# Patient Record
Sex: MALE | Race: WHITE | Employment: PART TIME | URBAN - METROPOLITAN AREA
[De-identification: names, ages, dates, MRNs, and addresses within clinical notes are randomized per-mention and may not be internally consistent; named-entity substitution may affect disease eponyms.]

---

## 2017-03-14 ENCOUNTER — OFFICE VISIT (OUTPATIENT)
Dept: PRIMARY CARE CLINIC | Age: 77
End: 2017-03-14

## 2017-03-14 VITALS
OXYGEN SATURATION: 95 % | RESPIRATION RATE: 14 BRPM | HEIGHT: 75 IN | DIASTOLIC BLOOD PRESSURE: 74 MMHG | WEIGHT: 315 LBS | TEMPERATURE: 97.1 F | BODY MASS INDEX: 39.17 KG/M2 | HEART RATE: 63 BPM | SYSTOLIC BLOOD PRESSURE: 128 MMHG

## 2017-03-14 DIAGNOSIS — E66.01 MORBID OBESITY WITH BMI OF 40.0-44.9, ADULT (HCC): ICD-10-CM

## 2017-03-14 DIAGNOSIS — G47.33 OSA (OBSTRUCTIVE SLEEP APNEA): ICD-10-CM

## 2017-03-14 DIAGNOSIS — I65.23 CAROTID STENOSIS, ASYMPTOMATIC, BILATERAL: ICD-10-CM

## 2017-03-14 DIAGNOSIS — E03.9 HYPOTHYROIDISM (ACQUIRED): ICD-10-CM

## 2017-03-14 DIAGNOSIS — I73.9 PERIPHERAL VASCULAR DISEASE (HCC): ICD-10-CM

## 2017-03-14 DIAGNOSIS — E66.01 MORBID OBESITY DUE TO EXCESS CALORIES (HCC): ICD-10-CM

## 2017-03-14 DIAGNOSIS — R55 NEAR SYNCOPE: Primary | ICD-10-CM

## 2017-03-14 DIAGNOSIS — I10 HTN (HYPERTENSION), BENIGN: ICD-10-CM

## 2017-03-14 DIAGNOSIS — R55 NEAR SYNCOPE: ICD-10-CM

## 2017-03-14 DIAGNOSIS — Z86.010 HISTORY OF COLONIC POLYPS: ICD-10-CM

## 2017-03-14 LAB
ALBUMIN SERPL-MCNC: 4.2 G/DL (ref 3.9–4.9)
ALP BLD-CCNC: 61 U/L (ref 35–104)
ALT SERPL-CCNC: 50 U/L (ref 0–41)
ANION GAP SERPL CALCULATED.3IONS-SCNC: 13 MEQ/L (ref 7–13)
AST SERPL-CCNC: 36 U/L (ref 0–40)
BASOPHILS ABSOLUTE: 0 K/UL (ref 0–0.2)
BASOPHILS RELATIVE PERCENT: 0.7 %
BILIRUB SERPL-MCNC: 0.5 MG/DL (ref 0–1.2)
BILIRUBIN, POC: NORMAL
BLOOD URINE, POC: NORMAL
BUN BLDV-MCNC: 23 MG/DL (ref 8–23)
CALCIUM SERPL-MCNC: 9.4 MG/DL (ref 8.6–10.2)
CHLORIDE BLD-SCNC: 103 MEQ/L (ref 98–107)
CLARITY, POC: CLEAR
CO2: 21 MEQ/L (ref 22–29)
COLOR, POC: YELLOW
CREAT SERPL-MCNC: 1.33 MG/DL (ref 0.7–1.2)
EOSINOPHILS ABSOLUTE: 0.2 K/UL (ref 0–0.7)
EOSINOPHILS RELATIVE PERCENT: 4.5 %
GFR AFRICAN AMERICAN: >60
GFR NON-AFRICAN AMERICAN: 52.2
GLOBULIN: 2.5 G/DL (ref 2.3–3.5)
GLUCOSE BLD-MCNC: 104 MG/DL (ref 74–109)
GLUCOSE URINE, POC: NORMAL
HCT VFR BLD CALC: 45.3 % (ref 42–52)
HEMOGLOBIN: 15 G/DL (ref 14–18)
KETONES, POC: NORMAL
LEUKOCYTE EST, POC: NORMAL
LYMPHOCYTES ABSOLUTE: 1.6 K/UL (ref 1–4.8)
LYMPHOCYTES RELATIVE PERCENT: 29.7 %
MCH RBC QN AUTO: 29.7 PG (ref 27–31.3)
MCHC RBC AUTO-ENTMCNC: 33 % (ref 33–37)
MCV RBC AUTO: 90.1 FL (ref 80–100)
MONOCYTES ABSOLUTE: 0.5 K/UL (ref 0.2–0.8)
MONOCYTES RELATIVE PERCENT: 8.4 %
NEUTROPHILS ABSOLUTE: 3.1 K/UL (ref 1.4–6.5)
NEUTROPHILS RELATIVE PERCENT: 56.7 %
NITRITE, POC: NORMAL
PDW BLD-RTO: 15.4 % (ref 11.5–14.5)
PH, POC: 6
PLATELET # BLD: 156 K/UL (ref 130–400)
POTASSIUM SERPL-SCNC: 4.1 MEQ/L (ref 3.5–5.1)
PROTEIN, POC: NORMAL
RBC # BLD: 5.03 M/UL (ref 4.7–6.1)
SODIUM BLD-SCNC: 137 MEQ/L (ref 132–144)
SPECIFIC GRAVITY, POC: 1.03
TOTAL PROTEIN: 6.7 G/DL (ref 6.4–8.1)
TSH SERPL DL<=0.05 MIU/L-ACNC: 1.76 UIU/ML (ref 0.27–4.2)
UROBILINOGEN, POC: NORMAL
WBC # BLD: 5.5 K/UL (ref 4.8–10.8)

## 2017-03-14 PROCEDURE — G8427 DOCREV CUR MEDS BY ELIG CLIN: HCPCS | Performed by: FAMILY MEDICINE

## 2017-03-14 PROCEDURE — G8598 ASA/ANTIPLAT THER USED: HCPCS | Performed by: FAMILY MEDICINE

## 2017-03-14 PROCEDURE — G8484 FLU IMMUNIZE NO ADMIN: HCPCS | Performed by: FAMILY MEDICINE

## 2017-03-14 PROCEDURE — 1123F ACP DISCUSS/DSCN MKR DOCD: CPT | Performed by: FAMILY MEDICINE

## 2017-03-14 PROCEDURE — G8417 CALC BMI ABV UP PARAM F/U: HCPCS | Performed by: FAMILY MEDICINE

## 2017-03-14 PROCEDURE — 93000 ELECTROCARDIOGRAM COMPLETE: CPT | Performed by: FAMILY MEDICINE

## 2017-03-14 PROCEDURE — 1036F TOBACCO NON-USER: CPT | Performed by: FAMILY MEDICINE

## 2017-03-14 PROCEDURE — 81002 URINALYSIS NONAUTO W/O SCOPE: CPT | Performed by: FAMILY MEDICINE

## 2017-03-14 PROCEDURE — 4040F PNEUMOC VAC/ADMIN/RCVD: CPT | Performed by: FAMILY MEDICINE

## 2017-03-14 PROCEDURE — 99214 OFFICE O/P EST MOD 30 MIN: CPT | Performed by: FAMILY MEDICINE

## 2017-03-14 ASSESSMENT — ENCOUNTER SYMPTOMS
CONSTIPATION: 0
ABDOMINAL PAIN: 0
WHEEZING: 0
SHORTNESS OF BREATH: 0
COUGH: 0
DIARRHEA: 0

## 2017-03-15 ENCOUNTER — TELEPHONE (OUTPATIENT)
Dept: INTERNAL MEDICINE | Age: 77
End: 2017-03-15

## 2017-03-20 ENCOUNTER — TELEPHONE (OUTPATIENT)
Dept: PRIMARY CARE CLINIC | Age: 77
End: 2017-03-20

## 2017-04-11 ENCOUNTER — OFFICE VISIT (OUTPATIENT)
Dept: PULMONOLOGY | Age: 77
End: 2017-04-11

## 2017-04-11 VITALS
DIASTOLIC BLOOD PRESSURE: 82 MMHG | TEMPERATURE: 97.6 F | SYSTOLIC BLOOD PRESSURE: 138 MMHG | WEIGHT: 315 LBS | RESPIRATION RATE: 16 BRPM | HEIGHT: 75 IN | HEART RATE: 61 BPM | BODY MASS INDEX: 39.17 KG/M2 | OXYGEN SATURATION: 98 %

## 2017-04-11 DIAGNOSIS — G47.33 SLEEP APNEA, OBSTRUCTIVE: Primary | ICD-10-CM

## 2017-04-11 DIAGNOSIS — G47.429 NARCOLEPSY DUE TO UNDERLYING CONDITION WITHOUT CATAPLEXY: ICD-10-CM

## 2017-04-11 DIAGNOSIS — G47.19 DAYTIME HYPERSOMNOLENCE: ICD-10-CM

## 2017-04-11 PROCEDURE — 1123F ACP DISCUSS/DSCN MKR DOCD: CPT | Performed by: INTERNAL MEDICINE

## 2017-04-11 PROCEDURE — 99204 OFFICE O/P NEW MOD 45 MIN: CPT | Performed by: INTERNAL MEDICINE

## 2017-04-11 PROCEDURE — G8417 CALC BMI ABV UP PARAM F/U: HCPCS | Performed by: INTERNAL MEDICINE

## 2017-04-11 PROCEDURE — G8598 ASA/ANTIPLAT THER USED: HCPCS | Performed by: INTERNAL MEDICINE

## 2017-04-11 PROCEDURE — G8427 DOCREV CUR MEDS BY ELIG CLIN: HCPCS | Performed by: INTERNAL MEDICINE

## 2017-04-11 PROCEDURE — 1036F TOBACCO NON-USER: CPT | Performed by: INTERNAL MEDICINE

## 2017-04-11 PROCEDURE — 4040F PNEUMOC VAC/ADMIN/RCVD: CPT | Performed by: INTERNAL MEDICINE

## 2017-04-11 ASSESSMENT — ENCOUNTER SYMPTOMS
VOMITING: 0
SHORTNESS OF BREATH: 0
CHEST TIGHTNESS: 0
COUGH: 0
SORE THROAT: 0
SINUS PRESSURE: 0
WHEEZING: 0
RHINORRHEA: 0
NAUSEA: 0
ABDOMINAL PAIN: 0
DIARRHEA: 0

## 2017-04-20 PROBLEM — M47.816 SPONDYLOSIS OF LUMBAR REGION WITHOUT MYELOPATHY OR RADICULOPATHY: Status: ACTIVE | Noted: 2017-04-20

## 2017-04-20 RX ORDER — FENOFIBRATE 160 MG/1
TABLET ORAL
Qty: 90 TABLET | Refills: 0 | Status: SHIPPED | OUTPATIENT
Start: 2017-04-20 | End: 2017-07-24 | Stop reason: SDUPTHER

## 2017-04-27 ENCOUNTER — HOSPITAL ENCOUNTER (OUTPATIENT)
Dept: SLEEP CENTER | Age: 77
Discharge: HOME | End: 2017-04-27
Payer: MEDICARE

## 2017-04-27 PROCEDURE — 95811 POLYSOM 6/>YRS CPAP 4/> PARM: CPT

## 2017-04-28 ENCOUNTER — HOSPITAL ENCOUNTER (OUTPATIENT)
Dept: SLEEP CENTER | Age: 77
Discharge: HOME | End: 2017-04-28
Payer: MEDICARE

## 2017-04-28 PROCEDURE — 95805 MULTIPLE SLEEP LATENCY TEST: CPT

## 2017-05-02 DIAGNOSIS — G47.429 NARCOLEPSY DUE TO UNDERLYING CONDITION WITHOUT CATAPLEXY: ICD-10-CM

## 2017-05-07 RX ORDER — AMLODIPINE BESYLATE 5 MG/1
TABLET ORAL
Qty: 90 TABLET | Refills: 1 | Status: SHIPPED | OUTPATIENT
Start: 2017-05-07 | End: 2017-11-12 | Stop reason: SDUPTHER

## 2017-05-07 RX ORDER — LISINOPRIL 30 MG/1
TABLET ORAL
Qty: 90 TABLET | Refills: 1 | Status: SHIPPED | OUTPATIENT
Start: 2017-05-07 | End: 2017-11-12 | Stop reason: SDUPTHER

## 2017-05-10 ENCOUNTER — OFFICE VISIT (OUTPATIENT)
Dept: PRIMARY CARE CLINIC | Age: 77
End: 2017-05-10

## 2017-05-10 VITALS
TEMPERATURE: 98.9 F | HEART RATE: 70 BPM | HEIGHT: 74 IN | OXYGEN SATURATION: 98 % | BODY MASS INDEX: 40.43 KG/M2 | DIASTOLIC BLOOD PRESSURE: 86 MMHG | WEIGHT: 315 LBS | SYSTOLIC BLOOD PRESSURE: 134 MMHG

## 2017-05-10 DIAGNOSIS — N39.0 URINARY TRACT INFECTION WITHOUT HEMATURIA, SITE UNSPECIFIED: Primary | ICD-10-CM

## 2017-05-10 DIAGNOSIS — I10 HTN (HYPERTENSION), BENIGN: ICD-10-CM

## 2017-05-10 DIAGNOSIS — G47.33 OSA (OBSTRUCTIVE SLEEP APNEA): ICD-10-CM

## 2017-05-10 DIAGNOSIS — E78.2 MIXED HYPERLIPIDEMIA: ICD-10-CM

## 2017-05-10 LAB
BILIRUBIN, POC: ABNORMAL
BLOOD URINE, POC: ABNORMAL
CLARITY, POC: ABNORMAL
COLOR, POC: ABNORMAL
GLUCOSE URINE, POC: ABNORMAL
KETONES, POC: ABNORMAL
LEUKOCYTE EST, POC: ABNORMAL
NITRITE, POC: ABNORMAL
PH, POC: 6
PROTEIN, POC: ABNORMAL
SPECIFIC GRAVITY, POC: 1.03
UROBILINOGEN, POC: ABNORMAL

## 2017-05-10 PROCEDURE — G8427 DOCREV CUR MEDS BY ELIG CLIN: HCPCS | Performed by: FAMILY MEDICINE

## 2017-05-10 PROCEDURE — 99213 OFFICE O/P EST LOW 20 MIN: CPT | Performed by: FAMILY MEDICINE

## 2017-05-10 PROCEDURE — 96372 THER/PROPH/DIAG INJ SC/IM: CPT | Performed by: FAMILY MEDICINE

## 2017-05-10 PROCEDURE — 1036F TOBACCO NON-USER: CPT | Performed by: FAMILY MEDICINE

## 2017-05-10 PROCEDURE — 4040F PNEUMOC VAC/ADMIN/RCVD: CPT | Performed by: FAMILY MEDICINE

## 2017-05-10 PROCEDURE — 81003 URINALYSIS AUTO W/O SCOPE: CPT | Performed by: FAMILY MEDICINE

## 2017-05-10 PROCEDURE — 1123F ACP DISCUSS/DSCN MKR DOCD: CPT | Performed by: FAMILY MEDICINE

## 2017-05-10 PROCEDURE — G8417 CALC BMI ABV UP PARAM F/U: HCPCS | Performed by: FAMILY MEDICINE

## 2017-05-10 PROCEDURE — G8598 ASA/ANTIPLAT THER USED: HCPCS | Performed by: FAMILY MEDICINE

## 2017-05-10 RX ORDER — CIPROFLOXACIN 500 MG/1
TABLET, FILM COATED ORAL
Qty: 20 TABLET | Refills: 0 | Status: SHIPPED | OUTPATIENT
Start: 2017-05-10 | End: 2017-05-20

## 2017-05-10 RX ORDER — CEFTRIAXONE 1 G/1
1 INJECTION, POWDER, FOR SOLUTION INTRAMUSCULAR; INTRAVENOUS ONCE
Status: COMPLETED | OUTPATIENT
Start: 2017-05-10 | End: 2017-05-10

## 2017-05-10 RX ADMIN — CEFTRIAXONE 1 G: 1 INJECTION, POWDER, FOR SOLUTION INTRAMUSCULAR; INTRAVENOUS at 17:40

## 2017-05-10 ASSESSMENT — ENCOUNTER SYMPTOMS
COUGH: 0
DIARRHEA: 0
NAUSEA: 0
SHORTNESS OF BREATH: 0
VOMITING: 0
CONSTIPATION: 0
ABDOMINAL PAIN: 0
BACK PAIN: 0
SORE THROAT: 0
WHEEZING: 0

## 2017-05-11 ENCOUNTER — OFFICE VISIT (OUTPATIENT)
Dept: PULMONOLOGY | Age: 77
End: 2017-05-11

## 2017-05-11 VITALS
OXYGEN SATURATION: 96 % | HEIGHT: 75 IN | SYSTOLIC BLOOD PRESSURE: 132 MMHG | RESPIRATION RATE: 18 BRPM | HEART RATE: 58 BPM | TEMPERATURE: 97.2 F | WEIGHT: 315 LBS | DIASTOLIC BLOOD PRESSURE: 76 MMHG | BODY MASS INDEX: 39.17 KG/M2

## 2017-05-11 DIAGNOSIS — G47.33 SLEEP APNEA, OBSTRUCTIVE: Primary | ICD-10-CM

## 2017-05-11 PROCEDURE — 4040F PNEUMOC VAC/ADMIN/RCVD: CPT | Performed by: INTERNAL MEDICINE

## 2017-05-11 PROCEDURE — 1123F ACP DISCUSS/DSCN MKR DOCD: CPT | Performed by: INTERNAL MEDICINE

## 2017-05-11 PROCEDURE — 99213 OFFICE O/P EST LOW 20 MIN: CPT | Performed by: INTERNAL MEDICINE

## 2017-05-11 PROCEDURE — G8417 CALC BMI ABV UP PARAM F/U: HCPCS | Performed by: INTERNAL MEDICINE

## 2017-05-11 PROCEDURE — G8598 ASA/ANTIPLAT THER USED: HCPCS | Performed by: INTERNAL MEDICINE

## 2017-05-11 PROCEDURE — 1036F TOBACCO NON-USER: CPT | Performed by: INTERNAL MEDICINE

## 2017-05-11 PROCEDURE — G8427 DOCREV CUR MEDS BY ELIG CLIN: HCPCS | Performed by: INTERNAL MEDICINE

## 2017-05-11 ASSESSMENT — ENCOUNTER SYMPTOMS
ABDOMINAL PAIN: 0
SORE THROAT: 0
WHEEZING: 0
COUGH: 0
SINUS PRESSURE: 0
RHINORRHEA: 0
NAUSEA: 0
VOMITING: 0
DIARRHEA: 0
CHEST TIGHTNESS: 0
SHORTNESS OF BREATH: 0

## 2017-05-13 LAB
ORGANISM: ABNORMAL
URINE CULTURE, ROUTINE: ABNORMAL

## 2017-05-30 ENCOUNTER — HOSPITAL ENCOUNTER (OUTPATIENT)
Dept: SLEEP CENTER | Age: 77
Discharge: HOME | End: 2017-05-30
Payer: MEDICARE

## 2017-05-30 PROCEDURE — 95810 POLYSOM 6/> YRS 4/> PARAM: CPT

## 2017-06-21 ENCOUNTER — TELEPHONE (OUTPATIENT)
Dept: PRIMARY CARE CLINIC | Age: 77
End: 2017-06-21

## 2017-06-21 ENCOUNTER — OFFICE VISIT (OUTPATIENT)
Dept: PRIMARY CARE CLINIC | Age: 77
End: 2017-06-21

## 2017-06-21 VITALS
SYSTOLIC BLOOD PRESSURE: 120 MMHG | DIASTOLIC BLOOD PRESSURE: 78 MMHG | TEMPERATURE: 97.6 F | HEART RATE: 56 BPM | RESPIRATION RATE: 18 BRPM | BODY MASS INDEX: 41.75 KG/M2 | OXYGEN SATURATION: 93 % | HEIGHT: 73 IN | WEIGHT: 315 LBS

## 2017-06-21 DIAGNOSIS — I10 HTN (HYPERTENSION), BENIGN: ICD-10-CM

## 2017-06-21 DIAGNOSIS — E78.5 DYSLIPIDEMIA: ICD-10-CM

## 2017-06-21 DIAGNOSIS — Z12.5 PROSTATE CANCER SCREENING: ICD-10-CM

## 2017-06-21 DIAGNOSIS — M47.816 SPONDYLOSIS OF LUMBAR REGION WITHOUT MYELOPATHY OR RADICULOPATHY: ICD-10-CM

## 2017-06-21 DIAGNOSIS — M17.11 PRIMARY OSTEOARTHRITIS OF RIGHT KNEE: Primary | ICD-10-CM

## 2017-06-21 LAB
ALBUMIN SERPL-MCNC: 4.1 G/DL (ref 3.9–4.9)
ALP BLD-CCNC: 60 U/L (ref 35–104)
ALT SERPL-CCNC: 41 U/L (ref 0–41)
ANION GAP SERPL CALCULATED.3IONS-SCNC: 14 MEQ/L (ref 7–13)
AST SERPL-CCNC: 28 U/L (ref 0–40)
BILIRUB SERPL-MCNC: 0.6 MG/DL (ref 0–1.2)
BUN BLDV-MCNC: 20 MG/DL (ref 8–23)
CALCIUM SERPL-MCNC: 9 MG/DL (ref 8.6–10.2)
CHLORIDE BLD-SCNC: 105 MEQ/L (ref 98–107)
CHOLESTEROL, TOTAL: 134 MG/DL (ref 0–199)
CO2: 22 MEQ/L (ref 22–29)
CREAT SERPL-MCNC: 1.04 MG/DL (ref 0.7–1.2)
GFR AFRICAN AMERICAN: >60
GFR NON-AFRICAN AMERICAN: >60
GLOBULIN: 2.4 G/DL (ref 2.3–3.5)
GLUCOSE BLD-MCNC: 117 MG/DL (ref 74–109)
HDLC SERPL-MCNC: 28 MG/DL (ref 40–59)
LDL CHOLESTEROL CALCULATED: 83 MG/DL (ref 0–129)
POTASSIUM SERPL-SCNC: 4.4 MEQ/L (ref 3.5–5.1)
PROSTATE SPECIFIC ANTIGEN: 0.01 NG/ML (ref 0–6.22)
SODIUM BLD-SCNC: 141 MEQ/L (ref 132–144)
TOTAL PROTEIN: 6.5 G/DL (ref 6.4–8.1)
TRIGL SERPL-MCNC: 117 MG/DL (ref 0–200)

## 2017-06-21 PROCEDURE — 1123F ACP DISCUSS/DSCN MKR DOCD: CPT | Performed by: FAMILY MEDICINE

## 2017-06-21 PROCEDURE — G8427 DOCREV CUR MEDS BY ELIG CLIN: HCPCS | Performed by: FAMILY MEDICINE

## 2017-06-21 PROCEDURE — G8598 ASA/ANTIPLAT THER USED: HCPCS | Performed by: FAMILY MEDICINE

## 2017-06-21 PROCEDURE — G8417 CALC BMI ABV UP PARAM F/U: HCPCS | Performed by: FAMILY MEDICINE

## 2017-06-21 PROCEDURE — 99213 OFFICE O/P EST LOW 20 MIN: CPT | Performed by: FAMILY MEDICINE

## 2017-06-21 PROCEDURE — 1036F TOBACCO NON-USER: CPT | Performed by: FAMILY MEDICINE

## 2017-06-21 PROCEDURE — 4040F PNEUMOC VAC/ADMIN/RCVD: CPT | Performed by: FAMILY MEDICINE

## 2017-06-21 ASSESSMENT — ENCOUNTER SYMPTOMS
WHEEZING: 0
BACK PAIN: 0
DIARRHEA: 0
NAUSEA: 0
SORE THROAT: 0
CONSTIPATION: 0
ABDOMINAL PAIN: 0
COUGH: 0
SHORTNESS OF BREATH: 0
VOMITING: 0

## 2017-06-23 DIAGNOSIS — M47.816 SPONDYLOSIS OF LUMBAR REGION WITHOUT MYELOPATHY OR RADICULOPATHY: ICD-10-CM

## 2017-06-23 RX ORDER — TRAMADOL HYDROCHLORIDE 50 MG/1
50 TABLET ORAL EVERY 8 HOURS PRN
Qty: 90 TABLET | Refills: 0 | Status: SHIPPED | OUTPATIENT
Start: 2017-06-23 | End: 2017-07-23

## 2017-07-07 DIAGNOSIS — L02.212 ABSCESS OF BACK: ICD-10-CM

## 2017-07-09 LAB
GRAM STAIN RESULT: ABNORMAL
WOUND/ABSCESS: ABNORMAL

## 2017-07-10 ENCOUNTER — OFFICE VISIT (OUTPATIENT)
Dept: PRIMARY CARE CLINIC | Age: 77
End: 2017-07-10

## 2017-07-10 VITALS
TEMPERATURE: 98.3 F | DIASTOLIC BLOOD PRESSURE: 66 MMHG | HEART RATE: 60 BPM | SYSTOLIC BLOOD PRESSURE: 126 MMHG | HEIGHT: 73 IN | BODY MASS INDEX: 41.75 KG/M2 | RESPIRATION RATE: 14 BRPM | OXYGEN SATURATION: 93 % | WEIGHT: 315 LBS

## 2017-07-10 DIAGNOSIS — L72.3 INFLAMED SEBACEOUS CYST: Primary | ICD-10-CM

## 2017-07-10 DIAGNOSIS — G47.33 OSA (OBSTRUCTIVE SLEEP APNEA): ICD-10-CM

## 2017-07-10 DIAGNOSIS — I10 HTN (HYPERTENSION), BENIGN: ICD-10-CM

## 2017-07-10 PROCEDURE — G8598 ASA/ANTIPLAT THER USED: HCPCS | Performed by: FAMILY MEDICINE

## 2017-07-10 PROCEDURE — 4040F PNEUMOC VAC/ADMIN/RCVD: CPT | Performed by: FAMILY MEDICINE

## 2017-07-10 PROCEDURE — G8417 CALC BMI ABV UP PARAM F/U: HCPCS | Performed by: FAMILY MEDICINE

## 2017-07-10 PROCEDURE — 99213 OFFICE O/P EST LOW 20 MIN: CPT | Performed by: FAMILY MEDICINE

## 2017-07-10 PROCEDURE — 1123F ACP DISCUSS/DSCN MKR DOCD: CPT | Performed by: FAMILY MEDICINE

## 2017-07-10 PROCEDURE — G8427 DOCREV CUR MEDS BY ELIG CLIN: HCPCS | Performed by: FAMILY MEDICINE

## 2017-07-10 PROCEDURE — 1036F TOBACCO NON-USER: CPT | Performed by: FAMILY MEDICINE

## 2017-07-10 ASSESSMENT — ENCOUNTER SYMPTOMS
COUGH: 0
CHEST TIGHTNESS: 0
GASTROINTESTINAL NEGATIVE: 1
SHORTNESS OF BREATH: 0
EYES NEGATIVE: 1
NAUSEA: 0
PHOTOPHOBIA: 0
VOMITING: 0
BACK PAIN: 0
APNEA: 0
BLOOD IN STOOL: 0
CONSTIPATION: 0
DIARRHEA: 0
RESPIRATORY NEGATIVE: 1
ABDOMINAL PAIN: 0
EYE DISCHARGE: 0

## 2017-07-14 ENCOUNTER — OFFICE VISIT (OUTPATIENT)
Dept: PRIMARY CARE CLINIC | Age: 77
End: 2017-07-14

## 2017-07-14 VITALS
DIASTOLIC BLOOD PRESSURE: 62 MMHG | RESPIRATION RATE: 16 BRPM | WEIGHT: 315 LBS | SYSTOLIC BLOOD PRESSURE: 112 MMHG | HEART RATE: 60 BPM | BODY MASS INDEX: 45.24 KG/M2 | TEMPERATURE: 97.7 F

## 2017-07-14 DIAGNOSIS — L72.3 INFECTED SEBACEOUS CYST OF SKIN: Primary | ICD-10-CM

## 2017-07-14 DIAGNOSIS — L08.9 INFECTED SEBACEOUS CYST OF SKIN: Primary | ICD-10-CM

## 2017-07-14 DIAGNOSIS — I10 HTN (HYPERTENSION), BENIGN: ICD-10-CM

## 2017-07-14 PROCEDURE — 4040F PNEUMOC VAC/ADMIN/RCVD: CPT | Performed by: FAMILY MEDICINE

## 2017-07-14 PROCEDURE — 99212 OFFICE O/P EST SF 10 MIN: CPT | Performed by: FAMILY MEDICINE

## 2017-07-14 PROCEDURE — G8598 ASA/ANTIPLAT THER USED: HCPCS | Performed by: FAMILY MEDICINE

## 2017-07-14 PROCEDURE — 1123F ACP DISCUSS/DSCN MKR DOCD: CPT | Performed by: FAMILY MEDICINE

## 2017-07-14 PROCEDURE — 1036F TOBACCO NON-USER: CPT | Performed by: FAMILY MEDICINE

## 2017-07-14 PROCEDURE — G8417 CALC BMI ABV UP PARAM F/U: HCPCS | Performed by: FAMILY MEDICINE

## 2017-07-14 PROCEDURE — G8427 DOCREV CUR MEDS BY ELIG CLIN: HCPCS | Performed by: FAMILY MEDICINE

## 2017-07-14 ASSESSMENT — ENCOUNTER SYMPTOMS
DIARRHEA: 0
RESPIRATORY NEGATIVE: 1
CHEST TIGHTNESS: 0
BACK PAIN: 0
ABDOMINAL PAIN: 0
EYE DISCHARGE: 0
VOMITING: 0
GASTROINTESTINAL NEGATIVE: 1
APNEA: 0
SHORTNESS OF BREATH: 0
PHOTOPHOBIA: 0
BLOOD IN STOOL: 0
CONSTIPATION: 0
COUGH: 0
EYES NEGATIVE: 1
NAUSEA: 0

## 2017-07-17 ENCOUNTER — OFFICE VISIT (OUTPATIENT)
Dept: PULMONOLOGY | Age: 77
End: 2017-07-17

## 2017-07-17 VITALS
TEMPERATURE: 97.6 F | HEIGHT: 75 IN | SYSTOLIC BLOOD PRESSURE: 116 MMHG | WEIGHT: 315 LBS | HEART RATE: 59 BPM | OXYGEN SATURATION: 98 % | RESPIRATION RATE: 16 BRPM | BODY MASS INDEX: 39.17 KG/M2 | DIASTOLIC BLOOD PRESSURE: 72 MMHG

## 2017-07-17 DIAGNOSIS — G47.33 SLEEP APNEA, OBSTRUCTIVE: Primary | ICD-10-CM

## 2017-07-17 PROCEDURE — G8427 DOCREV CUR MEDS BY ELIG CLIN: HCPCS | Performed by: INTERNAL MEDICINE

## 2017-07-17 PROCEDURE — G8417 CALC BMI ABV UP PARAM F/U: HCPCS | Performed by: INTERNAL MEDICINE

## 2017-07-17 PROCEDURE — 99213 OFFICE O/P EST LOW 20 MIN: CPT | Performed by: INTERNAL MEDICINE

## 2017-07-17 PROCEDURE — 4040F PNEUMOC VAC/ADMIN/RCVD: CPT | Performed by: INTERNAL MEDICINE

## 2017-07-17 PROCEDURE — 1123F ACP DISCUSS/DSCN MKR DOCD: CPT | Performed by: INTERNAL MEDICINE

## 2017-07-17 PROCEDURE — G8598 ASA/ANTIPLAT THER USED: HCPCS | Performed by: INTERNAL MEDICINE

## 2017-07-17 PROCEDURE — 1036F TOBACCO NON-USER: CPT | Performed by: INTERNAL MEDICINE

## 2017-07-17 ASSESSMENT — ENCOUNTER SYMPTOMS
COUGH: 0
SHORTNESS OF BREATH: 0
SINUS PRESSURE: 0
WHEEZING: 0
ABDOMINAL PAIN: 0
BACK PAIN: 0
DIARRHEA: 0
CHEST TIGHTNESS: 0
RHINORRHEA: 0
CONSTIPATION: 0

## 2017-07-21 ENCOUNTER — OFFICE VISIT (OUTPATIENT)
Dept: PRIMARY CARE CLINIC | Age: 77
End: 2017-07-21

## 2017-07-21 VITALS
SYSTOLIC BLOOD PRESSURE: 130 MMHG | DIASTOLIC BLOOD PRESSURE: 82 MMHG | TEMPERATURE: 97.9 F | BODY MASS INDEX: 39.17 KG/M2 | OXYGEN SATURATION: 97 % | RESPIRATION RATE: 16 BRPM | WEIGHT: 315 LBS | HEART RATE: 56 BPM | HEIGHT: 75 IN

## 2017-07-21 DIAGNOSIS — N39.0 URINARY TRACT INFECTION WITHOUT HEMATURIA, SITE UNSPECIFIED: ICD-10-CM

## 2017-07-21 DIAGNOSIS — G47.33 OSA (OBSTRUCTIVE SLEEP APNEA): ICD-10-CM

## 2017-07-21 DIAGNOSIS — I10 HTN (HYPERTENSION), BENIGN: Primary | ICD-10-CM

## 2017-07-21 LAB
BILIRUBIN, POC: NORMAL
BLOOD URINE, POC: NORMAL
CLARITY, POC: CLEAR
COLOR, POC: YELLOW
GLUCOSE URINE, POC: NORMAL
KETONES, POC: NORMAL
LEUKOCYTE EST, POC: NORMAL
NITRITE, POC: NORMAL
PH, POC: 5.5
PROTEIN, POC: NORMAL
SPECIFIC GRAVITY, POC: 1.03
UROBILINOGEN, POC: 3.5

## 2017-07-21 PROCEDURE — G8417 CALC BMI ABV UP PARAM F/U: HCPCS | Performed by: FAMILY MEDICINE

## 2017-07-21 PROCEDURE — 4040F PNEUMOC VAC/ADMIN/RCVD: CPT | Performed by: FAMILY MEDICINE

## 2017-07-21 PROCEDURE — G8598 ASA/ANTIPLAT THER USED: HCPCS | Performed by: FAMILY MEDICINE

## 2017-07-21 PROCEDURE — 81002 URINALYSIS NONAUTO W/O SCOPE: CPT | Performed by: FAMILY MEDICINE

## 2017-07-21 PROCEDURE — 99212 OFFICE O/P EST SF 10 MIN: CPT | Performed by: FAMILY MEDICINE

## 2017-07-21 PROCEDURE — 1036F TOBACCO NON-USER: CPT | Performed by: FAMILY MEDICINE

## 2017-07-21 PROCEDURE — G8427 DOCREV CUR MEDS BY ELIG CLIN: HCPCS | Performed by: FAMILY MEDICINE

## 2017-07-21 PROCEDURE — 1123F ACP DISCUSS/DSCN MKR DOCD: CPT | Performed by: FAMILY MEDICINE

## 2017-07-21 ASSESSMENT — ENCOUNTER SYMPTOMS
SORE THROAT: 0
WHEEZING: 0
NAUSEA: 0
BACK PAIN: 0
DIARRHEA: 0
ROS SKIN COMMENTS: CYST
COUGH: 0
SHORTNESS OF BREATH: 0
CONSTIPATION: 0
VOMITING: 0
ABDOMINAL PAIN: 0

## 2017-07-24 RX ORDER — FENOFIBRATE 160 MG/1
TABLET ORAL
Qty: 90 TABLET | Refills: 1 | Status: SHIPPED | OUTPATIENT
Start: 2017-07-24 | End: 2018-01-17 | Stop reason: SDUPTHER

## 2017-08-06 ASSESSMENT — ENCOUNTER SYMPTOMS: COLOR CHANGE: 1

## 2017-09-05 ENCOUNTER — OFFICE VISIT (OUTPATIENT)
Dept: PULMONOLOGY | Age: 77
End: 2017-09-05

## 2017-09-05 VITALS
DIASTOLIC BLOOD PRESSURE: 76 MMHG | WEIGHT: 315 LBS | HEART RATE: 58 BPM | OXYGEN SATURATION: 97 % | RESPIRATION RATE: 16 BRPM | BODY MASS INDEX: 39.17 KG/M2 | HEIGHT: 75 IN | TEMPERATURE: 97.5 F | SYSTOLIC BLOOD PRESSURE: 124 MMHG

## 2017-09-05 DIAGNOSIS — G47.19 DAYTIME HYPERSOMNOLENCE: ICD-10-CM

## 2017-09-05 DIAGNOSIS — G47.33 SLEEP APNEA, OBSTRUCTIVE: Primary | ICD-10-CM

## 2017-09-05 PROCEDURE — 1036F TOBACCO NON-USER: CPT | Performed by: INTERNAL MEDICINE

## 2017-09-05 PROCEDURE — G8417 CALC BMI ABV UP PARAM F/U: HCPCS | Performed by: INTERNAL MEDICINE

## 2017-09-05 PROCEDURE — 4040F PNEUMOC VAC/ADMIN/RCVD: CPT | Performed by: INTERNAL MEDICINE

## 2017-09-05 PROCEDURE — 1123F ACP DISCUSS/DSCN MKR DOCD: CPT | Performed by: INTERNAL MEDICINE

## 2017-09-05 PROCEDURE — 99213 OFFICE O/P EST LOW 20 MIN: CPT | Performed by: INTERNAL MEDICINE

## 2017-09-05 PROCEDURE — G8427 DOCREV CUR MEDS BY ELIG CLIN: HCPCS | Performed by: INTERNAL MEDICINE

## 2017-09-05 PROCEDURE — G8598 ASA/ANTIPLAT THER USED: HCPCS | Performed by: INTERNAL MEDICINE

## 2017-09-05 ASSESSMENT — ENCOUNTER SYMPTOMS
RHINORRHEA: 0
SHORTNESS OF BREATH: 0
DIARRHEA: 0
NAUSEA: 0
COUGH: 0
VOMITING: 0
WHEEZING: 0
SORE THROAT: 0
SINUS PRESSURE: 0
ABDOMINAL PAIN: 0
CHEST TIGHTNESS: 0

## 2017-09-18 ENCOUNTER — OFFICE VISIT (OUTPATIENT)
Dept: PRIMARY CARE CLINIC | Age: 77
End: 2017-09-18

## 2017-09-18 VITALS
RESPIRATION RATE: 16 BRPM | HEART RATE: 56 BPM | SYSTOLIC BLOOD PRESSURE: 134 MMHG | DIASTOLIC BLOOD PRESSURE: 70 MMHG | TEMPERATURE: 97.7 F | HEIGHT: 75 IN | BODY MASS INDEX: 39.17 KG/M2 | WEIGHT: 315 LBS

## 2017-09-18 DIAGNOSIS — M47.816 SPONDYLOSIS OF LUMBAR REGION WITHOUT MYELOPATHY OR RADICULOPATHY: ICD-10-CM

## 2017-09-18 DIAGNOSIS — I10 HTN (HYPERTENSION), BENIGN: ICD-10-CM

## 2017-09-18 DIAGNOSIS — Z01.818 PRE-OP EXAM: ICD-10-CM

## 2017-09-18 DIAGNOSIS — C61 PROSTATE CA (HCC): ICD-10-CM

## 2017-09-18 DIAGNOSIS — M17.11 PRIMARY OSTEOARTHRITIS OF RIGHT KNEE: ICD-10-CM

## 2017-09-18 DIAGNOSIS — F41.1 GAD (GENERALIZED ANXIETY DISORDER): ICD-10-CM

## 2017-09-18 DIAGNOSIS — T14.8XXA BRUISING: ICD-10-CM

## 2017-09-18 DIAGNOSIS — Z01.818 PRE-OP EXAM: Primary | ICD-10-CM

## 2017-09-18 DIAGNOSIS — G47.33 OSA (OBSTRUCTIVE SLEEP APNEA): ICD-10-CM

## 2017-09-18 LAB
ALBUMIN SERPL-MCNC: 4.3 G/DL (ref 3.9–4.9)
ALP BLD-CCNC: 54 U/L (ref 35–104)
ALT SERPL-CCNC: 56 U/L (ref 0–41)
ANION GAP SERPL CALCULATED.3IONS-SCNC: 18 MEQ/L (ref 7–13)
APTT: 29.3 SEC (ref 21.6–35.4)
AST SERPL-CCNC: 39 U/L (ref 0–40)
BILIRUB SERPL-MCNC: 0.6 MG/DL (ref 0–1.2)
BILIRUBIN, POC: NORMAL
BLOOD URINE, POC: NORMAL
BUN BLDV-MCNC: 22 MG/DL (ref 8–23)
CALCIUM SERPL-MCNC: 9.2 MG/DL (ref 8.6–10.2)
CHLORIDE BLD-SCNC: 104 MEQ/L (ref 98–107)
CLARITY, POC: CLEAR
CO2: 19 MEQ/L (ref 22–29)
COLOR, POC: YELLOW
CREAT SERPL-MCNC: 1.25 MG/DL (ref 0.7–1.2)
GFR AFRICAN AMERICAN: >60
GFR NON-AFRICAN AMERICAN: 56
GLOBULIN: 2.6 G/DL (ref 2.3–3.5)
GLUCOSE BLD-MCNC: 143 MG/DL (ref 74–109)
GLUCOSE URINE, POC: NORMAL
HCT VFR BLD CALC: 44.2 % (ref 42–52)
HEMOGLOBIN: 14.6 G/DL (ref 14–18)
INR BLD: 1.1
KETONES, POC: NORMAL
LEUKOCYTE EST, POC: NORMAL
MCH RBC QN AUTO: 29.9 PG (ref 27–31.3)
MCHC RBC AUTO-ENTMCNC: 33 % (ref 33–37)
MCV RBC AUTO: 90.7 FL (ref 80–100)
NITRITE, POC: NORMAL
PDW BLD-RTO: 15.7 % (ref 11.5–14.5)
PH, POC: 6
PLATELET # BLD: 146 K/UL (ref 130–400)
POTASSIUM SERPL-SCNC: 4.3 MEQ/L (ref 3.5–5.1)
PROTEIN, POC: NORMAL
PROTHROMBIN TIME: 11.6 SEC (ref 8.1–13.7)
RBC # BLD: 4.88 M/UL (ref 4.7–6.1)
SODIUM BLD-SCNC: 141 MEQ/L (ref 132–144)
SPECIFIC GRAVITY, POC: 1.02
TOTAL PROTEIN: 6.9 G/DL (ref 6.4–8.1)
UROBILINOGEN, POC: NORMAL
WBC # BLD: 4.4 K/UL (ref 4.8–10.8)

## 2017-09-18 PROCEDURE — G8427 DOCREV CUR MEDS BY ELIG CLIN: HCPCS | Performed by: FAMILY MEDICINE

## 2017-09-18 PROCEDURE — G8417 CALC BMI ABV UP PARAM F/U: HCPCS | Performed by: FAMILY MEDICINE

## 2017-09-18 PROCEDURE — 93000 ELECTROCARDIOGRAM COMPLETE: CPT | Performed by: FAMILY MEDICINE

## 2017-09-18 PROCEDURE — G8598 ASA/ANTIPLAT THER USED: HCPCS | Performed by: FAMILY MEDICINE

## 2017-09-18 PROCEDURE — 81003 URINALYSIS AUTO W/O SCOPE: CPT | Performed by: FAMILY MEDICINE

## 2017-09-18 PROCEDURE — 1036F TOBACCO NON-USER: CPT | Performed by: FAMILY MEDICINE

## 2017-09-18 PROCEDURE — 1123F ACP DISCUSS/DSCN MKR DOCD: CPT | Performed by: FAMILY MEDICINE

## 2017-09-18 PROCEDURE — 99214 OFFICE O/P EST MOD 30 MIN: CPT | Performed by: FAMILY MEDICINE

## 2017-09-18 PROCEDURE — 4040F PNEUMOC VAC/ADMIN/RCVD: CPT | Performed by: FAMILY MEDICINE

## 2017-09-18 ASSESSMENT — ENCOUNTER SYMPTOMS
DIARRHEA: 0
CONSTIPATION: 0
APNEA: 0
RESPIRATORY NEGATIVE: 1
EYE DISCHARGE: 0
COUGH: 0
ABDOMINAL DISTENTION: 1
PHOTOPHOBIA: 0
BACK PAIN: 0
NAUSEA: 0
VOMITING: 0
CHEST TIGHTNESS: 0
SHORTNESS OF BREATH: 0
ABDOMINAL PAIN: 0
EYES NEGATIVE: 1
BLOOD IN STOOL: 0

## 2017-09-18 ASSESSMENT — PATIENT HEALTH QUESTIONNAIRE - PHQ9
SUM OF ALL RESPONSES TO PHQ QUESTIONS 1-9: 0
1. LITTLE INTEREST OR PLEASURE IN DOING THINGS: 0
SUM OF ALL RESPONSES TO PHQ9 QUESTIONS 1 & 2: 0
2. FEELING DOWN, DEPRESSED OR HOPELESS: 0

## 2017-10-17 ENCOUNTER — TELEPHONE (OUTPATIENT)
Dept: PRIMARY CARE CLINIC | Age: 77
End: 2017-10-17

## 2017-10-17 DIAGNOSIS — G47.00 INSOMNIA, UNSPECIFIED TYPE: ICD-10-CM

## 2017-10-17 RX ORDER — ESZOPICLONE 2 MG/1
TABLET, FILM COATED ORAL
Qty: 30 TABLET | Refills: 2 | Status: ON HOLD | OUTPATIENT
Start: 2017-10-17 | End: 2018-04-05 | Stop reason: HOSPADM

## 2017-11-12 RX ORDER — AMLODIPINE BESYLATE 5 MG/1
TABLET ORAL
Qty: 90 TABLET | Refills: 1 | Status: ON HOLD | OUTPATIENT
Start: 2017-11-12 | End: 2018-04-02

## 2017-11-12 RX ORDER — LISINOPRIL 30 MG/1
TABLET ORAL
Qty: 90 TABLET | Refills: 1 | Status: SHIPPED | OUTPATIENT
Start: 2017-11-12 | End: 2018-05-05 | Stop reason: SDUPTHER

## 2018-01-19 RX ORDER — FENOFIBRATE 160 MG/1
TABLET ORAL
Qty: 90 TABLET | Refills: 1 | Status: SHIPPED | OUTPATIENT
Start: 2018-01-19 | End: 2018-08-08 | Stop reason: SDUPTHER

## 2018-04-02 ENCOUNTER — HOSPITAL ENCOUNTER (INPATIENT)
Age: 78
LOS: 3 days | Discharge: HOME HEALTH | DRG: 271 | End: 2018-04-05
Attending: EMERGENCY MEDICINE | Admitting: INTERNAL MEDICINE
Payer: MEDICARE

## 2018-04-02 ENCOUNTER — ANESTHESIA EVENT (OUTPATIENT)
Dept: OPERATING ROOM | Age: 78
DRG: 271 | End: 2018-04-02
Payer: MEDICARE

## 2018-04-02 DIAGNOSIS — I72.9 PSEUDOANEURYSM (HCC): Primary | ICD-10-CM

## 2018-04-02 DIAGNOSIS — D50.8 OTHER IRON DEFICIENCY ANEMIA: ICD-10-CM

## 2018-04-02 LAB
ABO/RH: NORMAL
ALBUMIN SERPL-MCNC: 3.7 G/DL (ref 3.9–4.9)
ALP BLD-CCNC: 50 U/L (ref 35–104)
ALT SERPL-CCNC: 16 U/L (ref 0–41)
ANION GAP SERPL CALCULATED.3IONS-SCNC: 13 MEQ/L (ref 7–13)
ANTIBODY SCREEN: NORMAL
APTT: 29.8 SEC (ref 21.6–35.4)
AST SERPL-CCNC: 18 U/L (ref 0–40)
BASOPHILS ABSOLUTE: 0 K/UL (ref 0–0.2)
BASOPHILS RELATIVE PERCENT: 0.3 %
BILIRUB SERPL-MCNC: 1.7 MG/DL (ref 0–1.2)
BUN BLDV-MCNC: 32 MG/DL (ref 8–23)
CALCIUM SERPL-MCNC: 8.7 MG/DL (ref 8.6–10.2)
CHLORIDE BLD-SCNC: 102 MEQ/L (ref 98–107)
CO2: 23 MEQ/L (ref 22–29)
CREAT SERPL-MCNC: 1.31 MG/DL (ref 0.7–1.2)
EKG ATRIAL RATE: 64 BPM
EKG P AXIS: 95 DEGREES
EKG P-R INTERVAL: 218 MS
EKG Q-T INTERVAL: 432 MS
EKG QRS DURATION: 112 MS
EKG QTC CALCULATION (BAZETT): 445 MS
EKG R AXIS: -13 DEGREES
EKG T AXIS: 59 DEGREES
EKG VENTRICULAR RATE: 64 BPM
EOSINOPHILS ABSOLUTE: 0.1 K/UL (ref 0–0.7)
EOSINOPHILS RELATIVE PERCENT: 1.3 %
GFR AFRICAN AMERICAN: >60
GFR NON-AFRICAN AMERICAN: 53
GLOBULIN: 2.4 G/DL (ref 2.3–3.5)
GLUCOSE BLD-MCNC: 103 MG/DL (ref 74–109)
HCT VFR BLD CALC: 22.3 % (ref 42–52)
HEMOGLOBIN: 7.6 G/DL (ref 14–18)
INR BLD: 1.1
LYMPHOCYTES ABSOLUTE: 1.3 K/UL (ref 1–4.8)
LYMPHOCYTES RELATIVE PERCENT: 18.3 %
MCH RBC QN AUTO: 29.8 PG (ref 27–31.3)
MCHC RBC AUTO-ENTMCNC: 33.9 % (ref 33–37)
MCV RBC AUTO: 87.9 FL (ref 80–100)
MONOCYTES ABSOLUTE: 0.7 K/UL (ref 0.2–0.8)
MONOCYTES RELATIVE PERCENT: 10.3 %
NEUTROPHILS ABSOLUTE: 4.9 K/UL (ref 1.4–6.5)
NEUTROPHILS RELATIVE PERCENT: 69.8 %
PDW BLD-RTO: 16.7 % (ref 11.5–14.5)
PLATELET # BLD: 225 K/UL (ref 130–400)
POTASSIUM SERPL-SCNC: 4.5 MEQ/L (ref 3.5–5.1)
PROTHROMBIN TIME: 11.9 SEC (ref 8.1–13.7)
RBC # BLD: 2.54 M/UL (ref 4.7–6.1)
SODIUM BLD-SCNC: 138 MEQ/L (ref 132–144)
TOTAL PROTEIN: 6.1 G/DL (ref 6.4–8.1)
WBC # BLD: 7 K/UL (ref 4.8–10.8)

## 2018-04-02 PROCEDURE — 86920 COMPATIBILITY TEST SPIN: CPT

## 2018-04-02 PROCEDURE — 6360000002 HC RX W HCPCS: Performed by: EMERGENCY MEDICINE

## 2018-04-02 PROCEDURE — 93005 ELECTROCARDIOGRAM TRACING: CPT

## 2018-04-02 PROCEDURE — 96374 THER/PROPH/DIAG INJ IV PUSH: CPT

## 2018-04-02 PROCEDURE — 36430 TRANSFUSION BLD/BLD COMPNT: CPT

## 2018-04-02 PROCEDURE — 85610 PROTHROMBIN TIME: CPT

## 2018-04-02 PROCEDURE — 36415 COLL VENOUS BLD VENIPUNCTURE: CPT

## 2018-04-02 PROCEDURE — 85025 COMPLETE CBC W/AUTO DIFF WBC: CPT

## 2018-04-02 PROCEDURE — 80053 COMPREHEN METABOLIC PANEL: CPT

## 2018-04-02 PROCEDURE — 2580000003 HC RX 258: Performed by: INTERNAL MEDICINE

## 2018-04-02 PROCEDURE — 6370000000 HC RX 637 (ALT 250 FOR IP): Performed by: INTERNAL MEDICINE

## 2018-04-02 PROCEDURE — 86901 BLOOD TYPING SEROLOGIC RH(D): CPT

## 2018-04-02 PROCEDURE — P9016 RBC LEUKOCYTES REDUCED: HCPCS

## 2018-04-02 PROCEDURE — 2580000003 HC RX 258: Performed by: EMERGENCY MEDICINE

## 2018-04-02 PROCEDURE — 86850 RBC ANTIBODY SCREEN: CPT

## 2018-04-02 PROCEDURE — 1210000000 HC MED SURG R&B

## 2018-04-02 PROCEDURE — 85730 THROMBOPLASTIN TIME PARTIAL: CPT

## 2018-04-02 PROCEDURE — 99285 EMERGENCY DEPT VISIT HI MDM: CPT

## 2018-04-02 PROCEDURE — 86900 BLOOD TYPING SEROLOGIC ABO: CPT

## 2018-04-02 RX ORDER — ZOLPIDEM TARTRATE 5 MG/1
5 TABLET ORAL NIGHTLY PRN
Status: DISCONTINUED | OUTPATIENT
Start: 2018-04-02 | End: 2018-04-03

## 2018-04-02 RX ORDER — 0.9 % SODIUM CHLORIDE 0.9 %
250 INTRAVENOUS SOLUTION INTRAVENOUS ONCE
Status: COMPLETED | OUTPATIENT
Start: 2018-04-02 | End: 2018-04-03

## 2018-04-02 RX ORDER — NIACIN 500 MG/1
500 TABLET, EXTENDED RELEASE ORAL NIGHTLY
Status: DISCONTINUED | OUTPATIENT
Start: 2018-04-02 | End: 2018-04-03

## 2018-04-02 RX ORDER — FERROUS SULFATE 325(65) MG
325 TABLET ORAL 2 TIMES DAILY
Status: DISCONTINUED | OUTPATIENT
Start: 2018-04-02 | End: 2018-04-03

## 2018-04-02 RX ORDER — ACETAMINOPHEN 325 MG/1
650 TABLET ORAL EVERY 4 HOURS PRN
Status: DISCONTINUED | OUTPATIENT
Start: 2018-04-02 | End: 2018-04-03

## 2018-04-02 RX ORDER — GABAPENTIN 300 MG/1
300 CAPSULE ORAL DAILY
COMMUNITY
End: 2018-10-03 | Stop reason: SDUPTHER

## 2018-04-02 RX ORDER — FENOFIBRATE 160 MG/1
160 TABLET ORAL DAILY
Status: DISCONTINUED | OUTPATIENT
Start: 2018-04-02 | End: 2018-04-03

## 2018-04-02 RX ORDER — ONDANSETRON 2 MG/ML
4 INJECTION INTRAMUSCULAR; INTRAVENOUS EVERY 6 HOURS PRN
Status: DISCONTINUED | OUTPATIENT
Start: 2018-04-02 | End: 2018-04-03

## 2018-04-02 RX ORDER — FERROUS SULFATE 325(65) MG
325 TABLET ORAL 2 TIMES DAILY
COMMUNITY

## 2018-04-02 RX ORDER — LISINOPRIL 30 MG/1
1 TABLET ORAL DAILY
Status: CANCELLED | OUTPATIENT
Start: 2018-04-02

## 2018-04-02 RX ORDER — GABAPENTIN 300 MG/1
300 CAPSULE ORAL DAILY
Status: DISCONTINUED | OUTPATIENT
Start: 2018-04-03 | End: 2018-04-03

## 2018-04-02 RX ORDER — FAMOTIDINE 20 MG/1
20 TABLET, FILM COATED ORAL 2 TIMES DAILY
Status: DISCONTINUED | OUTPATIENT
Start: 2018-04-02 | End: 2018-04-03

## 2018-04-02 RX ORDER — FUROSEMIDE 20 MG/1
20 TABLET ORAL DAILY
COMMUNITY

## 2018-04-02 RX ORDER — LANOLIN ALCOHOL/MO/W.PET/CERES
3 CREAM (GRAM) TOPICAL DAILY
Status: DISCONTINUED | OUTPATIENT
Start: 2018-04-02 | End: 2018-04-03

## 2018-04-02 RX ORDER — 0.9 % SODIUM CHLORIDE 0.9 %
1000 INTRAVENOUS SOLUTION INTRAVENOUS ONCE
Status: COMPLETED | OUTPATIENT
Start: 2018-04-02 | End: 2018-04-02

## 2018-04-02 RX ORDER — ATORVASTATIN CALCIUM 10 MG/1
10 TABLET, FILM COATED ORAL DAILY
Status: DISCONTINUED | OUTPATIENT
Start: 2018-04-02 | End: 2018-04-03

## 2018-04-02 RX ORDER — METOPROLOL SUCCINATE 25 MG/1
25 TABLET, EXTENDED RELEASE ORAL DAILY
Status: DISCONTINUED | OUTPATIENT
Start: 2018-04-02 | End: 2018-04-03

## 2018-04-02 RX ORDER — MAGNESIUM GLUCONATE 30 MG(550)
50 TABLET ORAL
COMMUNITY

## 2018-04-02 RX ORDER — ONDANSETRON 2 MG/ML
4 INJECTION INTRAMUSCULAR; INTRAVENOUS ONCE
Status: COMPLETED | OUTPATIENT
Start: 2018-04-02 | End: 2018-04-02

## 2018-04-02 RX ADMIN — ATORVASTATIN CALCIUM 10 MG: 10 TABLET, FILM COATED ORAL at 22:05

## 2018-04-02 RX ADMIN — SODIUM CHLORIDE 1000 ML: 9 INJECTION, SOLUTION INTRAVENOUS at 13:26

## 2018-04-02 RX ADMIN — ONDANSETRON 4 MG: 2 INJECTION INTRAMUSCULAR; INTRAVENOUS at 13:26

## 2018-04-02 RX ADMIN — FERROUS SULFATE TAB 325 MG (65 MG ELEMENTAL FE) 325 MG: 325 (65 FE) TAB at 22:06

## 2018-04-02 RX ADMIN — SODIUM CHLORIDE 250 ML: 9 INJECTION, SOLUTION INTRAVENOUS at 22:04

## 2018-04-02 RX ADMIN — Medication 1 MG: at 13:26

## 2018-04-02 RX ADMIN — FENOFIBRATE 160 MG: 160 TABLET ORAL at 22:04

## 2018-04-02 RX ADMIN — METOPROLOL SUCCINATE 25 MG: 25 TABLET, EXTENDED RELEASE ORAL at 22:05

## 2018-04-02 RX ADMIN — FAMOTIDINE 20 MG: 20 TABLET, FILM COATED ORAL at 22:05

## 2018-04-02 RX ADMIN — MELATONIN TAB 3 MG 3 MG: 3 TAB at 22:04

## 2018-04-02 ASSESSMENT — ENCOUNTER SYMPTOMS
DIARRHEA: 0
COUGH: 0
ABDOMINAL PAIN: 0
NAUSEA: 0
SORE THROAT: 0
SHORTNESS OF BREATH: 0
BACK PAIN: 0
VOMITING: 0

## 2018-04-02 ASSESSMENT — PAIN DESCRIPTION - LOCATION
LOCATION: GROIN
LOCATION: GROIN

## 2018-04-02 ASSESSMENT — PAIN DESCRIPTION - ORIENTATION
ORIENTATION: RIGHT
ORIENTATION: RIGHT

## 2018-04-02 ASSESSMENT — PAIN SCALES - GENERAL
PAINLEVEL_OUTOF10: 8
PAINLEVEL_OUTOF10: 4
PAINLEVEL_OUTOF10: 6

## 2018-04-02 ASSESSMENT — PAIN DESCRIPTION - PAIN TYPE
TYPE: ACUTE PAIN
TYPE: ACUTE PAIN

## 2018-04-03 ENCOUNTER — APPOINTMENT (OUTPATIENT)
Dept: GENERAL RADIOLOGY | Age: 78
DRG: 271 | End: 2018-04-03
Payer: MEDICARE

## 2018-04-03 ENCOUNTER — ANESTHESIA (OUTPATIENT)
Dept: OPERATING ROOM | Age: 78
DRG: 271 | End: 2018-04-03
Payer: MEDICARE

## 2018-04-03 VITALS — TEMPERATURE: 98.4 F | RESPIRATION RATE: 19 BRPM | OXYGEN SATURATION: 100 %

## 2018-04-03 LAB
ANION GAP SERPL CALCULATED.3IONS-SCNC: 15 MEQ/L (ref 7–13)
BASOPHILS ABSOLUTE: 0 K/UL (ref 0–0.2)
BASOPHILS RELATIVE PERCENT: 0.3 %
BUN BLDV-MCNC: 30 MG/DL (ref 8–23)
CALCIUM SERPL-MCNC: 8.8 MG/DL (ref 8.6–10.2)
CHLORIDE BLD-SCNC: 102 MEQ/L (ref 98–107)
CO2: 21 MEQ/L (ref 22–29)
CREAT SERPL-MCNC: 1.4 MG/DL (ref 0.7–1.2)
EOSINOPHILS ABSOLUTE: 0.1 K/UL (ref 0–0.7)
EOSINOPHILS RELATIVE PERCENT: 1.8 %
GFR AFRICAN AMERICAN: 59.4
GFR NON-AFRICAN AMERICAN: 49.1
GLUCOSE BLD-MCNC: 117 MG/DL (ref 74–109)
HCT VFR BLD CALC: 23 % (ref 42–52)
HCT VFR BLD CALC: 24 % (ref 42–52)
HEMOGLOBIN: 7.8 G/DL (ref 14–18)
HEMOGLOBIN: 8.2 G/DL (ref 14–18)
LACTIC ACID: 1.3 MMOL/L (ref 0.5–2.2)
LYMPHOCYTES ABSOLUTE: 1.5 K/UL (ref 1–4.8)
LYMPHOCYTES RELATIVE PERCENT: 21.5 %
MAGNESIUM: 1.9 MG/DL (ref 1.7–2.3)
MCH RBC QN AUTO: 29.9 PG (ref 27–31.3)
MCHC RBC AUTO-ENTMCNC: 33.9 % (ref 33–37)
MCV RBC AUTO: 88 FL (ref 80–100)
MONOCYTES ABSOLUTE: 0.7 K/UL (ref 0.2–0.8)
MONOCYTES RELATIVE PERCENT: 10.6 %
NEUTROPHILS ABSOLUTE: 4.6 K/UL (ref 1.4–6.5)
NEUTROPHILS RELATIVE PERCENT: 65.8 %
PDW BLD-RTO: 16.9 % (ref 11.5–14.5)
PLATELET # BLD: 215 K/UL (ref 130–400)
POTASSIUM REFLEX MAGNESIUM: 5.2 MEQ/L (ref 3.5–5.1)
RBC # BLD: 2.73 M/UL (ref 4.7–6.1)
SODIUM BLD-SCNC: 138 MEQ/L (ref 132–144)
WBC # BLD: 7.1 K/UL (ref 4.8–10.8)

## 2018-04-03 PROCEDURE — C1725 CATH, TRANSLUMIN NON-LASER: HCPCS | Performed by: THORACIC SURGERY (CARDIOTHORACIC VASCULAR SURGERY)

## 2018-04-03 PROCEDURE — 80048 BASIC METABOLIC PNL TOTAL CA: CPT

## 2018-04-03 PROCEDURE — 0JCL0ZZ EXTIRPATION OF MATTER FROM RIGHT UPPER LEG SUBCUTANEOUS TISSUE AND FASCIA, OPEN APPROACH: ICD-10-PCS | Performed by: THORACIC SURGERY (CARDIOTHORACIC VASCULAR SURGERY)

## 2018-04-03 PROCEDURE — 3600000002 HC SURGERY LEVEL 2 BASE: Performed by: THORACIC SURGERY (CARDIOTHORACIC VASCULAR SURGERY)

## 2018-04-03 PROCEDURE — 85025 COMPLETE CBC W/AUTO DIFF WBC: CPT

## 2018-04-03 PROCEDURE — 04QK0ZZ REPAIR RIGHT FEMORAL ARTERY, OPEN APPROACH: ICD-10-PCS | Performed by: THORACIC SURGERY (CARDIOTHORACIC VASCULAR SURGERY)

## 2018-04-03 PROCEDURE — 2580000003 HC RX 258: Performed by: INTERNAL MEDICINE

## 2018-04-03 PROCEDURE — 6370000000 HC RX 637 (ALT 250 FOR IP): Performed by: THORACIC SURGERY (CARDIOTHORACIC VASCULAR SURGERY)

## 2018-04-03 PROCEDURE — 2500000003 HC RX 250 WO HCPCS: Performed by: NURSE ANESTHETIST, CERTIFIED REGISTERED

## 2018-04-03 PROCEDURE — P9016 RBC LEUKOCYTES REDUCED: HCPCS

## 2018-04-03 PROCEDURE — 2580000003 HC RX 258: Performed by: THORACIC SURGERY (CARDIOTHORACIC VASCULAR SURGERY)

## 2018-04-03 PROCEDURE — 76000 FLUOROSCOPY <1 HR PHYS/QHP: CPT

## 2018-04-03 PROCEDURE — 6370000000 HC RX 637 (ALT 250 FOR IP): Performed by: INTERNAL MEDICINE

## 2018-04-03 PROCEDURE — 2000000000 HC ICU R&B

## 2018-04-03 PROCEDURE — 6360000002 HC RX W HCPCS: Performed by: INTERNAL MEDICINE

## 2018-04-03 PROCEDURE — 6360000002 HC RX W HCPCS: Performed by: NURSE ANESTHETIST, CERTIFIED REGISTERED

## 2018-04-03 PROCEDURE — 83735 ASSAY OF MAGNESIUM: CPT

## 2018-04-03 PROCEDURE — 3700000001 HC ADD 15 MINUTES (ANESTHESIA): Performed by: THORACIC SURGERY (CARDIOTHORACIC VASCULAR SURGERY)

## 2018-04-03 PROCEDURE — 04VK3DZ RESTRICTION OF RIGHT FEMORAL ARTERY WITH INTRALUMINAL DEVICE, PERCUTANEOUS APPROACH: ICD-10-PCS | Performed by: THORACIC SURGERY (CARDIOTHORACIC VASCULAR SURGERY)

## 2018-04-03 PROCEDURE — C1874 STENT, COATED/COV W/DEL SYS: HCPCS | Performed by: THORACIC SURGERY (CARDIOTHORACIC VASCULAR SURGERY)

## 2018-04-03 PROCEDURE — 2720000010 HC SURG SUPPLY STERILE: Performed by: THORACIC SURGERY (CARDIOTHORACIC VASCULAR SURGERY)

## 2018-04-03 PROCEDURE — 6360000002 HC RX W HCPCS: Performed by: THORACIC SURGERY (CARDIOTHORACIC VASCULAR SURGERY)

## 2018-04-03 PROCEDURE — 85014 HEMATOCRIT: CPT

## 2018-04-03 PROCEDURE — 85018 HEMOGLOBIN: CPT

## 2018-04-03 PROCEDURE — 6360000004 HC RX CONTRAST MEDICATION: Performed by: THORACIC SURGERY (CARDIOTHORACIC VASCULAR SURGERY)

## 2018-04-03 PROCEDURE — 6360000002 HC RX W HCPCS: Performed by: STUDENT IN AN ORGANIZED HEALTH CARE EDUCATION/TRAINING PROGRAM

## 2018-04-03 PROCEDURE — 2580000003 HC RX 258: Performed by: STUDENT IN AN ORGANIZED HEALTH CARE EDUCATION/TRAINING PROGRAM

## 2018-04-03 PROCEDURE — 2580000003 HC RX 258: Performed by: NURSE ANESTHETIST, CERTIFIED REGISTERED

## 2018-04-03 PROCEDURE — 36430 TRANSFUSION BLD/BLD COMPNT: CPT

## 2018-04-03 PROCEDURE — 2500000003 HC RX 250 WO HCPCS: Performed by: STUDENT IN AN ORGANIZED HEALTH CARE EDUCATION/TRAINING PROGRAM

## 2018-04-03 PROCEDURE — 36415 COLL VENOUS BLD VENIPUNCTURE: CPT

## 2018-04-03 PROCEDURE — 2580000003 HC RX 258

## 2018-04-03 PROCEDURE — 93010 ELECTROCARDIOGRAM REPORT: CPT | Performed by: INTERNAL MEDICINE

## 2018-04-03 PROCEDURE — 36592 COLLECT BLOOD FROM PICC: CPT

## 2018-04-03 PROCEDURE — 2700000000 HC OXYGEN THERAPY PER DAY

## 2018-04-03 PROCEDURE — 83605 ASSAY OF LACTIC ACID: CPT

## 2018-04-03 PROCEDURE — 3700000000 HC ANESTHESIA ATTENDED CARE: Performed by: THORACIC SURGERY (CARDIOTHORACIC VASCULAR SURGERY)

## 2018-04-03 PROCEDURE — 3600000012 HC SURGERY LEVEL 2 ADDTL 15MIN: Performed by: THORACIC SURGERY (CARDIOTHORACIC VASCULAR SURGERY)

## 2018-04-03 PROCEDURE — C1894 INTRO/SHEATH, NON-LASER: HCPCS | Performed by: THORACIC SURGERY (CARDIOTHORACIC VASCULAR SURGERY)

## 2018-04-03 PROCEDURE — C1769 GUIDE WIRE: HCPCS | Performed by: THORACIC SURGERY (CARDIOTHORACIC VASCULAR SURGERY)

## 2018-04-03 DEVICE — IMPLANTABLE DEVICE: Type: IMPLANTABLE DEVICE | Status: FUNCTIONAL

## 2018-04-03 RX ORDER — DIPHENHYDRAMINE HYDROCHLORIDE 50 MG/ML
12.5 INJECTION INTRAMUSCULAR; INTRAVENOUS
Status: DISCONTINUED | OUTPATIENT
Start: 2018-04-03 | End: 2018-04-03 | Stop reason: HOSPADM

## 2018-04-03 RX ORDER — MORPHINE SULFATE 2 MG/ML
1 INJECTION, SOLUTION INTRAMUSCULAR; INTRAVENOUS ONCE
Status: COMPLETED | OUTPATIENT
Start: 2018-04-03 | End: 2018-04-03

## 2018-04-03 RX ORDER — SODIUM CHLORIDE, SODIUM LACTATE, POTASSIUM CHLORIDE, CALCIUM CHLORIDE 600; 310; 30; 20 MG/100ML; MG/100ML; MG/100ML; MG/100ML
INJECTION, SOLUTION INTRAVENOUS CONTINUOUS
Status: DISCONTINUED | OUTPATIENT
Start: 2018-04-03 | End: 2018-04-03

## 2018-04-03 RX ORDER — TRAMADOL HYDROCHLORIDE 50 MG/1
50 TABLET ORAL EVERY 6 HOURS PRN
Status: DISCONTINUED | OUTPATIENT
Start: 2018-04-03 | End: 2018-04-05 | Stop reason: HOSPADM

## 2018-04-03 RX ORDER — METOCLOPRAMIDE HYDROCHLORIDE 5 MG/ML
10 INJECTION INTRAMUSCULAR; INTRAVENOUS
Status: DISCONTINUED | OUTPATIENT
Start: 2018-04-03 | End: 2018-04-03 | Stop reason: HOSPADM

## 2018-04-03 RX ORDER — PROPOFOL 10 MG/ML
INJECTION, EMULSION INTRAVENOUS PRN
Status: DISCONTINUED | OUTPATIENT
Start: 2018-04-03 | End: 2018-04-03 | Stop reason: SDUPTHER

## 2018-04-03 RX ORDER — ONDANSETRON 2 MG/ML
4 INJECTION INTRAMUSCULAR; INTRAVENOUS EVERY 6 HOURS PRN
Status: DISCONTINUED | OUTPATIENT
Start: 2018-04-03 | End: 2018-04-05 | Stop reason: HOSPADM

## 2018-04-03 RX ORDER — HYDROCODONE BITARTRATE AND ACETAMINOPHEN 5; 325 MG/1; MG/1
1 TABLET ORAL PRN
Status: DISCONTINUED | OUTPATIENT
Start: 2018-04-03 | End: 2018-04-03 | Stop reason: HOSPADM

## 2018-04-03 RX ORDER — MEPERIDINE HYDROCHLORIDE 25 MG/ML
12.5 INJECTION INTRAMUSCULAR; INTRAVENOUS; SUBCUTANEOUS EVERY 5 MIN PRN
Status: DISCONTINUED | OUTPATIENT
Start: 2018-04-03 | End: 2018-04-03 | Stop reason: HOSPADM

## 2018-04-03 RX ORDER — MAGNESIUM HYDROXIDE 1200 MG/15ML
LIQUID ORAL CONTINUOUS PRN
Status: DISCONTINUED | OUTPATIENT
Start: 2018-04-03 | End: 2018-04-03 | Stop reason: HOSPADM

## 2018-04-03 RX ORDER — FENTANYL CITRATE 50 UG/ML
50 INJECTION, SOLUTION INTRAMUSCULAR; INTRAVENOUS EVERY 10 MIN PRN
Status: DISCONTINUED | OUTPATIENT
Start: 2018-04-03 | End: 2018-04-03 | Stop reason: HOSPADM

## 2018-04-03 RX ORDER — ONDANSETRON 2 MG/ML
4 INJECTION INTRAMUSCULAR; INTRAVENOUS
Status: DISCONTINUED | OUTPATIENT
Start: 2018-04-03 | End: 2018-04-03 | Stop reason: HOSPADM

## 2018-04-03 RX ORDER — ONDANSETRON 2 MG/ML
INJECTION INTRAMUSCULAR; INTRAVENOUS PRN
Status: DISCONTINUED | OUTPATIENT
Start: 2018-04-03 | End: 2018-04-03 | Stop reason: SDUPTHER

## 2018-04-03 RX ORDER — SODIUM CHLORIDE 9 MG/ML
INJECTION, SOLUTION INTRAVENOUS CONTINUOUS PRN
Status: DISCONTINUED | OUTPATIENT
Start: 2018-04-03 | End: 2018-04-03 | Stop reason: SDUPTHER

## 2018-04-03 RX ORDER — LIDOCAINE HYDROCHLORIDE 10 MG/ML
INJECTION, SOLUTION EPIDURAL; INFILTRATION; INTRACAUDAL; PERINEURAL PRN
Status: DISCONTINUED | OUTPATIENT
Start: 2018-04-03 | End: 2018-04-03 | Stop reason: SDUPTHER

## 2018-04-03 RX ORDER — SODIUM CHLORIDE 9 MG/ML
INJECTION, SOLUTION INTRAVENOUS CONTINUOUS
Status: DISPENSED | OUTPATIENT
Start: 2018-04-03 | End: 2018-04-04

## 2018-04-03 RX ORDER — 0.9 % SODIUM CHLORIDE 0.9 %
250 INTRAVENOUS SOLUTION INTRAVENOUS ONCE
Status: DISCONTINUED | OUTPATIENT
Start: 2018-04-03 | End: 2018-04-03

## 2018-04-03 RX ORDER — ACETAMINOPHEN 325 MG/1
650 TABLET ORAL EVERY 4 HOURS PRN
Status: DISCONTINUED | OUTPATIENT
Start: 2018-04-03 | End: 2018-04-05 | Stop reason: HOSPADM

## 2018-04-03 RX ORDER — SODIUM CHLORIDE 9 MG/ML
INJECTION, SOLUTION INTRAVENOUS
Status: COMPLETED
Start: 2018-04-03 | End: 2018-04-03

## 2018-04-03 RX ORDER — ROCURONIUM BROMIDE 10 MG/ML
INJECTION, SOLUTION INTRAVENOUS PRN
Status: DISCONTINUED | OUTPATIENT
Start: 2018-04-03 | End: 2018-04-03 | Stop reason: SDUPTHER

## 2018-04-03 RX ORDER — HYDROCODONE BITARTRATE AND ACETAMINOPHEN 5; 325 MG/1; MG/1
2 TABLET ORAL PRN
Status: DISCONTINUED | OUTPATIENT
Start: 2018-04-03 | End: 2018-04-03 | Stop reason: HOSPADM

## 2018-04-03 RX ORDER — PANTOPRAZOLE SODIUM 40 MG/1
40 TABLET, DELAYED RELEASE ORAL ONCE
Status: COMPLETED | OUTPATIENT
Start: 2018-04-03 | End: 2018-04-03

## 2018-04-03 RX ORDER — DEXTROSE MONOHYDRATE 25 G/50ML
25 INJECTION, SOLUTION INTRAVENOUS PRN
Status: DISCONTINUED | OUTPATIENT
Start: 2018-04-03 | End: 2018-04-03

## 2018-04-03 RX ORDER — SODIUM CHLORIDE 9 MG/ML
INJECTION, SOLUTION INTRAVENOUS CONTINUOUS
Status: DISCONTINUED | OUTPATIENT
Start: 2018-04-03 | End: 2018-04-03

## 2018-04-03 RX ORDER — FENTANYL CITRATE 50 UG/ML
INJECTION, SOLUTION INTRAMUSCULAR; INTRAVENOUS PRN
Status: DISCONTINUED | OUTPATIENT
Start: 2018-04-03 | End: 2018-04-03 | Stop reason: SDUPTHER

## 2018-04-03 RX ORDER — CEFAZOLIN SODIUM 1 G/3ML
INJECTION, POWDER, FOR SOLUTION INTRAMUSCULAR; INTRAVENOUS PRN
Status: DISCONTINUED | OUTPATIENT
Start: 2018-04-03 | End: 2018-04-03 | Stop reason: SDUPTHER

## 2018-04-03 RX ADMIN — SODIUM CHLORIDE 250 ML: 9 INJECTION, SOLUTION INTRAVENOUS at 01:23

## 2018-04-03 RX ADMIN — PANTOPRAZOLE SODIUM 40 MG: 40 TABLET, DELAYED RELEASE ORAL at 23:24

## 2018-04-03 RX ADMIN — FENTANYL CITRATE 50 MCG: 50 INJECTION, SOLUTION INTRAMUSCULAR; INTRAVENOUS at 18:15

## 2018-04-03 RX ADMIN — ROCURONIUM BROMIDE 50 MG: 10 INJECTION INTRAVENOUS at 15:41

## 2018-04-03 RX ADMIN — SODIUM CHLORIDE: 9 INJECTION, SOLUTION INTRAVENOUS at 14:13

## 2018-04-03 RX ADMIN — SODIUM CHLORIDE, POTASSIUM CHLORIDE, SODIUM LACTATE AND CALCIUM CHLORIDE: 600; 310; 30; 20 INJECTION, SOLUTION INTRAVENOUS at 15:04

## 2018-04-03 RX ADMIN — ROCURONIUM BROMIDE 10 MG: 10 INJECTION INTRAVENOUS at 17:01

## 2018-04-03 RX ADMIN — TRAMADOL HYDROCHLORIDE 50 MG: 50 TABLET, FILM COATED ORAL at 20:41

## 2018-04-03 RX ADMIN — MORPHINE SULFATE 1 MG: 2 INJECTION, SOLUTION INTRAMUSCULAR; INTRAVENOUS at 20:58

## 2018-04-03 RX ADMIN — INSULIN HUMAN 10 UNITS: 100 INJECTION, SOLUTION PARENTERAL at 12:26

## 2018-04-03 RX ADMIN — FENTANYL CITRATE 50 MCG: 50 INJECTION, SOLUTION INTRAMUSCULAR; INTRAVENOUS at 17:20

## 2018-04-03 RX ADMIN — SODIUM CHLORIDE: 9 INJECTION, SOLUTION INTRAVENOUS at 11:45

## 2018-04-03 RX ADMIN — ROCURONIUM BROMIDE 20 MG: 10 INJECTION INTRAVENOUS at 16:12

## 2018-04-03 RX ADMIN — CALCIUM GLUCONATE 1 G: 94 INJECTION, SOLUTION INTRAVENOUS at 12:26

## 2018-04-03 RX ADMIN — CEFAZOLIN SODIUM 3000 MG: 1 INJECTION, POWDER, FOR SOLUTION INTRAMUSCULAR; INTRAVENOUS at 15:55

## 2018-04-03 RX ADMIN — ONDANSETRON 4 MG: 2 INJECTION INTRAMUSCULAR; INTRAVENOUS at 17:45

## 2018-04-03 RX ADMIN — PROPOFOL 150 MG: 10 INJECTION, EMULSION INTRAVENOUS at 15:41

## 2018-04-03 RX ADMIN — FENTANYL CITRATE 100 MCG: 50 INJECTION, SOLUTION INTRAMUSCULAR; INTRAVENOUS at 15:41

## 2018-04-03 RX ADMIN — FENTANYL CITRATE 50 MCG: 50 INJECTION, SOLUTION INTRAMUSCULAR; INTRAVENOUS at 17:45

## 2018-04-03 RX ADMIN — SUGAMMADEX 200 MG: 100 INJECTION, SOLUTION INTRAVENOUS at 18:00

## 2018-04-03 RX ADMIN — SODIUM CHLORIDE: 9 INJECTION, SOLUTION INTRAVENOUS at 20:53

## 2018-04-03 RX ADMIN — LIDOCAINE HYDROCHLORIDE 50 MG: 10 INJECTION, SOLUTION EPIDURAL; INFILTRATION; INTRACAUDAL; PERINEURAL at 15:41

## 2018-04-03 RX ADMIN — FENTANYL CITRATE 50 MCG: 50 INJECTION, SOLUTION INTRAMUSCULAR; INTRAVENOUS at 18:30

## 2018-04-03 RX ADMIN — ACETAMINOPHEN 650 MG: 325 TABLET ORAL at 20:42

## 2018-04-03 ASSESSMENT — PULMONARY FUNCTION TESTS
PIF_VALUE: 9
PIF_VALUE: 3
PIF_VALUE: 20
PIF_VALUE: 21
PIF_VALUE: 21
PIF_VALUE: 20
PIF_VALUE: 3
PIF_VALUE: 20
PIF_VALUE: 21
PIF_VALUE: 22
PIF_VALUE: 20
PIF_VALUE: 21
PIF_VALUE: 21
PIF_VALUE: 2
PIF_VALUE: 15
PIF_VALUE: 19
PIF_VALUE: 20
PIF_VALUE: 21
PIF_VALUE: 21
PIF_VALUE: 20
PIF_VALUE: 19
PIF_VALUE: 3
PIF_VALUE: 21
PIF_VALUE: 15
PIF_VALUE: 21
PIF_VALUE: 0
PIF_VALUE: 20
PIF_VALUE: 15
PIF_VALUE: 20
PIF_VALUE: 21
PIF_VALUE: 21
PIF_VALUE: 20
PIF_VALUE: 15
PIF_VALUE: 15
PIF_VALUE: 21
PIF_VALUE: 21
PIF_VALUE: 20
PIF_VALUE: 21
PIF_VALUE: 20
PIF_VALUE: 20
PIF_VALUE: 21
PIF_VALUE: 3
PIF_VALUE: 9
PIF_VALUE: 21
PIF_VALUE: 21
PIF_VALUE: 20
PIF_VALUE: 21
PIF_VALUE: 20
PIF_VALUE: 17
PIF_VALUE: 20
PIF_VALUE: 21
PIF_VALUE: 21
PIF_VALUE: 3
PIF_VALUE: 20
PIF_VALUE: 17
PIF_VALUE: 20
PIF_VALUE: 21
PIF_VALUE: 21
PIF_VALUE: 20
PIF_VALUE: 20
PIF_VALUE: 21
PIF_VALUE: 14
PIF_VALUE: 20
PIF_VALUE: 12
PIF_VALUE: 5
PIF_VALUE: 20
PIF_VALUE: 22
PIF_VALUE: 20
PIF_VALUE: 19
PIF_VALUE: 21
PIF_VALUE: 22
PIF_VALUE: 21
PIF_VALUE: 21
PIF_VALUE: 5
PIF_VALUE: 21
PIF_VALUE: 20
PIF_VALUE: 20
PIF_VALUE: 21
PIF_VALUE: 20
PIF_VALUE: 21
PIF_VALUE: 20
PIF_VALUE: 21
PIF_VALUE: 3
PIF_VALUE: 22
PIF_VALUE: 21
PIF_VALUE: 3
PIF_VALUE: 20
PIF_VALUE: 3
PIF_VALUE: 19
PIF_VALUE: 21
PIF_VALUE: 19
PIF_VALUE: 20
PIF_VALUE: 3
PIF_VALUE: 21
PIF_VALUE: 20
PIF_VALUE: 22
PIF_VALUE: 20
PIF_VALUE: 20
PIF_VALUE: 0
PIF_VALUE: 21
PIF_VALUE: 20
PIF_VALUE: 2
PIF_VALUE: 15
PIF_VALUE: 3
PIF_VALUE: 21
PIF_VALUE: 20
PIF_VALUE: 20
PIF_VALUE: 21
PIF_VALUE: 20
PIF_VALUE: 20
PIF_VALUE: 21
PIF_VALUE: 20
PIF_VALUE: 20
PIF_VALUE: 21
PIF_VALUE: 17
PIF_VALUE: 15
PIF_VALUE: 20
PIF_VALUE: 21
PIF_VALUE: 21
PIF_VALUE: 20
PIF_VALUE: 21
PIF_VALUE: 20
PIF_VALUE: 14
PIF_VALUE: 19
PIF_VALUE: 21
PIF_VALUE: 21
PIF_VALUE: 20
PIF_VALUE: 21
PIF_VALUE: 21
PIF_VALUE: 20
PIF_VALUE: 4
PIF_VALUE: 22
PIF_VALUE: 20
PIF_VALUE: 21
PIF_VALUE: 20
PIF_VALUE: 21
PIF_VALUE: 16
PIF_VALUE: 3
PIF_VALUE: 21
PIF_VALUE: 2
PIF_VALUE: 20
PIF_VALUE: 21
PIF_VALUE: 21
PIF_VALUE: 19
PIF_VALUE: 20
PIF_VALUE: 16
PIF_VALUE: 21
PIF_VALUE: 21
PIF_VALUE: 20
PIF_VALUE: 3
PIF_VALUE: 21
PIF_VALUE: 16
PIF_VALUE: 21
PIF_VALUE: 20
PIF_VALUE: 21
PIF_VALUE: 21
PIF_VALUE: 3
PIF_VALUE: 3

## 2018-04-03 ASSESSMENT — PAIN SCALES - GENERAL
PAINLEVEL_OUTOF10: 9
PAINLEVEL_OUTOF10: 8
PAINLEVEL_OUTOF10: 0

## 2018-04-03 ASSESSMENT — ENCOUNTER SYMPTOMS
CONSTIPATION: 0
DIARRHEA: 0
VOMITING: 0
NAUSEA: 0
SHORTNESS OF BREATH: 0
WHEEZING: 0

## 2018-04-04 LAB
ANION GAP SERPL CALCULATED.3IONS-SCNC: 14 MEQ/L (ref 7–13)
BLOOD BANK DISPENSE STATUS: NORMAL
BLOOD BANK PRODUCT CODE: NORMAL
BPU ID: NORMAL
BUN BLDV-MCNC: 26 MG/DL (ref 8–23)
CALCIUM SERPL-MCNC: 8.3 MG/DL (ref 8.6–10.2)
CHLORIDE BLD-SCNC: 104 MEQ/L (ref 98–107)
CO2: 20 MEQ/L (ref 22–29)
CREAT SERPL-MCNC: 1.29 MG/DL (ref 0.7–1.2)
DESCRIPTION BLOOD BANK: NORMAL
GFR AFRICAN AMERICAN: >60
GFR NON-AFRICAN AMERICAN: 54
GLUCOSE BLD-MCNC: 103 MG/DL (ref 74–109)
HCT VFR BLD CALC: 22.7 % (ref 42–52)
HEMOGLOBIN: 7.7 G/DL (ref 14–18)
MCH RBC QN AUTO: 30.4 PG (ref 27–31.3)
MCHC RBC AUTO-ENTMCNC: 34.1 % (ref 33–37)
MCV RBC AUTO: 89.2 FL (ref 80–100)
PDW BLD-RTO: 16.7 % (ref 11.5–14.5)
PLATELET # BLD: 208 K/UL (ref 130–400)
POTASSIUM REFLEX MAGNESIUM: 4.4 MEQ/L (ref 3.5–5.1)
RBC # BLD: 2.54 M/UL (ref 4.7–6.1)
SODIUM BLD-SCNC: 138 MEQ/L (ref 132–144)
WBC # BLD: 6.1 K/UL (ref 4.8–10.8)

## 2018-04-04 PROCEDURE — 2000000000 HC ICU R&B

## 2018-04-04 PROCEDURE — 80048 BASIC METABOLIC PNL TOTAL CA: CPT

## 2018-04-04 PROCEDURE — 6370000000 HC RX 637 (ALT 250 FOR IP): Performed by: INTERNAL MEDICINE

## 2018-04-04 PROCEDURE — 7100000000 HC PACU RECOVERY - FIRST 15 MIN

## 2018-04-04 PROCEDURE — 7100000001 HC PACU RECOVERY - ADDTL 15 MIN

## 2018-04-04 PROCEDURE — 6370000000 HC RX 637 (ALT 250 FOR IP): Performed by: THORACIC SURGERY (CARDIOTHORACIC VASCULAR SURGERY)

## 2018-04-04 PROCEDURE — 2580000003 HC RX 258: Performed by: THORACIC SURGERY (CARDIOTHORACIC VASCULAR SURGERY)

## 2018-04-04 PROCEDURE — 36430 TRANSFUSION BLD/BLD COMPNT: CPT

## 2018-04-04 PROCEDURE — P9016 RBC LEUKOCYTES REDUCED: HCPCS

## 2018-04-04 PROCEDURE — 85027 COMPLETE CBC AUTOMATED: CPT

## 2018-04-04 PROCEDURE — 2700000000 HC OXYGEN THERAPY PER DAY

## 2018-04-04 RX ORDER — FUROSEMIDE 10 MG/ML
20 INJECTION INTRAMUSCULAR; INTRAVENOUS 2 TIMES DAILY
Status: DISCONTINUED | OUTPATIENT
Start: 2018-04-04 | End: 2018-04-05 | Stop reason: HOSPADM

## 2018-04-04 RX ORDER — CARVEDILOL 12.5 MG/1
12.5 TABLET ORAL 2 TIMES DAILY WITH MEALS
Status: DISCONTINUED | OUTPATIENT
Start: 2018-04-05 | End: 2018-04-04

## 2018-04-04 RX ORDER — FERROUS SULFATE 325(65) MG
325 TABLET ORAL 2 TIMES DAILY
Status: DISCONTINUED | OUTPATIENT
Start: 2018-04-04 | End: 2018-04-05 | Stop reason: HOSPADM

## 2018-04-04 RX ORDER — ATORVASTATIN CALCIUM 10 MG/1
10 TABLET, FILM COATED ORAL DAILY
Status: DISCONTINUED | OUTPATIENT
Start: 2018-04-04 | End: 2018-04-05 | Stop reason: HOSPADM

## 2018-04-04 RX ORDER — NIACIN 500 MG/1
500 TABLET, EXTENDED RELEASE ORAL NIGHTLY
Status: DISCONTINUED | OUTPATIENT
Start: 2018-04-04 | End: 2018-04-05 | Stop reason: HOSPADM

## 2018-04-04 RX ORDER — GABAPENTIN 300 MG/1
300 CAPSULE ORAL DAILY
Status: DISCONTINUED | OUTPATIENT
Start: 2018-04-04 | End: 2018-04-05 | Stop reason: HOSPADM

## 2018-04-04 RX ORDER — OMEPRAZOLE 20 MG/1
20 TABLET, DELAYED RELEASE ORAL NIGHTLY
Status: DISCONTINUED | OUTPATIENT
Start: 2018-04-04 | End: 2018-04-04

## 2018-04-04 RX ORDER — PANTOPRAZOLE SODIUM 40 MG/1
40 TABLET, DELAYED RELEASE ORAL NIGHTLY
Status: DISCONTINUED | OUTPATIENT
Start: 2018-04-04 | End: 2018-04-05 | Stop reason: HOSPADM

## 2018-04-04 RX ORDER — FENOFIBRATE 160 MG/1
160 TABLET ORAL DAILY
Status: DISCONTINUED | OUTPATIENT
Start: 2018-04-04 | End: 2018-04-05 | Stop reason: HOSPADM

## 2018-04-04 RX ORDER — CARVEDILOL 6.25 MG/1
6.25 TABLET ORAL 2 TIMES DAILY WITH MEALS
Status: DISCONTINUED | OUTPATIENT
Start: 2018-04-05 | End: 2018-04-05 | Stop reason: HOSPADM

## 2018-04-04 RX ORDER — 0.9 % SODIUM CHLORIDE 0.9 %
250 INTRAVENOUS SOLUTION INTRAVENOUS ONCE
Status: COMPLETED | OUTPATIENT
Start: 2018-04-04 | End: 2018-04-05

## 2018-04-04 RX ORDER — CARVEDILOL 3.12 MG/1
3.12 TABLET ORAL 2 TIMES DAILY WITH MEALS
Status: DISCONTINUED | OUTPATIENT
Start: 2018-04-04 | End: 2018-04-04

## 2018-04-04 RX ORDER — LANOLIN ALCOHOL/MO/W.PET/CERES
3 CREAM (GRAM) TOPICAL DAILY
Status: DISCONTINUED | OUTPATIENT
Start: 2018-04-04 | End: 2018-04-05 | Stop reason: HOSPADM

## 2018-04-04 RX ADMIN — SODIUM CHLORIDE 250 ML: 9 INJECTION, SOLUTION INTRAVENOUS at 11:40

## 2018-04-04 RX ADMIN — GABAPENTIN 300 MG: 300 CAPSULE ORAL at 11:37

## 2018-04-04 RX ADMIN — FENOFIBRATE 160 MG: 160 TABLET ORAL at 11:37

## 2018-04-04 RX ADMIN — CARVEDILOL 3.12 MG: 3.12 TABLET, FILM COATED ORAL at 18:21

## 2018-04-04 RX ADMIN — TRAMADOL HYDROCHLORIDE 50 MG: 50 TABLET, FILM COATED ORAL at 20:15

## 2018-04-04 RX ADMIN — MELATONIN TAB 3 MG 3 MG: 3 TAB at 20:16

## 2018-04-04 RX ADMIN — NIACIN 500 MG: 500 TABLET, EXTENDED RELEASE ORAL at 20:16

## 2018-04-04 RX ADMIN — FERROUS SULFATE TAB 325 MG (65 MG ELEMENTAL FE) 325 MG: 325 (65 FE) TAB at 11:37

## 2018-04-04 RX ADMIN — ATORVASTATIN CALCIUM 10 MG: 10 TABLET, FILM COATED ORAL at 11:37

## 2018-04-04 RX ADMIN — PANTOPRAZOLE SODIUM 40 MG: 40 TABLET, DELAYED RELEASE ORAL at 20:59

## 2018-04-04 RX ADMIN — ACETAMINOPHEN 650 MG: 325 TABLET ORAL at 20:16

## 2018-04-04 RX ADMIN — FERROUS SULFATE TAB 325 MG (65 MG ELEMENTAL FE) 325 MG: 325 (65 FE) TAB at 18:21

## 2018-04-04 ASSESSMENT — PAIN DESCRIPTION - PAIN TYPE
TYPE: ACUTE PAIN

## 2018-04-04 ASSESSMENT — PAIN DESCRIPTION - ORIENTATION
ORIENTATION: RIGHT

## 2018-04-04 ASSESSMENT — PAIN SCALES - GENERAL
PAINLEVEL_OUTOF10: 3
PAINLEVEL_OUTOF10: 0
PAINLEVEL_OUTOF10: 5
PAINLEVEL_OUTOF10: 3
PAINLEVEL_OUTOF10: 5
PAINLEVEL_OUTOF10: 0
PAINLEVEL_OUTOF10: 3
PAINLEVEL_OUTOF10: 3
PAINLEVEL_OUTOF10: 4
PAINLEVEL_OUTOF10: 4
PAINLEVEL_OUTOF10: 0
PAINLEVEL_OUTOF10: 3
PAINLEVEL_OUTOF10: 5

## 2018-04-04 ASSESSMENT — ENCOUNTER SYMPTOMS
VOMITING: 0
DIARRHEA: 0
CONSTIPATION: 0
NAUSEA: 0
WHEEZING: 0
SHORTNESS OF BREATH: 0

## 2018-04-04 ASSESSMENT — PAIN DESCRIPTION - LOCATION
LOCATION: GROIN

## 2018-04-04 ASSESSMENT — PAIN DESCRIPTION - DESCRIPTORS
DESCRIPTORS: BURNING

## 2018-04-05 LAB
ANION GAP SERPL CALCULATED.3IONS-SCNC: 13 MEQ/L (ref 7–13)
BUN BLDV-MCNC: 24 MG/DL (ref 8–23)
CALCIUM SERPL-MCNC: 8.5 MG/DL (ref 8.6–10.2)
CHLORIDE BLD-SCNC: 104 MEQ/L (ref 98–107)
CO2: 20 MEQ/L (ref 22–29)
CREAT SERPL-MCNC: 1.13 MG/DL (ref 0.7–1.2)
GFR AFRICAN AMERICAN: >60
GFR NON-AFRICAN AMERICAN: >60
GLUCOSE BLD-MCNC: 110 MG/DL (ref 74–109)
HCT VFR BLD CALC: 29.7 % (ref 42–52)
HEMOGLOBIN: 10.1 G/DL (ref 14–18)
MCH RBC QN AUTO: 30.3 PG (ref 27–31.3)
MCHC RBC AUTO-ENTMCNC: 34.1 % (ref 33–37)
MCV RBC AUTO: 88.9 FL (ref 80–100)
PDW BLD-RTO: 16.7 % (ref 11.5–14.5)
PLATELET # BLD: 200 K/UL (ref 130–400)
POTASSIUM SERPL-SCNC: 4.4 MEQ/L (ref 3.5–5.1)
RBC # BLD: 3.35 M/UL (ref 4.7–6.1)
SODIUM BLD-SCNC: 137 MEQ/L (ref 132–144)
WBC # BLD: 6.9 K/UL (ref 4.8–10.8)

## 2018-04-05 PROCEDURE — 80048 BASIC METABOLIC PNL TOTAL CA: CPT

## 2018-04-05 PROCEDURE — 6360000002 HC RX W HCPCS: Performed by: INTERNAL MEDICINE

## 2018-04-05 PROCEDURE — 85027 COMPLETE CBC AUTOMATED: CPT

## 2018-04-05 PROCEDURE — 2700000000 HC OXYGEN THERAPY PER DAY

## 2018-04-05 PROCEDURE — 6370000000 HC RX 637 (ALT 250 FOR IP): Performed by: INTERNAL MEDICINE

## 2018-04-05 PROCEDURE — 36415 COLL VENOUS BLD VENIPUNCTURE: CPT

## 2018-04-05 RX ORDER — CARVEDILOL 6.25 MG/1
3.12 TABLET ORAL 2 TIMES DAILY WITH MEALS
Qty: 60 TABLET | Refills: 3 | Status: SHIPPED | OUTPATIENT
Start: 2018-04-05

## 2018-04-05 RX ADMIN — GABAPENTIN 300 MG: 300 CAPSULE ORAL at 09:42

## 2018-04-05 RX ADMIN — FERROUS SULFATE TAB 325 MG (65 MG ELEMENTAL FE) 325 MG: 325 (65 FE) TAB at 16:51

## 2018-04-05 RX ADMIN — FERROUS SULFATE TAB 325 MG (65 MG ELEMENTAL FE) 325 MG: 325 (65 FE) TAB at 09:41

## 2018-04-05 RX ADMIN — CARVEDILOL 6.25 MG: 6.25 TABLET, FILM COATED ORAL at 09:41

## 2018-04-05 RX ADMIN — CARVEDILOL 6.25 MG: 6.25 TABLET, FILM COATED ORAL at 16:51

## 2018-04-05 RX ADMIN — FUROSEMIDE 20 MG: 10 INJECTION, SOLUTION INTRAVENOUS at 09:41

## 2018-04-05 ASSESSMENT — ENCOUNTER SYMPTOMS
NAUSEA: 0
DIARRHEA: 0
CONSTIPATION: 0
SHORTNESS OF BREATH: 0
VOMITING: 0
WHEEZING: 0

## 2018-04-05 ASSESSMENT — PAIN SCALES - GENERAL
PAINLEVEL_OUTOF10: 0

## 2018-04-06 VITALS
OXYGEN SATURATION: 99 % | DIASTOLIC BLOOD PRESSURE: 63 MMHG | RESPIRATION RATE: 20 BRPM | WEIGHT: 315 LBS | BODY MASS INDEX: 39.17 KG/M2 | HEIGHT: 75 IN | HEART RATE: 68 BPM | SYSTOLIC BLOOD PRESSURE: 145 MMHG | TEMPERATURE: 98.2 F

## 2018-04-07 ENCOUNTER — CARE COORDINATION (OUTPATIENT)
Dept: CASE MANAGEMENT | Age: 78
End: 2018-04-07

## 2018-04-07 DIAGNOSIS — I72.9 PSEUDOANEURYSM (HCC): Primary | ICD-10-CM

## 2018-04-07 PROCEDURE — 1111F DSCHRG MED/CURRENT MED MERGE: CPT | Performed by: FAMILY MEDICINE

## 2018-04-11 ENCOUNTER — OFFICE VISIT (OUTPATIENT)
Dept: PRIMARY CARE CLINIC | Age: 78
End: 2018-04-11
Payer: MEDICARE

## 2018-04-11 VITALS
HEART RATE: 52 BPM | OXYGEN SATURATION: 96 % | SYSTOLIC BLOOD PRESSURE: 138 MMHG | HEIGHT: 75 IN | WEIGHT: 315 LBS | RESPIRATION RATE: 15 BRPM | BODY MASS INDEX: 39.17 KG/M2 | DIASTOLIC BLOOD PRESSURE: 72 MMHG | TEMPERATURE: 98.5 F

## 2018-04-11 DIAGNOSIS — I72.9 PSEUDOANEURYSM (HCC): ICD-10-CM

## 2018-04-11 DIAGNOSIS — I73.9 PERIPHERAL VASCULAR DISEASE (HCC): ICD-10-CM

## 2018-04-11 DIAGNOSIS — G47.33 OSA (OBSTRUCTIVE SLEEP APNEA): ICD-10-CM

## 2018-04-11 DIAGNOSIS — E66.01 MORBID OBESITY WITH BMI OF 40.0-44.9, ADULT (HCC): ICD-10-CM

## 2018-04-11 DIAGNOSIS — I10 HTN (HYPERTENSION), BENIGN: Primary | ICD-10-CM

## 2018-04-11 PROCEDURE — 99496 TRANSJ CARE MGMT HIGH F2F 7D: CPT | Performed by: FAMILY MEDICINE

## 2018-04-11 ASSESSMENT — ENCOUNTER SYMPTOMS
EYE DISCHARGE: 0
COUGH: 0
CONSTIPATION: 0
ABDOMINAL PAIN: 0
SHORTNESS OF BREATH: 0
GASTROINTESTINAL NEGATIVE: 1
NAUSEA: 0
RESPIRATORY NEGATIVE: 1
PHOTOPHOBIA: 0
APNEA: 0
VOMITING: 0
BLOOD IN STOOL: 0
DIARRHEA: 0
EYES NEGATIVE: 1
CHEST TIGHTNESS: 0
BACK PAIN: 0

## 2018-05-30 ENCOUNTER — OFFICE VISIT (OUTPATIENT)
Dept: PRIMARY CARE CLINIC | Age: 78
End: 2018-05-30
Payer: MEDICARE

## 2018-05-30 VITALS
OXYGEN SATURATION: 96 % | DIASTOLIC BLOOD PRESSURE: 68 MMHG | TEMPERATURE: 98.1 F | WEIGHT: 315 LBS | SYSTOLIC BLOOD PRESSURE: 138 MMHG | BODY MASS INDEX: 42.07 KG/M2 | HEART RATE: 50 BPM

## 2018-05-30 DIAGNOSIS — E55.9 VITAMIN D DEFICIENCY: ICD-10-CM

## 2018-05-30 DIAGNOSIS — I10 HTN (HYPERTENSION), BENIGN: Primary | ICD-10-CM

## 2018-05-30 DIAGNOSIS — F51.04 PSYCHOPHYSIOLOGICAL INSOMNIA: ICD-10-CM

## 2018-05-30 DIAGNOSIS — I10 HTN (HYPERTENSION), BENIGN: ICD-10-CM

## 2018-05-30 DIAGNOSIS — D51.9 ANEMIA DUE TO VITAMIN B12 DEFICIENCY, UNSPECIFIED B12 DEFICIENCY TYPE: ICD-10-CM

## 2018-05-30 DIAGNOSIS — G47.33 OSA (OBSTRUCTIVE SLEEP APNEA): ICD-10-CM

## 2018-05-30 DIAGNOSIS — M47.816 SPONDYLOSIS OF LUMBAR REGION WITHOUT MYELOPATHY OR RADICULOPATHY: ICD-10-CM

## 2018-05-30 DIAGNOSIS — I72.9 PSEUDOANEURYSM (HCC): ICD-10-CM

## 2018-05-30 LAB
ALBUMIN SERPL-MCNC: 4.5 G/DL (ref 3.9–4.9)
ALP BLD-CCNC: 71 U/L (ref 35–104)
ALT SERPL-CCNC: 26 U/L (ref 0–41)
ANION GAP SERPL CALCULATED.3IONS-SCNC: 15 MEQ/L (ref 7–13)
AST SERPL-CCNC: 23 U/L (ref 0–40)
BASOPHILS ABSOLUTE: 0 K/UL (ref 0–0.2)
BASOPHILS RELATIVE PERCENT: 0.9 %
BILIRUB SERPL-MCNC: 0.6 MG/DL (ref 0–1.2)
BUN BLDV-MCNC: 26 MG/DL (ref 8–23)
CALCIUM SERPL-MCNC: 9 MG/DL (ref 8.6–10.2)
CHLORIDE BLD-SCNC: 103 MEQ/L (ref 98–107)
CO2: 23 MEQ/L (ref 22–29)
CREAT SERPL-MCNC: 1.12 MG/DL (ref 0.7–1.2)
EOSINOPHILS ABSOLUTE: 0.2 K/UL (ref 0–0.7)
EOSINOPHILS RELATIVE PERCENT: 6.1 %
FOLATE: 3.8 NG/ML (ref 7.3–26.1)
GFR AFRICAN AMERICAN: >60
GFR NON-AFRICAN AMERICAN: >60
GLOBULIN: 2.1 G/DL (ref 2.3–3.5)
GLUCOSE BLD-MCNC: 88 MG/DL (ref 74–109)
HCT VFR BLD CALC: 44.9 % (ref 42–52)
HEMOGLOBIN: 14.7 G/DL (ref 14–18)
LYMPHOCYTES ABSOLUTE: 1.1 K/UL (ref 1–4.8)
LYMPHOCYTES RELATIVE PERCENT: 32.9 %
MCH RBC QN AUTO: 30.1 PG (ref 27–31.3)
MCHC RBC AUTO-ENTMCNC: 32.8 % (ref 33–37)
MCV RBC AUTO: 91.7 FL (ref 80–100)
MONOCYTES ABSOLUTE: 0.3 K/UL (ref 0.2–0.8)
MONOCYTES RELATIVE PERCENT: 10.1 %
NEUTROPHILS ABSOLUTE: 1.7 K/UL (ref 1.4–6.5)
NEUTROPHILS RELATIVE PERCENT: 50 %
PDW BLD-RTO: 17.6 % (ref 11.5–14.5)
PLATELET # BLD: 140 K/UL (ref 130–400)
POTASSIUM SERPL-SCNC: 4 MEQ/L (ref 3.5–5.1)
RBC # BLD: 4.9 M/UL (ref 4.7–6.1)
SLIDE REVIEW: ABNORMAL
SODIUM BLD-SCNC: 141 MEQ/L (ref 132–144)
TOTAL PROTEIN: 6.6 G/DL (ref 6.4–8.1)
VITAMIN B-12: 256 PG/ML (ref 232–1245)
VITAMIN D 25-HYDROXY: 44.2 NG/ML (ref 30–100)
WBC # BLD: 3.3 K/UL (ref 4.8–10.8)

## 2018-05-30 PROCEDURE — G8417 CALC BMI ABV UP PARAM F/U: HCPCS | Performed by: FAMILY MEDICINE

## 2018-05-30 PROCEDURE — 1123F ACP DISCUSS/DSCN MKR DOCD: CPT | Performed by: FAMILY MEDICINE

## 2018-05-30 PROCEDURE — 4040F PNEUMOC VAC/ADMIN/RCVD: CPT | Performed by: FAMILY MEDICINE

## 2018-05-30 PROCEDURE — 1036F TOBACCO NON-USER: CPT | Performed by: FAMILY MEDICINE

## 2018-05-30 PROCEDURE — G8427 DOCREV CUR MEDS BY ELIG CLIN: HCPCS | Performed by: FAMILY MEDICINE

## 2018-05-30 PROCEDURE — 99213 OFFICE O/P EST LOW 20 MIN: CPT | Performed by: FAMILY MEDICINE

## 2018-05-30 RX ORDER — FOLIC ACID 1 MG/1
1 TABLET ORAL DAILY
Qty: 30 TABLET | Refills: 3 | Status: SHIPPED | OUTPATIENT
Start: 2018-05-30 | End: 2018-07-30 | Stop reason: SDUPTHER

## 2018-05-30 RX ORDER — TEMAZEPAM 15 MG/1
15 CAPSULE ORAL NIGHTLY PRN
Qty: 30 CAPSULE | Refills: 0 | Status: SHIPPED | OUTPATIENT
Start: 2018-05-30 | End: 2018-07-03 | Stop reason: SDUPTHER

## 2018-05-30 ASSESSMENT — ENCOUNTER SYMPTOMS
NAUSEA: 0
WHEEZING: 0
CHEST TIGHTNESS: 0
CONSTIPATION: 0
SHORTNESS OF BREATH: 0
PHOTOPHOBIA: 0
STRIDOR: 0
CHOKING: 0
DIARRHEA: 0
EYE DISCHARGE: 0
ABDOMINAL PAIN: 0
EYE PAIN: 0
FACIAL SWELLING: 0
EYE REDNESS: 0
COLOR CHANGE: 0
APNEA: 0

## 2018-07-03 ENCOUNTER — OFFICE VISIT (OUTPATIENT)
Dept: PRIMARY CARE CLINIC | Age: 78
End: 2018-07-03
Payer: MEDICARE

## 2018-07-03 VITALS
SYSTOLIC BLOOD PRESSURE: 130 MMHG | BODY MASS INDEX: 39.17 KG/M2 | DIASTOLIC BLOOD PRESSURE: 86 MMHG | RESPIRATION RATE: 16 BRPM | HEART RATE: 48 BPM | OXYGEN SATURATION: 98 % | HEIGHT: 75 IN | TEMPERATURE: 97.6 F | WEIGHT: 315 LBS

## 2018-07-03 DIAGNOSIS — Z12.5 PROSTATE CANCER SCREENING: ICD-10-CM

## 2018-07-03 DIAGNOSIS — E78.5 DYSLIPIDEMIA: ICD-10-CM

## 2018-07-03 DIAGNOSIS — I10 HTN (HYPERTENSION), BENIGN: ICD-10-CM

## 2018-07-03 DIAGNOSIS — F51.04 PSYCHOPHYSIOLOGICAL INSOMNIA: Primary | ICD-10-CM

## 2018-07-03 DIAGNOSIS — G47.33 OSA (OBSTRUCTIVE SLEEP APNEA): ICD-10-CM

## 2018-07-03 DIAGNOSIS — I73.9 PERIPHERAL VASCULAR DISEASE (HCC): ICD-10-CM

## 2018-07-03 PROCEDURE — 99213 OFFICE O/P EST LOW 20 MIN: CPT | Performed by: FAMILY MEDICINE

## 2018-07-03 PROCEDURE — 1123F ACP DISCUSS/DSCN MKR DOCD: CPT | Performed by: FAMILY MEDICINE

## 2018-07-03 PROCEDURE — 4040F PNEUMOC VAC/ADMIN/RCVD: CPT | Performed by: FAMILY MEDICINE

## 2018-07-03 PROCEDURE — 1036F TOBACCO NON-USER: CPT | Performed by: FAMILY MEDICINE

## 2018-07-03 PROCEDURE — G8427 DOCREV CUR MEDS BY ELIG CLIN: HCPCS | Performed by: FAMILY MEDICINE

## 2018-07-03 PROCEDURE — G8417 CALC BMI ABV UP PARAM F/U: HCPCS | Performed by: FAMILY MEDICINE

## 2018-07-03 RX ORDER — TEMAZEPAM 15 MG/1
15 CAPSULE ORAL NIGHTLY PRN
Qty: 30 CAPSULE | Refills: 2 | Status: SHIPPED | OUTPATIENT
Start: 2018-07-03 | End: 2018-08-02

## 2018-07-03 ASSESSMENT — ENCOUNTER SYMPTOMS
RESPIRATORY NEGATIVE: 1
BLOOD IN STOOL: 0
SHORTNESS OF BREATH: 0
PHOTOPHOBIA: 0
VOMITING: 0
CONSTIPATION: 0
COUGH: 0
DIARRHEA: 0
EYES NEGATIVE: 1
CHEST TIGHTNESS: 0
BACK PAIN: 0
ABDOMINAL PAIN: 0
APNEA: 0
GASTROINTESTINAL NEGATIVE: 1
EYE DISCHARGE: 0
NAUSEA: 0

## 2018-07-03 NOTE — PROGRESS NOTES
Subjective:      Patient ID: Jl Stephenson is a 68 y.o. male who presents today for:  Chief Complaint   Patient presents with    Insomnia     pt is here today to follow up on insomnia. pt is currently taking temazepam 15 mg nightly that has helped a lot. pt will need a refill for this. HPI     Insomnia  Patient is here for a follow up and medication refill on Temazepam 15 MG nightly. Patient states this medication works well for him and has no concerns at this time. Past Medical History:   Diagnosis Date    Anxiety     Arthritis     Chronic back pain     GERD (gastroesophageal reflux disease)     Glaucoma     Hyperlipidemia     Hypertension     Prostate CA ([address] 2001    Skin cancer 2011     Past Surgical History:   Procedure Laterality Date    BACK SURGERY      CARDIAC SURGERY      CATARACT REMOVAL WITH IMPLANT  01/23/2018    DR Summer Gonsales,   RT    CATARACT REMOVAL WITH IMPLANT  01/30/2018    DR Summer Gonsales,  LT    COLONOSCOPY  05/26/2017    Taz Corbin MD    ENDOSCOPY, COLON, DIAGNOSTIC      EYE SURGERY      JOINT REPLACEMENT      [place] SKIN TISSUE PROCEDURE UNLISTED Right 4/3/2018    REPAIR OF RIGHT FEMORAL PSEUDOANEURYSM ROOM: 482 performed by Claudine Veronica MD at [address] [place] [address]       History reviewed. No pertinent family history. Social History     Social History    Marital status:      Spouse name: N/A    Number of children: N/A    Years of education: N/A     Occupational History    Not on file. Social History Main Topics    Smoking status: Former Smoker     Types: Cigarettes     Quit date: 1/1/1968    Smokeless tobacco: Former User    Alcohol use No    Drug use: No    Sexual activity: Not on file     Other Topics Concern    Not on file     Social History Narrative    No narrative on file     Allergies:  Patient has no known allergies. Review of Systems   Constitutional: Positive for fatigue. Negative for activity change, appetite change and fever.    HENT:

## 2018-07-27 ENCOUNTER — OFFICE VISIT (OUTPATIENT)
Dept: PRIMARY CARE CLINIC | Age: 78
End: 2018-07-27
Payer: MEDICARE

## 2018-07-27 VITALS
HEIGHT: 75 IN | RESPIRATION RATE: 16 BRPM | HEART RATE: 45 BPM | OXYGEN SATURATION: 93 % | WEIGHT: 315 LBS | TEMPERATURE: 97.4 F | DIASTOLIC BLOOD PRESSURE: 80 MMHG | BODY MASS INDEX: 39.17 KG/M2 | SYSTOLIC BLOOD PRESSURE: 130 MMHG

## 2018-07-27 DIAGNOSIS — R22.31 LOCALIZED SWELLING ON RIGHT HAND: ICD-10-CM

## 2018-07-27 DIAGNOSIS — I10 HTN (HYPERTENSION), BENIGN: ICD-10-CM

## 2018-07-27 DIAGNOSIS — M79.641 RIGHT HAND PAIN: ICD-10-CM

## 2018-07-27 DIAGNOSIS — M79.641 RIGHT HAND PAIN: Primary | ICD-10-CM

## 2018-07-27 DIAGNOSIS — Z12.5 PROSTATE CANCER SCREENING: ICD-10-CM

## 2018-07-27 DIAGNOSIS — E78.5 DYSLIPIDEMIA: ICD-10-CM

## 2018-07-27 DIAGNOSIS — G47.33 OSA (OBSTRUCTIVE SLEEP APNEA): ICD-10-CM

## 2018-07-27 LAB
ALBUMIN SERPL-MCNC: 4.3 G/DL (ref 3.9–4.9)
ALP BLD-CCNC: 55 U/L (ref 35–104)
ALT SERPL-CCNC: 38 U/L (ref 0–41)
ANION GAP SERPL CALCULATED.3IONS-SCNC: 18 MEQ/L (ref 7–13)
AST SERPL-CCNC: 32 U/L (ref 0–40)
BILIRUB SERPL-MCNC: 0.6 MG/DL (ref 0–1.2)
BUN BLDV-MCNC: 26 MG/DL (ref 8–23)
CALCIUM SERPL-MCNC: 9.5 MG/DL (ref 8.6–10.2)
CHLORIDE BLD-SCNC: 102 MEQ/L (ref 98–107)
CHOLESTEROL, TOTAL: 127 MG/DL (ref 0–199)
CO2: 20 MEQ/L (ref 22–29)
CREAT SERPL-MCNC: 1.34 MG/DL (ref 0.7–1.2)
GFR AFRICAN AMERICAN: >60
GFR NON-AFRICAN AMERICAN: 51.6
GLOBULIN: 2.6 G/DL (ref 2.3–3.5)
GLUCOSE BLD-MCNC: 102 MG/DL (ref 74–109)
HDLC SERPL-MCNC: 26 MG/DL (ref 40–59)
LDL CHOLESTEROL CALCULATED: 69 MG/DL (ref 0–129)
POTASSIUM SERPL-SCNC: 4.2 MEQ/L (ref 3.5–5.1)
PROSTATE SPECIFIC ANTIGEN: 0.01 NG/ML (ref 0–6.22)
RHEUMATOID FACTOR: <10 IU/ML (ref 0–14)
SEDIMENTATION RATE, ERYTHROCYTE: 2 MM (ref 0–20)
SODIUM BLD-SCNC: 140 MEQ/L (ref 132–144)
TOTAL PROTEIN: 6.9 G/DL (ref 6.4–8.1)
TRIGL SERPL-MCNC: 158 MG/DL (ref 0–200)
URIC ACID, SERUM: 7.5 MG/DL (ref 3.4–7)

## 2018-07-27 PROCEDURE — 99213 OFFICE O/P EST LOW 20 MIN: CPT | Performed by: FAMILY MEDICINE

## 2018-07-27 PROCEDURE — G8417 CALC BMI ABV UP PARAM F/U: HCPCS | Performed by: FAMILY MEDICINE

## 2018-07-27 PROCEDURE — 4040F PNEUMOC VAC/ADMIN/RCVD: CPT | Performed by: FAMILY MEDICINE

## 2018-07-27 PROCEDURE — G8427 DOCREV CUR MEDS BY ELIG CLIN: HCPCS | Performed by: FAMILY MEDICINE

## 2018-07-27 PROCEDURE — 1036F TOBACCO NON-USER: CPT | Performed by: FAMILY MEDICINE

## 2018-07-27 PROCEDURE — 1101F PT FALLS ASSESS-DOCD LE1/YR: CPT | Performed by: FAMILY MEDICINE

## 2018-07-27 PROCEDURE — 1123F ACP DISCUSS/DSCN MKR DOCD: CPT | Performed by: FAMILY MEDICINE

## 2018-07-27 ASSESSMENT — ENCOUNTER SYMPTOMS
ABDOMINAL PAIN: 0
VOMITING: 0
PHOTOPHOBIA: 0
CONSTIPATION: 0
BLOOD IN STOOL: 0
BACK PAIN: 0
NAUSEA: 0
DIARRHEA: 0
RESPIRATORY NEGATIVE: 1
COUGH: 0
GASTROINTESTINAL NEGATIVE: 1
EYE DISCHARGE: 0
APNEA: 0
SHORTNESS OF BREATH: 0
CHEST TIGHTNESS: 0
EYES NEGATIVE: 1

## 2018-07-27 NOTE — PROGRESS NOTES
tobacco: Former User    Alcohol use No    Drug use: No    Sexual activity: Not on file     Other Topics Concern    Not on file     Social History Narrative    No narrative on file     Allergies:  Patient has no known allergies. Review of Systems   Constitutional: Negative. Negative for activity change, appetite change, fatigue and fever. HENT: Negative. Negative for congestion, nosebleeds and tinnitus. Eyes: Negative. Negative for photophobia, discharge and visual disturbance. Respiratory: Negative. Negative for apnea, cough, chest tightness and shortness of breath. Cardiovascular: Negative. Negative for chest pain and palpitations. Gastrointestinal: Negative. Negative for abdominal pain, blood in stool, constipation, diarrhea, nausea and vomiting. Genitourinary: Negative. Negative for dysuria, frequency, hematuria and urgency. Musculoskeletal: Positive for arthralgias. Negative for back pain and neck pain. Skin: Negative. Negative for pallor. Neurological: Negative. Negative for dizziness, tingling, syncope, speech difficulty, weakness, light-headedness, numbness and headaches. Psychiatric/Behavioral: Negative. Objective:   /80 (Site: Right Arm, Position: Sitting, Cuff Size: Medium Adult)   Pulse (!) 45   Temp 97.4 °F (36.3 °C) (Oral)   Resp 16   Ht 6' 3\" (1.905 m)   Wt (!) 335 lb (152 kg)   SpO2 93%   BMI 41.87 kg/m²     Physical Exam   Constitutional: He is oriented to person, place, and time. He appears well-developed and well-nourished. HENT:   Head: Normocephalic and atraumatic. Eyes: Conjunctivae and EOM are normal. Pupils are equal, round, and reactive to light. Neck: Normal range of motion. Neck supple. No JVD present. No thyromegaly present. Cardiovascular: Normal rate, regular rhythm, normal heart sounds and intact distal pulses. Exam reveals no gallop. No murmur heard.   Pulmonary/Chest: Effort normal and breath sounds normal. No Electronically signed by :  Miguelina Menon DO, personally performed the services described in this documentation, as scribed by Carly Murrell in my presence, and it is both accurate and complete.  Electronically signed by: Danii Gaviria DO    8/2/18 11:19 PM    Danii Gaviria DO

## 2018-07-30 DIAGNOSIS — D51.9 ANEMIA DUE TO VITAMIN B12 DEFICIENCY, UNSPECIFIED B12 DEFICIENCY TYPE: ICD-10-CM

## 2018-07-30 RX ORDER — FOLIC ACID 1 MG/1
1 TABLET ORAL DAILY
Qty: 90 TABLET | Refills: 3 | Status: SHIPPED | OUTPATIENT
Start: 2018-07-30

## 2018-07-30 NOTE — TELEPHONE ENCOUNTER
Patient was last seen on 7-27-18  Last Prescribed 5-30-18    Medication is pending  Please approve or deny this request

## 2018-08-01 ENCOUNTER — TELEPHONE (OUTPATIENT)
Dept: PRIMARY CARE CLINIC | Age: 78
End: 2018-08-01

## 2018-08-01 NOTE — TELEPHONE ENCOUNTER
Prior Authorization: Approved      Prior authorization approved Case ID: Danelle Roca      Payer: Naomi Macdonald [phone]     [phone]      Approval Details     Authorized from July 27, 2018 to December 30, 2018      Electronic appeal:  Not supported   View History   diclofenac sodium (VOLTAREN) 1 % GEL  Apply 4 g topically 4 times daily  Dispense:  5 Tube   Refills:  0  Start:  7/27/2018   End:  8/26/2018  Class:  Normal  Diagnoses:  Right hand pain  This order has been released to its destination.

## 2018-08-08 RX ORDER — FENOFIBRATE 160 MG/1
TABLET ORAL
Qty: 90 TABLET | Refills: 0 | Status: SHIPPED | OUTPATIENT
Start: 2018-08-08 | End: 2018-11-02 | Stop reason: SDUPTHER

## 2018-09-28 DIAGNOSIS — F51.02 ADJUSTMENT INSOMNIA: Primary | ICD-10-CM

## 2018-09-30 RX ORDER — TEMAZEPAM 15 MG/1
CAPSULE ORAL
Qty: 30 CAPSULE | Refills: 2 | Status: SHIPPED | OUTPATIENT
Start: 2018-09-30 | End: 2018-10-01 | Stop reason: SDUPTHER

## 2018-10-01 DIAGNOSIS — F51.02 ADJUSTMENT INSOMNIA: ICD-10-CM

## 2018-10-02 RX ORDER — TEMAZEPAM 15 MG/1
CAPSULE ORAL
Qty: 90 CAPSULE | Refills: 0 | Status: SHIPPED | OUTPATIENT
Start: 2018-10-02 | End: 2018-11-02

## 2018-10-03 ENCOUNTER — OFFICE VISIT (OUTPATIENT)
Dept: PRIMARY CARE CLINIC | Age: 78
End: 2018-10-03
Payer: MEDICARE

## 2018-10-03 VITALS
HEIGHT: 75 IN | WEIGHT: 315 LBS | HEART RATE: 58 BPM | TEMPERATURE: 97.5 F | SYSTOLIC BLOOD PRESSURE: 120 MMHG | RESPIRATION RATE: 16 BRPM | DIASTOLIC BLOOD PRESSURE: 78 MMHG | OXYGEN SATURATION: 98 % | BODY MASS INDEX: 39.17 KG/M2

## 2018-10-03 DIAGNOSIS — E78.5 DYSLIPIDEMIA: ICD-10-CM

## 2018-10-03 DIAGNOSIS — C61 PROSTATE CA (HCC): ICD-10-CM

## 2018-10-03 DIAGNOSIS — M17.11 PRIMARY OSTEOARTHRITIS OF RIGHT KNEE: ICD-10-CM

## 2018-10-03 DIAGNOSIS — R35.0 FREQUENCY OF URINATION: Primary | ICD-10-CM

## 2018-10-03 DIAGNOSIS — I10 HTN (HYPERTENSION), BENIGN: ICD-10-CM

## 2018-10-03 LAB
BILIRUBIN, POC: NORMAL
BLOOD URINE, POC: NORMAL
CLARITY, POC: CLEAR
COLOR, POC: YELLOW
GLUCOSE URINE, POC: NORMAL
KETONES, POC: NORMAL
LEUKOCYTE EST, POC: NORMAL
NITRITE, POC: NORMAL
PH, POC: 6
PROTEIN, POC: NORMAL
SPECIFIC GRAVITY, POC: 1.02
UROBILINOGEN, POC: NORMAL

## 2018-10-03 PROCEDURE — 81003 URINALYSIS AUTO W/O SCOPE: CPT | Performed by: FAMILY MEDICINE

## 2018-10-03 PROCEDURE — 1101F PT FALLS ASSESS-DOCD LE1/YR: CPT | Performed by: FAMILY MEDICINE

## 2018-10-03 PROCEDURE — 1036F TOBACCO NON-USER: CPT | Performed by: FAMILY MEDICINE

## 2018-10-03 PROCEDURE — G8417 CALC BMI ABV UP PARAM F/U: HCPCS | Performed by: FAMILY MEDICINE

## 2018-10-03 PROCEDURE — 99214 OFFICE O/P EST MOD 30 MIN: CPT | Performed by: FAMILY MEDICINE

## 2018-10-03 PROCEDURE — G8484 FLU IMMUNIZE NO ADMIN: HCPCS | Performed by: FAMILY MEDICINE

## 2018-10-03 PROCEDURE — G8427 DOCREV CUR MEDS BY ELIG CLIN: HCPCS | Performed by: FAMILY MEDICINE

## 2018-10-03 PROCEDURE — 4040F PNEUMOC VAC/ADMIN/RCVD: CPT | Performed by: FAMILY MEDICINE

## 2018-10-03 PROCEDURE — 1123F ACP DISCUSS/DSCN MKR DOCD: CPT | Performed by: FAMILY MEDICINE

## 2018-10-03 RX ORDER — GABAPENTIN 300 MG/1
300 CAPSULE ORAL NIGHTLY
Qty: 90 CAPSULE | Refills: 1 | Status: SHIPPED | OUTPATIENT
Start: 2018-10-03 | End: 2019-01-03

## 2018-10-03 RX ORDER — TAMSULOSIN HYDROCHLORIDE 0.4 MG/1
0.4 CAPSULE ORAL DAILY
Qty: 30 CAPSULE | Refills: 5 | Status: SHIPPED | OUTPATIENT
Start: 2018-10-03 | End: 2018-11-02

## 2018-10-03 ASSESSMENT — ENCOUNTER SYMPTOMS
COUGH: 0
VOMITING: 0
BACK PAIN: 0
CHEST TIGHTNESS: 0
PHOTOPHOBIA: 0
BLURRED VISION: 0
APNEA: 0
DIARRHEA: 0
EYE DISCHARGE: 0
GASTROINTESTINAL NEGATIVE: 1
NAUSEA: 0
SHORTNESS OF BREATH: 0
ABDOMINAL PAIN: 0
BLOOD IN STOOL: 0
ORTHOPNEA: 0
RESPIRATORY NEGATIVE: 1
CONSTIPATION: 0
EYES NEGATIVE: 1

## 2018-10-03 ASSESSMENT — PATIENT HEALTH QUESTIONNAIRE - PHQ9
1. LITTLE INTEREST OR PLEASURE IN DOING THINGS: 0
2. FEELING DOWN, DEPRESSED OR HOPELESS: 0
SUM OF ALL RESPONSES TO PHQ QUESTIONS 1-9: 0
SUM OF ALL RESPONSES TO PHQ QUESTIONS 1-9: 0
SUM OF ALL RESPONSES TO PHQ9 QUESTIONS 1 & 2: 0

## 2018-10-25 ENCOUNTER — TELEPHONE (OUTPATIENT)
Dept: PRIMARY CARE CLINIC | Age: 78
End: 2018-10-25

## 2018-11-02 RX ORDER — LISINOPRIL 30 MG/1
TABLET ORAL
Qty: 90 TABLET | Refills: 1 | Status: SHIPPED | OUTPATIENT
Start: 2018-11-02

## 2018-11-02 RX ORDER — FENOFIBRATE 160 MG/1
TABLET ORAL
Qty: 90 TABLET | Refills: 1 | Status: SHIPPED | OUTPATIENT
Start: 2018-11-02

## 2019-01-18 ENCOUNTER — NURSE ONLY (OUTPATIENT)
Dept: PRIMARY CARE CLINIC | Age: 79
End: 2019-01-18
Payer: MEDICARE

## 2019-01-18 DIAGNOSIS — Z23 NEED FOR INFLUENZA VACCINATION: Primary | ICD-10-CM

## 2019-01-18 PROCEDURE — 90662 IIV NO PRSV INCREASED AG IM: CPT | Performed by: FAMILY MEDICINE

## 2019-01-18 PROCEDURE — G0008 ADMIN INFLUENZA VIRUS VAC: HCPCS | Performed by: FAMILY MEDICINE

## 2019-03-08 ENCOUNTER — OFFICE VISIT (OUTPATIENT)
Dept: PRIMARY CARE CLINIC | Age: 79
End: 2019-03-08
Payer: MEDICARE

## 2019-03-08 DIAGNOSIS — R31.9 URINARY TRACT INFECTION WITH HEMATURIA, SITE UNSPECIFIED: Primary | ICD-10-CM

## 2019-03-08 DIAGNOSIS — R31.9 URINARY TRACT INFECTION WITH HEMATURIA, SITE UNSPECIFIED: ICD-10-CM

## 2019-03-08 DIAGNOSIS — N39.0 URINARY TRACT INFECTION WITH HEMATURIA, SITE UNSPECIFIED: ICD-10-CM

## 2019-03-08 DIAGNOSIS — N39.0 URINARY TRACT INFECTION WITH HEMATURIA, SITE UNSPECIFIED: Primary | ICD-10-CM

## 2019-03-08 LAB
BILIRUBIN, POC: ABNORMAL
BLOOD URINE, POC: ABNORMAL
CLARITY, POC: ABNORMAL
COLOR, POC: YELLOW
GLUCOSE URINE, POC: ABNORMAL
KETONES, POC: ABNORMAL
LEUKOCYTE EST, POC: ABNORMAL
NITRITE, POC: ABNORMAL
PH, POC: 6
PROTEIN, POC: ABNORMAL
SPECIFIC GRAVITY, POC: 1.02
UROBILINOGEN, POC: ABNORMAL

## 2019-03-08 PROCEDURE — 81003 URINALYSIS AUTO W/O SCOPE: CPT | Performed by: FAMILY MEDICINE

## 2019-03-08 PROCEDURE — 99999 PR OFFICE/OUTPT VISIT,PROCEDURE ONLY: CPT | Performed by: FAMILY MEDICINE

## 2019-03-08 RX ORDER — CIPROFLOXACIN 500 MG/1
500 TABLET, FILM COATED ORAL 2 TIMES DAILY
Qty: 20 TABLET | Refills: 0 | Status: SHIPPED | OUTPATIENT
Start: 2019-03-08 | End: 2019-03-18

## 2019-03-08 RX ORDER — PHENAZOPYRIDINE HYDROCHLORIDE 200 MG/1
200 TABLET, FILM COATED ORAL 3 TIMES DAILY PRN
Qty: 10 TABLET | Refills: 0 | Status: SHIPPED | OUTPATIENT
Start: 2019-03-08 | End: 2019-03-11

## 2019-03-11 LAB
ORGANISM: ABNORMAL
URINE CULTURE, ROUTINE: ABNORMAL
URINE CULTURE, ROUTINE: ABNORMAL

## 2019-05-07 ENCOUNTER — TELEPHONE (OUTPATIENT)
Dept: ADMINISTRATIVE | Age: 79
End: 2019-05-07

## 2019-05-07 ENCOUNTER — TELEPHONE (OUTPATIENT)
Dept: PRIMARY CARE CLINIC | Age: 79
End: 2019-05-07

## 2019-05-07 NOTE — TELEPHONE ENCOUNTER
Spoke with patient. Will follow  Wade Mejia DO.to [facility]  Patient eligible for AWV. Left message to call back.

## 2023-02-19 PROBLEM — I25.10 CAD S/P PERCUTANEOUS CORONARY ANGIOPLASTY: Status: ACTIVE | Noted: 2023-02-19

## 2023-02-19 PROBLEM — I48.92 ATRIAL FLUTTER (MULTI): Status: ACTIVE | Noted: 2023-02-19

## 2023-02-19 PROBLEM — F33.9 DEPRESSION, RECURRENT (CMS-HCC): Status: ACTIVE | Noted: 2023-02-19

## 2023-02-19 PROBLEM — M25.551 HIP PAIN, RIGHT: Status: ACTIVE | Noted: 2023-02-19

## 2023-02-19 PROBLEM — E78.2 HYPERLIPEMIA, MIXED: Status: ACTIVE | Noted: 2023-02-19

## 2023-02-19 PROBLEM — N28.9 RENAL INSUFFICIENCY: Status: ACTIVE | Noted: 2023-02-19

## 2023-02-19 PROBLEM — M47.12 CERVICAL SPONDYLOSIS WITH MYELOPATHY: Status: ACTIVE | Noted: 2023-02-19

## 2023-02-19 PROBLEM — M47.816 SPONDYLOSIS OF LUMBAR SPINE: Status: ACTIVE | Noted: 2023-02-19

## 2023-02-19 PROBLEM — D49.6 BRAIN TUMOR (MULTI): Status: ACTIVE | Noted: 2023-02-19

## 2023-02-19 PROBLEM — H90.3 BILATERAL SENSORINEURAL HEARING LOSS: Status: ACTIVE | Noted: 2023-02-19

## 2023-02-19 PROBLEM — K13.21 LEUKOPLAKIA OF TONGUE: Status: ACTIVE | Noted: 2023-02-19

## 2023-02-19 PROBLEM — R53.83 FATIGUE: Status: ACTIVE | Noted: 2023-02-19

## 2023-02-19 PROBLEM — R00.1 BRADYCARDIA: Status: ACTIVE | Noted: 2023-02-19

## 2023-02-19 PROBLEM — I99.8 ISCHEMIC FOOT PAIN AT REST: Status: ACTIVE | Noted: 2023-02-19

## 2023-02-19 PROBLEM — R25.2 MUSCLE CRAMPS: Status: ACTIVE | Noted: 2023-02-19

## 2023-02-19 PROBLEM — M25.611 STIFFNESS OF RIGHT SHOULDER JOINT: Status: ACTIVE | Noted: 2023-02-19

## 2023-02-19 PROBLEM — I10 HYPERTENSION: Status: ACTIVE | Noted: 2023-02-19

## 2023-02-19 PROBLEM — I71.21 THORACIC ASCENDING AORTIC ANEURYSM (CMS-HCC): Status: ACTIVE | Noted: 2023-02-19

## 2023-02-19 PROBLEM — N18.30 CHRONIC KIDNEY DISEASE (CKD), STAGE III (MODERATE) (MULTI): Status: ACTIVE | Noted: 2023-02-19

## 2023-02-19 PROBLEM — R32 INCONTINENCE: Status: ACTIVE | Noted: 2023-02-19

## 2023-02-19 PROBLEM — E79.0 HYPERURICEMIA: Status: ACTIVE | Noted: 2023-02-19

## 2023-02-19 PROBLEM — G47.30 SLEEP APNEA: Status: ACTIVE | Noted: 2023-02-19

## 2023-02-19 PROBLEM — M79.604 PAIN IN BOTH LOWER EXTREMITIES: Status: ACTIVE | Noted: 2023-02-19

## 2023-02-19 PROBLEM — E53.8 FOLIC ACID DEFICIENCY: Status: ACTIVE | Noted: 2023-02-19

## 2023-02-19 PROBLEM — I73.9 PVD (PERIPHERAL VASCULAR DISEASE) (CMS-HCC): Status: ACTIVE | Noted: 2023-02-19

## 2023-02-19 PROBLEM — R19.7 DIARRHEA: Status: ACTIVE | Noted: 2023-02-19

## 2023-02-19 PROBLEM — E53.8 VITAMIN B12 DEFICIENCY: Status: ACTIVE | Noted: 2023-02-19

## 2023-02-19 PROBLEM — G25.81 RESTLESS LEG SYNDROME: Status: ACTIVE | Noted: 2023-02-19

## 2023-02-19 PROBLEM — D64.9 ANEMIA: Status: ACTIVE | Noted: 2023-02-19

## 2023-02-19 PROBLEM — C61 MALIGNANT NEOPLASM OF PROSTATE (MULTI): Status: ACTIVE | Noted: 2023-02-19

## 2023-02-19 PROBLEM — Z98.61 CAD S/P PERCUTANEOUS CORONARY ANGIOPLASTY: Status: ACTIVE | Noted: 2023-02-19

## 2023-02-19 PROBLEM — G47.00 INSOMNIA: Status: ACTIVE | Noted: 2023-02-19

## 2023-02-19 PROBLEM — R26.9 GAIT ABNORMALITY: Status: ACTIVE | Noted: 2023-02-19

## 2023-02-19 PROBLEM — R35.0 INCREASED URINARY FREQUENCY: Status: ACTIVE | Noted: 2023-02-19

## 2023-02-19 PROBLEM — M25.511 ACUTE PAIN OF RIGHT SHOULDER: Status: ACTIVE | Noted: 2023-02-19

## 2023-02-19 PROBLEM — M54.16 LUMBAR RADICULOPATHY: Status: ACTIVE | Noted: 2023-02-19

## 2023-02-19 PROBLEM — M79.605 PAIN IN BOTH LOWER EXTREMITIES: Status: ACTIVE | Noted: 2023-02-19

## 2023-02-19 PROBLEM — I49.5 SINUS NODE DYSFUNCTION (MULTI): Status: ACTIVE | Noted: 2023-02-19

## 2023-02-19 PROBLEM — I72.9 ANEURYSM (CMS-HCC): Status: ACTIVE | Noted: 2023-02-19

## 2023-02-19 PROBLEM — M79.673 ISCHEMIC FOOT PAIN AT REST: Status: ACTIVE | Noted: 2023-02-19

## 2023-02-19 PROBLEM — R07.81 RIB PAIN ON RIGHT SIDE: Status: ACTIVE | Noted: 2023-02-19

## 2023-02-19 PROBLEM — F32.A DEPRESSION: Status: ACTIVE | Noted: 2023-02-19

## 2023-02-19 RX ORDER — ROPINIROLE 0.25 MG/1
1 TABLET, FILM COATED ORAL NIGHTLY
COMMUNITY
Start: 2022-05-14 | End: 2023-05-30 | Stop reason: SDUPTHER

## 2023-02-19 RX ORDER — MIRABEGRON 50 MG/1
1 TABLET, EXTENDED RELEASE ORAL DAILY
COMMUNITY
End: 2023-07-27 | Stop reason: ALTCHOICE

## 2023-02-19 RX ORDER — LATANOPROST 50 UG/ML
1 SOLUTION/ DROPS OPHTHALMIC NIGHTLY
COMMUNITY
Start: 2022-05-05

## 2023-02-19 RX ORDER — LANOLIN ALCOHOL/MO/W.PET/CERES
1 CREAM (GRAM) TOPICAL DAILY
COMMUNITY
Start: 2019-04-19

## 2023-02-19 RX ORDER — TRAMADOL HYDROCHLORIDE 50 MG/1
1 TABLET ORAL EVERY 6 HOURS
COMMUNITY
Start: 2016-03-01 | End: 2023-05-15 | Stop reason: SDUPTHER

## 2023-02-19 RX ORDER — NIACIN 500 MG/1
1 TABLET, EXTENDED RELEASE ORAL NIGHTLY
COMMUNITY

## 2023-02-19 RX ORDER — ASPIRIN 81 MG/1
1 TABLET ORAL DAILY
COMMUNITY
End: 2024-04-04 | Stop reason: WASHOUT

## 2023-02-19 RX ORDER — ALLOPURINOL 100 MG/1
1 TABLET ORAL 2 TIMES DAILY
COMMUNITY
Start: 2021-04-14 | End: 2023-05-15 | Stop reason: SDUPTHER

## 2023-02-19 RX ORDER — IBUPROFEN 100 MG/5ML
1 SUSPENSION, ORAL (FINAL DOSE FORM) ORAL DAILY
COMMUNITY

## 2023-02-19 RX ORDER — CALCITRIOL 0.25 UG/1
1 CAPSULE ORAL DAILY
COMMUNITY
Start: 2022-10-10 | End: 2023-09-21 | Stop reason: SDUPTHER

## 2023-02-19 RX ORDER — FERROUS SULFATE 325(65) MG
1 TABLET ORAL DAILY
COMMUNITY

## 2023-02-19 RX ORDER — DULOXETIN HYDROCHLORIDE 30 MG/1
1 CAPSULE, DELAYED RELEASE ORAL DAILY
COMMUNITY
Start: 2020-09-25 | End: 2023-04-19 | Stop reason: ALTCHOICE

## 2023-02-19 RX ORDER — FOLIC ACID 1 MG/1
1 TABLET ORAL DAILY
COMMUNITY
Start: 2019-09-16 | End: 2023-03-09 | Stop reason: SDUPTHER

## 2023-02-19 RX ORDER — CHLORTHALIDONE 25 MG/1
1 TABLET ORAL DAILY
COMMUNITY
End: 2023-10-25 | Stop reason: SDUPTHER

## 2023-02-19 RX ORDER — ATORVASTATIN CALCIUM 10 MG/1
1 TABLET, FILM COATED ORAL NIGHTLY
COMMUNITY
Start: 2021-12-27

## 2023-02-19 RX ORDER — TEMAZEPAM 15 MG/1
1 CAPSULE ORAL NIGHTLY PRN
COMMUNITY
Start: 2019-04-19 | End: 2023-05-05 | Stop reason: SDUPTHER

## 2023-02-19 RX ORDER — CHOLECALCIFEROL (VITAMIN D3) 125 MCG
1 CAPSULE ORAL DAILY
COMMUNITY

## 2023-02-19 RX ORDER — FUROSEMIDE 20 MG/1
1 TABLET ORAL
COMMUNITY
Start: 2020-07-17 | End: 2023-07-27 | Stop reason: ALTCHOICE

## 2023-02-19 RX ORDER — LOSARTAN POTASSIUM 50 MG/1
50 TABLET ORAL DAILY
COMMUNITY
End: 2023-09-21 | Stop reason: ENTERED-IN-ERROR

## 2023-02-19 RX ORDER — EZETIMIBE 10 MG/1
1 TABLET ORAL DAILY
COMMUNITY
Start: 2021-12-06 | End: 2023-06-09 | Stop reason: SDUPTHER

## 2023-03-09 DIAGNOSIS — E53.8 FOLIC ACID DEFICIENCY: Primary | ICD-10-CM

## 2023-03-09 RX ORDER — FOLIC ACID 1 MG/1
1 TABLET ORAL DAILY
Qty: 90 TABLET | Refills: 0 | Status: SHIPPED | OUTPATIENT
Start: 2023-03-09 | End: 2023-06-09 | Stop reason: SDUPTHER

## 2023-04-05 ENCOUNTER — OFFICE VISIT (OUTPATIENT)
Dept: PAIN MANAGEMENT | Age: 83
End: 2023-04-05
Payer: MEDICARE

## 2023-04-05 VITALS
DIASTOLIC BLOOD PRESSURE: 60 MMHG | HEIGHT: 74 IN | SYSTOLIC BLOOD PRESSURE: 110 MMHG | BODY MASS INDEX: 37.22 KG/M2 | TEMPERATURE: 97 F | WEIGHT: 290 LBS

## 2023-04-05 DIAGNOSIS — M46.1 SACROILIITIS (HCC): Primary | ICD-10-CM

## 2023-04-05 DIAGNOSIS — M47.817 LUMBOSACRAL SPONDYLOSIS WITHOUT MYELOPATHY: ICD-10-CM

## 2023-04-05 PROBLEM — F33.9 DEPRESSION, RECURRENT (HCC): Status: ACTIVE | Noted: 2023-02-19

## 2023-04-05 PROBLEM — G25.81 RESTLESS LEG SYNDROME: Status: ACTIVE | Noted: 2023-02-19

## 2023-04-05 PROBLEM — K21.9 GASTROESOPHAGEAL REFLUX DISEASE WITHOUT ESOPHAGITIS: Status: ACTIVE | Noted: 2017-06-29

## 2023-04-05 PROBLEM — E66.9 OBESITY: Status: ACTIVE | Noted: 2022-04-20

## 2023-04-05 PROBLEM — N18.30 STAGE 3 CHRONIC KIDNEY DISEASE (HCC): Status: ACTIVE | Noted: 2022-04-20

## 2023-04-05 PROBLEM — M17.11 OSTEOARTHRITIS OF RIGHT KNEE: Status: ACTIVE | Noted: 2017-10-03

## 2023-04-05 PROBLEM — I48.92 ATRIAL FLUTTER (HCC): Status: ACTIVE | Noted: 2018-03-22

## 2023-04-05 PROBLEM — D49.6 BRAIN TUMOR (HCC): Status: ACTIVE | Noted: 2023-02-19

## 2023-04-05 PROBLEM — I71.21 ASCENDING AORTIC ANEURYSM (HCC): Status: ACTIVE | Noted: 2022-04-20

## 2023-04-05 PROBLEM — D64.9 ANEMIA: Status: ACTIVE | Noted: 2022-04-20

## 2023-04-05 PROCEDURE — 99204 OFFICE O/P NEW MOD 45 MIN: CPT | Performed by: NURSE PRACTITIONER

## 2023-04-05 PROCEDURE — G8427 DOCREV CUR MEDS BY ELIG CLIN: HCPCS | Performed by: NURSE PRACTITIONER

## 2023-04-05 PROCEDURE — G8417 CALC BMI ABV UP PARAM F/U: HCPCS | Performed by: NURSE PRACTITIONER

## 2023-04-05 PROCEDURE — 1036F TOBACCO NON-USER: CPT | Performed by: NURSE PRACTITIONER

## 2023-04-05 PROCEDURE — 3078F DIAST BP <80 MM HG: CPT | Performed by: NURSE PRACTITIONER

## 2023-04-05 PROCEDURE — 1123F ACP DISCUSS/DSCN MKR DOCD: CPT | Performed by: NURSE PRACTITIONER

## 2023-04-05 PROCEDURE — 3074F SYST BP LT 130 MM HG: CPT | Performed by: NURSE PRACTITIONER

## 2023-04-05 RX ORDER — TEMAZEPAM 15 MG/1
CAPSULE ORAL
COMMUNITY
Start: 2019-04-19

## 2023-04-05 RX ORDER — CALCITRIOL 0.25 UG/1
CAPSULE, LIQUID FILLED ORAL
COMMUNITY
Start: 2023-01-08

## 2023-04-05 RX ORDER — CHLORTHALIDONE 25 MG/1
25 TABLET ORAL DAILY
COMMUNITY
Start: 2020-12-31

## 2023-04-05 RX ORDER — ROPINIROLE 0.5 MG/1
0.5 TABLET, FILM COATED ORAL NIGHTLY
COMMUNITY
Start: 2023-01-05

## 2023-04-05 RX ORDER — ALLOPURINOL 100 MG/1
100 TABLET ORAL 2 TIMES DAILY
COMMUNITY
Start: 2023-02-13

## 2023-04-05 RX ORDER — VIT C/B6/B5/MAGNESIUM/HERB 173 50-5-6-5MG
1500 CAPSULE ORAL DAILY
COMMUNITY

## 2023-04-05 RX ORDER — TRAMADOL HYDROCHLORIDE 50 MG/1
50 TABLET ORAL EVERY 6 HOURS PRN
COMMUNITY
Start: 2023-01-10

## 2023-04-05 RX ORDER — FLUOROURACIL 50 MG/G
CREAM TOPICAL
COMMUNITY
Start: 2019-04-30

## 2023-04-05 RX ORDER — FLUOXETINE 20 MG/1
20 TABLET, FILM COATED ORAL DAILY
COMMUNITY
Start: 2023-02-27

## 2023-04-05 RX ORDER — LOSARTAN POTASSIUM 50 MG/1
50 TABLET ORAL DAILY
COMMUNITY
Start: 2023-02-07

## 2023-04-05 RX ORDER — ZINC GLUCONATE 50 MG
TABLET ORAL
COMMUNITY

## 2023-04-05 RX ORDER — EZETIMIBE 10 MG/1
1 TABLET ORAL DAILY
COMMUNITY
Start: 2021-12-06

## 2023-04-05 ASSESSMENT — ENCOUNTER SYMPTOMS
BACK PAIN: 1
NAUSEA: 0
EYES NEGATIVE: 1
COUGH: 0
CONSTIPATION: 0
DIARRHEA: 0
GASTROINTESTINAL NEGATIVE: 1
SHORTNESS OF BREATH: 0

## 2023-04-05 NOTE — PROGRESS NOTES
Isabelle Carter  ()    2023    Subjective:     Isabelle Carter is 80 y.o. male who complains today of:    Chief Complaint   Patient presents with    Hip Pain         Allergies:  Patient has no known allergies. Past Medical History:   Diagnosis Date    Anxiety     Arthritis     Chronic back pain     Depression, recurrent ([facility] 2023    GERD (gastroesophageal reflux disease)     Glaucoma     Hyperlipidemia     Hypertension     [facility]     Skin cancer     Stage 3 chronic kidney disease ([facility] 2022     Past Surgical History:   Procedure Laterality Date    BACK SURGERY      CARDIAC SURGERY      CATARACT REMOVAL WITH IMPLANT  2018    DR Marilyn Mckay,   RT    CATARACT REMOVAL WITH IMPLANT  2018    DR Marilyn Mckay,  LT    COLONOSCOPY  2017    Fide Mathis MD    ENDOSCOPY, COLON, DIAGNOSTIC      EYE SURGERY      JOINT REPLACEMENT      RI UNLISTED PX SKIN MUC MEMBRANE & SUBQ TISSUE Right 4/3/2018    REPAIR OF RIGHT FEMORAL PSEUDOANEURYSM ROOM: 482 performed by Nikolay Woodward MD at [address] [place] [address]       History reviewed. No pertinent family history.   Social History     Socioeconomic History    Marital status:      Spouse name: Not on file    Number of children: Not on file    Years of education: Not on file    Highest education level: Not on file   Occupational History    Not on file   Tobacco Use    Smoking status: Former     Packs/day: 4.00     Years: 10.00     Pack years: 40.00     Types: Cigarettes     Quit date: 1968     Years since quittin.2    Smokeless tobacco: Former   Vaping Use    Vaping Use: Never used   Substance and Sexual Activity    Alcohol use: No    Drug use: No    Sexual activity: Not on file   Other Topics Concern    Not on file   Social History Narrative    Not on file     Social Determinants of Health     Financial Resource Strain: Not on file   Food Insecurity: Not on file   Transportation Needs: Not on file   Physical

## 2023-04-08 PROBLEM — K80.20 CALCULUS OF GALLBLADDER WITHOUT CHOLECYSTITIS WITHOUT OBSTRUCTION: Status: ACTIVE | Noted: 2023-04-08

## 2023-04-08 PROBLEM — E66.9 CLASS 2 OBESITY WITH BODY MASS INDEX (BMI) OF 38.0 TO 38.9 IN ADULT: Status: ACTIVE | Noted: 2023-04-08

## 2023-04-08 PROBLEM — R32 URINARY INCONTINENCE: Status: ACTIVE | Noted: 2023-04-08

## 2023-04-08 PROBLEM — R10.11 ABDOMINAL PAIN, CHRONIC, RIGHT UPPER QUADRANT: Status: ACTIVE | Noted: 2023-04-08

## 2023-04-08 PROBLEM — G89.29 ABDOMINAL PAIN, CHRONIC, RIGHT UPPER QUADRANT: Status: ACTIVE | Noted: 2023-04-08

## 2023-04-08 PROBLEM — E66.811 CLASS 1 OBESITY WITH BODY MASS INDEX (BMI) OF 31.0 TO 31.9 IN ADULT: Status: ACTIVE | Noted: 2023-04-08

## 2023-04-08 PROBLEM — E66.9 CLASS 1 OBESITY WITH BODY MASS INDEX (BMI) OF 31.0 TO 31.9 IN ADULT: Status: ACTIVE | Noted: 2023-04-08

## 2023-04-08 PROBLEM — E66.812 CLASS 2 OBESITY WITH BODY MASS INDEX (BMI) OF 38.0 TO 38.9 IN ADULT: Status: ACTIVE | Noted: 2023-04-08

## 2023-04-08 PROBLEM — K81.1 CHRONIC CHOLECYSTITIS: Status: ACTIVE | Noted: 2023-04-08

## 2023-04-08 PROBLEM — N39.41 URGE INCONTINENCE OF URINE: Status: ACTIVE | Noted: 2023-04-08

## 2023-04-08 PROBLEM — R39.12 WEAK URINARY STREAM: Status: ACTIVE | Noted: 2023-04-08

## 2023-04-10 ENCOUNTER — APPOINTMENT (OUTPATIENT)
Dept: PRIMARY CARE | Facility: CLINIC | Age: 83
End: 2023-04-10
Payer: MEDICARE

## 2023-04-10 DIAGNOSIS — J06.9 UPPER RESPIRATORY TRACT INFECTION, UNSPECIFIED TYPE: Primary | ICD-10-CM

## 2023-04-10 RX ORDER — BENZONATATE 100 MG/1
100 CAPSULE ORAL 3 TIMES DAILY PRN
Qty: 42 CAPSULE | Refills: 0 | Status: SHIPPED | OUTPATIENT
Start: 2023-04-10 | End: 2023-04-19 | Stop reason: ALTCHOICE

## 2023-04-10 RX ORDER — AZITHROMYCIN 250 MG/1
TABLET, FILM COATED ORAL
Qty: 6 TABLET | Refills: 0 | Status: SHIPPED | OUTPATIENT
Start: 2023-04-10 | End: 2023-04-19 | Stop reason: ALTCHOICE

## 2023-04-11 DIAGNOSIS — M46.1 SACROILIITIS (HCC): ICD-10-CM

## 2023-04-19 ENCOUNTER — OFFICE VISIT (OUTPATIENT)
Dept: PRIMARY CARE | Facility: CLINIC | Age: 83
End: 2023-04-19
Payer: MEDICARE

## 2023-04-19 VITALS
BODY MASS INDEX: 37.35 KG/M2 | HEIGHT: 74 IN | WEIGHT: 291 LBS | OXYGEN SATURATION: 99 % | HEART RATE: 45 BPM | DIASTOLIC BLOOD PRESSURE: 64 MMHG | SYSTOLIC BLOOD PRESSURE: 120 MMHG

## 2023-04-19 DIAGNOSIS — R10.9 ABDOMINAL PAIN, UNSPECIFIED ABDOMINAL LOCATION: Primary | ICD-10-CM

## 2023-04-19 DIAGNOSIS — E66.01 MORBID OBESITY (MULTI): ICD-10-CM

## 2023-04-19 DIAGNOSIS — I73.9 PVD (PERIPHERAL VASCULAR DISEASE) (CMS-HCC): ICD-10-CM

## 2023-04-19 DIAGNOSIS — I49.5 SINUS NODE DYSFUNCTION (MULTI): ICD-10-CM

## 2023-04-19 DIAGNOSIS — I10 PRIMARY HYPERTENSION: ICD-10-CM

## 2023-04-19 DIAGNOSIS — I71.21 ANEURYSM OF ASCENDING AORTA WITHOUT RUPTURE (CMS-HCC): ICD-10-CM

## 2023-04-19 DIAGNOSIS — F33.9 DEPRESSION, RECURRENT (CMS-HCC): ICD-10-CM

## 2023-04-19 PROBLEM — C61 MALIGNANT NEOPLASM OF PROSTATE (MULTI): Status: RESOLVED | Noted: 2023-02-19 | Resolved: 2023-04-19

## 2023-04-19 PROBLEM — R60.9 EDEMA: Status: ACTIVE | Noted: 2023-04-19

## 2023-04-19 PROBLEM — D49.6 BRAIN TUMOR (MULTI): Status: RESOLVED | Noted: 2023-02-19 | Resolved: 2023-04-19

## 2023-04-19 PROCEDURE — 1036F TOBACCO NON-USER: CPT | Performed by: FAMILY MEDICINE

## 2023-04-19 PROCEDURE — 99214 OFFICE O/P EST MOD 30 MIN: CPT | Performed by: FAMILY MEDICINE

## 2023-04-19 PROCEDURE — 3078F DIAST BP <80 MM HG: CPT | Performed by: FAMILY MEDICINE

## 2023-04-19 PROCEDURE — 3074F SYST BP LT 130 MM HG: CPT | Performed by: FAMILY MEDICINE

## 2023-04-19 PROCEDURE — 1159F MED LIST DOCD IN RCRD: CPT | Performed by: FAMILY MEDICINE

## 2023-04-19 PROCEDURE — 1160F RVW MEDS BY RX/DR IN RCRD: CPT | Performed by: FAMILY MEDICINE

## 2023-04-19 RX ORDER — TORSEMIDE 20 MG/1
1 TABLET ORAL DAILY
COMMUNITY
Start: 2023-04-14 | End: 2023-11-01 | Stop reason: ALTCHOICE

## 2023-04-19 ASSESSMENT — ENCOUNTER SYMPTOMS
POLYPHAGIA: 0
ABDOMINAL DISTENTION: 0
PALPITATIONS: 0
SEIZURES: 0
CONFUSION: 0
APPETITE CHANGE: 0
FATIGUE: 0
COUGH: 0
SINUS PAIN: 0
FLANK PAIN: 0
LOSS OF SENSATION IN FEET: 0
DIZZINESS: 0
ADENOPATHY: 0
CONSTITUTIONAL NEGATIVE: 1
PHOTOPHOBIA: 0
RHINORRHEA: 0
SINUS PRESSURE: 0
DECREASED CONCENTRATION: 0
ACTIVITY CHANGE: 0
NECK STIFFNESS: 0
CONSTIPATION: 0
OCCASIONAL FEELINGS OF UNSTEADINESS: 0
BLOOD IN STOOL: 0
ARTHRALGIAS: 0
DYSPHORIC MOOD: 0
HEMATURIA: 0
RECTAL PAIN: 0
TROUBLE SWALLOWING: 0
DYSURIA: 0
EYE PAIN: 0
MYALGIAS: 0
DIARRHEA: 0
STRIDOR: 0
FEVER: 0
COLOR CHANGE: 0
ABDOMINAL PAIN: 1
POLYDIPSIA: 0
SHORTNESS OF BREATH: 0
SORE THROAT: 0
HEADACHES: 0
SPEECH DIFFICULTY: 0
CHEST TIGHTNESS: 0
SLEEP DISTURBANCE: 0
DEPRESSION: 0
NERVOUS/ANXIOUS: 0
AGITATION: 0

## 2023-04-19 ASSESSMENT — LIFESTYLE VARIABLES
AUDIT-C TOTAL SCORE: 0
SKIP TO QUESTIONS 9-10: 1
HOW OFTEN DO YOU HAVE A DRINK CONTAINING ALCOHOL: NEVER
HOW MANY STANDARD DRINKS CONTAINING ALCOHOL DO YOU HAVE ON A TYPICAL DAY: PATIENT DOES NOT DRINK
HOW OFTEN DO YOU HAVE SIX OR MORE DRINKS ON ONE OCCASION: NEVER

## 2023-04-19 NOTE — LETTER
May 4, 2023     Calos Bonilla  [address] [place] [address]      Dear Mr. Bonilla:    Below are the results from your recent visit:  TEST RESULTS WITHIN NORMAL LIMITS     Resulted Orders   CT abdomen pelvis w IV contrast    Narrative    Interpreted By:  NIXON BILL MD  MRN: [id number]  Patient Name: CALOS BONILLA     STUDY:  CT ABDOMEN AND PELVIS W IV CONTRAST;  5/3/2023 10:08 am     INDICATION:  abdominal pain.     COMPARISON:  Right upper quadrant ultrasound 8 March 2023; CT abdomen and pelvis  without contrast 25 February 2020, 28 March 2018 and 17 November 2009     ACCESSION NUMBER(S):  [id number]     ORDERING CLINICIAN:  LEIGHANN SORTO     TECHNIQUE:  CT of the abdomen and pelvis from the lung bases through the  symphysis pubis after the uneventful administration of intravenous  contrast (75 mL Omnipaque 350). No oral contrast.     FINDINGS:  LOWER CHEST: No acute airspace disease.     BONES: No acute findings     LIVER: Normal. No enlargement or evidence of cirrhosis or fatty  change. No mass or other suspect lesion.     SPLEEN: Normal. No enlargement, mass or evidence of splenic vein  thrombosis.     PANCREAS: Normal. No CT evidence of acute or chronic pancreatitis. No  duct dilation. No mass.     GALLBLADDER: Distended up to the upper limits of normal, known sludge  and stones from ultrasound as recently as 8 March 2023. CT appearance  unchanged back through 28 March 2018     BILE DUCTS: Normal. No biliary duct dilation.     ADRENAL GLANDS: Normal. No nodule or mass.     KIDNEYS AND URETERS: Normal except for the 4.5 cm simple right renal  cyst. No hydronephrosis on either side.  No mass.  Symmetric  enhancement.  No infarct or CT evidence of acute pyelonephritis.  No  substantial radiodense stone.  Tiny stones and radiolucent stones  could be occult on CT.     LYMPH NODES: No adenopathy, intraperitoneal, retroperitoneal, pelvic  or otherwise     APPENDIX: Normal.  Not dilated, thick walled or in any  other way  inflamed in appearance.  No inflammatory change about the appendix.     COLON: Normal. No sign of acute diverticulitis or other colitis. No  annular constricting mass.     SMALL BOWEL: Normal. No small bowel dilation or any other sign of  small bowel obstruction. No sign of active inflammatory bowel disease.     STOMACH / DUODENUM: Grossly normal by CT which has limited  sensitivity and specificity for the stomach and duodenum.     RETROPERITONEUM: Normal.  No acute hemorrhage or inflammatory change.  Lymph nodes in a separate dedicated section.     OMENTUM, MESENTERY AND PERITONEAL SPACES:  Free intraperitoneal air: Negative  Free intraperitoneal fluid: Negative  Abscess: Negative  Other: n/a     URINARY BLADDER: Normal. No wall thickening, large diverticula,  radiodense stone or surrounding inflammatory change.     PELVIS: Brachytherapy seeds are in the prostate     VASCULATURE: Aortic and iliac atherosclerotic calcifications without  aneurysm or other acute finding. No high grade stenosis of the major  abdominal aortic branch vessels. Portal venous system patent.     ABDOMINAL WALL:  Hernia: Unchanged small fat containing left inguinal  Other: No acute or contributory abnormality.       Impression    NO ACUTE FINDINGS IN THE ABDOMEN OR PELVIS        The test results show that your current treatment is working. Please continue your current medication and plan.   If you have any questions or concerns, please don't hesitate to call.         Sincerely,        Giacomo Joseph, DO

## 2023-04-19 NOTE — PATIENT INSTRUCTIONS
Follow up in 1 week after CT virtually     Continue current medications and therapy for chronic medical conditions.    Patient was advised importance of proper diet/nutrition in addition adequate hydration. Patient was encouraged moderate exercise program to include 30 minutes daily for 5 days of the week or 150 minutes weekly. Patient will follow-up with us as scheduled.    I personally reviewed the OARRS report for this patient. I have considered the risks of abuse, dependence, addiction, and diversion.    UDS 2/1/2023    CT A&P ordered     CMP ordered

## 2023-04-19 NOTE — PROGRESS NOTES
Subjective   Patient ID: Holden Burgess is a 82 y.o. male who presents for Abdominal Pain, Depression, and Hypertension.    Patient presents today to follow up on abdominal pain, HTN and depression. Patient states he is still experiencing abdominal pain. He is seeing a specialist.     Patient was started on Fluoxetine for depression but had to stop it due to excessive sleepiness. States he was sleeping all day for 3 days. States overall he is doing well without the medication     At last visit, patient's Losartan was increased to 100mg. (/68)          Review of Systems   Constitutional: Negative.  Negative for activity change, appetite change, fatigue and fever.   HENT:  Negative for congestion, dental problem, ear discharge, ear pain, mouth sores, rhinorrhea, sinus pressure, sinus pain, sore throat, tinnitus and trouble swallowing.    Eyes:  Negative for photophobia, pain and visual disturbance.   Respiratory:  Negative for cough, chest tightness, shortness of breath and stridor.    Cardiovascular:  Negative for chest pain and palpitations.   Gastrointestinal:  Positive for abdominal pain. Negative for abdominal distention, blood in stool, constipation, diarrhea and rectal pain.   Endocrine: Negative for cold intolerance, heat intolerance, polydipsia, polyphagia and polyuria.   Genitourinary:  Negative for dysuria, flank pain, hematuria and urgency.   Musculoskeletal:  Negative for arthralgias, gait problem, myalgias and neck stiffness.   Skin:  Negative for color change and rash.   Allergic/Immunologic: Negative for environmental allergies and food allergies.   Neurological:  Negative for dizziness, seizures, syncope, speech difficulty and headaches.   Hematological:  Negative for adenopathy.   Psychiatric/Behavioral:  Negative for agitation, confusion, decreased concentration, dysphoric mood and sleep disturbance. The patient is not nervous/anxious.        Objective   /64 (BP Location: Right arm,  "Patient Position: Sitting, BP Cuff Size: Adult)   Pulse (!) 45   Ht 1.88 m (6' 2\")   Wt 132 kg (291 lb)   SpO2 99%   BMI 37.36 kg/m²     Physical Exam  Vitals reviewed.   Constitutional:       General: He is not in acute distress.     Appearance: Normal appearance. He is normal weight. He is not ill-appearing or diaphoretic.   HENT:      Head: Normocephalic.      Right Ear: Tympanic membrane and external ear normal.      Left Ear: Tympanic membrane and external ear normal.      Nose: Nose normal. No congestion.      Mouth/Throat:      Mouth: Mucous membranes are dry.      Pharynx: No posterior oropharyngeal erythema.   Eyes:      General:         Right eye: No discharge.         Left eye: No discharge.      Extraocular Movements: Extraocular movements intact.      Conjunctiva/sclera: Conjunctivae normal.      Pupils: Pupils are equal, round, and reactive to light.   Cardiovascular:      Rate and Rhythm: Normal rate and regular rhythm.      Pulses: Normal pulses.      Heart sounds: Normal heart sounds. No murmur heard.  Pulmonary:      Effort: Pulmonary effort is normal. No respiratory distress.      Breath sounds: Normal breath sounds. No wheezing or rales.   Chest:      Chest wall: No tenderness.   Abdominal:      General: Abdomen is flat. Bowel sounds are normal. There is no distension.      Palpations: There is no mass.      Tenderness: There is no abdominal tenderness. There is no guarding.   Genitourinary:     Rectum: Normal.   Musculoskeletal:         General: No tenderness. Normal range of motion.      Cervical back: Normal range of motion and neck supple. No tenderness.      Right lower leg: No edema.      Left lower leg: No edema.   Skin:     General: Skin is warm and dry.      Coloration: Skin is not jaundiced.      Findings: No bruising or erythema.   Neurological:      General: No focal deficit present.      Mental Status: He is alert and oriented to person, place, and time. Mental status is at " baseline.      Cranial Nerves: No cranial nerve deficit.      Sensory: No sensory deficit.      Coordination: Coordination normal.      Gait: Gait normal.   Psychiatric:         Mood and Affect: Mood normal.         Thought Content: Thought content normal.         Judgment: Judgment normal.         Assessment/Plan   Problem List Items Addressed This Visit          Circulatory    Hypertension    Relevant Orders    Comprehensive metabolic panel    PVD (peripheral vascular disease) (CMS/HCC)    Thoracic ascending aortic aneurysm (CMS/HCC)    Sinus node dysfunction (CMS/HCC)       Other    Depression, recurrent (CMS/HCC)     Other Visit Diagnoses       Abdominal pain, unspecified abdominal location    -  Primary    Relevant Orders    CT abdomen pelvis wo IV contrast    Morbid obesity (CMS/HCC)             Scribe Attestation  By signing my name below, I, Carissa ZAVALA , Scribe   attest that this documentation has been prepared under the direction and in the presence of Giacomo Joseph DO.  Provider Attestation - Scribe documentation  All medical record entries made by the Scribe were at my direction and personally dictated by me. I have reviewed the chart and agree that the record accurately reflects my personal performance of the history, physical exam, discussion and plan.

## 2023-04-26 LAB
ANION GAP IN SER/PLAS: 13 MMOL/L (ref 10–20)
CALCIDIOL (25 OH VITAMIN D3) (NG/ML) IN SER/PLAS: 36 NG/ML
CALCIUM (MG/DL) IN SER/PLAS: 9.5 MG/DL (ref 8.6–10.3)
CARBON DIOXIDE, TOTAL (MMOL/L) IN SER/PLAS: 31 MMOL/L (ref 21–32)
CHLORIDE (MMOL/L) IN SER/PLAS: 100 MMOL/L (ref 98–107)
CREATININE (MG/DL) IN SER/PLAS: 1.48 MG/DL (ref 0.5–1.3)
CREATININE (MG/DL) IN URINE: NORMAL
GFR MALE: 47 ML/MIN/1.73M2
GLUCOSE (MG/DL) IN SER/PLAS: 102 MG/DL (ref 74–99)
PHOSPHATE (MG/DL) IN SER/PLAS: 3.4 MG/DL (ref 2.5–4.9)
POTASSIUM (MMOL/L) IN SER/PLAS: 3.8 MMOL/L (ref 3.5–5.3)
PROTEIN (MG/DL) IN URINE: NORMAL
PROTEIN/CREATININE (MG/MG) IN URINE: NORMAL
SODIUM (MMOL/L) IN SER/PLAS: 140 MMOL/L (ref 136–145)
UREA NITROGEN (MG/DL) IN SER/PLAS: 44 MG/DL (ref 6–23)

## 2023-04-27 LAB
CREATININE (MG/DL) IN URINE: 64.4 MG/DL (ref 20–370)
PARATHYRIN INTACT (PG/ML) IN SER/PLAS: 55.2 PG/ML (ref 18.5–88)
PROTEIN (MG/DL) IN URINE: <4 MG/DL (ref 5–25)
PROTEIN/CREATININE (MG/MG) IN URINE: ABNORMAL MG/MG CREAT (ref 0–0.17)

## 2023-05-05 DIAGNOSIS — F51.01 PRIMARY INSOMNIA: ICD-10-CM

## 2023-05-05 NOTE — TELEPHONE ENCOUNTER
Pt came into the office + said that he is having trouble sleeping and would like to be put back on temazepam (Restoril) 15 mg capsule    ..he thought he could go without it but can't.    Richie on [facility]

## 2023-05-08 RX ORDER — TEMAZEPAM 15 MG/1
15 CAPSULE ORAL NIGHTLY PRN
Qty: 30 CAPSULE | Refills: 1 | Status: SHIPPED | OUTPATIENT
Start: 2023-05-08 | End: 2023-07-18 | Stop reason: SDUPTHER

## 2023-05-15 ENCOUNTER — TELEMEDICINE (OUTPATIENT)
Dept: PRIMARY CARE | Facility: CLINIC | Age: 83
End: 2023-05-15
Payer: MEDICARE

## 2023-05-15 DIAGNOSIS — I10 PRIMARY HYPERTENSION: Primary | ICD-10-CM

## 2023-05-15 DIAGNOSIS — N18.31 STAGE 3A CHRONIC KIDNEY DISEASE (MULTI): ICD-10-CM

## 2023-05-15 DIAGNOSIS — R10.11 ABDOMINAL PAIN, CHRONIC, RIGHT UPPER QUADRANT: ICD-10-CM

## 2023-05-15 DIAGNOSIS — K81.1 CHRONIC CHOLECYSTITIS: ICD-10-CM

## 2023-05-15 DIAGNOSIS — G89.29 ABDOMINAL PAIN, CHRONIC, RIGHT UPPER QUADRANT: ICD-10-CM

## 2023-05-15 DIAGNOSIS — M10.9 GOUT, UNSPECIFIED CAUSE, UNSPECIFIED CHRONICITY, UNSPECIFIED SITE: ICD-10-CM

## 2023-05-15 PROCEDURE — 99442 PR PHYS/QHP TELEPHONE EVALUATION 11-20 MIN: CPT | Performed by: FAMILY MEDICINE

## 2023-05-15 RX ORDER — TRAMADOL HYDROCHLORIDE 50 MG/1
50 TABLET ORAL EVERY 6 HOURS
Qty: 30 TABLET | Refills: 1 | Status: SHIPPED | OUTPATIENT
Start: 2023-05-15 | End: 2023-07-27 | Stop reason: SDUPTHER

## 2023-05-15 RX ORDER — ALLOPURINOL 100 MG/1
100 TABLET ORAL 2 TIMES DAILY
Qty: 180 TABLET | Refills: 1 | Status: SHIPPED | OUTPATIENT
Start: 2023-05-15

## 2023-05-15 RX ORDER — UREA 10 %
LOTION (ML) TOPICAL
COMMUNITY

## 2023-05-15 ASSESSMENT — ENCOUNTER SYMPTOMS
SLEEP DISTURBANCE: 0
DYSURIA: 0
SPEECH DIFFICULTY: 0
CONSTIPATION: 0
ADENOPATHY: 0
RECTAL PAIN: 0
HEADACHES: 0
BLOOD IN STOOL: 0
POLYDIPSIA: 0
SHORTNESS OF BREATH: 0
DIZZINESS: 0
COUGH: 0
SINUS PAIN: 0
APPETITE CHANGE: 0
DECREASED CONCENTRATION: 0
SINUS PRESSURE: 0
HEMATURIA: 0
COLOR CHANGE: 0
CONSTITUTIONAL NEGATIVE: 1
ARTHRALGIAS: 0
ABDOMINAL PAIN: 1
SORE THROAT: 0
MYALGIAS: 0
PHOTOPHOBIA: 0
FEVER: 0
CHEST TIGHTNESS: 0
STRIDOR: 0
NECK STIFFNESS: 0
ACTIVITY CHANGE: 0
DIARRHEA: 0
PAIN: 1
NERVOUS/ANXIOUS: 0
SEIZURES: 0
CONFUSION: 0
RHINORRHEA: 0
TROUBLE SWALLOWING: 0
PALPITATIONS: 0
AGITATION: 0
EYE PAIN: 0
POLYPHAGIA: 0
FLANK PAIN: 0
FATIGUE: 0
ABDOMINAL DISTENTION: 0
DYSPHORIC MOOD: 0

## 2023-05-15 NOTE — PATIENT INSTRUCTIONS
Follow up in 6 weeks     Continue current medications and therapy for chronic medical conditions.    Patient was advised importance of proper diet/nutrition in addition adequate hydration. Patient was encouraged moderate exercise program to include 30 minutes daily for 5 days of the week or 150 minutes weekly. Patient will follow-up with us as scheduled.    Review OARRS (data from independent source)    Review UDS    CT and A&P reviewed with patient today

## 2023-05-15 NOTE — PROGRESS NOTES
Subjective   Patient ID: Holden Burgess is a 82 y.o. male who presents for Abdominal Pain, Pain, and Gout.      Patient is following up on abdominal pain that has resolved.  He would like to review results of blood work done 4/26/23 and CT abdomen pelvis done on 55/3/23  He will need refill of allopurinol and tramadol.     Abdominal Pain  Pertinent negatives include no arthralgias, constipation, diarrhea, dysuria, fever, headaches, hematuria or myalgias.   Pain  Associated symptoms include abdominal pain. Pertinent negatives include no chest pain, constipation, diarrhea, dysuria, eye pain, fatigue, fever, headaches, rash or shortness of breath.        Review of Systems   Constitutional: Negative.  Negative for activity change, appetite change, fatigue and fever.   HENT:  Negative for congestion, dental problem, ear discharge, ear pain, mouth sores, rhinorrhea, sinus pressure, sinus pain, sore throat, tinnitus and trouble swallowing.    Eyes:  Negative for photophobia, pain and visual disturbance.   Respiratory:  Negative for cough, chest tightness, shortness of breath and stridor.    Cardiovascular:  Negative for chest pain and palpitations.   Gastrointestinal:  Positive for abdominal pain. Negative for abdominal distention, blood in stool, constipation, diarrhea and rectal pain.   Endocrine: Negative for cold intolerance, heat intolerance, polydipsia, polyphagia and polyuria.   Genitourinary:  Negative for dysuria, flank pain, hematuria and urgency.   Musculoskeletal:  Negative for arthralgias, gait problem, myalgias and neck stiffness.   Skin:  Negative for color change and rash.   Allergic/Immunologic: Negative for environmental allergies and food allergies.   Neurological:  Negative for dizziness, seizures, syncope, speech difficulty and headaches.   Hematological:  Negative for adenopathy.   Psychiatric/Behavioral:  Negative for agitation, confusion, decreased concentration, dysphoric mood and sleep  disturbance. The patient is not nervous/anxious.        Objective   There were no vitals taken for this visit.    Physical Exam  Constitutional: Well developed, well nourished, alert and in no acute distress   Psychiatric: Mood calm and affect normal        Assessment/Plan   Problem List Items Addressed This Visit          Nervous    Abdominal pain, chronic, right upper quadrant    Relevant Medications    traMADol (Ultram) 50 mg tablet    Other Relevant Orders    Follow Up In [facility] - PCP       Circulatory    Hypertension - Primary       Digestive    Chronic cholecystitis       Genitourinary    Chronic kidney disease (CKD), stage III (moderate) (CMS/[facility])     Other Visit Diagnoses       Gout, unspecified cause, unspecified chronicity, unspecified site        Relevant Medications    allopurinol (Zyloprim) 100 mg tablet    Other Relevant Orders    Follow Up In [facility] - PCP           Scribe Attestation  By signing my name below, I, Gloria Kovacs   attest that this documentation has been prepared under the direction and in the presence of Giacomo Joseph DO.  Provider Attestation - Scribe documentation  All medical record entries made by the Scribe were at my direction and personally dictated by me. I have reviewed the chart and agree that the record accurately reflects my personal performance of the history, physical exam, discussion and plan.

## 2023-05-30 DIAGNOSIS — G25.81 RESTLESS LEG SYNDROME: ICD-10-CM

## 2023-05-30 RX ORDER — ROPINIROLE 0.25 MG/1
0.25 TABLET, FILM COATED ORAL NIGHTLY
Qty: 90 TABLET | Refills: 1 | Status: SHIPPED | OUTPATIENT
Start: 2023-05-30

## 2023-05-30 NOTE — TELEPHONE ENCOUNTER
Dr. Joseph patient  Refill for Ropinirole   [id number] - [place], [place] - [address] [place] [address]

## 2023-06-09 DIAGNOSIS — E53.8 FOLIC ACID DEFICIENCY: ICD-10-CM

## 2023-06-09 DIAGNOSIS — E78.00 HIGH CHOLESTEROL: Primary | ICD-10-CM

## 2023-06-09 RX ORDER — FOLIC ACID 1 MG/1
1 TABLET ORAL DAILY
Qty: 90 TABLET | Refills: 0 | Status: SHIPPED | OUTPATIENT
Start: 2023-06-09 | End: 2023-06-09 | Stop reason: SDUPTHER

## 2023-06-09 RX ORDER — FOLIC ACID 1 MG/1
1 TABLET ORAL DAILY
Qty: 90 TABLET | Refills: 0 | Status: SHIPPED | OUTPATIENT
Start: 2023-06-09 | End: 2023-09-21 | Stop reason: SDUPTHER

## 2023-06-10 RX ORDER — EZETIMIBE 10 MG/1
10 TABLET ORAL DAILY
Qty: 90 TABLET | Refills: 0 | Status: SHIPPED | OUTPATIENT
Start: 2023-06-10 | End: 2023-09-21 | Stop reason: SDUPTHER

## 2023-06-19 LAB
ALANINE AMINOTRANSFERASE (SGPT) (U/L) IN SER/PLAS: 48 U/L (ref 10–52)
ALBUMIN (G/DL) IN SER/PLAS: 4 G/DL (ref 3.4–5)
ALKALINE PHOSPHATASE (U/L) IN SER/PLAS: 77 U/L (ref 33–136)
ANION GAP IN SER/PLAS: 13 MMOL/L (ref 10–20)
ASPARTATE AMINOTRANSFERASE (SGOT) (U/L) IN SER/PLAS: 31 U/L (ref 9–39)
BILIRUBIN TOTAL (MG/DL) IN SER/PLAS: 0.8 MG/DL (ref 0–1.2)
CALCIUM (MG/DL) IN SER/PLAS: 9.3 MG/DL (ref 8.6–10.3)
CARBON DIOXIDE, TOTAL (MMOL/L) IN SER/PLAS: 25 MMOL/L (ref 21–32)
CHLORIDE (MMOL/L) IN SER/PLAS: 106 MMOL/L (ref 98–107)
CREATININE (MG/DL) IN SER/PLAS: 1.27 MG/DL (ref 0.5–1.3)
ERYTHROCYTE DISTRIBUTION WIDTH (RATIO) BY AUTOMATED COUNT: 18.4 % (ref 11.5–14.5)
ERYTHROCYTE MEAN CORPUSCULAR HEMOGLOBIN CONCENTRATION (G/DL) BY AUTOMATED: 30.8 G/DL (ref 32–36)
ERYTHROCYTE MEAN CORPUSCULAR VOLUME (FL) BY AUTOMATED COUNT: 103 FL (ref 80–100)
ERYTHROCYTES (10*6/UL) IN BLOOD BY AUTOMATED COUNT: 3.99 X10E12/L (ref 4.5–5.9)
GFR MALE: 56 ML/MIN/1.73M2
GLUCOSE (MG/DL) IN SER/PLAS: 121 MG/DL (ref 74–99)
HEMATOCRIT (%) IN BLOOD BY AUTOMATED COUNT: 41.2 % (ref 41–52)
HEMOGLOBIN (G/DL) IN BLOOD: 12.7 G/DL (ref 13.5–17.5)
LEUKOCYTES (10*3/UL) IN BLOOD BY AUTOMATED COUNT: 4.4 X10E9/L (ref 4.4–11.3)
PLATELETS (10*3/UL) IN BLOOD AUTOMATED COUNT: 63 X10E9/L (ref 150–450)
POTASSIUM (MMOL/L) IN SER/PLAS: 4.3 MMOL/L (ref 3.5–5.3)
PROTEIN TOTAL: 6.4 G/DL (ref 6.4–8.2)
SODIUM (MMOL/L) IN SER/PLAS: 140 MMOL/L (ref 136–145)
UREA NITROGEN (MG/DL) IN SER/PLAS: 31 MG/DL (ref 6–23)

## 2023-06-22 LAB — URINE CULTURE: ABNORMAL

## 2023-06-27 ENCOUNTER — HOSPITAL ENCOUNTER (OUTPATIENT)
Dept: DATA CONVERSION | Facility: HOSPITAL | Age: 83
End: 2023-06-27
Attending: UROLOGY | Admitting: UROLOGY
Payer: MEDICARE

## 2023-06-27 DIAGNOSIS — F32.A DEPRESSION, UNSPECIFIED: ICD-10-CM

## 2023-06-27 DIAGNOSIS — I25.10 ATHEROSCLEROTIC HEART DISEASE OF NATIVE CORONARY ARTERY WITHOUT ANGINA PECTORIS: ICD-10-CM

## 2023-06-27 DIAGNOSIS — G47.33 OBSTRUCTIVE SLEEP APNEA (ADULT) (PEDIATRIC): ICD-10-CM

## 2023-06-27 DIAGNOSIS — Z79.82 LONG TERM (CURRENT) USE OF ASPIRIN: ICD-10-CM

## 2023-06-27 DIAGNOSIS — I12.9 HYPERTENSIVE CHRONIC KIDNEY DISEASE WITH STAGE 1 THROUGH STAGE 4 CHRONIC KIDNEY DISEASE, OR UNSPECIFIED CHRONIC KIDNEY DISEASE: ICD-10-CM

## 2023-06-27 DIAGNOSIS — N18.9 CHRONIC KIDNEY DISEASE, UNSPECIFIED: ICD-10-CM

## 2023-06-27 DIAGNOSIS — Z85.46 PERSONAL HISTORY OF MALIGNANT NEOPLASM OF PROSTATE: ICD-10-CM

## 2023-06-27 DIAGNOSIS — Z87.891 PERSONAL HISTORY OF NICOTINE DEPENDENCE: ICD-10-CM

## 2023-06-27 DIAGNOSIS — E78.5 HYPERLIPIDEMIA, UNSPECIFIED: ICD-10-CM

## 2023-06-27 DIAGNOSIS — N40.0 BENIGN PROSTATIC HYPERPLASIA WITHOUT LOWER URINARY TRACT SYMPTOMS: ICD-10-CM

## 2023-06-27 DIAGNOSIS — K21.9 GASTRO-ESOPHAGEAL REFLUX DISEASE WITHOUT ESOPHAGITIS: ICD-10-CM

## 2023-06-27 DIAGNOSIS — N40.1 BENIGN PROSTATIC HYPERPLASIA WITH LOWER URINARY TRACT SYMPTOMS: ICD-10-CM

## 2023-06-27 LAB
ABO GROUP (TYPE) IN BLOOD: NORMAL
RH FACTOR: NORMAL

## 2023-06-29 ENCOUNTER — APPOINTMENT (OUTPATIENT)
Dept: PRIMARY CARE | Facility: CLINIC | Age: 83
End: 2023-06-29
Payer: MEDICARE

## 2023-06-30 ENCOUNTER — TELEPHONE (OUTPATIENT)
Dept: PRIMARY CARE | Facility: CLINIC | Age: 83
End: 2023-06-30
Payer: MEDICARE

## 2023-07-18 DIAGNOSIS — F51.01 PRIMARY INSOMNIA: ICD-10-CM

## 2023-07-18 RX ORDER — TEMAZEPAM 15 MG/1
15 CAPSULE ORAL NIGHTLY PRN
Qty: 30 CAPSULE | Refills: 1 | Status: SHIPPED | OUTPATIENT
Start: 2023-07-18 | End: 2023-09-21 | Stop reason: SDUPTHER

## 2023-07-18 NOTE — TELEPHONE ENCOUNTER
PT requests refill of:   TEMAZEPAM 15 MG CAP    1 CAP AT BEDTIME    Send to:   [facility]    Please advise pt @ [phone]

## 2023-07-27 ENCOUNTER — OFFICE VISIT (OUTPATIENT)
Dept: PRIMARY CARE | Facility: CLINIC | Age: 83
End: 2023-07-27
Payer: MEDICARE

## 2023-07-27 VITALS
DIASTOLIC BLOOD PRESSURE: 58 MMHG | HEART RATE: 45 BPM | SYSTOLIC BLOOD PRESSURE: 110 MMHG | BODY MASS INDEX: 38.89 KG/M2 | OXYGEN SATURATION: 97 % | HEIGHT: 74 IN | WEIGHT: 303 LBS | RESPIRATION RATE: 16 BRPM

## 2023-07-27 DIAGNOSIS — R10.11 ABDOMINAL PAIN, CHRONIC, RIGHT UPPER QUADRANT: ICD-10-CM

## 2023-07-27 DIAGNOSIS — E66.01 CLASS 2 SEVERE OBESITY DUE TO EXCESS CALORIES WITH SERIOUS COMORBIDITY AND BODY MASS INDEX (BMI) OF 38.0 TO 38.9 IN ADULT (MULTI): ICD-10-CM

## 2023-07-27 DIAGNOSIS — E78.2 HYPERLIPEMIA, MIXED: ICD-10-CM

## 2023-07-27 DIAGNOSIS — R26.9 GAIT ABNORMALITY: ICD-10-CM

## 2023-07-27 DIAGNOSIS — I71.21 ANEURYSM OF ASCENDING AORTA WITHOUT RUPTURE (CMS-HCC): ICD-10-CM

## 2023-07-27 DIAGNOSIS — I10 PRIMARY HYPERTENSION: ICD-10-CM

## 2023-07-27 DIAGNOSIS — G89.29 ABDOMINAL PAIN, CHRONIC, RIGHT UPPER QUADRANT: ICD-10-CM

## 2023-07-27 DIAGNOSIS — M54.16 LUMBAR RADICULOPATHY: ICD-10-CM

## 2023-07-27 DIAGNOSIS — M25.611 STIFFNESS OF RIGHT SHOULDER JOINT: Primary | ICD-10-CM

## 2023-07-27 PROBLEM — E66.9 CLASS 1 OBESITY WITH BODY MASS INDEX (BMI) OF 31.0 TO 31.9 IN ADULT: Status: RESOLVED | Noted: 2023-04-08 | Resolved: 2023-07-27

## 2023-07-27 PROBLEM — E66.811 CLASS 1 OBESITY WITH BODY MASS INDEX (BMI) OF 31.0 TO 31.9 IN ADULT: Status: RESOLVED | Noted: 2023-04-08 | Resolved: 2023-07-27

## 2023-07-27 PROBLEM — E66.9 CLASS 2 OBESITY WITH BODY MASS INDEX (BMI) OF 38.0 TO 38.9 IN ADULT: Status: RESOLVED | Noted: 2023-04-08 | Resolved: 2023-07-27

## 2023-07-27 PROBLEM — E66.812 CLASS 2 OBESITY WITH BODY MASS INDEX (BMI) OF 38.0 TO 38.9 IN ADULT: Status: RESOLVED | Noted: 2023-04-08 | Resolved: 2023-07-27

## 2023-07-27 PROCEDURE — 99213 OFFICE O/P EST LOW 20 MIN: CPT | Performed by: FAMILY MEDICINE

## 2023-07-27 RX ORDER — ACETAMINOPHEN 500 MG
TABLET ORAL
COMMUNITY
End: 2023-09-21 | Stop reason: SDUPTHER

## 2023-07-27 RX ORDER — TRAMADOL HYDROCHLORIDE 50 MG/1
50 TABLET ORAL EVERY 6 HOURS
Qty: 30 TABLET | Refills: 1 | Status: SHIPPED | OUTPATIENT
Start: 2023-07-27 | End: 2024-03-01 | Stop reason: SDUPTHER

## 2023-07-27 ASSESSMENT — ENCOUNTER SYMPTOMS
FLANK PAIN: 0
SINUS PAIN: 0
DYSPHORIC MOOD: 0
RHINORRHEA: 0
CHEST TIGHTNESS: 0
SLEEP DISTURBANCE: 0
POLYPHAGIA: 0
EYE PAIN: 0
HEMATURIA: 0
PAIN: 1
COUGH: 0
DIARRHEA: 0
SINUS PRESSURE: 0
SPEECH DIFFICULTY: 0
DIZZINESS: 0
HEADACHES: 0
ARTHRALGIAS: 0
POLYDIPSIA: 0
NECK STIFFNESS: 0
CONSTITUTIONAL NEGATIVE: 1
FEVER: 0
ADENOPATHY: 0
FATIGUE: 0
DECREASED CONCENTRATION: 0
MYALGIAS: 0
CONFUSION: 0
PALPITATIONS: 0
SEIZURES: 0
STRIDOR: 0
RECTAL PAIN: 0
SHORTNESS OF BREATH: 0
BLOOD IN STOOL: 0
APPETITE CHANGE: 0
PHOTOPHOBIA: 0
AGITATION: 0
ABDOMINAL DISTENTION: 0
COLOR CHANGE: 0
CONSTIPATION: 0
ABDOMINAL PAIN: 0
TROUBLE SWALLOWING: 0
ACTIVITY CHANGE: 0
SORE THROAT: 0
NERVOUS/ANXIOUS: 0
DYSURIA: 0

## 2023-07-27 NOTE — PATIENT INSTRUCTIONS
Follow up in 3 months    Continue current medications and therapy for chronic medical conditions.    Patient was advised importance of proper diet/nutrition in addition adequate hydration. Patient was encouraged moderate exercise program to include 30 minutes daily for 5 days of the week or 150 minutes weekly. Patient will follow-up with us as scheduled.    I personally reviewed the OARRS report for this patient. I have considered the risks of abuse, dependence, addiction, and diversion.    UDS: 02/01/2023    CSA: 01/26/2023    Obtain CBC and CMP.

## 2023-07-27 NOTE — PROGRESS NOTES
Subjective   Patient ID: Holden Burgess is a 82 y.o. male who presents for Pain.    Pain  Pertinent negatives include no abdominal pain, chest pain, constipation, diarrhea, dysuria, eye pain, fatigue, fever, headaches, rash or shortness of breath.    Follow up of chronic pain and used tramadol.     He is also has right arm and shoulder pain x 2 weeks. He was cranking up an umbrella and went to [facility] 7/10/23 for xray of arm and found pulled tendon . He went to Dr. Valladares and will need a shoulder replacement. Pain has reduced and was placed on a sling. He does not use the sling since it bothers the neck .     Review of Systems   Constitutional: Negative.  Negative for activity change, appetite change, fatigue and fever.   HENT:  Negative for congestion, dental problem, ear discharge, ear pain, mouth sores, rhinorrhea, sinus pressure, sinus pain, sore throat, tinnitus and trouble swallowing.    Eyes:  Negative for photophobia, pain and visual disturbance.   Respiratory:  Negative for cough, chest tightness, shortness of breath and stridor.    Cardiovascular:  Negative for chest pain and palpitations.   Gastrointestinal:  Negative for abdominal distention, abdominal pain, blood in stool, constipation, diarrhea and rectal pain.   Endocrine: Negative for cold intolerance, heat intolerance, polydipsia, polyphagia and polyuria.   Genitourinary:  Negative for dysuria, flank pain, hematuria and urgency.   Musculoskeletal:  Negative for arthralgias, gait problem, myalgias and neck stiffness.   Skin:  Negative for color change and rash.   Allergic/Immunologic: Negative for environmental allergies and food allergies.   Neurological:  Negative for dizziness, seizures, syncope, speech difficulty and headaches.   Hematological:  Negative for adenopathy.   Psychiatric/Behavioral:  Negative for agitation, confusion, decreased concentration, dysphoric mood and sleep disturbance. The patient is not nervous/anxious.      Objective  "  /58 (BP Location: Right arm, Patient Position: Sitting, BP Cuff Size: Large adult)   Pulse (!) 45 Comment: routinely low  Resp 16   Ht 1.88 m (6' 2\")   Wt 137 kg (303 lb)   SpO2 97%   BMI 38.90 kg/m²     Physical Exam  Vitals reviewed.   Constitutional:       General: He is not in acute distress.     Appearance: He is obese. He is not ill-appearing or diaphoretic.   HENT:      Head: Normocephalic.      Right Ear: Tympanic membrane and external ear normal.      Left Ear: Tympanic membrane and external ear normal.      Nose: Nose normal. No congestion.      Mouth/Throat:      Pharynx: No posterior oropharyngeal erythema.   Eyes:      General:         Right eye: No discharge.         Left eye: No discharge.      Extraocular Movements: Extraocular movements intact.      Conjunctiva/sclera: Conjunctivae normal.      Pupils: Pupils are equal, round, and reactive to light.   Cardiovascular:      Rate and Rhythm: Normal rate and regular rhythm.      Pulses: Normal pulses.      Heart sounds: Normal heart sounds. No murmur heard.  Pulmonary:      Effort: Pulmonary effort is normal. No respiratory distress.      Breath sounds: Normal breath sounds. No wheezing or rales.   Chest:      Chest wall: No tenderness.   Abdominal:      General: Abdomen is flat. Bowel sounds are normal. There is distension.      Palpations: There is no mass.      Tenderness: There is no abdominal tenderness. There is no guarding.   Musculoskeletal:         General: No tenderness. Normal range of motion.      Cervical back: Normal range of motion and neck supple. No tenderness.      Right lower leg: No edema.      Left lower leg: No edema.   Skin:     General: Skin is warm and dry.      Coloration: Skin is not jaundiced.      Findings: No bruising or erythema.   Neurological:      General: No focal deficit present.      Mental Status: He is alert and oriented to person, place, and time. Mental status is at baseline.      Cranial Nerves: No " cranial nerve deficit.      Sensory: No sensory deficit.      Coordination: Coordination normal.      Gait: Gait normal.   Psychiatric:         Mood and Affect: Mood normal.         Judgment: Judgment normal.       Assessment/Plan   Problem List Items Addressed This Visit       Gait abnormality    Hyperlipemia, mixed    Relevant Orders    CBC and Auto Differential    Comprehensive Metabolic Panel    Hypertension    Lumbar radiculopathy    Stiffness of right shoulder joint - Primary    Thoracic ascending aortic aneurysm (CMS/HCC)    Abdominal pain, chronic, right upper quadrant    Relevant Medications    traMADol (Ultram) 50 mg tablet    RESOLVED: Class 2 obesity with body mass index (BMI) of 38.0 to 38.9 in adult         Scribe Attestation  By signing my name below, IZarina RMA , Gloria   attest that this documentation has been prepared under the direction and in the presence of Giacomo Joseph DO.   Provider Attestation - Scribe documentation    All medical record entries made by the Scribe were at my direction and personally dictated by me. I have reviewed the chart and agree that the record accurately reflects my personal performance of the history, physical exam, discussion and plan.

## 2023-09-20 PROBLEM — Z87.891 FORMER SMOKER: Status: ACTIVE | Noted: 2023-09-20

## 2023-09-20 PROBLEM — M17.11 OSTEOARTHRITIS OF RIGHT KNEE: Status: ACTIVE | Noted: 2017-10-03

## 2023-09-20 PROBLEM — I25.10 CORONARY ATHEROSCLEROSIS OF NATIVE CORONARY ARTERY: Status: ACTIVE | Noted: 2022-04-20

## 2023-09-20 PROBLEM — N39.3 SUI (STRESS URINARY INCONTINENCE), MALE: Status: ACTIVE | Noted: 2023-09-20

## 2023-09-20 PROBLEM — H40.1131 PRIMARY OPEN-ANGLE GLAUCOMA, BILATERAL, MILD STAGE: Status: ACTIVE | Noted: 2018-01-03

## 2023-09-20 PROBLEM — R33.9 INCOMPLETE EMPTYING OF BLADDER: Status: ACTIVE | Noted: 2023-09-20

## 2023-09-20 PROBLEM — T83.9XXA FOLEY CATHETER PROBLEM (CMS-HCC): Status: ACTIVE | Noted: 2023-09-20

## 2023-09-20 PROBLEM — K21.9 GASTROESOPHAGEAL REFLUX DISEASE WITHOUT ESOPHAGITIS: Status: ACTIVE | Noted: 2017-06-29

## 2023-09-20 PROBLEM — I72.9 PSEUDOANEURYSM (CMS-HCC): Status: ACTIVE | Noted: 2018-04-02

## 2023-09-20 PROBLEM — M25.571 PAIN IN RIGHT ANKLE AND JOINTS OF RIGHT FOOT: Status: ACTIVE | Noted: 2018-12-06

## 2023-09-20 RX ORDER — FLUOXETINE 20 MG/1
1 TABLET ORAL DAILY
COMMUNITY
Start: 2023-02-27 | End: 2023-11-01 | Stop reason: ALTCHOICE

## 2023-09-20 RX ORDER — FUROSEMIDE 20 MG/1
1 TABLET ORAL AS NEEDED
COMMUNITY
End: 2024-02-15 | Stop reason: SDUPTHER

## 2023-09-20 RX ORDER — LOSARTAN POTASSIUM 100 MG/1
50 TABLET ORAL DAILY
COMMUNITY
Start: 2023-02-27 | End: 2024-01-03 | Stop reason: ALTCHOICE

## 2023-09-21 DIAGNOSIS — E78.00 HIGH CHOLESTEROL: ICD-10-CM

## 2023-09-21 DIAGNOSIS — F51.01 PRIMARY INSOMNIA: ICD-10-CM

## 2023-09-21 DIAGNOSIS — E53.8 FOLIC ACID DEFICIENCY: ICD-10-CM

## 2023-09-21 RX ORDER — CALCITRIOL 0.25 UG/1
0.25 CAPSULE ORAL EVERY OTHER DAY
COMMUNITY

## 2023-09-21 NOTE — TELEPHONE ENCOUNTER
Dr. Joseph Pt    Refill for  ezetimibe (Zetia) 10 mg tablet  temazepam (Restoril) 15 mg capsule   folic acid (Folvite) 1 mg tablet     Pharmacy-- [id number] [address] & [facility]

## 2023-09-23 RX ORDER — FOLIC ACID 1 MG/1
1 TABLET ORAL DAILY
Qty: 90 TABLET | Refills: 0 | Status: SHIPPED | OUTPATIENT
Start: 2023-09-23

## 2023-09-23 RX ORDER — EZETIMIBE 10 MG/1
10 TABLET ORAL DAILY
Qty: 90 TABLET | Refills: 0 | Status: SHIPPED | OUTPATIENT
Start: 2023-09-23

## 2023-09-23 RX ORDER — TEMAZEPAM 15 MG/1
15 CAPSULE ORAL NIGHTLY PRN
Qty: 30 CAPSULE | Refills: 1 | Status: SHIPPED | OUTPATIENT
Start: 2023-09-23 | End: 2023-11-22 | Stop reason: SDUPTHER

## 2023-09-26 ENCOUNTER — OFFICE VISIT (OUTPATIENT)
Dept: PRIMARY CARE | Facility: CLINIC | Age: 83
End: 2023-09-26
Payer: MEDICARE

## 2023-09-26 VITALS
HEART RATE: 50 BPM | OXYGEN SATURATION: 98 % | DIASTOLIC BLOOD PRESSURE: 58 MMHG | WEIGHT: 296.4 LBS | BODY MASS INDEX: 38.04 KG/M2 | RESPIRATION RATE: 16 BRPM | SYSTOLIC BLOOD PRESSURE: 122 MMHG | HEIGHT: 74 IN

## 2023-09-26 DIAGNOSIS — E78.2 HYPERLIPEMIA, MIXED: ICD-10-CM

## 2023-09-26 DIAGNOSIS — I10 PRIMARY HYPERTENSION: ICD-10-CM

## 2023-09-26 DIAGNOSIS — M25.511 CHRONIC RIGHT SHOULDER PAIN: Primary | ICD-10-CM

## 2023-09-26 DIAGNOSIS — G89.29 CHRONIC RIGHT SHOULDER PAIN: Primary | ICD-10-CM

## 2023-09-26 DIAGNOSIS — D50.9 IRON DEFICIENCY ANEMIA, UNSPECIFIED IRON DEFICIENCY ANEMIA TYPE: ICD-10-CM

## 2023-09-26 DIAGNOSIS — R42 LIGHTHEADEDNESS: ICD-10-CM

## 2023-09-26 LAB — POC HEMOGLOBIN: 11.8 G/DL (ref 13.5–17.5)

## 2023-09-26 PROCEDURE — 85018 HEMOGLOBIN: CPT | Performed by: FAMILY MEDICINE

## 2023-09-26 PROCEDURE — 99213 OFFICE O/P EST LOW 20 MIN: CPT | Performed by: FAMILY MEDICINE

## 2023-09-26 ASSESSMENT — ENCOUNTER SYMPTOMS
BLOOD IN STOOL: 0
DYSURIA: 0
AGITATION: 0
PALPITATIONS: 0
NERVOUS/ANXIOUS: 0
DECREASED CONCENTRATION: 0
SINUS PRESSURE: 0
SHORTNESS OF BREATH: 0
APPETITE CHANGE: 0
CONSTITUTIONAL NEGATIVE: 1
PHOTOPHOBIA: 0
SPEECH DIFFICULTY: 0
SEIZURES: 0
RHINORRHEA: 0
STRIDOR: 0
SORE THROAT: 0
EYE PAIN: 0
DYSPHORIC MOOD: 0
COLOR CHANGE: 0
RECTAL PAIN: 0
ACTIVITY CHANGE: 0
SLEEP DISTURBANCE: 0
FLANK PAIN: 0
CONSTIPATION: 0
TROUBLE SWALLOWING: 0
HEADACHES: 0
FEVER: 0
HEMATURIA: 0
POLYDIPSIA: 0
DIZZINESS: 0
ARTHRALGIAS: 0
DIARRHEA: 0
CONFUSION: 0
ABDOMINAL PAIN: 0
FATIGUE: 0
POLYPHAGIA: 0
ADENOPATHY: 0
MYALGIAS: 0
CHEST TIGHTNESS: 0
SINUS PAIN: 0
NECK STIFFNESS: 0
ABDOMINAL DISTENTION: 0
LIGHT-HEADEDNESS: 1
COUGH: 0

## 2023-09-26 NOTE — PATIENT INSTRUCTIONS
Follow up in 3 months    Continue current medications and therapy for chronic medical conditions.    Patient was advised importance of proper diet/nutrition in addition adequate hydration. Patient was encouraged moderate exercise program to include 30 minutes daily for 5 days of the week or 150 minutes weekly. Patient will follow-up with us as scheduled.    I personally reviewed the OARRS report for this patient. I have considered the risks of abuse, dependence, addiction, and diversion.    UDS: 02/01/2023    IO HEMOGLOBIN TODAY     OBTAIN CMP AND CBC

## 2023-09-26 NOTE — PROGRESS NOTES
"Subjective   Patient ID: GENARO Burgess is a 82 y.o. male who presents for Shoulder Pain.    HPI   Patient is following up on right shoulder pain. He is still in pain and uses tramadol.  He florinda abdominal bleeding and will not get shoulder surgery until bleeding is handled. He is going to the hyperbaric chamber routinely.   He would like to wait on the flu shot until after the shoulder surgery.     Review of Systems   Constitutional: Negative.  Negative for activity change, appetite change, fatigue and fever.   HENT:  Negative for congestion, dental problem, ear discharge, ear pain, mouth sores, rhinorrhea, sinus pressure, sinus pain, sore throat, tinnitus and trouble swallowing.    Eyes:  Negative for photophobia, pain and visual disturbance.   Respiratory:  Negative for cough, chest tightness, shortness of breath and stridor.    Cardiovascular:  Negative for chest pain and palpitations.   Gastrointestinal:  Negative for abdominal distention, abdominal pain, blood in stool, constipation, diarrhea and rectal pain.   Endocrine: Negative for cold intolerance, heat intolerance, polydipsia, polyphagia and polyuria.   Genitourinary:  Negative for dysuria, flank pain, hematuria and urgency.   Musculoskeletal:  Negative for arthralgias, gait problem, myalgias and neck stiffness.   Skin:  Negative for color change and rash.   Allergic/Immunologic: Negative for environmental allergies and food allergies.   Neurological:  Positive for light-headedness. Negative for dizziness, seizures, syncope, speech difficulty and headaches.   Hematological:  Negative for adenopathy.   Psychiatric/Behavioral:  Negative for agitation, confusion, decreased concentration, dysphoric mood and sleep disturbance. The patient is not nervous/anxious.        Objective   /58 (BP Location: Left arm, Patient Position: Sitting, BP Cuff Size: Large adult)   Pulse 50   Resp 16   Ht 1.88 m (6' 2\")   Wt 134 kg (296 lb 6.4 oz)   SpO2 98%   BMI 38.06 " kg/m²     Physical Exam  Vitals reviewed.   Constitutional:       General: He is not in acute distress.     Appearance: Normal appearance. He is normal weight. He is not ill-appearing or diaphoretic.   HENT:      Head: Normocephalic.      Right Ear: Tympanic membrane and external ear normal.      Left Ear: Tympanic membrane and external ear normal.      Nose: Nose normal. No congestion.      Mouth/Throat:      Pharynx: No posterior oropharyngeal erythema.   Eyes:      General:         Right eye: No discharge.         Left eye: No discharge.      Extraocular Movements: Extraocular movements intact.      Conjunctiva/sclera: Conjunctivae normal.      Pupils: Pupils are equal, round, and reactive to light.   Cardiovascular:      Rate and Rhythm: Normal rate and regular rhythm.      Pulses: Normal pulses.      Heart sounds: Normal heart sounds. No murmur heard.  Pulmonary:      Effort: Pulmonary effort is normal. No respiratory distress.      Breath sounds: Normal breath sounds. No wheezing or rales.   Chest:      Chest wall: No tenderness.   Abdominal:      General: Abdomen is flat. Bowel sounds are normal. There is no distension.      Palpations: There is no mass.      Tenderness: There is no abdominal tenderness. There is no guarding.   Musculoskeletal:         General: No tenderness. Normal range of motion.      Cervical back: Normal range of motion and neck supple. No tenderness.      Right lower leg: No edema.      Left lower leg: No edema.   Skin:     General: Skin is dry.      Coloration: Skin is not jaundiced.      Findings: No bruising, erythema or rash.   Neurological:      General: No focal deficit present.      Mental Status: He is alert and oriented to person, place, and time. Mental status is at baseline.      Cranial Nerves: No cranial nerve deficit.      Sensory: No sensory deficit.      Coordination: Coordination normal.      Gait: Gait normal.   Psychiatric:         Mood and Affect: Mood normal.          Thought Content: Thought content normal.         Judgment: Judgment normal.         Assessment/Plan   Problem List Items Addressed This Visit             ICD-10-CM    Acute pain of right shoulder - Primary M25.511    Anemia D64.9    Relevant Orders    POCT hemoglobin manually resulted (Completed)    Hyperlipemia, mixed E78.2    Hypertension I10     Other Visit Diagnoses         Codes    Lightheadedness     R42    Relevant Orders    Comprehensive Metabolic Panel    CBC and Auto Differential              Scribe Attestation  By signing my name below, IBarby RMA , Scribe   attest that this documentation has been prepared under the direction and in the presence of Giacomo Joseph DO.   Provider Attestation - Scribe documentation    All medical record entries made by the Scribe were at my direction and personally dictated by me. I have reviewed the chart and agree that the record accurately reflects my personal performance of the history, physical exam, discussion and plan.

## 2023-09-29 VITALS — BODY MASS INDEX: 38.96 KG/M2 | WEIGHT: 303.57 LBS | HEIGHT: 74 IN

## 2023-09-30 NOTE — H&P
History & Physical Reviewed:   I have reviewed the History and Physical dated:  26-Jun-2023   History and Physical reviewed and relevant findings noted. Patient examined to review pertinent physical  findings.: No significant changes   Home Medications Reviewed: no changes noted   Allergies Reviewed: no changes noted       ERAS (Enhanced Recovery After Surgery):  ·  ERAS Patient: no     Consent:   COVID-19 Consent:  ·  COVID-19 Risk Consent Surgeon has reviewed key risks related to the risk of hanane COVID-19 and if they contract COVID-19 what the risks are.     Attestation:   Note Completion:  I am a:  Resident/Fellow   Attending Attestation I saw and evaluated the patient.  I personally obtained the key and critical portions of the history and physical exam or was physically present for key and  critical portions performed by the resident/fellow. I reviewed the resident/fellow?s documentation and discussed the patient with the resident/fellow.  I agree with the resident/fellow?s medical decision making as documented in the note.     I personally evaluated the patient on 27-Jun-2023         Electronic Signatures:  Isa López (Resident))  (Signed 27-Jun-2023 11:55)   Authored: History & Physical Reviewed, ERAS, Consent,  Note Completion  Waylon Flores)  (Signed 27-Jun-2023 15:41)   Authored: Note Completion   Co-Signer: History & Physical Reviewed, ERAS, Consent, Note Completion      Last Updated: 27-Jun-2023 15:41 by Waylon lFores)

## 2023-10-02 ENCOUNTER — OFFICE VISIT (OUTPATIENT)
Dept: WOUND CARE | Facility: CLINIC | Age: 83
End: 2023-10-02
Payer: MEDICARE

## 2023-10-02 PROCEDURE — G0277 HBOT, FULL BODY CHAMBER, 30M: HCPCS

## 2023-10-02 NOTE — OP NOTE
PROCEDURE DETAILS    Preoperative Diagnosis:  Benign prostatic hyperplasia with lower urinary tract symptoms, N40.1    Postoperative Diagnosis:  Benign prostatic hyperplasia with lower urinary tract symptoms, N40.1    Surgeon: Waylon Flores  Resident/Fellow/Other Assistant: Raj Waldrop    Procedure:  1. TRANSURETHERAL INCISION OF THE PROSTATE    Anesthesia: No anesthesiologist associated with this case  Estimated Blood Loss: nil  Findings: exquisitely high and tight bladder neck. 5 and 7 oclock incisions made down to fat with excellent opening of the bladder neck  Specimens(s) Collected: no,     Drains and/or Catheters: 22fr 3 way, 30cc in balloon        Operative Report:   82 year old male, history of prostate ca s.p brachytherapy, with severe obstructive LUTS. Workup demonstrated a high and tight prostate. He was counseled on  his options and elected for transurethral incision of the prostate.  All risks, benefits, and alternatives were discussed with the patient and he elected to proceed.    After informed consent was obtained, the patient was brought back to the operating room and placed under general anesthesia.  He was positioned in the dorsal lithotomy with all pressure points padded per protocol.  He was prepped and draped in normal  sterile fashion.  A preoperative pause was performed and preoperative antibiotics were confirmed to be given.  We began by entering the bladder with a 22 Greek Storz cystourethroscope.  The anterior urethra was unremarkable.  The posterior urethra showed  a high and tight prostate, significant elevated off the floor of the trigone.  The bladder was free from masses, stones, lesions.  Ureteral orifices were well away from the base of the prostate bilaterally.    We removed the scope and dilated the anterior urethra to 28 Greek using [place] sounds.  We then inserted a 26 Greek resectoscope and using  a vapor electrode proceeded to make 5 and 7 incisions from the  bladder neck to the proximal to the verumontanum down to fat.  We stayed well away from the veru to minimize risk of post op incontinence in light of his radiation history. Several brachy  seeds were encountered and removed. We obtained meticulous hemostasis.  At the conclusion of the case, the was widely patent and level with the trigone, bladder was free from prostate chips, and ureteral orifices were preserved.  The scope was removed  and a 22 Lithuanian three-way Estevez catheter was inserted into the bladder with 30 cc of sterile water in the balloon.  The bladder irrigated clear.  The patient was awoken from anesthesia and transferred to recovery in stable condition                          Electronic Signatures:  Waylon Flores)  (Signed 27-Jun-2023 13:07)   Authored: Post-Operative Note, Chart Review, Note Completion      Last Updated: 27-Jun-2023 13:07 by Waylon Flores)

## 2023-10-03 ENCOUNTER — OFFICE VISIT (OUTPATIENT)
Dept: WOUND CARE | Facility: CLINIC | Age: 83
End: 2023-10-03
Payer: MEDICARE

## 2023-10-03 PROCEDURE — G0277 HBOT, FULL BODY CHAMBER, 30M: HCPCS

## 2023-10-03 PROCEDURE — 1159F MED LIST DOCD IN RCRD: CPT | Performed by: SURGERY

## 2023-10-03 PROCEDURE — 1125F AMNT PAIN NOTED PAIN PRSNT: CPT | Performed by: SURGERY

## 2023-10-03 PROCEDURE — 1036F TOBACCO NON-USER: CPT | Performed by: SURGERY

## 2023-10-03 PROCEDURE — 99183 HYPERBARIC OXYGEN THERAPY: CPT | Performed by: SURGERY

## 2023-10-03 PROCEDURE — 1160F RVW MEDS BY RX/DR IN RCRD: CPT | Performed by: SURGERY

## 2023-10-04 ENCOUNTER — OFFICE VISIT (OUTPATIENT)
Dept: WOUND CARE | Facility: CLINIC | Age: 83
End: 2023-10-04
Payer: MEDICARE

## 2023-10-04 PROCEDURE — G0277 HBOT, FULL BODY CHAMBER, 30M: HCPCS

## 2023-10-05 ENCOUNTER — OFFICE VISIT (OUTPATIENT)
Dept: WOUND CARE | Facility: CLINIC | Age: 83
End: 2023-10-05
Payer: MEDICARE

## 2023-10-05 PROCEDURE — G0277 HBOT, FULL BODY CHAMBER, 30M: HCPCS

## 2023-10-06 ENCOUNTER — OFFICE VISIT (OUTPATIENT)
Dept: WOUND CARE | Facility: CLINIC | Age: 83
End: 2023-10-06
Payer: MEDICARE

## 2023-10-06 ENCOUNTER — APPOINTMENT (OUTPATIENT)
Dept: CARDIOLOGY | Facility: CLINIC | Age: 83
End: 2023-10-06
Payer: MEDICARE

## 2023-10-06 PROCEDURE — G0277 HBOT, FULL BODY CHAMBER, 30M: HCPCS

## 2023-10-06 PROCEDURE — 99183 HYPERBARIC OXYGEN THERAPY: CPT | Performed by: NURSE PRACTITIONER

## 2023-10-09 ENCOUNTER — OFFICE VISIT (OUTPATIENT)
Dept: WOUND CARE | Facility: CLINIC | Age: 83
End: 2023-10-09
Payer: MEDICARE

## 2023-10-09 PROCEDURE — 99183 HYPERBARIC OXYGEN THERAPY: CPT | Performed by: NURSE PRACTITIONER

## 2023-10-09 PROCEDURE — G0277 HBOT, FULL BODY CHAMBER, 30M: HCPCS

## 2023-10-10 ENCOUNTER — OFFICE VISIT (OUTPATIENT)
Dept: WOUND CARE | Facility: CLINIC | Age: 83
End: 2023-10-10
Payer: MEDICARE

## 2023-10-10 PROCEDURE — 99183 HYPERBARIC OXYGEN THERAPY: CPT | Performed by: SURGERY

## 2023-10-12 ENCOUNTER — CLINICAL SUPPORT (OUTPATIENT)
Dept: WOUND CARE | Facility: CLINIC | Age: 83
End: 2023-10-12
Payer: MEDICARE

## 2023-10-13 ENCOUNTER — CLINICAL SUPPORT (OUTPATIENT)
Dept: WOUND CARE | Facility: CLINIC | Age: 83
End: 2023-10-13
Payer: MEDICARE

## 2023-10-13 PROCEDURE — 99183 HYPERBARIC OXYGEN THERAPY: CPT | Performed by: NURSE PRACTITIONER

## 2023-10-16 ENCOUNTER — OFFICE VISIT (OUTPATIENT)
Dept: WOUND CARE | Facility: CLINIC | Age: 83
End: 2023-10-16
Payer: MEDICARE

## 2023-10-16 PROCEDURE — 99183 HYPERBARIC OXYGEN THERAPY: CPT | Performed by: NURSE PRACTITIONER

## 2023-10-16 PROCEDURE — G0277 HBOT, FULL BODY CHAMBER, 30M: HCPCS | Performed by: NURSE PRACTITIONER

## 2023-10-17 ENCOUNTER — OFFICE VISIT (OUTPATIENT)
Dept: WOUND CARE | Facility: CLINIC | Age: 83
End: 2023-10-17
Payer: MEDICARE

## 2023-10-17 PROCEDURE — G0277 HBOT, FULL BODY CHAMBER, 30M: HCPCS

## 2023-10-17 PROCEDURE — 99183 HYPERBARIC OXYGEN THERAPY: CPT | Performed by: SURGERY

## 2023-10-18 ENCOUNTER — OFFICE VISIT (OUTPATIENT)
Dept: WOUND CARE | Facility: CLINIC | Age: 83
End: 2023-10-18
Payer: MEDICARE

## 2023-10-18 PROCEDURE — G0277 HBOT, FULL BODY CHAMBER, 30M: HCPCS

## 2023-10-18 PROCEDURE — 99183 HYPERBARIC OXYGEN THERAPY: CPT | Performed by: SURGERY

## 2023-10-19 ENCOUNTER — APPOINTMENT (OUTPATIENT)
Dept: UROLOGY | Facility: HOSPITAL | Age: 83
End: 2023-10-19
Payer: MEDICARE

## 2023-10-19 ENCOUNTER — OFFICE VISIT (OUTPATIENT)
Dept: WOUND CARE | Facility: CLINIC | Age: 83
End: 2023-10-19
Payer: MEDICARE

## 2023-10-19 PROCEDURE — G0277 HBOT, FULL BODY CHAMBER, 30M: HCPCS

## 2023-10-23 ENCOUNTER — APPOINTMENT (OUTPATIENT)
Dept: WOUND CARE | Facility: CLINIC | Age: 83
End: 2023-10-23
Payer: MEDICARE

## 2023-10-24 ENCOUNTER — OFFICE VISIT (OUTPATIENT)
Dept: WOUND CARE | Facility: CLINIC | Age: 83
End: 2023-10-24
Payer: MEDICARE

## 2023-10-24 PROCEDURE — G0277 HBOT, FULL BODY CHAMBER, 30M: HCPCS

## 2023-10-24 PROCEDURE — 99183 HYPERBARIC OXYGEN THERAPY: CPT | Performed by: SURGERY

## 2023-10-25 ENCOUNTER — TELEPHONE (OUTPATIENT)
Dept: UROLOGY | Facility: CLINIC | Age: 83
End: 2023-10-25

## 2023-10-25 ENCOUNTER — OFFICE VISIT (OUTPATIENT)
Dept: WOUND CARE | Facility: CLINIC | Age: 83
End: 2023-10-25
Payer: MEDICARE

## 2023-10-25 ENCOUNTER — TELEMEDICINE (OUTPATIENT)
Dept: UROLOGY | Facility: HOSPITAL | Age: 83
End: 2023-10-25
Payer: MEDICARE

## 2023-10-25 ENCOUNTER — TELEPHONE (OUTPATIENT)
Dept: PRIMARY CARE | Facility: CLINIC | Age: 83
End: 2023-10-25

## 2023-10-25 DIAGNOSIS — I10 ESSENTIAL HYPERTENSION: Primary | ICD-10-CM

## 2023-10-25 DIAGNOSIS — N39.3 SUI (STRESS URINARY INCONTINENCE), MALE: Primary | ICD-10-CM

## 2023-10-25 PROCEDURE — 99213 OFFICE O/P EST LOW 20 MIN: CPT | Mod: 95 | Performed by: UROLOGY

## 2023-10-25 PROCEDURE — 99213 OFFICE O/P EST LOW 20 MIN: CPT | Performed by: UROLOGY

## 2023-10-25 PROCEDURE — G0277 HBOT, FULL BODY CHAMBER, 30M: HCPCS

## 2023-10-25 RX ORDER — CHLORTHALIDONE 25 MG/1
25 TABLET ORAL DAILY
Qty: 90 TABLET | Refills: 3 | Status: SHIPPED | OUTPATIENT
Start: 2023-10-25 | End: 2024-05-08 | Stop reason: WASHOUT

## 2023-10-25 NOTE — TELEPHONE ENCOUNTER
"Called and left a message for the patient - waiting for a call back to schedule with Dr Cardoso    Referral from Dr Flores  \"82 year old male, history of prostate radiation, severe obstructive symptoms, urge incontinence, s/p TUIP 6/27/23.  Now unfortunately with profound stress incontinence\"    "

## 2023-10-25 NOTE — TELEPHONE ENCOUNTER
Rx Refill Request Telephone Encounter    Name:  Holden IRWIN Aditya  :  [id number]  Medication Name:  chlorthalidone  25mg  By mouth  daily  90    Specific Pharmacy location:  [facility]  Date of last appointment:    Date of next appointment:  23  Best number to reach patient:  [phone]

## 2023-10-25 NOTE — PROGRESS NOTES
Virtual or Telephone Consent    A telephone visit (audio only) between the patient (at the originating site) and the provider (at the distant site) was utilized to provide this telehealth service.   Verbal consent was requested and obtained from Hloden Burgess on this date, 10/25/23 for a telehealth visit.     HPI  Patient is an 82 year old male, history of prostate radiation, severe obstructive symptoms, urge incontinence, s/p TUIP 6/27/23.      6/29/23- stream is strong. successful TOV     7/20/23- Patient reported that he change pads every 2 hours. He is emptying bladder well. PVR 0ml. +ELIZABETH, significant, barely able to urinate on his own         10/25/2023- here today for symptom check/ fuv. No blood in urine. Significant leakage. Doing kegel exercises. Changing depends q2h. Wants to do something to get this fixed. Unable to use a cunningham clamp due to body habitus    Lab Results   Component Value Date    PSA <0.10 04/14/2021    PSA <0.10 10/31/2019       Current Medications:  Current Outpatient Medications   Medication Sig Dispense Refill    allopurinol (Zyloprim) 100 mg tablet Take 1 tablet (100 mg) by mouth 2 times a day. 180 tablet 1    ascorbic acid (Vitamin C) 1,000 mg tablet Take 1 tablet (1,000 mg) by mouth once daily.      aspirin 81 mg EC tablet Take 1 tablet (81 mg) by mouth once daily.      atorvastatin (Lipitor) 10 mg tablet Take 1 tablet (10 mg) by mouth once daily at bedtime.      calcitriol (Rocaltrol) 0.25 mcg capsule Take 1 capsule (0.25 mcg) by mouth once daily.      chlorthalidone (Hygroton) 25 mg tablet Take 1 tablet (25 mg) by mouth once daily.      cholecalciferol (Vitamin D-3) 125 MCG (5000 UT) capsule Take 1 capsule (125 mcg) by mouth once daily.      cyanocobalamin (Vitamin B-12) 1,000 mcg tablet Take 1 tablet (1,000 mcg) by mouth once daily.      diclofenac sodium 1 % kit Apply 4 g topically.      ezetimibe (Zetia) 10 mg tablet Take 1 tablet (10 mg) by mouth once daily. 90 tablet 0     famotidine (PEPCID AC ORAL) Take by mouth.      ferrous sulfate 325 (65 Fe) MG tablet Take 1 tablet (325 mg) by mouth once daily.      FLUoxetine (PROzac) 20 mg tablet Take 1 tablet (20 mg) by mouth once daily.      folic acid (Folvite) 1 mg tablet Take 1 tablet (1 mg) by mouth once daily. 90 tablet 0    furosemide (Lasix) 20 mg tablet Take 1 tablet (20 mg) by mouth once daily.      latanoprost (Xalatan) 0.005 % ophthalmic solution Administer 1 drop into affected eye(s) once daily at bedtime.      losartan (Cozaar) 100 mg tablet Take 1 tablet (100 mg) by mouth once daily.      magnesium 30 mg tablet Take 550 mg by mouth.      niacin (Niaspan) 500 mg ER tablet Take 1 tablet (500 mg) by mouth.      rOPINIRole (Requip) 0.25 mg tablet Take 1 tablet (0.25 mg) by mouth once daily at bedtime. 90 tablet 1    temazepam (Restoril) 15 mg capsule Take 1 capsule (15 mg) by mouth as needed at bedtime for sleep. 30 capsule 1    torsemide (Demadex) 20 mg tablet Take 1 tablet (20 mg) by mouth once daily.      traMADol (Ultram) 50 mg tablet Take 1 tablet (50 mg) by mouth every 6 hours. 30 tablet 1    TURMERIC ORAL Take 3 capsules by mouth once daily.      zinc sulfate 50 mg zinc (220 mg) tablet Take by mouth.       No current facility-administered medications for this visit.        Active Problems:  GENARO Burgess is a 82 y.o. male with the following Problems and Medications.  Patient Active Problem List   Diagnosis    Acute pain of right shoulder    Insomnia    Anemia    Atrial flutter (CMS/HCC)    Bilateral sensorineural hearing loss    Bradycardia    Cervical spondylosis with myelopathy    CAD S/P percutaneous coronary angioplasty    Chronic kidney disease (CKD), stage III (moderate) (CMS/HCC)    Depression, recurrent (CMS/HCC)    Depression    Diarrhea    Fatigue    Folic acid deficiency    Gait abnormality    Hip pain, right    Pain in both lower extremities    Hyperlipemia, mixed    Hyperuricemia    Ischemic foot pain at rest     Leukoplakia of tongue    Muscle cramps    Aneurysm (CMS/HCC)    Hypertension    PVD (peripheral vascular disease) (CMS/HCC)    Renal insufficiency    Restless leg syndrome    Rib pain on right side    Sleep apnea    Lumbar radiculopathy    Spondylosis of lumbar spine    Stiffness of right shoulder joint    Vitamin B12 deficiency    Thoracic ascending aortic aneurysm (CMS/HCC)    Increased urinary frequency    Incontinence    Sinus node dysfunction (CMS/HCC)    Abdominal pain, chronic, right upper quadrant    Calculus of gallbladder without cholecystitis without obstruction    Chronic cholecystitis    Urge incontinence of urine    Weak urinary stream    Urinary incontinence    Edema    Anxiety    Benign neoplasm    Chronic back pain    Coronary atherosclerosis of native coronary artery    Dermatochalasis of both eyelids    Dextroscoliosis    Estevez catheter problem (CMS/HCC)    Gastroesophageal reflux disease without esophagitis    Glaucoma    Incomplete emptying of bladder    Localized osteoarthrosis    Lumbosacral spondylosis without myelopathy    MGD (meibomian gland dysfunction)    Osteoarthritis of right knee    Osteoarthritis of spine with radiculopathy, lumbar region    Pain in right ankle and joints of right foot    Pain in joint involving pelvic region and thigh    Primary open-angle glaucoma, bilateral, mild stage    RPE mottling of macula    ELIZABETH (stress urinary incontinence), male    Lumbar stenosis    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 180 tablet 1    ascorbic acid (Vitamin C) 1,000 mg tablet Take 1 tablet (1,000 mg) by mouth once daily.      aspirin 81 mg EC tablet Take 1 tablet (81 mg) by mouth once daily.      atorvastatin (Lipitor) 10 mg tablet Take 1 tablet (10 mg) by mouth once daily at bedtime.      calcitriol (Rocaltrol) 0.25 mcg  capsule Take 1 capsule (0.25 mcg) by mouth once daily.      chlorthalidone (Hygroton) 25 mg tablet Take 1 tablet (25 mg) by mouth once daily.      cholecalciferol (Vitamin D-3) 125 MCG (5000 UT) capsule Take 1 capsule (125 mcg) by mouth once daily.      cyanocobalamin (Vitamin B-12) 1,000 mcg tablet Take 1 tablet (1,000 mcg) by mouth once daily.      diclofenac sodium 1 % kit Apply 4 g topically.      ezetimibe (Zetia) 10 mg tablet Take 1 tablet (10 mg) by mouth once daily. 90 tablet 0    famotidine (PEPCID AC ORAL) Take by mouth.      ferrous sulfate 325 (65 Fe) MG tablet Take 1 tablet (325 mg) by mouth once daily.      FLUoxetine (PROzac) 20 mg tablet Take 1 tablet (20 mg) by mouth once daily.      folic acid (Folvite) 1 mg tablet Take 1 tablet (1 mg) by mouth once daily. 90 tablet 0    furosemide (Lasix) 20 mg tablet Take 1 tablet (20 mg) by mouth once daily.      latanoprost (Xalatan) 0.005 % ophthalmic solution Administer 1 drop into affected eye(s) once daily at bedtime.      losartan (Cozaar) 100 mg tablet Take 1 tablet (100 mg) by mouth once daily.      magnesium 30 mg tablet Take 550 mg by mouth.      niacin (Niaspan) 500 mg ER tablet Take 1 tablet (500 mg) by mouth.      rOPINIRole (Requip) 0.25 mg tablet Take 1 tablet (0.25 mg) by mouth once daily at bedtime. 90 tablet 1    temazepam (Restoril) 15 mg capsule Take 1 capsule (15 mg) by mouth as needed at bedtime for sleep. 30 capsule 1    torsemide (Demadex) 20 mg tablet Take 1 tablet (20 mg) by mouth once daily.      traMADol (Ultram) 50 mg tablet Take 1 tablet (50 mg) by mouth every 6 hours. 30 tablet 1    TURMERIC ORAL Take 3 capsules by mouth once daily.      zinc sulfate 50 mg zinc (220 mg) tablet Take by mouth.       No current facility-administered medications for this visit.       PMH:  Past Medical History:   Diagnosis Date    Abdominal pain, chronic, right upper quadrant     ACP (advance care planning)     Anemia     Aneurysm (CMS/HCC)     Body  mass index (BMI) 39.0-39.9, adult 12/06/2021    BMI 39.0-39.9,adult    Body mass index (BMI) 39.0-39.9, adult 07/11/2022    BMI 39.0-39.9,adult    Body mass index (BMI) 39.0-39.9, adult 04/24/2022    Body mass index (BMI) of 39.0 to 39.9 in adult    Body mass index (BMI) 39.0-39.9, adult 04/24/2022    Body mass index (BMI) of 39.0 to 39.9 in adult    Body mass index (BMI) 39.0-39.9, adult 04/24/2022    Body mass index (BMI) of 39.0 to 39.9 in adult    Cramp and spasm 07/19/2022    Leg cramps    Difficulty breathing     Encounter for general adult medical examination without abnormal findings 09/25/2020    Encounter for Medicare annual wellness exam    Encounter for screening for malignant neoplasm of prostate 04/14/2021    Encounter for prostate cancer screening    Encounter for screening for malignant neoplasm of prostate 09/25/2020    Encounter for prostate cancer screening    Forearm injury     Hx of atrial flutter     Hyperuricemia     Incontinence     Obesity, unspecified 04/26/2022    Class 2 obesity with body mass index (BMI) of 39.0 to 39.9 in adult    Other symptoms and signs involving the musculoskeletal system 10/12/2022    Shoulder weakness    Personal history of other diseases of the circulatory system 09/29/2021    History of hypotension    Personal history of other diseases of the circulatory system 10/26/2021    History of hypertension    Personal history of other endocrine, nutritional and metabolic disease 12/06/2021    History of morbid obesity    Personal history of other specified conditions 09/29/2021    History of dizziness    Rotator cuff injury        PSH:  Past Surgical History:   Procedure Laterality Date    MR CHEST ANGIO W AND WO IV CONTRAST  9/18/2019    MR CHEST ANGIO W AND WO IV CONTRAST 9/18/2019 [facility]    MR CHEST ANGIO W AND WO IV CONTRAST  1/25/2023    MR CHEST ANGIO W AND WO IV CONTRAST 1/25/2023 [facility]    MR CHEST ANGIO W AND WO IV CONTRAST  1/25/2023     MR CHEST ANGIO W AND WO IV CONTRAST    OTHER SURGICAL HISTORY  2020    Knee replacement    OTHER SURGICAL HISTORY  2020    Catheter ablation    OTHER SURGICAL HISTORY  2020    Prostate surgery    OTHER SURGICAL HISTORY  2020    Lower back surgery    OTHER SURGICAL HISTORY  2020    Hand surgery    OTHER SURGICAL HISTORY  2021    Surgery    OTHER SURGICAL HISTORY  2021    Cataract surgery    OTHER SURGICAL HISTORY  2021    Colonoscopy    OTHER SURGICAL HISTORY  2021    Vertebral fusion       FMH:  Family History   Problem Relation Name Age of Onset    No Known Problems Mother      Heart disease Father      Heart disease Brother      Polymyalgia rheumatica Brother      Other (mitral valve disorder) Brother      Other (Coronary artery bypass graft) Brother      Other (Arterisclerotic cardiovascular disease) Brother         SHx:  Social History     Tobacco Use    Smoking status: Former     Types: Cigarettes     Quit date: 1978     Years since quittin.1     Passive exposure: Never    Smokeless tobacco: Never   Substance Use Topics    Alcohol use: Never    Drug use: Never       Allergies:  Allergies   Allergen Reactions    Crestor [Rosuvastatin] Unknown    Ezetimibe Unknown    Statins-Hmg-Coa Reductase Inhibitors Unknown     leg cramping         Assesment/Plan    Unfortunately persistent ELIZABETH, now 4 mo s/p TUIP. Discussed this is a risk of any outlet procedure in a post-radiation patient, but certainly is not something he would like to live with. Will send to Dr. Cardoso for considerAtion of sling vs AUS.    Scribe Attestation  By signing my name below, I, Gloria Bowers   attest that this documentation has been prepared under the direction and in the presence of Waylon Flores MD.

## 2023-10-26 ENCOUNTER — CLINICAL SUPPORT (OUTPATIENT)
Dept: WOUND CARE | Facility: CLINIC | Age: 83
End: 2023-10-26
Payer: MEDICARE

## 2023-10-26 PROCEDURE — G0277 HBOT, FULL BODY CHAMBER, 30M: HCPCS

## 2023-10-27 ENCOUNTER — APPOINTMENT (OUTPATIENT)
Dept: PRIMARY CARE | Facility: CLINIC | Age: 83
End: 2023-10-27
Payer: MEDICARE

## 2023-10-27 ENCOUNTER — CLINICAL SUPPORT (OUTPATIENT)
Dept: WOUND CARE | Facility: CLINIC | Age: 83
End: 2023-10-27
Payer: MEDICARE

## 2023-10-27 PROCEDURE — G0277 HBOT, FULL BODY CHAMBER, 30M: HCPCS

## 2023-10-30 ENCOUNTER — CLINICAL SUPPORT (OUTPATIENT)
Dept: WOUND CARE | Facility: CLINIC | Age: 83
End: 2023-10-30
Payer: MEDICARE

## 2023-10-30 PROCEDURE — G0277 HBOT, FULL BODY CHAMBER, 30M: HCPCS

## 2023-10-30 PROCEDURE — 99183 HYPERBARIC OXYGEN THERAPY: CPT | Performed by: NURSE PRACTITIONER

## 2023-10-31 ENCOUNTER — OFFICE VISIT (OUTPATIENT)
Dept: PRIMARY CARE | Facility: CLINIC | Age: 83
End: 2023-10-31
Payer: MEDICARE

## 2023-10-31 ENCOUNTER — OFFICE VISIT (OUTPATIENT)
Dept: UROLOGY | Facility: CLINIC | Age: 83
End: 2023-10-31
Payer: MEDICARE

## 2023-10-31 VITALS
RESPIRATION RATE: 15 BRPM | WEIGHT: 300.6 LBS | HEIGHT: 74 IN | HEART RATE: 44 BPM | OXYGEN SATURATION: 97 % | BODY MASS INDEX: 38.58 KG/M2 | SYSTOLIC BLOOD PRESSURE: 118 MMHG | DIASTOLIC BLOOD PRESSURE: 60 MMHG

## 2023-10-31 DIAGNOSIS — F51.02 ADJUSTMENT INSOMNIA: ICD-10-CM

## 2023-10-31 DIAGNOSIS — M47.817 LUMBOSACRAL SPONDYLOSIS WITHOUT MYELOPATHY: ICD-10-CM

## 2023-10-31 DIAGNOSIS — N39.41 URGE INCONTINENCE OF URINE: Primary | ICD-10-CM

## 2023-10-31 DIAGNOSIS — N39.0 URINARY TRACT INFECTION WITH HEMATURIA, SITE UNSPECIFIED: ICD-10-CM

## 2023-10-31 DIAGNOSIS — D50.9 IRON DEFICIENCY ANEMIA, UNSPECIFIED IRON DEFICIENCY ANEMIA TYPE: ICD-10-CM

## 2023-10-31 DIAGNOSIS — I10 PRIMARY HYPERTENSION: Primary | ICD-10-CM

## 2023-10-31 DIAGNOSIS — R31.9 URINARY TRACT INFECTION WITH HEMATURIA, SITE UNSPECIFIED: ICD-10-CM

## 2023-10-31 DIAGNOSIS — F33.9 DEPRESSION, RECURRENT (CMS-HCC): ICD-10-CM

## 2023-10-31 DIAGNOSIS — Z23 NEED FOR INFLUENZA VACCINATION: ICD-10-CM

## 2023-10-31 LAB
POC APPEARANCE, URINE: ABNORMAL
POC BILIRUBIN, URINE: ABNORMAL
POC BLOOD, URINE: ABNORMAL
POC COLOR, URINE: ABNORMAL
POC GLUCOSE, URINE: NEGATIVE MG/DL
POC HEMOGLOBIN: 11.1 G/DL (ref 13.5–17.5)
POC KETONES, URINE: NEGATIVE MG/DL
POC LEUKOCYTES, URINE: ABNORMAL
POC NITRITE,URINE: POSITIVE
POC PH, URINE: 6 PH
POC PROTEIN, URINE: ABNORMAL MG/DL
POC SPECIFIC GRAVITY, URINE: 1.02
POC UROBILINOGEN, URINE: 2 EU/DL

## 2023-10-31 PROCEDURE — 99205 OFFICE O/P NEW HI 60 MIN: CPT | Performed by: STUDENT IN AN ORGANIZED HEALTH CARE EDUCATION/TRAINING PROGRAM

## 2023-10-31 PROCEDURE — 99213 OFFICE O/P EST LOW 20 MIN: CPT | Performed by: FAMILY MEDICINE

## 2023-10-31 PROCEDURE — G0008 ADMIN INFLUENZA VIRUS VAC: HCPCS | Performed by: FAMILY MEDICINE

## 2023-10-31 PROCEDURE — 90662 IIV NO PRSV INCREASED AG IM: CPT | Performed by: FAMILY MEDICINE

## 2023-10-31 PROCEDURE — 87186 SC STD MICRODIL/AGAR DIL: CPT

## 2023-10-31 PROCEDURE — 1160F RVW MEDS BY RX/DR IN RCRD: CPT | Performed by: STUDENT IN AN ORGANIZED HEALTH CARE EDUCATION/TRAINING PROGRAM

## 2023-10-31 PROCEDURE — 81003 URINALYSIS AUTO W/O SCOPE: CPT | Performed by: STUDENT IN AN ORGANIZED HEALTH CARE EDUCATION/TRAINING PROGRAM

## 2023-10-31 PROCEDURE — 3078F DIAST BP <80 MM HG: CPT | Performed by: STUDENT IN AN ORGANIZED HEALTH CARE EDUCATION/TRAINING PROGRAM

## 2023-10-31 PROCEDURE — 1125F AMNT PAIN NOTED PAIN PRSNT: CPT | Performed by: STUDENT IN AN ORGANIZED HEALTH CARE EDUCATION/TRAINING PROGRAM

## 2023-10-31 PROCEDURE — 3074F SYST BP LT 130 MM HG: CPT | Performed by: STUDENT IN AN ORGANIZED HEALTH CARE EDUCATION/TRAINING PROGRAM

## 2023-10-31 PROCEDURE — 1159F MED LIST DOCD IN RCRD: CPT | Performed by: STUDENT IN AN ORGANIZED HEALTH CARE EDUCATION/TRAINING PROGRAM

## 2023-10-31 PROCEDURE — 1036F TOBACCO NON-USER: CPT | Performed by: STUDENT IN AN ORGANIZED HEALTH CARE EDUCATION/TRAINING PROGRAM

## 2023-10-31 PROCEDURE — 87086 URINE CULTURE/COLONY COUNT: CPT

## 2023-10-31 PROCEDURE — 85018 HEMOGLOBIN: CPT | Performed by: FAMILY MEDICINE

## 2023-10-31 RX ORDER — SULFAMETHOXAZOLE AND TRIMETHOPRIM 800; 160 MG/1; MG/1
1 TABLET ORAL 2 TIMES DAILY
Qty: 20 TABLET | Refills: 0 | Status: SHIPPED | OUTPATIENT
Start: 2023-10-31 | End: 2023-11-01 | Stop reason: SDUPTHER

## 2023-10-31 ASSESSMENT — ENCOUNTER SYMPTOMS
PALPITATIONS: 0
APPETITE CHANGE: 0
BLOOD IN STOOL: 0
NERVOUS/ANXIOUS: 0
ARTHRALGIAS: 0
SLEEP DISTURBANCE: 0
ADENOPATHY: 0
TROUBLE SWALLOWING: 0
COUGH: 0
CONSTITUTIONAL NEGATIVE: 1
DECREASED CONCENTRATION: 0
ABDOMINAL PAIN: 0
CONFUSION: 0
DIZZINESS: 0
ABDOMINAL DISTENTION: 0
RECTAL PAIN: 0
FEVER: 0
DYSPHORIC MOOD: 0
HEADACHES: 0
EYE PAIN: 0
COLOR CHANGE: 0
RHINORRHEA: 0
DYSURIA: 0
SHORTNESS OF BREATH: 0
NECK STIFFNESS: 0
HEMATURIA: 0
FATIGUE: 0
SORE THROAT: 0
POLYPHAGIA: 0
CHEST TIGHTNESS: 0
AGITATION: 0
FLANK PAIN: 0
SINUS PAIN: 0
SEIZURES: 0
POLYDIPSIA: 0
MYALGIAS: 0
SPEECH DIFFICULTY: 0
STRIDOR: 0
SINUS PRESSURE: 0
ACTIVITY CHANGE: 0
CONSTIPATION: 0
DIARRHEA: 0
PHOTOPHOBIA: 0

## 2023-10-31 NOTE — PROGRESS NOTES
Subjective   Patient ID: GENARO Burgess is a 82 y.o. male who presents for Shoulder Pain.    Patient is here for follow up on shoulder pain.    Patient states feeling better today. Patient states still have pain.    Patient denies have any other symptoms or concerns today.    Shoulder Pain   This is a recurrent problem. The current episode started more than 1 month ago. The problem occurs constantly. The problem has been gradually improving. Pertinent negatives include no fever.        Review of Systems   Constitutional: Negative.  Negative for activity change, appetite change, fatigue and fever.   HENT:  Negative for congestion, dental problem, ear discharge, ear pain, mouth sores, rhinorrhea, sinus pressure, sinus pain, sore throat, tinnitus and trouble swallowing.    Eyes:  Negative for photophobia, pain and visual disturbance.   Respiratory:  Negative for cough, chest tightness, shortness of breath and stridor.    Cardiovascular:  Negative for chest pain and palpitations.   Gastrointestinal:  Negative for abdominal distention, abdominal pain, blood in stool, constipation, diarrhea and rectal pain.   Endocrine: Negative for cold intolerance, heat intolerance, polydipsia, polyphagia and polyuria.   Genitourinary:  Negative for dysuria, flank pain, hematuria and urgency.   Musculoskeletal:  Negative for arthralgias, gait problem, myalgias and neck stiffness.   Skin:  Negative for color change and rash.   Allergic/Immunologic: Negative for environmental allergies and food allergies.   Neurological:  Negative for dizziness, seizures, syncope, speech difficulty and headaches.   Hematological:  Negative for adenopathy.   Psychiatric/Behavioral:  Negative for agitation, confusion, decreased concentration, dysphoric mood and sleep disturbance. The patient is not nervous/anxious.        Objective   /60 (BP Location: Right arm, Patient Position: Sitting, BP Cuff Size: Large adult)   Pulse (!) 44   Resp 15   Ht 1.88  "m (6' 2\")   Wt 136 kg (300 lb 9.6 oz)   SpO2 97%   BMI 38.59 kg/m²     Physical Exam  Vitals reviewed.   Constitutional:       General: He is not in acute distress.     Appearance: Normal appearance. He is normal weight. He is not ill-appearing or diaphoretic.   HENT:      Head: Normocephalic.      Right Ear: Tympanic membrane and external ear normal.      Left Ear: Tympanic membrane and external ear normal.      Nose: Nose normal. No congestion.      Mouth/Throat:      Pharynx: No posterior oropharyngeal erythema.   Eyes:      General:         Right eye: No discharge.         Left eye: No discharge.      Extraocular Movements: Extraocular movements intact.      Conjunctiva/sclera: Conjunctivae normal.      Pupils: Pupils are equal, round, and reactive to light.   Cardiovascular:      Rate and Rhythm: Normal rate and regular rhythm.      Pulses: Normal pulses.      Heart sounds: Normal heart sounds. No murmur heard.  Pulmonary:      Effort: Pulmonary effort is normal. No respiratory distress.      Breath sounds: Normal breath sounds. No wheezing or rales.   Chest:      Chest wall: No tenderness.   Abdominal:      General: Abdomen is flat. Bowel sounds are normal. There is no distension.      Palpations: There is no mass.      Tenderness: There is no abdominal tenderness. There is no guarding.   Musculoskeletal:         General: No tenderness. Normal range of motion.      Cervical back: Normal range of motion and neck supple. No tenderness.      Right lower leg: No edema.      Left lower leg: No edema.   Skin:     General: Skin is dry.      Coloration: Skin is not jaundiced.      Findings: No bruising, erythema or rash.   Neurological:      General: No focal deficit present.      Mental Status: He is alert and oriented to person, place, and time. Mental status is at baseline.      Cranial Nerves: No cranial nerve deficit.      Sensory: No sensory deficit.      Coordination: Coordination normal.      Gait: Gait " normal.   Psychiatric:         Mood and Affect: Mood normal.         Thought Content: Thought content normal.         Judgment: Judgment normal.         Assessment/Plan         Scribe Attestation  By signing my name below, I, LUCAS Mitchell , Wendieibmurray   attest that this documentation has been prepared under the direction and in the presence of Giacomo Joseph DO.   Provider Attestation - Scribe documentation    All medical record entries made by the Scribe were at my direction and personally dictated by me. I have reviewed the chart and agree that the record accurately reflects my personal performance of the history, physical exam, discussion and plan.

## 2023-10-31 NOTE — PROGRESS NOTES
Holden Burgess 1940 is a AGE@ male seen today for follow-up.    Referred by: Waylon Flores MD    CC: Urinary incontinence     10/31/2023:  Patient is a 82-year-old male with a past medical history significant for coronary artery disease, hyperlipidemia, hypertension, peripheral vascular disease, GERD, chronic kidney disease stage III, depression, anxiety, prostate cancer status postradiation who presents for the evaluation of urinary incontinence  Patient underwent procedure for BPH in June 2023  Subsequently developed stress incontinence which appears to be severe (more than 7 pads)  Patient has not tried any interventions at this point including physical therapy  He is currently receiving hyperbaric oxygen therapy for an isolated episode of gross hematuria which led to urinary retention      Extracted from chart review:  83 yo with BPH s/p TUIP with Dr. Flores 6/27/23  Subsequent stress incontinence  Hx of prostate cancer s/p RT  Catheter placed in ED for retention 8/5/23  Returns today for FUV    Pat is seen accompanied by spouse, Luanne  They report gross hematuria since last visit  Flushing periodically with good drainage  No dizziness, palpitations, nausea  No fever, chills, flank pain    10/31/2023:  HPI:          PSA   Date Value Ref Range Status   04/14/2021 <0.10 0.00 - 4.00 ng/mL Final     Comment:     The FDA requires that the method used for PSA assay be   reported to the physician. Values obtained with different   assay methods must not be used interchangeably. This test  was performed at [facility] using the Access   Hybritech PSA assay is a two-site immunoenzymatic sandwich   assay. The assay is approved for measurement of   prostate-specific antigen (PSA)in serum and may be used   in conjunction with a digital rectal examination in men   50 years and older as an aid in detection of prostate   cancer.  5-Alpha-reductase inhibitors (e.g. Proscar, Finasteride,   Avodart, Dutasteride  and Michelle) for the treatment of BPH   have been shown to lower PSA levels by an average of 50%   after 6 months of treatment.     10/31/2019 <0.10 0.00 - 4.00 ng/mL Final     Comment:     The FDA requires that the method used for PSA assay be   reported to the physician. Values obtained with different   assay methods must not be used interchangeably. This test   was performed at [facility] using the ADVIA  Centaur PSA method, which is a sandwich immunoassay using   chemiluminescence for quantitation. The assay is approved  for measurement of prostate-specific antigen (PSA) in   serum and may be used in conjunction with a digital rectal  examination in men 50 years and older as an aid in   detection of prostate cancer.   5-Alpha-reductase inhibitors (e.g. Proscar, Finasteride,   Avodart, Dutasteride and Michelle) for the treatment of BPH   have been shown to lower PSA levels by an average of 50%   after 6 months of treatment.          reports that he quit smoking about 45 years ago. His smoking use included cigarettes. He has never been exposed to tobacco smoke. He has never used smokeless tobacco. He reports that he does not drink alcohol and does not use drugs.    Current Outpatient Medications   Medication Sig Dispense Refill    allopurinol (Zyloprim) 100 mg tablet Take 1 tablet (100 mg) by mouth 2 times a day. 180 tablet 1    ascorbic acid (Vitamin C) 1,000 mg tablet Take 1 tablet (1,000 mg) by mouth once daily.      aspirin 81 mg EC tablet Take 1 tablet (81 mg) by mouth once daily.      atorvastatin (Lipitor) 10 mg tablet Take 1 tablet (10 mg) by mouth once daily at bedtime.      calcitriol (Rocaltrol) 0.25 mcg capsule Take 1 capsule (0.25 mcg) by mouth once daily.      chlorthalidone (Hygroton) 25 mg tablet Take 1 tablet (25 mg) by mouth once daily. 90 tablet 3    cholecalciferol (Vitamin D-3) 125 MCG (5000 UT) capsule Take 1 capsule (125 mcg) by mouth once daily.      cyanocobalamin (Vitamin  B-12) 1,000 mcg tablet Take 1 tablet (1,000 mcg) by mouth once daily.      diclofenac sodium 1 % kit Apply 4 g topically.      ezetimibe (Zetia) 10 mg tablet Take 1 tablet (10 mg) by mouth once daily. 90 tablet 0    famotidine (PEPCID AC ORAL) Take by mouth.      ferrous sulfate 325 (65 Fe) MG tablet Take 1 tablet (325 mg) by mouth once daily.      FLUoxetine (PROzac) 20 mg tablet Take 1 tablet (20 mg) by mouth once daily.      folic acid (Folvite) 1 mg tablet Take 1 tablet (1 mg) by mouth once daily. 90 tablet 0    furosemide (Lasix) 20 mg tablet Take 1 tablet (20 mg) by mouth once daily.      latanoprost (Xalatan) 0.005 % ophthalmic solution Administer 1 drop into affected eye(s) once daily at bedtime.      losartan (Cozaar) 100 mg tablet Take 1 tablet (100 mg) by mouth once daily.      magnesium 30 mg tablet Take 550 mg by mouth.      niacin (Niaspan) 500 mg ER tablet Take 1 tablet (500 mg) by mouth.      rOPINIRole (Requip) 0.25 mg tablet Take 1 tablet (0.25 mg) by mouth once daily at bedtime. 90 tablet 1    temazepam (Restoril) 15 mg capsule Take 1 capsule (15 mg) by mouth as needed at bedtime for sleep. 30 capsule 1    torsemide (Demadex) 20 mg tablet Take 1 tablet (20 mg) by mouth once daily.      traMADol (Ultram) 50 mg tablet Take 1 tablet (50 mg) by mouth every 6 hours. 30 tablet 1    TURMERIC ORAL Take 3 capsules by mouth once daily.      zinc sulfate 50 mg zinc (220 mg) tablet Take by mouth.       No current facility-administered medications for this visit.       Past Surgical History:   Procedure Laterality Date    MR CHEST ANGIO W AND WO IV CONTRAST  9/18/2019    MR CHEST ANGIO W AND WO IV CONTRAST 9/18/2019 [facility]    MR CHEST ANGIO W AND WO IV CONTRAST  1/25/2023    MR CHEST ANGIO W AND WO IV CONTRAST 1/25/2023 [facility]    MR CHEST ANGIO W AND WO IV CONTRAST  1/25/2023    MR CHEST ANGIO W AND WO IV CONTRAST    OTHER SURGICAL HISTORY  05/14/2020    Knee replacement    OTHER  SURGICAL HISTORY  05/14/2020    Catheter ablation    OTHER SURGICAL HISTORY  05/14/2020    Prostate surgery    OTHER SURGICAL HISTORY  05/14/2020    Lower back surgery    OTHER SURGICAL HISTORY  05/14/2020    Hand surgery    OTHER SURGICAL HISTORY  09/29/2021    Surgery    OTHER SURGICAL HISTORY  09/29/2021    Cataract surgery    OTHER SURGICAL HISTORY  09/29/2021    Colonoscopy    OTHER SURGICAL HISTORY  09/29/2021    Vertebral fusion       Family History   Problem Relation Name Age of Onset    No Known Problems Mother      Heart disease Father      Heart disease Brother      Polymyalgia rheumatica Brother      Other (mitral valve disorder) Brother      Other (Coronary artery bypass graft) Brother      Other (Arterisclerotic cardiovascular disease) Brother          Allergies   Allergen Reactions    Crestor [Rosuvastatin] Unknown    Ezetimibe Unknown    Statins-Hmg-Coa Reductase Inhibitors Unknown     leg cramping        Past Medical History:   Diagnosis Date    Abdominal pain, chronic, right upper quadrant     ACP (advance care planning)     Anemia     Aneurysm (CMS/HCC)     Body mass index (BMI) 39.0-39.9, adult 12/06/2021    BMI 39.0-39.9,adult    Body mass index (BMI) 39.0-39.9, adult 07/11/2022    BMI 39.0-39.9,adult    Body mass index (BMI) 39.0-39.9, adult 04/24/2022    Body mass index (BMI) of 39.0 to 39.9 in adult    Body mass index (BMI) 39.0-39.9, adult 04/24/2022    Body mass index (BMI) of 39.0 to 39.9 in adult    Body mass index (BMI) 39.0-39.9, adult 04/24/2022    Body mass index (BMI) of 39.0 to 39.9 in adult    Cramp and spasm 07/19/2022    Leg cramps    Difficulty breathing     Encounter for general adult medical examination without abnormal findings 09/25/2020    Encounter for Medicare annual wellness exam    Encounter for screening for malignant neoplasm of prostate 04/14/2021    Encounter for prostate cancer screening    Encounter for screening for malignant neoplasm of prostate 09/25/2020     "Encounter for prostate cancer screening    Forearm injury     Hx of atrial flutter     Hyperuricemia     Incontinence     Obesity, unspecified 04/26/2022    Class 2 obesity with body mass index (BMI) of 39.0 to 39.9 in adult    Other symptoms and signs involving the musculoskeletal system 10/12/2022    Shoulder weakness    Personal history of other diseases of the circulatory system 09/29/2021    History of hypotension    Personal history of other diseases of the circulatory system 10/26/2021    History of hypertension    Personal history of other endocrine, nutritional and metabolic disease 12/06/2021    History of morbid obesity    Personal history of other specified conditions 09/29/2021    History of dizziness    Rotator cuff injury         Review of Systems:   No fevers, chills, chest pain, or shortness of breath.     Physical Exam:  General: no Acute distress, appears well   Psych: Alert and oriented X 3  : circumcised phallus with patent meatus    Lab Results   Component Value Date    WBC 4.5 08/05/2023    HGB 11.8 (A) 09/26/2023    HCT 34.0 (L) 08/05/2023    MCV 99 08/05/2023    PLT 47 (L) 08/05/2023     Lab Results   Component Value Date    GLUCOSE 107 (H) 08/05/2023    CALCIUM 9.3 08/05/2023     08/05/2023    K 4.2 08/05/2023    CO2 25 08/05/2023     08/05/2023    BUN 32 (H) 08/05/2023    CREATININE 1.43 (H) 08/05/2023     Lab Results   Component Value Date    PSA <0.10 04/14/2021    PSA <0.10 10/31/2019     No results found for: \"HGBA1C\"  No results found.                               Diagnoses and all orders for this visit:  Urge incontinence of urine    Patient is an 82-year-old male with a complex medical history significant for coronary artery disease, chronic kidney disease, hypertension, hyperlipidemia, prostate cancer status postradiation, TUIP in June 2023 with subsequent urinary incontinence which appears to be severe. PVR 8cc.    1.  Urinary incontinence  I had an extensive " discussion with the patient today in regards to potential intervention options.  Patient is only 4 months out from the prostatic procedure and I would like to wait at least 6 months prior to any interventions.  In the interim we will try the patient on pelvic floor physical therapy.  He is in agreement with this.  In addition patient has urinary tract infection on encounter today and will be prescribed antibiotics.  Return to clinic in 3 months.      The dictation was performed using Dragon software. Please excuse any errors. Please contact our office with any inquiries or clarifications.    [unfilled]    Robert Cardoso MD

## 2023-10-31 NOTE — PATIENT INSTRUCTIONS
Follow up in 6 WEEKS     Continue current medications and therapy for chronic medical conditions.    Patient was advised importance of proper diet/nutrition in addition adequate hydration. Patient was encouraged moderate exercise program to include 30 minutes daily for 5 days of the week or 150 minutes weekly. Patient will follow-up with us as scheduled.    I personally reviewed the OARRS report for this patient. I have considered the risks of abuse, dependence, addiction, and diversion.    UDS: 02/01/2023     Obtain hemoglobin fingerstick    Administer influenza vaccine

## 2023-11-01 ENCOUNTER — TELEPHONE (OUTPATIENT)
Dept: UROLOGY | Facility: CLINIC | Age: 83
End: 2023-11-01

## 2023-11-01 ENCOUNTER — OFFICE VISIT (OUTPATIENT)
Dept: CARDIOLOGY | Facility: CLINIC | Age: 83
End: 2023-11-01
Payer: MEDICARE

## 2023-11-01 VITALS
WEIGHT: 300 LBS | DIASTOLIC BLOOD PRESSURE: 58 MMHG | SYSTOLIC BLOOD PRESSURE: 114 MMHG | HEART RATE: 56 BPM | HEIGHT: 74 IN | BODY MASS INDEX: 38.5 KG/M2

## 2023-11-01 DIAGNOSIS — I49.5 SINUS NODE DYSFUNCTION (MULTI): ICD-10-CM

## 2023-11-01 DIAGNOSIS — I25.10 ATHEROSCLEROSIS OF NATIVE CORONARY ARTERY OF NATIVE HEART, UNSPECIFIED WHETHER ANGINA PRESENT: ICD-10-CM

## 2023-11-01 DIAGNOSIS — Z87.891 FORMER SMOKER: ICD-10-CM

## 2023-11-01 DIAGNOSIS — I48.92 ATRIAL FLUTTER, UNSPECIFIED TYPE (MULTI): Primary | ICD-10-CM

## 2023-11-01 DIAGNOSIS — R00.1 BRADYCARDIA: ICD-10-CM

## 2023-11-01 DIAGNOSIS — I10 PRIMARY HYPERTENSION: ICD-10-CM

## 2023-11-01 DIAGNOSIS — R94.5 ABNORMAL RESULTS OF LIVER FUNCTION STUDIES: ICD-10-CM

## 2023-11-01 DIAGNOSIS — Z01.812 PRE-PROCEDURAL LABORATORY EXAMINATIONS: ICD-10-CM

## 2023-11-01 PROCEDURE — 1160F RVW MEDS BY RX/DR IN RCRD: CPT | Performed by: INTERNAL MEDICINE

## 2023-11-01 PROCEDURE — 3078F DIAST BP <80 MM HG: CPT | Performed by: INTERNAL MEDICINE

## 2023-11-01 PROCEDURE — 99215 OFFICE O/P EST HI 40 MIN: CPT | Performed by: INTERNAL MEDICINE

## 2023-11-01 PROCEDURE — 1125F AMNT PAIN NOTED PAIN PRSNT: CPT | Performed by: INTERNAL MEDICINE

## 2023-11-01 PROCEDURE — 93000 ELECTROCARDIOGRAM COMPLETE: CPT | Performed by: INTERNAL MEDICINE

## 2023-11-01 PROCEDURE — 1159F MED LIST DOCD IN RCRD: CPT | Performed by: INTERNAL MEDICINE

## 2023-11-01 PROCEDURE — 3074F SYST BP LT 130 MM HG: CPT | Performed by: INTERNAL MEDICINE

## 2023-11-01 PROCEDURE — 1036F TOBACCO NON-USER: CPT | Performed by: INTERNAL MEDICINE

## 2023-11-01 RX ORDER — MUPIROCIN 20 MG/G
1 OINTMENT TOPICAL ONCE
Status: CANCELLED | OUTPATIENT
Start: 2023-11-01 | End: 2023-11-01

## 2023-11-01 RX ORDER — SULFAMETHOXAZOLE AND TRIMETHOPRIM 800; 160 MG/1; MG/1
1 TABLET ORAL 2 TIMES DAILY
Qty: 20 TABLET | Refills: 0 | Status: SHIPPED | OUTPATIENT
Start: 2023-11-01 | End: 2023-11-01 | Stop reason: SDUPTHER

## 2023-11-01 RX ORDER — SULFAMETHOXAZOLE AND TRIMETHOPRIM 800; 160 MG/1; MG/1
1 TABLET ORAL 2 TIMES DAILY
Qty: 20 TABLET | Refills: 0 | OUTPATIENT
Start: 2023-11-01 | End: 2023-11-11

## 2023-11-01 ASSESSMENT — ENCOUNTER SYMPTOMS
CONSTITUTIONAL NEGATIVE: 1
CARDIOVASCULAR NEGATIVE: 1
RESPIRATORY NEGATIVE: 1
NEUROLOGICAL NEGATIVE: 1

## 2023-11-01 NOTE — PROGRESS NOTES
CARDIOLOGY OFFICE VISIT      CHIEF COMPLAINT  Chief Complaint   Patient presents with    Follow-up     Holter results       HISTORY OF PRESENT ILLNESS  HPI    82-year-old  male who is followed for paroxysmal atrial flutter status post radiofrequency catheter ablation on March 22, 2018 with no clinical recurrence. He developed a right femoral pseudoaneurysm and underwent repair with endovascular stent graft and evacuation of a right thigh hematoma on April 8, 2018. He has underlying sinus node dysfunction, minimal coronary disease per left heart catheterization, and normal left ventricular function. He presents to the office today for follow-up evaluation of sinus node dysfunction.    Patient had an echocardiogram in October 2022 that shows left ventricular contraction 60% with mild to moderate mitral regurgitation moderate tricuspid regurgitation and moderate dilatation of the aortic root and ascending aorta. Patient underwent a chest MRI that shows aortic root 44 mm, ascending aortic area 40 mm unchanged from prior MRI.    Patient states that he is under treatment for bladder issues with hyperbaric chamber.  One of the days that he was on his way for his treatment, he had an episode of dizziness and lightheadedness.  He was found to have a heart rate in the 40s.  Procedure was canceled.    Since then he has been noticing that most of the times his heart rate is bouncing between 40 to 50 bpm.        Past Medical History  Past Medical History:   Diagnosis Date    Abdominal pain, chronic, right upper quadrant     ACP (advance care planning)     Anemia     Aneurysm (CMS/[facility])     Body mass index (BMI) 39.0-39.9, adult 12/06/2021    BMI 39.0-39.9,adult    Body mass index (BMI) 39.0-39.9, adult 07/11/2022    BMI 39.0-39.9,adult    Body mass index (BMI) 39.0-39.9, adult 04/24/2022    Body mass index (BMI) of 39.0 to 39.9 in adult    Body mass index (BMI) 39.0-39.9, adult 04/24/2022    Body mass index (BMI) of 39.0  to 39.9 in adult    Body mass index (BMI) 39.0-39.9, adult 2022    Body mass index (BMI) of 39.0 to 39.9 in adult    Cramp and spasm 2022    Leg cramps    Difficulty breathing     Encounter for general adult medical examination without abnormal findings 2020    Encounter for Medicare annual wellness exam    Encounter for screening for malignant neoplasm of prostate 2021    Encounter for prostate cancer screening    Encounter for screening for malignant neoplasm of prostate 2020    Encounter for prostate cancer screening    Forearm injury     Hx of atrial flutter     Hyperuricemia     Incontinence     Obesity, unspecified 2022    Class 2 obesity with body mass index (BMI) of 39.0 to 39.9 in adult    Other symptoms and signs involving the musculoskeletal system 10/12/2022    Shoulder weakness    Personal history of other diseases of the circulatory system 2021    History of hypotension    Personal history of other diseases of the circulatory system 10/26/2021    History of hypertension    Personal history of other endocrine, nutritional and metabolic disease 2021    History of morbid obesity    Personal history of other specified conditions 2021    History of dizziness    Rotator cuff injury        Social History  Social History     Tobacco Use    Smoking status: Former     Types: Cigarettes     Quit date: 1978     Years since quittin.2     Passive exposure: Never    Smokeless tobacco: Never   Substance Use Topics    Alcohol use: Never    Drug use: Never       Family History     Family History   Problem Relation Name Age of Onset    No Known Problems Mother      Heart disease Father      Heart disease Brother      Polymyalgia rheumatica Brother      Other (mitral valve disorder) Brother      Other (Coronary artery bypass graft) Brother      Other (Arterisclerotic cardiovascular disease) Brother          Allergies:  Allergies   Allergen Reactions     Crestor [Rosuvastatin] Unknown    Ezetimibe Unknown    Statins-Hmg-Coa Reductase Inhibitors Unknown     leg cramping        Outpatient Medications:  Current Outpatient Medications   Medication Instructions    allopurinol (ZYLOPRIM) 100 mg, oral, 2 times daily    ascorbic acid (Vitamin C) 1,000 mg tablet 1 tablet, oral, Daily    aspirin 81 mg EC tablet 1 tablet, oral, Daily    atorvastatin (Lipitor) 10 mg tablet 1 tablet, oral, Nightly    calcitriol (ROCALTROL) 0.25 mcg, oral, Daily    chlorthalidone (HYGROTON) 25 mg, oral, Daily    cholecalciferol (Vitamin D-3) 125 MCG (5000 UT) capsule 1 capsule, oral, Daily    cyanocobalamin (Vitamin B-12) 1,000 mcg tablet 1 tablet, oral, Daily    ezetimibe (ZETIA) 10 mg, oral, Daily    famotidine (PEPCID AC ORAL) oral    ferrous sulfate 325 (65 Fe) MG tablet 1 tablet, oral, Daily    folic acid (FOLVITE) 1 mg, oral, Daily    furosemide (Lasix) 20 mg tablet 1 tablet, oral, As needed    latanoprost (Xalatan) 0.005 % ophthalmic solution 1 drop, ophthalmic (eye), Nightly    losartan (COZAAR) 50 mg, oral, Daily    magnesium 550 mg, oral    niacin (Niaspan) 500 mg ER tablet 1 tablet, oral    NON FORMULARY 1 each, oral, Daily, Prevagen    rOPINIRole (REQUIP) 0.25 mg, oral, Nightly    sulfamethoxazole-trimethoprim (Bactrim DS) 800-160 mg tablet 1 tablet, oral, 2 times daily    temazepam (RESTORIL) 15 mg, oral, Nightly PRN    traMADol (ULTRAM) 50 mg, oral, Every 6 hours    TURMERIC ORAL 1 capsule, oral, Daily    zinc sulfate 50 mg zinc (220 mg) tablet oral          REVIEW OF SYSTEMS  Review of Systems   Constitutional: Negative.   Cardiovascular: Negative.    Respiratory: Negative.     Neurological: Negative.    All other systems reviewed and are negative.        VITALS  Vitals:    11/01/23 1100   BP: 114/58   Pulse: 56       PHYSICAL EXAM  Constitutional:       Appearance: Healthy appearance.   Eyes:      Pupils: Pupils are equal, round, and reactive to light.   Pulmonary:      Effort:  Pulmonary effort is normal.      Breath sounds: Normal breath sounds.   Cardiovascular:      PMI at left midclavicular line. Normal rate. Regular rhythm.      Murmurs: There is no murmur.      No gallop.  No click. No rub.   Pulses:     Intact distal pulses.   Musculoskeletal: Normal range of motion.      Cervical back: Normal range of motion. Skin:     General: Skin is warm and dry.   Neurological:      General: No focal deficit present.      Mental Status: Alert and oriented to person, place and time.           ASSESSMENT AND PLAN    Clinical impressions:  1. Atrial flutter status post radiofrequency catheter ablation on March 22, 2018 with no clinical recurrence.  2. Repair of a leaking right femoral pseudoaneurysm with endovascular stent graft and evacuation of a right thigh hematoma on April 3, 2018.  3. Normal left ventricular function per 2-D echo dated August 28, 2019.  Echocardiogram in 2022 shows left ventricular atrial fraction of 60%  4. Underlying sinus node dysfunction with EP study dated March 22, 2018 revealing mild sinus node dysfunction.  5. Minimal coronary disease per remote left heart catheterization.  6. Hypertension, controlled 7. Dyslipidemia on statin.  8. Obstructive sleep apnea on CPAP.  9. Morbid obesity with a BMI of 41.91.  10. 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      Plan-recommendations.    I have personally reviewed the Holter monitor with patient that happening at the beginning of 2023.  Initial rhythm was sinus rhythm-sinus bradycardia with a minimum heart rate of 34 bpm maximal heart rate of 112 bpm average heart rate of 65 bpm.  Patient is lately having episodes of dizziness that may be related with symptomatic bradycardia.  Patient will be recommended to undergo dual-chamber pacemaker implantation.  Procedure, risk, benefits and possible complications were explained to patient.   All questions were answered.  Patient agrees with plan.  Informed consent was signed.    Patient will be followed my office 7 days for procedure for wound assessment.    Risk factor modification and lifestyle modification discussed with patient. Diet , exercise and hydration discussed with patient.    I have personally review with patient during this office visit, laboratory data, echocardiogram results, stress test results, Holter-event monitor results prior and after the last electrophysiology visit. All questions has been answered.    Please excuse any errors in grammar or translation related to this dictation.  Voice recognition software was utilized to prepare this document.

## 2023-11-01 NOTE — H&P (VIEW-ONLY)
CARDIOLOGY OFFICE VISIT      CHIEF COMPLAINT  Chief Complaint   Patient presents with    Follow-up     Holter results       HISTORY OF PRESENT ILLNESS  HPI    82-year-old  male who is followed for paroxysmal atrial flutter status post radiofrequency catheter ablation on March 22, 2018 with no clinical recurrence. He developed a right femoral pseudoaneurysm and underwent repair with endovascular stent graft and evacuation of a right thigh hematoma on April 8, 2018. He has underlying sinus node dysfunction, minimal coronary disease per left heart catheterization, and normal left ventricular function. He presents to the office today for follow-up evaluation of sinus node dysfunction.    Patient had an echocardiogram in October 2022 that shows left ventricular contraction 60% with mild to moderate mitral regurgitation moderate tricuspid regurgitation and moderate dilatation of the aortic root and ascending aorta. Patient underwent a chest MRI that shows aortic root 44 mm, ascending aortic area 40 mm unchanged from prior MRI.    Patient states that he is under treatment for bladder issues with hyperbaric chamber.  One of the days that he was on his way for his treatment, he had an episode of dizziness and lightheadedness.  He was found to have a heart rate in the 40s.  Procedure was canceled.    Since then he has been noticing that most of the times his heart rate is bouncing between 40 to 50 bpm.        Past Medical History  Past Medical History:   Diagnosis Date    Abdominal pain, chronic, right upper quadrant     ACP (advance care planning)     Anemia     Aneurysm (CMS/[facility])     Body mass index (BMI) 39.0-39.9, adult 12/06/2021    BMI 39.0-39.9,adult    Body mass index (BMI) 39.0-39.9, adult 07/11/2022    BMI 39.0-39.9,adult    Body mass index (BMI) 39.0-39.9, adult 04/24/2022    Body mass index (BMI) of 39.0 to 39.9 in adult    Body mass index (BMI) 39.0-39.9, adult 04/24/2022    Body mass index (BMI) of 39.0  to 39.9 in adult    Body mass index (BMI) 39.0-39.9, adult 2022    Body mass index (BMI) of 39.0 to 39.9 in adult    Cramp and spasm 2022    Leg cramps    Difficulty breathing     Encounter for general adult medical examination without abnormal findings 2020    Encounter for Medicare annual wellness exam    Encounter for screening for malignant neoplasm of prostate 2021    Encounter for prostate cancer screening    Encounter for screening for malignant neoplasm of prostate 2020    Encounter for prostate cancer screening    Forearm injury     Hx of atrial flutter     Hyperuricemia     Incontinence     Obesity, unspecified 2022    Class 2 obesity with body mass index (BMI) of 39.0 to 39.9 in adult    Other symptoms and signs involving the musculoskeletal system 10/12/2022    Shoulder weakness    Personal history of other diseases of the circulatory system 2021    History of hypotension    Personal history of other diseases of the circulatory system 10/26/2021    History of hypertension    Personal history of other endocrine, nutritional and metabolic disease 2021    History of morbid obesity    Personal history of other specified conditions 2021    History of dizziness    Rotator cuff injury        Social History  Social History     Tobacco Use    Smoking status: Former     Types: Cigarettes     Quit date: 1978     Years since quittin.2     Passive exposure: Never    Smokeless tobacco: Never   Substance Use Topics    Alcohol use: Never    Drug use: Never       Family History     Family History   Problem Relation Name Age of Onset    No Known Problems Mother      Heart disease Father      Heart disease Brother      Polymyalgia rheumatica Brother      Other (mitral valve disorder) Brother      Other (Coronary artery bypass graft) Brother      Other (Arterisclerotic cardiovascular disease) Brother          Allergies:  Allergies   Allergen Reactions     Crestor [Rosuvastatin] Unknown    Ezetimibe Unknown    Statins-Hmg-Coa Reductase Inhibitors Unknown     leg cramping        Outpatient Medications:  Current Outpatient Medications   Medication Instructions    allopurinol (ZYLOPRIM) 100 mg, oral, 2 times daily    ascorbic acid (Vitamin C) 1,000 mg tablet 1 tablet, oral, Daily    aspirin 81 mg EC tablet 1 tablet, oral, Daily    atorvastatin (Lipitor) 10 mg tablet 1 tablet, oral, Nightly    calcitriol (ROCALTROL) 0.25 mcg, oral, Daily    chlorthalidone (HYGROTON) 25 mg, oral, Daily    cholecalciferol (Vitamin D-3) 125 MCG (5000 UT) capsule 1 capsule, oral, Daily    cyanocobalamin (Vitamin B-12) 1,000 mcg tablet 1 tablet, oral, Daily    ezetimibe (ZETIA) 10 mg, oral, Daily    famotidine (PEPCID AC ORAL) oral    ferrous sulfate 325 (65 Fe) MG tablet 1 tablet, oral, Daily    folic acid (FOLVITE) 1 mg, oral, Daily    furosemide (Lasix) 20 mg tablet 1 tablet, oral, As needed    latanoprost (Xalatan) 0.005 % ophthalmic solution 1 drop, ophthalmic (eye), Nightly    losartan (COZAAR) 50 mg, oral, Daily    magnesium 550 mg, oral    niacin (Niaspan) 500 mg ER tablet 1 tablet, oral    NON FORMULARY 1 each, oral, Daily, Prevagen    rOPINIRole (REQUIP) 0.25 mg, oral, Nightly    sulfamethoxazole-trimethoprim (Bactrim DS) 800-160 mg tablet 1 tablet, oral, 2 times daily    temazepam (RESTORIL) 15 mg, oral, Nightly PRN    traMADol (ULTRAM) 50 mg, oral, Every 6 hours    TURMERIC ORAL 1 capsule, oral, Daily    zinc sulfate 50 mg zinc (220 mg) tablet oral          REVIEW OF SYSTEMS  Review of Systems   Constitutional: Negative.   Cardiovascular: Negative.    Respiratory: Negative.     Neurological: Negative.    All other systems reviewed and are negative.        VITALS  Vitals:    11/01/23 1100   BP: 114/58   Pulse: 56       PHYSICAL EXAM  Constitutional:       Appearance: Healthy appearance.   Eyes:      Pupils: Pupils are equal, round, and reactive to light.   Pulmonary:      Effort:  Pulmonary effort is normal.      Breath sounds: Normal breath sounds.   Cardiovascular:      PMI at left midclavicular line. Normal rate. Regular rhythm.      Murmurs: There is no murmur.      No gallop.  No click. No rub.   Pulses:     Intact distal pulses.   Musculoskeletal: Normal range of motion.      Cervical back: Normal range of motion. Skin:     General: Skin is warm and dry.   Neurological:      General: No focal deficit present.      Mental Status: Alert and oriented to person, place and time.           ASSESSMENT AND PLAN    Clinical impressions:  1. Atrial flutter status post radiofrequency catheter ablation on March 22, 2018 with no clinical recurrence.  2. Repair of a leaking right femoral pseudoaneurysm with endovascular stent graft and evacuation of a right thigh hematoma on April 3, 2018.  3. Normal left ventricular function per 2-D echo dated August 28, 2019.  Echocardiogram in 2022 shows left ventricular atrial fraction of 60%  4. Underlying sinus node dysfunction with EP study dated March 22, 2018 revealing mild sinus node dysfunction.  5. Minimal coronary disease per remote left heart catheterization.  6. Hypertension, controlled 7. Dyslipidemia on statin.  8. Obstructive sleep apnea on CPAP.  9. Morbid obesity with a BMI of 41.91.  10. 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      Plan-recommendations.    I have personally reviewed the Holter monitor with patient that happening at the beginning of 2023.  Initial rhythm was sinus rhythm-sinus bradycardia with a minimum heart rate of 34 bpm maximal heart rate of 112 bpm average heart rate of 65 bpm.  Patient is lately having episodes of dizziness that may be related with symptomatic bradycardia.  Patient will be recommended to undergo dual-chamber pacemaker implantation.  Procedure, risk, benefits and possible complications were explained to patient.   All questions were answered.  Patient agrees with plan.  Informed consent was signed.    Patient will be followed my office 7 days for procedure for wound assessment.    Risk factor modification and lifestyle modification discussed with patient. Diet , exercise and hydration discussed with patient.    I have personally review with patient during this office visit, laboratory data, echocardiogram results, stress test results, Holter-event monitor results prior and after the last electrophysiology visit. All questions has been answered.    Please excuse any errors in grammar or translation related to this dictation.  Voice recognition software was utilized to prepare this document.

## 2023-11-02 ENCOUNTER — CLINICAL SUPPORT (OUTPATIENT)
Dept: WOUND CARE | Facility: CLINIC | Age: 83
End: 2023-11-02
Payer: MEDICARE

## 2023-11-02 PROCEDURE — G0277 HBOT, FULL BODY CHAMBER, 30M: HCPCS

## 2023-11-03 ENCOUNTER — OFFICE VISIT (OUTPATIENT)
Dept: WOUND CARE | Facility: CLINIC | Age: 83
End: 2023-11-03
Payer: MEDICARE

## 2023-11-03 PROCEDURE — G0277 HBOT, FULL BODY CHAMBER, 30M: HCPCS

## 2023-11-04 LAB
BACTERIA UR CULT: ABNORMAL
BACTERIA UR CULT: ABNORMAL

## 2023-11-06 ENCOUNTER — CLINICAL SUPPORT (OUTPATIENT)
Dept: WOUND CARE | Facility: CLINIC | Age: 83
End: 2023-11-06
Payer: MEDICARE

## 2023-11-06 PROCEDURE — 99183 HYPERBARIC OXYGEN THERAPY: CPT | Performed by: NURSE PRACTITIONER

## 2023-11-06 PROCEDURE — G0277 HBOT, FULL BODY CHAMBER, 30M: HCPCS

## 2023-11-07 ENCOUNTER — OFFICE VISIT (OUTPATIENT)
Dept: WOUND CARE | Facility: CLINIC | Age: 83
End: 2023-11-07
Payer: MEDICARE

## 2023-11-07 PROCEDURE — G0277 HBOT, FULL BODY CHAMBER, 30M: HCPCS

## 2023-11-07 PROCEDURE — 99183 HYPERBARIC OXYGEN THERAPY: CPT | Performed by: SURGERY

## 2023-11-08 ENCOUNTER — APPOINTMENT (OUTPATIENT)
Dept: WOUND CARE | Facility: CLINIC | Age: 83
End: 2023-11-08
Payer: MEDICARE

## 2023-11-09 ENCOUNTER — CLINICAL SUPPORT (OUTPATIENT)
Dept: WOUND CARE | Facility: CLINIC | Age: 83
End: 2023-11-09
Payer: MEDICARE

## 2023-11-09 PROCEDURE — G0277 HBOT, FULL BODY CHAMBER, 30M: HCPCS

## 2023-11-12 PROBLEM — F32.A DEPRESSION: Status: RESOLVED | Noted: 2023-02-19 | Resolved: 2023-11-12

## 2023-11-13 ENCOUNTER — CLINICAL SUPPORT (OUTPATIENT)
Dept: WOUND CARE | Facility: CLINIC | Age: 83
End: 2023-11-13
Payer: MEDICARE

## 2023-11-13 ENCOUNTER — LAB (OUTPATIENT)
Dept: LAB | Facility: LAB | Age: 83
End: 2023-11-13
Payer: MEDICARE

## 2023-11-13 DIAGNOSIS — R94.5 ABNORMAL RESULTS OF LIVER FUNCTION STUDIES: ICD-10-CM

## 2023-11-13 DIAGNOSIS — Z01.812 PRE-PROCEDURAL LABORATORY EXAMINATIONS: ICD-10-CM

## 2023-11-13 DIAGNOSIS — E78.2 HYPERLIPEMIA, MIXED: ICD-10-CM

## 2023-11-13 DIAGNOSIS — I10 PRIMARY HYPERTENSION: ICD-10-CM

## 2023-11-13 DIAGNOSIS — R42 LIGHTHEADEDNESS: ICD-10-CM

## 2023-11-13 LAB
ALBUMIN SERPL BCP-MCNC: 4.1 G/DL (ref 3.4–5)
ALP SERPL-CCNC: 83 U/L (ref 33–136)
ALT SERPL W P-5'-P-CCNC: 47 U/L (ref 10–52)
ANION GAP SERPL CALC-SCNC: 11 MMOL/L (ref 10–20)
AST SERPL W P-5'-P-CCNC: 32 U/L (ref 9–39)
BASOPHILS # BLD AUTO: 0.05 X10*3/UL (ref 0–0.1)
BASOPHILS NFR BLD AUTO: 1.2 %
BILIRUB SERPL-MCNC: 0.9 MG/DL (ref 0–1.2)
BUN SERPL-MCNC: 27 MG/DL (ref 6–23)
CALCIUM SERPL-MCNC: 9.2 MG/DL (ref 8.6–10.3)
CHLORIDE SERPL-SCNC: 106 MMOL/L (ref 98–107)
CO2 SERPL-SCNC: 25 MMOL/L (ref 21–32)
CREAT SERPL-MCNC: 1.48 MG/DL (ref 0.5–1.3)
EOSINOPHIL # BLD AUTO: 0.1 X10*3/UL (ref 0–0.4)
EOSINOPHIL NFR BLD AUTO: 2.4 %
ERYTHROCYTE [DISTWIDTH] IN BLOOD BY AUTOMATED COUNT: 19.2 % (ref 11.5–14.5)
GFR SERPL CREATININE-BSD FRML MDRD: 47 ML/MIN/1.73M*2
GLUCOSE SERPL-MCNC: 95 MG/DL (ref 74–99)
HCT VFR BLD AUTO: 39.8 % (ref 41–52)
HGB BLD-MCNC: 12.1 G/DL (ref 13.5–17.5)
IMM GRANULOCYTES # BLD AUTO: 0.03 X10*3/UL (ref 0–0.5)
IMM GRANULOCYTES NFR BLD AUTO: 0.7 % (ref 0–0.9)
INR PPP: 1.2 (ref 0.9–1.1)
LYMPHOCYTES # BLD AUTO: 1.21 X10*3/UL (ref 0.8–3)
LYMPHOCYTES NFR BLD AUTO: 29.2 %
MCH RBC QN AUTO: 30.3 PG (ref 26–34)
MCHC RBC AUTO-ENTMCNC: 30.4 G/DL (ref 32–36)
MCV RBC AUTO: 100 FL (ref 80–100)
MONOCYTES # BLD AUTO: 0.24 X10*3/UL (ref 0.05–0.8)
MONOCYTES NFR BLD AUTO: 5.8 %
NEUTROPHILS # BLD AUTO: 2.52 X10*3/UL (ref 1.6–5.5)
NEUTROPHILS NFR BLD AUTO: 60.7 %
NRBC BLD-RTO: 0 /100 WBCS (ref 0–0)
PLATELET # BLD AUTO: 55 X10*3/UL (ref 150–450)
POTASSIUM SERPL-SCNC: 3.8 MMOL/L (ref 3.5–5.3)
PROT SERPL-MCNC: 6.1 G/DL (ref 6.4–8.2)
PROTHROMBIN TIME: 13.6 SECONDS (ref 9.8–12.8)
RBC # BLD AUTO: 3.99 X10*6/UL (ref 4.5–5.9)
SODIUM SERPL-SCNC: 138 MMOL/L (ref 136–145)
WBC # BLD AUTO: 4.2 X10*3/UL (ref 4.4–11.3)

## 2023-11-13 PROCEDURE — 85610 PROTHROMBIN TIME: CPT

## 2023-11-13 PROCEDURE — 99183 HYPERBARIC OXYGEN THERAPY: CPT | Performed by: NURSE PRACTITIONER

## 2023-11-13 PROCEDURE — 36415 COLL VENOUS BLD VENIPUNCTURE: CPT

## 2023-11-13 PROCEDURE — G0277 HBOT, FULL BODY CHAMBER, 30M: HCPCS

## 2023-11-13 PROCEDURE — 80053 COMPREHEN METABOLIC PANEL: CPT

## 2023-11-13 PROCEDURE — 85025 COMPLETE CBC W/AUTO DIFF WBC: CPT

## 2023-11-14 ENCOUNTER — CLINICAL SUPPORT (OUTPATIENT)
Dept: WOUND CARE | Facility: CLINIC | Age: 83
End: 2023-11-14
Payer: MEDICARE

## 2023-11-14 DIAGNOSIS — N39.3 SUI (STRESS URINARY INCONTINENCE), MALE: ICD-10-CM

## 2023-11-14 PROCEDURE — G0277 HBOT, FULL BODY CHAMBER, 30M: HCPCS

## 2023-11-14 PROCEDURE — 99183 HYPERBARIC OXYGEN THERAPY: CPT | Performed by: SURGERY

## 2023-11-15 ENCOUNTER — OFFICE VISIT (OUTPATIENT)
Dept: WOUND CARE | Facility: CLINIC | Age: 83
End: 2023-11-15
Payer: MEDICARE

## 2023-11-15 LAB — GLUCOSE BLD MANUAL STRIP-MCNC: 101 MG/DL (ref 74–99)

## 2023-11-15 PROCEDURE — 82947 ASSAY GLUCOSE BLOOD QUANT: CPT

## 2023-11-15 PROCEDURE — G0277 HBOT, FULL BODY CHAMBER, 30M: HCPCS

## 2023-11-15 PROCEDURE — 99183 HYPERBARIC OXYGEN THERAPY: CPT | Performed by: NURSE PRACTITIONER

## 2023-11-16 ENCOUNTER — CLINICAL SUPPORT (OUTPATIENT)
Dept: WOUND CARE | Facility: CLINIC | Age: 83
End: 2023-11-16
Payer: MEDICARE

## 2023-11-16 PROCEDURE — G0277 HBOT, FULL BODY CHAMBER, 30M: HCPCS

## 2023-11-20 ENCOUNTER — OFFICE VISIT (OUTPATIENT)
Dept: WOUND CARE | Facility: CLINIC | Age: 83
End: 2023-11-20
Payer: MEDICARE

## 2023-11-20 PROCEDURE — 99183 HYPERBARIC OXYGEN THERAPY: CPT | Performed by: NURSE PRACTITIONER

## 2023-11-20 PROCEDURE — G0277 HBOT, FULL BODY CHAMBER, 30M: HCPCS

## 2023-11-21 ENCOUNTER — APPOINTMENT (OUTPATIENT)
Dept: RADIOLOGY | Facility: HOSPITAL | Age: 83
End: 2023-11-21
Payer: MEDICARE

## 2023-11-21 ENCOUNTER — APPOINTMENT (OUTPATIENT)
Dept: CARDIOLOGY | Facility: HOSPITAL | Age: 83
End: 2023-11-21
Payer: MEDICARE

## 2023-11-21 ENCOUNTER — HOSPITAL ENCOUNTER (OUTPATIENT)
Facility: HOSPITAL | Age: 83
Setting detail: OUTPATIENT SURGERY
Discharge: HOME | End: 2023-11-21
Attending: INTERNAL MEDICINE | Admitting: INTERNAL MEDICINE
Payer: MEDICARE

## 2023-11-21 VITALS
HEIGHT: 74 IN | DIASTOLIC BLOOD PRESSURE: 76 MMHG | BODY MASS INDEX: 38.62 KG/M2 | RESPIRATION RATE: 18 BRPM | SYSTOLIC BLOOD PRESSURE: 142 MMHG | HEART RATE: 75 BPM | TEMPERATURE: 96.4 F | WEIGHT: 300.93 LBS | OXYGEN SATURATION: 98 %

## 2023-11-21 DIAGNOSIS — R00.1 BRADYCARDIA: Primary | ICD-10-CM

## 2023-11-21 DIAGNOSIS — Z95.0 PACEMAKER: ICD-10-CM

## 2023-11-21 DIAGNOSIS — I49.5 SINUS NODE DYSFUNCTION (MULTI): ICD-10-CM

## 2023-11-21 DIAGNOSIS — Z95.0 PACEMAKER: Primary | ICD-10-CM

## 2023-11-21 DIAGNOSIS — Z01.812 PRE-PROCEDURAL LABORATORY EXAMINATIONS: ICD-10-CM

## 2023-11-21 LAB
ABO GROUP (TYPE) IN BLOOD: NORMAL
ANION GAP SERPL CALC-SCNC: 13 MMOL/L (ref 10–20)
ANTIBODY SCREEN: NORMAL
BUN SERPL-MCNC: 31 MG/DL (ref 6–23)
CALCIUM SERPL-MCNC: 9 MG/DL (ref 8.6–10.3)
CHLORIDE SERPL-SCNC: 107 MMOL/L (ref 98–107)
CO2 SERPL-SCNC: 23 MMOL/L (ref 21–32)
CREAT SERPL-MCNC: 1.24 MG/DL (ref 0.5–1.3)
ERYTHROCYTE [DISTWIDTH] IN BLOOD BY AUTOMATED COUNT: 18.6 % (ref 11.5–14.5)
GFR SERPL CREATININE-BSD FRML MDRD: 58 ML/MIN/1.73M*2
GLUCOSE SERPL-MCNC: 119 MG/DL (ref 74–99)
HCT VFR BLD AUTO: 36.2 % (ref 41–52)
HGB BLD-MCNC: 11.5 G/DL (ref 13.5–17.5)
MCH RBC QN AUTO: 30.3 PG (ref 26–34)
MCHC RBC AUTO-ENTMCNC: 31.8 G/DL (ref 32–36)
MCV RBC AUTO: 95 FL (ref 80–100)
NRBC BLD-RTO: 0 /100 WBCS (ref 0–0)
PLATELET # BLD AUTO: 50 X10*3/UL (ref 150–450)
POTASSIUM SERPL-SCNC: 3.7 MMOL/L (ref 3.5–5.3)
RBC # BLD AUTO: 3.8 X10*6/UL (ref 4.5–5.9)
RH FACTOR (ANTIGEN D): NORMAL
SODIUM SERPL-SCNC: 139 MMOL/L (ref 136–145)
WBC # BLD AUTO: 2.9 X10*3/UL (ref 4.4–11.3)

## 2023-11-21 PROCEDURE — 71045 X-RAY EXAM CHEST 1 VIEW: CPT | Mod: FOREIGN READ | Performed by: RADIOLOGY

## 2023-11-21 PROCEDURE — 85027 COMPLETE CBC AUTOMATED: CPT | Performed by: NURSE PRACTITIONER

## 2023-11-21 PROCEDURE — 99152 MOD SED SAME PHYS/QHP 5/>YRS: CPT | Performed by: INTERNAL MEDICINE

## 2023-11-21 PROCEDURE — 33208 INSRT HEART PM ATRIAL & VENT: CPT | Mod: KX | Performed by: INTERNAL MEDICINE

## 2023-11-21 PROCEDURE — 2500000004 HC RX 250 GENERAL PHARMACY W/ HCPCS (ALT 636 FOR OP/ED): Performed by: INTERNAL MEDICINE

## 2023-11-21 PROCEDURE — 33208 INSRT HEART PM ATRIAL & VENT: CPT | Performed by: INTERNAL MEDICINE

## 2023-11-21 PROCEDURE — 36430 TRANSFUSION BLD/BLD COMPNT: CPT | Mod: 59

## 2023-11-21 PROCEDURE — 93005 ELECTROCARDIOGRAM TRACING: CPT

## 2023-11-21 PROCEDURE — 2780000003 HC OR 278 NO HCPCS: Performed by: INTERNAL MEDICINE

## 2023-11-21 PROCEDURE — 99153 MOD SED SAME PHYS/QHP EA: CPT | Performed by: INTERNAL MEDICINE

## 2023-11-21 PROCEDURE — P9035 PLATELET PHERES LEUKOREDUCED: HCPCS

## 2023-11-21 PROCEDURE — 71046 X-RAY EXAM CHEST 2 VIEWS: CPT | Performed by: RADIOLOGY

## 2023-11-21 PROCEDURE — 7100000010 HC PHASE TWO TIME - EACH INCREMENTAL 1 MINUTE: Performed by: INTERNAL MEDICINE

## 2023-11-21 PROCEDURE — 2500000004 HC RX 250 GENERAL PHARMACY W/ HCPCS (ALT 636 FOR OP/ED): Performed by: NURSE PRACTITIONER

## 2023-11-21 PROCEDURE — 2500000001 HC RX 250 WO HCPCS SELF ADMINISTERED DRUGS (ALT 637 FOR MEDICARE OP): Performed by: NURSE PRACTITIONER

## 2023-11-21 PROCEDURE — C1785 PMKR, DUAL, RATE-RESP: HCPCS | Performed by: INTERNAL MEDICINE

## 2023-11-21 PROCEDURE — C1892 INTRO/SHEATH,FIXED,PEEL-AWAY: HCPCS | Performed by: INTERNAL MEDICINE

## 2023-11-21 PROCEDURE — 80048 BASIC METABOLIC PNL TOTAL CA: CPT | Performed by: NURSE PRACTITIONER

## 2023-11-21 PROCEDURE — C1898 LEAD, PMKR, OTHER THAN TRANS: HCPCS | Performed by: INTERNAL MEDICINE

## 2023-11-21 PROCEDURE — 36415 COLL VENOUS BLD VENIPUNCTURE: CPT | Performed by: NURSE PRACTITIONER

## 2023-11-21 PROCEDURE — 2500000005 HC RX 250 GENERAL PHARMACY W/O HCPCS: Performed by: INTERNAL MEDICINE

## 2023-11-21 PROCEDURE — 2500000001 HC RX 250 WO HCPCS SELF ADMINISTERED DRUGS (ALT 637 FOR MEDICARE OP): Performed by: INTERNAL MEDICINE

## 2023-11-21 PROCEDURE — 93010 ELECTROCARDIOGRAM REPORT: CPT | Performed by: INTERNAL MEDICINE

## 2023-11-21 PROCEDURE — 86900 BLOOD TYPING SEROLOGIC ABO: CPT | Performed by: NURSE PRACTITIONER

## 2023-11-21 PROCEDURE — 7100000009 HC PHASE TWO TIME - INITIAL BASE CHARGE: Performed by: INTERNAL MEDICINE

## 2023-11-21 PROCEDURE — 2720000007 HC OR 272 NO HCPCS: Performed by: INTERNAL MEDICINE

## 2023-11-21 PROCEDURE — 2750000001 HC OR 275 NO HCPCS: Performed by: INTERNAL MEDICINE

## 2023-11-21 PROCEDURE — 71046 X-RAY EXAM CHEST 2 VIEWS: CPT | Mod: FY

## 2023-11-21 PROCEDURE — 71045 X-RAY EXAM CHEST 1 VIEW: CPT | Mod: FY,FR

## 2023-11-21 DEVICE — LEAD 5076-58 CAPSUREFIX NOVUS US EN
Type: IMPLANTABLE DEVICE | Status: FUNCTIONAL
Brand: CAPSUREFIX® NOVUS

## 2023-11-21 DEVICE — LEAD 5076-52 CAPSUREFIX NOVUS US EN
Type: IMPLANTABLE DEVICE | Status: FUNCTIONAL
Brand: CAPSUREFIX® NOVUS

## 2023-11-21 DEVICE — IPG W1DR01 AZURE XT DR MRI WL USA BCP
Type: IMPLANTABLE DEVICE | Status: FUNCTIONAL
Brand: AZURE™ XT DR MRI SURESCAN™

## 2023-11-21 RX ORDER — CHLORHEXIDINE GLUCONATE 40 MG/ML
SOLUTION TOPICAL ONCE
Status: COMPLETED | OUTPATIENT
Start: 2023-11-21 | End: 2023-11-21

## 2023-11-21 RX ORDER — ACETAMINOPHEN 325 MG/1
650 TABLET ORAL EVERY 4 HOURS PRN
Qty: 30 TABLET | Refills: 0
Start: 2023-11-21 | End: 2024-04-04 | Stop reason: WASHOUT

## 2023-11-21 RX ORDER — TRAMADOL HYDROCHLORIDE 50 MG/1
50 TABLET ORAL EVERY 6 HOURS PRN
Status: DISCONTINUED | OUTPATIENT
Start: 2023-11-21 | End: 2023-11-21 | Stop reason: HOSPADM

## 2023-11-21 RX ORDER — MIDAZOLAM HYDROCHLORIDE 1 MG/ML
INJECTION INTRAMUSCULAR; INTRAVENOUS AS NEEDED
Status: DISCONTINUED | OUTPATIENT
Start: 2023-11-21 | End: 2023-11-21 | Stop reason: HOSPADM

## 2023-11-21 RX ORDER — POTASSIUM CHLORIDE 20 MEQ/1
40 TABLET, EXTENDED RELEASE ORAL ONCE
Status: COMPLETED | OUTPATIENT
Start: 2023-11-21 | End: 2023-11-21

## 2023-11-21 RX ORDER — CEFAZOLIN SODIUM IN 0.9 % NACL 3 G/100 ML
3 INTRAVENOUS SOLUTION, PIGGYBACK (ML) INTRAVENOUS ONCE
Status: COMPLETED | OUTPATIENT
Start: 2023-11-21 | End: 2023-11-21

## 2023-11-21 RX ORDER — SODIUM CHLORIDE 9 MG/ML
20 INJECTION, SOLUTION INTRAVENOUS CONTINUOUS
Status: DISCONTINUED | OUTPATIENT
Start: 2023-11-21 | End: 2023-11-21

## 2023-11-21 RX ORDER — FENTANYL CITRATE 50 UG/ML
INJECTION, SOLUTION INTRAMUSCULAR; INTRAVENOUS AS NEEDED
Status: DISCONTINUED | OUTPATIENT
Start: 2023-11-21 | End: 2023-11-21 | Stop reason: HOSPADM

## 2023-11-21 RX ORDER — MUPIROCIN 20 MG/G
1 OINTMENT TOPICAL ONCE
Status: COMPLETED | OUTPATIENT
Start: 2023-11-21 | End: 2023-11-21

## 2023-11-21 RX ORDER — ACETAMINOPHEN 325 MG/1
650 TABLET ORAL EVERY 4 HOURS PRN
Status: DISCONTINUED | OUTPATIENT
Start: 2023-11-21 | End: 2023-11-21 | Stop reason: HOSPADM

## 2023-11-21 RX ORDER — ONDANSETRON HYDROCHLORIDE 2 MG/ML
4 INJECTION, SOLUTION INTRAVENOUS EVERY 6 HOURS PRN
Status: DISCONTINUED | OUTPATIENT
Start: 2023-11-21 | End: 2023-11-21 | Stop reason: HOSPADM

## 2023-11-21 RX ORDER — LIDOCAINE HYDROCHLORIDE 10 MG/ML
INJECTION, SOLUTION EPIDURAL; INFILTRATION; INTRACAUDAL; PERINEURAL AS NEEDED
Status: DISCONTINUED | OUTPATIENT
Start: 2023-11-21 | End: 2023-11-21 | Stop reason: HOSPADM

## 2023-11-21 RX ADMIN — POTASSIUM CHLORIDE 40 MEQ: 1500 TABLET, EXTENDED RELEASE ORAL at 16:11

## 2023-11-21 RX ADMIN — Medication: at 07:39

## 2023-11-21 RX ADMIN — ACETAMINOPHEN 650 MG: 325 TABLET ORAL at 17:43

## 2023-11-21 RX ADMIN — SODIUM CHLORIDE 20 ML/HR: 9 INJECTION, SOLUTION INTRAVENOUS at 07:38

## 2023-11-21 RX ADMIN — SODIUM CHLORIDE 20 ML/HR: 9 INJECTION, SOLUTION INTRAVENOUS at 07:39

## 2023-11-21 RX ADMIN — MUPIROCIN 1 APPLICATION: 20 OINTMENT TOPICAL at 07:38

## 2023-11-21 RX ADMIN — Medication 3 G: at 07:00

## 2023-11-21 ASSESSMENT — PAIN SCALES - GENERAL: PAINLEVEL_OUTOF10: 4

## 2023-11-21 ASSESSMENT — COLUMBIA-SUICIDE SEVERITY RATING SCALE - C-SSRS
6. HAVE YOU EVER DONE ANYTHING, STARTED TO DO ANYTHING, OR PREPARED TO DO ANYTHING TO END YOUR LIFE?: NO
2. HAVE YOU ACTUALLY HAD ANY THOUGHTS OF KILLING YOURSELF?: NO
1. IN THE PAST MONTH, HAVE YOU WISHED YOU WERE DEAD OR WISHED YOU COULD GO TO SLEEP AND NOT WAKE UP?: NO

## 2023-11-21 ASSESSMENT — PAIN - FUNCTIONAL ASSESSMENT
PAIN_FUNCTIONAL_ASSESSMENT: 0-10
PAIN_FUNCTIONAL_ASSESSMENT: 0-10

## 2023-11-21 NOTE — NURSING NOTE
Patient is S/P PPM insertion via lt chestwall, Aquacell dressing remains dry/intact. Ice pack and lt arm immobilizer in place. Patient resting comfortably, denies any complaints. Post-procedure EKG and Chest Xray completed. Will provided box lunch.

## 2023-11-21 NOTE — DISCHARGE INSTRUCTIONS
**Please follow-up with your primary care physician in regards to your chronically low platelets.**

## 2023-11-21 NOTE — Clinical Note
The PACEMAKER, DUAL CHAMBER, HORACIO MRI XT DR - [id number] device was inserted. The leads were placed into the connector and visually verified to be in correct position. Injury current obtained.

## 2023-11-21 NOTE — NURSING NOTE
270 ml platelets transfused without  allergic reaction. Patient tolerated well. Vital signs remain stable.

## 2023-11-21 NOTE — NURSING NOTE
Patient device check completed at 1700, all is good. Patient ambulated with standby assist to BR at 1715, gait slow, steady. Discharge instructions reviewed with patient and wife, verbalized understanding. Final Lt chest dressing check remains stable. IV x 2 removed. Patient discharged to home via w/c.

## 2023-11-22 DIAGNOSIS — F51.01 PRIMARY INSOMNIA: ICD-10-CM

## 2023-11-22 LAB
BLOOD EXPIRATION DATE: NORMAL
DISPENSE STATUS: NORMAL
PRODUCT BLOOD TYPE: 6200
PRODUCT CODE: NORMAL
UNIT ABO: NORMAL
UNIT NUMBER: NORMAL
UNIT RH: NORMAL
UNIT VOLUME: 270

## 2023-11-24 RX ORDER — TEMAZEPAM 15 MG/1
15 CAPSULE ORAL NIGHTLY PRN
Qty: 30 CAPSULE | Refills: 2 | Status: SHIPPED | OUTPATIENT
Start: 2023-11-24 | End: 2024-01-25

## 2023-11-27 ENCOUNTER — APPOINTMENT (OUTPATIENT)
Dept: WOUND CARE | Facility: CLINIC | Age: 83
End: 2023-11-27
Payer: MEDICARE

## 2023-11-28 ENCOUNTER — TELEPHONE (OUTPATIENT)
Dept: CARDIOLOGY | Facility: CLINIC | Age: 83
End: 2023-11-28

## 2023-11-28 ENCOUNTER — CLINICAL SUPPORT (OUTPATIENT)
Dept: CARDIOLOGY | Facility: CLINIC | Age: 83
End: 2023-11-28
Payer: MEDICARE

## 2023-11-28 DIAGNOSIS — R00.1 BRADYCARDIA: ICD-10-CM

## 2023-11-28 DIAGNOSIS — I49.5 SINUS NODE DYSFUNCTION (MULTI): ICD-10-CM

## 2023-11-28 DIAGNOSIS — Z95.0 PACEMAKER: ICD-10-CM

## 2023-11-28 NOTE — PROGRESS NOTES
Pt. here for wound check of pacemaker implant by Dr. Solis on 11/21/2023 at [facility]. L clavicular area is well approximated with no erythema or drainage. Pt. denies any fever or chills. Temp 98.4

## 2023-11-28 NOTE — TELEPHONE ENCOUNTER
Pt was in for wound check from pacemaker implant. Pt asking when he would be able to have right shoulder replacement and he is also due to have artificial sphincter surgery as well.

## 2023-12-01 LAB
ATRIAL RATE: 53 BPM
ATRIAL RATE: 60 BPM
P AXIS: 91 DEGREES
P OFFSET: 142 MS
P ONSET: 104 MS
PR INTERVAL: 242 MS
PR INTERVAL: 298 MS
Q ONSET: 222 MS
Q ONSET: 225 MS
QRS COUNT: 10 BEATS
QRS COUNT: 9 BEATS
QRS DURATION: 164 MS
QRS DURATION: 166 MS
QT INTERVAL: 474 MS
QT INTERVAL: 486 MS
QTC CALCULATION(BAZETT): 456 MS
QTC CALCULATION(BAZETT): 474 MS
QTC FREDERICIA: 466 MS
QTC FREDERICIA: 474 MS
R AXIS: -35 DEGREES
R AXIS: -47 DEGREES
T AXIS: -6 DEGREES
T AXIS: 10 DEGREES
T OFFSET: 459 MS
T OFFSET: 468 MS
VENTRICULAR RATE: 53 BPM
VENTRICULAR RATE: 60 BPM

## 2023-12-04 ENCOUNTER — OFFICE VISIT (OUTPATIENT)
Dept: PRIMARY CARE | Facility: CLINIC | Age: 83
End: 2023-12-04
Payer: MEDICARE

## 2023-12-04 VITALS
TEMPERATURE: 97.8 F | WEIGHT: 299 LBS | BODY MASS INDEX: 38.37 KG/M2 | HEART RATE: 64 BPM | OXYGEN SATURATION: 95 % | HEIGHT: 74 IN | DIASTOLIC BLOOD PRESSURE: 58 MMHG | SYSTOLIC BLOOD PRESSURE: 116 MMHG

## 2023-12-04 DIAGNOSIS — R31.9 HEMATURIA, UNSPECIFIED TYPE: ICD-10-CM

## 2023-12-04 DIAGNOSIS — Z00.00 ENCOUNTER FOR MEDICARE ANNUAL WELLNESS EXAM: Primary | ICD-10-CM

## 2023-12-04 DIAGNOSIS — D69.6 THROMBOCYTOPENIA (CMS-HCC): ICD-10-CM

## 2023-12-04 DIAGNOSIS — Z71.89 ACP (ADVANCE CARE PLANNING): ICD-10-CM

## 2023-12-04 DIAGNOSIS — H61.23 BILATERAL IMPACTED CERUMEN: ICD-10-CM

## 2023-12-04 DIAGNOSIS — Z79.899 MEDICATION MANAGEMENT: ICD-10-CM

## 2023-12-04 LAB
POC APPEARANCE, URINE: CLEAR
POC BILIRUBIN, URINE: ABNORMAL
POC BLOOD, URINE: ABNORMAL
POC COLOR, URINE: ABNORMAL
POC GLUCOSE, URINE: NEGATIVE MG/DL
POC KETONES, URINE: NEGATIVE MG/DL
POC LEUKOCYTES, URINE: ABNORMAL
POC NITRITE,URINE: POSITIVE
POC PH, URINE: 5.5 PH
POC PROTEIN, URINE: ABNORMAL MG/DL
POC SPECIFIC GRAVITY, URINE: 1.02
POC UROBILINOGEN, URINE: 1 EU/DL

## 2023-12-04 PROCEDURE — 1159F MED LIST DOCD IN RCRD: CPT | Performed by: FAMILY MEDICINE

## 2023-12-04 PROCEDURE — 1036F TOBACCO NON-USER: CPT | Performed by: FAMILY MEDICINE

## 2023-12-04 PROCEDURE — 1125F AMNT PAIN NOTED PAIN PRSNT: CPT | Performed by: FAMILY MEDICINE

## 2023-12-04 PROCEDURE — 3074F SYST BP LT 130 MM HG: CPT | Performed by: FAMILY MEDICINE

## 2023-12-04 PROCEDURE — 69209 REMOVE IMPACTED EAR WAX UNI: CPT | Performed by: FAMILY MEDICINE

## 2023-12-04 PROCEDURE — G0439 PPPS, SUBSEQ VISIT: HCPCS | Performed by: FAMILY MEDICINE

## 2023-12-04 PROCEDURE — 3078F DIAST BP <80 MM HG: CPT | Performed by: FAMILY MEDICINE

## 2023-12-04 PROCEDURE — 1160F RVW MEDS BY RX/DR IN RCRD: CPT | Performed by: FAMILY MEDICINE

## 2023-12-04 PROCEDURE — 81002 URINALYSIS NONAUTO W/O SCOPE: CPT | Performed by: FAMILY MEDICINE

## 2023-12-04 PROCEDURE — 1170F FXNL STATUS ASSESSED: CPT | Performed by: FAMILY MEDICINE

## 2023-12-04 RX ORDER — AMOXICILLIN AND CLAVULANATE POTASSIUM 875; 125 MG/1; MG/1
875 TABLET, FILM COATED ORAL 2 TIMES DAILY
Qty: 20 TABLET | Refills: 0 | Status: SHIPPED | OUTPATIENT
Start: 2023-12-04 | End: 2023-12-11 | Stop reason: SDUPTHER

## 2023-12-04 ASSESSMENT — ENCOUNTER SYMPTOMS
DYSPHORIC MOOD: 0
RECTAL PAIN: 0
EYE PAIN: 0
HEMATURIA: 0
ABDOMINAL DISTENTION: 0
CONSTIPATION: 0
SHORTNESS OF BREATH: 0
SEIZURES: 0
PALPITATIONS: 0
ADENOPATHY: 0
DEPRESSION: 0
CHEST TIGHTNESS: 0
ACTIVITY CHANGE: 0
FATIGUE: 0
MYALGIAS: 0
POLYPHAGIA: 0
PHOTOPHOBIA: 0
SINUS PAIN: 0
CONSTITUTIONAL NEGATIVE: 1
ARTHRALGIAS: 0
RHINORRHEA: 0
SORE THROAT: 0
DYSURIA: 0
SLEEP DISTURBANCE: 0
COLOR CHANGE: 0
OCCASIONAL FEELINGS OF UNSTEADINESS: 1
FEVER: 0
NERVOUS/ANXIOUS: 0
STRIDOR: 0
POLYDIPSIA: 0
LOSS OF SENSATION IN FEET: 0
TROUBLE SWALLOWING: 0
ABDOMINAL PAIN: 0
CONFUSION: 0
SPEECH DIFFICULTY: 0
NECK STIFFNESS: 0
DIARRHEA: 0
SINUS PRESSURE: 0
DIZZINESS: 0
AGITATION: 0
COUGH: 0
BLOOD IN STOOL: 0
FLANK PAIN: 0
DECREASED CONCENTRATION: 0
APPETITE CHANGE: 0
HEADACHES: 0

## 2023-12-04 ASSESSMENT — ACTIVITIES OF DAILY LIVING (ADL)
TAKING_MEDICATION: INDEPENDENT
BATHING: INDEPENDENT
MANAGING_FINANCES: INDEPENDENT
DOING_HOUSEWORK: INDEPENDENT
DRESSING: INDEPENDENT
GROCERY_SHOPPING: INDEPENDENT

## 2023-12-04 ASSESSMENT — PATIENT HEALTH QUESTIONNAIRE - PHQ9
1. LITTLE INTEREST OR PLEASURE IN DOING THINGS: NOT AT ALL
2. FEELING DOWN, DEPRESSED OR HOPELESS: NOT AT ALL
SUM OF ALL RESPONSES TO PHQ9 QUESTIONS 1 AND 2: 0

## 2023-12-04 NOTE — PROGRESS NOTES
Subjective   Reason for Visit: Holden Burgess is an 83 y.o. male here for a Medicare Wellness visit.     Past Medical, Surgical, and Family History reviewed and updated in chart.    Reviewed all medications by prescribing practitioner or clinical pharmacist (such as prescriptions, OTCs, herbal therapies and supplements) and documented in the medical record.    HPI    Patient Care Team:  Giacomo Joseph DO as PCP - General  Giacomo Joseph DO as PCP - [facility] ACO Attributed Provider     Review of Systems   Constitutional: Negative.  Negative for activity change, appetite change, fatigue and fever.   HENT:  Negative for congestion, dental problem, ear discharge, ear pain, mouth sores, rhinorrhea, sinus pressure, sinus pain, sore throat, tinnitus and trouble swallowing.    Eyes:  Negative for photophobia, pain and visual disturbance.   Respiratory:  Negative for cough, chest tightness, shortness of breath and stridor.    Cardiovascular:  Negative for chest pain and palpitations.   Gastrointestinal:  Negative for abdominal distention, abdominal pain, blood in stool, constipation, diarrhea and rectal pain.   Endocrine: Negative for cold intolerance, heat intolerance, polydipsia, polyphagia and polyuria.   Genitourinary:  Negative for dysuria, flank pain, hematuria and urgency.   Musculoskeletal:  Negative for arthralgias, gait problem, myalgias and neck stiffness.   Skin:  Negative for color change and rash.   Allergic/Immunologic: Negative for environmental allergies and food allergies.   Neurological:  Negative for dizziness, seizures, syncope, speech difficulty and headaches.   Hematological:  Negative for adenopathy.   Psychiatric/Behavioral:  Negative for agitation, confusion, decreased concentration, dysphoric mood and sleep disturbance. The patient is not nervous/anxious.        Objective   Vitals:  /58 (BP Location: Right arm, Patient Position: Sitting, BP Cuff Size: Adult)   Pulse 64   Temp 36.6 °C  "(97.8 °F)   Ht 1.88 m (6' 2\")   Wt 136 kg (299 lb)   SpO2 95%   BMI 38.39 kg/m²       Physical Exam  Vitals reviewed.   Constitutional:       General: He is not in acute distress.     Appearance: He is obese. He is not ill-appearing or diaphoretic.   HENT:      Head: Normocephalic.      Right Ear: Tympanic membrane and external ear normal.      Left Ear: Tympanic membrane and external ear normal.      Nose: Nose normal. No congestion.      Mouth/Throat:      Pharynx: No posterior oropharyngeal erythema.   Eyes:      General:         Right eye: No discharge.         Left eye: No discharge.      Extraocular Movements: Extraocular movements intact.      Conjunctiva/sclera: Conjunctivae normal.      Pupils: Pupils are equal, round, and reactive to light.   Cardiovascular:      Rate and Rhythm: Normal rate and regular rhythm.      Pulses: Normal pulses.      Heart sounds: Normal heart sounds. No murmur heard.  Pulmonary:      Effort: Pulmonary effort is normal. No respiratory distress.      Breath sounds: Normal breath sounds. No wheezing or rales.   Chest:      Chest wall: No tenderness.   Abdominal:      General: Bowel sounds are normal. There is distension.      Palpations: There is no mass.      Tenderness: There is no abdominal tenderness. There is no guarding.   Musculoskeletal:         General: No tenderness. Normal range of motion.      Cervical back: Normal range of motion and neck supple. No tenderness.      Right lower leg: No edema.      Left lower leg: No edema.   Skin:     General: Skin is dry.      Coloration: Skin is not jaundiced.      Findings: No bruising, erythema or rash.   Neurological:      Mental Status: He is alert and oriented to person, place, and time. Mental status is at baseline.      Cranial Nerves: No cranial nerve deficit.      Sensory: No sensory deficit.      Coordination: Coordination normal.      Gait: Gait normal.      Comments: Mild recent and remote memory changes "   Psychiatric:         Mood and Affect: Mood normal.         Thought Content: Thought content normal.         Judgment: Judgment normal.         Assessment/Plan   Problem List Items Addressed This Visit    None  Visit Diagnoses       Encounter for Medicare annual wellness exam    -  Primary    Medication management        Relevant Orders    OOB Internal Tracking    Thrombocytopenia (CMS/HCC)        Relevant Medications    amoxicillin-pot clavulanate (Augmentin) 875-125 mg tablet    Other Relevant Orders    Referral to Hematology and Oncology    CBC and Auto Differential    Comprehensive Metabolic Panel    POCT UA (nonautomated) manually resulted (Completed)    ACP (advance care planning)        Bilateral impacted cerumen        Relevant Orders    Ear cerumen removal           Scribe Attestation  By signing my name below, IBarby RMA , Gloria   attest that this documentation has been prepared under the direction and in the presence of Giacomo Joseph DO.   Provider Attestation - Scribe documentation    All medical record entries made by the Scribe were at my direction and personally dictated by me. I have reviewed the chart and agree that the record accurately reflects my personal performance of the history, physical exam, discussion and plan.

## 2023-12-05 PROCEDURE — 87086 URINE CULTURE/COLONY COUNT: CPT

## 2023-12-05 PROCEDURE — 87186 SC STD MICRODIL/AGAR DIL: CPT

## 2023-12-07 DIAGNOSIS — D69.6 THROMBOCYTOPENIA (CMS-HCC): ICD-10-CM

## 2023-12-07 NOTE — TELEPHONE ENCOUNTER
DR SORTO PT    PT PHONED OFFICE AND STATED THAT HE DROPPED OFF URINE ON TUES AND HAS NOT HEARD ANYTHING BACK. LET HIM KNOW IT IS STILL IN PROCESS.

## 2023-12-09 LAB — BACTERIA UR CULT: ABNORMAL

## 2023-12-11 RX ORDER — AMOXICILLIN AND CLAVULANATE POTASSIUM 875; 125 MG/1; MG/1
875 TABLET, FILM COATED ORAL 2 TIMES DAILY
Qty: 20 TABLET | Refills: 0 | Status: SHIPPED | OUTPATIENT
Start: 2023-12-11 | End: 2023-12-13 | Stop reason: SDUPTHER

## 2023-12-11 NOTE — TELEPHONE ENCOUNTER
Dr. Joseph pt  Please fax amoxicillin-pot clavulanate ([facility]in) 875-[facility]   [phone]

## 2023-12-12 DIAGNOSIS — D69.6 THROMBOCYTOPENIA (CMS-HCC): ICD-10-CM

## 2023-12-12 DIAGNOSIS — N30.00 ACUTE CYSTITIS WITHOUT HEMATURIA: ICD-10-CM

## 2023-12-12 RX ORDER — SULFAMETHOXAZOLE AND TRIMETHOPRIM 800; 160 MG/1; MG/1
1 TABLET ORAL 2 TIMES DAILY
Qty: 20 TABLET | Refills: 0 | Status: CANCELLED | OUTPATIENT
Start: 2023-12-12

## 2023-12-12 RX ORDER — CIPROFLOXACIN 500 MG/1
500 TABLET ORAL 2 TIMES DAILY
Qty: 20 TABLET | Refills: 0 | Status: SHIPPED | OUTPATIENT
Start: 2023-12-12 | End: 2023-12-22

## 2023-12-12 NOTE — TELEPHONE ENCOUNTER
Dr. Joseph Pt    Needs Refill for  amoxicillin-pot clavulanate (Augmentin) 875-125 mg tablet       Send back to   [id number] - [place], [place] - [address] AT [address]         *[facility]

## 2023-12-13 RX ORDER — AMOXICILLIN AND CLAVULANATE POTASSIUM 875; 125 MG/1; MG/1
875 TABLET, FILM COATED ORAL 2 TIMES DAILY
Qty: 20 TABLET | Refills: 0 | Status: SHIPPED | OUTPATIENT
Start: 2023-12-13 | End: 2023-12-23

## 2023-12-14 ENCOUNTER — APPOINTMENT (OUTPATIENT)
Dept: NEPHROLOGY | Facility: CLINIC | Age: 83
End: 2023-12-14
Payer: MEDICARE

## 2023-12-19 ENCOUNTER — TELEPHONE (OUTPATIENT)
Dept: CARDIOLOGY | Facility: CLINIC | Age: 83
End: 2023-12-19

## 2023-12-19 ENCOUNTER — APPOINTMENT (OUTPATIENT)
Dept: UROLOGY | Facility: CLINIC | Age: 83
End: 2023-12-19
Payer: MEDICARE

## 2023-12-19 NOTE — TELEPHONE ENCOUNTER
12/18  Patient called office and left voice mail message stating he was returning a call. He can be reached at [phone]

## 2024-01-03 ENCOUNTER — OFFICE VISIT (OUTPATIENT)
Dept: NEPHROLOGY | Facility: CLINIC | Age: 84
End: 2024-01-03
Payer: MEDICARE

## 2024-01-03 VITALS
WEIGHT: 306 LBS | HEIGHT: 74 IN | DIASTOLIC BLOOD PRESSURE: 82 MMHG | BODY MASS INDEX: 39.27 KG/M2 | HEART RATE: 84 BPM | SYSTOLIC BLOOD PRESSURE: 157 MMHG

## 2024-01-03 DIAGNOSIS — R80.8 OTHER PROTEINURIA: ICD-10-CM

## 2024-01-03 DIAGNOSIS — I10 ESSENTIAL HYPERTENSION: ICD-10-CM

## 2024-01-03 DIAGNOSIS — N18.32 STAGE 3B CHRONIC KIDNEY DISEASE (MULTI): Primary | ICD-10-CM

## 2024-01-03 PROCEDURE — 1036F TOBACCO NON-USER: CPT | Performed by: INTERNAL MEDICINE

## 2024-01-03 PROCEDURE — 1159F MED LIST DOCD IN RCRD: CPT | Performed by: INTERNAL MEDICINE

## 2024-01-03 PROCEDURE — 3077F SYST BP >= 140 MM HG: CPT | Performed by: INTERNAL MEDICINE

## 2024-01-03 PROCEDURE — 3079F DIAST BP 80-89 MM HG: CPT | Performed by: INTERNAL MEDICINE

## 2024-01-03 PROCEDURE — 99214 OFFICE O/P EST MOD 30 MIN: CPT | Performed by: INTERNAL MEDICINE

## 2024-01-03 PROCEDURE — 1125F AMNT PAIN NOTED PAIN PRSNT: CPT | Performed by: INTERNAL MEDICINE

## 2024-01-03 RX ORDER — VALSARTAN 320 MG/1
320 TABLET ORAL DAILY
Qty: 90 TABLET | Refills: 3 | Status: SHIPPED | OUTPATIENT
Start: 2024-01-03 | End: 2025-01-02

## 2024-01-03 NOTE — PROGRESS NOTES
Holden Burgess   83 y.o.    @@  [facility]/Room: [id number]/Room/bed info not found    Subjective:   The patient is being seen for a routine clinic follow-up of chronic kidney disease. Recently, the disease has been stable. Disease complications:  No hyperkalemia, no hypocalcemia, no hyperphosphatemia, no metabolic acidosis, no coagulopathy, no uremic encephalopathy, no neuropathy and no renal osteodystrophy. The patient is currently asymptomatic. No associated symptoms are reported.       Meds:   Current Outpatient Medications   Medication Sig Dispense Refill    acetaminophen (Tylenol) 325 mg tablet Take 2 tablets (650 mg) by mouth every 4 hours if needed for mild pain (1 - 3). 30 tablet 0    allopurinol (Zyloprim) 100 mg tablet Take 1 tablet (100 mg) by mouth 2 times a day. (Patient taking differently: Take 1 tablet (100 mg) by mouth once daily.) 180 tablet 1    ascorbic acid (Vitamin C) 1,000 mg tablet Take 1 tablet (1,000 mg) by mouth once daily.      aspirin 81 mg EC tablet Take 1 tablet (81 mg) by mouth once daily.      atorvastatin (Lipitor) 10 mg tablet Take 1 tablet (10 mg) by mouth once daily at bedtime.      calcitriol (Rocaltrol) 0.25 mcg capsule Take 1 capsule (0.25 mcg) by mouth once daily.      chlorthalidone (Hygroton) 25 mg tablet Take 1 tablet (25 mg) by mouth once daily. 90 tablet 3    cholecalciferol (Vitamin D-3) 125 MCG (5000 UT) capsule Take 1 capsule (125 mcg) by mouth once daily.      cyanocobalamin (Vitamin B-12) 1,000 mcg tablet Take 1 tablet (1,000 mcg) by mouth once daily.      ezetimibe (Zetia) 10 mg tablet Take 1 tablet (10 mg) by mouth once daily. 90 tablet 0    famotidine (PEPCID AC ORAL) Take by mouth.      ferrous sulfate 325 (65 Fe) MG tablet Take 1 tablet by mouth once daily.      folic acid (Folvite) 1 mg tablet Take 1 tablet (1 mg) by mouth once daily. 90 tablet 0    furosemide (Lasix) 20 mg tablet Take 1 tablet (20 mg) by mouth if needed.      latanoprost (Xalatan) 0.005 %  ophthalmic solution Administer 1 drop into affected eye(s) once daily at bedtime.      losartan (Cozaar) 100 mg tablet Take 0.5 tablets (50 mg) by mouth once daily.      magnesium 30 mg tablet Take 550 mg by mouth.      niacin (Niaspan) 500 mg ER tablet Take 1 tablet (500 mg) by mouth.      NON FORMULARY Take 1 each by mouth once daily. Prevagen      rOPINIRole (Requip) 0.25 mg tablet Take 1 tablet (0.25 mg) by mouth once daily at bedtime. 90 tablet 1    temazepam (Restoril) 15 mg capsule Take 1 capsule (15 mg) by mouth as needed at bedtime for sleep. 30 capsule 2    traMADol (Ultram) 50 mg tablet Take 1 tablet (50 mg) by mouth every 6 hours. 30 tablet 1    TURMERIC ORAL Take 1 capsule by mouth once daily.      zinc sulfate 50 mg zinc (220 mg) tablet Take by mouth.       No current facility-administered medications for this visit.          ROS:  The patient is awake and oriented. No dizziness or lightheadedness. No chills and no fever. No headaches. No nausea and no vomiting. No shortness of breath. No cough. No sputum. No chest pain. No chest tightness. No abdominal pain. No diarrhea and no constipation. No hematemesis or hemoptysis. No hematuria. No rectal bleeding. No melena. No epistaxis. No urinary symptoms. No flank pain. No leg edema. No leg pain. No weakness. No itching. Overall, the rest of the review of systems is also negative.  12 point review of systems otherwise negative as stated in HPI.        Physical Examination:        Vitals:    01/03/24 1312   BP: 157/82   Pulse: 84     General: The patient is awake, oriented, and is not in any distress.  Head and Neck: Normocephalic. No periorbital edema.  Eyes: non-icteric  Respiratory: Symmetric air entry. Symmetric chest expansion.No respiratory distress.  Cardiovascular: Symmetric peripheral pulses.  Skin: No maculopapular rash.  Abdomen: soft, nt/nd  Musculoskeletal: No peripheral edema in both left and right upper extremities.  No edema in either left or  right lower extremities.  Neuro Exam: Speech is fluent. Moves extremities.    Imaging:  === 03/08/23 ===    US RIGHT UPPER QUADRANT    - Impression -  Cholelithiasis, however no specific findings to suggest acute  cholecystitis.    Overall, no acute right upper quadrant pathology is demonstrated.    Hepatomegaly and steatosis are suspected.       Blood Labs:  No results found for this or any previous visit (from the past 24 hour(s)).   Lab Results   Component Value Date    PTH 55.2 04/26/2023    PROTUR >=300 (3+) (A) 12/04/2023    PHOS 3.4 04/26/2023      Lab Results   Component Value Date    GLUCOSE 119 (H) 11/21/2023    CALCIUM 9.0 11/21/2023     11/21/2023    K 3.7 11/21/2023    CO2 23 11/21/2023     11/21/2023    BUN 31 (H) 11/21/2023    CREATININE 1.24 11/21/2023         Assessment and Plan:  #1 chronic kidney disease stage III. Creatinine level is 1.24.  Stable kidney function.  Normal potassium and bicarb level.  I asked for PTH, phosphorus, and 25-hydroxy vitamin D level.    #2 hypertension. Blood pressure is high side.  I replaced his losartan with valsartan.    #3 dyslipidemia. He is on a statin.     #4 hyperuricemia. Uric acid level is high. I increased his allopurinol dose.    5.  Proteinuria.  His recent urine test shows proteinuria.  I asked for a spot urine protein to creatinine ratio.  I also asked for urine and serum protein electrophoresis and immunofixation and free light chain assay.     I will see him in about 4 months for follow-up.          Nicola Brooks MD

## 2024-01-04 ENCOUNTER — LAB (OUTPATIENT)
Dept: LAB | Facility: LAB | Age: 84
End: 2024-01-04
Payer: MEDICARE

## 2024-01-04 DIAGNOSIS — N18.32 STAGE 3B CHRONIC KIDNEY DISEASE (MULTI): ICD-10-CM

## 2024-01-04 DIAGNOSIS — I10 ESSENTIAL HYPERTENSION: ICD-10-CM

## 2024-01-04 DIAGNOSIS — R80.8 OTHER PROTEINURIA: ICD-10-CM

## 2024-01-04 DIAGNOSIS — D69.6 THROMBOCYTOPENIA (CMS-HCC): ICD-10-CM

## 2024-01-04 LAB
25(OH)D3 SERPL-MCNC: 45 NG/ML (ref 30–100)
ALBUMIN SERPL BCP-MCNC: 4.2 G/DL (ref 3.4–5)
ALP SERPL-CCNC: 77 U/L (ref 33–136)
ALT SERPL W P-5'-P-CCNC: 30 U/L (ref 10–52)
ANION GAP SERPL CALC-SCNC: 11 MMOL/L (ref 10–20)
AST SERPL W P-5'-P-CCNC: 27 U/L (ref 9–39)
BASOPHILS # BLD AUTO: 0.03 X10*3/UL (ref 0–0.1)
BASOPHILS NFR BLD AUTO: 0.8 %
BILIRUB SERPL-MCNC: 1.1 MG/DL (ref 0–1.2)
BUN SERPL-MCNC: 29 MG/DL (ref 6–23)
CALCIUM SERPL-MCNC: 9.2 MG/DL (ref 8.6–10.3)
CHLORIDE SERPL-SCNC: 105 MMOL/L (ref 98–107)
CO2 SERPL-SCNC: 27 MMOL/L (ref 21–32)
CREAT SERPL-MCNC: 1.44 MG/DL (ref 0.5–1.3)
EOSINOPHIL # BLD AUTO: 0.1 X10*3/UL (ref 0–0.4)
EOSINOPHIL NFR BLD AUTO: 2.7 %
ERYTHROCYTE [DISTWIDTH] IN BLOOD BY AUTOMATED COUNT: 18.1 % (ref 11.5–14.5)
GFR SERPL CREATININE-BSD FRML MDRD: 48 ML/MIN/1.73M*2
GLUCOSE SERPL-MCNC: 116 MG/DL (ref 74–99)
HCT VFR BLD AUTO: 40.5 % (ref 41–52)
HGB BLD-MCNC: 13.1 G/DL (ref 13.5–17.5)
IMM GRANULOCYTES # BLD AUTO: 0.04 X10*3/UL (ref 0–0.5)
IMM GRANULOCYTES NFR BLD AUTO: 1.1 % (ref 0–0.9)
LYMPHOCYTES # BLD AUTO: 1.27 X10*3/UL (ref 0.8–3)
LYMPHOCYTES NFR BLD AUTO: 33.9 %
MCH RBC QN AUTO: 30.5 PG (ref 26–34)
MCHC RBC AUTO-ENTMCNC: 32.3 G/DL (ref 32–36)
MCV RBC AUTO: 94 FL (ref 80–100)
MONOCYTES # BLD AUTO: 0.25 X10*3/UL (ref 0.05–0.8)
MONOCYTES NFR BLD AUTO: 6.7 %
NEUTROPHILS # BLD AUTO: 2.06 X10*3/UL (ref 1.6–5.5)
NEUTROPHILS NFR BLD AUTO: 54.8 %
NRBC BLD-RTO: 0 /100 WBCS (ref 0–0)
PLATELET # BLD AUTO: 53 X10*3/UL (ref 150–450)
POTASSIUM SERPL-SCNC: 4.1 MMOL/L (ref 3.5–5.3)
PROT SERPL-MCNC: 6.2 G/DL (ref 6.4–8.2)
PROT SERPL-MCNC: 6.3 G/DL (ref 6.4–8.2)
PTH-INTACT SERPL-MCNC: 75.1 PG/ML (ref 18.5–88)
RBC # BLD AUTO: 4.29 X10*6/UL (ref 4.5–5.9)
SODIUM SERPL-SCNC: 139 MMOL/L (ref 136–145)
WBC # BLD AUTO: 3.8 X10*3/UL (ref 4.4–11.3)

## 2024-01-04 PROCEDURE — 85025 COMPLETE CBC W/AUTO DIFF WBC: CPT

## 2024-01-04 PROCEDURE — 82306 VITAMIN D 25 HYDROXY: CPT

## 2024-01-04 PROCEDURE — 84165 PROTEIN E-PHORESIS SERUM: CPT | Performed by: INTERNAL MEDICINE

## 2024-01-04 PROCEDURE — 80053 COMPREHEN METABOLIC PANEL: CPT

## 2024-01-04 PROCEDURE — 83970 ASSAY OF PARATHORMONE: CPT

## 2024-01-04 PROCEDURE — 84155 ASSAY OF PROTEIN SERUM: CPT

## 2024-01-04 PROCEDURE — 36415 COLL VENOUS BLD VENIPUNCTURE: CPT

## 2024-01-04 PROCEDURE — 83521 IG LIGHT CHAINS FREE EACH: CPT

## 2024-01-04 PROCEDURE — 86334 IMMUNOFIX E-PHORESIS SERUM: CPT

## 2024-01-04 PROCEDURE — 86320 SERUM IMMUNOELECTROPHORESIS: CPT | Performed by: INTERNAL MEDICINE

## 2024-01-04 PROCEDURE — 84165 PROTEIN E-PHORESIS SERUM: CPT

## 2024-01-05 ENCOUNTER — LAB (OUTPATIENT)
Dept: LAB | Facility: LAB | Age: 84
End: 2024-01-05
Payer: MEDICARE

## 2024-01-05 DIAGNOSIS — I10 ESSENTIAL HYPERTENSION: ICD-10-CM

## 2024-01-05 DIAGNOSIS — N18.32 STAGE 3B CHRONIC KIDNEY DISEASE (MULTI): ICD-10-CM

## 2024-01-05 DIAGNOSIS — R80.8 OTHER PROTEINURIA: ICD-10-CM

## 2024-01-05 LAB
CREAT UR-MCNC: 88 MG/DL (ref 20–370)
KAPPA LC SERPL-MCNC: 2.81 MG/DL (ref 0.33–1.94)
KAPPA LC/LAMBDA SER: 1.48 {RATIO} (ref 0.26–1.65)
LAMBDA LC SERPL-MCNC: 1.9 MG/DL (ref 0.57–2.63)
PROT UR-ACNC: 8 MG/DL (ref 5–25)
PROT UR-ACNC: 8 MG/DL (ref 5–25)
PROT/CREAT UR: 0.09 MG/MG CREAT (ref 0–0.17)

## 2024-01-05 PROCEDURE — 84156 ASSAY OF PROTEIN URINE: CPT

## 2024-01-05 PROCEDURE — 84166 PROTEIN E-PHORESIS/URINE/CSF: CPT

## 2024-01-05 PROCEDURE — 86335 IMMUNFIX E-PHORSIS/URINE/CSF: CPT

## 2024-01-05 PROCEDURE — 82570 ASSAY OF URINE CREATININE: CPT

## 2024-01-05 PROCEDURE — 86325 OTHER IMMUNOELECTROPHORESIS: CPT | Performed by: INTERNAL MEDICINE

## 2024-01-05 PROCEDURE — 84166 PROTEIN E-PHORESIS/URINE/CSF: CPT | Performed by: INTERNAL MEDICINE

## 2024-01-09 LAB
ALBUMIN: 3.9 G/DL (ref 3.4–5)
ALPHA 1 GLOBULIN: 0.3 G/DL (ref 0.2–0.6)
ALPHA 2 GLOBULIN: 0.6 G/DL (ref 0.4–1.1)
BETA GLOBULIN: 0.7 G/DL (ref 0.5–1.2)
GAMMA GLOBULIN: 0.7 G/DL (ref 0.5–1.4)
IMMUNOFIXATION COMMENT: NORMAL
PATH REVIEW - SERUM IMMUNOFIXATION: NORMAL
PATH REVIEW-SERUM PROTEIN ELECTROPHORESIS: NORMAL
PROTEIN ELECTROPHORESIS COMMENT: NORMAL

## 2024-01-10 ENCOUNTER — HOSPITAL ENCOUNTER (OUTPATIENT)
Dept: CARDIOLOGY | Facility: HOSPITAL | Age: 84
Discharge: HOME | End: 2024-01-10
Payer: MEDICARE

## 2024-01-10 DIAGNOSIS — I49.5 SINUS NODE DYSFUNCTION (MULTI): ICD-10-CM

## 2024-01-10 DIAGNOSIS — Z95.0 PACEMAKER: ICD-10-CM

## 2024-01-10 LAB
ALBUMIN MFR UR ELPH: 54 %
ALPHA1 GLOB MFR UR ELPH: 7.9 %
ALPHA2 GLOB MFR UR ELPH: 11.3 %
B-GLOBULIN MFR UR ELPH: 15.7 %
GAMMA GLOB MFR UR ELPH: 11.1 %
IMMUNOFIXATION COMMENT: NORMAL
PATH REVIEW - URINE IMMUNOFIXATION: NORMAL
PATH REVIEW-URINE PROTEIN ELECTROPHORESIS: NORMAL
URINE ELECTROPHORESIS COMMENT: NORMAL

## 2024-01-15 ENCOUNTER — TREATMENT (OUTPATIENT)
Dept: PHYSICAL THERAPY | Facility: CLINIC | Age: 84
End: 2024-01-15
Payer: MEDICARE

## 2024-01-15 DIAGNOSIS — N39.3 SUI (STRESS URINARY INCONTINENCE), MALE: ICD-10-CM

## 2024-01-15 DIAGNOSIS — M62.89 PFD (PELVIC FLOOR DYSFUNCTION): Primary | ICD-10-CM

## 2024-01-15 PROCEDURE — 97110 THERAPEUTIC EXERCISES: CPT | Mod: GP | Performed by: PHYSICAL THERAPIST

## 2024-01-15 PROCEDURE — 97162 PT EVAL MOD COMPLEX 30 MIN: CPT | Mod: GP | Performed by: PHYSICAL THERAPIST

## 2024-01-15 ASSESSMENT — PAIN - FUNCTIONAL ASSESSMENT: PAIN_FUNCTIONAL_ASSESSMENT: 0-10

## 2024-01-15 ASSESSMENT — PAIN SCALES - GENERAL: PAINLEVEL_OUTOF10: 5 - MODERATE PAIN

## 2024-01-15 NOTE — LETTER
January 18, 2024    Sally Duarte PT  [address] [place] [address]   Rehab Services, [facility] [address]  [facility] [address]    Patient: GENARO Burgess   YOB: 1940   Date of Visit: 1/15/2024       Dear Robert Cardoso Md  Office Address Unavailable  Office Address Unavailable  As Of 7/1/2021    The attached plan of care is being sent to you because your patient’s medical reimbursement requires that you certify the plan of care. Your signature is required to allow uninterrupted insurance coverage.      You may indicate your approval by signing below and faxing this form back to us at Dept Fax: [phone].    Please call Dept: [phone] with any questions or concerns.    Thank you for this referral,        Sally Duarte PT  [address]  [address] [facility] [address] [place] [address]    Payer: Payor: MEDICARE / Plan: MEDICARE PART A AND B / Product Type: *No Product type* /                                                                         Date:     Dear Sally Duarte PT,     Re: Mr. Holden Burgess, MRN:[id number]    I certify that I have reviewed the attached plan of care and it is medically necessary for Mr. Holden Burgess (1940) who is under my care.          ______________________________________                    _________________  Provider name and credentials                                           Date and time                                                                                           Plan of Care 1/15/24   Effective from: 1/15/2024  Effective to: 4/8/2024    Plan ID: [id number]            Participants as of Finalize on 1/18/2024    Name Type Comments Contact Info    Robert Cardoso MD Referring Provider      Sally Duarte PT Physical Therapist  [phone]       Last Plan Note     Author: Sally Duarte PT Status: Sign when Signing Visit Last edited: 1/15/2024  4:00 PM       Physical Therapy    Physical Therapy Evaluation and Treatment     "  Patient Name: Holden Burgess \"GENARO\"  MRN: [id number]  Today's Date: 1/15/2024  Time Calculation  Start Time: 0415  Stop Time: 0505  Time Calculation (min): 50 min  Visit 1/10 per poc.    90 Days= 4-8-24    Assessment:  Patient presents with  chronic PFD s/p radiation seeds for prostate cancer.    Problems include-  Poor posture, Poor flexibility, weakness, decrease knowledge of condition, decrease knowledge of HEP.    Response to treatment-  Improved knowledge and understanding.        Goals:  1.Patient will begin to feel the need to void 50%-75% of the time.    2.Patient will report decreased leaking noted by  1-2 pads/day, and 50% less saturated at night.      3.Patient will evacuate bowels daily.    4.Patient will demonstrate good postural awareness and alignment for toileting and strengthening.  5.Increase LE hamstring/adductor/hip flexor flexibility to moderately impraired.    6.Patient will demonstrate good isolated PF/TA contractions.  7.Patient will be knowledgeable of healthy bowel/bladder habits.    8.NIH-CPSI =  8 or less   .  9.Patient will be independent with a home exercise program.     Plan:  OP PT Plan  Treatment/Interventions: Biofeedback, Education/ Instruction, Manual therapy, Neuromuscular re-education, Therapeutic exercises  PT Plan: Skilled PT  PT Frequency: 1 time per week  Duration: 10 weeks, 10 visits  Certification Period Start Date: 01/15/24  Certification Period End Date: 04/08/24  Rehab Potential: Good  Plan of Care Agreement: Patient    Current Problem:   1. PFD (pelvic floor dysfunction)  Follow Up In Physical Therapy      2. ELIZABETH (stress urinary incontinence), male  Referral to Physical Therapy    Follow Up In Physical Therapy          Subjective    COMPLAINT/REPORT:  Patient reports loss of urine continence after seeds implanted.  He is here to prepare for upcoming surgery for continence.                            Bowel-  Evacuates daily to every other day.  Occasionally strains.  " Fluids- 1 cup of coffee.  1 1/2 to 4 glasses of water.      Bladder-  Does not feel the need to void.  Goes q 2 hours.  4 Pads/2 Depends during the day.  1 Depend at night.  Wakes with it full.      [place]-  n/a.                          Precautions/PMH:  Prostate Cancer (Radiation Seeds).  HTN.  Pacemaker.  Lumbar Fusion.  Stage 3 Kidney Disease.  Bilateral TKR.  Right shoulder surgery pending.  Sleep Apnea (C-Pap).    Precautions  Precautions Comment: No recent falls.  Uses cane prn.     Pain:  Pain Assessment  Pain Assessment: 0-10  Pain Score: 5 - Moderate pain  Pain Location: Back      Outcome Measures:  Other Measures  Other Outcome Measures: NIH-CPSI = 17.     Objective   INSPECTION:  Posture- Trunk flexed/SB-Rot Right.  Flat lumbar spine.  Right hip low.                         Gait/Stairs-  Short steps.                            ROM/FLEXIBILITY:  Hip Flexor contractures  noted by inability to get flat in supine.  LLE-  Poor hip rotation/hamstrings/adductors.  RLE-  Poor hamstrings/adductors.      STRENGTH:  MMT-  seated LE's 4-5/5.                         Isolated Contractions-  Trace.                       Resting Tone-  n/a.      PALPATION:  n/a.    OTHER/SPECIAL:  Q#- n/a.     Treatments:  TherX-  Education initiated regarding healthy bowel and bladder habits.  Including frequency, water intake and toileting posture.  Patient to continue with timed voids 1x q 2 hours.  Practiced upright sitting posture.      Education initiated on the core/PF function.  Global vs. Local muscles.  Fast vs. Slow contractions.  Instructed in and performed isolated PF and TA contractions.  Patient to perform 5 fast kegels, 10 slow kegels with TA, 5 fast kegels 5x/day.                                             Current Participants as of 1/18/2024    Name Type Comments Contact Info    Robert Cardoso MD Referring Provider      Signature pending    Sally Duarte PT Physical Therapist  [phone]    Signature  pending

## 2024-01-16 ENCOUNTER — LAB (OUTPATIENT)
Dept: LAB | Facility: CLINIC | Age: 84
End: 2024-01-16
Payer: MEDICARE

## 2024-01-16 ENCOUNTER — OFFICE VISIT (OUTPATIENT)
Dept: HEMATOLOGY/ONCOLOGY | Facility: CLINIC | Age: 84
End: 2024-01-16
Payer: MEDICARE

## 2024-01-16 VITALS
TEMPERATURE: 97.7 F | WEIGHT: 304.24 LBS | DIASTOLIC BLOOD PRESSURE: 77 MMHG | BODY MASS INDEX: 41.21 KG/M2 | RESPIRATION RATE: 16 BRPM | HEIGHT: 72 IN | HEART RATE: 87 BPM | OXYGEN SATURATION: 96 % | SYSTOLIC BLOOD PRESSURE: 139 MMHG

## 2024-01-16 DIAGNOSIS — D69.6 THROMBOCYTOPENIA (CMS-HCC): ICD-10-CM

## 2024-01-16 LAB
BASOPHILS # BLD AUTO: 0.02 X10*3/UL (ref 0–0.1)
BASOPHILS NFR BLD AUTO: 0.5 %
EOSINOPHIL # BLD AUTO: 0.12 X10*3/UL (ref 0–0.4)
EOSINOPHIL NFR BLD AUTO: 3 %
ERYTHROCYTE [DISTWIDTH] IN BLOOD BY AUTOMATED COUNT: 18 % (ref 11.5–14.5)
FERRITIN SERPL-MCNC: 355 NG/ML (ref 20–300)
FOLATE SERPL-MCNC: >24 NG/ML
HCT VFR BLD AUTO: 40.9 % (ref 41–52)
HGB BLD-MCNC: 13.3 G/DL (ref 13.5–17.5)
IMM GRANULOCYTES # BLD AUTO: 0.04 X10*3/UL (ref 0–0.5)
IMM GRANULOCYTES NFR BLD AUTO: 1 % (ref 0–0.9)
IRON SATN MFR SERPL: 26 % (ref 25–45)
IRON SERPL-MCNC: 75 UG/DL (ref 35–150)
LYMPHOCYTES # BLD AUTO: 1.13 X10*3/UL (ref 0.8–3)
LYMPHOCYTES NFR BLD AUTO: 28.3 %
MCH RBC QN AUTO: 31.1 PG (ref 26–34)
MCHC RBC AUTO-ENTMCNC: 32.5 G/DL (ref 32–36)
MCV RBC AUTO: 96 FL (ref 80–100)
MONOCYTES # BLD AUTO: 0.3 X10*3/UL (ref 0.05–0.8)
MONOCYTES NFR BLD AUTO: 7.5 %
NEUTROPHILS # BLD AUTO: 2.38 X10*3/UL (ref 1.6–5.5)
NEUTROPHILS NFR BLD AUTO: 59.7 %
NRBC BLD-RTO: ABNORMAL /100{WBCS}
OVALOCYTES BLD QL SMEAR: NORMAL
PLATELET # BLD AUTO: 60 X10*3/UL (ref 150–450)
POLYCHROMASIA BLD QL SMEAR: NORMAL
RBC # BLD AUTO: 4.27 X10*6/UL (ref 4.5–5.9)
RBC MORPH BLD: NORMAL
RHEUMATOID FACT SER NEPH-ACNC: <10 IU/ML (ref 0–15)
TIBC SERPL-MCNC: 284 UG/DL (ref 240–445)
UIBC SERPL-MCNC: 209 UG/DL (ref 110–370)
VIT B12 SERPL-MCNC: 547 PG/ML (ref 211–911)
WBC # BLD AUTO: 4 X10*3/UL (ref 4.4–11.3)

## 2024-01-16 PROCEDURE — 82746 ASSAY OF FOLIC ACID SERUM: CPT | Performed by: PHYSICIAN ASSISTANT

## 2024-01-16 PROCEDURE — 99215 OFFICE O/P EST HI 40 MIN: CPT | Performed by: PHYSICIAN ASSISTANT

## 2024-01-16 PROCEDURE — 86038 ANTINUCLEAR ANTIBODIES: CPT | Performed by: PHYSICIAN ASSISTANT

## 2024-01-16 PROCEDURE — 1159F MED LIST DOCD IN RCRD: CPT | Performed by: PHYSICIAN ASSISTANT

## 2024-01-16 PROCEDURE — 1036F TOBACCO NON-USER: CPT | Performed by: PHYSICIAN ASSISTANT

## 2024-01-16 PROCEDURE — 86431 RHEUMATOID FACTOR QUANT: CPT | Performed by: PHYSICIAN ASSISTANT

## 2024-01-16 PROCEDURE — 83540 ASSAY OF IRON: CPT | Performed by: PHYSICIAN ASSISTANT

## 2024-01-16 PROCEDURE — 3078F DIAST BP <80 MM HG: CPT | Performed by: PHYSICIAN ASSISTANT

## 2024-01-16 PROCEDURE — 82728 ASSAY OF FERRITIN: CPT | Performed by: PHYSICIAN ASSISTANT

## 2024-01-16 PROCEDURE — 1160F RVW MEDS BY RX/DR IN RCRD: CPT | Performed by: PHYSICIAN ASSISTANT

## 2024-01-16 PROCEDURE — 36415 COLL VENOUS BLD VENIPUNCTURE: CPT

## 2024-01-16 PROCEDURE — 3075F SYST BP GE 130 - 139MM HG: CPT | Performed by: PHYSICIAN ASSISTANT

## 2024-01-16 PROCEDURE — 99205 OFFICE O/P NEW HI 60 MIN: CPT | Performed by: PHYSICIAN ASSISTANT

## 2024-01-16 PROCEDURE — 85025 COMPLETE CBC W/AUTO DIFF WBC: CPT

## 2024-01-16 PROCEDURE — 1126F AMNT PAIN NOTED NONE PRSNT: CPT | Performed by: PHYSICIAN ASSISTANT

## 2024-01-16 PROCEDURE — 82607 VITAMIN B-12: CPT | Performed by: PHYSICIAN ASSISTANT

## 2024-01-16 ASSESSMENT — ENCOUNTER SYMPTOMS
ABDOMINAL DISTENTION: 0
EYE PROBLEMS: 0
HEADACHES: 0
CHILLS: 0
NECK PAIN: 0
NAUSEA: 0
WOUND: 0
FREQUENCY: 0
DIZZINESS: 0
VOMITING: 0
CONFUSION: 0
CARDIOVASCULAR NEGATIVE: 1
DIFFICULTY URINATING: 0
NUMBNESS: 0
SPEECH DIFFICULTY: 0
DECREASED CONCENTRATION: 0
CONSTIPATION: 0
HEMATURIA: 0
DIAPHORESIS: 0
APPETITE CHANGE: 0
NECK STIFFNESS: 0
ABDOMINAL PAIN: 0
FATIGUE: 1
SCLERAL ICTERUS: 0
DIARRHEA: 0
RESPIRATORY NEGATIVE: 1
HEMATOLOGIC/LYMPHATIC NEGATIVE: 1

## 2024-01-16 ASSESSMENT — COLUMBIA-SUICIDE SEVERITY RATING SCALE - C-SSRS
2. HAVE YOU ACTUALLY HAD ANY THOUGHTS OF KILLING YOURSELF?: NO
1. IN THE PAST MONTH, HAVE YOU WISHED YOU WERE DEAD OR WISHED YOU COULD GO TO SLEEP AND NOT WAKE UP?: NO
6. HAVE YOU EVER DONE ANYTHING, STARTED TO DO ANYTHING, OR PREPARED TO DO ANYTHING TO END YOUR LIFE?: NO

## 2024-01-16 ASSESSMENT — PAIN SCALES - GENERAL: PAINLEVEL: 0-NO PAIN

## 2024-01-16 NOTE — PROGRESS NOTES
"Patient ID: Holden Burgess \"PAT\" is a 83 y.o. male.  Primary care physician:     Interval History:   Mr. Burgess is a 82 y/o male with PMH of sleep apnea, paroxysmal A-fib s/p radiofrequency ablation (3/22/18), CKD, CAD s/p percutaneous coronary angioplasty, hypertension,  depression/anxiety, hyperlipidemia, osteoarthritis, prostate cancer s/p radiation(2001), urinary incontinence, anemia. He is referred to [facility] today for the evaluation of thrombocytopenia. Of note, patient has low platelets counts for several years. However, he states that he was not aware of his low counts until he went to have pace marker placed in Nov, 2023. Patient states that he received one unit platelets transfusion prior to the procedure. Patient states that he noticed blood in urine the first few weeks of receiving hyperbaric treatment for his bladder issue, which has resolved at this time. States that he has been on oral iron tablets since he had ablation done. Admits history of alcohol drinking. Stopped on 12/31/2000. Admits history of smoking, 3-4 pack per day, but stopped in June 1968. Patient states that his brother in [place] was diagnosed with MDS 2 years ago    Selected labs prior to the initial consult:   7/5/19: platelets 103,000  7/2/20: platelets 68,000  9/28/20: Hgb 14.5, MCV 95, platelets 97,000  7/14/21: WBC 3.5, ANC 1.83, Hgb 13.6, , platelets 71,000  10/1/21: WBC 3.6, ANC 1.76, Hgb 14.0, MCV 97, platelets 58,000  2/7/22: ESR 9  7/27/22: WBC 4.1, Hgb 12.9, MCV 99, platelets 66,000, creatinine 1.35  6/19/23: Hgb 12.7, , platelets 63,000  8/5/23: Hgb 10.7, MCV 99, platelets 47,000  11/13/23: WBC 4.2, Hgb 12.1, , platelets 55,000, creatinine 1.48  11/21/23: WBC 2.9, Hgb 11.5, MCV 95, platelets 50,000  1/4/24: WBC 3.8, ANC 2.06, Hgb 13.1, MCV 94, platelets 53,000, creatinine 1.44  1/4/24: SPEP with IF showed no monoclonal proteins detected, FLC ratio 1.48    4/29/23: EGD and colonoscopy with biopsy  A. " Stomach, antrum, biopsy:  - Gastric antral mucosa with no diagnostic abnormalities.  - No evidence of H. pylori microorganisms on H&E sections.     B. Colon, transverse, polyp, biopsy:  - Tubular adenoma.     C. Colon, descending, polyp, biopsy:  - Tubular adenoma.    5/3/23: CT abdomen/pelvis with IV contrast  LIVER: Normal. No enlargement or evidence of cirrhosis or fatty change. No mass or other suspect lesion.     SPLEEN: Normal. No enlargement, mass or evidence of splenic vein thrombosis.      Subjective      HPI  -Complains of fatigue.   -Denies any fever, drenching night sweats or unintentional weight loss.   -Denies feeling any lumps or bumps.   -Denies any chest pain, SOB, abdominal pain.   -Denies any hematochezia. Complains of dark stools.   -Currently on oral iron tablets.   -Denies any nose or gum bleeding.   -Complains of blood in urine the first few weeks of receiving hyperbaric treatment, which has resolved.       Social History     Tobacco Use    Smoking status: Former     Types: Cigarettes     Quit date: 1978     Years since quittin.4     Passive exposure: Never    Smokeless tobacco: Never   Substance Use Topics    Alcohol use: Never    Drug use: Never        Objective    BMI:   Body mass index is 41.57 kg/m².     Vitals:   Visit Vitals  Smoking Status Former     Vitals:    24 1009   BP: 139/77   Pulse: 87   Resp: 16   Temp: 36.5 °C (97.7 °F)   SpO2: 96%        Review of Systems   Constitutional:  Positive for fatigue. Negative for appetite change, chills and diaphoresis.   HENT:   Negative for lump/mass, mouth sores and nosebleeds.    Eyes:  Negative for eye problems and icterus.   Respiratory: Negative.     Cardiovascular: Negative.    Gastrointestinal:  Negative for abdominal distention, abdominal pain, constipation, diarrhea, nausea and vomiting.   Genitourinary:  Negative for bladder incontinence, difficulty urinating, frequency and hematuria.    Musculoskeletal:  Negative for  "gait problem, neck pain and neck stiffness.   Skin:  Negative for itching, rash and wound.   Neurological:  Negative for dizziness, gait problem, headaches, numbness and speech difficulty.   Hematological: Negative.    Psychiatric/Behavioral:  Negative for confusion and decreased concentration.         Physical Exam  HENT:      Head: Normocephalic.      Nose: Nose normal.      Mouth/Throat:      Mouth: Mucous membranes are moist.   Eyes:      Pupils: Pupils are equal, round, and reactive to light.   Cardiovascular:      Rate and Rhythm: Normal rate and regular rhythm.      Pulses: Normal pulses.      Heart sounds: Normal heart sounds.   Pulmonary:      Effort: Pulmonary effort is normal.      Breath sounds: Normal breath sounds.   Abdominal:      General: Bowel sounds are normal.      Palpations: Abdomen is soft.   Musculoskeletal:         General: Normal range of motion.   Skin:     General: Skin is warm and dry.   Neurological:      General: No focal deficit present.      Mental Status: He is alert and oriented to person, place, and time.   Psychiatric:         Mood and Affect: Mood normal.         Behavior: Behavior normal.       Labs:  Lab Results   Component Value Date    WBC 3.8 (L) 01/04/2024    NEUTROABS 2.06 01/04/2024    IGABSOL 0.04 01/04/2024    LYMPHSABS 1.27 01/04/2024    MONOSABS 0.25 01/04/2024    EOSABS 0.10 01/04/2024    BASOSABS 0.03 01/04/2024    RBC 4.29 (L) 01/04/2024    MCV 94 01/04/2024    MCHC 32.3 01/04/2024    HGB 13.1 (L) 01/04/2024    HCT 40.5 (L) 01/04/2024    PLT 53 (L) 01/04/2024     No results found for: \"RETICCTPCT\"   Lab Results   Component Value Date    CREATININE 1.44 (H) 01/04/2024    BUN 29 (H) 01/04/2024    EGFR 48 (L) 01/04/2024     01/04/2024    K 4.1 01/04/2024     01/04/2024    CO2 27 01/04/2024      Lab Results   Component Value Date    ALT 30 01/04/2024    AST 27 01/04/2024    ALKPHOS 77 01/04/2024    BILITOT 1.1 01/04/2024      Lab Results   Component Value " Date    TSH 2.65 07/27/2022     Lab Results   Component Value Date    TSH 2.65 07/27/2022    R0BWGQB 7.4 12/17/2020       Lab Results   Component Value Date    SEDRATE 9 02/07/2022        Lab Results   Component Value Date    SPEP Normal. 01/04/2024          Performance Status:  Symptomatic; fully ambulatory    Assessment/Plan      Thrombocytopenia  -thrombocytopenia for several years  -The most recent labs on 1/4/24: Hgb 13.1, MCV 94, platelets 53,000  -Patient states that he received one unit platelets transfusion on 11/21/23 prior to the placement of his pacemaker.   -Discussed causes of thrombocytopenia with patient: pseudothrombocytopenia, ITP, drug induced, infection (e.g. HIV, hepatitis C, mono), hypersplenism due to chronic liver disease, alcohol, nutrient deficiencies (e.g. vitamin B12, folate), autoimmune disorder (SLE, RA), MDS, cancer with bone marrow infiltration, hereditary    -Admits history of alcohol drinking. Stopped on 12/31/2000.   -5/3/23: CT abdomen/pelvis with IV contrast did not show hepatosplenomegaly   -Today, we will do CBC, vitamin B12, folate, RF and ALFREDO  -Considering his age, history of anemia and intermittent leukopenia (though differentials were normal), we will also schedule patient for bone marrow biopsy.   -I will see him back in 2 weeks after the bone marrow biopsy to discuss results and further plan.     2. Anemia  -history of anemia  -Currently, he is on oral iron tablets once per day.   -Complains of dark stool.  -EGD and colonoscopy done on 4/29/23 were unremarkable.   -patient has CKD, which can also contribute to anemia  -Besides CBC, vitamin B12, folate, we will also check iron panel and ferritin today.     3. Intermittent leukopenia with normal differentials    4. History of prostate cancer (2000)    5. sleep apnea    6. paroxysmal A-fib s/p radiofrequency ablation (3/22/18)    7. CKD  -currently on calcitriol    8. CAD s/p percutaneous coronary angioplasty  -currently on  atorvastatin, aspirin     9. Hypertension  -currently on chlorthalidone. Furosemide, valsartan     10. depression/anxiety    11. Hyperlipidemia  -currently on ezetimibe, niacin     12. urinary incontinence    Problem List Items Addressed This Visit    None  Visit Diagnoses         Codes    Thrombocytopenia (CMS/HCC)     D69.6    Relevant Orders    CBC and Auto Differential (Completed)    Iron and TIBC    Ferritin    Vitamin B12    Folate    ALFREDO with Reflex to DALE    Rheumatoid Factor    Bone Marrow Evaluation    Biopsy bone marrow    Clinic Appointment Request Follow Up; FLACO HERNANDEZ; [facility]                 Flaco Hernandez PA-C

## 2024-01-16 NOTE — PROGRESS NOTES
"Physical Therapy    Physical Therapy Evaluation and Treatment      Patient Name: Holden Burgess \"PAT\"  MRN: [id number]  Today's Date: 1/15/2024  Time Calculation  Start Time: 0415  Stop Time: 0505  Time Calculation (min): 50 min  Visit 1/10 per poc.    90 Days= 4-8-24    Assessment:  Patient presents with  chronic PFD s/p radiation seeds for prostate cancer.    Problems include-  Poor posture, Poor flexibility, weakness, decrease knowledge of condition, decrease knowledge of HEP.    Response to treatment-  Improved knowledge and understanding.        Goals:  1.Patient will begin to feel the need to void 50%-75% of the time.    2.Patient will report decreased leaking noted by  1-2 pads/day, and 50% less saturated at night.      3.Patient will evacuate bowels daily.    4.Patient will demonstrate good postural awareness and alignment for toileting and strengthening.  5.Increase LE hamstring/adductor/hip flexor flexibility to moderately impraired.    6.Patient will demonstrate good isolated PF/TA contractions.  7.Patient will be knowledgeable of healthy bowel/bladder habits.    8.NIH-CPSI =  8 or less   .  9.Patient will be independent with a home exercise program.     Plan:  OP PT Plan  Treatment/Interventions: Biofeedback, Education/ Instruction, Manual therapy, Neuromuscular re-education, Therapeutic exercises  PT Plan: Skilled PT  PT Frequency: 1 time per week  Duration: 10 weeks, 10 visits  Certification Period Start Date: 01/15/24  Certification Period End Date: 04/08/24  Rehab Potential: Good  Plan of Care Agreement: Patient    Current Problem:   1. PFD (pelvic floor dysfunction)  Follow Up In Physical Therapy      2. ELIZABETH (stress urinary incontinence), male  Referral to Physical Therapy    Follow Up In Physical Therapy          Subjective    COMPLAINT/REPORT:  Patient reports loss of urine continence after seeds implanted.  He is here to prepare for upcoming surgery for continence.                          "   Bowel-  Evacuates daily to every other day.  Occasionally strains.  Fluids- 1 cup of coffee.  1 1/2 to 4 glasses of water.      Bladder-  Does not feel the need to void.  Goes q 2 hours.  4 Pads/2 Depends during the day.  1 Depend at night.  Wakes with it full.      [place]-  n/a.                          Precautions/PMH:  Prostate Cancer (Radiation Seeds).  HTN.  Pacemaker.  Lumbar Fusion.  Stage 3 Kidney Disease.  Bilateral TKR.  Right shoulder surgery pending.  Sleep Apnea (C-Pap).    Precautions  Precautions Comment: No recent falls.  Uses cane prn.     Pain:  Pain Assessment  Pain Assessment: 0-10  Pain Score: 5 - Moderate pain  Pain Location: Back      Outcome Measures:  Other Measures  Other Outcome Measures: NIH-CPSI = 17.     Objective   INSPECTION:  Posture- Trunk flexed/SB-Rot Right.  Flat lumbar spine.  Right hip low.                         Gait/Stairs-  Short steps.                            ROM/FLEXIBILITY:  Hip Flexor contractures  noted by inability to get flat in supine.  LLE-  Poor hip rotation/hamstrings/adductors.  RLE-  Poor hamstrings/adductors.      STRENGTH:  MMT-  seated LE's 4-5/5.                         Isolated Contractions-  Trace.                       Resting Tone-  n/a.      PALPATION:  n/a.    OTHER/SPECIAL:  Q#- n/a.     Treatments:  TherX-  Education initiated regarding healthy bowel and bladder habits.  Including frequency, water intake and toileting posture.  Patient to continue with timed voids 1x q 2 hours.  Practiced upright sitting posture.      Education initiated on the core/PF function.  Global vs. Local muscles.  Fast vs. Slow contractions.  Instructed in and performed isolated PF and TA contractions.  Patient to perform 5 fast kegels, 10 slow kegels with TA, 5 fast kegels 5x/day.

## 2024-01-16 NOTE — PATIENT INSTRUCTIONS
You had some blood drawn today.     We will schedule you for bone marrow biopsy.     We will see you back in 2 weeks after you have bone marrow biopsy done

## 2024-01-17 LAB — ANA SER QL HEP2 SUBST: NEGATIVE

## 2024-01-18 ENCOUNTER — TELEPHONE (OUTPATIENT)
Dept: HEMATOLOGY/ONCOLOGY | Facility: CLINIC | Age: 84
End: 2024-01-18
Payer: MEDICARE

## 2024-01-18 NOTE — TELEPHONE ENCOUNTER
Spoke with the patient's wife and provided the update from Caren eubanks. She will update patient and had no further questions or concerns at this time.

## 2024-01-22 DIAGNOSIS — F51.01 PRIMARY INSOMNIA: ICD-10-CM

## 2024-01-23 ENCOUNTER — APPOINTMENT (OUTPATIENT)
Dept: PHYSICAL THERAPY | Facility: CLINIC | Age: 84
End: 2024-01-23
Payer: MEDICARE

## 2024-01-23 ENCOUNTER — PROCEDURE VISIT (OUTPATIENT)
Dept: HEMATOLOGY/ONCOLOGY | Facility: CLINIC | Age: 84
End: 2024-01-23
Payer: MEDICARE

## 2024-01-23 ENCOUNTER — LAB (OUTPATIENT)
Dept: LAB | Facility: CLINIC | Age: 84
End: 2024-01-23
Payer: MEDICARE

## 2024-01-23 VITALS
RESPIRATION RATE: 16 BRPM | OXYGEN SATURATION: 100 % | SYSTOLIC BLOOD PRESSURE: 153 MMHG | BODY MASS INDEX: 41.15 KG/M2 | WEIGHT: 301.15 LBS | DIASTOLIC BLOOD PRESSURE: 80 MMHG | HEART RATE: 83 BPM | TEMPERATURE: 97.3 F

## 2024-01-23 DIAGNOSIS — D69.6 THROMBOCYTOPENIA (CMS-HCC): ICD-10-CM

## 2024-01-23 DIAGNOSIS — D64.9 ANEMIA: Primary | ICD-10-CM

## 2024-01-23 LAB
BASOPHILS # BLD AUTO: 0.01 X10*3/UL (ref 0–0.1)
BASOPHILS NFR BLD AUTO: 0.3 %
EOSINOPHIL # BLD AUTO: 0.1 X10*3/UL (ref 0–0.4)
EOSINOPHIL NFR BLD AUTO: 3.1 %
ERYTHROCYTE [DISTWIDTH] IN BLOOD BY AUTOMATED COUNT: 17.5 % (ref 11.5–14.5)
HCT VFR BLD AUTO: 40 % (ref 41–52)
HGB BLD-MCNC: 13.1 G/DL (ref 13.5–17.5)
HOLD SPECIMEN: NORMAL
IMM GRANULOCYTES # BLD AUTO: 0.02 X10*3/UL (ref 0–0.5)
IMM GRANULOCYTES NFR BLD AUTO: 0.6 % (ref 0–0.9)
LYMPHOCYTES # BLD AUTO: 1.19 X10*3/UL (ref 0.8–3)
LYMPHOCYTES NFR BLD AUTO: 36.8 %
MCH RBC QN AUTO: 31.4 PG (ref 26–34)
MCHC RBC AUTO-ENTMCNC: 32.8 G/DL (ref 32–36)
MCV RBC AUTO: 96 FL (ref 80–100)
MONOCYTES # BLD AUTO: 0.26 X10*3/UL (ref 0.05–0.8)
MONOCYTES NFR BLD AUTO: 8 %
NEUTROPHILS # BLD AUTO: 1.65 X10*3/UL (ref 1.6–5.5)
NEUTROPHILS NFR BLD AUTO: 51.2 %
PLATELET # BLD AUTO: 57 X10*3/UL (ref 150–450)
RBC # BLD AUTO: 4.17 X10*6/UL (ref 4.5–5.9)
WBC # BLD AUTO: 3.2 X10*3/UL (ref 4.4–11.3)

## 2024-01-23 PROCEDURE — 38222 DX BONE MARROW BX & ASPIR: CPT | Performed by: PHYSICIAN ASSISTANT

## 2024-01-23 PROCEDURE — 88271 CYTOGENETICS DNA PROBE: CPT | Mod: 59 | Performed by: PHYSICIAN ASSISTANT

## 2024-01-23 PROCEDURE — 88185 FLOWCYTOMETRY/TC ADD-ON: CPT | Mod: TC | Performed by: PHYSICIAN ASSISTANT

## 2024-01-23 PROCEDURE — 88313 SPECIAL STAINS GROUP 2: CPT | Performed by: PATHOLOGY

## 2024-01-23 PROCEDURE — 81450 HL NEO GSAP 5-50DNA/DNA&RNA: CPT | Performed by: PHYSICIAN ASSISTANT

## 2024-01-23 PROCEDURE — 85097 BONE MARROW INTERPRETATION: CPT | Mod: TC,59,SUR | Performed by: PHYSICIAN ASSISTANT

## 2024-01-23 PROCEDURE — 88342 IMHCHEM/IMCYTCHM 1ST ANTB: CPT | Performed by: PATHOLOGY

## 2024-01-23 PROCEDURE — 85025 COMPLETE CBC W/AUTO DIFF WBC: CPT | Performed by: PHYSICIAN ASSISTANT

## 2024-01-23 PROCEDURE — 36415 COLL VENOUS BLD VENIPUNCTURE: CPT

## 2024-01-23 PROCEDURE — 88311 DECALCIFY TISSUE: CPT | Performed by: PATHOLOGY

## 2024-01-23 PROCEDURE — 85097 BONE MARROW INTERPRETATION: CPT | Mod: TC | Performed by: PHYSICIAN ASSISTANT

## 2024-01-23 PROCEDURE — 88305 TISSUE EXAM BY PATHOLOGIST: CPT | Performed by: PATHOLOGY

## 2024-01-23 PROCEDURE — 88280 CHROMOSOME KARYOTYPE STUDY: CPT | Performed by: PHYSICIAN ASSISTANT

## 2024-01-23 PROCEDURE — G0452 MOLECULAR PATHOLOGY INTERPR: HCPCS | Performed by: PHYSICIAN ASSISTANT

## 2024-01-23 PROCEDURE — 88291 CYTO/MOLECULAR REPORT: CPT | Performed by: PHYSICIAN ASSISTANT

## 2024-01-23 PROCEDURE — 88341 IMHCHEM/IMCYTCHM EA ADD ANTB: CPT | Performed by: PATHOLOGY

## 2024-01-23 PROCEDURE — 88189 FLOWCYTOMETRY/READ 16 & >: CPT | Performed by: PHYSICIAN ASSISTANT

## 2024-01-23 ASSESSMENT — PAIN SCALES - GENERAL: PAINLEVEL: 0-NO PAIN

## 2024-01-23 NOTE — PROGRESS NOTES
"Patient ID: Holden Burgess \"GENARO\" is a 83 y.o. male.    Biopsy bone marrow    Date/Time: 1/23/2024 3:54 PM    Performed by: Waylon Gonzalez PA-C  Authorized by: Waylon Gonzalez PA-C    Consent:     Consent obtained:  Written    Consent given by:  Patient    Risks, benefits, and alternatives were discussed: yes      Risks discussed:  Bleeding, infection and pain  Universal protocol:     Procedure explained and questions answered to patient or proxy's satisfaction: yes      Relevant documents present and verified: yes      Test results available: yes      Imaging studies available: yes      Required blood products, implants, devices, and special equipment available: yes      Site/side marked: yes      Immediately prior to procedure, a time out was called: yes      Patient identity confirmed:  Verbally with patient  Indications:     Indications:  Disease assessment  Pre-procedure details:     Skin preparation:  Chlorhexidine    Preparation: Patient was prepped and draped in the usual sterile fashion    Sedation:     Sedation type:  None  Anesthesia:     Anesthesia method:  Local infiltration    Local anesthetic:  Lidocaine 1% w/o epi  Procedure specific details:      The procedure was explained & potential complications reviewed with the patient including the risks for bleeding, infection, & discomfort at the bone marrow biopsy site. The patient was given time for questions regarding the procedure. The patient agreed to proceed & electronic signed consent was obtained.  The patient's most recent history & physical exam were reviewed & relevant findings were noted.  The patient's allergy history was reviewed.  Next, a pre-procedure time out was performed to properly identify the patient & the procedure to be performed.  The patient was placed in the prone position & the left posterior iliac crest bone marrow biopsy site was identified & marked.  Next, the skin at the marked bone marrow biopsy site was cleansed " Please call patient and schedule patient for a telehealth appointment with me, sometime soon. Thank you.    Please see orders for home health and home-based medical care.  Thank you   with Chlorhexidine solution & sterile drapes were placed.  The skin, subcutaneous tissue, & periosteum below the marked bone marrow biopsy site were anesthetized using 10 ml of 1% Lidocaine solution.  Next, a Jamshidi needle was inserted at the marked biopsy site & slowly advanced into the posterior iliac crest.  Marrow aspirate & core biopsy were obtained & sent to Pathology for review & testing.  The needle was removed & sterile dressing was applied to the biopsy site.  The patient tolerated the procedure well without incident.  Prior to discharge, the biopsy site was inspected & the patient was given written post procedure care instructions.   Post-procedure details:     Procedure completion:  Tolerated well, no immediate complications

## 2024-01-25 RX ORDER — TEMAZEPAM 15 MG/1
15 CAPSULE ORAL NIGHTLY PRN
Qty: 90 CAPSULE | Refills: 0 | Status: SHIPPED | OUTPATIENT
Start: 2024-01-25 | End: 2024-02-21 | Stop reason: SDUPTHER

## 2024-01-29 ENCOUNTER — APPOINTMENT (OUTPATIENT)
Dept: PHYSICAL THERAPY | Facility: CLINIC | Age: 84
End: 2024-01-29
Payer: MEDICARE

## 2024-01-29 LAB
CELL COUNT (BLOOD): 3.55 X10*3/UL
CELL POPULATIONS: NORMAL
DIAGNOSIS: NORMAL
FLOW DIFFERENTIAL: NORMAL
FLOW TEST ORDERED: NORMAL
LAB TEST METHOD: NORMAL
NUMBER OF CELLS COLLECTED: NORMAL
PATH REPORT.TOTAL CANCER: NORMAL
SIGNATURE COMMENT: NORMAL
SPECIMEN VIABILITY: NORMAL

## 2024-01-31 LAB
ELECTRONICALLY SIGNED BY: NORMAL
MYELOID NGS RESULTS: NORMAL

## 2024-02-05 ENCOUNTER — TREATMENT (OUTPATIENT)
Dept: PHYSICAL THERAPY | Facility: CLINIC | Age: 84
End: 2024-02-05
Payer: MEDICARE

## 2024-02-05 DIAGNOSIS — N39.3 SUI (STRESS URINARY INCONTINENCE), MALE: ICD-10-CM

## 2024-02-05 DIAGNOSIS — M62.89 PFD (PELVIC FLOOR DYSFUNCTION): ICD-10-CM

## 2024-02-05 PROCEDURE — 97110 THERAPEUTIC EXERCISES: CPT | Mod: GP | Performed by: PHYSICAL THERAPIST

## 2024-02-06 ENCOUNTER — APPOINTMENT (OUTPATIENT)
Dept: HEMATOLOGY/ONCOLOGY | Facility: CLINIC | Age: 84
End: 2024-02-06
Payer: MEDICARE

## 2024-02-06 ASSESSMENT — PAIN SCALES - GENERAL: PAINLEVEL_OUTOF10: 5 - MODERATE PAIN

## 2024-02-06 ASSESSMENT — PAIN - FUNCTIONAL ASSESSMENT: PAIN_FUNCTIONAL_ASSESSMENT: 0-10

## 2024-02-06 NOTE — PROGRESS NOTES
Physical Therapy    Physical Therapy Treatment    Patient Name: Holden Burgess  MRN: [id number]  Today's Date: 2-5-24   Start Time:  3:10  Stop Time:  4:00  Total:  50 minutes  Visit 2/10 per poc  90 Days = 4-8-24    Assessment:  Sensation improving.  Postural awareness improving.       Plan:  Continue to progress as tolerated.       Current Problem  1. ELIZABETH (stress urinary incontinence), male  Follow Up In Physical Therapy      2. PFD (pelvic floor dysfunction)  Follow Up In Physical Therapy              Subjective    Patient reports that his biopsy went well.  He has to go back for blood work.    Compliant with home instructions.     Bladder-  Beginning to experience the need to go 25% of the time, but then nothing comes out.      Precautions  Precautions  Precautions Comment: No recent falls.     Pain  Pain Assessment  Pain Assessment: 0-10  Pain Score: 5 - Moderate pain  Pain Location: Back    Objective   Updated handouts issued.      Treatments:  TherX-  Reviewed bladder habits including frequency and the use of the Urge Protocol.  Patient to continue Timed voids, and use good toileting posture.  Use diaphragmatic breathing to down train pelvic floor and improve voiding. Bladder Log issued.      HEP Review-  5 fast kegels, 10 slow kegels with TA contraction, 5 fast kegel at least 5x/day.  Upright Posture.      Instructed in and performed in upright posture diaphragmatic breathing, isolated PF/TA contractions, Roll-In with ball/Roll-Out with red band.  Handouts issued.    Instructed in and performed seated hamstring stretch and standing hip flexor stretch.

## 2024-02-07 LAB
CHROM ANALY OVERALL INTERP-IMP: NORMAL
ELECTRONICALLY COSIGNED BY CYTOGENETICS: NORMAL
ELECTRONICALLY SIGNED BY CYTOGENETICS: NORMAL
STRUCT VAR ISCN NAME: NORMAL

## 2024-02-12 ENCOUNTER — TREATMENT (OUTPATIENT)
Dept: PHYSICAL THERAPY | Facility: CLINIC | Age: 84
End: 2024-02-12
Payer: MEDICARE

## 2024-02-12 DIAGNOSIS — M62.89 PFD (PELVIC FLOOR DYSFUNCTION): ICD-10-CM

## 2024-02-12 DIAGNOSIS — N39.3 SUI (STRESS URINARY INCONTINENCE), MALE: ICD-10-CM

## 2024-02-12 PROCEDURE — 97110 THERAPEUTIC EXERCISES: CPT | Mod: GP | Performed by: PHYSICAL THERAPIST

## 2024-02-13 ENCOUNTER — APPOINTMENT (OUTPATIENT)
Dept: UROLOGY | Facility: CLINIC | Age: 84
End: 2024-02-13
Payer: MEDICARE

## 2024-02-13 ENCOUNTER — OFFICE VISIT (OUTPATIENT)
Dept: HEMATOLOGY/ONCOLOGY | Facility: CLINIC | Age: 84
End: 2024-02-13
Payer: MEDICARE

## 2024-02-13 VITALS
HEART RATE: 85 BPM | BODY MASS INDEX: 41.81 KG/M2 | WEIGHT: 306 LBS | TEMPERATURE: 97.5 F | DIASTOLIC BLOOD PRESSURE: 87 MMHG | OXYGEN SATURATION: 97 % | SYSTOLIC BLOOD PRESSURE: 146 MMHG | RESPIRATION RATE: 18 BRPM

## 2024-02-13 DIAGNOSIS — D69.3 IDIOPATHIC THROMBOCYTOPENIC PURPURA (MULTI): ICD-10-CM

## 2024-02-13 DIAGNOSIS — N39.3 SUI (STRESS URINARY INCONTINENCE), MALE: ICD-10-CM

## 2024-02-13 DIAGNOSIS — D69.6 THROMBOCYTOPENIA (CMS-HCC): ICD-10-CM

## 2024-02-13 DIAGNOSIS — Z98.61 CAD S/P PERCUTANEOUS CORONARY ANGIOPLASTY: ICD-10-CM

## 2024-02-13 DIAGNOSIS — I10 PRIMARY HYPERTENSION: ICD-10-CM

## 2024-02-13 DIAGNOSIS — E78.2 HYPERLIPEMIA, MIXED: Primary | ICD-10-CM

## 2024-02-13 DIAGNOSIS — I25.10 CAD S/P PERCUTANEOUS CORONARY ANGIOPLASTY: ICD-10-CM

## 2024-02-13 DIAGNOSIS — M10.9 GOUT, UNSPECIFIED CAUSE, UNSPECIFIED CHRONICITY, UNSPECIFIED SITE: ICD-10-CM

## 2024-02-13 PROCEDURE — 1159F MED LIST DOCD IN RCRD: CPT | Performed by: INTERNAL MEDICINE

## 2024-02-13 PROCEDURE — 3079F DIAST BP 80-89 MM HG: CPT | Performed by: INTERNAL MEDICINE

## 2024-02-13 PROCEDURE — 1126F AMNT PAIN NOTED NONE PRSNT: CPT | Performed by: INTERNAL MEDICINE

## 2024-02-13 PROCEDURE — 99214 OFFICE O/P EST MOD 30 MIN: CPT | Performed by: INTERNAL MEDICINE

## 2024-02-13 PROCEDURE — 1036F TOBACCO NON-USER: CPT | Performed by: INTERNAL MEDICINE

## 2024-02-13 PROCEDURE — 3077F SYST BP >= 140 MM HG: CPT | Performed by: INTERNAL MEDICINE

## 2024-02-13 RX ORDER — PREDNISONE 50 MG/1
50 TABLET ORAL DAILY
Qty: 3 TABLET | Refills: 0 | Status: SHIPPED | OUTPATIENT
Start: 2024-02-13 | End: 2024-02-16

## 2024-02-13 ASSESSMENT — PAIN SCALES - GENERAL
PAINLEVEL: 0-NO PAIN
PAINLEVEL_OUTOF10: 5 - MODERATE PAIN

## 2024-02-13 ASSESSMENT — PAIN - FUNCTIONAL ASSESSMENT: PAIN_FUNCTIONAL_ASSESSMENT: 0-10

## 2024-02-13 NOTE — PATIENT INSTRUCTIONS
Your bone marrow did not show any evidence of MDS    There is a possibility you may have ITP--we will empirically treat with prednisone and see what happens    You need to take prednisone 50 mg daily for 3 days, then we will recheck your blood counts on Friday

## 2024-02-13 NOTE — PROGRESS NOTES
Patient ID: GENARO Burgess is a 83 y.o. male.  Referring Physician: No referring provider defined for this encounter.  Primary Care Provider: Giacomo Joseph,   Visit Type: Follow Up      Subjective    HPI What did my bone marrow show?    Review of Systems   Constitutional: Negative.    HENT:  Negative.     Eyes: Negative.    Respiratory: Negative.     Cardiovascular: Negative.    Gastrointestinal: Negative.    Endocrine: Negative.    Genitourinary: Negative.     Musculoskeletal: Negative.    Skin: Negative.    Neurological: Negative.    Hematological: Negative.    Psychiatric/Behavioral: Negative.          Objective   BSA: 2.65 meters squared  /87 (BP Location: Left arm)   Pulse 85   Temp 36.4 °C (97.5 °F) (Temporal)   Resp 18   Wt 139 kg (306 lb)   SpO2 97%   BMI 41.81 kg/m²      has a past medical history of Abdominal pain, chronic, right upper quadrant, ACP (advance care planning), Anemia, Aneurysm (CMS/[facility]), Body mass index (BMI) 39.0-39.9, adult (12/06/2021), Body mass index (BMI) 39.0-39.9, adult (07/11/2022), Body mass index (BMI) 39.0-39.9, adult (04/24/2022), Body mass index (BMI) 39.0-39.9, adult (04/24/2022), Body mass index (BMI) 39.0-39.9, adult (04/24/2022), Cramp and spasm (07/19/2022), Difficulty breathing, Encounter for general adult medical examination without abnormal findings (09/25/2020), Encounter for screening for malignant neoplasm of prostate (04/14/2021), Encounter for screening for malignant neoplasm of prostate (09/25/2020), Forearm injury, atrial flutter, Hyperlipidemia, Hypertension, Hyperuricemia, Incontinence, Obesity, unspecified (04/26/2022), Other symptoms and signs involving the musculoskeletal system (10/12/2022), Personal history of other diseases of the circulatory system (09/29/2021), Personal history of other diseases of the circulatory system (10/26/2021), Personal history of other endocrine, nutritional and metabolic disease (12/06/2021), Personal history of  "other specified conditions (09/29/2021), Rotator cuff injury, Sinus node dysfunction (CMS/HCC), and Valvular heart disease.   has a past surgical history that includes Other surgical history (05/14/2020); Other surgical history (05/14/2020); Other surgical history (05/14/2020); Other surgical history (05/14/2020); Other surgical history (05/14/2020); Other surgical history (09/29/2021); Other surgical history (09/29/2021); Other surgical history (09/29/2021); Other surgical history (09/29/2021); MR angio chest w and wo IV contrast (9/18/2019); MR angio chest w and wo IV contrast (1/25/2023); MR angio chest w and wo IV contrast (1/25/2023); and Cardiac electrophysiology procedure (Left, 11/21/2023).  Family History   Problem Relation Name Age of Onset    No Known Problems Mother      Heart disease Father      Heart disease Brother      Polymyalgia rheumatica Brother      Other (mitral valve disorder) Brother      Other (Coronary artery bypass graft) Brother      Other (Arterisclerotic cardiovascular disease) Brother       Oncology History    No history exists.       Holden Burgess \"PAT\"  reports that he quit smoking about 45 years ago. His smoking use included cigarettes. He has never been exposed to tobacco smoke. He has never used smokeless tobacco.  He  reports no history of alcohol use.  He  reports no history of drug use.    Physical Exam  Vitals reviewed.   Constitutional:       Appearance: Normal appearance.   HENT:      Head: Normocephalic.      Mouth/Throat:      Mouth: Mucous membranes are moist.   Eyes:      Extraocular Movements: Extraocular movements intact.      Pupils: Pupils are equal, round, and reactive to light.   Cardiovascular:      Rate and Rhythm: Normal rate and regular rhythm.      Heart sounds: Normal heart sounds.   Pulmonary:      Breath sounds: Normal breath sounds.   Abdominal:      General: Bowel sounds are normal.      Palpations: Abdomen is soft.   Musculoskeletal:         General: " Normal range of motion.      Cervical back: Normal range of motion and neck supple.   Skin:     General: Skin is warm.   Neurological:      General: No focal deficit present.      Mental Status: He is alert and oriented to person, place, and time.   Psychiatric:         Mood and Affect: Mood normal.         Behavior: Behavior normal.         WBC   Date/Time Value Ref Range Status   01/23/2024 02:52 PM 3.2 (L) 4.4 - 11.3 x10*3/uL Final   01/16/2024 11:07 AM 4.0 (L) 4.4 - 11.3 x10*3/uL Final   01/04/2024 01:53 PM 3.8 (L) 4.4 - 11.3 x10*3/uL Final     nRBC   Date Value Ref Range Status   01/16/2024   Final     Comment:     Not Measured   01/04/2024 0.0 0.0 - 0.0 /100 WBCs Final   11/21/2023 0.0 0.0 - 0.0 /100 WBCs Final     RBC   Date Value Ref Range Status   01/23/2024 4.17 (L) 4.50 - 5.90 x10*6/uL Final   01/16/2024 4.27 (L) 4.50 - 5.90 x10*6/uL Final   01/04/2024 4.29 (L) 4.50 - 5.90 x10*6/uL Final     POC Hemoglobin   Date Value Ref Range Status   10/31/2023 11.1 (A) 13.5 - 17.5 g/dL Final   09/26/2023 11.8 (A) 13.5 - 17.5 g/dL Final     Hemoglobin   Date Value Ref Range Status   01/23/2024 13.1 (L) 13.5 - 17.5 g/dL Final   01/16/2024 13.3 (L) 13.5 - 17.5 g/dL Final   01/04/2024 13.1 (L) 13.5 - 17.5 g/dL Final     Hematocrit   Date Value Ref Range Status   01/23/2024 40.0 (L) 41.0 - 52.0 % Final   01/16/2024 40.9 (L) 41.0 - 52.0 % Final   01/04/2024 40.5 (L) 41.0 - 52.0 % Final     MCV   Date/Time Value Ref Range Status   01/23/2024 02:52 PM 96 80 - 100 fL Final   01/16/2024 11:07 AM 96 80 - 100 fL Final   01/04/2024 01:53 PM 94 80 - 100 fL Final     MCH   Date/Time Value Ref Range Status   01/23/2024 02:52 PM 31.4 26.0 - 34.0 pg Final   01/16/2024 11:07 AM 31.1 26.0 - 34.0 pg Final   01/04/2024 01:53 PM 30.5 26.0 - 34.0 pg Final     MCHC   Date/Time Value Ref Range Status   01/23/2024 02:52 PM 32.8 32.0 - 36.0 g/dL Final   01/16/2024 11:07 AM 32.5 32.0 - 36.0 g/dL Final   01/04/2024 01:53 PM 32.3 32.0 - 36.0 g/dL  "Final     RDW   Date/Time Value Ref Range Status   01/23/2024 02:52 PM 17.5 (H) 11.5 - 14.5 % Final   01/16/2024 11:07 AM 18.0 (H) 11.5 - 14.5 % Final   01/04/2024 01:53 PM 18.1 (H) 11.5 - 14.5 % Final     Platelets   Date/Time Value Ref Range Status   01/23/2024 02:52 PM 57 (L) 150 - 450 x10*3/uL Final   01/16/2024 11:07 AM 60 (L) 150 - 450 x10*3/uL Final   01/04/2024 01:53 PM 53 (L) 150 - 450 x10*3/uL Final     No results found for: \"MPV\"  Neutrophils %   Date/Time Value Ref Range Status   01/23/2024 02:52 PM 51.2 40.0 - 80.0 % Final   01/16/2024 11:07 AM 59.7 40.0 - 80.0 % Final   01/04/2024 01:53 PM 54.8 40.0 - 80.0 % Final     Immature Granulocytes %, Automated   Date/Time Value Ref Range Status   01/23/2024 02:52 PM 0.6 0.0 - 0.9 % Final     Comment:     Immature Granulocyte Count (IG) includes promyelocytes, myelocytes and metamyelocytes but does not include bands. Percent differential counts (%) should be interpreted in the context of the absolute cell counts (cells/UL).   01/16/2024 11:07 AM 1.0 (H) 0.0 - 0.9 % Final     Comment:     Immature Granulocyte Count (IG) includes promyelocytes, myelocytes and metamyelocytes but does not include bands. Percent differential counts (%) should be interpreted in the context of the absolute cell counts (cells/UL).   01/04/2024 01:53 PM 1.1 (H) 0.0 - 0.9 % Final     Comment:     Immature Granulocyte Count (IG) includes promyelocytes, myelocytes and metamyelocytes but does not include bands. Percent differential counts (%) should be interpreted in the context of the absolute cell counts (cells/UL).     Lymphocytes %   Date/Time Value Ref Range Status   01/23/2024 02:52 PM 36.8 13.0 - 44.0 % Final   01/16/2024 11:07 AM 28.3 13.0 - 44.0 % Final   01/04/2024 01:53 PM 33.9 13.0 - 44.0 % Final     Monocytes %   Date/Time Value Ref Range Status   01/23/2024 02:52 PM 8.0 2.0 - 10.0 % Final   01/16/2024 11:07 AM 7.5 2.0 - 10.0 % Final   01/04/2024 01:53 PM 6.7 2.0 - 10.0 % Final "     Eosinophils %   Date/Time Value Ref Range Status   01/23/2024 02:52 PM 3.1 0.0 - 6.0 % Final   01/16/2024 11:07 AM 3.0 0.0 - 6.0 % Final   01/04/2024 01:53 PM 2.7 0.0 - 6.0 % Final     Basophils %   Date/Time Value Ref Range Status   01/23/2024 02:52 PM 0.3 0.0 - 2.0 % Final   01/16/2024 11:07 AM 0.5 0.0 - 2.0 % Final   01/04/2024 01:53 PM 0.8 0.0 - 2.0 % Final     Neutrophils Absolute   Date/Time Value Ref Range Status   01/23/2024 02:52 PM 1.65 1.60 - 5.50 x10*3/uL Final     Comment:     Percent differential counts (%) should be interpreted in the context of the absolute cell counts (cells/uL).   01/16/2024 11:07 AM 2.38 1.60 - 5.50 x10*3/uL Final     Comment:     Percent differential counts (%) should be interpreted in the context of the absolute cell counts (cells/uL).   01/04/2024 01:53 PM 2.06 1.60 - 5.50 x10*3/uL Final     Comment:     Percent differential counts (%) should be interpreted in the context of the absolute cell counts (cells/uL).     Immature Granulocytes Absolute, Automated   Date/Time Value Ref Range Status   01/23/2024 02:52 PM 0.02 0.00 - 0.50 x10*3/uL Final   01/16/2024 11:07 AM 0.04 0.00 - 0.50 x10*3/uL Final   01/04/2024 01:53 PM 0.04 0.00 - 0.50 x10*3/uL Final     Lymphocytes Absolute   Date/Time Value Ref Range Status   01/23/2024 02:52 PM 1.19 0.80 - 3.00 x10*3/uL Final   01/16/2024 11:07 AM 1.13 0.80 - 3.00 x10*3/uL Final   01/04/2024 01:53 PM 1.27 0.80 - 3.00 x10*3/uL Final     Monocytes Absolute   Date/Time Value Ref Range Status   01/23/2024 02:52 PM 0.26 0.05 - 0.80 x10*3/uL Final   01/16/2024 11:07 AM 0.30 0.05 - 0.80 x10*3/uL Final   01/04/2024 01:53 PM 0.25 0.05 - 0.80 x10*3/uL Final     Eosinophils Absolute   Date/Time Value Ref Range Status   01/23/2024 02:52 PM 0.10 0.00 - 0.40 x10*3/uL Final   01/16/2024 11:07 AM 0.12 0.00 - 0.40 x10*3/uL Final   01/04/2024 01:53 PM 0.10 0.00 - 0.40 x10*3/uL Final     Basophils Absolute   Date/Time Value Ref Range Status   01/23/2024  "02:52 PM 0.01 0.00 - 0.10 x10*3/uL Final   01/16/2024 11:07 AM 0.02 0.00 - 0.10 x10*3/uL Final     Comment:     Automated WBC differential has been confirmed by manual smear.   01/04/2024 01:53 PM 0.03 0.00 - 0.10 x10*3/uL Final       No components found for: \"PT\"  aPTT   Date/Time Value Ref Range Status   06/23/2020 02:38 PM 34 25 - 35 sec Final     Comment:      Note new reference range as of 05/26/2020.    THE APTT IS NO LONGER USED FOR MONITORING     UNFRACTIONATED HEPARIN THERAPY.    FOR MONITORING HEPARIN THERAPY,     USE THE HEPARIN ASSAY.       Medication Documentation Review Audit       Reviewed by Danielle Tarango MA (Medical Assistant) on 02/20/24 at 1048      Medication Order Taking? Sig Documenting Provider Last Dose Status   acetaminophen (Tylenol) 325 mg tablet [id number] Yes Take 2 tablets (650 mg) by mouth every 4 hours if needed for mild pain (1 - 3). Zenia Blanco, APRN-CNP Taking Active   allopurinol (Zyloprim) 100 mg tablet [id number] Yes Take 1 tablet (100 mg) by mouth 2 times a day. Giacomo Joseph,  Taking Active   ascorbic acid (Vitamin C) 1,000 mg tablet [id number] Yes Take 1 tablet (1,000 mg) by mouth once daily. Historical Provider, MD Taking Active   aspirin 81 mg EC tablet [id number] Yes Take 1 tablet (81 mg) by mouth once daily. Historical Provider, MD Taking Active   atorvastatin (Lipitor) 10 mg tablet [id number] Yes Take 1 tablet (10 mg) by mouth once daily at bedtime. Historical Provider, MD Taking Active   calcitriol (Rocaltrol) 0.25 mcg capsule [id number] Yes Take 1 capsule (0.25 mcg) by mouth every other day. Historical Provider, MD Taking Active   chlorthalidone (Hygroton) 25 mg tablet [id number] Yes Take 1 tablet (25 mg) by mouth once daily. Nicola Brooks MD Taking Active   cholecalciferol (Vitamin D-3) 125 MCG (5000 UT) capsule [id number] Yes Take 1 capsule (125 mcg) by mouth once daily. Historical Provider, MD Taking Active   cyanocobalamin (Vitamin B-12) 1,000 mcg tablet [id number] Yes " Take 1 tablet (1,000 mcg) by mouth once daily. Historical Provider, MD Taking Active   ezetimibe (Zetia) 10 mg tablet [id number] Yes Take 1 tablet (10 mg) by mouth once daily. Giacomo Joseph DO Taking Active     Discontinued 02/15/24 1252   ferrous sulfate 325 (65 Fe) MG tablet [id number] Yes Take 1 tablet by mouth once daily. Historical Provider, MD Taking Active   folic acid (Folvite) 1 mg tablet [id number] Yes Take 1 tablet (1 mg) by mouth once daily. Giacomo Joseph DO Taking Active     Discontinued 02/15/24 1322   furosemide (Lasix) 20 mg tablet [id number] Yes Take 1 tablet (20 mg) by mouth once daily. Waylon Benjamin,  Taking Active   latanoprost (Xalatan) 0.005 % ophthalmic solution [id number] Yes Administer 1 drop into affected eye(s) once daily at bedtime. Historical Provider, MD Taking Active   magnesium 30 mg tablet [id number] Yes Take 550 mg by mouth. Historical Provider, MD Taking Active   niacin (Niaspan) 500 mg ER tablet [id number] Yes Take 1 tablet (500 mg) by mouth. Historical Provider, MD Taking Active   NON FORMULARY [id number] Yes Take 1 each by mouth once daily. Prevagen Historical Provider, MD Taking Active   predniSONE (Deltasone) 50 mg tablet [id number]  Take 1 tablet (50 mg) by mouth once daily for 3 days. Kevin Murphy MD   24 2359   rOPINIRole (Requip) 0.25 mg tablet [id number] Yes Take 1 tablet (0.25 mg) by mouth once daily at bedtime. Giacomo Joseph DO Taking Active   temazepam (Restoril) 15 mg capsule [id number] Yes TAKE 1 CAPSULE BY MOUTH AT BEDTIME AS NEEDED FOR SLEEP Giacomo Joseph DO Taking Active   traMADol (Ultram) 50 mg tablet [id number] Yes Take 1 tablet (50 mg) by mouth every 6 hours. Giacomo Joseph DO Taking Active   TURMERIC ORAL [id number] Yes Take 1 capsule by mouth once daily. Historical Provider, MD Taking Active   valsartan (Diovan) 320 mg tablet [id number] Yes Take 1 tablet (320 mg) by mouth once daily. Nicola Brooks MD Taking Active   zinc sulfate 50 mg  zinc (220 mg) tablet [id number] Yes Take by mouth. 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; he completed XRT over 20 yrs ago  -here to review  bmbx done on 1/23/2024 -showed mildly hypercellular bone marrow (80%) with maturing trilineage hematopoiesis and moderate increase in megakaryocytes; small lymphoid aggregates favor benign; marrow shows mild reticulin fibrosis and increased number of megakaryocytes; flow cytometry showed no immunophenotypic evidence of lymphoproliferative disorder and no increased and/or abnormal blast population  -finding of SRSF2 mutation; 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  -CBC done on day of marrow showed wbc 4.0, hgb 13.3, plt 60,000, ANC 2380  -behavior of cytopenias and marrow findings suggest MDS, but specimen itself could not corroborate this, probably secondary to sampling variation  -there is possibility this could be ITP--will give him prednisone trial--will take prednisone 50 mg PO daily for next 3 days--if he has ITP--there should be dramatic rise in platelet count on Friday--if no response, then he does not have ITP, and so depending on nature of surgical procedure--he should be transfused a unit of platelets if appropriate  -will follow him Q6 months    2) gout  -on allopurinol    3) hyperlipidemia  -on atorvastatin  -on ezetimibe  -on lasix    4) hypertension  -on chlorthalidone  -on valsartan    5) CAD  -s/p PCI  -has pacemaker in place  -on ASA    6) stress incontinence  -has history of prostate cancer, s/p XRT >20 years ago  -s/p TURP in 6/2023  -has upcoming cystoscopy     Problem List Items Addressed This Visit    None  Visit Diagnoses         Codes    Thrombocytopenia (CMS/HCC)     D69.6    Relevant Orders    CBC and Auto Differential (Completed)    Clinic Appointment Request Follow  Up; KEVIN MURPHY; [facility]                 Kevin Murphy MD

## 2024-02-13 NOTE — PROGRESS NOTES
Physical Therapy    Physical Therapy Treatment    Patient Name: Holden Burgess  MRN: [id number]  Today's Date: 2-12-24   Start Time:  3:10  Stop Time:  3:55  Total:  45 minutes  Visit 3/10 per poc  90 Days = 4-8-24    Assessment:  Sensation improving.  Postural awareness improving.  Clarifications/additions made to HEP.     Plan:  Continue to progress as tolerated.  Inquire if use of the Knack decreases leaking with bed transitions.       Current Problem  1. ELIZABETH (stress urinary incontinence), male  Follow Up In Physical Therapy      2. PFD (pelvic floor dysfunction)  Follow Up In Physical Therapy              Subjective    Patient reports that his biopsy went well.  He has to go back for blood work.    Compliant with home instructions.     Bladder-  Beginning to experience the need to go 25% of the time, but then nothing comes out.  Patient reports leakage with bed transitions.  Patient has been performing his exercises on the toilet once he attempts to void.      Precautions  Precautions  Precautions Comment: No recent falls.     Pain  Pain Assessment  Pain Assessment: 0-10  Pain Score: 5 - Moderate pain  Pain Location: Back    Objective   Updated handouts issued.      Treatments:  TherX-  Reviewed bladder habits including frequency and the use of the Urge Protocol.  Patient to continue Timed voids, and use good toileting posture.  Use diaphragmatic breathing to down train pelvic floor and improve voiding. Patient to continue Bladder Log.  Instructed in the use of The Knack for bed transitions.      HEP Review-  5 fast kegels, 10 slow kegels with TA contraction, 5 fast kegel at least 5x/day.  *PATIENT TO NOT PERFORM EXERCISES ON THE TOILET.  HE IS TO FOCUS ON DOWN TRAINING TO VOID.*  Once he has change his garment and is finished, he can perform his exercises.  Upright Posture.  Diaphragmatic breathing.  Roll-in/Roll-out.  Seated hamstring stretch.  Standing hip flexor stretch.

## 2024-02-15 ENCOUNTER — OFFICE VISIT (OUTPATIENT)
Dept: CARDIOLOGY | Facility: CLINIC | Age: 84
End: 2024-02-15
Payer: MEDICARE

## 2024-02-15 VITALS
BODY MASS INDEX: 42.85 KG/M2 | WEIGHT: 306.1 LBS | HEART RATE: 80 BPM | DIASTOLIC BLOOD PRESSURE: 90 MMHG | HEIGHT: 71 IN | SYSTOLIC BLOOD PRESSURE: 144 MMHG

## 2024-02-15 DIAGNOSIS — I48.92 ATRIAL FLUTTER, UNSPECIFIED TYPE (MULTI): ICD-10-CM

## 2024-02-15 DIAGNOSIS — I73.9 PVD (PERIPHERAL VASCULAR DISEASE) (CMS-HCC): ICD-10-CM

## 2024-02-15 DIAGNOSIS — Z98.61 CAD S/P PERCUTANEOUS CORONARY ANGIOPLASTY: ICD-10-CM

## 2024-02-15 DIAGNOSIS — E66.01 MORBID OBESITY WITH BMI OF 40.0-44.9, ADULT (MULTI): ICD-10-CM

## 2024-02-15 DIAGNOSIS — N18.31 STAGE 3A CHRONIC KIDNEY DISEASE (MULTI): ICD-10-CM

## 2024-02-15 DIAGNOSIS — I25.10 CAD S/P PERCUTANEOUS CORONARY ANGIOPLASTY: ICD-10-CM

## 2024-02-15 DIAGNOSIS — I49.5 SINUS NODE DYSFUNCTION (MULTI): ICD-10-CM

## 2024-02-15 DIAGNOSIS — G47.30 SLEEP APNEA, UNSPECIFIED TYPE: ICD-10-CM

## 2024-02-15 DIAGNOSIS — E78.2 HYPERLIPEMIA, MIXED: ICD-10-CM

## 2024-02-15 DIAGNOSIS — Z87.891 FORMER SMOKER: ICD-10-CM

## 2024-02-15 DIAGNOSIS — I71.21 ANEURYSM OF ASCENDING AORTA WITHOUT RUPTURE (CMS-HCC): ICD-10-CM

## 2024-02-15 DIAGNOSIS — I10 PRIMARY HYPERTENSION: ICD-10-CM

## 2024-02-15 PROCEDURE — 1126F AMNT PAIN NOTED NONE PRSNT: CPT | Performed by: INTERNAL MEDICINE

## 2024-02-15 PROCEDURE — 3080F DIAST BP >= 90 MM HG: CPT | Performed by: INTERNAL MEDICINE

## 2024-02-15 PROCEDURE — 1036F TOBACCO NON-USER: CPT | Performed by: INTERNAL MEDICINE

## 2024-02-15 PROCEDURE — 3077F SYST BP >= 140 MM HG: CPT | Performed by: INTERNAL MEDICINE

## 2024-02-15 PROCEDURE — 99214 OFFICE O/P EST MOD 30 MIN: CPT | Performed by: INTERNAL MEDICINE

## 2024-02-15 PROCEDURE — 1159F MED LIST DOCD IN RCRD: CPT | Performed by: INTERNAL MEDICINE

## 2024-02-15 RX ORDER — FUROSEMIDE 20 MG/1
20 TABLET ORAL DAILY
Qty: 90 TABLET | Refills: 3 | Status: SHIPPED | OUTPATIENT
Start: 2024-02-15 | End: 2024-03-18 | Stop reason: SDUPTHER

## 2024-02-15 NOTE — PATIENT INSTRUCTIONS
Patient to follow up in 1 month with Dr. Sourav MD     Please INCREASE Furosemide (Lasix) to 20mg once daily instead of as needed.     No other changes today.   Continue same medications and treatments.   Patient educated on proper medication use.   Patient educated on risk factor modification.   Please bring any lab results from other providers / physicians to your next appointment.     Please bring all medicines, vitamins, and herbal supplements with you when you come to the office.     Prescriptions will not be filled unless you are compliant with your follow up appointments or have a follow up appointment scheduled as per instruction of your physician. Refills should be requested at the time of your visit.    ITroy RN am scribing for and in the presence of Dr. Waylon Benjamin MD

## 2024-02-15 NOTE — PROGRESS NOTES
Patient:  Holden Burgess  YOB: 1940  MRN: [id number]       Chief Complaint/Active Symptoms:     6 month follow up  Holden Burgess is a 83 y.o. male who returns today for cardiac follow-up.  Only complaint is a little bit of puffiness around his ankles.  He has been taking intermittent Lasix on occasion but this point we can go to daily at 20 mg.  He has no chest pain or shortness of breath had a recent bone marrow biopsy and is being treated for chronic hematological issue but this is going to be done conservatively.  He is also planning to get shoulder surgery and a urological surgery in the future.  Did ultimately have a pacemaker for sick sinus syndrome done in the fall of last year by Dr. Solis.      Objective:     Vitals:    02/15/24 1254   BP: 144/90   Pulse: 80       Vitals:    02/15/24 1254   Weight: 139 kg (306 lb 1.6 oz)       Allergies:     Allergies   Allergen Reactions    Crestor [Rosuvastatin] Unknown    Ezetimibe Unknown    Statins-Hmg-Coa Reductase Inhibitors Unknown     leg cramping          Medications:     Current Outpatient Medications   Medication Instructions    acetaminophen (TYLENOL) 650 mg, oral, Every 4 hours PRN    allopurinol (ZYLOPRIM) 100 mg, oral, 2 times daily    ascorbic acid (Vitamin C) 1,000 mg tablet 1 tablet, oral, Daily    aspirin 81 mg EC tablet 1 tablet, oral, Daily    atorvastatin (Lipitor) 10 mg tablet 1 tablet, oral, Nightly    calcitriol (ROCALTROL) 0.25 mcg, oral, Every other day    chlorthalidone (HYGROTON) 25 mg, oral, Daily    cholecalciferol (Vitamin D-3) 125 MCG (5000 UT) capsule 1 capsule, oral, Daily    cyanocobalamin (Vitamin B-12) 1,000 mcg tablet 1 tablet, oral, Daily    ezetimibe (ZETIA) 10 mg, oral, Daily    ferrous sulfate 325 (65 Fe) MG tablet 1 tablet, oral, Daily    folic acid (FOLVITE) 1 mg, oral, Daily    furosemide (Lasix) 20 mg tablet 1 tablet, oral, As needed    latanoprost (Xalatan) 0.005 % ophthalmic solution 1 drop, ophthalmic  (eye), Nightly    magnesium 550 mg, oral    niacin (Niaspan) 500 mg ER tablet 1 tablet, oral    NON FORMULARY 1 each, oral, Daily, Prevagen    predniSONE (DELTASONE) 50 mg, oral, Daily    rOPINIRole (REQUIP) 0.25 mg, oral, Nightly    temazepam (RESTORIL) 15 mg, oral, Nightly PRN    traMADol (ULTRAM) 50 mg, oral, Every 6 hours    TURMERIC ORAL 1 capsule, oral, Daily    valsartan (DIOVAN) 320 mg, oral, Daily    zinc sulfate 50 mg zinc (220 mg) tablet oral       Physical Examination:   Vitals reviewed.   Constitutional:       Appearance: Normal and healthy appearance. Well-developed and not in distress.      Comments: Walking cane for assistance    Eyes:      Conjunctiva/sclera: Conjunctivae normal.      Pupils: Pupils are equal, round, and reactive to light.   Neck:      Vascular: No JVR. JVD normal.   Pulmonary:      Effort: Pulmonary effort is normal.      Breath sounds: Normal breath sounds. No wheezing. No rhonchi. No rales.   Chest:      Chest wall: Not tender to palpatation.   Cardiovascular:      PMI at left midclavicular line. Normal rate. Regular rhythm. Normal S1. Normal S2.       Murmurs: There is no murmur.      No gallop.  No click. No rub.   Pulses:     Intact distal pulses.   Edema:     Peripheral edema absent.   Abdominal:      Tenderness: There is no abdominal tenderness.   Musculoskeletal: Normal range of motion.         General: No tenderness.      Cervical back: Normal range of motion. Skin:     General: Skin is warm and dry.   Neurological:      General: No focal deficit present.      Mental Status: Alert and oriented to person, place and time.   Psychiatric:         Behavior: Behavior is cooperative.            Lab:     CBC:   Lab Results   Component Value Date    WBC 3.2 (L) 01/23/2024    RBC 4.17 (L) 01/23/2024    HGB 13.1 (L) 01/23/2024    HGB 11.1 (A) 10/31/2023    HCT 40.0 (L) 01/23/2024    PLT 57 (L) 01/23/2024        CMP:    Lab Results   Component Value Date     01/04/2024    K 4.1  "01/04/2024     01/04/2024    CO2 27 01/04/2024    BUN 29 (H) 01/04/2024    CREATININE 1.44 (H) 01/04/2024    GLUCOSE 116 (H) 01/04/2024    CALCIUM 9.2 01/04/2024       Magnesium:    Lab Results   Component Value Date    MG 2.00 08/16/2022       Lipid Profile:    Lab Results   Component Value Date    TRIG 131 02/01/2023    HDL 28.8 (A) 02/01/2023       TSH:    Lab Results   Component Value Date    TSH 2.65 07/27/2022       BNP:   Lab Results   Component Value Date    BNP 23 07/05/2019        PT/INR:    Lab Results   Component Value Date    PROTIME 13.6 (H) 11/13/2023    INR 1.2 (H) 11/13/2023       HgBA1c:    No results found for: \"HGBA1C\"    BMP:  Lab Results   Component Value Date     01/04/2024     11/21/2023     11/13/2023    K 4.1 01/04/2024    K 3.7 11/21/2023    K 3.8 11/13/2023     01/04/2024     11/21/2023     11/13/2023    CO2 27 01/04/2024    CO2 23 11/21/2023    CO2 25 11/13/2023    BUN 29 (H) 01/04/2024    BUN 31 (H) 11/21/2023    BUN 27 (H) 11/13/2023    CREATININE 1.44 (H) 01/04/2024    CREATININE 1.24 11/21/2023    CREATININE 1.48 (H) 11/13/2023       CBC:  Lab Results   Component Value Date    WBC 3.2 (L) 01/23/2024    WBC 4.0 (L) 01/16/2024    WBC 3.8 (L) 01/04/2024    RBC 4.17 (L) 01/23/2024    RBC 4.27 (L) 01/16/2024    RBC 4.29 (L) 01/04/2024    HGB 13.1 (L) 01/23/2024    HGB 13.3 (L) 01/16/2024    HGB 13.1 (L) 01/04/2024    HGB 11.1 (A) 10/31/2023    HGB 11.8 (A) 09/26/2023    HCT 40.0 (L) 01/23/2024    HCT 40.9 (L) 01/16/2024    HCT 40.5 (L) 01/04/2024    MCV 96 01/23/2024    MCV 96 01/16/2024    MCV 94 01/04/2024    MCH 31.4 01/23/2024    MCH 31.1 01/16/2024    MCH 30.5 01/04/2024    MCHC 32.8 01/23/2024    MCHC 32.5 01/16/2024    MCHC 32.3 01/04/2024    RDW 17.5 (H) 01/23/2024    RDW 18.0 (H) 01/16/2024    RDW 18.1 (H) 01/04/2024    PLT 57 (L) 01/23/2024    PLT 60 (L) 01/16/2024    PLT 53 (L) 01/04/2024       Cardiac Enzymes:    No results found for: " "\"TROPHS\"    Hepatic Function Panel:    Lab Results   Component Value Date    ALKPHOS 77 01/04/2024    ALT 30 01/04/2024    AST 27 01/04/2024    PROT 6.3 (L) 01/04/2024    PROT 6.2 (L) 01/04/2024    BILITOT 1.1 01/04/2024         Diagnostic Studies:   MRN: [id number]  Patient Name: CALOS BONILLA     STUDY:  MYOCARDIAL PERFUSION STRESS TEST WITH LEXISCAN     Performing facility:  [facility],  [address] [place] [address] [address] [id number]  [facility] Provider:  Waylon Benjamin DO, [facility]  PCP:  Dr. DORON SORTO  Supervising provider:  Osvaldo Burks MD, [facility]     INDICATION:  CAD; S/P PTCA     HISTORY:  Gender:  M; Age:  82 y/o ; Height:  187.9 cm; Weight:  138.884911 kg.     High Cholesterol;  CAD;  Arrhythmias;  Family HX CAD;  THORACIC AAA  CKD  H/O Ablation     Quit smoking 55 years ago.     PTCA.     COMPARISON:  Previous nuclear testing completed [id number] at [facility].        ACCESSION NUMBER(S):  [id number]; [id number]; [id number]     ORDERING CLINICIAN:  WAYLON BENJAMIN     TECHNIQUE:  TWO DAY protocol.  Stress injection: Date:11/08/22, 34.6 mCi of Myoview IV 20 seconds  after rapid injection of Lexiscan.  Rest injection: Date: 11/14/22, 26.2 mCi of Myoview IV at rest.  The patient had a rapid injection of  0.4 mg of Lexiscan IV over 10  seconds.  Imaging was performed by  gated tomographic technique.  Reason for Lexiscan:  CANE     STRESS TEST DATA:  Resting heart rate was 52 BPM.  Resting blood pressure was 160/72 mmHg.  Peak blood pressure was 146/64 mmHg.  Peak heart rate was 72 BPM.     TEST TERMINATED DUE TO:  Protocol completed     FINDINGS:  STRESS TEST RESULTS:     Resting electrocardiogram revealed sinus bradycardia with  first-degree AV block and right bundle branch block.  There were no significant ischemic ECG changes or dysrhythmias.  The patient did not have chest pains/symptoms during procedure.  There was a normal recovery phase.     IMAGING RESULTS:     Image quality was good.  Rest " "and stress tomographic images were reviewed and revealed normal  perfusion without evidence of ischemia, myocardial infarction, or  left ventricular dilatation with stress.  Overall left ventricular systolic function appeared to be normal  without regional wall motion abnormalities.  Ejection fraction was 57%.  TID is 1.01 and is normal.  There was evidence of diaphragmatic attenuation artifact.     IMPRESSION:  Normal Lexiscan Myoview cardiac perfusion stress test.  No evidence of ischemia or myocardial infarction by perfusion imaging.  Normal left ventricular systolic function, ejection fraction 57%.  When compared to prior study from August 2019, there has been no  significant changes.  None invasive risk stratification is low risk.  No results found.    EKG:   No results found for: \"EKG\"     [address], [address], [place] [address]           Tel [phone] Fax [phone]     TRANSTHORACIC ECHOCARDIOGRAM REPORT        Patient Name:     CALOS ALLISON     Flip Physician:   [id number] Waylon BONILLA DO  Study Date:       9/30/2022           Referring Physician: WAYLON SALCIDO  MRN/PID:          [id number]            PCP:                 [id number] Joseph DO  Accession/Order#: [id number]        Department Location: [facility]  YOB: 1940          Fellow:  Gender:           M                   Nurse:  Admit Date:       9/30/2022           Sonographer:         Waylon Enriquez  Admission Status: Outpatient          Additional Staff:  Height:           187.96 cm           CC Report to:  Weight:           134.72 kg           Study Type:          Echocardiogram  BSA:              2.57 m2  Blood Pressure: 135 /65 mmHg     Diagnosis/ICD: " I25.10-Atherosclerotic heart disease of native coronary artery                 without angina pectoris; Z98.61-Coronary angioplasty status                 (PTCA); G47.30-Sleep apnea, unspecified; I71.2-Thoracic aortic                 aneurysm, without rupture  Indication:    CAD w/ PTCA, Sleep Apnea, Thoracic AO Aneurysm  Procedure/CPT: Echo Complete w Full [id number]     Patient History:  Pertinent History: CAD and PTCA, Sleep Apnea, Tho. AO Aneurysm, Atrial Flutter.     Study Detail: The following Echo studies were performed: 2D, M-Mode, Doppler and                color flow. Technically challenging study due to body habitus and                Unable to control breathing. The patient was awake.        PHYSICIAN INTERPRETATION:  Left Ventricle: Left ventricular systolic function is normal, with an estimated ejection fraction of 60%. There are no regional wall motion abnormalities. The left ventricular cavity size is normal. There is mild concentric left ventricular hypertrophy. Spectral Doppler shows an impaired relaxation pattern of left ventricular diastolic filling.  LV Wall Scoring:  All segments are normal.     Left Atrium: The left atrium is normal in size.  Right Ventricle: The right ventricle is moderately enlarged. There is normal right ventricular global systolic function.  Right Atrium: The right atrium is mild to moderately dilated.  Aortic Valve: The aortic valve appears structurally normal. The aortic valve appears tricuspid. There is no evidence of aortic valve stenosis.  There is mild aortic valve regurgitation. The peak instantaneous gradient of the aortic valve is 8.8 mmHg. The mean gradient of the aortic valve is 5.0 mmHg.  Mitral Valve: The mitral valve is normal in structure. There is no evidence of mitral valve stenosis. The doppler estimated mean and peak diastolic pressure gradients are 1.0 mmHg and 4.3 mmHg respectively. There is normal mitral valve leaflet mobility. There is mild mitral  valve regurgitation.  Tricuspid Valve: The tricuspid valve is structurally normal. There is normal tricuspid valve leaflet mobility. There is mild tricuspid regurgitation.  Pulmonic Valve: The pulmonic valve is not well visualized. There is no indication of pulmonic valve regurgitation.  Pericardium: There is no pericardial effusion noted.  Aorta: The aortic root is abnormal. There is moderate dilatation of the ascending aorta. There is moderate dilatation the aortic root.  Pulmonary Artery: The main pulmonary artery is normal in size, and position, with normal bifurcation into the left and right pulmonary arteries.  Systemic Veins: The inferior vena cava appears to be of normal size.  In comparison to the previous echocardiogram(s): Prior examinations are available and were reviewed for comparison purposes. Somewhat similar to 2019 study.        CONCLUSIONS:   1. Left ventricular systolic function is normal with a 60% estimated ejection fraction.   2. Spectral Doppler shows an impaired relaxation pattern of left ventricular diastolic filling.   3. Moderately enlarged right ventricle.   4. The right atrium is mild to moderately dilated.   5. There is no evidence of mitral valve stenosis.   6. Mild mitral valve regurgitation.   7. Mild tricuspid regurgitation is visualized.   8. Aortic valve stenosis is not present.   9. Mild aortic valve regurgitation.  10. The main pulmonary artery is normal in size, and position, with normal bifurcation into the left and right pulmonary arteries.  11. There is moderate dilatation of the aortic root.  12. There is moderate dilatation of the ascending aorta.  Radiology:     No orders to display       Assessment/Plan:     Diagnoses and all orders for this visit:  CAD S/P percutaneous coronary angioplasty  Hyperlipemia, mixed  Primary hypertension  Aneurysm of ascending aorta without rupture (CMS/HCC)  Sinus node dysfunction (CMS/HCC)  PVD (peripheral vascular disease)  (CMS/HCC)  Atrial flutter, unspecified type (CMS/HCC)  Morbid obesity with BMI of 40.0-44.9, adult (CMS/HCC)  Stage 3a chronic kidney disease (CMS/HCC)  Sleep apnea, unspecified type  Former smoker      Patient Active Problem List   Diagnosis    Acute pain of right shoulder    Insomnia    Anemia    Atrial flutter (CMS/HCC)    Bilateral sensorineural hearing loss    Bradycardia    Cervical spondylosis with myelopathy    CAD S/P percutaneous coronary angioplasty    Chronic kidney disease (CKD), stage III (moderate) (CMS/HCC)    Depression, recurrent (CMS/HCC)    Diarrhea    Fatigue    Folic acid deficiency    Gait abnormality    Hip pain, right    Pain in both lower extremities    Hyperlipemia, mixed    Hyperuricemia    Ischemic foot pain at rest    Leukoplakia of tongue    Muscle cramps    Aneurysm (CMS/HCC)    Hypertension    PVD (peripheral vascular disease) (CMS/[facility])    Renal insufficiency    Restless leg syndrome    Rib pain on right side    Sleep apnea    Lumbar radiculopathy    Spondylosis of lumbar spine    Stiffness of right shoulder joint    Vitamin B12 deficiency    Thoracic ascending aortic aneurysm (CMS/HCC)    Increased urinary frequency    Incontinence    Sinus node dysfunction (CMS/HCC)    Abdominal pain, chronic, right upper quadrant    Calculus of gallbladder without cholecystitis without obstruction    Chronic cholecystitis    Urge incontinence of urine    Weak urinary stream    Urinary incontinence    Edema    Anxiety    Benign neoplasm    Chronic back pain    Dermatochalasis of both eyelids    Dextroscoliosis    Estevez catheter problem (CMS/HCC)    Gastroesophageal reflux disease without esophagitis    Glaucoma    Incomplete emptying of bladder    Localized osteoarthrosis    Lumbosacral spondylosis without myelopathy    MGD (meibomian gland dysfunction)    Osteoarthritis of right knee    Osteoarthritis of spine with radiculopathy, lumbar region    Pain in right ankle and joints of right foot     Pain in joint involving pelvic region and thigh    Primary open-angle glaucoma, bilateral, mild stage    RPE mottling of macula    ELIZABETH (stress urinary incontinence), male    Lumbar stenosis    Pseudoaneurysm (CMS/HCC)    Former smoker    BMI 38.0-38.9,adult    PFD (pelvic floor dysfunction)    Morbid obesity with BMI of 40.0-44.9, adult (CMS/[facility])         ASSESSMENT   83-year-old gentleman here for routine cardiovascular follow-up.    Meds, vitals, examination are as noted.    Was reviewed in detail discussed the patient at length.    Impression:    Diagnoses and all orders for this visit:  CAD S/P percutaneous coronary angioplasty  Hyperlipemia, mixed  Primary hypertension  Aneurysm of ascending aorta without rupture (CMS/HCC)  Sinus node dysfunction (CMS/HCC)  PVD (peripheral vascular disease) (CMS/[facility])  Atrial flutter, unspecified type (CMS/HCC)  Morbid obesity with BMI of 40.0-44.9, adult (CMS/[facility])  Stage 3a chronic kidney disease (CMS/[facility])  Sleep apnea, unspecified type  Former smoker  Status postDual-chamber permanent pacer  Urinary incontinence  PLAN   Commendation:  Will attempt to used 20 of Lasix daily  Patient will follow up with me in 4 to 6 weeks  This should help control his minor hypertension as well  Will be prepared to come in for preoperative assessment when this arises for urological and orthopedic matters  Follow-up with Dr. Solis for his pacemaker  Call if any other problems or issues otherwise arise.

## 2024-02-16 ENCOUNTER — TELEPHONE (OUTPATIENT)
Dept: HEMATOLOGY/ONCOLOGY | Facility: CLINIC | Age: 84
End: 2024-02-16
Payer: MEDICARE

## 2024-02-16 ENCOUNTER — LAB (OUTPATIENT)
Dept: LAB | Facility: CLINIC | Age: 84
End: 2024-02-16
Payer: MEDICARE

## 2024-02-16 DIAGNOSIS — D69.6 THROMBOCYTOPENIA (CMS-HCC): ICD-10-CM

## 2024-02-16 LAB
BASOPHILS # BLD AUTO: 0 X10*3/UL (ref 0–0.1)
BASOPHILS NFR BLD AUTO: 0 %
EOSINOPHIL # BLD AUTO: 0.01 X10*3/UL (ref 0–0.4)
EOSINOPHIL NFR BLD AUTO: 0.2 %
ERYTHROCYTE [DISTWIDTH] IN BLOOD BY AUTOMATED COUNT: 17.7 % (ref 11.5–14.5)
HCT VFR BLD AUTO: 37.8 % (ref 41–52)
HGB BLD-MCNC: 12.3 G/DL (ref 13.5–17.5)
HOLD SPECIMEN: NORMAL
HOLD SPECIMEN: NORMAL
IMM GRANULOCYTES # BLD AUTO: 0.05 X10*3/UL (ref 0–0.5)
IMM GRANULOCYTES NFR BLD AUTO: 0.9 % (ref 0–0.9)
LYMPHOCYTES # BLD AUTO: 1.45 X10*3/UL (ref 0.8–3)
LYMPHOCYTES NFR BLD AUTO: 24.7 %
MCH RBC QN AUTO: 31 PG (ref 26–34)
MCHC RBC AUTO-ENTMCNC: 32.5 G/DL (ref 32–36)
MCV RBC AUTO: 95 FL (ref 80–100)
MONOCYTES # BLD AUTO: 0.39 X10*3/UL (ref 0.05–0.8)
MONOCYTES NFR BLD AUTO: 6.7 %
NEUTROPHILS # BLD AUTO: 3.96 X10*3/UL (ref 1.6–5.5)
NEUTROPHILS NFR BLD AUTO: 67.5 %
NRBC BLD-RTO: ABNORMAL /100{WBCS}
OVALOCYTES BLD QL SMEAR: NORMAL
PLATELET # BLD AUTO: 62 X10*3/UL (ref 150–450)
POLYCHROMASIA BLD QL SMEAR: NORMAL
RBC # BLD AUTO: 3.97 X10*6/UL (ref 4.5–5.9)
RBC MORPH BLD: NORMAL
WBC # BLD AUTO: 5.9 X10*3/UL (ref 4.4–11.3)

## 2024-02-16 PROCEDURE — 36415 COLL VENOUS BLD VENIPUNCTURE: CPT

## 2024-02-16 PROCEDURE — 85025 COMPLETE CBC W/AUTO DIFF WBC: CPT

## 2024-02-16 NOTE — TELEPHONE ENCOUNTER
Pt in for lab check today.  He completed 3 days of prednisone and returned today for recheck of plt.  Dr. Murphy reviewed labs, plt count without any change despite plt.  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  Dr. Murphy spoke with pt. Briefly.  He was provided a copy of the lab results.  He is aware with platelets in the 60-70k range he may need platelet transfusion prior to any surgical procedures.  He and spouse voiced understanding.  He was given a copy of the LLS booklet on MDS.  He has a follow up scheduled for AUG.  He and spouse had no other questions.

## 2024-02-20 ENCOUNTER — APPOINTMENT (OUTPATIENT)
Dept: UROLOGY | Facility: CLINIC | Age: 84
End: 2024-02-20
Payer: MEDICARE

## 2024-02-20 ENCOUNTER — PROCEDURE VISIT (OUTPATIENT)
Dept: UROLOGY | Facility: CLINIC | Age: 84
End: 2024-02-20
Payer: MEDICARE

## 2024-02-20 VITALS
HEIGHT: 71 IN | SYSTOLIC BLOOD PRESSURE: 131 MMHG | DIASTOLIC BLOOD PRESSURE: 80 MMHG | BODY MASS INDEX: 42.14 KG/M2 | HEART RATE: 80 BPM | WEIGHT: 301 LBS

## 2024-02-20 DIAGNOSIS — N39.41 URGE INCONTINENCE OF URINE: ICD-10-CM

## 2024-02-20 DIAGNOSIS — N39.3 SUI (STRESS URINARY INCONTINENCE), MALE: Primary | ICD-10-CM

## 2024-02-20 PROCEDURE — 99214 OFFICE O/P EST MOD 30 MIN: CPT | Performed by: UROLOGY

## 2024-02-20 PROCEDURE — 52000 CYSTOURETHROSCOPY: CPT | Performed by: UROLOGY

## 2024-02-20 NOTE — PROGRESS NOTES
"Subjective   Patient ID: GENARO Burgess is a 83 y.o. male who presents for Cystoscopy.  2/2/2024: He has been having urine leakage for several years. He has not taken any medication for this. He lives with his wife. He is able to climb stairs. He does not use the restroom to urinate  normally. The leakage is what bothers him most. He has had a scope done in the past. He has started wearing pads. He is currently doing pelvic floor therapy and his next appointment is in March.   Interval history:      Extracted from Walker's note:  Patient is a 82-year-old male with a past medical history significant for coronary artery disease, hyperlipidemia, hypertension, peripheral vascular disease, GERD, chronic kidney disease stage III, depression, anxiety, prostate cancer status postradiation who presents for the evaluation of urinary incontinence  Patient underwent procedure for TUIP in June 2023  He is a prostates cancer survivor and had radiations more than 20 years ago.   Subsequently developed stress incontinence which appears to be severe (more than 7 pads)  Patient has not tried any interventions at this point including physical therapy  He is currently receiving hyperbaric oxygen therapy for an isolated episode of gross hematuria which led to urinary retention         Informed consent was obtained.  He was prepped and draped in a standard sterile fashion.  Flexible cystoscope was advanced per urethra.  Pendulous urethra was normal.  Bulbar urethra was normal.  External sphincter had poor strength 2/4.  Prostate was small and signs of radiation.  There was minor radionecrosis and dystrophic calcification. Bladder was entered and distended.  There were no intravesical lesions, foreign bodies, stones, or tumors.  The scope was removed.  Patient tolerated the procedure well. After that we had him stand up and he had large volume incontinence .     Review of Systems    Objective   /80   Pulse 80   Ht 1.803 m (5' 11\")  "  Wt 137 kg (301 lb)   BMI 41.98 kg/m²     Physical Exam  Constitutional: Patient appears well-developed and well-nourished. No acute distress.     Pulmonary/Chest: Effort normal. No respiratory distress.   Abdominal: obese.  : gravitational incontinence  Integumentary: No rash or lesions.  Psychiatric: Normal mood and affect. Behavior is normal. Thought content normal.     Assessment/Plan   Problem List Items Addressed This Visit    Discussed artifical sphincter surgery in depth.  Provided handout so he can research the surgery at home. Schedule the surgery at your convenience.     He has gravitational incontinence. He has ELIZABETH + UUI. Offered AUS. Will subsequently require bladder relaxation meds as well     Scribe Attestation  By signing my name below, IRoberta , Scribe   attest that this documentation has been prepared under the direction and in the presence of Jamarcus Phan MD.

## 2024-02-21 DIAGNOSIS — R32 URINARY INCONTINENCE, UNSPECIFIED TYPE: ICD-10-CM

## 2024-02-21 DIAGNOSIS — F51.01 PRIMARY INSOMNIA: ICD-10-CM

## 2024-02-21 RX ORDER — TEMAZEPAM 15 MG/1
15 CAPSULE ORAL NIGHTLY PRN
Qty: 90 CAPSULE | Refills: 0 | Status: SHIPPED | OUTPATIENT
Start: 2024-02-21

## 2024-02-25 PROBLEM — D69.6 THROMBOCYTOPENIA (CMS-HCC): Status: ACTIVE | Noted: 2024-02-25

## 2024-02-25 PROBLEM — M10.9 GOUT: Status: ACTIVE | Noted: 2024-02-25

## 2024-02-25 ASSESSMENT — ENCOUNTER SYMPTOMS
EYES NEGATIVE: 1
ENDOCRINE NEGATIVE: 1
HEMATOLOGIC/LYMPHATIC NEGATIVE: 1
PSYCHIATRIC NEGATIVE: 1
RESPIRATORY NEGATIVE: 1
MUSCULOSKELETAL NEGATIVE: 1
CARDIOVASCULAR NEGATIVE: 1
NEUROLOGICAL NEGATIVE: 1
GASTROINTESTINAL NEGATIVE: 1
CONSTITUTIONAL NEGATIVE: 1

## 2024-02-28 ENCOUNTER — OFFICE VISIT (OUTPATIENT)
Dept: CARDIOLOGY | Facility: CLINIC | Age: 84
End: 2024-02-28
Payer: MEDICARE

## 2024-02-28 ENCOUNTER — HOSPITAL ENCOUNTER (OUTPATIENT)
Dept: RADIOLOGY | Facility: HOSPITAL | Age: 84
Discharge: HOME | End: 2024-02-28
Payer: MEDICARE

## 2024-02-28 ENCOUNTER — HOSPITAL ENCOUNTER (OUTPATIENT)
Dept: CARDIOLOGY | Facility: HOSPITAL | Age: 84
Discharge: HOME | End: 2024-02-28
Payer: MEDICARE

## 2024-02-28 VITALS
HEART RATE: 69 BPM | BODY MASS INDEX: 39.01 KG/M2 | WEIGHT: 304 LBS | HEIGHT: 74 IN | DIASTOLIC BLOOD PRESSURE: 62 MMHG | SYSTOLIC BLOOD PRESSURE: 112 MMHG

## 2024-02-28 DIAGNOSIS — I49.5 SINUS NODE DYSFUNCTION (MULTI): ICD-10-CM

## 2024-02-28 DIAGNOSIS — I10 PRIMARY HYPERTENSION: ICD-10-CM

## 2024-02-28 DIAGNOSIS — I48.3 TYPICAL ATRIAL FLUTTER (MULTI): ICD-10-CM

## 2024-02-28 DIAGNOSIS — Z95.0 PACEMAKER: Primary | ICD-10-CM

## 2024-02-28 DIAGNOSIS — R00.1 BRADYCARDIA: ICD-10-CM

## 2024-02-28 DIAGNOSIS — Z95.0 PACEMAKER: ICD-10-CM

## 2024-02-28 DIAGNOSIS — Z87.891 FORMER SMOKER: ICD-10-CM

## 2024-02-28 PROBLEM — Z98.890 HISTORY OF RADIOFREQUENCY ABLATION (RFA) PROCEDURE FOR CARDIAC ARRHYTHMIA: Status: ACTIVE | Noted: 2024-02-28

## 2024-02-28 PROBLEM — E66.01 MORBID OBESITY WITH BMI OF 40.0-44.9, ADULT (MULTI): Status: RESOLVED | Noted: 2024-02-15 | Resolved: 2024-02-28

## 2024-02-28 PROBLEM — Z85.46 PERSONAL HISTORY OF MALIGNANT NEOPLASM OF PROSTATE: Status: ACTIVE | Noted: 2018-03-28

## 2024-02-28 PROBLEM — R42 DIZZINESS AND GIDDINESS: Status: ACTIVE | Noted: 2018-03-28

## 2024-02-28 LAB
CELLS ANALYZED: 20 CELLS
CHROM ANALY OVERALL INTERP-IMP: NORMAL
CHROMOS CYTO BASIC ASSOC OBS PNL BLD/T: 5 CELLS
CHROMOSOME ANALYSIS MASTER PANEL: 0 CELLS
CHROMOSOME ANALYSIS MASTER PANEL: 2 CELLS
CHROMOSOME ANALYSIS MASTER PANEL: 47 CHROMOSOMES
CHROMOSOME ANALYSIS MASTER PANEL: NORMAL
ELECTRONICALLY SIGNED BY CYTOGENETICS: NORMAL
ISCN BAND LEVEL QL: 400 BANDS
KARYOTYP MAR: 3 CELLS
TOTAL CELLS COUNTED MAR: 20 CELLS

## 2024-02-28 PROCEDURE — 71046 X-RAY EXAM CHEST 2 VIEWS: CPT

## 2024-02-28 PROCEDURE — 93000 ELECTROCARDIOGRAM COMPLETE: CPT | Mod: DISTINCT PROCEDURAL SERVICE | Performed by: INTERNAL MEDICINE

## 2024-02-28 PROCEDURE — 93280 PM DEVICE PROGR EVAL DUAL: CPT

## 2024-02-28 PROCEDURE — 99215 OFFICE O/P EST HI 40 MIN: CPT | Performed by: INTERNAL MEDICINE

## 2024-02-28 PROCEDURE — 71046 X-RAY EXAM CHEST 2 VIEWS: CPT | Performed by: RADIOLOGY

## 2024-02-28 PROCEDURE — 3074F SYST BP LT 130 MM HG: CPT | Performed by: INTERNAL MEDICINE

## 2024-02-28 PROCEDURE — 93280 PM DEVICE PROGR EVAL DUAL: CPT | Performed by: INTERNAL MEDICINE

## 2024-02-28 PROCEDURE — 93290 INTERROG DEV EVAL ICPMS IP: CPT | Performed by: INTERNAL MEDICINE

## 2024-02-28 PROCEDURE — 1126F AMNT PAIN NOTED NONE PRSNT: CPT | Performed by: INTERNAL MEDICINE

## 2024-02-28 PROCEDURE — 1036F TOBACCO NON-USER: CPT | Performed by: INTERNAL MEDICINE

## 2024-02-28 PROCEDURE — 3078F DIAST BP <80 MM HG: CPT | Performed by: INTERNAL MEDICINE

## 2024-02-28 PROCEDURE — 1159F MED LIST DOCD IN RCRD: CPT | Performed by: INTERNAL MEDICINE

## 2024-02-28 ASSESSMENT — ENCOUNTER SYMPTOMS
PALPITATIONS: 0
DYSPNEA ON EXERTION: 0

## 2024-02-28 NOTE — PATIENT INSTRUCTIONS
Continue same medications/treatment.  Patient educated on proper medication use.  Patient educated on risk factor modification.  Please bring any lab results from other providers/physicians to your next appointment.    Please bring all medicines, vitamins, and herbal supplements with you when you come to the office.    Prescriptions will not be filled unless you are compliant with your follow up appointments or have a follow up appointment scheduled as per instruction of your physician. Refills should be requested at the time of your visit.    Follow up with Dr. Dent in 6 months with device check  Continue remote checks at 3 and 9 months    RANGEL DANIELLE RN, AM SCRIBING FOR, AND IN THE PRESENCE OF DR. ANA PAULA DENT MD

## 2024-02-28 NOTE — PROGRESS NOTES
CARDIOLOGY OFFICE VISIT      CHIEF COMPLAINT  Chief Complaint   Patient presents with    Device Check       HISTORY OF PRESENT ILLNESS  HPI    83-year-old  male who is followed for paroxysmal atrial flutter status post radiofrequency catheter ablation on March 22, 2018 with no clinical recurrence. He developed a right femoral pseudoaneurysm and underwent repair with endovascular stent graft and evacuation of a right thigh hematoma on April 8, 2018. He has underlying sinus node dysfunction, minimal coronary disease per left heart catheterization, and normal left ventricular function. He presents to the office today for follow-up evaluation of sinus node dysfunction.     Patient had an echocardiogram in October 2022 that shows left ventricular contraction 60% with mild to moderate mitral regurgitation moderate tricuspid regurgitation and moderate dilatation of the aortic root and ascending aorta. Patient underwent a chest MRI that shows aortic root 44 mm, ascending aortic area 40 mm unchanged from prior MRI.     Patient states that he is under treatment for bladder issues with hyperbaric chamber.  One of the days that he was on his way for his treatment, he had an episode of dizziness and lightheadedness.  He was found to have a heart rate in the 40s.  Procedure was canceled.     Since then he has been noticing that most of the times his heart rate is bouncing between 40 to 50 bpm.  He underwent a dual-chamber pacemaker in November 2023 with no complications.    During the pacemaker evaluation, patient had evidence of thrombocytopenia.  He was referred to hematology service.  Apparently they are discussing the option of myelodysplastic syndrome.  Bone marrow biopsy was performed recently.  Pending results.    Overall patient is doing much better.  He states that after pacemaker was implanted he has more energy to do things.    EKG performed today shows atrial paced rhythm left axis deviation right bundle  branch block at a rate of 69 bpm QRS duration 146 ms QT corrected 450 ms.  Rhythm strip shows the same pattern.    Patient had a device interrogation performed today at the device clinic and it shows adequate sensing, capture and impedances of all leads.  Battery longevity 12 years 9 months.  [place] of atrial fibrillation 0.4% of the time with the longest duration 10 hours.  No ventricular arrhythmias.        Past Medical History  Past Medical History:   Diagnosis Date    Abdominal pain, chronic, right upper quadrant     ACP (advance care planning)     Anemia     Aneurysm (CMS/[facility])     Body mass index (BMI) 39.0-39.9, adult 12/06/2021    BMI 39.0-39.9,adult    Body mass index (BMI) 39.0-39.9, adult 07/11/2022    BMI 39.0-39.9,adult    Body mass index (BMI) 39.0-39.9, adult 04/24/2022    Body mass index (BMI) of 39.0 to 39.9 in adult    Body mass index (BMI) 39.0-39.9, adult 04/24/2022    Body mass index (BMI) of 39.0 to 39.9 in adult    Body mass index (BMI) 39.0-39.9, adult 04/24/2022    Body mass index (BMI) of 39.0 to 39.9 in adult    Cramp and spasm 07/19/2022    Leg cramps    Difficulty breathing     Encounter for general adult medical examination without abnormal findings 09/25/2020    Encounter for Medicare annual wellness exam    Encounter for screening for malignant neoplasm of prostate 04/14/2021    Encounter for prostate cancer screening    Encounter for screening for malignant neoplasm of prostate 09/25/2020    Encounter for prostate cancer screening    Forearm injury     Hx of atrial flutter     Hyperlipidemia     Hypertension     Hyperuricemia     Incontinence     Obesity, unspecified 04/26/2022    Class 2 obesity with body mass index (BMI) of 39.0 to 39.9 in adult    Other symptoms and signs involving the musculoskeletal system 10/12/2022    Shoulder weakness    Personal history of other diseases of the circulatory system 09/29/2021    History of hypotension    Personal history of other diseases of  the circulatory system 10/26/2021    History of hypertension    Personal history of other endocrine, nutritional and metabolic disease 2021    History of morbid obesity    Personal history of other specified conditions 2021    History of dizziness    Rotator cuff injury     Sinus node dysfunction (CMS/HCC)     Valvular heart disease        Social History  Social History     Tobacco Use    Smoking status: Former     Packs/day: 3.00     Years: 10.00     Additional pack years: 0.00     Total pack years: 30.00     Types: Cigarettes     Quit date: 1968     Years since quittin.5     Passive exposure: Never    Smokeless tobacco: Never   Substance Use Topics    Alcohol use: Never    Drug use: Never       Family History     Family History   Problem Relation Name Age of Onset    Other (polio) Mother      Heart disease Father      Heart disease Brother      Polymyalgia rheumatica Brother      Other (mitral valve disorder) Brother      Other (Coronary artery bypass graft) Brother      Other (Arterisclerotic cardiovascular disease) Brother          Allergies:  Allergies   Allergen Reactions    Crestor [Rosuvastatin] Unknown    Ezetimibe Unknown    Statins-Hmg-Coa Reductase Inhibitors Unknown     leg cramping        Outpatient Medications:  Current Outpatient Medications   Medication Instructions    acetaminophen (TYLENOL) 650 mg, oral, Every 4 hours PRN    allopurinol (ZYLOPRIM) 100 mg, oral, 2 times daily    ascorbic acid (Vitamin C) 1,000 mg tablet 1 tablet, oral, Daily    aspirin 81 mg EC tablet 1 tablet, oral, Daily    atorvastatin (Lipitor) 10 mg tablet 1 tablet, oral, Nightly    calcitriol (ROCALTROL) 0.25 mcg, oral, Every other day    chlorthalidone (HYGROTON) 25 mg, oral, Daily    cholecalciferol (Vitamin D-3) 125 MCG (5000 UT) capsule 1 capsule, oral, Daily    cyanocobalamin (Vitamin B-12) 1,000 mcg tablet 1 tablet, oral, Daily    ezetimibe (ZETIA) 10 mg, oral, Daily    ferrous sulfate 325 (65 Fe)  MG tablet 1 tablet, oral, Daily    folic acid (FOLVITE) 1 mg, oral, Daily    furosemide (LASIX) 20 mg, oral, Daily    latanoprost (Xalatan) 0.005 % ophthalmic solution 1 drop, ophthalmic (eye), Nightly    MAGNESIUM ORAL 550 mg, oral, Nightly, Take 1 tablet 550 mg at night.    niacin (Niaspan) 500 mg ER tablet 1 tablet, oral, Nightly    NON FORMULARY 1 each, oral, Daily, Prevagen    rOPINIRole (REQUIP) 0.25 mg, oral, Nightly    temazepam (RESTORIL) 15 mg, oral, Nightly PRN    traMADol (ULTRAM) 50 mg, oral, Every 6 hours    TURMERIC ORAL 1 capsule, oral, Daily    valsartan (DIOVAN) 320 mg, oral, Daily    zinc sulfate 50 mg zinc (220 mg) tablet oral          REVIEW OF SYSTEMS  Review of Systems   Cardiovascular:  Negative for chest pain, dyspnea on exertion and palpitations.   All other systems reviewed and are negative.        VITALS  Vitals:    02/28/24 1029   BP: 112/62   Pulse: 69       PHYSICAL EXAM  Constitutional:       General: Awake.      Appearance: Normal and healthy appearance. Well-developed and not in distress.   Neck:      Vascular: No JVR. JVD normal.   Pulmonary:      Effort: Pulmonary effort is normal.      Breath sounds: Normal breath sounds. No wheezing. No rhonchi. No rales.   Chest:      Chest wall: Not tender to palpatation.      Comments: Left sided device pocket- healed and well approximated. No swelling or hematoma      Cardiovascular:      PMI at left midclavicular line. Normal rate. Regular rhythm. Normal S1. Normal S2.       Murmurs: There is no murmur.      No gallop.  No click. No rub.   Pulses:     Intact distal pulses.   Edema:     Peripheral edema absent.   Abdominal:      Tenderness: There is no abdominal tenderness.   Musculoskeletal: Normal range of motion.         General: No tenderness. Skin:     General: Skin is warm and dry.   Neurological:      General: No focal deficit present.      Mental Status: Alert and oriented to person, place and time.           ASSESSMENT AND  PLAN    Clinical impressions:  1. Atrial flutter status post radiofrequency catheter ablation on March 22, 2018 with no clinical recurrence.  2. Repair of a leaking right femoral pseudoaneurysm with endovascular stent graft and evacuation of a right thigh hematoma on April 3, 2018.  3. Normal left ventricular function per 2-D echo dated August 28, 2019.  Echocardiogram in 2022 shows left ventricular atrial fraction of 60%  4. Underlying sinus node dysfunction with EP study dated March 22, 2018 revealing mild sinus node dysfunction.  5. Minimal coronary disease per remote left heart catheterization.  6. Hypertension, controlled   7. Dyslipidemia on statin.  8. Obstructive sleep apnea on CPAP.  9. Morbid obesity with a BMI of 41.91.  10. 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  11.  Status post dual-chamber pacemaker implanted in 2023 no complications.  [employer] dual-chamber pacemaker [place] XT DR MRI  12.  Evidence of atrial fibrillation by device interrogation.      Plan recommendations    Patient is overall doing well from the electrophysiology standpoint.  I have personally discussed results of the device interrogation.  [place] of atrial fraction is still minimal but he may require anticoagulation therapy if burden increases.  If this is the case, we may have to talk to hematology service about clearance for use anticoagulation therapy.    Follow device clinic as scheduled.    No markers every 6 months or sooner if needed.    Risk factor modification and lifestyle modification discussed with patient. Diet , exercise and hydration discussed with patient.    I have personally review with patient during this office visit, laboratory data, echocardiogram results, stress test results, Holter-event monitor results prior and after the last electrophysiology visit. All questions has been answered.    Please excuse any errors  in grammar or translation related to this dictation.  Voice recognition software was utilized to prepare this document.

## 2024-03-01 DIAGNOSIS — R10.11 ABDOMINAL PAIN, CHRONIC, RIGHT UPPER QUADRANT: ICD-10-CM

## 2024-03-01 DIAGNOSIS — G89.29 ABDOMINAL PAIN, CHRONIC, RIGHT UPPER QUADRANT: ICD-10-CM

## 2024-03-01 LAB
LAB AP CONSOLIDATED THERANOSTIC REPORT: NORMAL
PATH REPORT.COMMENTS IMP SPEC: NORMAL
PATH REPORT.FINAL DX SPEC: NORMAL
PATH REPORT.GROSS SPEC: NORMAL
PATH REPORT.MICROSCOPIC SPEC OTHER STN: NORMAL
PATH REPORT.RELEVANT HX SPEC: NORMAL
PATH REPORT.TOTAL CANCER: NORMAL

## 2024-03-03 RX ORDER — TRAMADOL HYDROCHLORIDE 50 MG/1
50 TABLET ORAL EVERY 6 HOURS PRN
Qty: 30 TABLET | Refills: 0 | Status: SHIPPED | OUTPATIENT
Start: 2024-03-03 | End: 2024-04-18 | Stop reason: SDUPTHER

## 2024-03-05 RX ORDER — CELECOXIB 400 MG/1
400 CAPSULE ORAL ONCE
Status: CANCELLED | OUTPATIENT
Start: 2024-03-05 | End: 2024-03-05

## 2024-03-05 RX ORDER — SODIUM CHLORIDE 9 MG/ML
100 INJECTION, SOLUTION INTRAVENOUS CONTINUOUS
Status: CANCELLED | OUTPATIENT
Start: 2024-03-05

## 2024-03-05 RX ORDER — ACETAMINOPHEN 325 MG/1
975 TABLET ORAL ONCE
Status: CANCELLED | OUTPATIENT
Start: 2024-03-05 | End: 2024-03-05

## 2024-03-11 ENCOUNTER — APPOINTMENT (OUTPATIENT)
Dept: OTOLARYNGOLOGY | Facility: CLINIC | Age: 84
End: 2024-03-11
Payer: MEDICARE

## 2024-03-11 ENCOUNTER — TELEPHONE (OUTPATIENT)
Dept: HEMATOLOGY/ONCOLOGY | Facility: CLINIC | Age: 84
End: 2024-03-11

## 2024-03-11 DIAGNOSIS — D69.6 THROMBOCYTOPENIA (CMS-HCC): Primary | ICD-10-CM

## 2024-03-11 DIAGNOSIS — D46.9 MDS (MYELODYSPLASTIC SYNDROME) (MULTI): Primary | ICD-10-CM

## 2024-03-11 NOTE — TELEPHONE ENCOUNTER
I have him scheduled for 4/3 at 2pm.  He is aware there were updates to his bone marrow report and that Dr. Murphy wants to review these updates with him in person.  PT was amenable.  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  I need to present him in heme tumor board this Thursday.  He originally wanted to come back in 6 months.  Can he be brought back any time after this Thursday, so I can review treatment options for him.  He does have an upcoming urology procedure--I already told urologist that he may have to give him a one time platelet transfusion just before the procedure.    -per DR. Murphy

## 2024-03-14 ENCOUNTER — APPOINTMENT (OUTPATIENT)
Dept: HEMATOLOGY/ONCOLOGY | Facility: HOSPITAL | Age: 84
End: 2024-03-14
Payer: MEDICARE

## 2024-03-14 ENCOUNTER — APPOINTMENT (OUTPATIENT)
Dept: CARDIOLOGY | Facility: CLINIC | Age: 84
End: 2024-03-14
Payer: MEDICARE

## 2024-03-18 ENCOUNTER — OFFICE VISIT (OUTPATIENT)
Dept: CARDIOLOGY | Facility: CLINIC | Age: 84
End: 2024-03-18
Payer: MEDICARE

## 2024-03-18 VITALS
HEIGHT: 72 IN | BODY MASS INDEX: 41.68 KG/M2 | WEIGHT: 307.7 LBS | DIASTOLIC BLOOD PRESSURE: 62 MMHG | HEART RATE: 68 BPM | SYSTOLIC BLOOD PRESSURE: 118 MMHG

## 2024-03-18 DIAGNOSIS — I25.10 CAD S/P PERCUTANEOUS CORONARY ANGIOPLASTY: ICD-10-CM

## 2024-03-18 DIAGNOSIS — I73.9 PVD (PERIPHERAL VASCULAR DISEASE) (CMS-HCC): ICD-10-CM

## 2024-03-18 DIAGNOSIS — I48.92 ATRIAL FLUTTER, UNSPECIFIED TYPE (MULTI): ICD-10-CM

## 2024-03-18 DIAGNOSIS — I72.9 ANEURYSM (CMS-HCC): ICD-10-CM

## 2024-03-18 DIAGNOSIS — E78.2 HYPERLIPEMIA, MIXED: ICD-10-CM

## 2024-03-18 DIAGNOSIS — Z98.61 CAD S/P PERCUTANEOUS CORONARY ANGIOPLASTY: ICD-10-CM

## 2024-03-18 DIAGNOSIS — I10 PRIMARY HYPERTENSION: ICD-10-CM

## 2024-03-18 DIAGNOSIS — Z87.891 FORMER SMOKER: ICD-10-CM

## 2024-03-18 PROCEDURE — 1036F TOBACCO NON-USER: CPT | Performed by: INTERNAL MEDICINE

## 2024-03-18 PROCEDURE — 3074F SYST BP LT 130 MM HG: CPT | Performed by: INTERNAL MEDICINE

## 2024-03-18 PROCEDURE — 1159F MED LIST DOCD IN RCRD: CPT | Performed by: INTERNAL MEDICINE

## 2024-03-18 PROCEDURE — 99214 OFFICE O/P EST MOD 30 MIN: CPT | Performed by: INTERNAL MEDICINE

## 2024-03-18 PROCEDURE — 3078F DIAST BP <80 MM HG: CPT | Performed by: INTERNAL MEDICINE

## 2024-03-18 RX ORDER — FUROSEMIDE 20 MG/1
20 TABLET ORAL DAILY
Qty: 90 TABLET | Refills: 3 | Status: SHIPPED | OUTPATIENT
Start: 2024-03-18 | End: 2024-04-07 | Stop reason: HOSPADM

## 2024-03-18 NOTE — PROGRESS NOTES
Patient:  Holden Burgess  YOB: 1940  MRN: [id number]       Chief Complaint/Active Symptoms:     1 month follow up  Holden Burgess is a 83 y.o. male who returns today for cardiac follow-up.  Patient is feeling reasonably well.  He still walking with a cane.  He is planning various procedures including urological and orthopedic in the near future.  He has had no change cardiac wise.  His blood pressure was perfectly fine today.  He did not necessarily adjust his Lasix as we discussed before.  He is going to discuss further with urology the use of Lasix after his procedure.  Based on current status is cardiac clearance and is acceptable for the proposed procedures.      Objective:     Vitals:    03/18/24 1407   BP: 118/62   Pulse: 68       Vitals:    03/18/24 1407   Weight: 140 kg (307 lb 11.2 oz)       Allergies:     Allergies   Allergen Reactions    Crestor [Rosuvastatin] Unknown    Ezetimibe Unknown    Statins-Hmg-Coa Reductase Inhibitors Unknown     leg cramping          Medications:     Current Outpatient Medications   Medication Instructions    acetaminophen (TYLENOL) 650 mg, oral, Every 4 hours PRN    allopurinol (ZYLOPRIM) 100 mg, oral, 2 times daily    ascorbic acid (Vitamin C) 1,000 mg tablet 1 tablet, oral, Daily    aspirin 81 mg EC tablet 1 tablet, oral, Daily    atorvastatin (Lipitor) 10 mg tablet 1 tablet, oral, Nightly    calcitriol (ROCALTROL) 0.25 mcg, oral, Every other day    chlorthalidone (HYGROTON) 25 mg, oral, Daily    cholecalciferol (Vitamin D-3) 125 MCG (5000 UT) capsule 1 capsule, oral, Daily    cyanocobalamin (Vitamin B-12) 1,000 mcg tablet 1 tablet, oral, Daily    ezetimibe (ZETIA) 10 mg, oral, Daily    ferrous sulfate 325 (65 Fe) MG tablet 1 tablet, oral, Daily    folic acid (FOLVITE) 1 mg, oral, Daily    furosemide (LASIX) 20 mg, oral, Daily    latanoprost (Xalatan) 0.005 % ophthalmic solution 1 drop, ophthalmic (eye), Nightly    MAGNESIUM ORAL 550 mg, oral, Nightly,  Take 1 tablet 550 mg at night.    niacin (Niaspan) 500 mg ER tablet 1 tablet, oral, Nightly    NON FORMULARY 1 each, oral, Daily, Prevagen    rOPINIRole (REQUIP) 0.25 mg, oral, Nightly    temazepam (RESTORIL) 15 mg, oral, Nightly PRN    traMADol (ULTRAM) 50 mg, oral, Every 6 hours PRN    TURMERIC ORAL 1 capsule, oral, Daily    valsartan (DIOVAN) 320 mg, oral, Daily    zinc sulfate 50 mg zinc (220 mg) tablet oral       Physical Examination:   Constitutional:       Appearance: Healthy appearance. Not in distress.   Neck:      Vascular: No JVR. JVD normal.   Pulmonary:      Effort: Pulmonary effort is normal.      Breath sounds: Normal breath sounds. No wheezing. No rhonchi. No rales.   Chest:      Chest wall: Not tender to palpatation.   Cardiovascular:      PMI at left midclavicular line. Normal rate. Regular rhythm. Normal S1. Normal S2.       Murmurs: There is no murmur.      No gallop.  No click. No rub.      Comments: Pacemaker in upper left chest.  Pulses:     Intact distal pulses.   Edema:     Peripheral edema absent.   Abdominal:      General: Bowel sounds are normal.      Palpations: Abdomen is soft.      Tenderness: There is no abdominal tenderness.   Musculoskeletal: Normal range of motion.         General: No tenderness. Skin:     General: Skin is warm and dry.   Neurological:      General: No focal deficit present.      Mental Status: Alert and oriented to person, place and time.      Comments: Altered gait due to orthopedic issues            Lab:     CBC:   Lab Results   Component Value Date    WBC 5.9 02/16/2024    RBC 3.97 (L) 02/16/2024    HGB 12.3 (L) 02/16/2024    HGB 11.1 (A) 10/31/2023    HCT 37.8 (L) 02/16/2024    PLT 62 (L) 02/16/2024        CMP:    Lab Results   Component Value Date     01/04/2024    K 4.1 01/04/2024     01/04/2024    CO2 27 01/04/2024    BUN 29 (H) 01/04/2024    CREATININE 1.44 (H) 01/04/2024    GLUCOSE 116 (H) 01/04/2024    CALCIUM 9.2 01/04/2024       Magnesium:   "  Lab Results   Component Value Date    MG 2.00 08/16/2022       Lipid Profile:    Lab Results   Component Value Date    TRIG 131 02/01/2023    HDL 28.8 (A) 02/01/2023       TSH:    Lab Results   Component Value Date    TSH 2.65 07/27/2022       BNP:   Lab Results   Component Value Date    BNP 23 07/05/2019        PT/INR:    Lab Results   Component Value Date    PROTIME 13.6 (H) 11/13/2023    INR 1.2 (H) 11/13/2023       HgBA1c:    No results found for: \"HGBA1C\"    BMP:  Lab Results   Component Value Date     01/04/2024     11/21/2023     11/13/2023    K 4.1 01/04/2024    K 3.7 11/21/2023    K 3.8 11/13/2023     01/04/2024     11/21/2023     11/13/2023    CO2 27 01/04/2024    CO2 23 11/21/2023    CO2 25 11/13/2023    BUN 29 (H) 01/04/2024    BUN 31 (H) 11/21/2023    BUN 27 (H) 11/13/2023    CREATININE 1.44 (H) 01/04/2024    CREATININE 1.24 11/21/2023    CREATININE 1.48 (H) 11/13/2023       CBC:  Lab Results   Component Value Date    WBC 5.9 02/16/2024    WBC 3.2 (L) 01/23/2024    WBC 4.0 (L) 01/16/2024    RBC 3.97 (L) 02/16/2024    RBC 4.17 (L) 01/23/2024    RBC 4.27 (L) 01/16/2024    HGB 12.3 (L) 02/16/2024    HGB 13.1 (L) 01/23/2024    HGB 13.3 (L) 01/16/2024    HGB 11.1 (A) 10/31/2023    HGB 11.8 (A) 09/26/2023    HCT 37.8 (L) 02/16/2024    HCT 40.0 (L) 01/23/2024    HCT 40.9 (L) 01/16/2024    MCV 95 02/16/2024    MCV 96 01/23/2024    MCV 96 01/16/2024    MCH 31.0 02/16/2024    MCH 31.4 01/23/2024    MCH 31.1 01/16/2024    MCHC 32.5 02/16/2024    MCHC 32.8 01/23/2024    MCHC 32.5 01/16/2024    RDW 17.7 (H) 02/16/2024    RDW 17.5 (H) 01/23/2024    RDW 18.0 (H) 01/16/2024    PLT 62 (L) 02/16/2024    PLT 57 (L) 01/23/2024    PLT 60 (L) 01/16/2024       Cardiac Enzymes:    No results found for: \"TROPHS\"    Hepatic Function Panel:    Lab Results   Component Value Date    ALKPHOS 77 01/04/2024    ALT 30 01/04/2024    AST 27 01/04/2024    PROT 6.3 (L) 01/04/2024    PROT 6.2 (L) " "01/04/2024    BILITOT 1.1 01/04/2024         Diagnostic Studies:     ECG 12 Lead    Result Date: 2/28/2024  EKG performed today shows atrial paced rhythm left axis deviation right bundle branch block at a rate of 69 bpm QRS duration 146 ms QT corrected 450 ms.  Rhythm strip shows the same pattern.    XR chest 2 views    Result Date: 2/28/2024  Interpreted By:  Thanh Louis, STUDY: XR CHEST 2 VIEWS;  2/28/2024 9:16 am   INDICATION: Signs/Symptoms:Post PPM.   COMPARISON: 11/21/2023   ACCESSION NUMBER(S): [id number]   ORDERING CLINICIAN: ANA PAULA DENT   FINDINGS: CHEST/LUNGS: The cardiac and mediastinal silhouettes are unchanged in size and configuration. Dual lead cardiac device overlies the left hemithorax and is unchanged in position. No focal areas of consolidation are noted. No effusion or pneumothorax is seen.   Minimal areas of atelectasis/scarring the left base.   UPPER ABDOMEN: No remarkable upper abdominal findings.   OSSEOUS STRUCTURES: No acute changes.       No radiographic evidence of an acute cardiopulmonary process.   MACRO: None.   Signed by: Thanh Louis 2/28/2024 9:26 AM Dictation workstation:   [id number]      EKG:   No results found for: \"EKG\"    MRN: [id number]  Patient Name: CALOS BONILLA     STUDY:  MYOCARDIAL PERFUSION STRESS TEST WITH LEXISCAN     Performing facility:  [facility], [facility],  [address] [place] [address] [address] [place] [address] Provider:  Waylon Benjamin DO, [facility]  PCP:  Dr. DORON SORTO  Supervising provider:  Osvaldo Burks MD, [facility]     INDICATION:  CAD; S/P PTCA     HISTORY:  Gender:  M; Age:  82 y/o ; Height:  187.9 cm; Weight:  138.492635 kg.     High Cholesterol;  CAD;  Arrhythmias;  Family HX CAD;  THORACIC AAA  CKD  H/O Ablation     Quit smoking 55 years ago.     PTCA.     COMPARISON:  Previous nuclear testing completed [id number] at [facility].        ACCESSION NUMBER(S):  [id number]; [id number]; [id number]     ORDERING CLINICIAN:  WAYLON BENJAMIN   "   TECHNIQUE:  TWO DAY protocol.  Stress injection: Date:11/08/22, 34.6 mCi of Myoview IV 20 seconds  after rapid injection of Lexiscan.  Rest injection: Date: 11/14/22, 26.2 mCi of Myoview IV at rest.  The patient had a rapid injection of  0.4 mg of Lexiscan IV over 10  seconds.  Imaging was performed by  gated tomographic technique.  Reason for Lexiscan:  CANE     STRESS TEST DATA:  Resting heart rate was 52 BPM.  Resting blood pressure was 160/72 mmHg.  Peak blood pressure was 146/64 mmHg.  Peak heart rate was 72 BPM.     TEST TERMINATED DUE TO:  Protocol completed     FINDINGS:  STRESS TEST RESULTS:     Resting electrocardiogram revealed sinus bradycardia with  first-degree AV block and right bundle branch block.  There were no significant ischemic ECG changes or dysrhythmias.  The patient did not have chest pains/symptoms during procedure.  There was a normal recovery phase.     IMAGING RESULTS:     Image quality was good.  Rest and stress tomographic images were reviewed and revealed normal  perfusion without evidence of ischemia, myocardial infarction, or  left ventricular dilatation with stress.  Overall left ventricular systolic function appeared to be normal  without regional wall motion abnormalities.  Ejection fraction was 57%.  TID is 1.01 and is normal.  There was evidence of diaphragmatic attenuation artifact.     IMPRESSION:  Normal Lexiscan Myoview cardiac perfusion stress test.  No evidence of ischemia or myocardial infarction by perfusion imaging.  Normal left ventricular systolic function, ejection fraction 57%.  When compared to prior study from August 2019, there has been no  significant changes.  None invasive risk stratification is low risk.                    [address], [address], [place] [address]           Tel [phone] Fax [phone]     TRANSTHORACIC ECHOCARDIOGRAM REPORT        Patient Name:     CALOS Castelan Physician:    [id number] Robert BONILLA DO  Study Date:       9/30/2022           Referring Physician: ROBERT SALCIDO  MRN/PID:          [id number]            PCP:                 [id number] Joseph DO  Accession/Order#: [id number]        Department Location: [facility]                                                             Anne Zarate  YOB: 1940          Fellow:  Gender:           M                   Nurse:  Admit Date:       9/30/2022           Sonographer:         Robert Enriquez  Admission Status: Outpatient          Additional Staff:  Height:           187.96 cm           CC Report to:  Weight:           134.72 kg           Study Type:          Echocardiogram  BSA:              2.57 m2  Blood Pressure: 135 /65 mmHg     Diagnosis/ICD: I25.10-Atherosclerotic heart disease of native coronary artery                 without angina pectoris; Z98.61-Coronary angioplasty status                 (PTCA); G47.30-Sleep apnea, unspecified; I71.2-Thoracic aortic                 aneurysm, without rupture  Indication:    CAD w/ PTCA, Sleep Apnea, Thoracic AO Aneurysm  Procedure/CPT: Echo Complete w Full [id number]     Patient History:  Pertinent History: CAD and PTCA, Sleep Apnea, Tho. AO Aneurysm, Atrial Flutter.     Study Detail: The following Echo studies were performed: 2D, M-Mode, Doppler and                color flow. Technically challenging study due to body habitus and                Unable to control breathing. The patient was awake.        PHYSICIAN INTERPRETATION:  Left Ventricle: Left ventricular systolic function is normal, with an estimated ejection fraction of 60%. There are no regional wall motion abnormalities. The left ventricular cavity size is normal. There is mild concentric left ventricular hypertrophy. Spectral Doppler shows an impaired relaxation pattern of left  ventricular diastolic filling.  LV Wall Scoring:  All segments are normal.     Left Atrium: The left atrium is normal in size.  Right Ventricle: The right ventricle is moderately enlarged. There is normal right ventricular global systolic function.  Right Atrium: The right atrium is mild to moderately dilated.  Aortic Valve: The aortic valve appears structurally normal. The aortic valve appears tricuspid. There is no evidence of aortic valve stenosis.  There is mild aortic valve regurgitation. The peak instantaneous gradient of the aortic valve is 8.8 mmHg. The mean gradient of the aortic valve is 5.0 mmHg.  Mitral Valve: The mitral valve is normal in structure. There is no evidence of mitral valve stenosis. The doppler estimated mean and peak diastolic pressure gradients are 1.0 mmHg and 4.3 mmHg respectively. There is normal mitral valve leaflet mobility. There is mild mitral valve regurgitation.  Tricuspid Valve: The tricuspid valve is structurally normal. There is normal tricuspid valve leaflet mobility. There is mild tricuspid regurgitation.  Pulmonic Valve: The pulmonic valve is not well visualized. There is no indication of pulmonic valve regurgitation.  Pericardium: There is no pericardial effusion noted.  Aorta: The aortic root is abnormal. There is moderate dilatation of the ascending aorta. There is moderate dilatation the aortic root.  Pulmonary Artery: The main pulmonary artery is normal in size, and position, with normal bifurcation into the left and right pulmonary arteries.  Systemic Veins: The inferior vena cava appears to be of normal size.  In comparison to the previous echocardiogram(s): Prior examinations are available and were reviewed for comparison purposes. Somewhat similar to 2019 study.        CONCLUSIONS:   1. Left ventricular systolic function is normal with a 60% estimated ejection fraction.   2. Spectral Doppler shows an impaired relaxation pattern of left ventricular diastolic  filling.   3. Moderately enlarged right ventricle.   4. The right atrium is mild to moderately dilated.   5. There is no evidence of mitral valve stenosis.   6. Mild mitral valve regurgitation.   7. Mild tricuspid regurgitation is visualized.   8. Aortic valve stenosis is not present.   9. Mild aortic valve regurgitation.  10. The main pulmonary artery is normal in size, and position, with normal bifurcation into the left and right pulmonary arteries.  11. There is moderate dilatation of the aortic root.  12. There is moderate dilatation of the ascending aorta.  Radiology:     No orders to display       Assessment/Plan:         Patient Active Problem List   Diagnosis    Acute pain of right shoulder    Insomnia    Anemia    Atrial flutter (CMS/HCC)    Bilateral sensorineural hearing loss    Bradycardia    Cervical spondylosis with myelopathy    CAD S/P percutaneous coronary angioplasty    Chronic kidney disease (CKD), stage III (moderate) (CMS/HCC)    Depression, recurrent (CMS/HCC)    Diarrhea    Fatigue    Folic acid deficiency    Gait abnormality    Hip pain, right    Pain in both lower extremities    Hyperlipemia, mixed    Hyperuricemia    Ischemic foot pain at rest    Leukoplakia of tongue    Muscle cramps    Aneurysm (CMS/HCC)    Hypertension    PVD (peripheral vascular disease) (CMS/HCC)    Renal insufficiency    Restless leg syndrome    Rib pain on right side    Sleep apnea    Lumbar radiculopathy    Spondylosis of lumbar spine    Stiffness of right shoulder joint    Vitamin B12 deficiency    Thoracic ascending aortic aneurysm (CMS/HCC)    Increased urinary frequency    Incontinence    Sinus node dysfunction (CMS/HCC)    Abdominal pain, chronic, right upper quadrant    Calculus of gallbladder without cholecystitis without obstruction    Chronic cholecystitis    Urge incontinence of urine    Weak urinary stream    Urinary incontinence    Edema    Anxiety    Benign neoplasm    Chronic back pain     Dermatochalasis of both eyelids    Dextroscoliosis    Estevez catheter problem (CMS/HCC)    Gastroesophageal reflux disease without esophagitis    Glaucoma    Incomplete emptying of bladder    Localized osteoarthrosis    Lumbosacral spondylosis without myelopathy    MGD (meibomian gland dysfunction)    Osteoarthritis of right knee    Osteoarthritis of spine with radiculopathy, lumbar region    Pain in right ankle and joints of right foot    Pain in joint involving pelvic region and thigh    Primary open-angle glaucoma, bilateral, mild stage    RPE mottling of macula    ELIZABETH (stress urinary incontinence), male    Lumbar stenosis    Pseudoaneurysm (CMS/HCC)    Former smoker    PFD (pelvic floor dysfunction)    BMI 40.0-44.9, adult (CMS/HCC)    Thrombocytopenia (CMS/HCC)    Gout    BMI 39.0-39.9,adult    Dizziness and giddiness    History of radiofrequency ablation (RFA) procedure for cardiac arrhythmia    Personal history of malignant neoplasm of prostate    Presence of cardiac pacemaker         ASSESSMENT       83-year-old gentleman here for routine cardiovascular follow-up.     Meds, vitals, examination are as noted.     Was reviewed in detail discussed the patient at length.     Impression:     Diagnoses and all orders for this visit:  CAD S/P percutaneous coronary angioplasty  Hyperlipemia, mixed  Primary hypertension improved at this time  Aneurysm of ascending aorta without rupture (CMS/HCC)  Sinus node dysfunction (CMS/HCC)  PVD (peripheral vascular disease) (CMS/HCC)  Atrial flutter, unspecified type (CMS/HCC)  Morbid obesity with BMI of 40.0-44.9, adult (CMS/HCC)  Stage 3a chronic kidney disease (CMS/HCC)  Sleep apnea, unspecified type  Former smoker  Status postDual-chamber permanent pacer  Urinary incontinence  PLAN     Recommendation:  Proceed with any planned surgery is at acceptable relatively low cardiovascular risk  See me back afterwards in the summer  Call if any problems or issues arise  May use Lasix  as needed for the time being or every other day  Usual safety precautions from walking with a cane discussed with the patient  Usual EP follow-up with Dr. Solis

## 2024-03-18 NOTE — PATIENT INSTRUCTIONS
Continue same medications/treatment.  Patient educated on proper medication use.  Patient educated on risk factor modification.  Please bring any lab results from other providers/physicians to your next appointment.    Please bring all medicines, vitamins, and herbal supplements with you when you come to the office.    Prescriptions will not be filled unless you are compliant with your follow up appointments or have a follow up appointment scheduled as per instruction of your physician. Refills should be requested at the time of your visit.    Follow up in August I, LISANDRO MCKEON RN, AM SCRIBING FOR AND IN THE PRESENCE OF DR. ROBERT SALCIDO, DO, FACC

## 2024-03-20 DIAGNOSIS — R35.0 URINARY FREQUENCY: ICD-10-CM

## 2024-03-21 ENCOUNTER — TUMOR BOARD CONFERENCE (OUTPATIENT)
Dept: HEMATOLOGY/ONCOLOGY | Facility: HOSPITAL | Age: 84
End: 2024-03-21
Payer: MEDICARE

## 2024-03-21 NOTE — TUMOR BOARD NOTE
TUMOR BOARD DISCUSSION SUMMARY    PRESENTER: Kevin Murphy    DIAGNOSIS: MDS with low platelet count    SUMMARY/PRESENTATION: Holden Burgess is a 83 y.o. male patient who was presented by Dr. 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 The treating [facility] physician is not always present, and many of the physicians formulating the recommendation have not personally seen or examined the patient under discussion. It is understood that the treating [facility] physician considers the expertise of the Tumor Board Recommendation in formulating his/her plan for the patient. However, in many situations, based on individualized patient considerations, a different plan is determined by the treating physician to be the optimal medical management.     Scribe Attestation  By signing my name below, Radha DANIELLE Scribe   attest that this documentation has been prepared under the direction and in the presence of [facility]IGNA[facility] TUMOR BOARD.

## 2024-03-22 ENCOUNTER — APPOINTMENT (OUTPATIENT)
Dept: PHYSICAL THERAPY | Facility: CLINIC | Age: 84
End: 2024-03-22
Payer: MEDICARE

## 2024-03-23 ENCOUNTER — LAB (OUTPATIENT)
Dept: LAB | Facility: LAB | Age: 84
End: 2024-03-23
Payer: MEDICARE

## 2024-03-23 DIAGNOSIS — R35.0 URINARY FREQUENCY: ICD-10-CM

## 2024-03-23 PROCEDURE — 87086 URINE CULTURE/COLONY COUNT: CPT

## 2024-03-24 LAB — BACTERIA UR CULT: ABNORMAL

## 2024-03-25 ENCOUNTER — ANESTHESIA EVENT (OUTPATIENT)
Dept: OPERATING ROOM | Facility: HOSPITAL | Age: 84
End: 2024-03-25
Payer: MEDICARE

## 2024-03-25 DIAGNOSIS — Z79.2 PROPHYLACTIC ANTIBIOTIC: ICD-10-CM

## 2024-03-25 RX ORDER — SULFAMETHOXAZOLE AND TRIMETHOPRIM 800; 160 MG/1; MG/1
1 TABLET ORAL 2 TIMES DAILY
Qty: 6 TABLET | Refills: 0 | Status: SHIPPED | OUTPATIENT
Start: 2024-03-25 | End: 2024-04-04 | Stop reason: WASHOUT

## 2024-03-28 ENCOUNTER — ANESTHESIA (OUTPATIENT)
Dept: OPERATING ROOM | Facility: HOSPITAL | Age: 84
End: 2024-03-28
Payer: MEDICARE

## 2024-03-28 ENCOUNTER — HOSPITAL ENCOUNTER (OUTPATIENT)
Facility: HOSPITAL | Age: 84
Setting detail: OUTPATIENT SURGERY
Discharge: HOME | End: 2024-03-28
Attending: UROLOGY | Admitting: UROLOGY
Payer: MEDICARE

## 2024-03-28 VITALS
RESPIRATION RATE: 20 BRPM | HEART RATE: 62 BPM | TEMPERATURE: 97.3 F | WEIGHT: 307 LBS | SYSTOLIC BLOOD PRESSURE: 165 MMHG | BODY MASS INDEX: 41.58 KG/M2 | OXYGEN SATURATION: 98 % | HEIGHT: 72 IN | DIASTOLIC BLOOD PRESSURE: 78 MMHG

## 2024-03-28 DIAGNOSIS — G89.18 ACUTE POSTOPERATIVE PAIN: Primary | ICD-10-CM

## 2024-03-28 DIAGNOSIS — R32 URINARY INCONTINENCE, UNSPECIFIED TYPE: ICD-10-CM

## 2024-03-28 LAB
ABO GROUP (TYPE) IN BLOOD: NORMAL
ANION GAP SERPL CALC-SCNC: 13 MMOL/L (ref 10–20)
ANTIBODY SCREEN: NORMAL
BUN SERPL-MCNC: 34 MG/DL (ref 6–23)
CALCIUM SERPL-MCNC: 9.3 MG/DL (ref 8.6–10.3)
CHLORIDE SERPL-SCNC: 106 MMOL/L (ref 98–107)
CO2 SERPL-SCNC: 23 MMOL/L (ref 21–32)
CREAT SERPL-MCNC: 1.75 MG/DL (ref 0.5–1.3)
EGFRCR SERPLBLD CKD-EPI 2021: 38 ML/MIN/1.73M*2
ERYTHROCYTE [DISTWIDTH] IN BLOOD BY AUTOMATED COUNT: 18.6 % (ref 11.5–14.5)
GLUCOSE SERPL-MCNC: 91 MG/DL (ref 74–99)
HCT VFR BLD AUTO: 38.8 % (ref 41–52)
HGB BLD-MCNC: 12.5 G/DL (ref 13.5–17.5)
MCH RBC QN AUTO: 31.1 PG (ref 26–34)
MCHC RBC AUTO-ENTMCNC: 32.2 G/DL (ref 32–36)
MCV RBC AUTO: 97 FL (ref 80–100)
NRBC BLD-RTO: 0 /100 WBCS (ref 0–0)
PATH REVIEW-CBC DIFFERENTIAL: NORMAL
PLATELET # BLD AUTO: 41 X10*3/UL (ref 150–450)
POTASSIUM SERPL-SCNC: 4.2 MMOL/L (ref 3.5–5.3)
RBC # BLD AUTO: 4.02 X10*6/UL (ref 4.5–5.9)
RH FACTOR (ANTIGEN D): NORMAL
SODIUM SERPL-SCNC: 138 MMOL/L (ref 136–145)
WBC # BLD AUTO: 3.7 X10*3/UL (ref 4.4–11.3)

## 2024-03-28 PROCEDURE — A53445 PR INSERT,INFLATABLE SPHINCTER: Performed by: ANESTHESIOLOGY

## 2024-03-28 PROCEDURE — 2500000004 HC RX 250 GENERAL PHARMACY W/ HCPCS (ALT 636 FOR OP/ED): Performed by: UROLOGY

## 2024-03-28 PROCEDURE — 2500000001 HC RX 250 WO HCPCS SELF ADMINISTERED DRUGS (ALT 637 FOR MEDICARE OP): Performed by: UROLOGY

## 2024-03-28 PROCEDURE — 36415 COLL VENOUS BLD VENIPUNCTURE: CPT | Performed by: ANESTHESIOLOGY

## 2024-03-28 PROCEDURE — C1815 PROS, URINARY SPH, IMP: HCPCS | Performed by: UROLOGY

## 2024-03-28 PROCEDURE — P9073 PLATELETS PHERESIS PATH REDU: HCPCS

## 2024-03-28 PROCEDURE — 86900 BLOOD TYPING SEROLOGIC ABO: CPT | Mod: 91 | Performed by: ANESTHESIOLOGY

## 2024-03-28 PROCEDURE — 85027 COMPLETE CBC AUTOMATED: CPT | Performed by: ANESTHESIOLOGY

## 2024-03-28 PROCEDURE — C1725 CATH, TRANSLUMIN NON-LASER: HCPCS | Performed by: UROLOGY

## 2024-03-28 PROCEDURE — 7100000010 HC PHASE TWO TIME - EACH INCREMENTAL 1 MINUTE: Performed by: UROLOGY

## 2024-03-28 PROCEDURE — 2720000007 HC OR 272 NO HCPCS: Performed by: UROLOGY

## 2024-03-28 PROCEDURE — 2500000004 HC RX 250 GENERAL PHARMACY W/ HCPCS (ALT 636 FOR OP/ED): Mod: JZ | Performed by: NURSE PRACTITIONER

## 2024-03-28 PROCEDURE — 36415 COLL VENOUS BLD VENIPUNCTURE: CPT | Performed by: UROLOGY

## 2024-03-28 PROCEDURE — 3600000008 HC OR TIME - EACH INCREMENTAL 1 MINUTE - PROCEDURE LEVEL THREE: Performed by: UROLOGY

## 2024-03-28 PROCEDURE — C1760 CLOSURE DEV, VASC: HCPCS | Performed by: UROLOGY

## 2024-03-28 PROCEDURE — 53445 INSERT URO/VES NCK SPHINCTER: CPT | Performed by: UROLOGY

## 2024-03-28 PROCEDURE — 3600000003 HC OR TIME - INITIAL BASE CHARGE - PROCEDURE LEVEL THREE: Performed by: UROLOGY

## 2024-03-28 PROCEDURE — 2500000005 HC RX 250 GENERAL PHARMACY W/O HCPCS: Performed by: NURSE ANESTHETIST, CERTIFIED REGISTERED

## 2024-03-28 PROCEDURE — 2780000003 HC OR 278 NO HCPCS: Performed by: UROLOGY

## 2024-03-28 PROCEDURE — 82374 ASSAY BLOOD CARBON DIOXIDE: CPT | Performed by: UROLOGY

## 2024-03-28 PROCEDURE — 3700000001 HC GENERAL ANESTHESIA TIME - INITIAL BASE CHARGE: Performed by: UROLOGY

## 2024-03-28 PROCEDURE — 3700000002 HC GENERAL ANESTHESIA TIME - EACH INCREMENTAL 1 MINUTE: Performed by: UROLOGY

## 2024-03-28 PROCEDURE — 2500000004 HC RX 250 GENERAL PHARMACY W/ HCPCS (ALT 636 FOR OP/ED): Performed by: NURSE ANESTHETIST, CERTIFIED REGISTERED

## 2024-03-28 PROCEDURE — 7100000009 HC PHASE TWO TIME - INITIAL BASE CHARGE: Performed by: UROLOGY

## 2024-03-28 PROCEDURE — 85060 BLOOD SMEAR INTERPRETATION: CPT | Performed by: PATHOLOGY

## 2024-03-28 PROCEDURE — 7100000001 HC RECOVERY ROOM TIME - INITIAL BASE CHARGE: Performed by: UROLOGY

## 2024-03-28 PROCEDURE — 99100 ANES PT EXTEME AGE<1 YR&>70: CPT | Performed by: ANESTHESIOLOGY

## 2024-03-28 PROCEDURE — 7100000002 HC RECOVERY ROOM TIME - EACH INCREMENTAL 1 MINUTE: Performed by: UROLOGY

## 2024-03-28 PROCEDURE — A53445 PR INSERT,INFLATABLE SPHINCTER: Performed by: NURSE ANESTHETIST, CERTIFIED REGISTERED

## 2024-03-28 PROCEDURE — 36430 TRANSFUSION BLD/BLD COMPNT: CPT

## 2024-03-28 DEVICE — ACCESSORY KIT, AMS  800: Type: IMPLANTABLE DEVICE | Site: ABDOMEN | Status: FUNCTIONAL

## 2024-03-28 DEVICE — OCCLUSIVE CUFF WITH INHIBIZONE
Type: IMPLANTABLE DEVICE | Site: GROIN | Status: FUNCTIONAL
Brand: AMS 800 ARTIFICIAL URINARY SPHINCTER

## 2024-03-28 RX ORDER — MEPERIDINE HYDROCHLORIDE 25 MG/ML
12.5 INJECTION INTRAMUSCULAR; INTRAVENOUS; SUBCUTANEOUS EVERY 10 MIN PRN
Status: DISCONTINUED | OUTPATIENT
Start: 2024-03-28 | End: 2024-03-28 | Stop reason: HOSPADM

## 2024-03-28 RX ORDER — LIDOCAINE HYDROCHLORIDE 10 MG/ML
INJECTION INFILTRATION; PERINEURAL AS NEEDED
Status: DISCONTINUED | OUTPATIENT
Start: 2024-03-28 | End: 2024-03-28

## 2024-03-28 RX ORDER — PROPOFOL 10 MG/ML
INJECTION, EMULSION INTRAVENOUS AS NEEDED
Status: DISCONTINUED | OUTPATIENT
Start: 2024-03-28 | End: 2024-03-28

## 2024-03-28 RX ORDER — ACETAMINOPHEN 325 MG/1
975 TABLET ORAL ONCE
Status: COMPLETED | OUTPATIENT
Start: 2024-03-28 | End: 2024-03-28

## 2024-03-28 RX ORDER — ACETAMINOPHEN 325 MG/1
650 TABLET ORAL EVERY 4 HOURS PRN
Status: DISCONTINUED | OUTPATIENT
Start: 2024-03-28 | End: 2024-03-28 | Stop reason: HOSPADM

## 2024-03-28 RX ORDER — OXYCODONE HYDROCHLORIDE 5 MG/1
5 TABLET ORAL EVERY 6 HOURS PRN
Qty: 12 TABLET | Refills: 0 | Status: SHIPPED | OUTPATIENT
Start: 2024-03-28 | End: 2024-04-04 | Stop reason: WASHOUT

## 2024-03-28 RX ORDER — ALBUTEROL SULFATE 0.83 MG/ML
2.5 SOLUTION RESPIRATORY (INHALATION) ONCE AS NEEDED
Status: DISCONTINUED | OUTPATIENT
Start: 2024-03-28 | End: 2024-03-28 | Stop reason: HOSPADM

## 2024-03-28 RX ORDER — SODIUM CHLORIDE 9 MG/ML
100 INJECTION, SOLUTION INTRAVENOUS CONTINUOUS
Status: DISCONTINUED | OUTPATIENT
Start: 2024-03-28 | End: 2024-03-28 | Stop reason: HOSPADM

## 2024-03-28 RX ORDER — PHENYLEPHRINE HYDROCHLORIDE 10 MG/ML
INJECTION INTRAVENOUS AS NEEDED
Status: DISCONTINUED | OUTPATIENT
Start: 2024-03-28 | End: 2024-03-28

## 2024-03-28 RX ORDER — CEFAZOLIN SODIUM 2 G/100ML
INJECTION, SOLUTION INTRAVENOUS AS NEEDED
Status: DISCONTINUED | OUTPATIENT
Start: 2024-03-28 | End: 2024-03-28

## 2024-03-28 RX ORDER — ONDANSETRON HYDROCHLORIDE 2 MG/ML
4 INJECTION, SOLUTION INTRAVENOUS ONCE AS NEEDED
Status: DISCONTINUED | OUTPATIENT
Start: 2024-03-28 | End: 2024-03-28 | Stop reason: HOSPADM

## 2024-03-28 RX ORDER — ONDANSETRON HYDROCHLORIDE 2 MG/ML
INJECTION, SOLUTION INTRAVENOUS AS NEEDED
Status: DISCONTINUED | OUTPATIENT
Start: 2024-03-28 | End: 2024-03-28

## 2024-03-28 RX ORDER — ACETAMINOPHEN 500 MG
1000 TABLET ORAL EVERY 6 HOURS PRN
Qty: 30 TABLET | Refills: 0 | Status: SHIPPED | OUTPATIENT
Start: 2024-03-28

## 2024-03-28 RX ORDER — SULFAMETHOXAZOLE AND TRIMETHOPRIM 800; 160 MG/1; MG/1
1 TABLET ORAL 2 TIMES DAILY
Qty: 14 TABLET | Refills: 0 | Status: SHIPPED | OUTPATIENT
Start: 2024-03-28 | End: 2024-04-07 | Stop reason: HOSPADM

## 2024-03-28 RX ORDER — METRONIDAZOLE 500 MG/100ML
500 INJECTION, SOLUTION INTRAVENOUS ONCE
Status: COMPLETED | OUTPATIENT
Start: 2024-03-28 | End: 2024-03-28

## 2024-03-28 RX ORDER — MELOXICAM 15 MG/1
15 TABLET ORAL DAILY
Qty: 7 TABLET | Refills: 0 | Status: SHIPPED | OUTPATIENT
Start: 2024-03-28 | End: 2024-04-04 | Stop reason: WASHOUT

## 2024-03-28 RX ORDER — SODIUM CHLORIDE, SODIUM LACTATE, POTASSIUM CHLORIDE, CALCIUM CHLORIDE 600; 310; 30; 20 MG/100ML; MG/100ML; MG/100ML; MG/100ML
100 INJECTION, SOLUTION INTRAVENOUS CONTINUOUS
Status: DISCONTINUED | OUTPATIENT
Start: 2024-03-28 | End: 2024-03-28 | Stop reason: HOSPADM

## 2024-03-28 RX ORDER — CELECOXIB 100 MG/1
400 CAPSULE ORAL ONCE
Status: COMPLETED | OUTPATIENT
Start: 2024-03-28 | End: 2024-03-28

## 2024-03-28 RX ORDER — PHENYLEPHRINE HCL IN 0.9% NACL 1 MG/10 ML
SYRINGE (ML) INTRAVENOUS AS NEEDED
Status: DISCONTINUED | OUTPATIENT
Start: 2024-03-28 | End: 2024-03-28

## 2024-03-28 RX ORDER — FENTANYL CITRATE 50 UG/ML
INJECTION, SOLUTION INTRAMUSCULAR; INTRAVENOUS AS NEEDED
Status: DISCONTINUED | OUTPATIENT
Start: 2024-03-28 | End: 2024-03-28

## 2024-03-28 RX ORDER — ROCURONIUM BROMIDE 10 MG/ML
INJECTION, SOLUTION INTRAVENOUS AS NEEDED
Status: DISCONTINUED | OUTPATIENT
Start: 2024-03-28 | End: 2024-03-28

## 2024-03-28 RX ORDER — POLYETHYLENE GLYCOL 3350 17 G/17G
17 POWDER, FOR SOLUTION ORAL DAILY
Qty: 30 PACKET | Refills: 0 | Status: SHIPPED | OUTPATIENT
Start: 2024-03-28 | End: 2024-04-27

## 2024-03-28 RX ADMIN — ROCURONIUM BROMIDE 50 MG: 10 INJECTION, SOLUTION INTRAVENOUS at 15:29

## 2024-03-28 RX ADMIN — ACETAMINOPHEN 975 MG: 325 TABLET ORAL at 14:34

## 2024-03-28 RX ADMIN — FENTANYL CITRATE 50 MCG: 50 INJECTION, SOLUTION INTRAMUSCULAR; INTRAVENOUS at 15:29

## 2024-03-28 RX ADMIN — FENTANYL CITRATE 50 MCG: 50 INJECTION, SOLUTION INTRAMUSCULAR; INTRAVENOUS at 14:59

## 2024-03-28 RX ADMIN — Medication 200 MCG: at 15:56

## 2024-03-28 RX ADMIN — Medication 200 MCG: at 15:50

## 2024-03-28 RX ADMIN — CELECOXIB 400 MG: 100 CAPSULE ORAL at 14:34

## 2024-03-28 RX ADMIN — SODIUM CHLORIDE, SODIUM LACTATE, POTASSIUM CHLORIDE, AND CALCIUM CHLORIDE: .6; .31; .03; .02 INJECTION, SOLUTION INTRAVENOUS at 15:20

## 2024-03-28 RX ADMIN — METRONIDAZOLE 500 MG: 500 INJECTION, SOLUTION INTRAVENOUS at 15:20

## 2024-03-28 RX ADMIN — DEXAMETHASONE SODIUM PHOSPHATE 4 MG: 4 INJECTION, SOLUTION INTRAMUSCULAR; INTRAVENOUS at 15:05

## 2024-03-28 RX ADMIN — SODIUM CHLORIDE 100 ML/HR: 9 INJECTION, SOLUTION INTRAVENOUS at 14:34

## 2024-03-28 RX ADMIN — Medication 3 G: at 15:05

## 2024-03-28 RX ADMIN — Medication 200 MCG: at 16:05

## 2024-03-28 RX ADMIN — GENTAMICIN SULFATE 700 MG: 40 INJECTION, SOLUTION INTRAMUSCULAR; INTRAVENOUS at 14:45

## 2024-03-28 RX ADMIN — Medication 100 MCG: at 15:23

## 2024-03-28 RX ADMIN — ONDANSETRON 4 MG: 2 INJECTION, SOLUTION INTRAMUSCULAR; INTRAVENOUS at 15:05

## 2024-03-28 RX ADMIN — LIDOCAINE HYDROCHLORIDE 3 ML: 10 INJECTION, SOLUTION INFILTRATION; PERINEURAL at 14:59

## 2024-03-28 RX ADMIN — PROPOFOL 200 MG: 10 INJECTION, EMULSION INTRAVENOUS at 14:59

## 2024-03-28 RX ADMIN — FENTANYL CITRATE 50 MCG: 50 INJECTION, SOLUTION INTRAMUSCULAR; INTRAVENOUS at 17:02

## 2024-03-28 RX ADMIN — FENTANYL CITRATE 50 MCG: 50 INJECTION, SOLUTION INTRAMUSCULAR; INTRAVENOUS at 16:48

## 2024-03-28 RX ADMIN — SUGAMMADEX 200 MG: 100 INJECTION, SOLUTION INTRAVENOUS at 17:02

## 2024-03-28 SDOH — HEALTH STABILITY: MENTAL HEALTH: CURRENT SMOKER: 0

## 2024-03-28 ASSESSMENT — COLUMBIA-SUICIDE SEVERITY RATING SCALE - C-SSRS
1. IN THE PAST MONTH, HAVE YOU WISHED YOU WERE DEAD OR WISHED YOU COULD GO TO SLEEP AND NOT WAKE UP?: NO
6. HAVE YOU EVER DONE ANYTHING, STARTED TO DO ANYTHING, OR PREPARED TO DO ANYTHING TO END YOUR LIFE?: NO
1. IN THE PAST MONTH, HAVE YOU WISHED YOU WERE DEAD OR WISHED YOU COULD GO TO SLEEP AND NOT WAKE UP?: NO
2. HAVE YOU ACTUALLY HAD ANY THOUGHTS OF KILLING YOURSELF?: NO
2. HAVE YOU ACTUALLY HAD ANY THOUGHTS OF KILLING YOURSELF?: NO
6. HAVE YOU EVER DONE ANYTHING, STARTED TO DO ANYTHING, OR PREPARED TO DO ANYTHING TO END YOUR LIFE?: NO

## 2024-03-28 ASSESSMENT — PAIN SCALES - GENERAL
PAINLEVEL_OUTOF10: 0 - NO PAIN
PAIN_LEVEL: 0

## 2024-03-28 ASSESSMENT — PAIN - FUNCTIONAL ASSESSMENT
PAIN_FUNCTIONAL_ASSESSMENT: 0-10
PAIN_FUNCTIONAL_ASSESSMENT: 0-10

## 2024-03-28 NOTE — DISCHARGE INSTRUCTIONS
Urology [place]    DISCHARGE INSTRUCTIONS ANNETTE Burgess    Call [phone] during regular daytime business hours (8:00 am - 5:00 pm) and after 5:00 pm ask for the Urology resident with any questions or concerns.    If it is a life-threatening situation, proceed to the nearest emergency department.        Follow-up appointment:  5-13-24      Thank you for the opportunity to care for you today.  Your health and healing are very important to us.  We hope we made you feel as comfortable as possible and are committed to your recovery and continued well-being.      The following is a brief overview of your AUS (artificial urinary sphincter) procedure. Some of the information contained on this summary may be confidential.  This information should be kept in your records and should be shared with your regular doctor.    Physicians:   Dr. Phan    Procedure performed: insertion of artificial urinary sphincter      What to Expect During your Recovery and Home Care  Anesthesia Side Effects   You may feel sleepy, tired, or have a sore throat.   You may also feel drowsiness, dizziness, or inability to think clearly.  For your safety, do not drive, drink alcoholic beverages, take any unprescribed medication or make any important decisions for 24 hours after anesthesia.  A responsible adult should be with you for 24 hours.        Activity and Recovery    No heavy lifting greater than 10 pounds for the next 6 weeks. No driving until mobility has returned to normal. Do not drive or operate heavy machinery while taking narcotic pain medications as these medications can alter perception, impair judgement, and slow reaction times.    Pain Control  Unfortunately, you may experience pain after your procedure. Frequency and urgency to urinate and mild discomfort are expected. Adequate pain management can include alternative measures to help ease your pain and that can include over the counter Tylenol/ibuprofen or  oxycodone which can be taken as prescribed as needed for breakthrough pain. Do not take more than 4,000mg of Tylenol in a 24-hour period.      Nausea/Vomiting   Clear liquids are best tolerated at first. Start slow, advance your diet as tolerated to normal foods. Avoid spicy, greasy, heavy foods at first. Also, you may feel nauseous or like you need to vomit if you take any type of medication on an empty stomach.  Call your physician if you are unable to eat or drink, are not passing gas and have persistent vomiting.    Signs of Bleeding   You could have some blood in your urine off and on over the next few weeks. Your urine will be light pink to yellow. Minor bleeding or drainage may occur from the surgical sites; however, excessive or consistent bleeding should be reported to your surgeon. Excessive bleeding is defined as blood that is dripping from wound, soaking you bandages, and is ketchup colored, thick with possible blood clots.  Consistent is defined as bleeding that does not stop.      Treatment/wound care:   Keep area(s) clean and dry.   It is okay to shower 24 hours after time of surgery.    Do not scrub wound(s), pat dry.    Do not submerge wound(s) in standing water until seen for follow up appointment (no tub bathing, swimming, or hot tubs).    Clean with mild soap, gentle washing, pat dry, cover with bandage as needed.    Avoid waterproof bandages.  No oils or lotions on incisions.    Please visually inspect your wound(s) at least once daily.  If the wound(s) are in a difficult to see location, please use a mirror or have someone else assist with visual inspection.    Signs of Infection  Signs of infection can include fever, chills, redness around surgical incisions, green/yellow drainage from incisions, burning sensation with urination or severe abdominal pain.  If you see any of these occur, please contact your doctor's office at [phone]. Any fever higher than 100.4, especially if associated  with an ill feeling, abdominal pain, chills, or nausea should be reported to your surgeon.      Assist in bowel movements/urination  Take daily stool softener you were prescribed  Increase fiber in diet  Increase water (6 to 8 glasses)  Increase walking   Can try adding over the counter MiraLAX or in extreme cases milk of magnesia.  If you have tried these methods and you are not passing gas, your bladder still feels full and you cannot have a bowel movement, please go to your nearest Emergency room/contact your physician.    Additional Instructions:   No sex until discussed further at your follow up appointment. Your device is inactive, and it will be activated at your 6 weeks post operative visit and taught how to use it then. So you will continue to have leakage until that appointment. You should wear tight fitting underwear for the next 6 weeks. You should pull down on the pump inside your scrotum toward the ground while showering and while urinating    CATHETER CARE  Always keep the catheters tubing and drainage bag below the level of your bladder.  Avoid loops and kinks in the catheter tubing.  NOTIFY your physician if catheter falls out or catheter seems clogged and urine is not draining.   Do not wear the small leg bag to bed you should be provided with a larger overnight bag that you should wear to bed and can hang over the side of the bed.  We recommend wearing the large bag in the shower as well as this is easy to dry, and you do not get your leg straps wet from your leg bag.   Your catheter should be secured to your upper thigh, do not allow it to hang or dangle.

## 2024-03-28 NOTE — ANESTHESIA PREPROCEDURE EVALUATION
"Patient: Holden Burgess \"PAT\"    Procedure Information       Date/Time: 03/28/24 2134    Procedure: URINARY SPHINCTER DEVICE INSERTION (Scrotum)    Location: [facility] / [facility]    Surgeons: Jamarcus Phan MD            Relevant Problems   Anesthesia (within normal limits)      Cardiac   (+) Atrial flutter (CMS/HCC)   (+) CAD S/P percutaneous coronary angioplasty   (+) Hyperlipemia, mixed   (+) Hypertension   (+) Ischemic foot pain at rest   (+) PVD (peripheral vascular disease) (CMS/HCC)   (+) Presence of cardiac pacemaker   (+) Rib pain on right side   (+) Sinus node dysfunction (CMS/HCC)   (+) Thoracic ascending aortic aneurysm (CMS/HCC)      Neuro   (+) Anxiety   (+) Depression, recurrent (CMS/HCC)   (+) Lumbar radiculopathy      GI   (+) Gastroesophageal reflux disease without esophagitis      Liver   (+) Calculus of gallbladder without cholecystitis without obstruction   (+) Chronic cholecystitis      Hematology   (+) Anemia   (+) Thrombocytopenia (CMS/HCC)      Musculoskeletal   (+) Cervical spondylosis with myelopathy   (+) Dextroscoliosis   (+) Localized osteoarthrosis   (+) Lumbar stenosis   (+) Lumbosacral spondylosis without myelopathy   (+) Osteoarthritis of right knee   (+) Osteoarthritis of spine with radiculopathy, lumbar region   (+) Spondylosis of lumbar spine      HEENT   (+) Bilateral sensorineural hearing loss   (+) Glaucoma   (+) Primary open-angle glaucoma, bilateral, mild stage       Clinical information reviewed:   Tobacco  Allergies  Meds  Problems  Med Hx  Surg Hx   Fam Hx  Soc   Hx        NPO Detail:  NPO/Void Status  Date of Last Liquid: 03/28/24  Time of Last Liquid: 1100  Date of Last Solid: 03/28/24  Time of Last Solid: 0000         Physical Exam    Airway  Mallampati: III  TM distance: >3 FB  Neck ROM: full     Cardiovascular - normal exam     Dental - normal exam     Pulmonary - normal exam     Abdominal - normal exam             Anesthesia Plan    History of " general anesthesia?: yes  History of complications of general anesthesia?: no    ASA 3     general   (Denies GERD, Platelets available for transfusion  LMA 4)  The patient is not a current smoker.    intravenous induction   Postoperative administration of opioids is intended.  Anesthetic plan and risks discussed with patient.  Use of blood products discussed with patient who consented to blood products.    Plan discussed with CRNA.

## 2024-03-28 NOTE — H&P
History Of Present Illness  GENARO Burgess is a 83 y.o. male with hx of PCa and radiation with stress urinary incontinence who presents for an artifical urinary sphincter. No updates in pmh/psh/meds/all.     Past Medical History  Past Medical History:   Diagnosis Date    Abdominal pain, chronic, right upper quadrant     ACP (advance care planning)     Anemia     Aneurysm (CMS/HCC)     Body mass index (BMI) 39.0-39.9, adult 12/06/2021    BMI 39.0-39.9,adult    Body mass index (BMI) 39.0-39.9, adult 07/11/2022    BMI 39.0-39.9,adult    Body mass index (BMI) 39.0-39.9, adult 04/24/2022    Body mass index (BMI) of 39.0 to 39.9 in adult    Body mass index (BMI) 39.0-39.9, adult 04/24/2022    Body mass index (BMI) of 39.0 to 39.9 in adult    Body mass index (BMI) 39.0-39.9, adult 04/24/2022    Body mass index (BMI) of 39.0 to 39.9 in adult    Cramp and spasm 07/19/2022    Leg cramps    Difficulty breathing     Encounter for general adult medical examination without abnormal findings 09/25/2020    Encounter for Medicare annual wellness exam    Encounter for screening for malignant neoplasm of prostate 04/14/2021    Encounter for prostate cancer screening    Encounter for screening for malignant neoplasm of prostate 09/25/2020    Encounter for prostate cancer screening    Forearm injury     Hx of atrial flutter     Hyperlipidemia     Hypertension     Hyperuricemia     Incontinence     Obesity, unspecified 04/26/2022    Class 2 obesity with body mass index (BMI) of 39.0 to 39.9 in adult    Other symptoms and signs involving the musculoskeletal system 10/12/2022    Shoulder weakness    Personal history of other diseases of the circulatory system 09/29/2021    History of hypotension    Personal history of other diseases of the circulatory system 10/26/2021    History of hypertension    Personal history of other endocrine, nutritional and metabolic disease 12/06/2021    History of morbid obesity    Personal history of other  specified conditions 09/29/2021    History of dizziness    Rotator cuff injury     Sinus node dysfunction (CMS/HCC)     Valvular heart disease        Surgical History  Past Surgical History:   Procedure Laterality Date    BONE MARROW BIOPSY      BONE MARROW BIOPSY W/ ASPIRATION  01/23/2024    CARDIAC ELECTROPHYSIOLOGY PROCEDURE Left 11/21/2023    Procedure: PPM IMPLANT DC;  Surgeon: Jun Solis MD;  Location: [facility];  Service: Electrophysiology;  Laterality: Left;    CYSTOSCOPY  02/20/2024    MR CHEST ANGIO W AND WO IV CONTRAST  09/18/2019    MR CHEST ANGIO W AND WO IV CONTRAST 9/18/2019 [facility]    MR CHEST ANGIO W AND WO IV CONTRAST  01/25/2023    MR CHEST ANGIO W AND WO IV CONTRAST 1/25/2023 [facility]    MR CHEST ANGIO W AND WO IV CONTRAST  01/25/2023    MR CHEST ANGIO W AND WO IV CONTRAST    OTHER SURGICAL HISTORY  05/14/2020    Knee replacement    OTHER SURGICAL HISTORY  05/14/2020    Catheter ablation    OTHER SURGICAL HISTORY  05/14/2020    Prostate surgery    OTHER SURGICAL HISTORY  05/14/2020    Lower back surgery    OTHER SURGICAL HISTORY  05/14/2020    Hand surgery    OTHER SURGICAL HISTORY  09/29/2021    Surgery    OTHER SURGICAL HISTORY  09/29/2021    Cataract surgery    OTHER SURGICAL HISTORY  09/29/2021    Colonoscopy    OTHER SURGICAL HISTORY  09/29/2021    Vertebral fusion        Social History  He reports that he quit smoking about 55 years ago. His smoking use included cigarettes. He has a 30.00 pack-year smoking history. He has never been exposed to tobacco smoke. He has never used smokeless tobacco. He reports that he does not drink alcohol and does not use drugs.    Family History  Family History   Problem Relation Name Age of Onset    Other (polio) Mother      Heart disease Father      Heart disease Brother      Polymyalgia rheumatica Brother      Other (mitral valve disorder) Brother      Other (Coronary artery bypass graft) Brother      Other  (Arterisclerotic cardiovascular disease) Brother          Allergies  Crestor [rosuvastatin], Ezetimibe, and Statins-hmg-coa reductase inhibitors    Review of Systems  12 pt ROS reviewed and negative    Physical Exam  NAD  RRR  Non labored on RA  Soft NT     Last Recorded Vitals  There were no vitals taken for this visit.    Relevant Results  Cr 1.44  HH 12.3/37.8  Plt 62, will receive transufsion     Assessment/Plan   Principal Problem:    Urinary incontinence  GENARO Burgess is a 83 y.o. male with hx of PCa and radiation with stress urinary incontinence who presents for an artifical urinary sphincter. Proceed with surgery, Plt transfusion.           Isac Mariano MD

## 2024-03-28 NOTE — ANESTHESIA PROCEDURE NOTES
Airway  Date/Time: 3/28/2024 2:59 PM  Urgency: elective    Airway not difficult    Staffing  Performed: CRNA   Authorized by: Chris Thomas MD    Performed by: MICHELE Petersen-GAEL  Patient location during procedure: OR    Indications and Patient Condition  Indications for airway management: anesthesia  Spontaneous ventilation: present  Sedation level: deep  Preoxygenated: yes  Patient position: sniffing  Mask difficulty assessment: 1 - vent by mask  Planned trial extubation    Final Airway Details  Final airway type: endotracheal airway      Successful airway: ETT  Cuffed: yes   Successful intubation technique: direct laryngoscopy  Blade: Ketty  Blade size: #4  ETT size (mm): 8.0  Cormack-Lehane Classification: grade I - full view of glottis  Placement verified by: chest auscultation   Measured from: lips  ETT to lips (cm): 23  Number of attempts at approach: 1    Additional Comments  Patient moving-intubated

## 2024-03-28 NOTE — ANESTHESIA POSTPROCEDURE EVALUATION
"Patient: Holden Burgess \"PAT\"    Procedure Summary       Date: 03/28/24 Room / Location: [facility] / [facility]    Anesthesia Start: 1432 Anesthesia Stop: 1711    Procedure: URINARY SPHINCTER DEVICE INSERTION (Scrotum) Diagnosis:       Urinary incontinence, unspecified type      (Urinary incontinence, unspecified type [R32])    Surgeons: Jamarcus Phan MD Responsible Provider: Chris Thomas MD    Anesthesia Type: general ASA Status: 3            Anesthesia Type: general    Vitals Value Taken Time   /74 03/28/24 1711   Temp 36 03/28/24 1711   Pulse 62 03/28/24 1711   Resp 14 03/28/24 1711   SpO2 98 03/28/24 1711       Anesthesia Post Evaluation    Patient location during evaluation: PACU  Patient participation: complete - patient participated  Level of consciousness: awake and alert  Pain score: 0  Pain management: adequate  Multimodal analgesia pain management approach  Airway patency: patent  Cardiovascular status: acceptable  Respiratory status: acceptable  Hydration status: acceptable  Postoperative Nausea and Vomiting: none        No notable events documented.    "

## 2024-03-28 NOTE — ANESTHESIA PROCEDURE NOTES
Airway  Date/Time: 3/28/2024 3:29 PM  Urgency: elective    Airway not difficult    Staffing  Performed: CRNA   Authorized by: Chris Thomas MD    Performed by: MICHELE Petersen-GAEL  Patient location during procedure: OR    Indications and Patient Condition  Indications for airway management: anesthesia  Spontaneous Ventilation: absent  Sedation level: deep  Preoxygenated: yes  Patient position: sniffing  MILS maintained throughout  Mask difficulty assessment: 0 - not attempted  Planned trial extubation    Final Airway Details  Final airway type: supraglottic airway      Successful airway: Size 4     Number of attempts at approach: 1  Ventilation between attempts: none  Number of other approaches attempted: 0    Additional Comments  I-Gel #4 LMA

## 2024-03-28 NOTE — OP NOTE
"URINARY SPHINCTER DEVICE INSERTION Operative Note     Date: 3/28/2024  OR Location: [facility]    Name: Holden Burgess \"GENARO\", : 1940, Age: 83 y.o., MRN: [id number], Sex: male    Diagnosis  Pre-op Diagnosis     * Urinary incontinence, unspecified type [R32] Post-op Diagnosis     * Urinary incontinence, unspecified type [R32]     Procedures  URINARY SPHINCTER DEVICE INSERTION  48184 - AK INSJ INFLATABLE URETHRAL/BLADDER NECK SPHINCTER    Surgeons      * Jamarcus Phan - Primary    Resident/Fellow/Other Assistant:  Surgeon(s) and Role:     * Isac Mukherjee MD - Resident - Assisting    Procedure Summary  Anesthesia: General  ASA: III  Anesthesia Staff: Anesthesiologist: Chris Thomas MD  CRNA: MICHELE Petersen-CRNA  Estimated Blood Loss: 50mL  Intra-op Medications:   Administrations occurring from 1515 to 1745 on 24:   Medication Name Total Dose   sodium chloride 0.9% infusion Cannot be calculated   metroNIDAZOLE (Flagyl) 500 mg in NaCl (iso-os) 100 mL 500 mg              Anesthesia Record               Intraprocedure I/O Totals          Intake    sodium chloride 0.9% infusion 1000.00 mL    Total Intake 1000 mL          Specimen: No specimens collected     Staff:   Circulator: Betina White RN  Relief Circulator: Lizy Zendejas RN; Shae Eddy RN  Scrub Person: Tameka Parks         Drains and/or Catheters:   Urethral Catheter 16 Fr. (Active)       Tourniquet Times: none        Implants:  Implants       Type Name Action Serial No.      General Urology ACCESSORY KIT, AMS  800 - SN/A - [id number] Implanted N/A     Implant CUFF, CONTINENCE, URINARY ABI603, 4.5CM, W/INHIBIZONE - [id number] Implanted             Findings:  artificial urinary sphincter, 4.5 cm cuff, 24mL PRB right submuscular    Indications: GENARO Burgess is an 83 y.o. male who is having surgery for Urinary incontinence, unspecified type [R32].     The patient was seen in the preoperative area. The risks, benefits, " complications, treatment options, non-operative alternatives, expected recovery and outcomes were discussed with the patient. The possibilities of reaction to medication, pulmonary aspiration, injury to surrounding structures, bleeding, recurrent infection, the need for additional procedures, failure to diagnose a condition, and creating a complication requiring transfusion or operation were discussed with the patient. The patient concurred with the proposed plan, giving informed consent.  The site of surgery was properly noted/marked if necessary per policy. The patient has been actively warmed in preoperative area. Preoperative antibiotics have been ordered and given within 2 hours of incision. Venous thrombosis prophylaxis have been ordered including bilateral sequential compression devices    Procedure Details:   Preoperative diagnosis: male stress urinary Incontinence  Postoperative diagnosis: Male stress urinary Incontinence    Procedures performed:  Insertion of 3-piece artificial urinary sphincter    Anesthesia: general  IV Fluids: see anesthesia report  Estimated Blood Loss (mL):  50 mL  Blood Replacement: No   Specimen: None  Complications: No   Drains and/or Catheters: None  Findings:  artificial urinary sphincter, 4.5 cm cuff, 24mL PRB right submuscular    The indications, alternatives, benefits, and risks were discussed with the patient and informed consent was obtained. The patient was brought onto the operating room table, positioned supine, and secured with a safety strap. Pneumatic compression devices were placed on the lower extremities.    After the administration of intravenous antibiotics and general anesthesia, the patient was repositioned in dorsal lithotomy using well-padded universal stirrups. A gel bolster was placed under the buttocks to support and rotate the pelvis. The genitalia, perineum, and lower abdomen were prepped and draped in the standard sterile manner. A time-out was  completed, verifying the correct patient, surgical procedure, and positioning, prior to beginning the procedure.      The three components of the artificial urinary sphincter were appropriately prepared according to the ’s specifications and soaked in sterile antibiotic solution. Rubber-shod hemostats were used on the tubing to avoid inadvertent injury to the device.     A 16Fr urethral catheter was inserted to drain the bladder. A vertical midline perineal skin incision was made, extending from the base of the scrotum to 3 cm above the anus. Colles’ fascia and the subcutaneous tissues were incised with electrocautery, exposing the minimal bulbospongiosus muscle. A [place] appropriately positioned to optimize exposure.     The proximal bulbar urethra was carefully dissected circumferentially, freeing it dorsally from the corporal bodies. A loop was placed through this tunnel to facilitate the urethra’s mobility. The cuff-sizer was positioned snugly around the urethra at this level and measured at 5 cm. An  AUS with 4.5 cm urethral cuff was chosen and passed behind the urethra with the mesh surface on the outside. Using the tab, the urethral cuff was secured ventrally, confirming a  snug fit. Hemostasis was achieved with judicious electrocautery as well as Tisseal.    A 3cm transverse right inguinal incision was made following Trina’s lines and carried down through to the external oblique aponeurosis. This was incised sharply and the underlying muscles were gently , entering a space where a small pocket was created. The  61-70cm H20 pressure-regulating balloon was positioned in this space and filled with 24ml of sterile normal saline. The tubing was occluded with a rubber-shod hemostat and the overlying abdominal wall fascia was closed around the exiting tubing to avoid herniation, using interrupted 2-0 Vicryl suture.    A hemostat clamp was placed through the inguinal incision and  guided inferiorly to the right  hemiscrotum. A dartos pouch was created and the scrotal pump placed within this, away from the testis, with its  activation button facing the skin. The urethral catheter was removed. Using the trocar, the urethral cuff tubing was passed from the perineal to the inguinal incision. The excess tubing was trimmed and the ends flushed with sterile normal saline. The urethral cuff and pressure regulating balloon tubing were connected to the scrotal pump tubing using the quick-connectors, and the rubber-shod hemostats were removed.  The device was cycled through the activation and deactivation phases, ensuring a secure an airtight connection and proper function.     Having completed the artificial urinary sphincter placement, hemostasis was obtained and the self-retaining retractor was removed. The perineal wound was irrigated with warm sterile normal saline and closed using a running 2-0 Vicryl suture to approximate the bulbospongiosus muscle and a 2-0 Vicryl suture for Colles’ fascia. Running 4-0 vicryl sutures were used on the skin. The inguinal incision was closed with 2-0 Vicryl for the joselin's layer and by using running two layers of 2-0 vicryl for subcutaneous layers followed by a 4-0 Monocryl for skin. The perineal incision was covered with a dermabond and gauze fluffs, and an athletic supporter applied to help minimize swelling. Dermabond was used to secure the inguinal incision. At the end of the procedure, all counts were correct.    The patient tolerated the procedure well and was taken to the recovery room in satisfactory condition.     Complications:  None; patient tolerated the procedure well.    Disposition: PACU - hemodynamically stable.  Condition: stable           Attending Attestation:   Isac Mariano MD for   Jamarcus Phan  Phone Number: [phone]

## 2024-03-29 LAB
BLOOD EXPIRATION DATE: NORMAL
DISPENSE STATUS: NORMAL
PRODUCT BLOOD TYPE: 6200
PRODUCT CODE: NORMAL
UNIT ABO: NORMAL
UNIT NUMBER: NORMAL
UNIT RH: NORMAL
UNIT VOLUME: 323

## 2024-03-30 LAB
BLOOD EXPIRATION DATE: NORMAL
DISPENSE STATUS: NORMAL
PRODUCT BLOOD TYPE: 5100
PRODUCT CODE: NORMAL
UNIT ABO: NORMAL
UNIT NUMBER: NORMAL
UNIT RH: NORMAL
UNIT VOLUME: 268

## 2024-03-31 ENCOUNTER — HOSPITAL ENCOUNTER (EMERGENCY)
Facility: HOSPITAL | Age: 84
Discharge: HOME | End: 2024-03-31
Attending: STUDENT IN AN ORGANIZED HEALTH CARE EDUCATION/TRAINING PROGRAM
Payer: MEDICARE

## 2024-03-31 ENCOUNTER — TELEPHONE (OUTPATIENT)
Dept: UROLOGY | Facility: HOSPITAL | Age: 84
End: 2024-03-31
Payer: MEDICARE

## 2024-03-31 ENCOUNTER — APPOINTMENT (OUTPATIENT)
Dept: RADIOLOGY | Facility: HOSPITAL | Age: 84
End: 2024-03-31
Payer: MEDICARE

## 2024-03-31 ENCOUNTER — HOSPITAL ENCOUNTER (EMERGENCY)
Facility: HOSPITAL | Age: 84
Discharge: STILL ADMITTED OR ADMINISTRATIVE | End: 2024-03-31
Payer: MEDICARE

## 2024-03-31 VITALS
SYSTOLIC BLOOD PRESSURE: 136 MMHG | HEIGHT: 74 IN | BODY MASS INDEX: 38.5 KG/M2 | HEART RATE: 63 BPM | RESPIRATION RATE: 18 BRPM | TEMPERATURE: 97.2 F | OXYGEN SATURATION: 98 % | WEIGHT: 300 LBS | DIASTOLIC BLOOD PRESSURE: 73 MMHG

## 2024-03-31 DIAGNOSIS — R33.8 ACUTE URINARY RETENTION: Primary | ICD-10-CM

## 2024-03-31 LAB
ALBUMIN SERPL BCP-MCNC: 3.8 G/DL (ref 3.4–5)
ALP SERPL-CCNC: 82 U/L (ref 33–136)
ALT SERPL W P-5'-P-CCNC: 22 U/L (ref 10–52)
ANION GAP SERPL CALC-SCNC: 11 MMOL/L (ref 10–20)
APPEARANCE UR: ABNORMAL
AST SERPL W P-5'-P-CCNC: 23 U/L (ref 9–39)
BACTERIA #/AREA URNS AUTO: ABNORMAL /HPF
BASOPHILS # BLD AUTO: 0.03 X10*3/UL (ref 0–0.1)
BASOPHILS NFR BLD AUTO: 0.6 %
BILIRUB SERPL-MCNC: 0.9 MG/DL (ref 0–1.2)
BILIRUB UR STRIP.AUTO-MCNC: NEGATIVE MG/DL
BUN SERPL-MCNC: 38 MG/DL (ref 6–23)
CALCIUM SERPL-MCNC: 8.9 MG/DL (ref 8.6–10.3)
CHLORIDE SERPL-SCNC: 106 MMOL/L (ref 98–107)
CO2 SERPL-SCNC: 24 MMOL/L (ref 21–32)
COLOR UR: YELLOW
CREAT SERPL-MCNC: 1.81 MG/DL (ref 0.5–1.3)
EGFRCR SERPLBLD CKD-EPI 2021: 37 ML/MIN/1.73M*2
EOSINOPHIL # BLD AUTO: 0.17 X10*3/UL (ref 0–0.4)
EOSINOPHIL NFR BLD AUTO: 3.3 %
ERYTHROCYTE [DISTWIDTH] IN BLOOD BY AUTOMATED COUNT: 18.5 % (ref 11.5–14.5)
GLUCOSE SERPL-MCNC: 116 MG/DL (ref 74–99)
GLUCOSE UR STRIP.AUTO-MCNC: NEGATIVE MG/DL
HCT VFR BLD AUTO: 32.4 % (ref 41–52)
HGB BLD-MCNC: 10.3 G/DL (ref 13.5–17.5)
HOLD SPECIMEN: NORMAL
IMM GRANULOCYTES # BLD AUTO: 0.03 X10*3/UL (ref 0–0.5)
IMM GRANULOCYTES NFR BLD AUTO: 0.6 % (ref 0–0.9)
KETONES UR STRIP.AUTO-MCNC: NEGATIVE MG/DL
LEUKOCYTE ESTERASE UR QL STRIP.AUTO: ABNORMAL
LYMPHOCYTES # BLD AUTO: 0.93 X10*3/UL (ref 0.8–3)
LYMPHOCYTES NFR BLD AUTO: 17.8 %
MCH RBC QN AUTO: 30.9 PG (ref 26–34)
MCHC RBC AUTO-ENTMCNC: 31.8 G/DL (ref 32–36)
MCV RBC AUTO: 97 FL (ref 80–100)
MONOCYTES # BLD AUTO: 0.41 X10*3/UL (ref 0.05–0.8)
MONOCYTES NFR BLD AUTO: 7.8 %
NEUTROPHILS # BLD AUTO: 3.66 X10*3/UL (ref 1.6–5.5)
NEUTROPHILS NFR BLD AUTO: 69.9 %
NITRITE UR QL STRIP.AUTO: NEGATIVE
NRBC BLD-RTO: 0 /100 WBCS (ref 0–0)
PH UR STRIP.AUTO: 5 [PH]
PLATELET # BLD AUTO: 47 X10*3/UL (ref 150–450)
POTASSIUM SERPL-SCNC: 4.5 MMOL/L (ref 3.5–5.3)
PROT SERPL-MCNC: 5.7 G/DL (ref 6.4–8.2)
PROT UR STRIP.AUTO-MCNC: ABNORMAL MG/DL
RBC # BLD AUTO: 3.33 X10*6/UL (ref 4.5–5.9)
RBC # UR STRIP.AUTO: ABNORMAL /UL
RBC #/AREA URNS AUTO: >20 /HPF
SODIUM SERPL-SCNC: 136 MMOL/L (ref 136–145)
SP GR UR STRIP.AUTO: 1.02
UROBILINOGEN UR STRIP.AUTO-MCNC: <2 MG/DL
WBC # BLD AUTO: 5.2 X10*3/UL (ref 4.4–11.3)
WBC #/AREA URNS AUTO: ABNORMAL /HPF

## 2024-03-31 PROCEDURE — 2500000004 HC RX 250 GENERAL PHARMACY W/ HCPCS (ALT 636 FOR OP/ED)

## 2024-03-31 PROCEDURE — 99285 EMERGENCY DEPT VISIT HI MDM: CPT | Performed by: STUDENT IN AN ORGANIZED HEALTH CARE EDUCATION/TRAINING PROGRAM

## 2024-03-31 PROCEDURE — 87086 URINE CULTURE/COLONY COUNT: CPT | Mod: STJLAB | Performed by: STUDENT IN AN ORGANIZED HEALTH CARE EDUCATION/TRAINING PROGRAM

## 2024-03-31 PROCEDURE — 80053 COMPREHEN METABOLIC PANEL: CPT

## 2024-03-31 PROCEDURE — 74176 CT ABD & PELVIS W/O CONTRAST: CPT | Mod: FOREIGN READ | Performed by: RADIOLOGY

## 2024-03-31 PROCEDURE — 74176 CT ABD & PELVIS W/O CONTRAST: CPT

## 2024-03-31 PROCEDURE — 85025 COMPLETE CBC W/AUTO DIFF WBC: CPT

## 2024-03-31 PROCEDURE — 4500999001 HC ED NO CHARGE

## 2024-03-31 PROCEDURE — 81001 URINALYSIS AUTO W/SCOPE: CPT | Performed by: STUDENT IN AN ORGANIZED HEALTH CARE EDUCATION/TRAINING PROGRAM

## 2024-03-31 PROCEDURE — 2500000001 HC RX 250 WO HCPCS SELF ADMINISTERED DRUGS (ALT 637 FOR MEDICARE OP)

## 2024-03-31 PROCEDURE — 36415 COLL VENOUS BLD VENIPUNCTURE: CPT

## 2024-03-31 PROCEDURE — 99284 EMERGENCY DEPT VISIT MOD MDM: CPT | Mod: 25

## 2024-03-31 RX ORDER — CIPROFLOXACIN 500 MG/1
250 TABLET ORAL ONCE
Status: COMPLETED | OUTPATIENT
Start: 2024-03-31 | End: 2024-03-31

## 2024-03-31 RX ADMIN — CIPROFLOXACIN HYDROCHLORIDE 250 MG: 500 TABLET, FILM COATED ORAL at 10:57

## 2024-03-31 RX ADMIN — SODIUM CHLORIDE 1000 ML: 9 INJECTION, SOLUTION INTRAVENOUS at 09:32

## 2024-03-31 ASSESSMENT — PAIN DESCRIPTION - FREQUENCY: FREQUENCY: CONSTANT/CONTINUOUS

## 2024-03-31 ASSESSMENT — PAIN DESCRIPTION - PAIN TYPE: TYPE: ACUTE PAIN

## 2024-03-31 ASSESSMENT — PAIN SCALES - GENERAL
PAINLEVEL_OUTOF10: 4
PAINLEVEL_OUTOF10: 0 - NO PAIN

## 2024-03-31 ASSESSMENT — LIFESTYLE VARIABLES
EVER HAD A DRINK FIRST THING IN THE MORNING TO STEADY YOUR NERVES TO GET RID OF A HANGOVER: NO
TOTAL SCORE: 0
HAVE YOU EVER FELT YOU SHOULD CUT DOWN ON YOUR DRINKING: NO
EVER FELT BAD OR GUILTY ABOUT YOUR DRINKING: NO
HAVE PEOPLE ANNOYED YOU BY CRITICIZING YOUR DRINKING: NO

## 2024-03-31 ASSESSMENT — PAIN - FUNCTIONAL ASSESSMENT: PAIN_FUNCTIONAL_ASSESSMENT: 0-10

## 2024-03-31 ASSESSMENT — PAIN DESCRIPTION - LOCATION: LOCATION: SCROTUM

## 2024-03-31 ASSESSMENT — PAIN DESCRIPTION - DESCRIPTORS: DESCRIPTORS: DISCOMFORT

## 2024-03-31 NOTE — TELEPHONE ENCOUNTER
"   UROLOGY ON-CALL RESIDENT TELEPHONE NOTE    Holden Burgess  [id number]  1940 (83 y.o.)    Page received from  to call patient.  Page read \"urinary retention/swelling at incision\"    HPI: Briefly, this is a 83 y.o. male w/PMHx most notable for prostate cancer for which he received radiation and subsequently developed stress urinary incontinence now postop from artificial urethral sphincter placement on 3/28/2024 with Dr. Phan.  He discharged without a catheter.  Overnight he has developed worsening suprapubic fullness and urge but inability to urinate.  They call asking for advice.    Allergies   Allergen Reactions    Crestor [Rosuvastatin] Unknown    Ezetimibe Unknown    Statins-Hmg-Coa Reductase Inhibitors Unknown     leg cramping       Past Medical History:   Diagnosis Date    Abdominal pain, chronic, right upper quadrant     ACP (advance care planning)     Anemia     Aneurysm (CMS/HCC)     Body mass index (BMI) 39.0-39.9, adult 12/06/2021    BMI 39.0-39.9,adult    Body mass index (BMI) 39.0-39.9, adult 07/11/2022    BMI 39.0-39.9,adult    Body mass index (BMI) 39.0-39.9, adult 04/24/2022    Body mass index (BMI) of 39.0 to 39.9 in adult    Body mass index (BMI) 39.0-39.9, adult 04/24/2022    Body mass index (BMI) of 39.0 to 39.9 in adult    Body mass index (BMI) 39.0-39.9, adult 04/24/2022    Body mass index (BMI) of 39.0 to 39.9 in adult    Cramp and spasm 07/19/2022    Leg cramps    Difficulty breathing     Encounter for general adult medical examination without abnormal findings 09/25/2020    Encounter for Medicare annual wellness exam    Encounter for screening for malignant neoplasm of prostate 04/14/2021    Encounter for prostate cancer screening    Encounter for screening for malignant neoplasm of prostate 09/25/2020    Encounter for prostate cancer screening    Forearm injury     Hx of atrial flutter     Hyperlipidemia     Hypertension     Hyperuricemia     Incontinence     Obesity, " unspecified 04/26/2022    Class 2 obesity with body mass index (BMI) of 39.0 to 39.9 in adult    Other symptoms and signs involving the musculoskeletal system 10/12/2022    Shoulder weakness    Personal history of other diseases of the circulatory system 09/29/2021    History of hypotension    Personal history of other diseases of the circulatory system 10/26/2021    History of hypertension    Personal history of other endocrine, nutritional and metabolic disease 12/06/2021    History of morbid obesity    Personal history of other specified conditions 09/29/2021    History of dizziness    Rotator cuff injury     Sinus node dysfunction (CMS/HCC)     Valvular heart disease      Past Surgical History:   Procedure Laterality Date    BONE MARROW BIOPSY      BONE MARROW BIOPSY W/ ASPIRATION  01/23/2024    CARDIAC ELECTROPHYSIOLOGY PROCEDURE Left 11/21/2023    Procedure: PPM IMPLANT DC;  Surgeon: Jun Solis MD;  Location: [facility];  Service: Electrophysiology;  Laterality: Left;    CYSTOSCOPY  02/20/2024    MR CHEST ANGIO W AND WO IV CONTRAST  09/18/2019    MR CHEST ANGIO W AND WO IV CONTRAST 9/18/2019 [facility]    MR CHEST ANGIO W AND WO IV CONTRAST  01/25/2023    MR CHEST ANGIO W AND WO IV CONTRAST 1/25/2023 [facility]    MR CHEST ANGIO W AND WO IV CONTRAST  01/25/2023    MR CHEST ANGIO W AND WO IV CONTRAST    OTHER SURGICAL HISTORY  05/14/2020    Knee replacement    OTHER SURGICAL HISTORY  05/14/2020    Catheter ablation    OTHER SURGICAL HISTORY  05/14/2020    Prostate surgery    OTHER SURGICAL HISTORY  05/14/2020    Lower back surgery    OTHER SURGICAL HISTORY  05/14/2020    Hand surgery    OTHER SURGICAL HISTORY  09/29/2021    Surgery    OTHER SURGICAL HISTORY  09/29/2021    Cataract surgery    OTHER SURGICAL HISTORY  09/29/2021    Colonoscopy    OTHER SURGICAL HISTORY  09/29/2021    Vertebral fusion     Social History     Socioeconomic History    Marital status:       Spouse name: Not on file    Number of children: Not on file    Years of education: Not on file    Highest education level: Not on file   Occupational History    Not on file   Tobacco Use    Smoking status: Former     Packs/day: 3.00     Years: 10.00     Additional pack years: 0.00     Total pack years: 30.00     Types: Cigarettes     Quit date: 1968     Years since quittin.6     Passive exposure: Never    Smokeless tobacco: Never   Substance and Sexual Activity    Alcohol use: Never    Drug use: Never    Sexual activity: Defer   Other Topics Concern    Not on file   Social History Narrative    Not on file     Social Determinants of Health     Financial Resource Strain: Not on file   Food Insecurity: Not on file   Transportation Needs: Not on file   Physical Activity: Not on file   Stress: Not on file   Social Connections: Not on file   Intimate Partner Violence: Not on file   Housing Stability: Not on file     Family History   Problem Relation Name Age of Onset    Other (polio) Mother      Heart disease Father      Heart disease Brother      Polymyalgia rheumatica Brother      Other (mitral valve disorder) Brother      Other (Coronary artery bypass graft) Brother      Other (Arterisclerotic cardiovascular disease) Brother       Current Outpatient Medications   Medication Sig Dispense Refill    acetaminophen (Tylenol) 325 mg tablet Take 2 tablets (650 mg) by mouth every 4 hours if needed for mild pain (1 - 3). 30 tablet 0    acetaminophen (Tylenol) 500 mg tablet Take 2 tablets (1,000 mg) by mouth every 6 hours if needed for mild pain (1 - 3). 30 tablet 0    allopurinol (Zyloprim) 100 mg tablet Take 1 tablet (100 mg) by mouth 2 times a day. 180 tablet 1    ascorbic acid (Vitamin C) 1,000 mg tablet Take 1 tablet (1,000 mg) by mouth once daily.      aspirin 81 mg EC tablet Take 1 tablet (81 mg) by mouth once daily.      atorvastatin (Lipitor) 10 mg tablet Take 1 tablet (10 mg) by mouth once daily at bedtime.       calcitriol (Rocaltrol) 0.25 mcg capsule Take 1 capsule (0.25 mcg) by mouth every other day.      chlorthalidone (Hygroton) 25 mg tablet Take 1 tablet (25 mg) by mouth once daily. 90 tablet 3    cholecalciferol (Vitamin D-3) 125 MCG (5000 UT) capsule Take 1 capsule (125 mcg) by mouth once daily.      cyanocobalamin (Vitamin B-12) 1,000 mcg tablet Take 1 tablet (1,000 mcg) by mouth once daily.      ezetimibe (Zetia) 10 mg tablet Take 1 tablet (10 mg) by mouth once daily. 90 tablet 0    ferrous sulfate 325 (65 Fe) MG tablet Take 1 tablet by mouth once daily.      folic acid (Folvite) 1 mg tablet Take 1 tablet (1 mg) by mouth once daily. 90 tablet 0    furosemide (Lasix) 20 mg tablet Take 1 tablet (20 mg) by mouth once daily. 90 tablet 3    latanoprost (Xalatan) 0.005 % ophthalmic solution Administer 1 drop into affected eye(s) once daily at bedtime.      MAGNESIUM ORAL Take 550 mg by mouth once daily at bedtime. Take 1 tablet 550 mg at night.      meloxicam (Mobic) 15 mg tablet Take 1 tablet (15 mg) by mouth once daily for 7 days. 7 tablet 0    niacin (Niaspan) 500 mg ER tablet Take 1 tablet (500 mg) by mouth once daily at bedtime.      NON FORMULARY Take 1 each by mouth once daily. Prevagen      oxyCODONE (Roxicodone) 5 mg immediate release tablet Take 1 tablet (5 mg) by mouth every 6 hours if needed for severe pain (7 - 10) for up to 3 days. 12 tablet 0    polyethylene glycol (Glycolax, Miralax) 17 gram packet Take 17 g by mouth once daily. 30 packet 0    rOPINIRole (Requip) 0.25 mg tablet Take 1 tablet (0.25 mg) by mouth once daily at bedtime. 90 tablet 1    sulfamethoxazole-trimethoprim (Bactrim DS) 800-160 mg tablet Take 1 tablet by mouth 2 times a day for 7 days. 14 tablet 0    temazepam (Restoril) 15 mg capsule Take 1 capsule (15 mg) by mouth as needed at bedtime for sleep. 90 capsule 0    traMADol (Ultram) 50 mg tablet Take 1 tablet (50 mg) by mouth every 6 hours if needed for severe pain (7 - 10). 30 tablet  0    TURMERIC ORAL Take 1 capsule by mouth once daily.      valsartan (Diovan) 320 mg tablet Take 1 tablet (320 mg) by mouth once daily. 90 tablet 3    zinc sulfate 50 mg zinc (220 mg) tablet Take by mouth.       No current facility-administered medications for this visit.       Disposition: Referred patient to ED.  Advised them to speak with urology before placing Estevez catheter as the smallest reasonable catheter size is needed to prevent erosion of urethral sphincter.  Patient agreed with plan.    Roman Perera MD  Urologic Surgery PGY-2  Covering Main Stella Only  Adult Urology: [id number]    Pediatric Urology: [id number]

## 2024-03-31 NOTE — DISCHARGE INSTRUCTIONS
Follow-up with Dr. Phan at your regularly scheduled appointment.  Please follow-up with his instructions.

## 2024-03-31 NOTE — ED PROVIDER NOTES
EMERGENCY DEPARTMENT ENCOUNTER      Pt Name: Holden Burgess  MRN: [id number]  Birthdate 1940  Date of evaluation: 3/31/2024    HISTORY OF PRESENT ILLNESS    Holden Burgess is an 83 y.o. male with history including CAD, a flutter, CKD stage III, depression, hypertension, PVD, prostate cancer, urinary incontinence presenting to the emergency department for urinary retention and feeling of a full urinary bladder.  Patient had a procedure done with Dr. Phan on 3/27.  They placed a urinary sphincter device.  Patient has had gravity incontinence and dribbles at baseline.  Patient has been having continued dribbling since his procedure.  He states that he feels like he is not able to completely empty his bladder.  He has a lot of pressure in the suprapubic region.  He is also noted a lot of swelling and discoloration in the genitals.  Patient does have a history of low platelet counts causing bleeding.  Patient denies any fevers or chills.    PAST MEDICAL HISTORY     Past Medical History:   Diagnosis Date    Abdominal pain, chronic, right upper quadrant     ACP (advance care planning)     Anemia     Aneurysm (CMS/[facility])     Body mass index (BMI) 39.0-39.9, adult 12/06/2021    BMI 39.0-39.9,adult    Body mass index (BMI) 39.0-39.9, adult 07/11/2022    BMI 39.0-39.9,adult    Body mass index (BMI) 39.0-39.9, adult 04/24/2022    Body mass index (BMI) of 39.0 to 39.9 in adult    Body mass index (BMI) 39.0-39.9, adult 04/24/2022    Body mass index (BMI) of 39.0 to 39.9 in adult    Body mass index (BMI) 39.0-39.9, adult 04/24/2022    Body mass index (BMI) of 39.0 to 39.9 in adult    Cramp and spasm 07/19/2022    Leg cramps    Difficulty breathing     Encounter for general adult medical examination without abnormal findings 09/25/2020    Encounter for Medicare annual wellness exam    Encounter for screening for malignant neoplasm of prostate 04/14/2021    Encounter for prostate cancer screening    Encounter for screening  for malignant neoplasm of prostate 09/25/2020    Encounter for prostate cancer screening    Forearm injury     Hx of atrial flutter     Hyperlipidemia     Hypertension     Hyperuricemia     Incontinence     Obesity, unspecified 04/26/2022    Class 2 obesity with body mass index (BMI) of 39.0 to 39.9 in adult    Other symptoms and signs involving the musculoskeletal system 10/12/2022    Shoulder weakness    Personal history of other diseases of the circulatory system 09/29/2021    History of hypotension    Personal history of other diseases of the circulatory system 10/26/2021    History of hypertension    Personal history of other endocrine, nutritional and metabolic disease 12/06/2021    History of morbid obesity    Personal history of other specified conditions 09/29/2021    History of dizziness    Rotator cuff injury     Sinus node dysfunction (CMS/HCC)     Valvular heart disease        SURGICAL HISTORY       Past Surgical History:   Procedure Laterality Date    BONE MARROW BIOPSY      BONE MARROW BIOPSY W/ ASPIRATION  01/23/2024    CARDIAC ELECTROPHYSIOLOGY PROCEDURE Left 11/21/2023    Procedure: PPM IMPLANT DC;  Surgeon: Jun Solis MD;  Location: [facility];  Service: Electrophysiology;  Laterality: Left;    CYSTOSCOPY  02/20/2024    MR CHEST ANGIO W AND WO IV CONTRAST  09/18/2019    MR CHEST ANGIO W AND WO IV CONTRAST 9/18/2019 [facility]    MR CHEST ANGIO W AND WO IV CONTRAST  01/25/2023    MR CHEST ANGIO W AND WO IV CONTRAST 1/25/2023 [facility]    MR CHEST ANGIO W AND WO IV CONTRAST  01/25/2023    MR CHEST ANGIO W AND WO IV CONTRAST    OTHER SURGICAL HISTORY  05/14/2020    Knee replacement    OTHER SURGICAL HISTORY  05/14/2020    Catheter ablation    OTHER SURGICAL HISTORY  05/14/2020    Prostate surgery    OTHER SURGICAL HISTORY  05/14/2020    Lower back surgery    OTHER SURGICAL HISTORY  05/14/2020    Hand surgery    OTHER SURGICAL HISTORY  09/29/2021    Surgery    OTHER  SURGICAL HISTORY  09/29/2021    Cataract surgery    OTHER SURGICAL HISTORY  09/29/2021    Colonoscopy    OTHER SURGICAL HISTORY  09/29/2021    Vertebral fusion       CURRENT MEDICATIONS       Discharge Medication List as of 3/31/2024 12:35 PM        CONTINUE these medications which have NOT CHANGED    Details   !! acetaminophen (Tylenol) 325 mg tablet Take 2 tablets (650 mg) by mouth every 4 hours if needed for mild pain (1 - 3)., Starting Tue 11/21/2023, No Print      !! acetaminophen (Tylenol) 500 mg tablet Take 2 tablets (1,000 mg) by mouth every 6 hours if needed for mild pain (1 - 3)., Starting Thu 3/28/2024, Normal      allopurinol (Zyloprim) 100 mg tablet Take 1 tablet (100 mg) by mouth 2 times a day., Starting Mon 5/15/2023, Normal      ascorbic acid (Vitamin C) 1,000 mg tablet Take 1 tablet (1,000 mg) by mouth once daily., Historical Med      aspirin 81 mg EC tablet Take 1 tablet (81 mg) by mouth once daily., Historical Med      atorvastatin (Lipitor) 10 mg tablet Take 1 tablet (10 mg) by mouth once daily at bedtime., Starting Mon 12/27/2021, Historical Med      calcitriol (Rocaltrol) 0.25 mcg capsule Take 1 capsule (0.25 mcg) by mouth every other day., Historical Med      chlorthalidone (Hygroton) 25 mg tablet Take 1 tablet (25 mg) by mouth once daily., Starting Wed 10/25/2023, Until Thu 10/24/2024, Normal      cholecalciferol (Vitamin D-3) 125 MCG (5000 UT) capsule Take 1 capsule (125 mcg) by mouth once daily., Historical Med      cyanocobalamin (Vitamin B-12) 1,000 mcg tablet Take 1 tablet (1,000 mcg) by mouth once daily., Starting Fri 4/19/2019, Historical Med      ezetimibe (Zetia) 10 mg tablet Take 1 tablet (10 mg) by mouth once daily., Starting Sat 9/23/2023, Normal      ferrous sulfate 325 (65 Fe) MG tablet Take 1 tablet by mouth once daily., Historical Med      folic acid (Folvite) 1 mg tablet Take 1 tablet (1 mg) by mouth once daily., Starting Sat 9/23/2023, Normal      furosemide (Lasix) 20 mg  tablet Take 1 tablet (20 mg) by mouth once daily., Starting Mon 3/18/2024, Until Tue 3/18/2025, Print      latanoprost (Xalatan) 0.005 % ophthalmic solution Administer 1 drop into affected eye(s) once daily at bedtime., Starting Thu 5/5/2022, Historical Med      MAGNESIUM ORAL Take 550 mg by mouth once daily at bedtime. Take 1 tablet 550 mg at night., Historical Med      meloxicam (Mobic) 15 mg tablet Take 1 tablet (15 mg) by mouth once daily for 7 days., Starting Thu 3/28/2024, Until Thu 4/4/2024, Normal      niacin (Niaspan) 500 mg ER tablet Take 1 tablet (500 mg) by mouth once daily at bedtime., Historical Med      NON FORMULARY Take 1 each by mouth once daily. Prevagen, Historical Med      oxyCODONE (Roxicodone) 5 mg immediate release tablet Take 1 tablet (5 mg) by mouth every 6 hours if needed for severe pain (7 - 10) for up to 3 days., Starting Thu 3/28/2024, Until Sun 3/31/2024 at 2359, Normal      polyethylene glycol (Glycolax, Miralax) 17 gram packet Take 17 g by mouth once daily., Starting Thu 3/28/2024, Until Sat 4/27/2024, Normal      rOPINIRole (Requip) 0.25 mg tablet Take 1 tablet (0.25 mg) by mouth once daily at bedtime., Starting Tue 5/30/2023, Normal      sulfamethoxazole-trimethoprim (Bactrim DS) 800-160 mg tablet Take 1 tablet by mouth 2 times a day for 7 days., Starting Thu 3/28/2024, Until Thu 4/4/2024, Normal      temazepam (Restoril) 15 mg capsule Take 1 capsule (15 mg) by mouth as needed at bedtime for sleep., Starting Wed 2/21/2024, Normal      traMADol (Ultram) 50 mg tablet Take 1 tablet (50 mg) by mouth every 6 hours if needed for severe pain (7 - 10)., Starting Sun 3/3/2024, Normal      TURMERIC ORAL Take 1 capsule by mouth once daily., Historical Med      valsartan (Diovan) 320 mg tablet Take 1 tablet (320 mg) by mouth once daily., Starting Wed 1/3/2024, Until Thu 1/2/2025, Normal      zinc sulfate 50 mg zinc (220 mg) tablet Take by mouth., Historical Med       !! - Potential duplicate  medications found. Please discuss with provider.          ALLERGIES     Crestor [rosuvastatin], Ezetimibe, and Statins-hmg-coa reductase inhibitors    FAMILY HISTORY       Family History   Problem Relation Name Age of Onset    Other (polio) Mother      Heart disease Father      Heart disease Brother      Polymyalgia rheumatica Brother      Other (mitral valve disorder) Brother      Other (Coronary artery bypass graft) Brother      Other (Arterisclerotic cardiovascular disease) Brother          SOCIAL HISTORY       Social History     Socioeconomic History    Marital status:      Spouse name: None    Number of children: None    Years of education: None    Highest education level: None   Occupational History    None   Tobacco Use    Smoking status: Former     Packs/day: 3.00     Years: 10.00     Additional pack years: 0.00     Total pack years: 30.00     Types: Cigarettes     Quit date: 1968     Years since quittin.6     Passive exposure: Never    Smokeless tobacco: Never   Substance and Sexual Activity    Alcohol use: Never    Drug use: Never    Sexual activity: Defer   Other Topics Concern    None   Social History Narrative    None     Social Determinants of Health     Financial Resource Strain: Not on file   Food Insecurity: Not on file   Transportation Needs: Not on file   Physical Activity: Not on file   Stress: Not on file   Social Connections: Not on file   Intimate Partner Violence: Not on file   Housing Stability: Not on file       PHYSICAL EXAM       ED Triage Vitals [24 0735]   Temperature Heart Rate Respirations BP   36.2 °C (97.2 °F) 69 18 (!) 152/118      Pulse Ox Temp Source Heart Rate Source Patient Position   96 % Tympanic Monitor Sitting      BP Location FiO2 (%)     Right arm --       Physical Exam  Vitals and nursing note reviewed. Exam conducted with a chaperone present.   Constitutional:       General: He is not in acute distress.     Appearance: He is well-developed.    HENT:      Head: Normocephalic and atraumatic.      Right Ear: External ear normal.      Left Ear: External ear normal.      Nose: Nose normal.      Mouth/Throat:      Mouth: Mucous membranes are moist.   Eyes:      Conjunctiva/sclera: Conjunctivae normal.   Cardiovascular:      Rate and Rhythm: Normal rate and regular rhythm.      Pulses: Normal pulses.      Heart sounds: Normal heart sounds. No murmur heard.  Pulmonary:      Effort: Pulmonary effort is normal. No respiratory distress.      Breath sounds: Normal breath sounds.   Abdominal:      General: Bowel sounds are normal.      Palpations: Abdomen is soft.      Tenderness: There is abdominal tenderness (Suprapubic).   Genitourinary:     Comments: Swelling to the scrotum with bruising throughout the mons and scrotal region  Musculoskeletal:         General: No swelling. Normal range of motion.      Cervical back: Neck supple.   Skin:     General: Skin is warm and dry.      Capillary Refill: Capillary refill takes less than 2 seconds.   Neurological:      General: No focal deficit present.      Mental Status: He is alert. Mental status is at baseline.   Psychiatric:         Mood and Affect: Mood normal.          DIAGNOSTIC RESULTS     LABS:  Labs Reviewed   URINALYSIS WITH REFLEX CULTURE AND MICROSCOPIC - Abnormal       Result Value    Color, Urine Yellow      Appearance, Urine Hazy (*)     Specific Gravity, Urine 1.017      pH, Urine 5.0      Protein, Urine 30 (1+) (*)     Glucose, Urine NEGATIVE      Blood, Urine LARGE (3+) (*)     Ketones, Urine NEGATIVE      Bilirubin, Urine NEGATIVE      Urobilinogen, Urine <2.0      Nitrite, Urine NEGATIVE      Leukocyte Esterase, Urine TRACE (*)    CBC WITH AUTO DIFFERENTIAL - Abnormal    WBC 5.2      nRBC 0.0      RBC 3.33 (*)     Hemoglobin 10.3 (*)     Hematocrit 32.4 (*)     MCV 97      MCH 30.9      MCHC 31.8 (*)     RDW 18.5 (*)     Platelets 47 (*)     Neutrophils % 69.9      Immature Granulocytes %, Automated  0.6      Lymphocytes % 17.8      Monocytes % 7.8      Eosinophils % 3.3      Basophils % 0.6      Neutrophils Absolute 3.66      Immature Granulocytes Absolute, Automated 0.03      Lymphocytes Absolute 0.93      Monocytes Absolute 0.41      Eosinophils Absolute 0.17      Basophils Absolute 0.03     COMPREHENSIVE METABOLIC PANEL - Abnormal    Glucose 116 (*)     Sodium 136      Potassium 4.5      Chloride 106      Bicarbonate 24      Anion Gap 11      Urea Nitrogen 38 (*)     Creatinine 1.81 (*)     eGFR 37 (*)     Calcium 8.9      Albumin 3.8      Alkaline Phosphatase 82      Total Protein 5.7 (*)     AST 23      Bilirubin, Total 0.9      ALT 22     MICROSCOPIC ONLY, URINE - Abnormal    WBC, Urine 21-50 (*)     RBC, Urine >20 (*)     Bacteria, Urine 1+ (*)    URINE CULTURE   URINALYSIS WITH REFLEX CULTURE AND MICROSCOPIC    Narrative:     The following orders were created for panel order Urinalysis with Reflex Culture and Microscopic.  Procedure                               Abnormality         Status                     ---------                               -----------         ------                     Urinalysis with Reflex C...[[id number]]  Abnormal            Final result               Extra Urine Gray Tube[[id number]]                            In process                   Please view results for these tests on the individual orders.   EXTRA URINE GRAY TUBE       All other labs were within normal range or not returned as of this dictation.    Imaging  CT abdomen pelvis wo IV contrast   Final Result   1. Slight asymmetrical enlargement of the right rectus muscle   measuring up to 2.3 cm in thickness while the left measures 1.5 cm.   Findings may be secondary to a small intramuscular hematoma.    Postsurgical changes along the anterior pelvic wall. No fluid   collection or abscess.   2. Prostate gland is mildly enlarged measuring 6 cm with radiation   seeds noted in the prostate.   3. Tiny hiatal hernia.   4.  Moderate to severe bilateral renal cortical atrophy.   5. Colonic diverticulosis without findings of diverticulitis.   6. Moderate coronary artery calcifications.   Signed by Lee Holley MD           Procedures  Procedures     EMERGENCY DEPARTMENT COURSE/MDM:   Medical Decision Making  Holden Burgess is an 83 y.o. male with history including CAD, a flutter, CKD stage III, depression, hypertension, PVD, prostate cancer, urinary incontinence presenting to the emergency department for urinary retention and feeling of a full urinary bladder.  Initial bedside ultrasound showed a volume of 22 cc.  CT imaging ordered to rule out other potential causes of his symptoms.  Lab workup also ordered.    Patient's labs show an elevated creatinine of 1.81 up from 3 days ago at 1.75.  Patient's platelet count is 47 consistent with his baseline.  On examination patient has a lot of bruising and swelling to the genital region.  Most likely this is secondary to his low platelet count and easy bruising.  Patient was given fluids after CT imaging.  Patient CT shows evidence of a distended bladder.  Repeat bladder scan showed about 375 at bedside.  Initially, case was discussed with Dr. Phan and decision was made to transfer the patient for patient to be seen by urology downtown.    Because the patient's device placement, cannot place a Estevez here and we were able to contact Dr. Phan, his urologist who was able to come in and evaluated the patient at bedside in our ED and patient did not require transfer downtown..  He was able to confirm that the device was in the correct positioning and placed in the open position.  He does not believe that the patient needs antibiotics.  Care instructions were given to patient at bedside and they feel comfortable with discharge home and outpatient follow-up.        ED Course as of 03/31/24 8097   Sun Mar 31, 2024   1050 Discussed this case with Dr. Leonard urology who is a partner of   Emilie.  He recommends speaking to Dr. Phan in regards to this patient because of how close he was to the operation. [SK]   1127 Spoke with Dr. Phan in regards to this patient.  He recommends patient be sent downtown to see if his acute urinary retention is secondary to a device failure before having a suprapubic catheter placed.  Patient was accepted ED to ED transfer and will go by private vehicle. [SK]   1129 Patient's labs are concerning for possible infection with leukocyte Estrace and bacteria.  He was given a dose of ciprofloxacin sent here.  Patient also had new JORDY was given a liter of fluids. [SK]      ED Course User Index  [SK] Flower Clarke DO         Diagnoses as of 03/31/24 1857   Acute urinary retention        External records reviewed: recent inpatient, clinic, and prior ED notes  Labs and Diagnostic imaging independently reviewed/interpreted by me.    Patient plan, care, lab results and imaging were all discussed with attending.    ED Medications administered this visit:    Medications   sodium chloride 0.9 % bolus 1,000 mL (0 mL intravenous Stopped 3/31/24 1002)   ciprofloxacin (Cipro) tablet 250 mg (250 mg oral Given 3/31/24 1057)     New Prescriptions from this visit:    Discharge Medication List as of 3/31/2024 12:35 PM          (Please note that portions of this note were completed with a voice recognition program.  Efforts were made to edit the dictations but occasionally words are mis-transcribed.)     Flower Clarke DO  Resident  03/31/24 1858    The patient was seen by the resident/fellow.  I have personally performed a substantive portion of the encounter.  I have seen and examined the patient; agree with the workup, evaluation, MDM, management and diagnosis.  The care plan has been discussed with the resident; I have reviewed the resident’s note and agree with the documented findings.           Iqra Nance DO  04/01/24 0904

## 2024-04-01 ENCOUNTER — DOCUMENTATION (OUTPATIENT)
Dept: PHYSICAL THERAPY | Facility: CLINIC | Age: 84
End: 2024-04-01
Payer: MEDICARE

## 2024-04-01 ENCOUNTER — APPOINTMENT (OUTPATIENT)
Dept: PHYSICAL THERAPY | Facility: CLINIC | Age: 84
End: 2024-04-01
Payer: MEDICARE

## 2024-04-01 DIAGNOSIS — D69.6 THROMBOCYTOPENIA (CMS-HCC): ICD-10-CM

## 2024-04-01 NOTE — PROGRESS NOTES
"Physical Therapy    Discharge Summary    Name: Holden Burgess \"GENARO\"  MRN: [id number]  : 1940  Date: 24    Discharge Summary: PT    Discharge Information: Date of discharge 24, Date of last visit 24, Date of evaluation 1-15-24, Number of attended visits 3, Referred by Dr. Cardoso, and Referred for Stress Urinary Incontinence.     Rehab Discharge Reason: Patient requested due to having surgery.    "

## 2024-04-02 LAB — BACTERIA UR CULT: NO GROWTH

## 2024-04-03 ENCOUNTER — LAB (OUTPATIENT)
Dept: LAB | Facility: CLINIC | Age: 84
End: 2024-04-03
Payer: MEDICARE

## 2024-04-03 ENCOUNTER — TELEPHONE (OUTPATIENT)
Dept: CARDIOLOGY | Facility: CLINIC | Age: 84
End: 2024-04-03
Payer: MEDICARE

## 2024-04-03 ENCOUNTER — OFFICE VISIT (OUTPATIENT)
Dept: HEMATOLOGY/ONCOLOGY | Facility: CLINIC | Age: 84
End: 2024-04-03
Payer: MEDICARE

## 2024-04-03 VITALS
TEMPERATURE: 97.2 F | BODY MASS INDEX: 40.56 KG/M2 | HEART RATE: 79 BPM | OXYGEN SATURATION: 99 % | WEIGHT: 315 LBS | RESPIRATION RATE: 18 BRPM | SYSTOLIC BLOOD PRESSURE: 172 MMHG | DIASTOLIC BLOOD PRESSURE: 77 MMHG

## 2024-04-03 DIAGNOSIS — Z98.61 CAD S/P PERCUTANEOUS CORONARY ANGIOPLASTY: ICD-10-CM

## 2024-04-03 DIAGNOSIS — I25.10 CAD S/P PERCUTANEOUS CORONARY ANGIOPLASTY: ICD-10-CM

## 2024-04-03 DIAGNOSIS — N39.3 SUI (STRESS URINARY INCONTINENCE), MALE: ICD-10-CM

## 2024-04-03 DIAGNOSIS — E78.2 HYPERLIPEMIA, MIXED: ICD-10-CM

## 2024-04-03 DIAGNOSIS — D69.6 THROMBOCYTOPENIA (CMS-HCC): ICD-10-CM

## 2024-04-03 DIAGNOSIS — I10 PRIMARY HYPERTENSION: ICD-10-CM

## 2024-04-03 DIAGNOSIS — D46.9 MDS (MYELODYSPLASTIC SYNDROME) (MULTI): Primary | ICD-10-CM

## 2024-04-03 DIAGNOSIS — M1A.9XX0 CHRONIC GOUT WITHOUT TOPHUS, UNSPECIFIED CAUSE, UNSPECIFIED SITE: ICD-10-CM

## 2024-04-03 LAB
ALBUMIN SERPL BCP-MCNC: 4 G/DL (ref 3.4–5)
ALP SERPL-CCNC: 91 U/L (ref 33–136)
ALT SERPL W P-5'-P-CCNC: 22 U/L (ref 10–52)
ANION GAP SERPL CALC-SCNC: 14 MMOL/L (ref 10–20)
AST SERPL W P-5'-P-CCNC: 22 U/L (ref 9–39)
BASOPHILS # BLD AUTO: 0.01 X10*3/UL (ref 0–0.1)
BASOPHILS NFR BLD AUTO: 0.2 %
BILIRUB SERPL-MCNC: 1.2 MG/DL (ref 0–1.2)
BUN SERPL-MCNC: 43 MG/DL (ref 6–23)
CALCIUM SERPL-MCNC: 9.1 MG/DL (ref 8.6–10.3)
CHLORIDE SERPL-SCNC: 105 MMOL/L (ref 98–107)
CO2 SERPL-SCNC: 21 MMOL/L (ref 21–32)
CREAT SERPL-MCNC: 2.33 MG/DL (ref 0.5–1.3)
EGFRCR SERPLBLD CKD-EPI 2021: 27 ML/MIN/1.73M*2
EOSINOPHIL # BLD AUTO: 0.17 X10*3/UL (ref 0–0.4)
EOSINOPHIL NFR BLD AUTO: 3.3 %
ERYTHROCYTE [DISTWIDTH] IN BLOOD BY AUTOMATED COUNT: 19 % (ref 11.5–14.5)
GLUCOSE SERPL-MCNC: 107 MG/DL (ref 74–99)
HCT VFR BLD AUTO: 32.7 % (ref 41–52)
HGB BLD-MCNC: 10.3 G/DL (ref 13.5–17.5)
IMM GRANULOCYTES # BLD AUTO: 0.04 X10*3/UL (ref 0–0.5)
IMM GRANULOCYTES NFR BLD AUTO: 0.8 % (ref 0–0.9)
LYMPHOCYTES # BLD AUTO: 0.78 X10*3/UL (ref 0.8–3)
LYMPHOCYTES NFR BLD AUTO: 15 %
MCH RBC QN AUTO: 31.1 PG (ref 26–34)
MCHC RBC AUTO-ENTMCNC: 31.5 G/DL (ref 32–36)
MCV RBC AUTO: 99 FL (ref 80–100)
MONOCYTES # BLD AUTO: 0.29 X10*3/UL (ref 0.05–0.8)
MONOCYTES NFR BLD AUTO: 5.6 %
NEUTROPHILS # BLD AUTO: 3.9 X10*3/UL (ref 1.6–5.5)
NEUTROPHILS NFR BLD AUTO: 75.1 %
NRBC BLD-RTO: ABNORMAL /100{WBCS}
PLATELET # BLD AUTO: 58 X10*3/UL (ref 150–450)
PMV BLD AUTO: 11.3 FL (ref 7.5–11.5)
POTASSIUM SERPL-SCNC: 5.1 MMOL/L (ref 3.5–5.3)
PROT SERPL-MCNC: 6.1 G/DL (ref 6.4–8.2)
RBC # BLD AUTO: 3.31 X10*6/UL (ref 4.5–5.9)
SODIUM SERPL-SCNC: 135 MMOL/L (ref 136–145)
WBC # BLD AUTO: 5.2 X10*3/UL (ref 4.4–11.3)

## 2024-04-03 PROCEDURE — 3078F DIAST BP <80 MM HG: CPT | Performed by: INTERNAL MEDICINE

## 2024-04-03 PROCEDURE — 3077F SYST BP >= 140 MM HG: CPT | Performed by: INTERNAL MEDICINE

## 2024-04-03 PROCEDURE — 1159F MED LIST DOCD IN RCRD: CPT | Performed by: INTERNAL MEDICINE

## 2024-04-03 PROCEDURE — 85025 COMPLETE CBC W/AUTO DIFF WBC: CPT

## 2024-04-03 PROCEDURE — 36415 COLL VENOUS BLD VENIPUNCTURE: CPT

## 2024-04-03 PROCEDURE — 99214 OFFICE O/P EST MOD 30 MIN: CPT | Performed by: INTERNAL MEDICINE

## 2024-04-03 PROCEDURE — 80053 COMPREHEN METABOLIC PANEL: CPT

## 2024-04-03 PROCEDURE — 1126F AMNT PAIN NOTED NONE PRSNT: CPT | Performed by: INTERNAL MEDICINE

## 2024-04-03 ASSESSMENT — PAIN SCALES - GENERAL: PAINLEVEL: 0-NO PAIN

## 2024-04-03 NOTE — PATIENT INSTRUCTIONS
Let's try promacta    Hopefully you can get med in hand soon, and if that is the case, you will start it and then 2 weeks later return for next followup and blood count recheck

## 2024-04-03 NOTE — TELEPHONE ENCOUNTER
407 pm Wife left voicemail stating that patient is SOB on exertion and swelling in his extremities.  On Thurs he had a urinary sphincter device inserted.  He saw Hematology earlier today and his blood pressure was 177/72, HR 79, O2 sat 99.  She states that he is tired and doesn't feel well.  Earlier today she gave him a Lasix.  She states that if she does not hear from us, she will probably take him to the ER.    Spoke to wife and she states that on Sun at ER his weight was 300 and today at the hematologist his weight was 315.  I advised he that Dr. Benjamin was not in the office today and the other providers were gone for the day.  Advised her that if patient got worse to take him to ER.  Wife verbalized understanding.    Routed to Marcela NEWBERRY

## 2024-04-04 ENCOUNTER — HOSPITAL ENCOUNTER (OUTPATIENT)
Dept: RADIOLOGY | Facility: HOSPITAL | Age: 84
Discharge: HOME | DRG: 291 | End: 2024-04-04
Payer: MEDICARE

## 2024-04-04 ENCOUNTER — SPECIALTY PHARMACY (OUTPATIENT)
Dept: PHARMACY | Facility: CLINIC | Age: 84
End: 2024-04-04

## 2024-04-04 ENCOUNTER — HOSPITAL ENCOUNTER (INPATIENT)
Facility: HOSPITAL | Age: 84
LOS: 3 days | Discharge: HOME | DRG: 291 | End: 2024-04-07
Attending: STUDENT IN AN ORGANIZED HEALTH CARE EDUCATION/TRAINING PROGRAM | Admitting: INTERNAL MEDICINE
Payer: MEDICARE

## 2024-04-04 ENCOUNTER — APPOINTMENT (OUTPATIENT)
Dept: CARDIOLOGY | Facility: HOSPITAL | Age: 84
DRG: 291 | End: 2024-04-04
Payer: MEDICARE

## 2024-04-04 ENCOUNTER — TELEPHONE (OUTPATIENT)
Dept: CARDIOLOGY | Facility: HOSPITAL | Age: 84
End: 2024-04-04

## 2024-04-04 ENCOUNTER — OFFICE VISIT (OUTPATIENT)
Dept: CARDIOLOGY | Facility: CLINIC | Age: 84
End: 2024-04-04
Payer: MEDICARE

## 2024-04-04 VITALS
HEIGHT: 73 IN | HEART RATE: 60 BPM | SYSTOLIC BLOOD PRESSURE: 120 MMHG | BODY MASS INDEX: 41.08 KG/M2 | WEIGHT: 310 LBS | DIASTOLIC BLOOD PRESSURE: 60 MMHG

## 2024-04-04 DIAGNOSIS — I71.20 THORACIC AORTIC ANEURYSM, WITHOUT RUPTURE, UNSPECIFIED (CMS-HCC): ICD-10-CM

## 2024-04-04 DIAGNOSIS — R06.09 DYSPNEA ON EXERTION: ICD-10-CM

## 2024-04-04 DIAGNOSIS — I50.41 ACUTE COMBINED SYSTOLIC AND DIASTOLIC CONGESTIVE HEART FAILURE (MULTI): ICD-10-CM

## 2024-04-04 DIAGNOSIS — E78.2 HYPERLIPEMIA, MIXED: ICD-10-CM

## 2024-04-04 DIAGNOSIS — I10 PRIMARY HYPERTENSION: ICD-10-CM

## 2024-04-04 DIAGNOSIS — N18.9 ACUTE RENAL FAILURE SUPERIMPOSED ON CHRONIC KIDNEY DISEASE, UNSPECIFIED ACUTE RENAL FAILURE TYPE, UNSPECIFIED CKD STAGE (CMS-HCC): ICD-10-CM

## 2024-04-04 DIAGNOSIS — I50.9 ACUTE CONGESTIVE HEART FAILURE, UNSPECIFIED HEART FAILURE TYPE (MULTI): ICD-10-CM

## 2024-04-04 DIAGNOSIS — I48.92 ATRIAL FLUTTER, UNSPECIFIED TYPE (MULTI): ICD-10-CM

## 2024-04-04 DIAGNOSIS — I25.10 CAD S/P PERCUTANEOUS CORONARY ANGIOPLASTY: ICD-10-CM

## 2024-04-04 DIAGNOSIS — R06.02 SHORTNESS OF BREATH: ICD-10-CM

## 2024-04-04 DIAGNOSIS — Z98.61 CAD S/P PERCUTANEOUS CORONARY ANGIOPLASTY: ICD-10-CM

## 2024-04-04 DIAGNOSIS — R06.02 SHORTNESS OF BREATH: Primary | ICD-10-CM

## 2024-04-04 DIAGNOSIS — N17.9 ACUTE RENAL FAILURE SUPERIMPOSED ON CHRONIC KIDNEY DISEASE, UNSPECIFIED ACUTE RENAL FAILURE TYPE, UNSPECIFIED CKD STAGE (CMS-HCC): ICD-10-CM

## 2024-04-04 DIAGNOSIS — Z87.891 FORMER SMOKER: ICD-10-CM

## 2024-04-04 DIAGNOSIS — Z86.711 PERSONAL HISTORY OF PULMONARY EMBOLISM: ICD-10-CM

## 2024-04-04 PROBLEM — D46.9 MDS (MYELODYSPLASTIC SYNDROME) (MULTI): Status: ACTIVE | Noted: 2024-04-04

## 2024-04-04 LAB
ALBUMIN SERPL BCP-MCNC: 4.1 G/DL (ref 3.4–5)
ALP SERPL-CCNC: 98 U/L (ref 33–136)
ALT SERPL W P-5'-P-CCNC: 22 U/L (ref 10–52)
ANION GAP SERPL CALC-SCNC: 15 MMOL/L (ref 10–20)
AST SERPL W P-5'-P-CCNC: 25 U/L (ref 9–39)
BASOPHILS # BLD AUTO: 0.02 X10*3/UL (ref 0–0.1)
BASOPHILS NFR BLD AUTO: 0.3 %
BILIRUB DIRECT SERPL-MCNC: 0.2 MG/DL (ref 0–0.3)
BILIRUB SERPL-MCNC: 1.6 MG/DL (ref 0–1.2)
BNP SERPL-MCNC: 15 PG/ML (ref 0–99)
BUN SERPL-MCNC: 45 MG/DL (ref 6–23)
CALCIUM SERPL-MCNC: 9 MG/DL (ref 8.6–10.3)
CARDIAC TROPONIN I PNL SERPL HS: 5 NG/L (ref 0–20)
CARDIAC TROPONIN I PNL SERPL HS: 5 NG/L (ref 0–20)
CHLORIDE SERPL-SCNC: 106 MMOL/L (ref 98–107)
CO2 SERPL-SCNC: 18 MMOL/L (ref 21–32)
CREAT SERPL-MCNC: 2.6 MG/DL (ref 0.5–1.3)
D DIMER PPP FEU-MCNC: 1746 NG/ML FEU
EGFRCR SERPLBLD CKD-EPI 2021: 24 ML/MIN/1.73M*2
EOSINOPHIL # BLD AUTO: 0.14 X10*3/UL (ref 0–0.4)
EOSINOPHIL NFR BLD AUTO: 2.2 %
ERYTHROCYTE [DISTWIDTH] IN BLOOD BY AUTOMATED COUNT: 18.9 % (ref 11.5–14.5)
GLUCOSE SERPL-MCNC: 82 MG/DL (ref 74–99)
HCT VFR BLD AUTO: 31.2 % (ref 41–52)
HGB BLD-MCNC: 10 G/DL (ref 13.5–17.5)
HOLD SPECIMEN: NORMAL
IMM GRANULOCYTES # BLD AUTO: 0.05 X10*3/UL (ref 0–0.5)
IMM GRANULOCYTES NFR BLD AUTO: 0.8 % (ref 0–0.9)
INR PPP: 1.3 (ref 0.9–1.1)
LYMPHOCYTES # BLD AUTO: 1.25 X10*3/UL (ref 0.8–3)
LYMPHOCYTES NFR BLD AUTO: 20 %
MCH RBC QN AUTO: 31.3 PG (ref 26–34)
MCHC RBC AUTO-ENTMCNC: 32.1 G/DL (ref 32–36)
MCV RBC AUTO: 98 FL (ref 80–100)
MONOCYTES # BLD AUTO: 0.41 X10*3/UL (ref 0.05–0.8)
MONOCYTES NFR BLD AUTO: 6.6 %
NEUTROPHILS # BLD AUTO: 4.38 X10*3/UL (ref 1.6–5.5)
NEUTROPHILS NFR BLD AUTO: 70.1 %
NRBC BLD-RTO: 0 /100 WBCS (ref 0–0)
PLATELET # BLD AUTO: 64 X10*3/UL (ref 150–450)
POTASSIUM SERPL-SCNC: 4.9 MMOL/L (ref 3.5–5.3)
PROT SERPL-MCNC: 6.4 G/DL (ref 6.4–8.2)
PROTHROMBIN TIME: 14.3 SECONDS (ref 9.8–12.8)
RBC # BLD AUTO: 3.19 X10*6/UL (ref 4.5–5.9)
SODIUM SERPL-SCNC: 134 MMOL/L (ref 136–145)
WBC # BLD AUTO: 6.3 X10*3/UL (ref 4.4–11.3)

## 2024-04-04 PROCEDURE — 71250 CT THORAX DX C-: CPT

## 2024-04-04 PROCEDURE — 1200000002 HC GENERAL ROOM WITH TELEMETRY DAILY

## 2024-04-04 PROCEDURE — 36415 COLL VENOUS BLD VENIPUNCTURE: CPT | Performed by: PHYSICIAN ASSISTANT

## 2024-04-04 PROCEDURE — 80053 COMPREHEN METABOLIC PANEL: CPT | Performed by: PHYSICIAN ASSISTANT

## 2024-04-04 PROCEDURE — 99285 EMERGENCY DEPT VISIT HI MDM: CPT | Mod: 25

## 2024-04-04 PROCEDURE — 85379 FIBRIN DEGRADATION QUANT: CPT | Performed by: PHYSICIAN ASSISTANT

## 2024-04-04 PROCEDURE — 85025 COMPLETE CBC W/AUTO DIFF WBC: CPT | Performed by: PHYSICIAN ASSISTANT

## 2024-04-04 PROCEDURE — 99214 OFFICE O/P EST MOD 30 MIN: CPT | Performed by: INTERNAL MEDICINE

## 2024-04-04 PROCEDURE — 99223 1ST HOSP IP/OBS HIGH 75: CPT | Performed by: INTERNAL MEDICINE

## 2024-04-04 PROCEDURE — 82248 BILIRUBIN DIRECT: CPT | Performed by: PHYSICIAN ASSISTANT

## 2024-04-04 PROCEDURE — 93005 ELECTROCARDIOGRAM TRACING: CPT

## 2024-04-04 PROCEDURE — 85610 PROTHROMBIN TIME: CPT | Performed by: PHYSICIAN ASSISTANT

## 2024-04-04 PROCEDURE — 3078F DIAST BP <80 MM HG: CPT | Performed by: INTERNAL MEDICINE

## 2024-04-04 PROCEDURE — 3074F SYST BP LT 130 MM HG: CPT | Performed by: INTERNAL MEDICINE

## 2024-04-04 PROCEDURE — 84484 ASSAY OF TROPONIN QUANT: CPT | Performed by: PHYSICIAN ASSISTANT

## 2024-04-04 PROCEDURE — 83880 ASSAY OF NATRIURETIC PEPTIDE: CPT | Performed by: PHYSICIAN ASSISTANT

## 2024-04-04 PROCEDURE — 93970 EXTREMITY STUDY: CPT | Performed by: STUDENT IN AN ORGANIZED HEALTH CARE EDUCATION/TRAINING PROGRAM

## 2024-04-04 PROCEDURE — 71250 CT THORAX DX C-: CPT | Performed by: STUDENT IN AN ORGANIZED HEALTH CARE EDUCATION/TRAINING PROGRAM

## 2024-04-04 PROCEDURE — 93970 EXTREMITY STUDY: CPT

## 2024-04-04 PROCEDURE — 1036F TOBACCO NON-USER: CPT | Performed by: INTERNAL MEDICINE

## 2024-04-04 PROCEDURE — 1159F MED LIST DOCD IN RCRD: CPT | Performed by: INTERNAL MEDICINE

## 2024-04-04 RX ORDER — PANTOPRAZOLE SODIUM 40 MG/1
40 TABLET, DELAYED RELEASE ORAL
Status: DISCONTINUED | OUTPATIENT
Start: 2024-04-05 | End: 2024-04-07 | Stop reason: HOSPADM

## 2024-04-04 RX ORDER — HEPARIN SODIUM 5000 [USP'U]/ML
7500 INJECTION, SOLUTION INTRAVENOUS; SUBCUTANEOUS EVERY 8 HOURS SCHEDULED
Status: DISCONTINUED | OUTPATIENT
Start: 2024-04-04 | End: 2024-04-05

## 2024-04-04 RX ORDER — ACETAMINOPHEN 325 MG/1
650 TABLET ORAL EVERY 4 HOURS PRN
Status: DISCONTINUED | OUTPATIENT
Start: 2024-04-04 | End: 2024-04-07 | Stop reason: HOSPADM

## 2024-04-04 RX ORDER — PANTOPRAZOLE SODIUM 40 MG/10ML
40 INJECTION, POWDER, LYOPHILIZED, FOR SOLUTION INTRAVENOUS
Status: DISCONTINUED | OUTPATIENT
Start: 2024-04-05 | End: 2024-04-07 | Stop reason: HOSPADM

## 2024-04-04 RX ORDER — ACETAMINOPHEN 650 MG/1
650 SUPPOSITORY RECTAL EVERY 4 HOURS PRN
Status: DISCONTINUED | OUTPATIENT
Start: 2024-04-04 | End: 2024-04-07 | Stop reason: HOSPADM

## 2024-04-04 RX ORDER — ACETAMINOPHEN 160 MG/5ML
650 SOLUTION ORAL EVERY 4 HOURS PRN
Status: DISCONTINUED | OUTPATIENT
Start: 2024-04-04 | End: 2024-04-07 | Stop reason: HOSPADM

## 2024-04-04 RX ORDER — ONDANSETRON 4 MG/1
4 TABLET, FILM COATED ORAL EVERY 8 HOURS PRN
Status: DISCONTINUED | OUTPATIENT
Start: 2024-04-04 | End: 2024-04-07 | Stop reason: HOSPADM

## 2024-04-04 RX ORDER — POLYETHYLENE GLYCOL 3350 17 G/17G
17 POWDER, FOR SOLUTION ORAL DAILY PRN
Status: DISCONTINUED | OUTPATIENT
Start: 2024-04-04 | End: 2024-04-06

## 2024-04-04 RX ORDER — ONDANSETRON HYDROCHLORIDE 2 MG/ML
4 INJECTION, SOLUTION INTRAVENOUS EVERY 8 HOURS PRN
Status: DISCONTINUED | OUTPATIENT
Start: 2024-04-04 | End: 2024-04-07 | Stop reason: HOSPADM

## 2024-04-04 RX ORDER — TALC
3 POWDER (GRAM) TOPICAL NIGHTLY PRN
Status: DISCONTINUED | OUTPATIENT
Start: 2024-04-04 | End: 2024-04-07 | Stop reason: HOSPADM

## 2024-04-04 ASSESSMENT — ENCOUNTER SYMPTOMS
MUSCULOSKELETAL NEGATIVE: 1
RESPIRATORY NEGATIVE: 1
HEMATOLOGIC/LYMPHATIC NEGATIVE: 1
ENDOCRINE NEGATIVE: 1
CONSTITUTIONAL NEGATIVE: 1
CARDIOVASCULAR NEGATIVE: 1
PSYCHIATRIC NEGATIVE: 1
EYES NEGATIVE: 1
NEUROLOGICAL NEGATIVE: 1
GASTROINTESTINAL NEGATIVE: 1

## 2024-04-04 ASSESSMENT — PAIN SCALES - GENERAL: PAINLEVEL_OUTOF10: 0 - NO PAIN

## 2024-04-04 ASSESSMENT — LIFESTYLE VARIABLES
TOTAL SCORE: 0
HAVE PEOPLE ANNOYED YOU BY CRITICIZING YOUR DRINKING: NO
EVER HAD A DRINK FIRST THING IN THE MORNING TO STEADY YOUR NERVES TO GET RID OF A HANGOVER: NO
EVER FELT BAD OR GUILTY ABOUT YOUR DRINKING: NO
HAVE YOU EVER FELT YOU SHOULD CUT DOWN ON YOUR DRINKING: NO

## 2024-04-04 ASSESSMENT — COLUMBIA-SUICIDE SEVERITY RATING SCALE - C-SSRS
2. HAVE YOU ACTUALLY HAD ANY THOUGHTS OF KILLING YOURSELF?: NO
6. HAVE YOU EVER DONE ANYTHING, STARTED TO DO ANYTHING, OR PREPARED TO DO ANYTHING TO END YOUR LIFE?: NO
1. IN THE PAST MONTH, HAVE YOU WISHED YOU WERE DEAD OR WISHED YOU COULD GO TO SLEEP AND NOT WAKE UP?: NO

## 2024-04-04 ASSESSMENT — PAIN - FUNCTIONAL ASSESSMENT
PAIN_FUNCTIONAL_ASSESSMENT: 0-10
PAIN_FUNCTIONAL_ASSESSMENT: 0-10

## 2024-04-04 NOTE — ED PROVIDER NOTES
"HPI   Chief Complaint   Patient presents with   • Shortness of Breath     \"Was sent over here from CT for an irregular finding in his chest CT.\"   • Post-op Problem       Patient is an 83-year-old gentleman who was sent in by his cardiologist after having an abnormal CT scan done earlier this afternoon.  The patient recently underwent urethral surgery a week ago at [facility] and yesterday started experiencing some shortness of breath with exertion as well is with rest.  The wife states that she noted some swelling in his legs and gave him some Lasix.  She called the cardiologist this afternoon who saw him in the office and ordered a noncontrast CT scan of the chest as well as duplex ultrasounds of the lower extremities.  The wife states that they received a phone call today that there was an abnormal finding on the CAT scan of the chest and they were advised with emergency room.  I did review the patient's ultrasound which shows no evidence of DVT in the bilateral lower extremities.  There is not noted a 3.5 x 2 cm anechoic cystic structure in the left groin anterior to the common femoral artery/vein corresponding to a postprocedural seroma/chronic hematoma also noted on CT scan done on March 31, 2024.  CT scan of the chest noncontrast shows no acute pulmonary process mild subsegmental atelectasis in the lower lobes right greater than left, aneurysmal ascending aorta measuring 4.9 x 4.8 cm no evidence of acute intramural hematoma.  When compared to previous imaging this is unchanged.  Dilated main pulmonary artery measuring up to 3.8 cm suggestive of pulm hypertension.  IV contrast with not performed due to patient's low GFR.  The patient's wife notes that he does seem more short of breath with minimal exertion.  Patient denies any palpitations, near syncope, cough, hemoptysis, chest pain or back pain.                          [place] Coma Scale Score: 15                     Patient History   Past Medical " History:   Diagnosis Date   • Abdominal pain, chronic, right upper quadrant    • ACP (advance care planning)    • Anemia    • Aneurysm (CMS/HCC)    • Body mass index (BMI) 39.0-39.9, adult 12/06/2021    BMI 39.0-39.9,adult   • Body mass index (BMI) 39.0-39.9, adult 07/11/2022    BMI 39.0-39.9,adult   • Body mass index (BMI) 39.0-39.9, adult 04/24/2022    Body mass index (BMI) of 39.0 to 39.9 in adult   • Body mass index (BMI) 39.0-39.9, adult 04/24/2022    Body mass index (BMI) of 39.0 to 39.9 in adult   • Body mass index (BMI) 39.0-39.9, adult 04/24/2022    Body mass index (BMI) of 39.0 to 39.9 in adult   • Cramp and spasm 07/19/2022    Leg cramps   • Difficulty breathing    • Encounter for general adult medical examination without abnormal findings 09/25/2020    Encounter for Medicare annual wellness exam   • Encounter for screening for malignant neoplasm of prostate 04/14/2021    Encounter for prostate cancer screening   • Encounter for screening for malignant neoplasm of prostate 09/25/2020    Encounter for prostate cancer screening   • Forearm injury    • Hx of atrial flutter    • Hyperlipidemia    • Hypertension    • Hyperuricemia    • Incontinence    • Obesity, unspecified 04/26/2022    Class 2 obesity with body mass index (BMI) of 39.0 to 39.9 in adult   • Other symptoms and signs involving the musculoskeletal system 10/12/2022    Shoulder weakness   • Personal history of other diseases of the circulatory system 09/29/2021    History of hypotension   • Personal history of other diseases of the circulatory system 10/26/2021    History of hypertension   • Personal history of other endocrine, nutritional and metabolic disease 12/06/2021    History of morbid obesity   • Personal history of other specified conditions 09/29/2021    History of dizziness   • Rotator cuff injury    • Sinus node dysfunction (CMS/HCC)    • Valvular heart disease      Past Surgical History:   Procedure Laterality Date   • BONE MARROW  BIOPSY     • BONE MARROW BIOPSY W/ ASPIRATION  2024   • CARDIAC ELECTROPHYSIOLOGY PROCEDURE Left 2023    Procedure: PPM IMPLANT DC;  Surgeon: Jun Solis MD;  Location: [facility];  Service: Electrophysiology;  Laterality: Left;   • CYSTOSCOPY  2024   • MR CHEST ANGIO W AND WO IV CONTRAST  2019    MR CHEST ANGIO W AND WO IV CONTRAST 2019 [facility]   • MR CHEST ANGIO W AND WO IV CONTRAST  2023    MR CHEST ANGIO W AND WO IV CONTRAST 20[facility]   • MR CHEST ANGIO W AND WO IV CONTRAST  2023    MR CHEST ANGIO W AND WO IV CONTRAST   • OTHER SURGICAL HISTORY  2020    Knee replacement   • OTHER SURGICAL HISTORY  2020    Catheter ablation   • OTHER SURGICAL HISTORY  2020    Prostate surgery   • OTHER SURGICAL HISTORY  2020    Lower back surgery   • OTHER SURGICAL HISTORY  2020    Hand surgery   • OTHER SURGICAL HISTORY  2021    Surgery   • OTHER SURGICAL HISTORY  2021    Cataract surgery   • OTHER SURGICAL HISTORY  2021    Colonoscopy   • OTHER SURGICAL HISTORY  2021    Vertebral fusion     Family History   Problem Relation Name Age of Onset   • Other (polio) Mother     • Heart disease Father     • Heart disease Brother     • Polymyalgia rheumatica Brother     • Other (mitral valve disorder) Brother     • Other (Coronary artery bypass graft) Brother     • Other (Arterisclerotic cardiovascular disease) Brother       Social History     Tobacco Use   • Smoking status: Former     Packs/day: 3.00     Years: 10.00     Additional pack years: 0.00     Total pack years: 30.00     Types: Cigarettes     Quit date: 1968     Years since quittin.6     Passive exposure: Never   • Smokeless tobacco: Never   Vaping Use   • Vaping Use: Never used   Substance Use Topics   • Alcohol use: Never   • Drug use: Never       Physical Exam   ED Triage Vitals [24 1903]   Temperature Heart Rate  Respirations BP   36.5 °C (97.7 °F) 63 16 (!) 137/7      SpO2 Temp Source Heart Rate Source Patient Position   -- Temporal Monitor Sitting      BP Location FiO2 (%)     Right arm --       Physical Exam    ED Course & MDM   Diagnoses as of 04/04/24 2237   Shortness of breath   Acute renal failure superimposed on chronic kidney disease, unspecified acute renal failure type, unspecified CKD stage (CMS/HCC)   Acute congestive heart failure, unspecified heart failure type (CMS/HCC)       Medical Decision Making  Initial EKG interpreted by me at 1950 hrs. shows atrial paced rhythm with a prolonged AV conduction rate 62 left axis deviation with bundle branch block FL was 256 QRS is 142  QTc was 452.  Compared to previous EKG performed back on 21 November 2023 it is unchanged.  CBC white count 6.3 hemoglobin 10 hematocrit 31.2 platelet count was 64, metabolic sodium 134 bicarb 18 BUN 45 creatinine 2.6 with a GFR of 24 which is acute on chronic kidney insufficiency, PT 14.3 INR 1.3 hepatic function bilirubin 1.6 first 2 troponins were both negative at 5 BNP is 15.  Discussed results of the workup with the patient and wife.  Paged out to the hospitalist for admission  D-dimer 1746.  Discussed results with Dr. Lagos and he will get the patient in for CHF exacerbation.  They will do a VQ scan in the morning.        Procedure  Procedures     Harvinder Mitchell PA-C  04/04/24 2237

## 2024-04-04 NOTE — PROGRESS NOTES
Patient ID: GENARO Burgess is a 83 y.o. male.  Referring Physician: No referring provider defined for this encounter.  Primary Care Provider: Giacomo Joseph,   Visit Type: Follow Up      Subjective    HPI what did my bone marrow show now?    Review of Systems   Constitutional: Negative.    HENT:  Negative.     Eyes: Negative.    Respiratory: Negative.     Cardiovascular: Negative.    Gastrointestinal: Negative.    Endocrine: Negative.    Genitourinary: Negative.     Musculoskeletal: Negative.    Skin: Negative.    Neurological: Negative.    Hematological: Negative.    Psychiatric/Behavioral: Negative.          Objective   BSA: 2.73 meters squared  /77 (BP Location: Right arm)   Pulse 79   Temp 36.2 °C (97.2 °F) (Temporal)   Resp 18   Wt 143 kg (315 lb 14.7 oz)   SpO2 99%   BMI 40.56 kg/m²      has a past medical history of Abdominal pain, chronic, right upper quadrant, ACP (advance care planning), Anemia, Aneurysm (CMS/[facility]), Body mass index (BMI) 39.0-39.9, adult (12/06/2021), Body mass index (BMI) 39.0-39.9, adult (07/11/2022), Body mass index (BMI) 39.0-39.9, adult (04/24/2022), Body mass index (BMI) 39.0-39.9, adult (04/24/2022), Body mass index (BMI) 39.0-39.9, adult (04/24/2022), Cramp and spasm (07/19/2022), Difficulty breathing, Encounter for general adult medical examination without abnormal findings (09/25/2020), Encounter for screening for malignant neoplasm of prostate (04/14/2021), Encounter for screening for malignant neoplasm of prostate (09/25/2020), Forearm injury, atrial flutter, Hyperlipidemia, Hypertension, Hyperuricemia, Incontinence, Obesity, unspecified (04/26/2022), Other symptoms and signs involving the musculoskeletal system (10/12/2022), Personal history of other diseases of the circulatory system (09/29/2021), Personal history of other diseases of the circulatory system (10/26/2021), Personal history of other endocrine, nutritional and metabolic disease (12/06/2021),  "Personal history of other specified conditions (09/29/2021), Rotator cuff injury, Sinus node dysfunction (CMS/HCC), and Valvular heart disease.   has a past surgical history that includes Other surgical history (05/14/2020); Other surgical history (05/14/2020); Other surgical history (05/14/2020); Other surgical history (05/14/2020); Other surgical history (05/14/2020); Other surgical history (09/29/2021); Other surgical history (09/29/2021); Other surgical history (09/29/2021); Other surgical history (09/29/2021); MR angio chest w and wo IV contrast (09/18/2019); MR angio chest w and wo IV contrast (01/25/2023); MR angio chest w and wo IV contrast (01/25/2023); Cardiac electrophysiology procedure (Left, 11/21/2023); Bone marrow biopsy; Bone marrow biopsy w/ aspiration (01/23/2024); and Cystoscopy (02/20/2024).  Family History   Problem Relation Name Age of Onset    Other (polio) Mother      Heart disease Father      Heart disease Brother      Polymyalgia rheumatica Brother      Other (mitral valve disorder) Brother      Other (Coronary artery bypass graft) Brother      Other (Arterisclerotic cardiovascular disease) Brother       Oncology History    No history exists.       Holden Burgess \"PAT\"  reports that he quit smoking about 55 years ago. His smoking use included cigarettes. He has a 30.00 pack-year smoking history. He has never been exposed to tobacco smoke. He has never used smokeless tobacco.  He  reports no history of alcohol use.  He  reports no history of drug use.    Physical Exam  Vitals reviewed.   Constitutional:       Appearance: Normal appearance.   HENT:      Head: Normocephalic.      Mouth/Throat:      Mouth: Mucous membranes are moist.   Eyes:      Extraocular Movements: Extraocular movements intact.      Pupils: Pupils are equal, round, and reactive to light.   Cardiovascular:      Rate and Rhythm: Normal rate and regular rhythm.      Heart sounds: Normal heart sounds.   Pulmonary:      " Breath sounds: Normal breath sounds.   Abdominal:      General: Bowel sounds are normal.      Palpations: Abdomen is soft.   Musculoskeletal:         General: Normal range of motion.      Cervical back: Normal range of motion and neck supple.   Skin:     General: Skin is warm.   Neurological:      General: No focal deficit present.      Mental Status: He is alert and oriented to person, place, and time.   Psychiatric:         Mood and Affect: Mood normal.         Behavior: Behavior normal.         WBC   Date/Time Value Ref Range Status   04/03/2024 01:43 PM 5.2 4.4 - 11.3 x10*3/uL Final   03/31/2024 08:51 AM 5.2 4.4 - 11.3 x10*3/uL Final   03/28/2024 01:59 PM 3.7 (L) 4.4 - 11.3 x10*3/uL Final     nRBC   Date Value Ref Range Status   04/03/2024   Final     Comment:     Not Measured   03/31/2024 0.0 0.0 - 0.0 /100 WBCs Final   03/28/2024 0.0 0.0 - 0.0 /100 WBCs Final     RBC   Date Value Ref Range Status   04/03/2024 3.31 (L) 4.50 - 5.90 x10*6/uL Final     Comment:     Not Measured   03/31/2024 3.33 (L) 4.50 - 5.90 x10*6/uL Final   03/28/2024 4.02 (L) 4.50 - 5.90 x10*6/uL Final     POC Hemoglobin   Date Value Ref Range Status   10/31/2023 11.1 (A) 13.5 - 17.5 g/dL Final   09/26/2023 11.8 (A) 13.5 - 17.5 g/dL Final     Hemoglobin   Date Value Ref Range Status   04/03/2024 10.3 (L) 13.5 - 17.5 g/dL Final     Comment:     Not Measured   03/31/2024 10.3 (L) 13.5 - 17.5 g/dL Final   03/28/2024 12.5 (L) 13.5 - 17.5 g/dL Final     Hematocrit   Date Value Ref Range Status   04/03/2024 32.7 (L) 41.0 - 52.0 % Final     Comment:     Not Measured   03/31/2024 32.4 (L) 41.0 - 52.0 % Final   03/28/2024 38.8 (L) 41.0 - 52.0 % Final     MCV   Date/Time Value Ref Range Status   04/03/2024 01:43 PM 99 80 - 100 fL Final     Comment:     Not Measured   03/31/2024 08:51 AM 97 80 - 100 fL Final   03/28/2024 01:59 PM 97 80 - 100 fL Final     MCH   Date/Time Value Ref Range Status   04/03/2024 01:43 PM 31.1 26.0 - 34.0 pg Final     Comment:      Not Measured   03/31/2024 08:51 AM 30.9 26.0 - 34.0 pg Final   03/28/2024 01:59 PM 31.1 26.0 - 34.0 pg Final     MCHC   Date/Time Value Ref Range Status   04/03/2024 01:43 PM 31.5 (L) 32.0 - 36.0 g/dL Final     Comment:     Not Measured   03/31/2024 08:51 AM 31.8 (L) 32.0 - 36.0 g/dL Final   03/28/2024 01:59 PM 32.2 32.0 - 36.0 g/dL Final     RDW   Date/Time Value Ref Range Status   04/03/2024 01:43 PM 19.0 (H) 11.5 - 14.5 % Final     Comment:     Not Measured   03/31/2024 08:51 AM 18.5 (H) 11.5 - 14.5 % Final   03/28/2024 01:59 PM 18.6 (H) 11.5 - 14.5 % Final     Platelets   Date/Time Value Ref Range Status   04/03/2024 01:43 PM 58 (L) 150 - 450 x10*3/uL Final   03/31/2024 08:51 AM 47 (L) 150 - 450 x10*3/uL Final   03/28/2024 01:59 PM 41 (L) 150 - 450 x10*3/uL Final     MPV   Date/Time Value Ref Range Status   04/03/2024 01:43 PM 11.3 7.5 - 11.5 fL Final     Neutrophils %   Date/Time Value Ref Range Status   04/03/2024 01:43 PM 75.1 40.0 - 80.0 % Final     Comment:     Not Measured   03/31/2024 08:51 AM 69.9 40.0 - 80.0 % Final   02/16/2024 10:42 AM 67.5 40.0 - 80.0 % Final     Immature Granulocytes %, Automated   Date/Time Value Ref Range Status   04/03/2024 01:43 PM 0.8 0.0 - 0.9 % Final     Comment:     Not Measured  Immature Granulocyte Count (IG) includes promyelocytes, myelocytes and metamyelocytes but does not include bands. Percent differential counts (%) should be interpreted in the context of the absolute cell counts (cells/UL).   03/31/2024 08:51 AM 0.6 0.0 - 0.9 % Final     Comment:     Immature Granulocyte Count (IG) includes promyelocytes, myelocytes and metamyelocytes but does not include bands. Percent differential counts (%) should be interpreted in the context of the absolute cell counts (cells/UL).   02/16/2024 10:42 AM 0.9 0.0 - 0.9 % Final     Comment:     Immature Granulocyte Count (IG) includes promyelocytes, myelocytes and metamyelocytes but does not include bands. Percent differential  counts (%) should be interpreted in the context of the absolute cell counts (cells/UL).     Lymphocytes %   Date/Time Value Ref Range Status   04/03/2024 01:43 PM 15.0 13.0 - 44.0 % Final     Comment:     Not Measured   03/31/2024 08:51 AM 17.8 13.0 - 44.0 % Final   02/16/2024 10:42 AM 24.7 13.0 - 44.0 % Final     Monocytes %   Date/Time Value Ref Range Status   04/03/2024 01:43 PM 5.6 2.0 - 10.0 % Final     Comment:     Not Measured   03/31/2024 08:51 AM 7.8 2.0 - 10.0 % Final   02/16/2024 10:42 AM 6.7 2.0 - 10.0 % Final     Eosinophils %   Date/Time Value Ref Range Status   04/03/2024 01:43 PM 3.3 0.0 - 6.0 % Final     Comment:     Not Measured   03/31/2024 08:51 AM 3.3 0.0 - 6.0 % Final   02/16/2024 10:42 AM 0.2 0.0 - 6.0 % Final     Basophils %   Date/Time Value Ref Range Status   04/03/2024 01:43 PM 0.2 0.0 - 2.0 % Final     Comment:     Not Measured   03/31/2024 08:51 AM 0.6 0.0 - 2.0 % Final   02/16/2024 10:42 AM 0.0 0.0 - 2.0 % Final     Neutrophils Absolute   Date/Time Value Ref Range Status   04/03/2024 01:43 PM 3.90 1.60 - 5.50 x10*3/uL Final     Comment:     Not Measured  Percent differential counts (%) should be interpreted in the context of the absolute cell counts (cells/uL).   03/31/2024 08:51 AM 3.66 1.60 - 5.50 x10*3/uL Final     Comment:     Percent differential counts (%) should be interpreted in the context of the absolute cell counts (cells/uL).   02/16/2024 10:42 AM 3.96 1.60 - 5.50 x10*3/uL Final     Comment:     Percent differential counts (%) should be interpreted in the context of the absolute cell counts (cells/uL).     Immature Granulocytes Absolute, Automated   Date/Time Value Ref Range Status   04/03/2024 01:43 PM 0.04 0.00 - 0.50 x10*3/uL Final     Comment:     Not Measured   03/31/2024 08:51 AM 0.03 0.00 - 0.50 x10*3/uL Final   02/16/2024 10:42 AM 0.05 0.00 - 0.50 x10*3/uL Final     Lymphocytes Absolute   Date/Time Value Ref Range Status   04/03/2024 01:43 PM 0.78 (L) 0.80 - 3.00  "x10*3/uL Final     Comment:     Not Measured   03/31/2024 08:51 AM 0.93 0.80 - 3.00 x10*3/uL Final   02/16/2024 10:42 AM 1.45 0.80 - 3.00 x10*3/uL Final     Monocytes Absolute   Date/Time Value Ref Range Status   04/03/2024 01:43 PM 0.29 0.05 - 0.80 x10*3/uL Final     Comment:     Not Measured   03/31/2024 08:51 AM 0.41 0.05 - 0.80 x10*3/uL Final   02/16/2024 10:42 AM 0.39 0.05 - 0.80 x10*3/uL Final     Eosinophils Absolute   Date/Time Value Ref Range Status   04/03/2024 01:43 PM 0.17 0.00 - 0.40 x10*3/uL Final     Comment:     Not Measured   03/31/2024 08:51 AM 0.17 0.00 - 0.40 x10*3/uL Final   02/16/2024 10:42 AM 0.01 0.00 - 0.40 x10*3/uL Final     Basophils Absolute   Date/Time Value Ref Range Status   04/03/2024 01:43 PM 0.01 0.00 - 0.10 x10*3/uL Final     Comment:     Not Measured   03/31/2024 08:51 AM 0.03 0.00 - 0.10 x10*3/uL Final   02/16/2024 10:42 AM 0.00 0.00 - 0.10 x10*3/uL Final     Comment:     Automated WBC differential has been confirmed by manual smear.       No components found for: \"PT\"  aPTT   Date/Time Value Ref Range Status   06/23/2020 02:38 PM 34 25 - 35 sec Final     Comment:      Note new reference range as of 05/26/2020.    THE APTT IS NO LONGER USED FOR MONITORING     UNFRACTIONATED HEPARIN THERAPY.    FOR MONITORING HEPARIN THERAPY,     USE THE HEPARIN ASSAY.       Medication Documentation Review Audit       Reviewed by Adeola Jiménez MA (Medical Assistant) on 04/03/24 at 1337      Medication Order Taking? Sig Documenting Provider Last Dose Status   acetaminophen (Tylenol) 325 mg tablet [id number] Yes Take 2 tablets (650 mg) by mouth every 4 hours if needed for mild pain (1 - 3). Zenia Blanco, APRN-CNP Taking Active   acetaminophen (Tylenol) 500 mg tablet [id number] Yes Take 2 tablets (1,000 mg) by mouth every 6 hours if needed for mild pain (1 - 3). Isac Mukherjee MD Taking Active   allopurinol (Zyloprim) 100 mg tablet [id number] Yes Take 1 tablet (100 mg) by mouth 2 times a day. " Giacomo Joseph,  Taking Active   ascorbic acid (Vitamin C) 1,000 mg tablet [id number] Yes Take 1 tablet (1,000 mg) by mouth once daily. Historical Provider, MD Taking Active   aspirin 81 mg EC tablet [id number] No Take 1 tablet (81 mg) by mouth once daily. Historical Provider, MD Not Taking Active   atorvastatin (Lipitor) 10 mg tablet [id number] Yes Take 1 tablet (10 mg) by mouth once daily at bedtime. Historical MD Zay Taking Active   calcitriol (Rocaltrol) 0.25 mcg capsule [id number] Yes Take 1 capsule (0.25 mcg) by mouth every other day. Historical Provider, MD Taking Active   chlorthalidone (Hygroton) 25 mg tablet [id number] Yes Take 1 tablet (25 mg) by mouth once daily. Nicola Brooks MD Taking Active   cholecalciferol (Vitamin D-3) 125 MCG (5000 UT) capsule [id number] Yes Take 1 capsule (125 mcg) by mouth once daily. Historical Provider, MD Taking Active   cyanocobalamin (Vitamin B-12) 1,000 mcg tablet [id number] Yes Take 1 tablet (1,000 mcg) by mouth once daily. Historical Provider, MD Taking Active   ezetimibe (Zetia) 10 mg tablet [id number] Yes Take 1 tablet (10 mg) by mouth once daily. Giacomo Joseph,  Taking Active   ferrous sulfate 325 (65 Fe) MG tablet [id number] Yes Take 1 tablet by mouth once daily. Historical MD Zay Taking Active   folic acid (Folvite) 1 mg tablet [id number] Yes Take 1 tablet (1 mg) by mouth once daily. Giacomo Joseph DO Taking Active   furosemide (Lasix) 20 mg tablet [id number] Yes Take 1 tablet (20 mg) by mouth once daily. Waylon Benjamin,  Taking Active   latanoprost (Xalatan) 0.005 % ophthalmic solution [id number] Yes Administer 1 drop into affected eye(s) once daily at bedtime. Historical Provider, MD Taking Active   MAGNESIUM ORAL [id number] Yes Take 550 mg by mouth once daily at bedtime. Take 1 tablet 550 mg at night. Historical Provider, MD Taking Active   meloxicam (Mobic) 15 mg tablet [id number] Yes Take 1 tablet (15 mg) by mouth once daily for 7 days. Isac Mukherjee,  MD Taking Active   niacin (Niaspan) 500 mg ER tablet [id number] Yes Take 1 tablet (500 mg) by mouth once daily at bedtime. Historical Provider, MD Taking Active   NON FORMULARY [id number] Yes Take 1 each by mouth once daily. Prevagen Historical Provider, MD Taking Active   oxyCODONE (Roxicodone) 5 mg immediate release tablet [id number]  Take 1 tablet (5 mg) by mouth every 6 hours if needed for severe pain (7 - 10) for up to 3 days. Isac Mukherjee MD   24 235   polyethylene glycol (Glycolax, Miralax) 17 gram packet [id number] Yes Take 17 g by mouth once daily. Isac Mukherjee MD Taking Active   rOPINIRole (Requip) 0.25 mg tablet [id number] Yes Take 1 tablet (0.25 mg) by mouth once daily at bedtime. Giacomo Joseph DO Taking Active   sulfamethoxazole-trimethoprim (Bactrim DS) 800-160 mg tablet [id number]  Take 1 tablet by mouth 2 times a day for 3 days. Gutierrez Hopkins, APRN-CNP   24 235   sulfamethoxazole-trimethoprim (Bactrim DS) 800-160 mg tablet [id number] Yes Take 1 tablet by mouth 2 times a day for 7 days. Isac Mukherjee MD Taking Active   temazepam (Restoril) 15 mg capsule [id number] Yes Take 1 capsule (15 mg) by mouth as needed at bedtime for sleep. Giacomo Joseph DO Taking Active   traMADol (Ultram) 50 mg tablet [id number] Yes Take 1 tablet (50 mg) by mouth every 6 hours if needed for severe pain (7 - 10). Giacomo Joseph DO Taking Active   TURMERIC ORAL [id number] Yes Take 1 capsule by mouth once daily. Historical Provider, MD Taking Active   valsartan (Diovan) 320 mg tablet [id number] Yes Take 1 tablet (320 mg) by mouth once daily. Nicola Brooks MD Taking Active   zinc sulfate 50 mg zinc (220 mg) tablet [id number] Yes Take by mouth. 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; he  completed XRT over 20 yrs ago  -here to review  bmbx done on 1/23/2024 -showed mildly hypercellular bone marrow (80%) with maturing trilineage hematopoiesis and moderate increase in megakaryocytes; small lymphoid aggregates favor benign; marrow shows mild reticulin fibrosis and increased number of megakaryocytes; flow cytometry showed no immunophenotypic evidence of lymphoproliferative disorder and no increased and/or abnormal blast population  -finding of SRSF2 mutation; 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  -CBC done on day of marrow showed wbc 4.0, hgb 13.3, plt 60,000, ANC 2380  -behavior of cytopenias and marrow findings suggest MDS, but specimen itself could not corroborate this, probably secondary to sampling variation  -there is possibility this could be ITP--will give him prednisone trial--will take prednisone 50 mg PO daily for next 3 days--if he has ITP--there should be dramatic rise in platelet count on Friday--if no response, then he does not have ITP, and so depending on nature of surgical procedure--he should be transfused a unit of platelets if appropriate    -here for interval followup  -he had no response to steroids--confirming that he did not have ITP  -about a week after his last office followup, hematopathologist issued an addendum to his bone marrow path--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  -I then reviewed his case in malignant hematology tumor board--panel consensus was to try promacta vs consider CASE 4919 clinical trial  -reviewed briefly what is entailed in CASE 4919--treatment alternates between decitabine and azacytidine, shots are administered twice a week, available only at [facility] [place], and he would need bone marrow bx done 3 months in then 6 months in--he would for now prefer taking a pill if he can get it  -EQOL-MDS trial used promacta 50 mg daily in patients with low grade MDS and severe thrombocytopenia--leading to significant platelet response that was sustained in at least 42% of patients  -he  would like to try promacta  -benefits, risks, potential morbidity related to eltrombopag (promacta) were reviewed with Pat and he signed informed consent to proceed  -he will start promacta 50 mg PO once daily  -labs to be done today include CBC + COMP  -results reviewed--wbc 5.2, hgb 10.3, plt 58,000, creatinine 2.33, alk phos 91, AST 22, ALT 22  -once he has drug in hand, he will start, then return 2 weeks later for toxicity check and recheck of labs  -he is also in need of shoulder surgery     2) gout  -on allopurinol     3) hyperlipidemia  -on atorvastatin  -on ezetimibe  -on lasix     4) hypertension  -on chlorthalidone  -on valsartan     5) CAD  -s/p PCI  -has pacemaker in place  -on ASA     6) stress incontinence  -has history of prostate cancer, s/p XRT >20 years ago  -s/p TURP in 6/2023  -on 3/28/2024 he had insertion of inflatable urethral/bladder neck sphincter by Dr Phan--he was transfused platelets intra-operatively        Problem List Items Addressed This Visit    None           Kevin Murphy MD

## 2024-04-04 NOTE — PROGRESS NOTES
Patient:  Holden Burgess  YOB: 1940  MRN: [id number]       HPI:       Holden Burgess is a 83 y.o. male who returns today for cardiac follow-up.  Patient is here for an early follow-up because of shortness of breath.  Apparently a week or so ago he had undergone a sphincter repair of the urethra.  This was done in [place] via Dr. Phan.  Few days ago he was having some difficulties because he was retaining fluid.  He went to [facility] and Dr. Phan saw him there.  Apparently the valve was not correctly pulled adjusted so he fix that for him.  He was then discharged.  He has noticed however he has been more short of breath with activities and exercise now especially going up steps.  He had some transient weight gain but actually is only about 3 and half pounds heavier now than he was when he saw me in February preoperatively.  He has not had any chest pain.  His examination does display crepitations at the right base.  His legs are a bit swollen but this is not something new for him.  Nonetheless of concern is potential postoperative DVT and PE.  His EKG shows paced rhythm which is unchanged.  Exam is otherwise unchanged from prior evaluations.  Based on this I will be sending him for stat CT of the chest along with venous duplex.  Should there be a significant abnormality he should be hospitalized by the emergency room.  I did review this in great detail with the patient and his wife.      Objective:     There were no vitals filed for this visit.    Wt Readings from Last 4 Encounters:   04/03/24 143 kg (315 lb 14.7 oz)   03/31/24 136 kg (300 lb)   03/28/24 139 kg (307 lb)   03/18/24 140 kg (307 lb 11.2 oz)       Allergies:     Allergies   Allergen Reactions    Crestor [Rosuvastatin] Unknown    Ezetimibe Unknown    Statins-Hmg-Coa Reductase Inhibitors Unknown     leg cramping        Medications:     Current Outpatient Medications   Medication Instructions    acetaminophen (TYLENOL) 650  mg, oral, Every 4 hours PRN    acetaminophen (TYLENOL) 1,000 mg, oral, Every 6 hours PRN    allopurinol (ZYLOPRIM) 100 mg, oral, 2 times daily    ascorbic acid (Vitamin C) 1,000 mg tablet 1 tablet, oral, Daily    aspirin 81 mg EC tablet 1 tablet, oral, Daily    atorvastatin (Lipitor) 10 mg tablet 1 tablet, oral, Nightly    calcitriol (ROCALTROL) 0.25 mcg, oral, Every other day    chlorthalidone (HYGROTON) 25 mg, oral, Daily    cholecalciferol (Vitamin D-3) 125 MCG (5000 UT) capsule 1 capsule, oral, Daily    cyanocobalamin (Vitamin B-12) 1,000 mcg tablet 1 tablet, oral, Daily    eltrombopag olamine (PROMACTA) 50 mg, oral, Daily before breakfast, Take on an empty stomach, 1 hour before or 2 hours after a meal.    ezetimibe (ZETIA) 10 mg, oral, Daily    ferrous sulfate 325 (65 Fe) MG tablet 1 tablet, oral, Daily    folic acid (FOLVITE) 1 mg, oral, Daily    furosemide (LASIX) 20 mg, oral, Daily    latanoprost (Xalatan) 0.005 % ophthalmic solution 1 drop, ophthalmic (eye), Nightly    MAGNESIUM ORAL 550 mg, oral, Nightly, Take 1 tablet 550 mg at night.    meloxicam (MOBIC) 15 mg, oral, Daily    niacin (Niaspan) 500 mg ER tablet 1 tablet, oral, Nightly    NON FORMULARY 1 each, oral, Daily, Prevagen    polyethylene glycol (GLYCOLAX, MIRALAX) 17 g, oral, Daily    rOPINIRole (REQUIP) 0.25 mg, oral, Nightly    sulfamethoxazole-trimethoprim (Bactrim DS) 800-160 mg tablet 1 tablet, oral, 2 times daily    temazepam (RESTORIL) 15 mg, oral, Nightly PRN    traMADol (ULTRAM) 50 mg, oral, Every 6 hours PRN    TURMERIC ORAL 1 capsule, oral, Daily    valsartan (DIOVAN) 320 mg, oral, Daily    zinc sulfate 50 mg zinc (220 mg) tablet oral       Physical Examination:     Constitutional:       Appearance: Healthy appearance. Not in distress.   Neck:      Vascular: No JVR. JVD normal.   Pulmonary:      Effort: Pulmonary effort is normal.      Breath sounds: Normal breath sounds. No wheezing. No rhonchi. No rales.   Chest:      Chest wall: Not  "tender to palpatation.   Cardiovascular:      PMI at left midclavicular line. Normal rate. Regular rhythm. Normal S1. Normal S2.       Murmurs: There is no murmur.      No gallop.  No click. No rub.   Pulses:     Intact distal pulses.   Edema:     Peripheral edema absent.   Abdominal:      General: Bowel sounds are normal.      Palpations: Abdomen is soft.      Tenderness: There is no abdominal tenderness.   Musculoskeletal: Normal range of motion.         General: No tenderness. Skin:     General: Skin is warm and dry.   Neurological:      General: No focal deficit present.      Mental Status: Alert and oriented to person, place and time.          Lab:     CBC:   Lab Results   Component Value Date    WBC 5.2 04/03/2024    RBC 3.31 (L) 04/03/2024    HGB 10.3 (L) 04/03/2024    HGB 11.1 (A) 10/31/2023    HCT 32.7 (L) 04/03/2024    PLT 58 (L) 04/03/2024        CMP:    Lab Results   Component Value Date     (L) 04/03/2024    K 5.1 04/03/2024     04/03/2024    CO2 21 04/03/2024    BUN 43 (H) 04/03/2024    CREATININE 2.33 (H) 04/03/2024    GLUCOSE 107 (H) 04/03/2024    CALCIUM 9.1 04/03/2024       Magnesium:    Lab Results   Component Value Date    MG 2.00 08/16/2022       Lipid Profile:    Lab Results   Component Value Date    TRIG 131 02/01/2023    HDL 28.8 (A) 02/01/2023       TSH:    Lab Results   Component Value Date    TSH 2.65 07/27/2022       BNP:   Lab Results   Component Value Date    BNP 23 07/05/2019        PT/INR:    Lab Results   Component Value Date    PROTIME 13.6 (H) 11/13/2023    INR 1.2 (H) 11/13/2023       HgBA1c:    No results found for: \"HGBA1C\"    BMP:  Lab Results   Component Value Date     (L) 04/03/2024     03/31/2024     03/28/2024    K 5.1 04/03/2024    K 4.5 03/31/2024    K 4.2 03/28/2024     04/03/2024     03/31/2024     03/28/2024    CO2 21 04/03/2024    CO2 24 03/31/2024    CO2 23 03/28/2024    BUN 43 (H) 04/03/2024    BUN 38 (H) 03/31/2024    " "BUN 34 (H) 03/28/2024    CREATININE 2.33 (H) 04/03/2024    CREATININE 1.81 (H) 03/31/2024    CREATININE 1.75 (H) 03/28/2024       CBC:  Lab Results   Component Value Date    WBC 5.2 04/03/2024    WBC 5.2 03/31/2024    WBC 3.7 (L) 03/28/2024    RBC 3.31 (L) 04/03/2024    RBC 3.33 (L) 03/31/2024    RBC 4.02 (L) 03/28/2024    HGB 10.3 (L) 04/03/2024    HGB 10.3 (L) 03/31/2024    HGB 12.5 (L) 03/28/2024    HGB 11.1 (A) 10/31/2023    HGB 11.8 (A) 09/26/2023    HCT 32.7 (L) 04/03/2024    HCT 32.4 (L) 03/31/2024    HCT 38.8 (L) 03/28/2024    MCV 99 04/03/2024    MCV 97 03/31/2024    MCV 97 03/28/2024    MCH 31.1 04/03/2024    MCH 30.9 03/31/2024    MCH 31.1 03/28/2024    MCHC 31.5 (L) 04/03/2024    MCHC 31.8 (L) 03/31/2024    MCHC 32.2 03/28/2024    RDW 19.0 (H) 04/03/2024    RDW 18.5 (H) 03/31/2024    RDW 18.6 (H) 03/28/2024    PLT 58 (L) 04/03/2024    PLT 47 (L) 03/31/2024    PLT 41 (L) 03/28/2024    MPV 11.3 04/03/2024       Cardiac Enzymes:    No results found for: \"TROPHS\"    Hepatic Function Panel:    Lab Results   Component Value Date    ALKPHOS 91 04/03/2024    ALT 22 04/03/2024    AST 22 04/03/2024    PROT 6.1 (L) 04/03/2024    BILITOT 1.2 04/03/2024       Diagnostic Studies:     CT abdomen pelvis wo IV contrast    Result Date: 3/31/2024  STUDY: CT Abdomen and Pelvis without IV Contrast; 03/31/2024, 10:02 PM. INDICATION: Status post urinary sphincter procedure with testicular swelling. COMPARISON: CT AP: 05/03/23. ACCESSION NUMBER(S): [id number] ORDERING CLINICIAN: RIYA WILBURN TECHNIQUE: CT of the abdomen and pelvis was performed.  Contiguous axial images were obtained at 3 mm slice thickness through the abdomen and pelvis. Coronal and sagittal reconstructions at 3 mm slice thickness were performed. No intravenous contrast was administered.  Automated mA/kV exposure control was utilized and patient examination was performed in strict accordance with principles of ALARA. FINDINGS: Please note that the evaluation " of vessels, lymph nodes and organs is limited without intravenous contrast.  LOWER CHEST: No cardiomegaly.  Moderate coronary artery calcifications.  No pericardial effusion.  Lung bases are clear.  ABDOMEN:  LIVER: No hepatomegaly.  Smooth surface contour.  Normal attenuation.  BILE DUCTS: No intrahepatic or extrahepatic biliary ductal dilatation.  GALLBLADDER: The gallbladder is distended with questionable sludge. STOMACH: Tiny hiatal hernia.  PANCREAS: No masses or ductal dilatation.  SPLEEN: No splenomegaly or focal splenic lesion.  ADRENAL GLANDS: No thickening or nodules.  KIDNEYS AND URETERS: Moderate to severe bilateral renal cortical atrophy.  Right renal cyst measuring 4.8 cm  No renal or ureteral calculi.  PELVIS:  BLADDER: No abnormalities identified.  REPRODUCTIVE ORGANS: Prostate gland is mildly enlarged measuring 6 cm with radiation seeds noted in the prostate.  Penile pump noted.  Slight asymmetrical enlargement of the right rectus muscle measuring up to 2.3 cm in thickness while the left measures 1.5 cm.  Postsurgical changes along the anterior pelvic wall.  No fluid collection or abscess.  BOWEL: Colonic diverticulosis without findings of diverticulitis.  Appendix is normal  VESSELS: No abnormalities identified.  Abdominal aorta is normal in caliber.  PERITONEUM/RETROPERITONEUM/LYMPH NODES: No free fluid.  No pneumoperitoneum. No lymphadenopathy.  ABDOMINAL WALL: No abnormalities identified. SOFT TISSUES: No abnormalities identified.  BONES: No acute fracture or aggressive osseous lesion.  Posterior spinal fixation of L4-5 with interbody spacer.    1. Slight asymmetrical enlargement of the right rectus muscle measuring up to 2.3 cm in thickness while the left measures 1.5 cm. Findings may be secondary to a small intramuscular hematoma. Postsurgical changes along the anterior pelvic wall. No fluid collection or abscess. 2. Prostate gland is mildly enlarged measuring 6 cm with radiation seeds noted  in the prostate. 3. Tiny hiatal hernia. 4. Moderate to severe bilateral renal cortical atrophy. 5. Colonic diverticulosis without findings of diverticulitis. 6. Moderate coronary artery calcifications. Signed by Lee Holley MD    EKG:  AV sequential pacemaker  Underlying right bundle branch block pattern  Sinus mechanism possibly present  Baseline artifact    Waylon Benjamin DO,[facility]   Radiology:     No orders to display     MRN: [id number]  Patient Name: CALOS BONILLA     STUDY:  MYOCARDIAL PERFUSION STRESS TEST WITH LEXISCAN     Performing facility:  [facility],  [address] [place] [address] [id number] Provider:  Waylon Benjamin DO [facility]  PCP:  Dr. DORON SORTO  Supervising provider:  Osvaldo Burks MD, [facility]     INDICATION:  CAD; S/P PTCA     HISTORY:  Gender:  M; Age:  82 y/o ; Height:  187.9 cm; Weight:  138.685467 kg.     High Cholesterol;  CAD;  Arrhythmias;  Family HX CAD;  THORACIC AAA  CKD  H/O Ablation     Quit smoking 55 years ago.     PTCA.     COMPARISON:  Previous nuclear testing completed [id number] at [facility].        ACCESSION NUMBER(S):  [id number]; [id number]; [id number]     ORDERING CLINICIAN:  WAYLON BENJAMIN     TECHNIQUE:  TWO DAY protocol.  Stress injection: Date:11/08/22, 34.6 mCi of Myoview IV 20 seconds  after rapid injection of Lexiscan.  Rest injection: Date: 11/14/22, 26.2 mCi of Myoview IV at rest.  The patient had a rapid injection of  0.4 mg of Lexiscan IV over 10  seconds.  Imaging was performed by  gated tomographic technique.  Reason for Lexiscan:  CANE     STRESS TEST DATA:  Resting heart rate was 52 BPM.  Resting blood pressure was 160/72 mmHg.  Peak blood pressure was 146/64 mmHg.  Peak heart rate was 72 BPM.     TEST TERMINATED DUE TO:  Protocol completed     FINDINGS:  STRESS TEST RESULTS:     Resting electrocardiogram revealed sinus bradycardia with  first-degree AV block and right bundle branch block.  There were no significant ischemic ECG  changes or dysrhythmias.  The patient did not have chest pains/symptoms during procedure.  There was a normal recovery phase.     IMAGING RESULTS:     Image quality was good.  Rest and stress tomographic images were reviewed and revealed normal  perfusion without evidence of ischemia, myocardial infarction, or  left ventricular dilatation with stress.  Overall left ventricular systolic function appeared to be normal  without regional wall motion abnormalities.  Ejection fraction was 57%.  TID is 1.01 and is normal.  There was evidence of diaphragmatic attenuation artifact.     IMPRESSION:  Normal Lexiscan Myoview cardiac perfusion stress test.  No evidence of ischemia or myocardial infarction by perfusion imaging.  Normal left ventricular systolic function, ejection fraction 57%.  When compared to prior study from August 2019, there has been no  significant changes.  None invasive risk stratification is low risk.               [address], [address], [place] [address]           Tel [phone] Fax [phone]     TRANSTHORACIC ECHOCARDIOGRAM REPORT        Patient Name:     CALOS ALLISON     Flip Physician:   [id number] aWylon BONILLA DO  Study Date:       9/30/2022           Referring Physician: WAYLON SALCIDO  MRN/PID:          [id number]            PCP:                 [id number] Giacomo Joseph DO  Accession/Order#: [id number]        Department Location: [facility]  YOB: 1940          Fellow:  Gender:           M                   Nurse:  Admit Date:       9/30/2022           Sonographer:         Waylon Enriquez  Admission Status: Outpatient          Additional Staff:  Height:           187.96 cm           CC Report to:  Weight:           134.72 kg            Study Type:          Echocardiogram  BSA:              2.57 m2  Blood Pressure: 135 /65 mmHg     Diagnosis/ICD: I25.10-Atherosclerotic heart disease of native coronary artery                 without angina pectoris; Z98.61-Coronary angioplasty status                 (PTCA); G47.30-Sleep apnea, unspecified; I71.2-Thoracic aortic                 aneurysm, without rupture  Indication:    CAD w/ PTCA, Sleep Apnea, Thoracic AO Aneurysm  Procedure/CPT: Echo Complete w Full [id number]     Patient History:  Pertinent History: CAD and PTCA, Sleep Apnea, Tho. AO Aneurysm, Atrial Flutter.     Study Detail: The following Echo studies were performed: 2D, M-Mode, Doppler and                color flow. Technically challenging study due to body habitus and                Unable to control breathing. The patient was awake.        PHYSICIAN INTERPRETATION:  Left Ventricle: Left ventricular systolic function is normal, with an estimated ejection fraction of 60%. There are no regional wall motion abnormalities. The left ventricular cavity size is normal. There is mild concentric left ventricular hypertrophy. Spectral Doppler shows an impaired relaxation pattern of left ventricular diastolic filling.  LV Wall Scoring:  All segments are normal.     Left Atrium: The left atrium is normal in size.  Right Ventricle: The right ventricle is moderately enlarged. There is normal right ventricular global systolic function.  Right Atrium: The right atrium is mild to moderately dilated.  Aortic Valve: The aortic valve appears structurally normal. The aortic valve appears tricuspid. There is no evidence of aortic valve stenosis.  There is mild aortic valve regurgitation. The peak instantaneous gradient of the aortic valve is 8.8 mmHg. The mean gradient of the aortic valve is 5.0 mmHg.  Mitral Valve: The mitral valve is normal in structure. There is no evidence of mitral valve stenosis. The doppler estimated mean and peak diastolic pressure  gradients are 1.0 mmHg and 4.3 mmHg respectively. There is normal mitral valve leaflet mobility. There is mild mitral valve regurgitation.  Tricuspid Valve: The tricuspid valve is structurally normal. There is normal tricuspid valve leaflet mobility. There is mild tricuspid regurgitation.  Pulmonic Valve: The pulmonic valve is not well visualized. There is no indication of pulmonic valve regurgitation.  Pericardium: There is no pericardial effusion noted.  Aorta: The aortic root is abnormal. There is moderate dilatation of the ascending aorta. There is moderate dilatation the aortic root.  Pulmonary Artery: The main pulmonary artery is normal in size, and position, with normal bifurcation into the left and right pulmonary arteries.  Systemic Veins: The inferior vena cava appears to be of normal size.  In comparison to the previous echocardiogram(s): Prior examinations are available and were reviewed for comparison purposes. Somewhat similar to 2019 study.        CONCLUSIONS:   1. Left ventricular systolic function is normal with a 60% estimated ejection fraction.   2. Spectral Doppler shows an impaired relaxation pattern of left ventricular diastolic filling.   3. Moderately enlarged right ventricle.   4. The right atrium is mild to moderately dilated.   5. There is no evidence of mitral valve stenosis.   6. Mild mitral valve regurgitation.   7. Mild tricuspid regurgitation is visualized.   8. Aortic valve stenosis is not present.   9. Mild aortic valve regurgitation.  10. The main pulmonary artery is normal in size, and position, with normal bifurcation into the left and right pulmonary arteries.  11. There is moderate dilatation of the aortic root.  12. There is moderate dilatation of the ascending aorta.  Problem List:     Patient Active Problem List   Diagnosis    Acute pain of right shoulder    Insomnia    Anemia    Atrial flutter (CMS/HCC)    Bilateral sensorineural hearing loss    Bradycardia    Cervical  spondylosis with myelopathy    CAD S/P percutaneous coronary angioplasty    Chronic kidney disease (CKD), stage III (moderate) (CMS/HCC)    Depression, recurrent (CMS/HCC)    Diarrhea    Fatigue    Folic acid deficiency    Gait abnormality    Hip pain, right    Pain in both lower extremities    Hyperlipemia, mixed    Hyperuricemia    Ischemic foot pain at rest    Leukoplakia of tongue    Muscle cramps    Aneurysm (CMS/HCC)    Hypertension    PVD (peripheral vascular disease) (CMS/HCC)    Renal insufficiency    Restless leg syndrome    Rib pain on right side    Sleep apnea    Lumbar radiculopathy    Spondylosis of lumbar spine    Stiffness of right shoulder joint    Vitamin B12 deficiency    Thoracic ascending aortic aneurysm (CMS/HCC)    Increased urinary frequency    Incontinence    Sinus node dysfunction (CMS/HCC)    Abdominal pain, chronic, right upper quadrant    Calculus of gallbladder without cholecystitis without obstruction    Chronic cholecystitis    Urge incontinence of urine    Weak urinary stream    Urinary incontinence    Edema    Anxiety    Benign neoplasm    Chronic back pain    Dermatochalasis of both eyelids    Dextroscoliosis    Estevez catheter problem (CMS/HCC)    Gastroesophageal reflux disease without esophagitis    Glaucoma    Incomplete emptying of bladder    Localized osteoarthrosis    Lumbosacral spondylosis without myelopathy    MGD (meibomian gland dysfunction)    Osteoarthritis of right knee    Osteoarthritis of spine with radiculopathy, lumbar region    Pain in right ankle and joints of right foot    Pain in joint involving pelvic region and thigh    Primary open-angle glaucoma, bilateral, mild stage    RPE mottling of macula    ELIZABETH (stress urinary incontinence), male    Lumbar stenosis    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    Meds, vitals, examination are as noted.    Chart reviewed in detail discussed the patient at length including ER notes from [facility] along with prior medical data and discussed with patient and wife.    Impression:  Dyspnea  Crepitations in right lung base  Rule out DVT PE situation postoperative  Chronic edema  Chronic coronary heart disease  Permanent pacemaker  Myelodysplastic syndrome  Significant thrombocytopenia  Remote smoker  Morbid obesity  Mild thoracic aortic aneurysm  Prostate CA  Status post recent sphincter repair of the urinary tract system  Plan:   Recommendation:  Patient has symptomatology and findings that warrant further evaluation just to rule out pneumonia and/or PE or other lung pathology  Will obtain a stat CT chest along with venous duplex study  Results should come to me and I will make a decision whether he needs any further treatment here at the hospital or can go home  Will continue his other medications accordingly  Long-term plan is to check with hematology about the resumption of baby aspirin  Will await testing follow-up  Patient will continue his usual scheduled follow-up appointment for the time being

## 2024-04-04 NOTE — PATIENT INSTRUCTIONS
Continue same medications/treatment.  Patient educated on proper medication use.  Patient educated on risk factor modification.  Please bring any lab results from other providers/physicians to your next appointment.    Please bring all medicines, vitamins, and herbal supplements with you when you come to the office.    Prescriptions will not be filled unless you are compliant with your follow up appointments or have a follow up appointment scheduled as per instruction of your physician. Refills should be requested at the time of your visit.    Follow up as scheduled   Bilateral US of BLE r/o DVT  CT chest to r/o PE    ILISANDRO RN, AM SCRIBING FOR AND IN THE PRESENCE OF DR. ROBERT SALCIDO, DO, FACC

## 2024-04-04 NOTE — TELEPHONE ENCOUNTER
I called patient back and spoke to him and his wife. They would like to be seen today so he will see Dr. Waylon Benjamin, DO today at 1 p.m.

## 2024-04-05 ENCOUNTER — APPOINTMENT (OUTPATIENT)
Dept: RADIOLOGY | Facility: HOSPITAL | Age: 84
DRG: 291 | End: 2024-04-05
Payer: MEDICARE

## 2024-04-05 ENCOUNTER — APPOINTMENT (OUTPATIENT)
Dept: CARDIOLOGY | Facility: HOSPITAL | Age: 84
DRG: 291 | End: 2024-04-05
Payer: MEDICARE

## 2024-04-05 PROBLEM — R06.09 DYSPNEA ON EXERTION: Status: ACTIVE | Noted: 2024-04-04

## 2024-04-05 PROBLEM — I50.9 ACUTE CONGESTIVE HEART FAILURE (MULTI): Status: ACTIVE | Noted: 2024-04-05

## 2024-04-05 PROBLEM — R79.89 ELEVATED D-DIMER: Status: ACTIVE | Noted: 2024-04-05

## 2024-04-05 PROBLEM — S30.1XXA ABDOMINAL WALL HEMATOMA: Status: ACTIVE | Noted: 2024-04-05

## 2024-04-05 LAB
ALBUMIN SERPL BCP-MCNC: 3.9 G/DL (ref 3.4–5)
ALP SERPL-CCNC: 95 U/L (ref 33–136)
ALT SERPL W P-5'-P-CCNC: 23 U/L (ref 10–52)
ANION GAP SERPL CALC-SCNC: 13 MMOL/L (ref 10–20)
AORTIC VALVE MEAN GRADIENT: 7 MMHG
AORTIC VALVE PEAK VELOCITY: 1.9 M/S
AST SERPL W P-5'-P-CCNC: 24 U/L (ref 9–39)
AV PEAK GRADIENT: 14.4 MMHG
AVA (PEAK VEL): 2.48 CM2
AVA (VTI): 2.69 CM2
BILIRUB SERPL-MCNC: 1.6 MG/DL (ref 0–1.2)
BUN SERPL-MCNC: 48 MG/DL (ref 6–23)
CALCIUM SERPL-MCNC: 9 MG/DL (ref 8.6–10.3)
CHLORIDE SERPL-SCNC: 107 MMOL/L (ref 98–107)
CO2 SERPL-SCNC: 20 MMOL/L (ref 21–32)
CREAT SERPL-MCNC: 2.32 MG/DL (ref 0.5–1.3)
EGFRCR SERPLBLD CKD-EPI 2021: 27 ML/MIN/1.73M*2
EJECTION FRACTION APICAL 4 CHAMBER: 71.6
ERYTHROCYTE [DISTWIDTH] IN BLOOD BY AUTOMATED COUNT: 19.1 % (ref 11.5–14.5)
GLUCOSE SERPL-MCNC: 100 MG/DL (ref 74–99)
HCT VFR BLD AUTO: 30.1 % (ref 41–52)
HGB BLD-MCNC: 9.5 G/DL (ref 13.5–17.5)
LEFT ATRIUM VOLUME AREA LENGTH INDEX BSA: 32.2 ML/M2
LEFT VENTRICLE INTERNAL DIMENSION DIASTOLE: 4.69 CM (ref 3.5–6)
LEFT VENTRICULAR OUTFLOW TRACT DIAMETER: 2.2 CM
LV EJECTION FRACTION BIPLANE: 70 %
MCH RBC QN AUTO: 30.7 PG (ref 26–34)
MCHC RBC AUTO-ENTMCNC: 31.6 G/DL (ref 32–36)
MCV RBC AUTO: 97 FL (ref 80–100)
MITRAL VALVE E/A RATIO: 0.66
MITRAL VALVE E/E' RATIO: 7.06
NRBC BLD-RTO: 0 /100 WBCS (ref 0–0)
PLATELET # BLD AUTO: 61 X10*3/UL (ref 150–450)
POTASSIUM SERPL-SCNC: 5.1 MMOL/L (ref 3.5–5.3)
PROT SERPL-MCNC: 6.1 G/DL (ref 6.4–8.2)
RBC # BLD AUTO: 3.09 X10*6/UL (ref 4.5–5.9)
RIGHT VENTRICLE FREE WALL PEAK S': 19.8 CM/S
RIGHT VENTRICLE PEAK SYSTOLIC PRESSURE: 31.9 MMHG
SODIUM SERPL-SCNC: 135 MMOL/L (ref 136–145)
TRICUSPID ANNULAR PLANE SYSTOLIC EXCURSION: 2.7 CM
WBC # BLD AUTO: 6.3 X10*3/UL (ref 4.4–11.3)

## 2024-04-05 PROCEDURE — 93306 TTE W/DOPPLER COMPLETE: CPT | Performed by: INTERNAL MEDICINE

## 2024-04-05 PROCEDURE — 1200000002 HC GENERAL ROOM WITH TELEMETRY DAILY

## 2024-04-05 PROCEDURE — 80053 COMPREHEN METABOLIC PANEL: CPT | Performed by: INTERNAL MEDICINE

## 2024-04-05 PROCEDURE — 85027 COMPLETE CBC AUTOMATED: CPT | Performed by: INTERNAL MEDICINE

## 2024-04-05 PROCEDURE — A9540 TC99M MAA: HCPCS | Performed by: STUDENT IN AN ORGANIZED HEALTH CARE EDUCATION/TRAINING PROGRAM

## 2024-04-05 PROCEDURE — 3430000001 HC RX 343 DIAGNOSTIC RADIOPHARMACEUTICALS: Performed by: STUDENT IN AN ORGANIZED HEALTH CARE EDUCATION/TRAINING PROGRAM

## 2024-04-05 PROCEDURE — 2500000001 HC RX 250 WO HCPCS SELF ADMINISTERED DRUGS (ALT 637 FOR MEDICARE OP): Performed by: INTERNAL MEDICINE

## 2024-04-05 PROCEDURE — 78830 RP LOCLZJ TUM SPECT W/CT 1: CPT

## 2024-04-05 PROCEDURE — 2500000004 HC RX 250 GENERAL PHARMACY W/ HCPCS (ALT 636 FOR OP/ED): Performed by: INTERNAL MEDICINE

## 2024-04-05 PROCEDURE — 36415 COLL VENOUS BLD VENIPUNCTURE: CPT | Performed by: INTERNAL MEDICINE

## 2024-04-05 PROCEDURE — 99232 SBSQ HOSP IP/OBS MODERATE 35: CPT | Performed by: STUDENT IN AN ORGANIZED HEALTH CARE EDUCATION/TRAINING PROGRAM

## 2024-04-05 PROCEDURE — 78830 RP LOCLZJ TUM SPECT W/CT 1: CPT | Performed by: STUDENT IN AN ORGANIZED HEALTH CARE EDUCATION/TRAINING PROGRAM

## 2024-04-05 PROCEDURE — 99223 1ST HOSP IP/OBS HIGH 75: CPT | Performed by: INTERNAL MEDICINE

## 2024-04-05 PROCEDURE — 93306 TTE W/DOPPLER COMPLETE: CPT

## 2024-04-05 PROCEDURE — 2500000002 HC RX 250 W HCPCS SELF ADMINISTERED DRUGS (ALT 637 FOR MEDICARE OP, ALT 636 FOR OP/ED): Mod: MUE | Performed by: INTERNAL MEDICINE

## 2024-04-05 RX ORDER — CALCITRIOL 0.25 UG/1
0.25 CAPSULE ORAL EVERY OTHER DAY
Status: DISCONTINUED | OUTPATIENT
Start: 2024-04-05 | End: 2024-04-07 | Stop reason: HOSPADM

## 2024-04-05 RX ORDER — LATANOPROST 50 UG/ML
1 SOLUTION/ DROPS OPHTHALMIC NIGHTLY
Status: DISCONTINUED | OUTPATIENT
Start: 2024-04-05 | End: 2024-04-07 | Stop reason: HOSPADM

## 2024-04-05 RX ORDER — ATORVASTATIN CALCIUM 10 MG/1
10 TABLET, FILM COATED ORAL NIGHTLY
Status: DISCONTINUED | OUTPATIENT
Start: 2024-04-05 | End: 2024-04-07 | Stop reason: HOSPADM

## 2024-04-05 RX ORDER — ASCORBIC ACID 250 MG
1000 TABLET ORAL DAILY
Status: DISCONTINUED | OUTPATIENT
Start: 2024-04-05 | End: 2024-04-07 | Stop reason: HOSPADM

## 2024-04-05 RX ORDER — FOLIC ACID 1 MG/1
1 TABLET ORAL DAILY
Status: DISCONTINUED | OUTPATIENT
Start: 2024-04-05 | End: 2024-04-07 | Stop reason: HOSPADM

## 2024-04-05 RX ORDER — FUROSEMIDE 40 MG/1
40 TABLET ORAL DAILY
Qty: 30 TABLET | Refills: 11 | Status: SHIPPED | OUTPATIENT
Start: 2024-04-05 | End: 2025-04-05

## 2024-04-05 RX ORDER — ROPINIROLE 0.25 MG/1
0.25 TABLET, FILM COATED ORAL NIGHTLY
Status: DISCONTINUED | OUTPATIENT
Start: 2024-04-05 | End: 2024-04-07 | Stop reason: HOSPADM

## 2024-04-05 RX ORDER — FERROUS SULFATE 325(65) MG
1 TABLET ORAL
Status: DISCONTINUED | OUTPATIENT
Start: 2024-04-05 | End: 2024-04-07 | Stop reason: HOSPADM

## 2024-04-05 RX ORDER — TEMAZEPAM 15 MG/1
15 CAPSULE ORAL NIGHTLY PRN
Status: DISCONTINUED | OUTPATIENT
Start: 2024-04-05 | End: 2024-04-07 | Stop reason: HOSPADM

## 2024-04-05 RX ORDER — FUROSEMIDE 10 MG/ML
40 INJECTION INTRAMUSCULAR; INTRAVENOUS EVERY 12 HOURS
Status: DISCONTINUED | OUTPATIENT
Start: 2024-04-05 | End: 2024-04-05

## 2024-04-05 RX ORDER — NIACIN 500 MG/1
500 TABLET, EXTENDED RELEASE ORAL NIGHTLY
Status: DISCONTINUED | OUTPATIENT
Start: 2024-04-05 | End: 2024-04-07 | Stop reason: HOSPADM

## 2024-04-05 RX ORDER — FUROSEMIDE 40 MG/1
40 TABLET ORAL DAILY
Status: DISCONTINUED | OUTPATIENT
Start: 2024-04-06 | End: 2024-04-07 | Stop reason: HOSPADM

## 2024-04-05 RX ORDER — ZINC SULFATE 50(220)MG
50 CAPSULE ORAL DAILY
Status: DISCONTINUED | OUTPATIENT
Start: 2024-04-05 | End: 2024-04-07 | Stop reason: HOSPADM

## 2024-04-05 RX ORDER — VALSARTAN 80 MG/1
320 TABLET ORAL DAILY
Status: DISCONTINUED | OUTPATIENT
Start: 2024-04-05 | End: 2024-04-07 | Stop reason: HOSPADM

## 2024-04-05 RX ORDER — TRAMADOL HYDROCHLORIDE 50 MG/1
50 TABLET ORAL EVERY 6 HOURS PRN
Status: DISCONTINUED | OUTPATIENT
Start: 2024-04-05 | End: 2024-04-07 | Stop reason: HOSPADM

## 2024-04-05 RX ORDER — EZETIMIBE 10 MG/1
10 TABLET ORAL DAILY
Status: DISCONTINUED | OUTPATIENT
Start: 2024-04-05 | End: 2024-04-05

## 2024-04-05 RX ORDER — UBIDECARENONE 75 MG
1000 CAPSULE ORAL DAILY
Status: DISCONTINUED | OUTPATIENT
Start: 2024-04-05 | End: 2024-04-07 | Stop reason: HOSPADM

## 2024-04-05 RX ADMIN — CYANOCOBALAMIN TAB 500 MCG 1000 MCG: 500 TAB at 09:03

## 2024-04-05 RX ADMIN — NIACIN 500 MG: 500 TABLET, FILM COATED, EXTENDED RELEASE ORAL at 22:45

## 2024-04-05 RX ADMIN — HEPARIN SODIUM 7500 UNITS: 5000 INJECTION INTRAVENOUS; SUBCUTANEOUS at 05:10

## 2024-04-05 RX ADMIN — KIT FOR THE PREPARATION OF TECHNETIUM TC 99M ALBUMIN AGGREGATED 4.4 MILLICURIE: 2.5 INJECTION, POWDER, FOR SOLUTION INTRAVENOUS at 11:28

## 2024-04-05 RX ADMIN — ZINC SULFATE 220 MG (50 MG) CAPSULE 50 MG OF ELEMENTAL ZINC: CAPSULE at 09:02

## 2024-04-05 RX ADMIN — ROPINIROLE 0.25 MG: 0.25 TABLET, FILM COATED ORAL at 22:45

## 2024-04-05 RX ADMIN — LATANOPROST 1 DROP: 50 SOLUTION OPHTHALMIC at 22:45

## 2024-04-05 RX ADMIN — FERROUS SULFATE TAB 325 MG (65 MG ELEMENTAL FE) 1 TABLET: 325 (65 FE) TAB at 09:02

## 2024-04-05 RX ADMIN — PANTOPRAZOLE SODIUM 40 MG: 40 TABLET, DELAYED RELEASE ORAL at 05:09

## 2024-04-05 RX ADMIN — FUROSEMIDE 40 MG: 10 INJECTION, SOLUTION INTRAVENOUS at 09:04

## 2024-04-05 RX ADMIN — FOLIC ACID 1 MG: 1 TABLET ORAL at 09:01

## 2024-04-05 RX ADMIN — CALCITRIOL 0.25 MCG: 0.25 CAPSULE, LIQUID FILLED ORAL at 09:04

## 2024-04-05 RX ADMIN — VALSARTAN 320 MG: 80 TABLET, FILM COATED ORAL at 09:03

## 2024-04-05 RX ADMIN — Medication 1000 MG: at 09:03

## 2024-04-05 RX ADMIN — ATORVASTATIN CALCIUM 10 MG: 10 TABLET, FILM COATED ORAL at 22:45

## 2024-04-05 SDOH — ECONOMIC STABILITY: INCOME INSECURITY: IN THE PAST 12 MONTHS, HAS THE ELECTRIC, GAS, OIL, OR WATER COMPANY THREATENED TO SHUT OFF SERVICE IN YOUR HOME?: NO

## 2024-04-05 SDOH — SOCIAL STABILITY: SOCIAL INSECURITY: HAS ANYONE EVER THREATENED TO HURT YOUR FAMILY OR YOUR PETS?: NO

## 2024-04-05 SDOH — SOCIAL STABILITY: SOCIAL INSECURITY: HAVE YOU HAD THOUGHTS OF HARMING ANYONE ELSE?: NO

## 2024-04-05 SDOH — SOCIAL STABILITY: SOCIAL INSECURITY: WERE YOU ABLE TO COMPLETE ALL THE BEHAVIORAL HEALTH SCREENINGS?: YES

## 2024-04-05 SDOH — HEALTH STABILITY: PHYSICAL HEALTH: ON AVERAGE, HOW MANY MINUTES DO YOU ENGAGE IN EXERCISE AT THIS LEVEL?: 0 MIN

## 2024-04-05 SDOH — SOCIAL STABILITY: SOCIAL INSECURITY: ARE YOU OR HAVE YOU BEEN THREATENED OR ABUSED PHYSICALLY, EMOTIONALLY, OR SEXUALLY BY ANYONE?: NO

## 2024-04-05 SDOH — SOCIAL STABILITY: SOCIAL INSECURITY: ARE THERE ANY APPARENT SIGNS OF INJURIES/BEHAVIORS THAT COULD BE RELATED TO ABUSE/NEGLECT?: NO

## 2024-04-05 SDOH — SOCIAL STABILITY: SOCIAL INSECURITY: DOES ANYONE TRY TO KEEP YOU FROM HAVING/CONTACTING OTHER FRIENDS OR DOING THINGS OUTSIDE YOUR HOME?: NO

## 2024-04-05 SDOH — HEALTH STABILITY: PHYSICAL HEALTH: ON AVERAGE, HOW MANY DAYS PER WEEK DO YOU ENGAGE IN MODERATE TO STRENUOUS EXERCISE (LIKE A BRISK WALK)?: 0 DAYS

## 2024-04-05 SDOH — SOCIAL STABILITY: SOCIAL INSECURITY: ABUSE: ADULT

## 2024-04-05 SDOH — SOCIAL STABILITY: SOCIAL INSECURITY: DO YOU FEEL UNSAFE GOING BACK TO THE PLACE WHERE YOU ARE LIVING?: NO

## 2024-04-05 SDOH — SOCIAL STABILITY: SOCIAL INSECURITY: DO YOU FEEL ANYONE HAS EXPLOITED OR TAKEN ADVANTAGE OF YOU FINANCIALLY OR OF YOUR PERSONAL PROPERTY?: NO

## 2024-04-05 ASSESSMENT — COGNITIVE AND FUNCTIONAL STATUS - GENERAL
MOBILITY SCORE: 24
MOBILITY SCORE: 24
DAILY ACTIVITIY SCORE: 24
PATIENT BASELINE BEDBOUND: NO
DAILY ACTIVITIY SCORE: 24

## 2024-04-05 ASSESSMENT — PAIN SCALES - GENERAL: PAINLEVEL_OUTOF10: 0 - NO PAIN

## 2024-04-05 ASSESSMENT — LIFESTYLE VARIABLES
HOW MANY STANDARD DRINKS CONTAINING ALCOHOL DO YOU HAVE ON A TYPICAL DAY: PATIENT DOES NOT DRINK
AUDIT-C TOTAL SCORE: 0
SKIP TO QUESTIONS 9-10: 1
PRESCIPTION_ABUSE_PAST_12_MONTHS: NO
SUBSTANCE_ABUSE_PAST_12_MONTHS: NO
AUDIT-C TOTAL SCORE: 0
HOW OFTEN DO YOU HAVE 6 OR MORE DRINKS ON ONE OCCASION: NEVER
HOW OFTEN DO YOU HAVE A DRINK CONTAINING ALCOHOL: NEVER

## 2024-04-05 ASSESSMENT — ACTIVITIES OF DAILY LIVING (ADL)
LACK_OF_TRANSPORTATION: NO
FEEDING YOURSELF: INDEPENDENT
BATHING: INDEPENDENT
WALKS IN HOME: INDEPENDENT
ADEQUATE_TO_COMPLETE_ADL: YES
GROOMING: INDEPENDENT
TOILETING: INDEPENDENT
JUDGMENT_ADEQUATE_SAFELY_COMPLETE_DAILY_ACTIVITIES: YES
DRESSING YOURSELF: INDEPENDENT
HEARING - LEFT EAR: FUNCTIONAL
PATIENT'S MEMORY ADEQUATE TO SAFELY COMPLETE DAILY ACTIVITIES?: YES
HEARING - RIGHT EAR: FUNCTIONAL

## 2024-04-05 ASSESSMENT — PATIENT HEALTH QUESTIONNAIRE - PHQ9
2. FEELING DOWN, DEPRESSED OR HOPELESS: NOT AT ALL
SUM OF ALL RESPONSES TO PHQ9 QUESTIONS 1 & 2: 0
1. LITTLE INTEREST OR PLEASURE IN DOING THINGS: NOT AT ALL

## 2024-04-05 ASSESSMENT — PAIN - FUNCTIONAL ASSESSMENT: PAIN_FUNCTIONAL_ASSESSMENT: 0-10

## 2024-04-05 NOTE — PROGRESS NOTES
"Daily progress note    Holden Burgess is 83 y.o. male with past history of CAD status post PCI and stenting, hypertension, hyperlipidemia, sinus node dysfunction status post pacemaker,  sleep apnea, thoracic aortic aneurysm, morbidly obese former smoker, presenting with shortness of breath for few days.  Patient has been having progressive shortness of breath which worsened today ago.  Also gained weight, elevated D-dimer CT angiogram could not be obtained due to CKD, troponin 2 times negative, Echocardiogram from 2022 showed EF of 60%, lower extremity DVT study was negative, was admitted for dyspnea, VQ scan showed low probability for PE, echocardiogram showed normal ejection fraction.  Patient also continued to have diarrhea multiple episodes, stool pathogen and PCR still pending    Subjective  Patient was seen and examined at bedside  Has bilateral lower extremity swelling, watery diarrhea multiple episodes since his last urology procedure few days ago  No nausea vomiting            Vital signs in last 24 hours:  Temp:  [36.3 °C (97.3 °F)-36.5 °C (97.7 °F)] 36.5 °C (97.7 °F)  Heart Rate:  [58-70] 70  Resp:  [16-19] 18  BP: (120-163)/(7-77) 135/59  /59 (BP Location: Left arm, Patient Position: Lying)   Pulse 70   Temp 36.5 °C (97.7 °F) (Temporal)   Resp 18   Ht 1.854 m (6' 0.99\")   Wt 138 kg (304 lb 12.8 oz)   SpO2 100%   BMI 40.22 kg/m²    Intake/Output last 3 shifts:  No intake/output data recorded.  Intake/Output this shift:  No intake/output data recorded.    Physical Exam  Constitutional:       General: He is not in acute distress.     Appearance: Normal appearance. He is not ill-appearing, toxic-appearing or diaphoretic.   HENT:      Head: Normocephalic and atraumatic.      Mouth/Throat:      Mouth: Mucous membranes are moist.      Pharynx: No oropharyngeal exudate.   Eyes:      Extraocular Movements: Extraocular movements intact.      Pupils: Pupils are equal, round, and reactive to light. "   Cardiovascular:      Rate and Rhythm: Normal rate and regular rhythm.      Heart sounds: No murmur heard.  Pulmonary:      Effort: Pulmonary effort is normal. No respiratory distress.      Breath sounds: Normal breath sounds. No wheezing.   Abdominal:      General: Abdomen is flat. Bowel sounds are normal. There is no distension.      Tenderness: There is no abdominal tenderness. There is no guarding or rebound.   Musculoskeletal:         General: No swelling.      Right lower leg: Edema present.      Left lower leg: Edema present.   Skin:     Findings: No lesion or rash.   Neurological:      General: No focal deficit present.      Mental Status: He is alert and oriented to person, place, and time.      Cranial Nerves: No cranial nerve deficit.      Motor: No weakness.   Psychiatric:         Mood and Affect: Mood normal.         Behavior: Behavior normal.         Current medication   Current Facility-Administered Medications   Medication Dose Route Frequency Provider Last Rate Last Admin    acetaminophen (Tylenol) tablet 650 mg  650 mg oral q4h PRN Luis Lagos MD        Or    acetaminophen (Tylenol) oral liquid 650 mg  650 mg nasogastric tube q4h PRN Luis Lagos MD        Or    acetaminophen (Tylenol) suppository 650 mg  650 mg rectal q4h PRN Luis Lagos MD        ascorbic acid (Vitamin C) tablet 1,000 mg  1,000 mg oral Daily Luis Lagos MD        atorvastatin (Lipitor) tablet 10 mg  10 mg oral Nightly Luis Lagos MD        calcitriol (Rocaltrol) capsule 0.25 mcg  0.25 mcg oral Every other day Luis Lagos MD        cyanocobalamin (Vitamin B-12) tablet 1,000 mcg  1,000 mcg oral Daily Luis Lagos MD        eltrombopag olamine (Promacta) tablet 50 mg  50 mg oral Daily before breakfast Luis Lagos MD        ferrous sulfate (325 mg ferrous sulfate) tablet 1 tablet  1 tablet oral Daily with breakfast Luis Lagos MD        folic acid (Folvite) tablet 1 mg  1 mg oral Daily Luis Lagos MD      "   furosemide (Lasix) injection 40 mg  40 mg intravenous q12h Luis Lagos MD        latanoprost (Xalatan) 0.005 % ophthalmic solution 1 drop  1 drop ophthalmic (eye) Nightly Luis Lagos MD        melatonin tablet 3 mg  3 mg oral Nightly PRN Luis Lagos MD        niacin (Niaspan) ER tablet 500 mg  500 mg oral Nightly Luis Lagos MD        ondansetron (Zofran) tablet 4 mg  4 mg oral q8h PRN Luis Lagos MD        Or    ondansetron (Zofran) injection 4 mg  4 mg intravenous q8h PRN Luis Lagos MD        pantoprazole (ProtoNix) EC tablet 40 mg  40 mg oral Daily before breakfast Luis Lagos MD   40 mg at 04/05/24 0509    Or    pantoprazole (ProtoNix) injection 40 mg  40 mg intravenous Daily before breakfast Luis Lagos MD        polyethylene glycol (Glycolax, Miralax) packet 17 g  17 g oral Daily PRN Luis Lagos MD        rOPINIRole (Requip) tablet 0.25 mg  0.25 mg oral Nightly Luis Lagos MD        temazepam (Restoril) capsule 15 mg  15 mg oral Nightly PRN Luis Lagos MD        traMADol (Ultram) tablet 50 mg  50 mg oral q6h PRN Luis Lagos MD        valsartan (Diovan) tablet 320 mg  320 mg oral Daily Luis Lagos MD        zinc sulfate (Zincate) capsule 50 mg of elemental zinc  50 mg of elemental zinc oral Daily Luis Lagos MD            Labs  Lab Results   Component Value Date    WBC 6.3 04/05/2024    HGB 9.5 (L) 04/05/2024    HCT 30.1 (L) 04/05/2024    MCV 97 04/05/2024    PLT 61 (L) 04/05/2024   No results found for: \"HGBA1C\"  Lab Results   Component Value Date    GLUCOSE 100 (H) 04/05/2024    CALCIUM 9.0 04/05/2024     (L) 04/05/2024    K 5.1 04/05/2024    CO2 20 (L) 04/05/2024     04/05/2024    BUN 48 (H) 04/05/2024    CREATININE 2.32 (H) 04/05/2024           Principal Problem:    Acute congestive heart failure (CMS/HCC)  Active Problems:    CAD S/P percutaneous coronary angioplasty    Renal insufficiency    Thoracic ascending aortic aneurysm (CMS/HCC)    Sinus " node dysfunction (CMS/HCC)    Urinary incontinence    Thrombocytopenia (CMS/HCC)    Myelodysplastic syndrome (CMS/HCC)    Dyspnea on exertion    Elevated d-dimer    Abdominal wall hematoma      Assessment and Plan   #Acute on chronic diastolic heart failure  #Bilateral lower extremity edema    VQ scan negative for PE  Lower extremity DVT study was negative  D-dimer was elevated  Was evaluated by cardiology  Cardiology recommended outpatient follow-up  Continue furosemide 40 mg p.o. daily  Monitor intake output  Daily weight    #Watery diarrhea  Pending C. difficile and stool PCR    #Hypertension and hyperlipidemia  Continue home medication    #CAD status post PCI and stenting  #Sinus node dysfunction status post pacemaker  Continue current meds    #Thrombocytopenia  #Myelodysplastic syndrome  Monitor CBC  Outpatient follow-up with hematology      #Thoracic arctic aneurysm  Outpatient follow-up    #DVT prophylaxis SCDs due to thrombocytopenia  Disposition possible discharge over the next 24 hours   LOS: 1 day

## 2024-04-05 NOTE — DISCHARGE INSTRUCTIONS
HEART FAILURE EDUCATION:  1. Weigh yourself daily and record on your weight log.  2. If you gain more than 2 or 3 pounds overnight, call your cardiologist.  3. Follow a low sodium diet. No more than 2000 mg in one day, or more than 650 mg per meal.  4. Limit total fluids to no more than 8 cups (or 2 liters) per day - this includes all fluids (water, coffee, juice, milk, tea, etc.)  5. Monitor your blood pressure daily and record on your weight log.  6. Call to schedule your follow-up appointments when you get home if they were not already scheduled for you.  7. Keep your follow-up appointments! Bring your weight log with you so the doctors can see your weight trend and blood pressure readings.  8. Be sure to  any new prescriptions and take them as directed. If unsure of the medications, be sure to call your cardiologist.  9. Stay as active as you can tolerate.   10. If you notice subtle change of symptoms (slight increase in swelling, slight shortness of breath, a new intolerance to laying flat, a new cough), be sure to call your cardiologist.  11. If you have any questions or concerns or you have not heard back from the cardiologist, feel free to call Gale Mitchell heart failure navigator at [phone].

## 2024-04-05 NOTE — H&P
"History Of Present Illness  Holden Burgess \"GENARO\" is a 83 y.o. male presenting with shortness of breath for few days.  He had urological procedure described as placing urethra sphincter because he was incontinence.  The procedure was then 1 week ago.  After the procedure started to have some shortness of breath however worsened over the last day.  Patient also gained weight about 15 pounds over this week.  He has leg swelling slightly worse than baseline.  Patient has mild cough, denies fever, chest pain, nausea, or vomiting.    Patient was just diagnosed with myelodysplastic syndrome.  Patient also has bradycardia with recent pacemaker placement.    ER course: Patient was awake, alert, oriented, in no acute distress.  He was maintaining good saturation on room air.  Patient was afebrile  Labs shows evidence of chronic kidney disease with creatinine 2.6, and has no leukocytosis.  Patient has negative troponin x 2.  However patient does have elevated D-dimer 1746.  CT angiogram was not obtained because of chronic kidney disease.  Lower extremity ultrasound revealed negative for DVT.  Patient is admitted for further workup of dyspnea with exertion, and CHF exacerbation.       Past Medical History  He has a past medical history of Abdominal pain, chronic, right upper quadrant, ACP (advance care planning), Anemia, Aneurysm (CMS/[facility]), Body mass index (BMI) 39.0-39.9, adult (12/06/2021), Body mass index (BMI) 39.0-39.9, adult (07/11/2022), Body mass index (BMI) 39.0-39.9, adult (04/24/2022), Body mass index (BMI) 39.0-39.9, adult (04/24/2022), Body mass index (BMI) 39.0-39.9, adult (04/24/2022), Cramp and spasm (07/19/2022), Difficulty breathing, Encounter for general adult medical examination without abnormal findings (09/25/2020), Encounter for screening for malignant neoplasm of prostate (04/14/2021), Encounter for screening for malignant neoplasm of prostate (09/25/2020), Forearm injury, atrial flutter, " Hyperlipidemia, Hypertension, Hyperuricemia, Incontinence, Obesity, unspecified (04/26/2022), Other symptoms and signs involving the musculoskeletal system (10/12/2022), Personal history of other diseases of the circulatory system (09/29/2021), Personal history of other diseases of the circulatory system (10/26/2021), Personal history of other endocrine, nutritional and metabolic disease (12/06/2021), Personal history of other specified conditions (09/29/2021), Rotator cuff injury, Sinus node dysfunction (CMS/HCC), and Valvular heart disease.    Surgical History  He has a past surgical history that includes Other surgical history (05/14/2020); Other surgical history (05/14/2020); Other surgical history (05/14/2020); Other surgical history (05/14/2020); Other surgical history (05/14/2020); Other surgical history (09/29/2021); Other surgical history (09/29/2021); Other surgical history (09/29/2021); Other surgical history (09/29/2021); MR angio chest w and wo IV contrast (09/18/2019); MR angio chest w and wo IV contrast (01/25/2023); MR angio chest w and wo IV contrast (01/25/2023); Cardiac electrophysiology procedure (Left, 11/21/2023); Bone marrow biopsy; Bone marrow biopsy w/ aspiration (01/23/2024); and Cystoscopy (02/20/2024).     Social History  He reports that he quit smoking about 55 years ago. His smoking use included cigarettes. He has a 30.00 pack-year smoking history. He has never been exposed to tobacco smoke. He has never used smokeless tobacco. He reports that he does not drink alcohol and does not use drugs.    Family History  Family History   Problem Relation Name Age of Onset    Other (polio) Mother      Heart disease Father      Heart disease Brother      Polymyalgia rheumatica Brother      Other (mitral valve disorder) Brother      Other (Coronary artery bypass graft) Brother      Other (Arterisclerotic cardiovascular disease) Brother          Allergies  Crestor [rosuvastatin], Ezetimibe, and  "Statins-hmg-coa reductase inhibitors    Review of Systems  10/10 points review of system were conducted, negative except as above    Physical Exam  Constitutional:       Appearance: He is obese. He is not ill-appearing or diaphoretic.   HENT:      Head: Normocephalic and atraumatic.      Nose: Nose normal. No congestion.      Mouth/Throat:      Mouth: Mucous membranes are moist.   Eyes:      General: No scleral icterus.     Extraocular Movements: Extraocular movements intact.      Conjunctiva/sclera: Conjunctivae normal.      Pupils: Pupils are equal, round, and reactive to light.   Cardiovascular:      Rate and Rhythm: Normal rate and regular rhythm.      Heart sounds: No murmur heard.  Pulmonary:      Breath sounds: Rhonchi and rales present. No wheezing.   Abdominal:      General: There is no distension.      Palpations: There is no mass.      Tenderness: There is no abdominal tenderness. There is no right CVA tenderness, left CVA tenderness, guarding or rebound.      Hernia: No hernia is present.   Musculoskeletal:         General: No swelling, tenderness, deformity or signs of injury.      Cervical back: Normal range of motion and neck supple.      Right lower leg: Edema present.      Left lower leg: Edema present.   Skin:     General: Skin is warm and dry.      Findings: No lesion or rash.   Neurological:      General: No focal deficit present.      Mental Status: He is alert and oriented to person, place, and time. Mental status is at baseline.   Psychiatric:         Mood and Affect: Mood normal.         Behavior: Behavior normal.          Last Recorded Vitals  Blood pressure 154/70, pulse 66, temperature 36.3 °C (97.3 °F), temperature source Temporal, resp. rate 18, height 1.854 m (6' 0.99\"), weight 138 kg (304 lb 12.8 oz), SpO2 99 %.    Relevant Results    Scheduled medications  ascorbic acid, 1,000 mg, oral, Daily  atorvastatin, 10 mg, oral, Nightly  calcitriol, 0.25 mcg, oral, Every other " day  cyanocobalamin, 1,000 mcg, oral, Daily  eltrombopag olamine, 50 mg, oral, Daily before breakfast  ferrous sulfate (325 mg ferrous sulfate), 1 tablet, oral, Daily  folic acid, 1 mg, oral, Daily  furosemide, 40 mg, intravenous, q12h  latanoprost, 1 drop, ophthalmic (eye), Nightly  niacin, 500 mg, oral, Nightly  pantoprazole, 40 mg, oral, Daily before breakfast   Or  pantoprazole, 40 mg, intravenous, Daily before breakfast  rOPINIRole, 0.25 mg, oral, Nightly  valsartan, 320 mg, oral, Daily  zinc sulfate, 50 mg of elemental zinc, oral, Daily      Continuous medications     PRN medications  PRN medications: acetaminophen **OR** acetaminophen **OR** acetaminophen, melatonin, ondansetron **OR** ondansetron, polyethylene glycol, temazepam, traMADol    Results for orders placed or performed during the hospital encounter of 04/04/24 (from the past 24 hour(s))   CBC and Auto Differential   Result Value Ref Range    WBC 6.3 4.4 - 11.3 x10*3/uL    nRBC 0.0 0.0 - 0.0 /100 WBCs    RBC 3.19 (L) 4.50 - 5.90 x10*6/uL    Hemoglobin 10.0 (L) 13.5 - 17.5 g/dL    Hematocrit 31.2 (L) 41.0 - 52.0 %    MCV 98 80 - 100 fL    MCH 31.3 26.0 - 34.0 pg    MCHC 32.1 32.0 - 36.0 g/dL    RDW 18.9 (H) 11.5 - 14.5 %    Platelets 64 (L) 150 - 450 x10*3/uL    Neutrophils % 70.1 40.0 - 80.0 %    Immature Granulocytes %, Automated 0.8 0.0 - 0.9 %    Lymphocytes % 20.0 13.0 - 44.0 %    Monocytes % 6.6 2.0 - 10.0 %    Eosinophils % 2.2 0.0 - 6.0 %    Basophils % 0.3 0.0 - 2.0 %    Neutrophils Absolute 4.38 1.60 - 5.50 x10*3/uL    Immature Granulocytes Absolute, Automated 0.05 0.00 - 0.50 x10*3/uL    Lymphocytes Absolute 1.25 0.80 - 3.00 x10*3/uL    Monocytes Absolute 0.41 0.05 - 0.80 x10*3/uL    Eosinophils Absolute 0.14 0.00 - 0.40 x10*3/uL    Basophils Absolute 0.02 0.00 - 0.10 x10*3/uL   Basic metabolic panel   Result Value Ref Range    Glucose 82 74 - 99 mg/dL    Sodium 134 (L) 136 - 145 mmol/L    Potassium 4.9 3.5 - 5.3 mmol/L    Chloride 106 98  - 107 mmol/L    Bicarbonate 18 (L) 21 - 32 mmol/L    Anion Gap 15 10 - 20 mmol/L    Urea Nitrogen 45 (H) 6 - 23 mg/dL    Creatinine 2.60 (H) 0.50 - 1.30 mg/dL    eGFR 24 (L) >60 mL/min/1.73m*2    Calcium 9.0 8.6 - 10.3 mg/dL   Hepatic function panel   Result Value Ref Range    Albumin 4.1 3.4 - 5.0 g/dL    Bilirubin, Total 1.6 (H) 0.0 - 1.2 mg/dL    Bilirubin, Direct 0.2 0.0 - 0.3 mg/dL    Alkaline Phosphatase 98 33 - 136 U/L    ALT 22 10 - 52 U/L    AST 25 9 - 39 U/L    Total Protein 6.4 6.4 - 8.2 g/dL   Protime-INR   Result Value Ref Range    Protime 14.3 (H) 9.8 - 12.8 seconds    INR 1.3 (H) 0.9 - 1.1   B-Type Natriuretic Peptide   Result Value Ref Range    BNP 15 0 - 99 pg/mL   Troponin I, High Sensitivity, Initial   Result Value Ref Range    Troponin I, High Sensitivity 5 0 - 20 ng/L   Gray Top   Result Value Ref Range    Extra Tube Hold for add-ons.    D-dimer, quantitative   Result Value Ref Range    D-Dimer Non VTE, Quant (ng/mL FEU) 1,746 (H) <=500 ng/mL FEU   Troponin, High Sensitivity, 1 Hour   Result Value Ref Range    Troponin I, High Sensitivity 5 0 - 20 ng/L   Comprehensive metabolic panel   Result Value Ref Range    Glucose 100 (H) 74 - 99 mg/dL    Sodium 135 (L) 136 - 145 mmol/L    Potassium 5.1 3.5 - 5.3 mmol/L    Chloride 107 98 - 107 mmol/L    Bicarbonate 20 (L) 21 - 32 mmol/L    Anion Gap 13 10 - 20 mmol/L    Urea Nitrogen 48 (H) 6 - 23 mg/dL    Creatinine 2.32 (H) 0.50 - 1.30 mg/dL    eGFR 27 (L) >60 mL/min/1.73m*2    Calcium 9.0 8.6 - 10.3 mg/dL    Albumin 3.9 3.4 - 5.0 g/dL    Alkaline Phosphatase 95 33 - 136 U/L    Total Protein 6.1 (L) 6.4 - 8.2 g/dL    AST 24 9 - 39 U/L    Bilirubin, Total 1.6 (H) 0.0 - 1.2 mg/dL    ALT 23 10 - 52 U/L   CBC   Result Value Ref Range    WBC 6.3 4.4 - 11.3 x10*3/uL    nRBC 0.0 0.0 - 0.0 /100 WBCs    RBC 3.09 (L) 4.50 - 5.90 x10*6/uL    Hemoglobin 9.5 (L) 13.5 - 17.5 g/dL    Hematocrit 30.1 (L) 41.0 - 52.0 %    MCV 97 80 - 100 fL    MCH 30.7 26.0 - 34.0 pg     MCHC 31.6 (L) 32.0 - 36.0 g/dL    RDW 19.1 (H) 11.5 - 14.5 %    Platelets 61 (L) 150 - 450 x10*3/uL       Vascular US Lower Extremity Venous Duplex Bilateral    Result Date: 4/4/2024  Interpreted By:  Stevenson Presley, STUDY: [facility] US LOWER EXTREMITY VENOUS DUPLEX BILATERAL;  4/4/2024 6:16 pm   INDICATION: Signs/Symptoms:r/o DVT.   COMPARISON: None.   ACCESSION NUMBER(S): [id number]   ORDERING CLINICIAN: ROBERT SALCIDO   TECHNIQUE: Grayscale, color and spectral Doppler sonographic images of the bilateral lower extremity deep venous system.   FINDINGS: RIGHT: There is normal compressibility of the common femoral vein, saphenous femoral junction, profunda femoris, femoral vein and popliteal vein. The right posterior tibial and peroneal veins demonstrate normal color flow and compressibility. There is normal spontaneous and phasic variation throughout the right lower extremity by spectral doppler.   LEFT: There is normal compressibility of the common femoral vein, saphenous femoral junction, profunda femoris, femoral vein and popliteal vein. The left posterior tibial and peroneal veins demonstrate normal color flow and compressibility. There is normal spontaneous and phasic variation throughout the left lower extremity by spectral doppler.   OTHER FINDINGS: Anterior to the left common femoral artery and vein there is a 2 cm x 3.5 cm anechoic cystic structure without internal flow that corresponds to a simple fluid circumscribed collection in the left groin on 03/31/2024 likely representing a postprocedural seroma/chronic hematoma.       No evidence of DVT in the bilateral lower extremities.   3.5 cm x 2 cm anechoic cystic structure in the left groin anterior to the common femoral artery/vein corresponding to a postprocedural seroma/chronic hematoma also noted on the CT 03/31/2024.   MACRO: Stevenson Presley discussed the significance and urgency of this critical finding by RAMIRO SEN with  ROBERT SALCIDO on 4/4/2024  at 6:24 pm.  (**-RCF-**) Findings:  See findings.   Signed by: Stevenson Presley 4/4/2024 6:24 PM Dictation workstation:   [id number]    CT chest wo IV contrast    Result Date: 4/4/2024  Interpreted By:  Stevenson Presley, STUDY: CT CHEST WO IV CONTRAST;  4/4/2024 4:32 pm   INDICATION: Signs/Symptoms:SOB, R/O PE.   COMPARISON: CT abdomen/pelvis 03/31/2024   ACCESSION NUMBER(S): [id number]   ORDERING CLINICIAN: ROBERT SALCIDO   TECHNIQUE: Contiguous axial images of the chest and upper abdomen were obtained without contrast. Coronal and sagittal reformatted images were reconstructed from the axial data. Intravenous contrast was not performed for CT PE protocol due to low GFR.   FINDINGS:     MEDIASTINUM AND LYMPH NODES:  The esophageal wall appears within normal limits.  No enlarged intrathoracic or axillary lymph nodes by imaging criteria. No pneumomediastinum.   VESSELS: The ascending aorta is aneurysmal, measuring 4.9 cm x 4.8 cm. No evidence of acute intramural hematoma. Mild aortic atherosclerosis. The pulmonary artery is enlarged, measuring up to 3.8 cm, indicative of pulmonary hypertension.   HEART: Normal in size. Pacer/AICD leads are noted in the right atrium and right ventricle. Moderate coronary artery calcifications. No significant pericardial effusion.   LUNG, AIRWAYS, AND PLEURA: No suspicious pulmonary nodules. Calcified granuloma in the right middle lobe. Mild subsegmental atelectasis in the lingula and lower lobes (right > left). No consolidation, pulmonary edema, pleural effusion or pneumothorax.   OSSEOUS STRUCTURES: No acute osseous abnormality. There are acute old bilateral rib fractures. There is diffuse idiopathic skeletal hyperostosis in the thoracic spine characterized by ossification of the anterior longitudinal ligament in flowing osteophytes relatively preserved disc heights.   CHEST WALL SOFT TISSUES: No discernible abnormality.   UPPER ABDOMEN/OTHER: The gallbladder is distended with  hyperattenuating homogeneous material, similar to CT abdomen/pelvis 03/31/2024, which could be sludge or vicarious contrast from a prior procedure. There is no gallbladder wall thickening or pericholecystic fluid.       No acute pulmonary process. Mild subsegmental atelectasis in the lower lobes (right > left).   Aneurysmal ascending aorta measuring 4.9 cm x 4.8 cm. No evidence of acute intramural hematoma.   Dilated main pulmonary artery measuring up to 3.8 cm suggestive of pulmonary hypertension. Intravenous contrast was not performed for CT PE protocol due to low GFR. A perfusion scan can be performed if there persistent concern for acute pulmonary embolism.   MACRO: Stevenson Presley discussed the significance and urgency of this critical finding by RAMIRO SEN with  ROBERT SALCIDO on 4/4/2024 at 6:20 pm.  (**-RCF-**) Findings:  See findings.   Signed by: Stevenson Presley 4/4/2024 6:22 PM Dictation workstation:   [id number]    CT abdomen pelvis wo IV contrast    Result Date: 3/31/2024  STUDY: CT Abdomen and Pelvis without IV Contrast; 03/31/2024, 10:02 PM. INDICATION: Status post urinary sphincter procedure with testicular swelling. COMPARISON: CT AP: 05/03/23. ACCESSION NUMBER(S): [id number] ORDERING CLINICIAN: RIYA WILBURN TECHNIQUE: CT of the abdomen and pelvis was performed.  Contiguous axial images were obtained at 3 mm slice thickness through the abdomen and pelvis. Coronal and sagittal reconstructions at 3 mm slice thickness were performed. No intravenous contrast was administered.  Automated mA/kV exposure control was utilized and patient examination was performed in strict accordance with principles of ALARA. FINDINGS: Please note that the evaluation of vessels, lymph nodes and organs is limited without intravenous contrast.  LOWER CHEST: No cardiomegaly.  Moderate coronary artery calcifications.  No pericardial effusion.  Lung bases are clear.  ABDOMEN:  LIVER: No hepatomegaly.  Smooth surface  contour.  Normal attenuation.  BILE DUCTS: No intrahepatic or extrahepatic biliary ductal dilatation.  GALLBLADDER: The gallbladder is distended with questionable sludge. STOMACH: Tiny hiatal hernia.  PANCREAS: No masses or ductal dilatation.  SPLEEN: No splenomegaly or focal splenic lesion.  ADRENAL GLANDS: No thickening or nodules.  KIDNEYS AND URETERS: Moderate to severe bilateral renal cortical atrophy.  Right renal cyst measuring 4.8 cm  No renal or ureteral calculi.  PELVIS:  BLADDER: No abnormalities identified.  REPRODUCTIVE ORGANS: Prostate gland is mildly enlarged measuring 6 cm with radiation seeds noted in the prostate.  Penile pump noted.  Slight asymmetrical enlargement of the right rectus muscle measuring up to 2.3 cm in thickness while the left measures 1.5 cm.  Postsurgical changes along the anterior pelvic wall.  No fluid collection or abscess.  BOWEL: Colonic diverticulosis without findings of diverticulitis.  Appendix is normal  VESSELS: No abnormalities identified.  Abdominal aorta is normal in caliber.  PERITONEUM/RETROPERITONEUM/LYMPH NODES: No free fluid.  No pneumoperitoneum. No lymphadenopathy.  ABDOMINAL WALL: No abnormalities identified. SOFT TISSUES: No abnormalities identified.  BONES: No acute fracture or aggressive osseous lesion.  Posterior spinal fixation of L4-5 with interbody spacer.    1. Slight asymmetrical enlargement of the right rectus muscle measuring up to 2.3 cm in thickness while the left measures 1.5 cm. Findings may be secondary to a small intramuscular hematoma. Postsurgical changes along the anterior pelvic wall. No fluid collection or abscess. 2. Prostate gland is mildly enlarged measuring 6 cm with radiation seeds noted in the prostate. 3. Tiny hiatal hernia. 4. Moderate to severe bilateral renal cortical atrophy. 5. Colonic diverticulosis without findings of diverticulitis. 6. Moderate coronary artery calcifications. Signed by Lee Holley MD        Assessment/Plan   Principal Problem:    Acute congestive heart failure (CMS/HCC)  Active Problems:    CAD S/P percutaneous coronary angioplasty    Renal insufficiency    Thoracic ascending aortic aneurysm (CMS/HCC)    Sinus node dysfunction (CMS/HCC)    Urinary incontinence    Thrombocytopenia (CMS/HCC)    Myelodysplastic syndrome (CMS/HCC)    Dyspnea on exertion    Elevated d-dimer    Abdominal wall hematoma    -There is possible right rectus muscle hematoma measuring up to 2.3 cm.  -Patient also has enlarged prostate gland.  -Has elevated proBNP, shortness of breath and 15 pounds of weight gain in 1 week.  -Continue with IV Lasix 40 mg twice daily, monitor kidney function while on Lasix.  -Patient has elevated D-dimer, not able to obtain CT scan due to low GFR, will obtain VQ scan.  -DuoNeb for bronchodilation as needed.  -Monitor for any bleeding.  Patient has thrombocytopenia and myelodysplastic syndrome.  -SCD for DVT prophylaxis.  Hesitant to start heparin with significantly low platelet level.  There is possible abdominal wall hematoma as well.  -Patient has pacemaker in place  -Obtain echocardiogram  -Telemetry monitoring  -Cardiology consult  -Daily weight  -Monitor intake and output  -Monitor kidney function while on diuretics

## 2024-04-05 NOTE — CARE PLAN
Problem: Pain - Adult  Goal: Verbalizes/displays adequate comfort level or baseline comfort level  4/5/2024 0057 by Tamiko Chacko RN  Outcome: Progressing  4/5/2024 0056 by Tamiko Chacko RN  Outcome: Progressing  4/5/2024 0056 by Tamiko Chacko RN  Outcome: Progressing   The patient's goals for the shift include Decrease shortness of breath    The clinical goals for the shift include Increase activity without shortness of breath

## 2024-04-05 NOTE — CARE PLAN
The patient's goals for the shift include Decrease shortness of breath    The clinical goals for the shift include Increase activity without shortness of breath by 4/6/24.

## 2024-04-05 NOTE — CONSULTS
Cardiology Consult Note      Date:   4/5/2024  Patient name:  Holden Burgess  Date of admission:  4/4/2024  7:16 PM  MRN:   [id number]  YOB: 1940  Time of Consult:  10:34 AM    Consulting Cardiologist: Dr. Waylon Benjamin      Primary Cardiologist:  Dr. Waylon Benjamin    Referring Provider:         Meg Yoder MD Attending Phone: [phone]  Fax: [phone] -         Admission Diagnosis:     Acute congestive heart failure (CMS/HCC)      History of Present Illness:      Holden Burgess is a 83 y.o.  male patient who is being at the request of Dr. Meg Yoder MD for inpatient consultation of  shortness of breath . He was admitted on 4/4/2024.  Previous [facility] and [facility] records have been reviewed in detail.      This is a 83-year-old gentleman who I have managed for many many years for his cardiovascular disease.  He has a history of coronary disease but no myocardial infarction.  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  He is always had normal cardiac function.  He does have a mild to moderate-sized thoracic aortic aneurysm which we are monitoring.  He was seen in the office yesterday because of this of breath postoperatively since he had urological surgery a week ago.  Patient apparently had a sphincter repair and device placed in his urinary tract system.  It malfunctioned over the weekend and he was in the emergency room at [facility] which time the urologist corrected the problem.  At the time he was retaining significant amount of bladder fluid.  Patient went home but has been somewhat out of breath with activities over the last few days.  He came to see me with concerns of this.  On examination the only clinical finding new for him was some crepitations in the right base of his lung.  His legs had some mild edema as well but this is a chronic situation.  He is morbidly obese but his weight was only about 3 pounds different and elevated from  his last office visit 2 months ago.  Cardiac examination was unremarkable.  He was sent to the hospital for a CT scan and venous duplex to rule out PE however they could not do the CT with contrast because of elevated creatinine.  The routine CT scan however with no signs of any pulmonary congestion infiltrates or other pathology acutely.  The incidental finding of his a aneurysm was present.  There was some atelectasis.  His duplex was negative.  Patient wound up in the emergency room to try and obtain a VQ scan at the request of the radiologist but apparently services were not available for this and he was admitted to the hospital.  Concerns were raised about congestive heart failure however this gentleman in the 30+ years that I have managed his care has never had congestive heart failure.  The patient BNP is normal.  The patient's CT of his chest shows no pulmonary congestion or pathology of that type.  Patient has no desiccant water retention or weight gain over the last 2 months.  Of biggest concern still is whether or not patient may have had a pulmonary emboli as him postoperatively with a D-dimer of over 1700.  He does have atelectasis and his postop condition is somewhat reduced in functional capacity which may be accounting for some of his shortness of breath.  Patient did have troponins done which were negative.  His other labs do show at least stage III kidney disease.  Patient does have a additional history of recently confirmed myelodysplastic syndrome along with severe thrombocytopenia.  Allergies:     Allergies   Allergen Reactions    Crestor [Rosuvastatin] Unknown    Ezetimibe Unknown    Statins-Hmg-Coa Reductase Inhibitors Unknown     leg cramping         Past Medical History:     Past Medical History:   Diagnosis Date    Abdominal pain, chronic, right upper quadrant     ACP (advance care planning)     Anemia     Aneurysm (CMS/HCC)     Body mass index (BMI) 39.0-39.9, adult 12/06/2021    BMI  39.0-39.9,adult    Body mass index (BMI) 39.0-39.9, adult 07/11/2022    BMI 39.0-39.9,adult    Body mass index (BMI) 39.0-39.9, adult 04/24/2022    Body mass index (BMI) of 39.0 to 39.9 in adult    Body mass index (BMI) 39.0-39.9, adult 04/24/2022    Body mass index (BMI) of 39.0 to 39.9 in adult    Body mass index (BMI) 39.0-39.9, adult 04/24/2022    Body mass index (BMI) of 39.0 to 39.9 in adult    Cramp and spasm 07/19/2022    Leg cramps    Difficulty breathing     Encounter for general adult medical examination without abnormal findings 09/25/2020    Encounter for Medicare annual wellness exam    Encounter for screening for malignant neoplasm of prostate 04/14/2021    Encounter for prostate cancer screening    Encounter for screening for malignant neoplasm of prostate 09/25/2020    Encounter for prostate cancer screening    Forearm injury     Hx of atrial flutter     Hyperlipidemia     Hypertension     Hyperuricemia     Incontinence     Obesity, unspecified 04/26/2022    Class 2 obesity with body mass index (BMI) of 39.0 to 39.9 in adult    Other symptoms and signs involving the musculoskeletal system 10/12/2022    Shoulder weakness    Personal history of other diseases of the circulatory system 09/29/2021    History of hypotension    Personal history of other diseases of the circulatory system 10/26/2021    History of hypertension    Personal history of other endocrine, nutritional and metabolic disease 12/06/2021    History of morbid obesity    Personal history of other specified conditions 09/29/2021    History of dizziness    Rotator cuff injury     Sinus node dysfunction (CMS/HCC)     Valvular heart disease        Past Surgical History:     Past Surgical History:   Procedure Laterality Date    BONE MARROW BIOPSY      BONE MARROW BIOPSY W/ ASPIRATION  01/23/2024    CARDIAC ELECTROPHYSIOLOGY PROCEDURE Left 11/21/2023    Procedure: PPM IMPLANT DC;  Surgeon: Jun Solis MD;  Location: [facility];   Service: Electrophysiology;  Laterality: Left;    CYSTOSCOPY  2024    MR CHEST ANGIO W AND WO IV CONTRAST  2019    MR CHEST ANGIO W AND WO IV CONTRAST 2019 [facility]    MR CHEST ANGIO W AND WO IV CONTRAST  2023    MR CHEST ANGIO W AND WO IV CONTRAST 2023 DOCTOR OFFICE LEGACY    MR CHEST ANGIO W AND WO IV CONTRAST  2023    MR CHEST ANGIO W AND WO IV CONTRAST    OTHER SURGICAL HISTORY  2020    Knee replacement    OTHER SURGICAL HISTORY  2020    Catheter ablation    OTHER SURGICAL HISTORY  2020    Prostate surgery    OTHER SURGICAL HISTORY  2020    Lower back surgery    OTHER SURGICAL HISTORY  2020    Hand surgery    OTHER SURGICAL HISTORY  2021    Surgery    OTHER SURGICAL HISTORY  2021    Cataract surgery    OTHER SURGICAL HISTORY  2021    Colonoscopy    OTHER SURGICAL HISTORY  2021    Vertebral fusion       Family History:     Family History   Problem Relation Name Age of Onset    Other (polio) Mother      Heart disease Father      Heart disease Brother      Polymyalgia rheumatica Brother      Other (mitral valve disorder) Brother      Other (Coronary artery bypass graft) Brother      Other (Arterisclerotic cardiovascular disease) Brother         Social History:     Social History     Tobacco Use    Smoking status: Former     Packs/day: 3.00     Years: 10.00     Additional pack years: 0.00     Total pack years: 30.00     Types: Cigarettes     Quit date: 1968     Years since quittin.6     Passive exposure: Never    Smokeless tobacco: Never   Vaping Use    Vaping Use: Never used   Substance Use Topics    Alcohol use: Never    Drug use: Never       CURRENT MEDICATIONS    ascorbic acid, 1,000 mg, oral, Daily  atorvastatin, 10 mg, oral, Nightly  calcitriol, 0.25 mcg, oral, Every other day  cyanocobalamin, 1,000 mcg, oral, Daily  eltrombopag olamine, 50 mg, oral, Daily before breakfast  ferrous sulfate (325 mg  ferrous sulfate), 1 tablet, oral, Daily with breakfast  folic acid, 1 mg, oral, Daily  furosemide, 40 mg, intravenous, q12h  latanoprost, 1 drop, ophthalmic (eye), Nightly  niacin, 500 mg, oral, Nightly  pantoprazole, 40 mg, oral, Daily before breakfast   Or  pantoprazole, 40 mg, intravenous, Daily before breakfast  rOPINIRole, 0.25 mg, oral, Nightly  valsartan, 320 mg, oral, Daily  zinc sulfate, 50 mg of elemental zinc, oral, Daily         Current Outpatient Medications   Medication Instructions    acetaminophen (TYLENOL) 1,000 mg, oral, Every 6 hours PRN    allopurinol (ZYLOPRIM) 100 mg, oral, 2 times daily    ascorbic acid (Vitamin C) 1,000 mg tablet 1 tablet, oral, Daily    atorvastatin (Lipitor) 10 mg tablet 1 tablet, oral, Nightly    calcitriol (ROCALTROL) 0.25 mcg, oral, Every other day    chlorthalidone (HYGROTON) 25 mg, oral, Daily    cholecalciferol (Vitamin D-3) 125 MCG (5000 UT) capsule 1 capsule, oral, Daily    cyanocobalamin (Vitamin B-12) 1,000 mcg tablet 1 tablet, oral, Daily    eltrombopag olamine (PROMACTA) 50 mg, oral, Daily before breakfast, Take on an empty stomach, 1 hour before or 2 hours after a meal.    ezetimibe (ZETIA) 10 mg, oral, Daily    ferrous sulfate 325 (65 Fe) MG tablet 1 tablet, oral, Daily    folic acid (FOLVITE) 1 mg, oral, Daily    furosemide (LASIX) 20 mg, oral, Daily    latanoprost (Xalatan) 0.005 % ophthalmic solution 1 drop, ophthalmic (eye), Nightly    MAGNESIUM ORAL 550 mg, oral, Nightly, Take 1 tablet 550 mg at night.    niacin (Niaspan) 500 mg ER tablet 1 tablet, oral, Nightly    NON FORMULARY 1 each, oral, Daily, Prevagen    polyethylene glycol (GLYCOLAX, MIRALAX) 17 g, oral, Daily    rOPINIRole (REQUIP) 0.25 mg, oral, Nightly    temazepam (RESTORIL) 15 mg, oral, Nightly PRN    traMADol (ULTRAM) 50 mg, oral, Every 6 hours PRN    TURMERIC ORAL 1 capsule, oral, Daily    valsartan (DIOVAN) 320 mg, oral, Daily    zinc sulfate 50 mg zinc (220 mg) tablet oral        Review  "of Systems:      12 point review of systems was obtained in detail and is negative other than that detailed above.    Vital Signs:     Vitals:    04/04/24 2300 04/04/24 2324 04/04/24 2355 04/05/24 0724   BP:   154/70 135/59   BP Location:   Left arm Left arm   Patient Position:   Lying Lying   Pulse: 58 58 66 70   Resp:   18 18   Temp:   36.3 °C (97.3 °F) 36.5 °C (97.7 °F)   TempSrc:   Temporal Temporal   SpO2: 98% 99% 99% 100%   Weight:  138 kg (304 lb 12.8 oz)     Height:  1.854 m (6' 0.99\")       No intake or output data in the 24 hours ending 04/05/24 1034    Wt Readings from Last 4 Encounters:   04/04/24 138 kg (304 lb 12.8 oz)   04/04/24 141 kg (310 lb)   04/03/24 143 kg (315 lb 14.7 oz)   03/31/24 136 kg (300 lb)       Physical Examination:     GENERAL APPEARANCE: Well developed, well nourished, in no acute distress.  CHEST: Symmetric and non-tender.  INTEGUMENT: Skin warm and dry, without gross excoriationis or lesions.  HEENT: No gross abnormalities of conjunctiva, teeth, gums, oral mucosa  NECK: Supple, no JVD, no bruit. Thyroid not palpable. Carotid upstrokes normal.  NEURO/PSHCY: Alert and oriented x3; appropriate behavior and responses and responses, grossly normal cerebellar function with normal balance and coordination  LUNGS: Crepitations right lower base otherwise clear to auscultation bilaterally, normal respiratory effort.  HEART: Rate and rhythm regular with no evident murmur; no gallop appreciated. There are no rubs, clicks or heaves. PMI nondisplaced.  ABDOMEN: Soft, nontender, no palpable hepatosplenomegaly, no mases, no bruits. Abdominal aorta not noted to be enlarged.  MUSCULOSKELETAL: Ambulatory with normal tandem gait.  EXTREMITIES: Warm with good color, no clubbing or cyanois. There is mild 1+ chronic edema noted.  PERIPHERAL VASCULAR: Pulses present and equally palpable; 2+ throughout. No femoral bruits.      Lab:     CBC:   Lab Results   Component Value Date    WBC 6.3 04/05/2024    RBC " "3.09 (L) 04/05/2024    HGB 9.5 (L) 04/05/2024    HCT 30.1 (L) 04/05/2024    PLT 61 (L) 04/05/2024        CMP:    Lab Results   Component Value Date     (L) 04/05/2024    K 5.1 04/05/2024     04/05/2024    CO2 20 (L) 04/05/2024    BUN 48 (H) 04/05/2024    CREATININE 2.32 (H) 04/05/2024    GLUCOSE 100 (H) 04/05/2024    CALCIUM 9.0 04/05/2024       Magnesium:        BNP:   Lab Results   Component Value Date    BNP 15 04/04/2024        PT/INR:    Lab Results   Component Value Date    PROTIME 14.3 (H) 04/04/2024    INR 1.3 (H) 04/04/2024       HgBA1c:    No results found for: \"HGBA1C\"    BMP:  Lab Results   Component Value Date     (L) 04/05/2024     (L) 04/04/2024    K 5.1 04/05/2024    K 4.9 04/04/2024     04/05/2024     04/04/2024    CO2 20 (L) 04/05/2024    CO2 18 (L) 04/04/2024    BUN 48 (H) 04/05/2024    BUN 45 (H) 04/04/2024    CREATININE 2.32 (H) 04/05/2024    CREATININE 2.60 (H) 04/04/2024       CBC:  Lab Results   Component Value Date    WBC 6.3 04/05/2024    WBC 6.3 04/04/2024    RBC 3.09 (L) 04/05/2024    RBC 3.19 (L) 04/04/2024    HGB 9.5 (L) 04/05/2024    HGB 10.0 (L) 04/04/2024    HCT 30.1 (L) 04/05/2024    HCT 31.2 (L) 04/04/2024    MCV 97 04/05/2024    MCV 98 04/04/2024    MCH 30.7 04/05/2024    MCH 31.3 04/04/2024    MCHC 31.6 (L) 04/05/2024    MCHC 32.1 04/04/2024    RDW 19.1 (H) 04/05/2024    RDW 18.9 (H) 04/04/2024    PLT 61 (L) 04/05/2024    PLT 64 (L) 04/04/2024       Cardiac Enzymes:    Lab Results   Component Value Date    TROPHS 5 04/04/2024    TROPHS 5 04/04/2024       Hepatic Function Panel:    Lab Results   Component Value Date    ALKPHOS 95 04/05/2024    ALT 23 04/05/2024    AST 24 04/05/2024    PROT 6.1 (L) 04/05/2024    BILITOT 1.6 (H) 04/05/2024    BILIDIR 0.2 04/04/2024       Diagnostic Studies:     ECG 12 lead    Result Date: 4/5/2024  Atrial-paced rhythm with prolonged AV conduction Left axis deviation Right bundle branch block Abnormal ECG When " compared with ECG of 04-APR-2024 19:42, (unconfirmed) Electronic atrial pacemaker has replaced Sinus rhythm Right bundle branch block is now Present    Vascular US Lower Extremity Venous Duplex Bilateral    Result Date: 4/4/2024  Interpreted By:  Stevenson Presley, STUDY: [facility] US LOWER EXTREMITY VENOUS DUPLEX BILATERAL;  4/4/2024 6:16 pm   INDICATION: Signs/Symptoms:r/o DVT.   COMPARISON: None.   ACCESSION NUMBER(S): [id number]   ORDERING CLINICIAN: ROBERT SALCIDO   TECHNIQUE: Grayscale, color and spectral Doppler sonographic images of the bilateral lower extremity deep venous system.   FINDINGS: RIGHT: There is normal compressibility of the common femoral vein, saphenous femoral junction, profunda femoris, femoral vein and popliteal vein. The right posterior tibial and peroneal veins demonstrate normal color flow and compressibility. There is normal spontaneous and phasic variation throughout the right lower extremity by spectral doppler.   LEFT: There is normal compressibility of the common femoral vein, saphenous femoral junction, profunda femoris, femoral vein and popliteal vein. The left posterior tibial and peroneal veins demonstrate normal color flow and compressibility. There is normal spontaneous and phasic variation throughout the left lower extremity by spectral doppler.   OTHER FINDINGS: Anterior to the left common femoral artery and vein there is a 2 cm x 3.5 cm anechoic cystic structure without internal flow that corresponds to a simple fluid circumscribed collection in the left groin on 03/31/2024 likely representing a postprocedural seroma/chronic hematoma.       No evidence of DVT in the bilateral lower extremities.   3.5 cm x 2 cm anechoic cystic structure in the left groin anterior to the common femoral artery/vein corresponding to a postprocedural seroma/chronic hematoma also noted on the CT 03/31/2024.   MACRO: Stevenson Presley discussed the significance and urgency of this critical finding  by [facility] with  ROBERT SALCIDO on 4/4/2024 at 6:24 pm.  (**-RCF-**) Findings:  See findings.   Signed by: Stevenson Presley 4/4/2024 6:24 PM Dictation workstation:   [id number]    CT chest wo IV contrast    Result Date: 4/4/2024  Interpreted By:  Stevenson Presley, STUDY: CT CHEST WO IV CONTRAST;  4/4/2024 4:32 pm   INDICATION: Signs/Symptoms:SOB, R/O PE.   COMPARISON: CT abdomen/pelvis 03/31/2024   ACCESSION NUMBER(S): [id number]   ORDERING CLINICIAN: ROBERT SALCIDO   TECHNIQUE: Contiguous axial images of the chest and upper abdomen were obtained without contrast. Coronal and sagittal reformatted images were reconstructed from the axial data. Intravenous contrast was not performed for CT PE protocol due to low GFR.   FINDINGS:     MEDIASTINUM AND LYMPH NODES:  The esophageal wall appears within normal limits.  No enlarged intrathoracic or axillary lymph nodes by imaging criteria. No pneumomediastinum.   VESSELS: The ascending aorta is aneurysmal, measuring 4.9 cm x 4.8 cm. No evidence of acute intramural hematoma. Mild aortic atherosclerosis. The pulmonary artery is enlarged, measuring up to 3.8 cm, indicative of pulmonary hypertension.   HEART: Normal in size. Pacer/AICD leads are noted in the right atrium and right ventricle. Moderate coronary artery calcifications. No significant pericardial effusion.   LUNG, AIRWAYS, AND PLEURA: No suspicious pulmonary nodules. Calcified granuloma in the right middle lobe. Mild subsegmental atelectasis in the lingula and lower lobes (right > left). No consolidation, pulmonary edema, pleural effusion or pneumothorax.   OSSEOUS STRUCTURES: No acute osseous abnormality. There are acute old bilateral rib fractures. There is diffuse idiopathic skeletal hyperostosis in the thoracic spine characterized by ossification of the anterior longitudinal ligament in flowing osteophytes relatively preserved disc heights.   CHEST WALL SOFT TISSUES: No discernible abnormality.   UPPER  ABDOMEN/OTHER: The gallbladder is distended with hyperattenuating homogeneous material, similar to CT abdomen/pelvis 03/31/2024, which could be sludge or vicarious contrast from a prior procedure. There is no gallbladder wall thickening or pericholecystic fluid.       No acute pulmonary process. Mild subsegmental atelectasis in the lower lobes (right > left).   Aneurysmal ascending aorta measuring 4.9 cm x 4.8 cm. No evidence of acute intramural hematoma.   Dilated main pulmonary artery measuring up to 3.8 cm suggestive of pulmonary hypertension. Intravenous contrast was not performed for CT PE protocol due to low GFR. A perfusion scan can be performed if there persistent concern for acute pulmonary embolism.   MACRO: Stevenson Presley discussed the significance and urgency of this critical finding by RAMIRO SEN with  WAYLON SALCIDO on 4/4/2024 at 6:20 pm.  (**-RCF-**) Findings:  See findings.   Signed by: Stevenson Presley 4/4/2024 6:22 PM Dictation workstation:   [id number]             [address], [address] [place] [address]           Tel [phone] Fax [phone]     TRANSTHORACIC ECHOCARDIOGRAM REPORT        Patient Name:     CALOS Castelan Physician:   [id number] Waylon BONILLA DO  Study Date:       9/30/2022           Referring Physician: WAYLON SALCIDO  MRN/PID:          [id number]            PCP:                 [id number] Giacomo Joseph DO  Accession/Order#: [id number]        Department Location[facility]  YOB: 1940          Fellow:  Gender:           M                   Nurse:  Admit Date:       9/30/2022           Sonographer:         Waylon Enriquez  Admission Status: Outpatient          Additional Staff:  Height:           187.96 cm            CC Report to:  Weight:           134.72 kg           Study Type:          Echocardiogram  BSA:              2.57 m2  Blood Pressure: 135 /65 mmHg     Diagnosis/ICD: I25.10-Atherosclerotic heart disease of native coronary artery                 without angina pectoris; Z98.61-Coronary angioplasty status                 (PTCA); G47.30-Sleep apnea, unspecified; I71.2-Thoracic aortic                 aneurysm, without rupture  Indication:    CAD w/ PTCA, Sleep Apnea, Thoracic AO Aneurysm  Procedure/CPT: Echo Complete w Full [id number]     Patient History:  Pertinent History: CAD and PTCA, Sleep Apnea, Tho. AO Aneurysm, Atrial Flutter.     Study Detail: The following Echo studies were performed: 2D, M-Mode, Doppler and                color flow. Technically challenging study due to body habitus and                Unable to control breathing. The patient was awake.        PHYSICIAN INTERPRETATION:  Left Ventricle: Left ventricular systolic function is normal, with an estimated ejection fraction of 60%. There are no regional wall motion abnormalities. The left ventricular cavity size is normal. There is mild concentric left ventricular hypertrophy. Spectral Doppler shows an impaired relaxation pattern of left ventricular diastolic filling.  LV Wall Scoring:  All segments are normal.     Left Atrium: The left atrium is normal in size.  Right Ventricle: The right ventricle is moderately enlarged. There is normal right ventricular global systolic function.  Right Atrium: The right atrium is mild to moderately dilated.  Aortic Valve: The aortic valve appears structurally normal. The aortic valve appears tricuspid. There is no evidence of aortic valve stenosis.  There is mild aortic valve regurgitation. The peak instantaneous gradient of the aortic valve is 8.8 mmHg. The mean gradient of the aortic valve is 5.0 mmHg.  Mitral Valve: The mitral valve is normal in structure. There is no evidence of mitral valve  stenosis. The doppler estimated mean and peak diastolic pressure gradients are 1.0 mmHg and 4.3 mmHg respectively. There is normal mitral valve leaflet mobility. There is mild mitral valve regurgitation.  Tricuspid Valve: The tricuspid valve is structurally normal. There is normal tricuspid valve leaflet mobility. There is mild tricuspid regurgitation.  Pulmonic Valve: The pulmonic valve is not well visualized. There is no indication of pulmonic valve regurgitation.  Pericardium: There is no pericardial effusion noted.  Aorta: The aortic root is abnormal. There is moderate dilatation of the ascending aorta. There is moderate dilatation the aortic root.  Pulmonary Artery: The main pulmonary artery is normal in size, and position, with normal bifurcation into the left and right pulmonary arteries.  Systemic Veins: The inferior vena cava appears to be of normal size.  In comparison to the previous echocardiogram(s): Prior examinations are available and were reviewed for comparison purposes. Somewhat similar to 2019 study.        CONCLUSIONS:   1. Left ventricular systolic function is normal with a 60% estimated ejection fraction.   2. Spectral Doppler shows an impaired relaxation pattern of left ventricular diastolic filling.   3. Moderately enlarged right ventricle.   4. The right atrium is mild to moderately dilated.   5. There is no evidence of mitral valve stenosis.   6. Mild mitral valve regurgitation.   7. Mild tricuspid regurgitation is visualized.   8. Aortic valve stenosis is not present.   9. Mild aortic valve regurgitation.  10. The main pulmonary artery is normal in size, and position, with normal bifurcation into the left and right pulmonary arteries.  11. There is moderate dilatation of the aortic root.  12. There is moderate dilatation of the ascending aorta.             [address], [address], [place], [place] [address]           Tel [phone] Fax [phone]      TRANSTHORACIC ECHOCARDIOGRAM REPORT        Patient Name:     CALOS ALLISON     [place] Physician:   [id number] Waylon Padillarhiannon GARVEYOR                                    Study Date:       9/30/2022           Referring Physician: WYALON WILIAMRHIANNON  MRN/PID:          [id number]            PCP:                 [id number] Giacomo Joseph DO  Accession/Order#: [id number]        Department Location: [facility]                                                             Anne Zarate  YOB: 1940          Fellow:  Gender:           M                   Nurse:  Admit Date:       9/30/2022           Sonographer:         Waylon Enriquez  Admission Status: Outpatient          Additional Staff:  Height:           187.96 cm           CC Report to:  Weight:           134.72 kg           Study Type:          Echocardiogram  BSA:              2.57 m2  Blood Pressure: 135 /65 mmHg     Diagnosis/ICD: I25.10-Atherosclerotic heart disease of native coronary artery                 without angina pectoris; Z98.61-Coronary angioplasty status                 (PTCA); G47.30-Sleep apnea, unspecified; I71.2-Thoracic aortic                 aneurysm, without rupture  Indication:    CAD w/ PTCA, Sleep Apnea, Thoracic AO Aneurysm  Procedure/CPT: Echo Complete w Full Doppler-[id number]     Patient History:  Pertinent History: CAD and PTCA, Sleep Apnea, Tho. AO Aneurysm, Atrial Flutter.     Study Detail: The following Echo studies were performed: 2D, M-Mode, Doppler and                color flow. Technically challenging study due to body habitus and                Unable to control breathing. The patient was awake.        PHYSICIAN INTERPRETATION:  Left Ventricle: Left ventricular systolic function is normal, with an estimated ejection fraction of 60%. There are no regional wall motion abnormalities. The left ventricular cavity size is normal. There is mild  concentric left ventricular hypertrophy. Spectral Doppler shows an impaired relaxation pattern of left ventricular diastolic filling.  LV Wall Scoring:  All segments are normal.     Left Atrium: The left atrium is normal in size.  Right Ventricle: The right ventricle is moderately enlarged. There is normal right ventricular global systolic function.  Right Atrium: The right atrium is mild to moderately dilated.  Aortic Valve: The aortic valve appears structurally normal. The aortic valve appears tricuspid. There is no evidence of aortic valve stenosis.  There is mild aortic valve regurgitation. The peak instantaneous gradient of the aortic valve is 8.8 mmHg. The mean gradient of the aortic valve is 5.0 mmHg.  Mitral Valve: The mitral valve is normal in structure. There is no evidence of mitral valve stenosis. The doppler estimated mean and peak diastolic pressure gradients are 1.0 mmHg and 4.3 mmHg respectively. There is normal mitral valve leaflet mobility. There is mild mitral valve regurgitation.  Tricuspid Valve: The tricuspid valve is structurally normal. There is normal tricuspid valve leaflet mobility. There is mild tricuspid regurgitation.  Pulmonic Valve: The pulmonic valve is not well visualized. There is no indication of pulmonic valve regurgitation.  Pericardium: There is no pericardial effusion noted.  Aorta: The aortic root is abnormal. There is moderate dilatation of the ascending aorta. There is moderate dilatation the aortic root.  Pulmonary Artery: The main pulmonary artery is normal in size, and position, with normal bifurcation into the left and right pulmonary arteries.  Systemic Veins: The inferior vena cava appears to be of normal size.  In comparison to the previous echocardiogram(s): Prior examinations are available and were reviewed for comparison purposes. Somewhat similar to 2019 study.        CONCLUSIONS:   1. Left ventricular systolic function is normal with a 60% estimated ejection  fraction.   2. Spectral Doppler shows an impaired relaxation pattern of left ventricular diastolic filling.   3. Moderately enlarged right ventricle.   4. The right atrium is mild to moderately dilated.   5. There is no evidence of mitral valve stenosis.   6. Mild mitral valve regurgitation.   7. Mild tricuspid regurgitation is visualized.   8. Aortic valve stenosis is not present.   9. Mild aortic valve regurgitation.  10. The main pulmonary artery is normal in size, and position, with normal bifurcation into the left and right pulmonary arteries.  11. There is moderate dilatation of the aortic root.  12. There is moderate dilatation of the ascending aorta.  Radiology:     Transthoracic Echo (TTE) Complete    (Results Pending)   NM Lung perfusion with spect/ct    (Results Pending)       Problem List:     Patient Active Problem List   Diagnosis    Acute pain of right shoulder    Insomnia    Anemia    Atrial flutter (CMS/HCC)    Bilateral sensorineural hearing loss    Bradycardia    Cervical spondylosis with myelopathy    CAD S/P percutaneous coronary angioplasty    Chronic kidney disease (CKD), stage III (moderate) (CMS/HCC)    Depression, recurrent (CMS/HCC)    Diarrhea    Fatigue    Folic acid deficiency    Gait abnormality    Hip pain, right    Pain in both lower extremities    Hyperlipemia, mixed    Hyperuricemia    Ischemic foot pain at rest    Leukoplakia of tongue    Muscle cramps    Aneurysm (CMS/HCC)    Hypertension    PVD (peripheral vascular disease) (CMS/HCC)    Renal insufficiency    Restless leg syndrome    Rib pain on right side    Sleep apnea    Lumbar radiculopathy    Spondylosis of lumbar spine    Stiffness of right shoulder joint    Vitamin B12 deficiency    Thoracic ascending aortic aneurysm (CMS/HCC)    Increased urinary frequency    Incontinence    Sinus node dysfunction (CMS/HCC)    Abdominal pain, chronic, right upper quadrant    Calculus of gallbladder without cholecystitis without  obstruction    Chronic cholecystitis    Urge incontinence of urine    Weak urinary stream    Urinary incontinence    Edema    Anxiety    Benign neoplasm    Chronic back pain    Dermatochalasis of both eyelids    Dextroscoliosis    Estevez catheter problem (CMS/HCC)    Gastroesophageal reflux disease without esophagitis    Glaucoma    Incomplete emptying of bladder    Localized osteoarthrosis    Lumbosacral spondylosis without myelopathy    MGD (meibomian gland dysfunction)    Osteoarthritis of right knee    Osteoarthritis of spine with radiculopathy, lumbar region    Pain in right ankle and joints of right foot    Pain in joint involving pelvic region and thigh    Primary open-angle glaucoma, bilateral, mild stage    RPE mottling of macula    ELIZABETH (stress urinary incontinence), male    Lumbar stenosis    Pseudoaneurysm (CMS/HCC)    Former smoker    PFD (pelvic floor dysfunction)    BMI 40.0-44.9, adult (CMS/HCC)    Thrombocytopenia (CMS/HCC)    Gout    BMI 39.0-39.9,adult    Dizziness and giddiness    History of radiofrequency ablation (RFA) procedure for cardiac arrhythmia    Personal history of malignant neoplasm of prostate    Presence of cardiac pacemaker    Myelodysplastic syndrome (CMS/HCC)    Dyspnea on exertion    Acute congestive heart failure (CMS/HCC)    Elevated d-dimer    Abdominal wall hematoma       Assessment:         83-year-old gentleman seen evaluate today at the bedside in telemetry unit.    Bedside examination evaluation and further discussion took place with me.    Chart was reviewed in detail including lab, EKG, x-rays, pertinent tests have been completed and pending testing and discussed at length with the patient.    Impression:  Dyspnea  Recent urological surgery  Elevated D-dimer rule out pulmonary embolism  Chronic thoracic aneurysm  Coronary artery disease quiescent  Sinus node dysfunction with permanent pacemaker  History of atrial arrhythmia  Morbid obesity  Remote radiofrequency  ablation  Prostate CA  Myelodysplastic syndrome  Thrombocytopenia  History of normal LV function with no prior significant history of CHF  Plan:   Recommendation:  Await VQ scan  Can recheck echo  Maintain usual medications  May need to discuss with urology any additional treatments and/or ongoing diuretics  If any intention to continue any anticoagulation will need to discuss with patient's hematologist due to his underlying issues  Maintain telemetry for now  Will continue to follow  Device check while here      Thank you for the opportunity to participate in the care of your patient.  Please do not hesitate to call if you have any questions.    Electronically signed by Waylon Benjamin DO, on 4/5/2024 at 10:34 AM

## 2024-04-05 NOTE — NURSING NOTE
CHF Clinical Nurse Navigator Documentation  Congestive Heart Failure disease education was performed by the Clinical Nurse Navigator with a good understanding: yes  CHF signs and symptoms discussed and when to call cardiologist?  yes  Living With Heart Failure Education booklet?  no  Controlling Heart Failure at Home Education? yes  CHF Education Teaching Tool? yes  AINSLEY education modules assigned?  no  Home medication usage?  yes  Nutrition Education? Yes-low sodium   Fluid Restriction Education? yes  Daily Weight Education? Yes-daily weight log provided   Cardiovascular Rehab Referral ordered?  no  Follow-up with Cardiologist after discharge education? yes  Comments: Met with patient at bedside for heart failure education. Patient verbalized understanding. Patient lives at home with his wife who is a retired nurse. He is compliant with all his medications and follow up appointments. No questions or concerns at this time. Provided my contact information should any questions or concerns arise.

## 2024-04-06 LAB
ANION GAP SERPL CALC-SCNC: 14 MMOL/L (ref 10–20)
ATRIAL RATE: 62 BPM
BUN SERPL-MCNC: 46 MG/DL (ref 6–23)
CALCIUM SERPL-MCNC: 9.5 MG/DL (ref 8.6–10.3)
CHLORIDE SERPL-SCNC: 107 MMOL/L (ref 98–107)
CO2 SERPL-SCNC: 19 MMOL/L (ref 21–32)
CREAT SERPL-MCNC: 2.53 MG/DL (ref 0.5–1.3)
EGFRCR SERPLBLD CKD-EPI 2021: 25 ML/MIN/1.73M*2
ERYTHROCYTE [DISTWIDTH] IN BLOOD BY AUTOMATED COUNT: 19.1 % (ref 11.5–14.5)
GLUCOSE SERPL-MCNC: 116 MG/DL (ref 74–99)
HCT VFR BLD AUTO: 32.1 % (ref 41–52)
HGB BLD-MCNC: 10.2 G/DL (ref 13.5–17.5)
HOLD SPECIMEN: NORMAL
MCH RBC QN AUTO: 31.3 PG (ref 26–34)
MCHC RBC AUTO-ENTMCNC: 31.8 G/DL (ref 32–36)
MCV RBC AUTO: 99 FL (ref 80–100)
NRBC BLD-RTO: 0 /100 WBCS (ref 0–0)
P OFFSET: 175 MS
P ONSET: 153 MS
PLATELET # BLD AUTO: 90 X10*3/UL (ref 150–450)
POTASSIUM SERPL-SCNC: 5 MMOL/L (ref 3.5–5.3)
PR INTERVAL: 256 MS
Q ONSET: 230 MS
QRS COUNT: 10 BEATS
QRS DURATION: 142 MS
QT INTERVAL: 446 MS
QTC CALCULATION(BAZETT): 452 MS
QTC FREDERICIA: 451 MS
R AXIS: -39 DEGREES
RBC # BLD AUTO: 3.26 X10*6/UL (ref 4.5–5.9)
SODIUM SERPL-SCNC: 135 MMOL/L (ref 136–145)
T AXIS: -4 DEGREES
T OFFSET: 453 MS
VENTRICULAR RATE: 62 BPM
WBC # BLD AUTO: 7.4 X10*3/UL (ref 4.4–11.3)

## 2024-04-06 PROCEDURE — 2500000002 HC RX 250 W HCPCS SELF ADMINISTERED DRUGS (ALT 637 FOR MEDICARE OP, ALT 636 FOR OP/ED): Mod: MUE | Performed by: STUDENT IN AN ORGANIZED HEALTH CARE EDUCATION/TRAINING PROGRAM

## 2024-04-06 PROCEDURE — 1200000002 HC GENERAL ROOM WITH TELEMETRY DAILY

## 2024-04-06 PROCEDURE — 99222 1ST HOSP IP/OBS MODERATE 55: CPT | Performed by: STUDENT IN AN ORGANIZED HEALTH CARE EDUCATION/TRAINING PROGRAM

## 2024-04-06 PROCEDURE — 99231 SBSQ HOSP IP/OBS SF/LOW 25: CPT | Performed by: STUDENT IN AN ORGANIZED HEALTH CARE EDUCATION/TRAINING PROGRAM

## 2024-04-06 PROCEDURE — 36415 COLL VENOUS BLD VENIPUNCTURE: CPT | Performed by: STUDENT IN AN ORGANIZED HEALTH CARE EDUCATION/TRAINING PROGRAM

## 2024-04-06 PROCEDURE — 51703 INSERT BLADDER CATH COMPLEX: CPT | Performed by: STUDENT IN AN ORGANIZED HEALTH CARE EDUCATION/TRAINING PROGRAM

## 2024-04-06 PROCEDURE — 2500000002 HC RX 250 W HCPCS SELF ADMINISTERED DRUGS (ALT 637 FOR MEDICARE OP, ALT 636 FOR OP/ED): Mod: MUE | Performed by: INTERNAL MEDICINE

## 2024-04-06 PROCEDURE — 80048 BASIC METABOLIC PNL TOTAL CA: CPT | Performed by: STUDENT IN AN ORGANIZED HEALTH CARE EDUCATION/TRAINING PROGRAM

## 2024-04-06 PROCEDURE — 97161 PT EVAL LOW COMPLEX 20 MIN: CPT | Mod: GP | Performed by: PHYSICAL THERAPIST

## 2024-04-06 PROCEDURE — 2500000001 HC RX 250 WO HCPCS SELF ADMINISTERED DRUGS (ALT 637 FOR MEDICARE OP): Performed by: NURSE PRACTITIONER

## 2024-04-06 PROCEDURE — 2500000001 HC RX 250 WO HCPCS SELF ADMINISTERED DRUGS (ALT 637 FOR MEDICARE OP): Performed by: INTERNAL MEDICINE

## 2024-04-06 PROCEDURE — 85027 COMPLETE CBC AUTOMATED: CPT | Performed by: STUDENT IN AN ORGANIZED HEALTH CARE EDUCATION/TRAINING PROGRAM

## 2024-04-06 RX ORDER — TAMSULOSIN HYDROCHLORIDE 0.4 MG/1
0.4 CAPSULE ORAL DAILY
Status: DISCONTINUED | OUTPATIENT
Start: 2024-04-06 | End: 2024-04-07 | Stop reason: HOSPADM

## 2024-04-06 RX ADMIN — FOLIC ACID 1 MG: 1 TABLET ORAL at 09:55

## 2024-04-06 RX ADMIN — FUROSEMIDE 40 MG: 40 TABLET ORAL at 09:56

## 2024-04-06 RX ADMIN — LATANOPROST 1 DROP: 50 SOLUTION OPHTHALMIC at 21:08

## 2024-04-06 RX ADMIN — ZINC SULFATE 220 MG (50 MG) CAPSULE 50 MG OF ELEMENTAL ZINC: CAPSULE at 09:55

## 2024-04-06 RX ADMIN — TRAMADOL HYDROCHLORIDE 50 MG: 50 TABLET, COATED ORAL at 00:38

## 2024-04-06 RX ADMIN — TAMSULOSIN HYDROCHLORIDE 0.4 MG: 0.4 CAPSULE ORAL at 11:43

## 2024-04-06 RX ADMIN — FERROUS SULFATE TAB 325 MG (65 MG ELEMENTAL FE) 1 TABLET: 325 (65 FE) TAB at 09:55

## 2024-04-06 RX ADMIN — Medication 1000 MG: at 09:56

## 2024-04-06 RX ADMIN — ACETAMINOPHEN 650 MG: 325 TABLET ORAL at 11:46

## 2024-04-06 RX ADMIN — TRAMADOL HYDROCHLORIDE 50 MG: 50 TABLET, COATED ORAL at 17:44

## 2024-04-06 RX ADMIN — VALSARTAN 320 MG: 80 TABLET, FILM COATED ORAL at 09:56

## 2024-04-06 RX ADMIN — NIACIN 500 MG: 500 TABLET, FILM COATED, EXTENDED RELEASE ORAL at 21:07

## 2024-04-06 RX ADMIN — PANTOPRAZOLE SODIUM 40 MG: 40 TABLET, DELAYED RELEASE ORAL at 07:00

## 2024-04-06 RX ADMIN — CYANOCOBALAMIN TAB 500 MCG 1000 MCG: 500 TAB at 09:55

## 2024-04-06 RX ADMIN — ATORVASTATIN CALCIUM 10 MG: 10 TABLET, FILM COATED ORAL at 21:06

## 2024-04-06 RX ADMIN — ROPINIROLE 0.25 MG: 0.25 TABLET, FILM COATED ORAL at 21:07

## 2024-04-06 ASSESSMENT — COGNITIVE AND FUNCTIONAL STATUS - GENERAL
MOBILITY SCORE: 22
TOILETING: A LITTLE
WALKING IN HOSPITAL ROOM: A LITTLE
CLIMB 3 TO 5 STEPS WITH RAILING: A LITTLE
DAILY ACTIVITIY SCORE: 21
DAILY ACTIVITIY SCORE: 24
HELP NEEDED FOR BATHING: A LITTLE
DRESSING REGULAR LOWER BODY CLOTHING: A LITTLE
MOBILITY SCORE: 24

## 2024-04-06 ASSESSMENT — PAIN SCALES - GENERAL
PAINLEVEL_OUTOF10: 7
PAINLEVEL_OUTOF10: 3
PAINLEVEL_OUTOF10: 0 - NO PAIN
PAINLEVEL_OUTOF10: 6
PAINLEVEL_OUTOF10: 3
PAINLEVEL_OUTOF10: 7

## 2024-04-06 ASSESSMENT — PAIN - FUNCTIONAL ASSESSMENT
PAIN_FUNCTIONAL_ASSESSMENT: 0-10

## 2024-04-06 ASSESSMENT — PAIN DESCRIPTION - LOCATION
LOCATION: FINGER (COMMENT WHICH ONE)
LOCATION: HEAD

## 2024-04-06 NOTE — CONSULTS
Reason For Consult  Urinary retention, stress urinary incontinence s/p artificial urinary sphincter surgery    History Of Present Illness  GENARO Burgess is a 83 y.o. male presenting with with acute urinary retention and shortness of breath. He is 1 week s/p AUS implant with Dr. Phan. He reports ongoing incontinence that is low volume and inability to void. Bladder scan was 1100ml. He was admit for SOB. CT chest and V/Q scan are negative for PE. 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 He has low platelets and experienced significant abdominal bruising following the surgery.       Past Medical History  He has a past medical history of Abdominal pain, chronic, right upper quadrant, ACP (advance care planning), Anemia, Aneurysm (CMS/HCC), Body mass index (BMI) 39.0-39.9, adult (12/06/2021), Body mass index (BMI) 39.0-39.9, adult (07/11/2022), Body mass index (BMI) 39.0-39.9, adult (04/24/2022), Body mass index (BMI) 39.0-39.9, adult (04/24/2022), Body mass index (BMI) 39.0-39.9, adult (04/24/2022), Cramp and spasm (07/19/2022), Difficulty breathing, Encounter for general adult medical examination without abnormal findings (09/25/2020), Encounter for screening for malignant neoplasm of prostate (04/14/2021), Encounter for screening for malignant neoplasm of prostate (09/25/2020), Forearm injury, atrial flutter, Hyperlipidemia, Hypertension, Hyperuricemia, Incontinence, Obesity, unspecified (04/26/2022), Other symptoms and signs involving the musculoskeletal system (10/12/2022), Personal history of other diseases of the circulatory system (09/29/2021), Personal history of other diseases of the circulatory system (10/26/2021), Personal history of other endocrine, nutritional and metabolic disease (12/06/2021), Personal history of other specified conditions (09/29/2021), Rotator cuff injury, Sinus node dysfunction (CMS/HCC), and Valvular heart disease.    Surgical History  He has a past surgical  history that includes Other surgical history (05/14/2020); Other surgical history (05/14/2020); Other surgical history (05/14/2020); Other surgical history (05/14/2020); Other surgical history (05/14/2020); Other surgical history (09/29/2021); Other surgical history (09/29/2021); Other surgical history (09/29/2021); Other surgical history (09/29/2021); MR angio chest w and wo IV contrast (09/18/2019); MR angio chest w and wo IV contrast (01/25/2023); MR angio chest w and wo IV contrast (01/25/2023); Cardiac electrophysiology procedure (Left, 11/21/2023); Bone marrow biopsy; Bone marrow biopsy w/ aspiration (01/23/2024); and Cystoscopy (02/20/2024).     Social History  He reports that he quit smoking about 55 years ago. His smoking use included cigarettes. He has a 30.00 pack-year smoking history. He has never been exposed to tobacco smoke. He has never used smokeless tobacco. He reports that he does not drink alcohol and does not use drugs.    Family History  Family History   Problem Relation Name Age of Onset    Other (polio) Mother      Heart disease Father      Heart disease Brother      Polymyalgia rheumatica Brother      Other (mitral valve disorder) Brother      Other (Coronary artery bypass graft) Brother      Other (Arterisclerotic cardiovascular disease) Brother          Allergies  Crestor [rosuvastatin], Ezetimibe, and Statins-hmg-coa reductase inhibitors    Review of Systems  Per HPI otherwise complete ROS is negative     Physical Exam  Vitals:    04/06/24 0858   BP: 119/76   Pulse: 65   Resp: 17   Temp: 36.2 °C (97.2 °F)   SpO2: 99%     NAD  Alert and oriented  MMM  EOMI, anicteric  Neck supple  Regular rate  Nonlabored breathing  Abdomen soft, nontender, diffuse ecchymosis of the suprapubic and lower abdominal area extending to the umbilicus and the right flank   incisions intact without erythema or drainage, he's incontinent of clear yellow urine in a minimally saturated pad  Extremities WWP  No rash  "or lesion  Appropriate mood and affect       Last Recorded Vitals  Blood pressure 119/76, pulse 65, temperature 36.2 °C (97.2 °F), temperature source Temporal, resp. rate 17, height 1.854 m (6' 0.99\"), weight 137 kg (301 lb 12.8 oz), SpO2 99 %.    Relevant Results      Procedure note:  The patient was prepped and draped in the usual sterile fashion. The AUS was cycled and deactivated again reducing the amount of fluid in the cuff. Once this was confirmed to be deactivated a 14fr indwelling gallagher catheter was placed. Mild resistance experienced at the level of the suspected cuff. Copious clear yellow urine was drained and the balloon was inflated with 10ml SW.      Assessment/Plan     Urinary retention, stress urinary incontinence s/p artificial urinary sphincter surgery  I spoke with Dr. Phan by phone for additional history and guidance. He recommend discharge form the hospital with gallagher catheter indwelling and he can see the patient in the office on Monday for trial of void and to discuss options such as an SP tube.     I spent 60 minutes in the professional and overall care of this patient.      Ismael Dyson MD    "

## 2024-04-06 NOTE — CARE PLAN
The patient's goals for the shift include Decrease shortness of breath    The clinical goals for the shift include Increase activity without shortness of breath

## 2024-04-06 NOTE — PROGRESS NOTES
"Daily progress note     Holden Burgess is 83 y.o. male with past history of CAD status post PCI and stenting, hypertension, hyperlipidemia, sinus node dysfunction status post pacemaker,  sleep apnea, thoracic aortic aneurysm, morbidly obese former smoker, presenting with shortness of breath for few days.  Patient has been having progressive shortness of breath which worsened today ago.  Also gained weight, elevated D-dimer CT angiogram could not be obtained due to CKD, troponin 2 times negative, Echocardiogram from 2022 showed EF of 60%, lower extremity DVT study was negative, was admitted for dyspnea, VQ scan showed low probability for PE, echocardiogram showed normal ejection fraction.  Patient also continued to have diarrhea multiple episodes, stool pathogen and PCR still pending     Subjective  Patient was seen and examined at bedside  No longer having diarrhea  No nausea vomiting  No shortness of breath  Lower extremity edema significantly improved  Patient was retaining over 1000 cc and Estevez catheter was placed by urology    Vital signs in last 24 hours:  Temp:  [36.1 °C (97 °F)-36.4 °C (97.5 °F)] 36.4 °C (97.5 °F)  Heart Rate:  [60-75] 60  Resp:  [17-18] 17  BP: ()/(52-76) 108/53  /53 (BP Location: Left arm, Patient Position: Lying)   Pulse 60   Temp 36.4 °C (97.5 °F) (Temporal)   Resp 17   Ht 1.854 m (6' 0.99\")   Wt 137 kg (301 lb 12.8 oz)   SpO2 99%   BMI 39.83 kg/m²    Intake/Output last 3 shifts:  I/O last 3 completed shifts:  In: 930 (6.7 mL/kg) [P.O.:930]  Out: 0 (0 mL/kg)   Weight: 138.3 kg   Intake/Output this shift:  I/O this shift:  In: 200 [P.O.:200]  Out: 1700 [Urine:1700]    Physical Exam  Constitutional:       General: He is not in acute distress.     Appearance: Normal appearance. He is not ill-appearing, toxic-appearing or diaphoretic.   HENT:      Head: Normocephalic and atraumatic.      Mouth/Throat:      Mouth: Mucous membranes are moist.      Pharynx: No " oropharyngeal exudate.   Eyes:      Extraocular Movements: Extraocular movements intact.      Pupils: Pupils are equal, round, and reactive to light.   Cardiovascular:      Rate and Rhythm: Tachycardia present. Rhythm irregular.      Heart sounds: No murmur heard.  Pulmonary:      Effort: Pulmonary effort is normal. No respiratory distress.      Breath sounds: Normal breath sounds. No wheezing.   Abdominal:      General: Abdomen is flat. Bowel sounds are normal. There is no distension.      Tenderness: There is no abdominal tenderness. There is no guarding or rebound.   Genitourinary:     Comments: Estevez catheter in place draining clear urine  Musculoskeletal:         General: No swelling.      Right lower leg: Edema present.      Left lower leg: Edema present.      Comments: Trace bilateral lower extremity edema significantly decreased from before     Skin:     Findings: No lesion or rash.   Neurological:      General: No focal deficit present.      Mental Status: He is alert and oriented to person, place, and time.      Cranial Nerves: No cranial nerve deficit.      Motor: No weakness.   Psychiatric:         Mood and Affect: Mood normal.         Behavior: Behavior normal.         Current medication   Current Facility-Administered Medications   Medication Dose Route Frequency Provider Last Rate Last Admin    acetaminophen (Tylenol) tablet 650 mg  650 mg oral q4h PRN Luis Lagos MD   650 mg at 04/06/24 1146    Or    acetaminophen (Tylenol) oral liquid 650 mg  650 mg nasogastric tube q4h PRN Luis Lagos MD        Or    acetaminophen (Tylenol) suppository 650 mg  650 mg rectal q4h PRN Luis Lagos MD        ascorbic acid (Vitamin C) tablet 1,000 mg  1,000 mg oral Daily Luis Lagos MD   1,000 mg at 04/06/24 0956    atorvastatin (Lipitor) tablet 10 mg  10 mg oral Nightly Luis Lagos MD   10 mg at 04/05/24 2245    calcitriol (Rocaltrol) capsule 0.25 mcg  0.25 mcg oral Every other day Luis Lagos MD    0.25 mcg at 04/05/24 0904    cyanocobalamin (Vitamin B-12) tablet 1,000 mcg  1,000 mcg oral Daily Luis Lagos MD   1,000 mcg at 04/06/24 0955    eltrombopag olamine (Promacta) tablet 50 mg  50 mg oral Daily before breakfast Luis Lagos MD        ferrous sulfate (325 mg ferrous sulfate) tablet 1 tablet  1 tablet oral Daily with breakfast Luis Lagos MD   1 tablet at 04/06/24 0955    folic acid (Folvite) tablet 1 mg  1 mg oral Daily Luis Lagos MD   1 mg at 04/06/24 0955    furosemide (Lasix) tablet 40 mg  40 mg oral Daily ALYSE Leggett   40 mg at 04/06/24 0956    latanoprost (Xalatan) 0.005 % ophthalmic solution 1 drop  1 drop ophthalmic (eye) Nightly Luis Lagos MD   1 drop at 04/05/24 2245    melatonin tablet 3 mg  3 mg oral Nightly PRN Luis Lagos MD        niacin (Niaspan) ER tablet 500 mg  500 mg oral Nightly Luis Lagos MD   500 mg at 04/05/24 2245    ondansetron (Zofran) tablet 4 mg  4 mg oral q8h PRN Luis Lagos MD        Or    ondansetron (Zofran) injection 4 mg  4 mg intravenous q8h PRN Luis Lagos MD        pantoprazole (ProtoNix) EC tablet 40 mg  40 mg oral Daily before breakfast Luis Lagos MD   40 mg at 04/06/24 0700    Or    pantoprazole (ProtoNix) injection 40 mg  40 mg intravenous Daily before breakfast Luis Lagos MD        rOPINIRole (Requip) tablet 0.25 mg  0.25 mg oral Nightly Luis Lagos MD   0.25 mg at 04/05/24 2245    tamsulosin (Flomax) 24 hr capsule 0.4 mg  0.4 mg oral Daily Meg Yoder MD   0.4 mg at 04/06/24 1143    temazepam (Restoril) capsule 15 mg  15 mg oral Nightly PRN Luis Lagos MD        traMADol (Ultram) tablet 50 mg  50 mg oral q6h PRN Luis Lagos MD   50 mg at 04/06/24 0038    valsartan (Diovan) tablet 320 mg  320 mg oral Daily Luis Lagos MD   320 mg at 04/06/24 0956    zinc sulfate (Zincate) capsule 50 mg of elemental zinc  50 mg of elemental zinc oral Daily Luis Lagos MD   50 mg of elemental zinc at  "04/06/24 0955        Labs  Lab Results   Component Value Date    WBC 7.4 04/06/2024    HGB 10.2 (L) 04/06/2024    HCT 32.1 (L) 04/06/2024    MCV 99 04/06/2024    PLT 90 (L) 04/06/2024   No results found for: \"HGBA1C\"  Lab Results   Component Value Date    GLUCOSE 116 (H) 04/06/2024    CALCIUM 9.5 04/06/2024     (L) 04/06/2024    K 5.0 04/06/2024    CO2 19 (L) 04/06/2024     04/06/2024    BUN 46 (H) 04/06/2024    CREATININE 2.53 (H) 04/06/2024           Principal Problem:    Acute congestive heart failure (CMS/HCC)  Active Problems:    CAD S/P percutaneous coronary angioplasty    Renal insufficiency    Thoracic ascending aortic aneurysm (CMS/HCC)    Sinus node dysfunction (CMS/HCC)    Urinary incontinence    Thrombocytopenia (CMS/HCC)    Myelodysplastic syndrome (CMS/HCC)    Dyspnea on exertion    Elevated d-dimer    Abdominal wall hematoma      #Acute on chronic diastolic heart failure  #Bilateral lower extremity edema     VQ scan negative for PE  Lower extremity DVT study was negative  D-dimer was elevated  Was evaluated by cardiology  Cardiology recommended outpatient follow-up  Continue furosemide 40 mg p.o. daily  Monitor intake output  Daily weight     #Hypertension and hyperlipidemia  Continue home medication     #CAD status post PCI and stenting  #Sinus node dysfunction status post pacemaker  Continue current meds     #Thrombocytopenia  #Myelodysplastic syndrome  Monitor CBC  Outpatient follow-up with hematology     #Thoracic arctic aneurysm  Outpatient follow-up     #Acute urinary retention    #Stress urinary incontinence status post artificial urinary sphincter surgery  Urology was consulted  Estevez catheter was placed  Urology recommended discharging with Estevez and follow-up outpatient with his own urologist on Monday for voiding trial    #DVT prophylaxis SCDs due to thrombocytopenia  Disposition possible discharge over the next 24 hours Home no needs       LOS: 2 days    "

## 2024-04-06 NOTE — PROGRESS NOTES
"Physical Therapy    Physical Therapy Evaluation    Patient Name: Holden Burgess \"PAT\"  MRN: [id number]  Today's Date: 4/6/2024   Time Calculation  Start Time: 1120  Stop Time: 1131  Time Calculation (min): 11 min    Assessment/Plan   PT Assessment  PT Assessment Results: Decreased endurance, Decreased mobility  Rehab Prognosis: Good  Evaluation/Treatment Tolerance: Patient tolerated treatment well  Medical Staff Made Aware: Yes  Strengths: Ability to acquire knowledge, Access to adaptive/assistive products, Attitude of self, Capable of completing ADLs semi/independent, Coping skills, Living arrangement secure, Support of Caregivers  End of Session Communication: Bedside nurse  End of Session Patient Position: Up in chair, Alarm on  IP OR SWING BED PT PLAN  Inpatient or Swing Bed: Inpatient  PT Plan  Treatment/Interventions: Gait training, Stair training, Endurance training  PT Plan: Skilled PT  PT Frequency: 2 times per week  PT Discharge Recommendations: Low intensity level of continued care  PT Recommended Transfer Status: Assist x1, Assistive device  Physical Therapy eval completed per MD requisition. P.T. recommendations as outlined above. Recommend D/C from acute care when medically appropriate as deemed by medical staff.    Subjective           General Visit Information:  General  Reason for Referral: impaired mobility  Referred By: Dr. Yoder (PT/OT 4/5)  Past Medical History Relevant to Rehab: includes: CAD, HTN, HLD, A-fib, anemia, PPM/AICD, pulmonary HTN, RC injury, valvular heart disease, urethral surgery 1 week PTA, sinus node dysfunction, lumbar back surgery with fusion, mylodysplastic sundrome, prostate CA, obesity, PCI, EF 60%  Family/Caregiver Present: Yes  Caregiver Feedback: Wife supportive  Prior to Session Communication: Bedside nurse  Patient Position Received: Bed, 2 rail up, Alarm on  Preferred Learning Style: auditory, verbal  General Comment: Pt. is an 82yo who [facility] " 4/4/2024 [facility] c/o SOB, LE edema and an irregular CT, possible R rectus muscle hematoma.    Chest CT (4/4) (+) BLL atelctasis, R>L, (+) known AAA, (+) pulmonary HTN,   VQ scan (4/5) (-) PE    B LE US (4/5) (-) DVTs    Dx: SOB, acute renail failure on CKD, CHF    Home Living:  Home Living  Home Living Comments: Pt. lives with his spouse in a 2 level house wiht 2 MATTIE without HR. Bed/bath on 2nd floor (13 steps with HR) with walkin shower with a seat and bars. 1/2 bath and laundry on 1st floor.    Prior Level of Function:  Prior Function Per Pt/Caregiver Report  Prior Function Comments: Pt. amb with cane PTA and owns a FWW and RW. Pt was I wiht ADLs adn spouse complete IADLs. Pt. denied falls in last 3 months. Pt. drove PTA.    Precautions:  Precautions  Medical Precautions:  (Activity order: No restrictions)  Precautions Comment: Per EMR, High fall risk         Objective     Pain:  Pain Assessment  Pain Assessment: 0-10  Pain Score: 0 - No pain    Cognition:  Cognition  Overall Cognitive Status: Within Functional Limits  Orientation Level: Oriented X4    General Assessments:  General Observation  General Observation: Estevezterra   Activity Tolerance  Endurance: Endurance does not limit participation in activity                 Dynamic Sitting Balance  Dynamic Sitting-Comments: Good static and dynamic sitting balance  Dynamic Standing Balance  Dynamic Standing-Comments: Good static and dynamic standing balance    Functional Assessments:     Bed Mobility  Bed Mobility: Yes  Bed Mobility 1  Bed Mobility 1: Supine to sitting  Level of Assistance 1: Modified independent  Transfers  Transfer: Yes  Transfer 1  Technique 1: Sit to stand, Stand to sit  Transfer Device 1: Cane  Transfer Level of Assistance 1: Modified independent  Transfers 2  Technique 2: Stand pivot  Transfer Device 2: Cane  Transfer Level of Assistance 2: Modified independent  Ambulation/Gait Training  Ambulation/Gait Training Performed: Yes  Ambulation/Gait  Training 1  Surface 1: Level tile  Device 1: Single point cane  Assistance 1: Distant supervision  Quality of Gait 1:  (slow, steady reciprocal gait with no LOB)  Comments/Distance (ft) 1: 50'  Stairs  Stairs: No       Extremity/Trunk Assessments:        RLE   RLE : Within Functional Limits  LLE   LLE : Within Functional Limits    Outcome Measures:  [facility] Basic Mobility  Turning from your back to your side while in a flat bed without using bedrails: None  Moving from lying on your back to sitting on the side of a flat bed without using bedrails: None  Moving to and from bed to chair (including a wheelchair): None  Standing up from a chair using your arms (e.g. wheelchair or bedside chair): None  To walk in hospital room: A little  Climbing 3-5 steps with railing: A little  Basic Mobility - Total Score: 22                            Goals:  Encounter Problems       Encounter Problems (Active)       PT Problem       Pt.will ambulate 100' with cane with MOD I (Progressing)       Start:  04/06/24    Expected End:  04/20/24            Pt. will amb up/down 13 steps with HR and cane with CGA  (Not Progressing)       Start:  04/06/24    Expected End:  04/20/24            Pt. will perform 2 x 15 B LE AROM exercises  (Not Progressing)       Start:  04/06/24    Expected End:  04/20/24               Pain - Adult            Education Documentation  Mobility Training, taught by Jordin Morley, PT at 4/6/2024  3:03 PM.  Learner: Significant Other, Patient  Readiness: Acceptance  Method: Explanation  Response: Verbalizes Understanding, Needs Reinforcement  Comment: Role of PT, transfers, amb, safety, PT POC

## 2024-04-07 ENCOUNTER — DOCUMENTATION (OUTPATIENT)
Dept: HOME HEALTH SERVICES | Facility: HOME HEALTH | Age: 84
End: 2024-04-07
Payer: MEDICARE

## 2024-04-07 ENCOUNTER — HOME HEALTH ADMISSION (OUTPATIENT)
Dept: HOME HEALTH SERVICES | Facility: HOME HEALTH | Age: 84
End: 2024-04-07
Payer: MEDICARE

## 2024-04-07 VITALS
HEIGHT: 73 IN | TEMPERATURE: 97.9 F | OXYGEN SATURATION: 98 % | DIASTOLIC BLOOD PRESSURE: 65 MMHG | RESPIRATION RATE: 16 BRPM | HEART RATE: 79 BPM | BODY MASS INDEX: 40 KG/M2 | WEIGHT: 301.8 LBS | SYSTOLIC BLOOD PRESSURE: 145 MMHG

## 2024-04-07 LAB
ANION GAP SERPL CALC-SCNC: 12 MMOL/L (ref 10–20)
BUN SERPL-MCNC: 47 MG/DL (ref 6–23)
CALCIUM SERPL-MCNC: 9.1 MG/DL (ref 8.6–10.3)
CHLORIDE SERPL-SCNC: 107 MMOL/L (ref 98–107)
CO2 SERPL-SCNC: 22 MMOL/L (ref 21–32)
CREAT SERPL-MCNC: 2.31 MG/DL (ref 0.5–1.3)
EGFRCR SERPLBLD CKD-EPI 2021: 27 ML/MIN/1.73M*2
ERYTHROCYTE [DISTWIDTH] IN BLOOD BY AUTOMATED COUNT: 18.9 % (ref 11.5–14.5)
GLUCOSE SERPL-MCNC: 120 MG/DL (ref 74–99)
HCT VFR BLD AUTO: 30.4 % (ref 41–52)
HGB BLD-MCNC: 9.5 G/DL (ref 13.5–17.5)
MCH RBC QN AUTO: 30.9 PG (ref 26–34)
MCHC RBC AUTO-ENTMCNC: 31.3 G/DL (ref 32–36)
MCV RBC AUTO: 99 FL (ref 80–100)
NRBC BLD-RTO: 0 /100 WBCS (ref 0–0)
PLATELET # BLD AUTO: 65 X10*3/UL (ref 150–450)
POTASSIUM SERPL-SCNC: 5.1 MMOL/L (ref 3.5–5.3)
RBC # BLD AUTO: 3.07 X10*6/UL (ref 4.5–5.9)
SODIUM SERPL-SCNC: 136 MMOL/L (ref 136–145)
WBC # BLD AUTO: 5.8 X10*3/UL (ref 4.4–11.3)

## 2024-04-07 PROCEDURE — 99239 HOSP IP/OBS DSCHRG MGMT >30: CPT | Performed by: STUDENT IN AN ORGANIZED HEALTH CARE EDUCATION/TRAINING PROGRAM

## 2024-04-07 PROCEDURE — 2500000002 HC RX 250 W HCPCS SELF ADMINISTERED DRUGS (ALT 637 FOR MEDICARE OP, ALT 636 FOR OP/ED): Performed by: INTERNAL MEDICINE

## 2024-04-07 PROCEDURE — 2500000002 HC RX 250 W HCPCS SELF ADMINISTERED DRUGS (ALT 637 FOR MEDICARE OP, ALT 636 FOR OP/ED): Mod: MUE | Performed by: STUDENT IN AN ORGANIZED HEALTH CARE EDUCATION/TRAINING PROGRAM

## 2024-04-07 PROCEDURE — 2500000001 HC RX 250 WO HCPCS SELF ADMINISTERED DRUGS (ALT 637 FOR MEDICARE OP): Performed by: NURSE PRACTITIONER

## 2024-04-07 PROCEDURE — 36415 COLL VENOUS BLD VENIPUNCTURE: CPT | Performed by: STUDENT IN AN ORGANIZED HEALTH CARE EDUCATION/TRAINING PROGRAM

## 2024-04-07 PROCEDURE — 85027 COMPLETE CBC AUTOMATED: CPT | Performed by: STUDENT IN AN ORGANIZED HEALTH CARE EDUCATION/TRAINING PROGRAM

## 2024-04-07 PROCEDURE — 2500000001 HC RX 250 WO HCPCS SELF ADMINISTERED DRUGS (ALT 637 FOR MEDICARE OP): Performed by: INTERNAL MEDICINE

## 2024-04-07 PROCEDURE — 80048 BASIC METABOLIC PNL TOTAL CA: CPT | Performed by: STUDENT IN AN ORGANIZED HEALTH CARE EDUCATION/TRAINING PROGRAM

## 2024-04-07 RX ADMIN — CYANOCOBALAMIN TAB 500 MCG 1000 MCG: 500 TAB at 08:10

## 2024-04-07 RX ADMIN — PANTOPRAZOLE SODIUM 40 MG: 40 TABLET, DELAYED RELEASE ORAL at 06:34

## 2024-04-07 RX ADMIN — VALSARTAN 320 MG: 80 TABLET, FILM COATED ORAL at 08:11

## 2024-04-07 RX ADMIN — FOLIC ACID 1 MG: 1 TABLET ORAL at 08:10

## 2024-04-07 RX ADMIN — CALCITRIOL 0.25 MCG: 0.25 CAPSULE, LIQUID FILLED ORAL at 08:10

## 2024-04-07 RX ADMIN — Medication 1000 MG: at 08:10

## 2024-04-07 RX ADMIN — FERROUS SULFATE TAB 325 MG (65 MG ELEMENTAL FE) 1 TABLET: 325 (65 FE) TAB at 08:10

## 2024-04-07 RX ADMIN — TEMAZEPAM 15 MG: 15 CAPSULE ORAL at 01:17

## 2024-04-07 RX ADMIN — FUROSEMIDE 40 MG: 40 TABLET ORAL at 08:10

## 2024-04-07 RX ADMIN — TAMSULOSIN HYDROCHLORIDE 0.4 MG: 0.4 CAPSULE ORAL at 08:10

## 2024-04-07 RX ADMIN — ZINC SULFATE 220 MG (50 MG) CAPSULE 50 MG OF ELEMENTAL ZINC: CAPSULE at 08:10

## 2024-04-07 ASSESSMENT — COGNITIVE AND FUNCTIONAL STATUS - GENERAL
HELP NEEDED FOR BATHING: A LITTLE
DAILY ACTIVITIY SCORE: 21
TOILETING: A LITTLE
DRESSING REGULAR LOWER BODY CLOTHING: A LITTLE

## 2024-04-07 ASSESSMENT — PAIN SCALES - GENERAL: PAINLEVEL_OUTOF10: 0 - NO PAIN

## 2024-04-07 NOTE — HH CARE COORDINATION
Home Care received a Referral for Nursing. We have processed the referral for a Start of Care on 4/9.     If you have any questions or concerns, please feel free to contact us at [phone]. Follow the prompts, enter your five digit zip code, and you will be directed to your care team on WEST 1.

## 2024-04-07 NOTE — DISCHARGE SUMMARY
Discharge Diagnosis  Acute congestive heart failure (CMS/[facility])  1. Shortness of breath    2. Acute renal failure superimposed on chronic kidney disease, unspecified acute renal failure type, unspecified CKD stage (CMS/[facility])    3. Acute congestive heart failure, unspecified heart failure type (CMS/[facility])    4. Acute combined systolic and diastolic congestive heart failure (CMS/[facility])    5. Dyspnea on exertion    6. Thoracic aortic aneurysm, without rupture, unspecified (CMS/[facility])    7. Personal history of pulmonary embolism       Issues Requiring Follow-Up  Follow-up with cardiology  BMP in a week  Follow-up with urology regarding Estevez catheter    Discharge Meds     Your medication list        CHANGE how you take these medications        Instructions Last Dose Given Next Dose Due   furosemide 40 mg tablet  Commonly known as: Lasix  What changed:   medication strength  how much to take      Take 1 tablet (40 mg) by mouth once daily.              CONTINUE taking these medications        Instructions Last Dose Given Next Dose Due   acetaminophen 500 mg tablet  Commonly known as: Tylenol      Take 2 tablets (1,000 mg) by mouth every 6 hours if needed for mild pain (1 - 3).       allopurinol 100 mg tablet  Commonly known as: Zyloprim      Take 1 tablet (100 mg) by mouth 2 times a day.       ascorbic acid 1,000 mg tablet  Commonly known as: Vitamin C           atorvastatin 10 mg tablet  Commonly known as: Lipitor           calcitriol 0.25 mcg capsule  Commonly known as: Rocaltrol           chlorthalidone 25 mg tablet  Commonly known as: Hygroton      Take 1 tablet (25 mg) by mouth once daily.       cholecalciferol 125 MCG (5000 UT) capsule  Commonly known as: Vitamin D-3           cyanocobalamin 1,000 mcg tablet  Commonly known as: Vitamin B-12           ezetimibe 10 mg tablet  Commonly known as: Zetia      Take 1 tablet (10 mg) by mouth once daily.       ferrous sulfate (325 mg ferrous sulfate) tablet           folic acid 1 mg  tablet  Commonly known as: Folvite      Take 1 tablet (1 mg) by mouth once daily.       latanoprost 0.005 % ophthalmic solution  Commonly known as: Xalatan           MAGNESIUM ORAL           niacin 500 mg ER tablet  Commonly known as: Niaspan           NON FORMULARY           polyethylene glycol 17 gram packet  Commonly known as: Glycolax, Miralax      Take 17 g by mouth once daily.       rOPINIRole 0.25 mg tablet  Commonly known as: Requip      Take 1 tablet (0.25 mg) by mouth once daily at bedtime.       temazepam 15 mg capsule  Commonly known as: Restoril      Take 1 capsule (15 mg) by mouth as needed at bedtime for sleep.       traMADol 50 mg tablet  Commonly known as: Ultram      Take 1 tablet (50 mg) by mouth every 6 hours if needed for severe pain (7 - 10).       TURMERIC ORAL           valsartan 320 mg tablet  Commonly known as: Diovan      Take 1 tablet (320 mg) by mouth once daily.       zinc sulfate 50 mg zinc (220 mg) tablet                  STOP taking these medications      eltrombopag olamine 50 mg tablet  Commonly known as: Promacta        sulfamethoxazole-trimethoprim 800-160 mg tablet  Commonly known as: Bactrim DS                  Where to Get Your Medications        These medications were sent to [id number] [address] AT [facility] & [address] [address]      Hours: 24-hours Phone: [phone]   furosemide 40 mg tablet         Test Results Pending At Discharge  Pending Labs       No current pending labs.            Hospital Course  Holden Burgess is 83 y.o. male with past history of CAD status post PCI and stenting, hypertension, hyperlipidemia, sinus node dysfunction status post pacemaker, sleep apnea, thoracic aortic aneurysm, morbidly obese former smoker, presenting with shortness of breath for few days. Patient has been having progressive shortness of breath which worsened today ago. Also gained weight,  elevated D-dimer CT angiogram could not be obtained due to CKD, troponin 2 times negative, Echocardiogram from 2022 showed EF of 60%, lower extremity DVT study was negative, was admitted for dyspnea, VQ scan showed low probability for PE, echocardiogram showed normal ejection fraction.  Patient was treated with IV diuretics for diastolic heart failure, switch to p.o. diuretics.  Patient developed urinary retention and Estevez catheter was placed by urology.  As per urology patient will be discharged with Estevez catheter and follow-up with his own urologist Dr. Phan To discuss further.  Recommended if mixed up in appointment over the next week.  Patient will continue with cardiology.  Check BMP in a week.  Patient initially reported diarrhea diarrhea has stopped after admission.  Patient has MDS with history of cytopenia.  With hematology, thoracic aortic aneurysm that needs follow-up with vascular as outpatient.  Patient is stable for discharge today with outpatient follow-ups.  Discharge plan discussed with the patient and he is in agreement.  Total discharge time spent 40 minutes.    Pertinent Physical Exam At Time of Discharge  Physical Exam  Constitutional:       General: He is not in acute distress.     Appearance: Normal appearance. He is not ill-appearing, toxic-appearing or diaphoretic.   HENT:      Head: Normocephalic and atraumatic.      Mouth/Throat:      Mouth: Mucous membranes are moist.      Pharynx: No oropharyngeal exudate.   Eyes:      Extraocular Movements: Extraocular movements intact.      Pupils: Pupils are equal, round, and reactive to light.   Cardiovascular:      Rate and Rhythm: Normal rate and regular rhythm.      Heart sounds: No murmur heard.  Pulmonary:      Effort: Pulmonary effort is normal. No respiratory distress.      Breath sounds: Normal breath sounds. No wheezing.   Abdominal:      General: Abdomen is flat. Bowel sounds are normal. There is no distension.      Tenderness: There is  no abdominal tenderness. There is no guarding or rebound.   Genitourinary:     Comments: Estevez catheter in place  Musculoskeletal:         General: No swelling.      Right lower leg: No edema.      Left lower leg: No edema.   Skin:     Findings: No lesion or rash.   Neurological:      General: No focal deficit present.      Mental Status: He is alert and oriented to person, place, and time.      Cranial Nerves: No cranial nerve deficit.      Motor: No weakness.   Psychiatric:         Mood and Affect: Mood normal.         Behavior: Behavior normal.         Outpatient Follow-Up  Future Appointments   Date Time Provider Department Center   4/15/2024  3:20 PM John Vo MD [id number] [place]   4/18/2024  1:45 PM Waylon Benjamin DO [id number] [facility]   5/8/2024 10:30 AM Nicola Brooks MD [id number] [facility]   5/13/2024  9:45 AM Jamarcus Phan MD [id number] [facility]   8/12/2024  2:30 PM Waylon Benjamin DO [id number] [facility]   8/13/2024 12:00 PM Kevin Murphy MD [id number] [facility]   8/28/2024 10:00 AM [facility] [id number] [facility] [place] [facility]   8/28/2024 11:00 AM Jun Solis MD [id number] [place]         Meg Yoder MD

## 2024-04-07 NOTE — PROGRESS NOTES
04/07/24 1124   Current Planned Discharge Disposition   Current Planned Discharge Disposition Home H  ()     Patient to be discharged home, patient does have gallagher catheter, no orders from urology other than patient is to follow up outpatient with them. Per patient's wife, she is a retired RN, she is comfortable with gallagher care. Advised wife that still good idea to have nurse from home care see patient, if there are any issues they can call and get orders from PCP or urologist to avoid readmission. Patient's wife in agreement with plan, [facility] was choice, referral made per PCP.

## 2024-04-07 NOTE — CARE PLAN
Problem: Pain - Adult  Goal: Verbalizes/displays adequate comfort level or baseline comfort level  Outcome: Progressing     Problem: Safety - Adult  Goal: Free from fall injury  Outcome: Progressing     Problem: Discharge Planning  Goal: Discharge to home or other facility with appropriate resources  Outcome: Progressing     Problem: Chronic Conditions and Co-morbidities  Goal: Patient's chronic conditions and co-morbidity symptoms are monitored and maintained or improved  Outcome: Progressing     Problem: Respiratory  Goal: Clear secretions with interventions this shift  Outcome: Progressing  Goal: Minimize anxiety/maximize coping throughout shift  Outcome: Progressing  Goal: Minimal/no exertional discomfort or dyspnea this shift  Outcome: Progressing  Goal: No signs of respiratory distress (eg. Use of accessory muscles. Peds grunting)  Outcome: Progressing  Goal: Patent airway maintained this shift  Outcome: Progressing  Goal: Tolerate mechanical ventilation evidenced by VS/agitation level this shift  Outcome: Progressing  Goal: Tolerate pulmonary toileting this shift  Outcome: Progressing  Goal: Verbalize decreased shortness of breath this shift  Outcome: Progressing  Goal: Wean oxygen to maintain O2 saturation per order/standard this shift  Outcome: Progressing  Goal: Increase self care and/or family involvement in next 24 hours  Outcome: Progressing     Problem: Heart Failure  Goal: Improved gas exchange this shift  Outcome: Progressing  Goal: Improved urinary output this shift  Outcome: Progressing  Goal: Reduction in peripheral edema within 24 hours  Outcome: Progressing  Goal: Report improvement of dyspnea/breathlessness this shift  Outcome: Progressing  Goal: Weight from fluid excess reduced over 2-3 days, then stabilize  Outcome: Progressing  Goal: Increase self care and/or family involvement in 24 hours  Outcome: Progressing   The patient's goals for the shift include Decrease shortness of breath    The  clinical goals for the shift include Patient will remain safe this shift.

## 2024-04-08 ENCOUNTER — PATIENT OUTREACH (OUTPATIENT)
Dept: PRIMARY CARE | Facility: CLINIC | Age: 84
End: 2024-04-08
Payer: MEDICARE

## 2024-04-08 ENCOUNTER — TELEPHONE (OUTPATIENT)
Dept: CARDIOLOGY | Facility: CLINIC | Age: 84
End: 2024-04-08
Payer: MEDICARE

## 2024-04-08 DIAGNOSIS — D69.6 THROMBOCYTOPENIA (CMS-HCC): ICD-10-CM

## 2024-04-08 DIAGNOSIS — D46.9 MDS (MYELODYSPLASTIC SYNDROME) (MULTI): ICD-10-CM

## 2024-04-08 PROCEDURE — RXMED WILLOW AMBULATORY MEDICATION CHARGE

## 2024-04-08 NOTE — PROGRESS NOTES
Discharge Facility: [facility]  Discharge Diagnosis: Acute congestive heart failure   Admission Date: 4/4/24  Discharge Date:  4/7/24    PCP Appointment Date: CARLITO  Specialist Appointment Date:   - Dr. Benjamin (cardiology): 4/18/24  - Urology: D  Hospital Encounter and Summary: Linked   See discharge assessment below for further details    Medications  Medications reviewed with patient/caregiver?: Yes (new/changes only) (4/8/2024 12:00 PM)  Is the patient having any side effects they believe may be caused by any medication additions or changes?: No (4/8/2024 12:00 PM)  Does the patient have all medications ordered at discharge?: Yes (4/8/2024 12:00 PM)  Care Management Interventions: No intervention needed (4/8/2024 12:00 PM)  Prescription Comments: CHANGE: furosemide 40mg daily  STOP: promacta, bactrim (4/8/2024 12:00 PM)  Is the patient taking all medications as directed (includes completed medication regime)?: Yes (4/8/2024 12:00 PM)  Care Management Interventions: Provided patient education (4/8/2024 12:00 PM)    Appointments  Does the patient have a primary care provider?: Yes (4/8/2024 12:00 PM)  Care Management Interventions: Advised patient to make appointment (4/8/2024 12:00 PM)  Has the patient kept scheduled appointments due by today?: Yes (4/8/2024 12:00 PM)    Self Management  [facility] (4/8/2024 12:00 PM)  Has home health visited the patient within 72 hours of discharge?: Call prior to 72 hours (4/8/2024 12:00 PM)    Patient Teaching  Does the patient have access to their discharge instructions?: Yes (4/8/2024 12:00 PM)  Care Management Interventions: Reviewed instructions with patient (4/8/2024 12:00 PM)  What is the patient's perception of their health status since discharge?: Improving (4/8/2024 12:00 PM)  Is the patient/caregiver able to teach back the hierarchy of who to call/visit for symptoms/problems? PCP, Specialist, Home Health nurse, Urgent Care, ED, 911: Yes  (4/8/2024 12:00 PM)

## 2024-04-08 NOTE — PROGRESS NOTES
Per recent admission 4/4/24 for cardiac issues, eltrombopag 50 mg PO once daily prescription was inadvertently discontinued.    Per Dr. Murphy authorization, prescription resent today 4/8/24 to  [facility].

## 2024-04-08 NOTE — TELEPHONE ENCOUNTER
Pt's wife left message stating that pt was discharged from hospital yesterday.  She is wondering if pt should be seen sooner than current pending apt in August 2024.      Review of chart pt is scheduled to see Dr. Benjamin on 4/18/2024.    Call placed to pt's wife, advised of the apt on 4/18/2024 at 145 pm.  Huy verbalized an understanding.

## 2024-04-09 ENCOUNTER — SPECIALTY PHARMACY (OUTPATIENT)
Dept: PHARMACY | Facility: CLINIC | Age: 84
End: 2024-04-09

## 2024-04-09 ENCOUNTER — PHARMACY VISIT (OUTPATIENT)
Dept: PHARMACY | Facility: CLINIC | Age: 84
End: 2024-04-09
Payer: COMMERCIAL

## 2024-04-09 ENCOUNTER — ONCOLOGY MEDICATION OUTREACH (OUTPATIENT)
Dept: HEMATOLOGY/ONCOLOGY | Facility: CLINIC | Age: 84
End: 2024-04-09
Payer: MEDICARE

## 2024-04-09 NOTE — NURSING NOTE
Follow up phone call made by [facility] Clinical Nurse Navigator. Received a call from  on 10S that patient had reached out to them because he had lost the information I gave him and was requesting information to be sent to him. I called patient. He request that the information be mailed to him. Verified his address. Information will be sent today. Patient is doing well. Has follow up with urology tomorrow and he is aware. Also aware of appointment with Dr. Benjamin on 4/18. No questions or concerns at this time. Reminded patient of my contact information should any questions or concerns arise.

## 2024-04-09 NOTE — PROGRESS NOTES
" Specialty pharmacist Leon Arriaga PharmD reached out to our office regarding potential DDI between Holden' new eltrombopag prescription they are processing and his potential use of polyvalent cation supplements like iron, calcium, magnesium, zinc.    I called Holden (\"Pat\") and spoke to both him and his wife Huy.  Advised Rachelle to take polyvalent cation supplements (he takes iron, magnesium, zinc) together in evening.  Take Promacta on empty stomach in AM 2 hrs before other drugs.  We discussed specifics of med/supplement timing and rationale.      They liked this plan, verbalized understanding.     This was communicated back to the [facility] and Dr. Murphy teams.  "

## 2024-04-10 ENCOUNTER — OFFICE VISIT (OUTPATIENT)
Dept: UROLOGY | Facility: CLINIC | Age: 84
End: 2024-04-10
Payer: MEDICARE

## 2024-04-10 VITALS
HEIGHT: 73 IN | DIASTOLIC BLOOD PRESSURE: 57 MMHG | BODY MASS INDEX: 39.04 KG/M2 | HEART RATE: 71 BPM | SYSTOLIC BLOOD PRESSURE: 104 MMHG | WEIGHT: 294.6 LBS

## 2024-04-10 DIAGNOSIS — N39.41 URGE INCONTINENCE OF URINE: ICD-10-CM

## 2024-04-10 DIAGNOSIS — R33.8 ACUTE URINARY RETENTION: Primary | ICD-10-CM

## 2024-04-10 DIAGNOSIS — N39.3 STRESS INCONTINENCE OF URINE: ICD-10-CM

## 2024-04-10 PROCEDURE — 99024 POSTOP FOLLOW-UP VISIT: CPT | Performed by: NURSE PRACTITIONER

## 2024-04-10 PROCEDURE — 1159F MED LIST DOCD IN RCRD: CPT | Performed by: NURSE PRACTITIONER

## 2024-04-10 PROCEDURE — 51700 IRRIGATION OF BLADDER: CPT | Performed by: NURSE PRACTITIONER

## 2024-04-10 PROCEDURE — 3078F DIAST BP <80 MM HG: CPT | Performed by: NURSE PRACTITIONER

## 2024-04-10 PROCEDURE — 3074F SYST BP LT 130 MM HG: CPT | Performed by: NURSE PRACTITIONER

## 2024-04-10 PROCEDURE — 1111F DSCHRG MED/CURRENT MED MERGE: CPT | Performed by: NURSE PRACTITIONER

## 2024-04-10 NOTE — PROGRESS NOTES
UROLOGIC FOLLOW-UP VISIT     PROBLEM LIST:  1. Acute urinary retention        2. Stress incontinence of urine  Voiding Trial      3. Urge incontinence of urine             HISTORY OF PRESENT ILLNESS:   Holden Burgess is an 83 y.o. with BPH s/p TUIP with Dr. Flores 6/27/23  Subsequent stress incontinence s/p AUS placement 3/28/24 with Dr. Phan  Presented to ED 3/31/24 with urinary retention  Admitted 4/4-7/24 for HF exacerbation  Hx of prostate cancer s/p RT    INTERVAL HISTORY:  Returns for TOV  Seen accompanied by spouse  Asking when swelling will go down  Prepared to use a pad until follow up     PAST MEDICAL HISTORY:  Past Medical History:   Diagnosis Date    Abdominal pain, chronic, right upper quadrant     ACP (advance care planning)     Anemia     Aneurysm (CMS/HCC)     Body mass index (BMI) 39.0-39.9, adult 12/06/2021    BMI 39.0-39.9,adult    Body mass index (BMI) 39.0-39.9, adult 07/11/2022    BMI 39.0-39.9,adult    Body mass index (BMI) 39.0-39.9, adult 04/24/2022    Body mass index (BMI) of 39.0 to 39.9 in adult    Body mass index (BMI) 39.0-39.9, adult 04/24/2022    Body mass index (BMI) of 39.0 to 39.9 in adult    Body mass index (BMI) 39.0-39.9, adult 04/24/2022    Body mass index (BMI) of 39.0 to 39.9 in adult    Cramp and spasm 07/19/2022    Leg cramps    Difficulty breathing     Encounter for general adult medical examination without abnormal findings 09/25/2020    Encounter for Medicare annual wellness exam    Encounter for screening for malignant neoplasm of prostate 04/14/2021    Encounter for prostate cancer screening    Encounter for screening for malignant neoplasm of prostate 09/25/2020    Encounter for prostate cancer screening    Forearm injury     Hx of atrial flutter     Hyperlipidemia     Hypertension     Hyperuricemia     Incontinence     Obesity, unspecified 04/26/2022    Class 2 obesity with body mass index (BMI) of 39.0 to 39.9 in adult    Other symptoms and signs involving the  musculoskeletal system 10/12/2022    Shoulder weakness    Personal history of other diseases of the circulatory system 09/29/2021    History of hypotension    Personal history of other diseases of the circulatory system 10/26/2021    History of hypertension    Personal history of other endocrine, nutritional and metabolic disease 12/06/2021    History of morbid obesity    Personal history of other specified conditions 09/29/2021    History of dizziness    Rotator cuff injury     Sinus node dysfunction (CMS/HCC)     Valvular heart disease        PAST SURGICAL HISTORY:  Past Surgical History:   Procedure Laterality Date    BONE MARROW BIOPSY      BONE MARROW BIOPSY W/ ASPIRATION  01/23/2024    CARDIAC ELECTROPHYSIOLOGY PROCEDURE Left 11/21/2023    Procedure: PPM IMPLANT DC;  Surgeon: Jun Solis MD;  Location: [facility];  Service: Electrophysiology;  Laterality: Left;    CYSTOSCOPY  02/20/2024    MR CHEST ANGIO W AND WO IV CONTRAST  09/18/2019    MR CHEST ANGIO W AND WO IV CONTRAST 9/18/2019 [facility]    MR CHEST ANGIO W AND WO IV CONTRAST  01/25/2023    MR CHEST ANGIO W AND WO IV CONTRAST 1/25/2023 [facility]    MR CHEST ANGIO W AND WO IV CONTRAST  01/25/2023    MR CHEST ANGIO W AND WO IV CONTRAST    OTHER SURGICAL HISTORY  05/14/2020    Knee replacement    OTHER SURGICAL HISTORY  05/14/2020    Catheter ablation    OTHER SURGICAL HISTORY  05/14/2020    Prostate surgery    OTHER SURGICAL HISTORY  05/14/2020    Lower back surgery    OTHER SURGICAL HISTORY  05/14/2020    Hand surgery    OTHER SURGICAL HISTORY  09/29/2021    Surgery    OTHER SURGICAL HISTORY  09/29/2021    Cataract surgery    OTHER SURGICAL HISTORY  09/29/2021    Colonoscopy    OTHER SURGICAL HISTORY  09/29/2021    Vertebral fusion        ALLERGIES:   Allergies   Allergen Reactions    Crestor [Rosuvastatin] Unknown    Ezetimibe Unknown    Statins-Hmg-Coa Reductase Inhibitors Unknown     leg cramping        MEDICATIONS:    Current Outpatient Medications on File Prior to Visit   Medication Sig Dispense Refill    acetaminophen (Tylenol) 500 mg tablet Take 2 tablets (1,000 mg) by mouth every 6 hours if needed for mild pain (1 - 3). 30 tablet 0    allopurinol (Zyloprim) 100 mg tablet Take 1 tablet (100 mg) by mouth 2 times a day. 180 tablet 1    ascorbic acid (Vitamin C) 1,000 mg tablet Take 1 tablet (1,000 mg) by mouth once daily.      atorvastatin (Lipitor) 10 mg tablet Take 1 tablet (10 mg) by mouth once daily at bedtime.      calcitriol (Rocaltrol) 0.25 mcg capsule Take 1 capsule (0.25 mcg) by mouth every other day.      chlorthalidone (Hygroton) 25 mg tablet Take 1 tablet (25 mg) by mouth once daily. 90 tablet 3    cholecalciferol (Vitamin D-3) 125 MCG (5000 UT) capsule Take 1 capsule (125 mcg) by mouth once daily.      cyanocobalamin (Vitamin B-12) 1,000 mcg tablet Take 1 tablet (1,000 mcg) by mouth once daily.      eltrombopag olamine (Promacta) 50 mg tablet Take 1 tablet (50 mg) by mouth once daily in the morning. Take before meals. Take on an empty stomach, 1 hour before or 2 hours after a meal. 30 tablet 2    ezetimibe (Zetia) 10 mg tablet Take 1 tablet (10 mg) by mouth once daily. 90 tablet 0    ferrous sulfate 325 (65 Fe) MG tablet Take 1 tablet by mouth once daily.      folic acid (Folvite) 1 mg tablet Take 1 tablet (1 mg) by mouth once daily. 90 tablet 0    furosemide (Lasix) 40 mg tablet Take 1 tablet (40 mg) by mouth once daily. 30 tablet 11    latanoprost (Xalatan) 0.005 % ophthalmic solution Administer 1 drop into affected eye(s) once daily at bedtime.      MAGNESIUM ORAL Take 550 mg by mouth once daily at bedtime. Take 1 tablet 550 mg at night.      niacin (Niaspan) 500 mg ER tablet Take 1 tablet (500 mg) by mouth once daily at bedtime.      NON FORMULARY Take 1 each by mouth once daily. Prevagen      polyethylene glycol (Glycolax, Miralax) 17 gram packet Take 17 g by mouth once daily. 30 packet 0    rOPINIRole  (Requip) 0.25 mg tablet Take 1 tablet (0.25 mg) by mouth once daily at bedtime. 90 tablet 1    temazepam (Restoril) 15 mg capsule Take 1 capsule (15 mg) by mouth as needed at bedtime for sleep. 90 capsule 0    traMADol (Ultram) 50 mg tablet Take 1 tablet (50 mg) by mouth every 6 hours if needed for severe pain (7 - 10). 30 tablet 0    TURMERIC ORAL Take 1 capsule by mouth once daily.      valsartan (Diovan) 320 mg tablet Take 1 tablet (320 mg) by mouth once daily. 90 tablet 3    zinc sulfate 50 mg zinc (220 mg) tablet Take by mouth.      [DISCONTINUED] acetaminophen (Tylenol) 325 mg tablet Take 2 tablets (650 mg) by mouth every 4 hours if needed for mild pain (1 - 3). 30 tablet 0    [DISCONTINUED] aspirin 81 mg EC tablet Take 1 tablet (81 mg) by mouth once daily.      [DISCONTINUED] eltrombopag olamine (Promacta) 50 mg tablet Take 1 tablet (50 mg) by mouth once daily in the morning. Take before meals. Take on an empty stomach, 1 hour before or 2 hours after a meal. (Patient not taking: Reported on 4/4/2024) 30 tablet 2    [DISCONTINUED] furosemide (Lasix) 20 mg tablet Take 1 tablet (20 mg) by mouth once daily. 90 tablet 3    [DISCONTINUED] meloxicam (Mobic) 15 mg tablet Take 1 tablet (15 mg) by mouth once daily for 7 days. 7 tablet 0    [DISCONTINUED] oxyCODONE (Roxicodone) 5 mg immediate release tablet Take 1 tablet (5 mg) by mouth every 6 hours if needed for severe pain (7 - 10) for up to 3 days. 12 tablet 0    [DISCONTINUED] sulfamethoxazole-trimethoprim (Bactrim DS) 800-160 mg tablet Take 1 tablet by mouth 2 times a day for 3 days. 6 tablet 0    [DISCONTINUED] sulfamethoxazole-trimethoprim (Bactrim DS) 800-160 mg tablet Take 1 tablet by mouth 2 times a day for 7 days. 14 tablet 0     No current facility-administered medications on file prior to visit.        SOCIAL HISTORY:  Patient  reports that he quit smoking about 55 years ago. His smoking use included cigarettes. He started smoking about 65 years ago. He  has a 30 pack-year smoking history. He has never been exposed to tobacco smoke. He has never used smokeless tobacco. He reports that he does not drink alcohol and does not use drugs.   Social History     Socioeconomic History    Marital status:      Spouse name: Not on file    Number of children: Not on file    Years of education: Not on file    Highest education level: Not on file   Occupational History    Not on file   Tobacco Use    Smoking status: Former     Current packs/day: 0.00     Average packs/day: 3.0 packs/day for 10.0 years (30.0 ttl pk-yrs)     Types: Cigarettes     Start date: 1958     Quit date: 1968     Years since quittin.6     Passive exposure: Never    Smokeless tobacco: Never   Vaping Use    Vaping status: Never Used   Substance and Sexual Activity    Alcohol use: Never    Drug use: Never    Sexual activity: Defer   Other Topics Concern    Not on file   Social History Narrative    Not on file     Social Determinants of Health     Financial Resource Strain: Low Risk  (2024)    Overall Financial Resource Strain (CARDIA)     Difficulty of Paying Living Expenses: Not hard at all   Food Insecurity: Not on file   Transportation Needs: No Transportation Needs (2024)    PRAPARE - Transportation     Lack of Transportation (Medical): No     Lack of Transportation (Non-Medical): No   Physical Activity: Inactive (2024)    Exercise Vital Sign     Days of Exercise per Week: 0 days     Minutes of Exercise per Session: 0 min   Stress: Not on file   Social Connections: Not on file   Intimate Partner Violence: Not on file   Housing Stability: Low Risk  (2024)    Housing Stability Vital Sign     Unable to Pay for Housing in the Last Year: No     Number of Places Lived in the Last Year: 1     Unstable Housing in the Last Year: No       FAMILY HISTORY:  Family History   Problem Relation Name Age of Onset    Other (polio) Mother      Heart disease Father      Heart disease  Brother      Polymyalgia rheumatica Brother      Other (mitral valve disorder) Brother      Other (Coronary artery bypass graft) Brother      Other (Arterisclerotic cardiovascular disease) Brother         REVIEW OF SYSTEMS:  All systems reviewed, pertinent negatives as noted in HPI.     PHYSICAL EXAM:  Visit Vitals  /57   Pulse 71     Constitutional: Well-developed and well-nourished. No distress.    Head: Normocephalic and atraumatic.    Neck: Normal range of motion.     Pulmonary/Chest: Effort normal. No respiratory distress.   Abdominal: Non-distended.  : See below.  Integumentary: Extensive bruising over abdomen.  Musculoskeletal: Normal range of motion.    Neurological: Alert and oriented.  Psychiatric: Normal mood and affect. Thought content normal.      LABORATORY REVIEW:   Lab Results   Component Value Date    BUN 47 (H) 04/07/2024    CREATININE 2.31 (H) 04/07/2024    EGFR 27 (L) 04/07/2024     04/07/2024    K 5.1 04/07/2024     04/07/2024    CO2 22 04/07/2024    CALCIUM 9.1 04/07/2024      Lab Results   Component Value Date    WBC 5.8 04/07/2024    RBC 3.07 (L) 04/07/2024    HGB 9.5 (L) 04/07/2024    HCT 30.4 (L) 04/07/2024    MCV 99 04/07/2024    MCH 30.9 04/07/2024    MCHC 31.3 (L) 04/07/2024    RDW 18.9 (H) 04/07/2024    PLT 65 (L) 04/07/2024    MPV 11.3 04/03/2024        Lab Results   Component Value Date    PSA <0.10 04/14/2021    PSA <0.10 10/31/2019           Assessment:      1. Acute urinary retention        2. Stress incontinence of urine  Voiding Trial      3. Urge incontinence of urine            Holden Burgess is an 83 y.o. with BPH s/p TUIP with Dr. Flores 6/27/23  Subsequent stress incontinence s/p AUS placement 3/28/24 with Dr. Phan  Presented to ED 3/31/24 with urinary retention  Admitted 4/4-7/24 for HF exacerbation; Estevez placed  Hx of prostate cancer s/p RT    Significant scrotal edema, penis not visualized  Successful TOV; 250 mL instilled, 150 mL voided     Plan:    Discussed heat/ice, acetaminophen to help reduce swelling  RTC with Dr. Phan in 4 weeks for AUS activation or sooner if needed  Encouraged to contact us in the interim with any questions, concerns

## 2024-04-11 ENCOUNTER — TELEPHONE (OUTPATIENT)
Dept: HEMATOLOGY/ONCOLOGY | Facility: CLINIC | Age: 84
End: 2024-04-11
Payer: MEDICARE

## 2024-04-11 DIAGNOSIS — D46.9 MYELODYSPLASTIC SYNDROME (MULTI): ICD-10-CM

## 2024-04-11 NOTE — TELEPHONE ENCOUNTER
Spoke with patient's wife- Huy. States patient received his Promacta yesterday and took his 1st dose at 0600 this morning. Scheduled patient for April 26th at 1100. Aware to get labs before [facility]. Huy verbalized understanding and had no further questions or concerns at this time.

## 2024-04-12 ENCOUNTER — HOME CARE VISIT (OUTPATIENT)
Dept: HOME HEALTH SERVICES | Facility: HOME HEALTH | Age: 84
End: 2024-04-12

## 2024-04-12 DIAGNOSIS — I50.9 ACUTE CONGESTIVE HEART FAILURE, UNSPECIFIED HEART FAILURE TYPE (MULTI): Primary | ICD-10-CM

## 2024-04-15 ENCOUNTER — OFFICE VISIT (OUTPATIENT)
Dept: OTOLARYNGOLOGY | Facility: CLINIC | Age: 84
End: 2024-04-15
Payer: MEDICARE

## 2024-04-15 ENCOUNTER — CLINICAL SUPPORT (OUTPATIENT)
Dept: AUDIOLOGY | Facility: CLINIC | Age: 84
End: 2024-04-15
Payer: MEDICARE

## 2024-04-15 ENCOUNTER — APPOINTMENT (OUTPATIENT)
Dept: PHYSICAL THERAPY | Facility: CLINIC | Age: 84
End: 2024-04-15
Payer: MEDICARE

## 2024-04-15 DIAGNOSIS — H90.3 BILATERAL SENSORINEURAL HEARING LOSS: Primary | ICD-10-CM

## 2024-04-15 PROCEDURE — 1160F RVW MEDS BY RX/DR IN RCRD: CPT | Performed by: OTOLARYNGOLOGY

## 2024-04-15 PROCEDURE — 99213 OFFICE O/P EST LOW 20 MIN: CPT | Performed by: OTOLARYNGOLOGY

## 2024-04-15 PROCEDURE — 1159F MED LIST DOCD IN RCRD: CPT | Performed by: OTOLARYNGOLOGY

## 2024-04-15 PROCEDURE — 92557 COMPREHENSIVE HEARING TEST: CPT | Performed by: AUDIOLOGIST

## 2024-04-15 PROCEDURE — 1111F DSCHRG MED/CURRENT MED MERGE: CPT | Performed by: OTOLARYNGOLOGY

## 2024-04-15 ASSESSMENT — ENCOUNTER SYMPTOMS
RESPIRATORY NEGATIVE: 1
CONSTITUTIONAL NEGATIVE: 1
NEUROLOGICAL NEGATIVE: 1
CARDIOVASCULAR NEGATIVE: 1

## 2024-04-15 NOTE — PROGRESS NOTES
"Subjective   Patient ID: Holden Burgess \"GENARO\" is a 83 y.o. male who presents for Hearing Loss.    HPI  This patient has a history of bilateral hearing loss and [facility]  He is now working with a [facility] who is trying to get him appropriate hearing aid benefits.  He is now here to see us for assessment of his hearing to determine if indeed that is still indicated.  All remaining ENT inquiry is otherwise clear.  No concerning worrisome vertigo etc.  Significant history lately of numerous medical problems also to snowballing together including need for pacemaker etc.      Review of Systems   Constitutional: Negative.    HENT: Negative.     Respiratory: Negative.     Cardiovascular: Negative.    Neurological: Negative.        Physical Exam    General appearance: No acute distress. Normal facies. Symmetric facial movement. No gross lesions of the face are noted.  The external ear structures appear normal. The ear canals patent and the tympanic membranes are intact without evidence of air-fluid levels, retraction, or congenital defects.  Anterior rhinoscopy notes essentially a midline nasal septum. Examination is noted for normal healthy mucosal membranes without any evidence of lesions, polyps, or exudate. The tongue is normally mobile. There are no lesions on the gingiva, buccal, or oral mucosa. There are no oral cavity masses.  The neck is negative for mass lymphadenopathy. The trachea and parotid are clear. The thyroid bed is grossly unremarkable. The salivary gland structures are grossly unremarkable.    Audiogram: Bilateral symmetric sensorineural hearing loss starting in between 30 and 40 dB in the lower frequencies and going to as high as 80 dB plus in the higher frequencies.    Assessment/Plan     Bilateral sensorineural hearing loss.  He is a great candidate for amplification and will pursue same accordingly with colleagues [facility].  " See me back personally as needed.  All questions were answered in this regard accordingly.

## 2024-04-15 NOTE — PROGRESS NOTES
Mr. Espinoza, age 83, was seen today for a hearing evaluation during his ENT visit with Dr. Vo. He arrives in need of a repeat hearing evaluation as he is in the process of obtaining amplification through the [facility].    Results:  Otoscopy revealed clear ear canals and tympanic membranes were visualized bilaterally.  Tympanometry could not be tested due to inability to maintain a probe tip seal bilaterally.  Audiometric thresholds revealed a mild sloping to severe sensorineural hearing loss in his left ear and a mild sloping to profound sensorineural hearing loss in his right ear.  Word recognition scores were good bilaterally.  Hearing levels have remained essentially stable as compared to his last evaluation October 2022.    Recommendations:  Follow-up with PCP, Dr. Joseph, as medically directed.  Follow-up with ENT, Dr. Vo, as medically directed.  [facility]ina[facility] amplification [facility].  Retest hearing annually to monitor.

## 2024-04-18 ENCOUNTER — OFFICE VISIT (OUTPATIENT)
Dept: CARDIOLOGY | Facility: CLINIC | Age: 84
End: 2024-04-18
Payer: MEDICARE

## 2024-04-18 ENCOUNTER — OFFICE VISIT (OUTPATIENT)
Dept: PRIMARY CARE | Facility: CLINIC | Age: 84
End: 2024-04-18
Payer: MEDICARE

## 2024-04-18 VITALS
TEMPERATURE: 97.2 F | SYSTOLIC BLOOD PRESSURE: 112 MMHG | BODY MASS INDEX: 39.23 KG/M2 | HEIGHT: 73 IN | RESPIRATION RATE: 18 BRPM | HEART RATE: 64 BPM | DIASTOLIC BLOOD PRESSURE: 68 MMHG | WEIGHT: 296 LBS | OXYGEN SATURATION: 99 %

## 2024-04-18 VITALS
HEART RATE: 68 BPM | SYSTOLIC BLOOD PRESSURE: 98 MMHG | DIASTOLIC BLOOD PRESSURE: 50 MMHG | WEIGHT: 296.6 LBS | HEIGHT: 72 IN | BODY MASS INDEX: 40.17 KG/M2

## 2024-04-18 DIAGNOSIS — I25.10 CAD S/P PERCUTANEOUS CORONARY ANGIOPLASTY: ICD-10-CM

## 2024-04-18 DIAGNOSIS — G89.29 ABDOMINAL PAIN, CHRONIC, RIGHT UPPER QUADRANT: ICD-10-CM

## 2024-04-18 DIAGNOSIS — R10.11 ABDOMINAL PAIN, CHRONIC, RIGHT UPPER QUADRANT: ICD-10-CM

## 2024-04-18 DIAGNOSIS — I48.92 ATRIAL FLUTTER, UNSPECIFIED TYPE (MULTI): ICD-10-CM

## 2024-04-18 DIAGNOSIS — R33.9 URINARY RETENTION: ICD-10-CM

## 2024-04-18 DIAGNOSIS — R06.09 DYSPNEA ON EXERTION: ICD-10-CM

## 2024-04-18 DIAGNOSIS — Z98.61 CAD S/P PERCUTANEOUS CORONARY ANGIOPLASTY: ICD-10-CM

## 2024-04-18 DIAGNOSIS — N18.4 CHRONIC RENAL DISEASE, STAGE IV (MULTI): ICD-10-CM

## 2024-04-18 DIAGNOSIS — E66.01 MORBID OBESITY (MULTI): ICD-10-CM

## 2024-04-18 DIAGNOSIS — Z87.891 FORMER SMOKER: ICD-10-CM

## 2024-04-18 DIAGNOSIS — F33.9 DEPRESSION, RECURRENT (CMS-HCC): ICD-10-CM

## 2024-04-18 DIAGNOSIS — R73.9 HYPERGLYCEMIA: ICD-10-CM

## 2024-04-18 DIAGNOSIS — D64.9 LOW HEMOGLOBIN: ICD-10-CM

## 2024-04-18 DIAGNOSIS — Z79.899 MEDICATION MANAGEMENT: ICD-10-CM

## 2024-04-18 DIAGNOSIS — I10 PRIMARY HYPERTENSION: ICD-10-CM

## 2024-04-18 DIAGNOSIS — R33.9 INCOMPLETE EMPTYING OF BLADDER: ICD-10-CM

## 2024-04-18 DIAGNOSIS — Z09 ENCOUNTER FOR EXAMINATION FOLLOWING TREATMENT AT HOSPITAL: Primary | ICD-10-CM

## 2024-04-18 DIAGNOSIS — I71.11 ANEURYSM OF THE ASCENDING AORTA, RUPTURED (MULTI): ICD-10-CM

## 2024-04-18 LAB
POC FINGERSTICK BLOOD GLUCOSE: 127 MG/DL (ref 70–100)
POC HEMOGLOBIN A1C: 5.2 % (ref 4.2–6.5)
POC HEMOGLOBIN: 10 G/DL (ref 13.5–17.5)

## 2024-04-18 PROCEDURE — 1036F TOBACCO NON-USER: CPT | Performed by: FAMILY MEDICINE

## 2024-04-18 PROCEDURE — 1159F MED LIST DOCD IN RCRD: CPT | Performed by: FAMILY MEDICINE

## 2024-04-18 PROCEDURE — 1160F RVW MEDS BY RX/DR IN RCRD: CPT | Performed by: INTERNAL MEDICINE

## 2024-04-18 PROCEDURE — 1036F TOBACCO NON-USER: CPT | Performed by: INTERNAL MEDICINE

## 2024-04-18 PROCEDURE — 99495 TRANSJ CARE MGMT MOD F2F 14D: CPT | Performed by: INTERNAL MEDICINE

## 2024-04-18 PROCEDURE — 1111F DSCHRG MED/CURRENT MED MERGE: CPT | Performed by: INTERNAL MEDICINE

## 2024-04-18 PROCEDURE — 3074F SYST BP LT 130 MM HG: CPT | Performed by: INTERNAL MEDICINE

## 2024-04-18 PROCEDURE — 83036 HEMOGLOBIN GLYCOSYLATED A1C: CPT | Performed by: FAMILY MEDICINE

## 2024-04-18 PROCEDURE — 1160F RVW MEDS BY RX/DR IN RCRD: CPT | Performed by: FAMILY MEDICINE

## 2024-04-18 PROCEDURE — 85018 HEMOGLOBIN: CPT | Performed by: FAMILY MEDICINE

## 2024-04-18 PROCEDURE — 82962 GLUCOSE BLOOD TEST: CPT | Performed by: FAMILY MEDICINE

## 2024-04-18 PROCEDURE — 3074F SYST BP LT 130 MM HG: CPT | Performed by: FAMILY MEDICINE

## 2024-04-18 PROCEDURE — 1111F DSCHRG MED/CURRENT MED MERGE: CPT | Performed by: FAMILY MEDICINE

## 2024-04-18 PROCEDURE — 1159F MED LIST DOCD IN RCRD: CPT | Performed by: INTERNAL MEDICINE

## 2024-04-18 PROCEDURE — 99213 OFFICE O/P EST LOW 20 MIN: CPT | Performed by: FAMILY MEDICINE

## 2024-04-18 PROCEDURE — 3078F DIAST BP <80 MM HG: CPT | Performed by: FAMILY MEDICINE

## 2024-04-18 PROCEDURE — 3078F DIAST BP <80 MM HG: CPT | Performed by: INTERNAL MEDICINE

## 2024-04-18 RX ORDER — TRAMADOL HYDROCHLORIDE 50 MG/1
50 TABLET ORAL EVERY 6 HOURS PRN
Qty: 30 TABLET | Refills: 1 | Status: SHIPPED | OUTPATIENT
Start: 2024-04-18

## 2024-04-18 ASSESSMENT — ENCOUNTER SYMPTOMS
SORE THROAT: 0
ABDOMINAL DISTENTION: 1
STRIDOR: 0
DIZZINESS: 0
AGITATION: 0
DECREASED CONCENTRATION: 0
POLYDIPSIA: 0
SINUS PAIN: 0
ABDOMINAL PAIN: 0
SHORTNESS OF BREATH: 0
EYE PAIN: 0
NECK STIFFNESS: 0
RECTAL PAIN: 0
TROUBLE SWALLOWING: 0
CONFUSION: 0
PHOTOPHOBIA: 0
DIARRHEA: 0
BLOOD IN STOOL: 0
CONSTIPATION: 0
BACK PAIN: 1
SINUS PRESSURE: 0
COLOR CHANGE: 0
FLANK PAIN: 0
CONSTITUTIONAL NEGATIVE: 1
CHEST TIGHTNESS: 0
HEADACHES: 0
MYALGIAS: 0
POLYPHAGIA: 0
HEMATURIA: 0
DYSURIA: 0
SLEEP DISTURBANCE: 0
NERVOUS/ANXIOUS: 0
FEVER: 0
ADENOPATHY: 0
COUGH: 0
PALPITATIONS: 0
FATIGUE: 0
ACTIVITY CHANGE: 0
RHINORRHEA: 0
DYSPHORIC MOOD: 0
SPEECH DIFFICULTY: 0
APPETITE CHANGE: 0
SEIZURES: 0
ARTHRALGIAS: 1

## 2024-04-18 NOTE — PATIENT INSTRUCTIONS
Continue same medications/treatment.  Patient educated on proper medication use.  Patient educated on risk factor modification.  Please bring any lab results from other providers/physicians to your next appointment.    Please bring all medicines, vitamins, and herbal supplements with you when you come to the office.    Prescriptions will not be filled unless you are compliant with your follow up appointments or have a follow up appointment scheduled as per instruction of your physician. Refills should be requested at the time of your visit.    Follow up 8/12/24 already scheduled    I, LISANDRO MCKEON RN, AM SCRIBING FOR AND IN THE PRESENCE OF DR. ROBERT SALCIDO, DO, FACC

## 2024-04-18 NOTE — PROGRESS NOTES
Patient:  Holden Burgess  YOB: 1940  MRN: [id number]       Chief Complaint/Active Symptoms:       Holden Burgess is a 83 y.o. male who returns today for cardiac follow-up.  Patient was recently hospitalized at my request when he had seen me originally on his last visit was having some dyspnea postoperative from a urological procedure.  There was concerns for possible DVT PE and other things of the like.  He was sent to the emergency room eventually was admitted and did undergo studies.  This included a VQ scan which was negative.  Patient did have kidney dysfunction which prohibited the use of contrast.  He also had venous duplex I believe which was negative.  Ultimately he did well and was discharged.  Main problem was likely that he had some atelectasis after his initial surgery also had edema in his legs for some fluid retention due to the surgery itself which was to place a valve in the urethra for incontinence related to prostate cancer.  He has been home now for few weeks he is doing quite well he is ambulating with a cane his leg swelling is better his breathing is back to normal.  He would like to enter back into cardiac rehab for further exercise and training and this would be a good idea since he does have underlying heart disease as well as a arrhythmia and pacemaker.      Objective:     Vitals:    04/18/24 1413   BP: 98/50   Pulse: 68       Vitals:    04/18/24 1413   Weight: 135 kg (296 lb 9.6 oz)       Allergies:     Allergies   Allergen Reactions    Crestor [Rosuvastatin] Unknown    Ezetimibe Unknown    Statins-Hmg-Coa Reductase Inhibitors Unknown     leg cramping          Medications:     Current Outpatient Medications   Medication Instructions    acetaminophen (TYLENOL) 1,000 mg, oral, Every 6 hours PRN    allopurinol (ZYLOPRIM) 100 mg, oral, 2 times daily    ascorbic acid (Vitamin C) 1,000 mg tablet 1 tablet, oral, Daily    atorvastatin (Lipitor) 10 mg tablet 1 tablet, oral,  Nightly    calcitriol (ROCALTROL) 0.25 mcg, oral, Every other day    chlorthalidone (HYGROTON) 25 mg, oral, Daily    cholecalciferol (Vitamin D-3) 125 MCG (5000 UT) capsule 1 capsule, oral, Daily    cyanocobalamin (Vitamin B-12) 1,000 mcg tablet 1 tablet, oral, Daily    ezetimibe (ZETIA) 10 mg, oral, Daily    ferrous sulfate 325 (65 Fe) MG tablet 1 tablet, oral, Daily    folic acid (FOLVITE) 1 mg, oral, Daily    furosemide (LASIX) 40 mg, oral, Daily    latanoprost (Xalatan) 0.005 % ophthalmic solution 1 drop, ophthalmic (eye), Nightly    MAGNESIUM ORAL 550 mg, oral, Nightly, Take 1 tablet 550 mg at night.    niacin (Niaspan) 500 mg ER tablet 1 tablet, oral, Nightly    NON FORMULARY 1 each, oral, Daily, Prevagen    polyethylene glycol (GLYCOLAX, MIRALAX) 17 g, oral, Daily    Promacta 50 mg, oral, Daily before breakfast, Take on an empty stomach, 1 hour before or 2 hours after a meal.    rOPINIRole (REQUIP) 0.25 mg, oral, Nightly    temazepam (RESTORIL) 15 mg, oral, Nightly PRN    traMADol (ULTRAM) 50 mg, oral, Every 6 hours PRN    TURMERIC ORAL 1 capsule, oral, Daily    valsartan (DIOVAN) 320 mg, oral, Daily    zinc sulfate 50 mg zinc (220 mg) tablet oral       Physical Examination:   Constitutional:       Appearance: Healthy appearance. Not in distress.   Neck:      Vascular: No JVR. JVD normal.   Pulmonary:      Effort: Pulmonary effort is normal.      Breath sounds: Normal breath sounds. No wheezing. No rhonchi. No rales.   Chest:      Chest wall: Not tender to palpatation.      Comments: Pacemaker upper left chest  Cardiovascular:      PMI at left midclavicular line. Normal rate. Regular rhythm. Normal S1. Normal S2.       Murmurs: There is no murmur.      No gallop.  No click. No rub.   Pulses:     Intact distal pulses.   Edema:     Peripheral edema present.     Ankle: bilateral trace edema of the ankle.     Feet: bilateral trace edema of the feet.  Abdominal:      General: Bowel sounds are normal.       Palpations: Abdomen is soft.      Tenderness: There is no abdominal tenderness.   Musculoskeletal: Normal range of motion.         General: No tenderness. Skin:     General: Skin is warm and dry.   Neurological:      General: No focal deficit present.      Mental Status: Alert and oriented to person, place and time.            Lab:     CBC:   Lab Results   Component Value Date    WBC 5.8 04/07/2024    RBC 3.07 (L) 04/07/2024    HGB 10 (A) 04/18/2024    HCT 30.4 (L) 04/07/2024    PLT 65 (L) 04/07/2024        CMP:    Lab Results   Component Value Date     04/07/2024    K 5.1 04/07/2024     04/07/2024    CO2 22 04/07/2024    BUN 47 (H) 04/07/2024    CREATININE 2.31 (H) 04/07/2024    GLUCOSE 120 (H) 04/07/2024    CALCIUM 9.1 04/07/2024       Magnesium:    Lab Results   Component Value Date    MG 2.00 08/16/2022       Lipid Profile:    Lab Results   Component Value Date    TRIG 131 02/01/2023    HDL 28.8 (A) 02/01/2023       TSH:    Lab Results   Component Value Date    TSH 2.65 07/27/2022       BNP:   Lab Results   Component Value Date    BNP 15 04/04/2024        PT/INR:    Lab Results   Component Value Date    PROTIME 14.3 (H) 04/04/2024    INR 1.3 (H) 04/04/2024       HgBA1c:    Lab Results   Component Value Date    HGBA1C 5.2 04/18/2024       BMP:  Lab Results   Component Value Date     04/07/2024     (L) 04/06/2024     (L) 04/05/2024    K 5.1 04/07/2024    K 5.0 04/06/2024    K 5.1 04/05/2024     04/07/2024     04/06/2024     04/05/2024    CO2 22 04/07/2024    CO2 19 (L) 04/06/2024    CO2 20 (L) 04/05/2024    BUN 47 (H) 04/07/2024    BUN 46 (H) 04/06/2024    BUN 48 (H) 04/05/2024    CREATININE 2.31 (H) 04/07/2024    CREATININE 2.53 (H) 04/06/2024    CREATININE 2.32 (H) 04/05/2024       CBC:  Lab Results   Component Value Date    WBC 5.8 04/07/2024    WBC 7.4 04/06/2024    WBC 6.3 04/05/2024    RBC 3.07 (L) 04/07/2024    RBC 3.26 (L) 04/06/2024    RBC 3.09 (L)  04/05/2024    HGB 10 (A) 04/18/2024    HGB 9.5 (L) 04/07/2024    HGB 10.2 (L) 04/06/2024    HGB 9.5 (L) 04/05/2024    HGB 11.1 (A) 10/31/2023    HGB 11.8 (A) 09/26/2023    HCT 30.4 (L) 04/07/2024    HCT 32.1 (L) 04/06/2024    HCT 30.1 (L) 04/05/2024    MCV 99 04/07/2024    MCV 99 04/06/2024    MCV 97 04/05/2024    MCH 30.9 04/07/2024    MCH 31.3 04/06/2024    MCH 30.7 04/05/2024    MCHC 31.3 (L) 04/07/2024    MCHC 31.8 (L) 04/06/2024    MCHC 31.6 (L) 04/05/2024    RDW 18.9 (H) 04/07/2024    RDW 19.1 (H) 04/06/2024    RDW 19.1 (H) 04/05/2024    PLT 65 (L) 04/07/2024    PLT 90 (L) 04/06/2024    PLT 61 (L) 04/05/2024    MPV 11.3 04/03/2024       Cardiac Enzymes:    Lab Results   Component Value Date    TROPHS 5 04/04/2024    TROPHS 5 04/04/2024       Hepatic Function Panel:    Lab Results   Component Value Date    ALKPHOS 95 04/05/2024    ALT 23 04/05/2024    AST 24 04/05/2024    PROT 6.1 (L) 04/05/2024    BILITOT 1.6 (H) 04/05/2024    BILIDIR 0.2 04/04/2024         Diagnostic Studies:     ECG 12 lead    Result Date: 4/6/2024  Atrial-paced rhythm with prolonged AV conduction Left axis deviation Right bundle branch block Abnormal ECG When compared with ECG of 04-APR-2024 19:42, (unconfirmed) Electronic atrial pacemaker has replaced Sinus rhythm Right bundle branch block is now Present See ED provider note for full interpretation and clinical correlation Confirmed by Jesica Liriano ([id number]) on 4/6/2024 10:16:47 AM    Transthoracic Echo (TTE) Complete    Result Date: 4/5/2024          [place] [address]  Tel [phone] Fax [phone] TRANSTHORACIC ECHOCARDIOGRAM REPORT  Patient Name:      CALOS Castelan Physician:    [id number] Rico Gould MD, [facility] Study Date:        4/5/2024             Ordering Provider:    [id number] KATE RAYMOND MRN/PID:            [id number]             Fellow: Accession#:        [id number]         Nurse: Date of Birth/Age: 1940 / 83      Sonographer:          Shannondestiny Fernandez RDCS                    years Gender:            M                    Additional Staff: Height:            182.88 cm            Admit Date:           4/4/2024 Weight:            143.34 kg            Admission Status:     Inpatient -                                                               Routine BSA / BMI:         2.59 m2 / 42.86      Department Location:  [place] [address]                                      Echo Lab Blood Pressure: 154 /70 mmHg Study Type:    TRANSTHORACIC ECHO (TTE) COMPLETE Diagnosis/ICD: Thoracic aortic aneurysm, without rupture, unspecified-I71.20;                Personal history of pulmonary embolism-Z86.711; Shortness of                breath-R06.02 Indication:    Acute PE, Shortness of Breath, Ascending aortic Aneurysm CPT Codes:     Echo Complete w Full Doppler-[id number] Patient History: Pacer/Defib:       Permanent pacemaker Pertinent History: Chest Pain, A-Fib, Evaluate for Acute PE, Acute Combined                    Systolic and Diastolic CHF, MARTINEZ, Bradycardia, CAD s/p PTCA,                    Ascending Aortic Aneurysm, SA Node Dysfunction, Elevated                    D-Dimer, Anemia, CKD, PVD, MARCO, Edema, Gout, Ablation, HTN                    and Hyperlipidemia. Study Detail: The following Echo studies were performed: 2D, M-Mode, Doppler and               color flow. Technically challenging study due to poor acoustic               windows, prominent lung artifact and body habitus.  PHYSICIAN INTERPRETATION: Left Ventricle: Left ventricular systolic function is normal, with an estimated ejection fraction of 65-70%. There are no regional wall motion abnormalities. The left ventricular cavity size is normal. There is moderate concentric left ventricular hypertrophy. Spectral Doppler shows a normal pattern of left  ventricular diastolic filling. Currently normal diastolic function. Left Atrium: The left atrium is normal in size. Right Ventricle: The right ventricle is normal in size. There is normal right ventricular global systolic function. Normal RV size and function Mild pulmonary hypertension RVSP 32 mmHg. Right Atrium: The right atrium is normal in size. Aortic Valve: The aortic valve is trileaflet. There is mild aortic valve regurgitation. The peak instantaneous gradient of the aortic valve is 14.4 mmHg. The mean gradient of the aortic valve is 7.0 mmHg. 1+ AI. Mild aortic sclerosis without hemodynamically significant stenosis. Mitral Valve: The mitral valve is normal in structure. There is no evidence of mitral valve regurgitation. Normal mitral valve. Tricuspid Valve: The tricuspid valve is structurally normal. There is mild tricuspid regurgitation. Pulmonic Valve: The pulmonic valve is structurally normal. There is no indication of pulmonic valve regurgitation. Pericardium: There is a trivial pericardial effusion. There is a pericardial fat pad present. Aorta: The aortic root is abnormal. There is moderate dilatation of the ascending aorta. There is moderate dilatation the aortic root. Aortic root sinus effacement with ascending aorta dilatation aneurysm with maximal diameter 5.3 cm. Study of 2022 4.8 cm.  CONCLUSIONS:  1. Left ventricular systolic function is normal with a 65-70% estimated ejection fraction.  2. Currently normal diastolic function.  3. There is moderate concentric left ventricular hypertrophy.  4. Normal RV size and function     Mild pulmonary hypertension RVSP 32 mmHg.  5. Normal mitral valve.  6. 1+ AI. Mild aortic sclerosis without hemodynamically significant stenosis.  7. Mild aortic valve regurgitation.  8. Aortic root sinus effacement with ascending aorta dilatation aneurysm with maximal diameter 5.3 cm. Study of 2022 4.8 cm.  9. There is moderate dilatation of the aortic root. 10. There is  moderate dilatation of the ascending aorta. QUANTITATIVE DATA SUMMARY: 2D MEASUREMENTS:                           Normal Ranges: Ao Root d:     5.30 cm    (2.0-3.7cm) LAs:           3.90 cm    (2.7-4.0cm) IVSd:          1.22 cm    (0.6-1.1cm) LVPWd:         1.55 cm    (0.6-1.1cm) LVIDd:         4.69 cm    (3.9-5.9cm) LVIDs:         2.17 cm LV Mass Index: 100.6 g/m2 LV % FS        53.7 % LA VOLUME:                               Normal Ranges: LA Vol A4C:        71.5 ml    (22+/-6mL/m2) LA Vol A2C:        82.4 ml LA Vol BP:         83.3 ml LA Vol Index A4C:  27.6ml/m2 LA Vol Index A2C:  31.9 ml/m2 LA Vol Index BP:   32.2 ml/m2 LA Area A4C:       21.7 cm2 LA Area A2C:       25.3 cm2 LA Major Axis A4C: 5.6 cm LA Major Axis A2C: 6.6 cm LA Volume Index:   30.7 ml/m2 LA Vol A4C:        67.8 ml LA Vol A2C:        79.6 ml RA VOLUME BY A/L METHOD:                               Normal Ranges: RA Vol A4C:        63.4 ml    (8.3-19.5ml) RA Vol Index A4C:  24.5 ml/m2 RA Area A4C:       19.7 cm2 RA Major Axis A4C: 5.2 cm LV SYSTOLIC FUNCTION BY 2D PLANIMETRY (MOD):                     Normal Ranges: EF-A4C View: 71.6 % (>=55%) EF-A2C View: 68.4 % EF-Biplane:  70.1 % LV DIASTOLIC FUNCTION:                        Normal Ranges: MV Peak E:    0.64 m/s (0.7-1.2 m/s) MV Peak A:    0.96 m/s (0.42-0.7 m/s) E/A Ratio:    0.66     (1.0-2.2) MV e'         0.09 m/s (>8.0) MV lateral e' 0.12 m/s MV medial e'  0.06 m/s E/e' Ratio:   7.06     (<8.0) MITRAL VALVE:                 Normal Ranges: MV DT: 407 msec (150-240msec) AORTIC VALVE:                                    Normal Ranges: AoV Vmax:                1.90 m/s  (<=1.7m/s) AoV Peak P.4 mmHg (<20mmHg) AoV Mean P.0 mmHg  (1.7-11.5mmHg) LVOT Max Raffi:            1.24 m/s  (<=1.1m/s) AoV VTI:                 38.50 cm  (18-25cm) LVOT VTI:                27.20 cm LVOT Diameter:           2.20 cm   (1.8-2.4cm) AoV Area, VTI:           2.69 cm2  (2.5-5.5cm2) AoV  Area,Vmax:           2.48 cm2  (2.5-4.5cm2) AoV Dimensionless Index: 0.71  RIGHT VENTRICLE: RV Basal 3.54 cm RV Mid   3.35 cm RV Major 8.5 cm TAPSE:   27.0 mm RV s'    0.20 m/s TRICUSPID VALVE/RVSP:                             Normal Ranges: Peak TR Velocity: 2.69 m/s RV Syst Pressure: 31.9 mmHg (< 30mmHg) PULMONIC VALVE:                         Normal Ranges: PV Accel Time: 129 msec (>120ms) PV Max Raffi:    1.0 m/s  (0.6-0.9m/s) PV Max P.0 mmHg PV Mean P.0 mmHg PV VTI:        17.00 cm  [id number] Rico Goudl MD, [facility] Electronically signed on 2024 at 3:03:03 PM  ** Final **     NM Lung perfusion with spect/ct    Result Date: 2024  Interpreted By:  Juarez Xavier, STUDY: NM LUNG PERFUSION WITH SPECT/CT;  2024 12:02 pm   INDICATION: Signs/Symptoms:Shortness of breath, elevated D-dimer, suspected PE.   COMPARISON: None   ACCESSION NUMBER(S): [id number]   ORDERING CLINICIAN: KATE RAYMOND   TECHNIQUE: DIVISION OF NUCLEAR MEDICINE PERFUSION LUNG SCANS   Multiple perfusion images of the lungs were acquired after the intravenous administration of 4.4 mCi of Tc-99m macroaggregated albumin (MAA). In addition, SPECT/CT of the chest was performed.   FINDINGS: Planar perfusion images of both lungs demonstrate mild heterogeneity throughout the lung fields bilaterally. No distinct wedge-shaped subsegmental or segmental perfusion defect seen on SPECT CT to suggest acute pulmonary embolism (low probability).       1. No distinct wedge-shaped subsegmental or segmental perfusion defect seen on SPECT CT to suggest acute pulmonary embolism (low probability).   The interpretation above is based on modified PIOPED II and PISAPED criteria.   This study was analyzed and interpreted at [place], [place].   Signed by: Juarez Xavier 2024 12:16 PM Dictation workstation:   [id number]    Vascular US Lower Extremity Venous Duplex Bilateral    Result Date: 2024  Interpreted By:  Stevenson Presley, STUDY:  VASC US LOWER EXTREMITY VENOUS DUPLEX BILATERAL;  4/4/2024 6:16 pm   INDICATION: Signs/Symptoms:r/o DVT.   COMPARISON: None.   ACCESSION NUMBER(S): [id number]   ORDERING CLINICIAN: ROBERT SALCIDO   TECHNIQUE: Grayscale, color and spectral Doppler sonographic images of the bilateral lower extremity deep venous system.   FINDINGS: RIGHT: There is normal compressibility of the common femoral vein, saphenous femoral junction, profunda femoris, femoral vein and popliteal vein. The right posterior tibial and peroneal veins demonstrate normal color flow and compressibility. There is normal spontaneous and phasic variation throughout the right lower extremity by spectral doppler.   LEFT: There is normal compressibility of the common femoral vein, saphenous femoral junction, profunda femoris, femoral vein and popliteal vein. The left posterior tibial and peroneal veins demonstrate normal color flow and compressibility. There is normal spontaneous and phasic variation throughout the left lower extremity by spectral doppler.   OTHER FINDINGS: Anterior to the left common femoral artery and vein there is a 2 cm x 3.5 cm anechoic cystic structure without internal flow that corresponds to a simple fluid circumscribed collection in the left groin on 03/31/2024 likely representing a postprocedural seroma/chronic hematoma.       No evidence of DVT in the bilateral lower extremities.   3.5 cm x 2 cm anechoic cystic structure in the left groin anterior to the common femoral artery/vein corresponding to a postprocedural seroma/chronic hematoma also noted on the CT 03/31/2024.   MACRO: Stevenson Presley discussed the significance and urgency of this critical finding by RAMIRO SEN with  ROBERT SALCIDO on 4/4/2024 at 6:24 pm.  (**-RCF-**) Findings:  See findings.   Signed by: Stevenson Presley 4/4/2024 6:24 PM Dictation workstation:   [id number]    CT chest wo IV contrast    Result Date: 4/4/2024  Interpreted By:  Stevenson Presley,  STUDY: CT CHEST WO IV CONTRAST;  4/4/2024 4:32 pm   INDICATION: Signs/Symptoms:SOB, R/O PE.   COMPARISON: CT abdomen/pelvis 03/31/2024   ACCESSION NUMBER(S): [id number]   ORDERING CLINICIAN: ROBERT SALCIDO   TECHNIQUE: Contiguous axial images of the chest and upper abdomen were obtained without contrast. Coronal and sagittal reformatted images were reconstructed from the axial data. Intravenous contrast was not performed for CT PE protocol due to low GFR.   FINDINGS:     MEDIASTINUM AND LYMPH NODES:  The esophageal wall appears within normal limits.  No enlarged intrathoracic or axillary lymph nodes by imaging criteria. No pneumomediastinum.   VESSELS: The ascending aorta is aneurysmal, measuring 4.9 cm x 4.8 cm. No evidence of acute intramural hematoma. Mild aortic atherosclerosis. The pulmonary artery is enlarged, measuring up to 3.8 cm, indicative of pulmonary hypertension.   HEART: Normal in size. Pacer/AICD leads are noted in the right atrium and right ventricle. Moderate coronary artery calcifications. No significant pericardial effusion.   LUNG, AIRWAYS, AND PLEURA: No suspicious pulmonary nodules. Calcified granuloma in the right middle lobe. Mild subsegmental atelectasis in the lingula and lower lobes (right > left). No consolidation, pulmonary edema, pleural effusion or pneumothorax.   OSSEOUS STRUCTURES: No acute osseous abnormality. There are acute old bilateral rib fractures. There is diffuse idiopathic skeletal hyperostosis in the thoracic spine characterized by ossification of the anterior longitudinal ligament in flowing osteophytes relatively preserved disc heights.   CHEST WALL SOFT TISSUES: No discernible abnormality.   UPPER ABDOMEN/OTHER: The gallbladder is distended with hyperattenuating homogeneous material, similar to CT abdomen/pelvis 03/31/2024, which could be sludge or vicarious contrast from a prior procedure. There is no gallbladder wall thickening or pericholecystic fluid.       No  "acute pulmonary process. Mild subsegmental atelectasis in the lower lobes (right > left).   Aneurysmal ascending aorta measuring 4.9 cm x 4.8 cm. No evidence of acute intramural hematoma.   Dilated main pulmonary artery measuring up to 3.8 cm suggestive of pulmonary hypertension. Intravenous contrast was not performed for CT PE protocol due to low GFR. A perfusion scan can be performed if there persistent concern for acute pulmonary embolism.   MACRO: Stevenson Presley discussed the significance and urgency of this critical finding by RAMIRO SEN with  ROBERT SALCIDO on 4/4/2024 at 6:20 pm.  (**-RCF-**) Findings:  See findings.   Signed by: Stevenson Presley 4/4/2024 6:22 PM Dictation workstation:   [id number]      EKG:   No results found for: \"EKG\"      Radiology:     No orders to display       Assessment/Plan:         Patient Active Problem List   Diagnosis    Acute pain of right shoulder    Insomnia    Anemia    Atrial flutter (Multi)    Bilateral sensorineural hearing loss    Bradycardia    Cervical spondylosis with myelopathy    CAD S/P percutaneous coronary angioplasty    Chronic kidney disease (CKD), stage III (moderate) (Multi)    Depression, recurrent (CMS-HCC)    Diarrhea    Fatigue    Folic acid deficiency    Gait abnormality    Hip pain, right    Pain in both lower extremities    Hyperlipemia, mixed    Hyperuricemia    Ischemic foot pain at rest    Leukoplakia of tongue    Muscle cramps    Aneurysm (CMS-HCC)    Hypertension    PVD (peripheral vascular disease) (CMS-HCC)    Renal insufficiency    Restless leg syndrome    Rib pain on right side    Sleep apnea    Lumbar radiculopathy    Spondylosis of lumbar spine    Stiffness of right shoulder joint    Vitamin B12 deficiency    Thoracic ascending aortic aneurysm (CMS-HCC)    Increased urinary frequency    Incontinence    Sinus node dysfunction (Multi)    Abdominal pain, chronic, right upper quadrant    Calculus of gallbladder without cholecystitis without " obstruction    Chronic cholecystitis    Urge incontinence of urine    Weak urinary stream    Urinary incontinence    Edema    Anxiety    Benign neoplasm    Chronic back pain    Dermatochalasis of both eyelids    Dextroscoliosis    Estevez catheter problem (CMS-[facility])    Gastroesophageal reflux disease without esophagitis    Glaucoma    Incomplete emptying of bladder    Localized osteoarthrosis    Lumbosacral spondylosis without myelopathy    MGD (meibomian gland dysfunction)    Osteoarthritis of right knee    Osteoarthritis of spine with radiculopathy, lumbar region    Pain in right ankle and joints of right foot    Pain in joint involving pelvic region and thigh    Primary open-angle glaucoma, bilateral, mild stage    RPE mottling of macula    ELIZABETH (stress urinary incontinence), male    Lumbar stenosis    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[facility])    Former smoker    PFD (pelvic floor dysfunction)    BMI 40.0-44.9, adult (Multi)    Thrombocytopenia (CMS-HCC)    Gout    BMI 39.0-39.9,adult    Dizziness and giddiness    History of radiofrequency ablation (RFA) procedure for cardiac arrhythmia    Personal history of malignant neoplasm of prostate    Presence of cardiac pacemaker    Myelodysplastic syndrome (Multi)    Dyspnea on exertion    Acute congestive heart failure (Multi)    Elevated d-dimer    Abdominal wall hematoma    Hyperglycemia         ASSESSMENT       83-year-old gentleman seen evaluate today in follow-up after recent hospitalization.     Meds, vitals, examination as noted.         Chart was reviewed in detail including lab, EKG, x-rays, pertinent tests have been completed and pending testing and discussed at length with the patient and his wife.     Impression:  Dyspnea now resolved  Recent urological surgery seemingly doing well  Elevated D-dimer with no evidence of PE by VQ scan  Chronic thoracic aneurysm  Coronary artery disease quiescent  Sinus node dysfunction with permanent pacemaker  History of atrial  arrhythmia  Morbid obesity  Remote radiofrequency ablation  Prostate CA  Myelodysplastic syndrome  Thrombocytopenia  History of normal LV function with no prior significant history of CHF  PLAN     Recommendation:  Continue current meds  See me back in August on scheduled visit  Enter into cardiac rehab if feasible  Follow-up with EP service in pacemaker clinic  May hold Lasix on occasion if renal pressure runs a bit low  Call if any other particular problems issues or concerns arise.

## 2024-04-18 NOTE — PATIENT INSTRUCTIONS
Follow up in 3 months     Continue current medications and therapy for chronic medical conditions.    Patient was advised importance of proper diet/nutrition in addition adequate hydration. Patient was encouraged moderate exercise program to include 30 minutes daily for 5 days of the week or 150 minutes weekly. Patient will follow-up with us as scheduled.    I personally reviewed the [facility] report for this patient. I have considered the risks of abuse, dependence, addiction, and diversion.    UDS: DUE     Reviewed recent labs and diagnostic test results hospitalization    Review post hospitalization medications and discharge instructions    IO A1C and Glucose today    IO Hemoglobin today

## 2024-04-18 NOTE — PROGRESS NOTES
Subjective   Patient ID: GENARO Burgess is a 83 y.o. male who presents for Post Hospitalization.    Patient states was seeing at [facility] on 04/04/2024 and admitted until 04/07/2024. Patient was diagnose with shortness of breath and acute congestive heart failure. Patient states feeling better today.    Patient had done a blood work 04/04/2024-04/07/2024  Patient had done a Transthoracic Echo complete on 04/05/2024  Patient had done a ECG on 04/04/2024  Patient had done a NM lung perfusion on 04/05/2024           Review of Systems   Constitutional: Negative.  Negative for activity change, appetite change, fatigue and fever.   HENT:  Negative for congestion, dental problem, ear discharge, ear pain, mouth sores, rhinorrhea, sinus pressure, sinus pain, sore throat, tinnitus and trouble swallowing.    Eyes:  Negative for photophobia, pain and visual disturbance.   Respiratory:  Negative for cough, chest tightness, shortness of breath and stridor.    Cardiovascular:  Negative for chest pain and palpitations.   Gastrointestinal:  Positive for abdominal distention. Negative for abdominal pain, blood in stool, constipation, diarrhea and rectal pain.   Endocrine: Negative for cold intolerance, heat intolerance, polydipsia, polyphagia and polyuria.   Genitourinary:  Negative for dysuria, flank pain, hematuria and urgency.   Musculoskeletal:  Positive for arthralgias, back pain and gait problem. Negative for myalgias and neck stiffness.   Skin:  Negative for color change and rash.   Allergic/Immunologic: Negative for environmental allergies and food allergies.   Neurological:  Negative for dizziness, seizures, syncope, speech difficulty and headaches.   Hematological:  Negative for adenopathy.   Psychiatric/Behavioral:  Negative for agitation, confusion, decreased concentration, dysphoric mood and sleep disturbance. The patient is not nervous/anxious.        Objective   /68 (BP Location: Right arm, Patient Position: Sitting,  "BP Cuff Size: Large adult)   Pulse 64   Temp 36.2 °C (97.2 °F)   Resp 18   Ht 1.854 m (6' 1\")   Wt 134 kg (296 lb)   SpO2 99%   BMI 39.05 kg/m²     Physical Exam  Vitals reviewed.   Constitutional:       General: He is not in acute distress.     Appearance: He is obese. He is not ill-appearing or diaphoretic.   HENT:      Head: Normocephalic.      Right Ear: Tympanic membrane and external ear normal.      Left Ear: Tympanic membrane and external ear normal.      Nose: Nose normal. No congestion.      Mouth/Throat:      Pharynx: No posterior oropharyngeal erythema.   Eyes:      General:         Right eye: No discharge.         Left eye: No discharge.      Extraocular Movements: Extraocular movements intact.      Conjunctiva/sclera: Conjunctivae normal.      Pupils: Pupils are equal, round, and reactive to light.   Cardiovascular:      Rate and Rhythm: Normal rate and regular rhythm.      Pulses: Normal pulses.      Heart sounds: Normal heart sounds. No murmur heard.  Pulmonary:      Effort: Pulmonary effort is normal. No respiratory distress.      Breath sounds: Normal breath sounds. No wheezing or rales.   Chest:      Chest wall: No tenderness.   Abdominal:      General: Bowel sounds are normal. There is distension.      Palpations: There is no mass.      Tenderness: There is no abdominal tenderness. There is no guarding.   Musculoskeletal:         General: Tenderness present. Normal range of motion.      Cervical back: Normal range of motion and neck supple. No tenderness.      Right lower leg: No edema.      Left lower leg: No edema.   Skin:     General: Skin is dry.      Coloration: Skin is not jaundiced.      Findings: No bruising, erythema or rash.   Neurological:      General: No focal deficit present.      Mental Status: He is alert and oriented to person, place, and time. Mental status is at baseline.      Cranial Nerves: No cranial nerve deficit.      Sensory: No sensory deficit.      Coordination: " Coordination abnormal.      Gait: Gait abnormal.   Psychiatric:         Mood and Affect: Mood normal.         Thought Content: Thought content normal.         Judgment: Judgment normal.         Assessment/Plan   Problem List Items Addressed This Visit             ICD-10-CM    Depression, recurrent (CMS-HCC) F33.9     Stable/monitored         Hypertension I10    Abdominal pain, chronic, right upper quadrant R10.11, G89.29    Relevant Medications    traMADol (Ultram) 50 mg tablet    Incomplete emptying of bladder R33.9    BMI 40.0-44.9, adult (Multi) Z68.41    Hyperglycemia R73.9    Relevant Orders    POCT glycosylated hemoglobin (Hb A1C) manually resulted (Completed)    POCT fingerstick glucose manually resulted (Completed)    Chronic renal disease, stage IV (Multi) N18.4     monitored         Aneurysm of the ascending aorta, ruptured (Multi) I71.11     monitored          Other Visit Diagnoses         Codes    Encounter for examination following treatment at hospital    -  [facility] Z09    Low hemoglobin     D64.9    Relevant Orders    POCT Hemoglobin manually resulted (Completed)    Medication management     Z79.899    Relevant Orders    Drug Screen 9 Panel, Blood with Reflex to Confirmation    Morbid obesity (Multi)     E66.01    Urinary retention     R33.9              Scribe Attestation  By signing my name below, I, LUCAS Mitchell , Scribe   attest that this documentation has been prepared under the direction and in the presence of Giacomo Joseph DO.   Provider Attestation - Scribe documentation    All medical record entries made by the Scribe were at my direction and personally dictated by me. I have reviewed the chart and agree that the record accurately reflects my personal performance of the history, physical exam, discussion and plan.

## 2024-04-19 ENCOUNTER — TELEPHONE (OUTPATIENT)
Dept: HEMATOLOGY/ONCOLOGY | Facility: CLINIC | Age: 84
End: 2024-04-19

## 2024-04-19 ENCOUNTER — PATIENT OUTREACH (OUTPATIENT)
Dept: PRIMARY CARE | Facility: CLINIC | Age: 84
End: 2024-04-19

## 2024-04-19 NOTE — TELEPHONE ENCOUNTER
Spoke with the patient. States he has a request- states his brother has a similar condition as the patient- and the brother is receiving weekly blood transfusions. Asking if his brother can establish care with a provider here and possibly treatment (blood and platelet transfusions) while he is visiting. States brother will be here week of May 6th-May 17th.

## 2024-04-19 NOTE — PROGRESS NOTES
Unable to reach patient for call back after patient's follow up appointment with PCP.   SOCOM with call back number for patient to call if needed   If no voicemail available call attempts x 2 were made to contact the patient to assist with any questions or concerns patient may have.

## 2024-04-24 ENCOUNTER — LAB (OUTPATIENT)
Dept: LAB | Facility: LAB | Age: 84
End: 2024-04-24
Payer: MEDICARE

## 2024-04-24 DIAGNOSIS — I50.9 ACUTE CONGESTIVE HEART FAILURE, UNSPECIFIED HEART FAILURE TYPE (MULTI): ICD-10-CM

## 2024-04-24 DIAGNOSIS — D46.9 MYELODYSPLASTIC SYNDROME (MULTI): ICD-10-CM

## 2024-04-24 LAB
ALBUMIN SERPL BCP-MCNC: 4 G/DL (ref 3.4–5)
ALP SERPL-CCNC: 96 U/L (ref 33–136)
ALT SERPL W P-5'-P-CCNC: 144 U/L (ref 10–52)
ANION GAP SERPL CALC-SCNC: 12 MMOL/L (ref 10–20)
AST SERPL W P-5'-P-CCNC: 47 U/L (ref 9–39)
BASOPHILS # BLD AUTO: 0.02 X10*3/UL (ref 0–0.1)
BASOPHILS NFR BLD AUTO: 0.3 %
BILIRUB SERPL-MCNC: 1.1 MG/DL (ref 0–1.2)
BUN SERPL-MCNC: 77 MG/DL (ref 6–23)
CALCIUM SERPL-MCNC: 8.8 MG/DL (ref 8.6–10.3)
CHLORIDE SERPL-SCNC: 103 MMOL/L (ref 98–107)
CO2 SERPL-SCNC: 25 MMOL/L (ref 21–32)
CREAT SERPL-MCNC: 1.77 MG/DL (ref 0.5–1.3)
EGFRCR SERPLBLD CKD-EPI 2021: 38 ML/MIN/1.73M*2
EOSINOPHIL # BLD AUTO: 0.12 X10*3/UL (ref 0–0.4)
EOSINOPHIL NFR BLD AUTO: 1.9 %
ERYTHROCYTE [DISTWIDTH] IN BLOOD BY AUTOMATED COUNT: 18.9 % (ref 11.5–14.5)
GLUCOSE SERPL-MCNC: 102 MG/DL (ref 74–99)
HCT VFR BLD AUTO: 34.9 % (ref 41–52)
HGB BLD-MCNC: 11 G/DL (ref 13.5–17.5)
IMM GRANULOCYTES # BLD AUTO: 0.15 X10*3/UL (ref 0–0.5)
IMM GRANULOCYTES NFR BLD AUTO: 2.3 % (ref 0–0.9)
LYMPHOCYTES # BLD AUTO: 0.91 X10*3/UL (ref 0.8–3)
LYMPHOCYTES NFR BLD AUTO: 14.2 %
MCH RBC QN AUTO: 31.8 PG (ref 26–34)
MCHC RBC AUTO-ENTMCNC: 31.5 G/DL (ref 32–36)
MCV RBC AUTO: 101 FL (ref 80–100)
MONOCYTES # BLD AUTO: 0.25 X10*3/UL (ref 0.05–0.8)
MONOCYTES NFR BLD AUTO: 3.9 %
NEUTROPHILS # BLD AUTO: 4.95 X10*3/UL (ref 1.6–5.5)
NEUTROPHILS NFR BLD AUTO: 77.4 %
NRBC BLD-RTO: 0 /100 WBCS (ref 0–0)
PLATELET # BLD AUTO: 84 X10*3/UL (ref 150–450)
POTASSIUM SERPL-SCNC: 4.2 MMOL/L (ref 3.5–5.3)
PROT SERPL-MCNC: 6.2 G/DL (ref 6.4–8.2)
RBC # BLD AUTO: 3.46 X10*6/UL (ref 4.5–5.9)
SODIUM SERPL-SCNC: 136 MMOL/L (ref 136–145)
WBC # BLD AUTO: 6.4 X10*3/UL (ref 4.4–11.3)

## 2024-04-24 PROCEDURE — 80053 COMPREHEN METABOLIC PANEL: CPT

## 2024-04-24 PROCEDURE — 85025 COMPLETE CBC W/AUTO DIFF WBC: CPT

## 2024-04-24 PROCEDURE — 36415 COLL VENOUS BLD VENIPUNCTURE: CPT

## 2024-04-26 ENCOUNTER — OFFICE VISIT (OUTPATIENT)
Dept: HEMATOLOGY/ONCOLOGY | Facility: CLINIC | Age: 84
End: 2024-04-26
Payer: MEDICARE

## 2024-04-26 VITALS
HEART RATE: 86 BPM | OXYGEN SATURATION: 100 % | DIASTOLIC BLOOD PRESSURE: 57 MMHG | SYSTOLIC BLOOD PRESSURE: 95 MMHG | BODY MASS INDEX: 39.23 KG/M2 | RESPIRATION RATE: 18 BRPM | WEIGHT: 289.24 LBS | TEMPERATURE: 96.8 F

## 2024-04-26 DIAGNOSIS — I10 PRIMARY HYPERTENSION: ICD-10-CM

## 2024-04-26 DIAGNOSIS — D46.9 MYELODYSPLASTIC SYNDROME (MULTI): Primary | ICD-10-CM

## 2024-04-26 DIAGNOSIS — Z98.61 CAD S/P PERCUTANEOUS CORONARY ANGIOPLASTY: ICD-10-CM

## 2024-04-26 DIAGNOSIS — M1A.9XX0 CHRONIC GOUT WITHOUT TOPHUS, UNSPECIFIED CAUSE, UNSPECIFIED SITE: ICD-10-CM

## 2024-04-26 DIAGNOSIS — I25.10 CAD S/P PERCUTANEOUS CORONARY ANGIOPLASTY: ICD-10-CM

## 2024-04-26 DIAGNOSIS — N39.3 STRESS INCONTINENCE OF URINE: ICD-10-CM

## 2024-04-26 DIAGNOSIS — E78.2 HYPERLIPEMIA, MIXED: ICD-10-CM

## 2024-04-26 PROCEDURE — 1160F RVW MEDS BY RX/DR IN RCRD: CPT | Performed by: INTERNAL MEDICINE

## 2024-04-26 PROCEDURE — 1159F MED LIST DOCD IN RCRD: CPT | Performed by: INTERNAL MEDICINE

## 2024-04-26 PROCEDURE — 99214 OFFICE O/P EST MOD 30 MIN: CPT | Performed by: INTERNAL MEDICINE

## 2024-04-26 PROCEDURE — 3078F DIAST BP <80 MM HG: CPT | Performed by: INTERNAL MEDICINE

## 2024-04-26 PROCEDURE — 1126F AMNT PAIN NOTED NONE PRSNT: CPT | Performed by: INTERNAL MEDICINE

## 2024-04-26 PROCEDURE — 1111F DSCHRG MED/CURRENT MED MERGE: CPT | Performed by: INTERNAL MEDICINE

## 2024-04-26 PROCEDURE — 3074F SYST BP LT 130 MM HG: CPT | Performed by: INTERNAL MEDICINE

## 2024-04-26 ASSESSMENT — PAIN SCALES - GENERAL: PAINLEVEL: 0-NO PAIN

## 2024-04-29 ENCOUNTER — APPOINTMENT (OUTPATIENT)
Dept: PHYSICAL THERAPY | Facility: CLINIC | Age: 84
End: 2024-04-29
Payer: MEDICARE

## 2024-05-01 ENCOUNTER — TELEPHONE (OUTPATIENT)
Dept: CARDIOLOGY | Facility: CLINIC | Age: 84
End: 2024-05-01
Payer: MEDICARE

## 2024-05-01 ASSESSMENT — ENCOUNTER SYMPTOMS
PSYCHIATRIC NEGATIVE: 1
MUSCULOSKELETAL NEGATIVE: 1
NEUROLOGICAL NEGATIVE: 1
CONSTITUTIONAL NEGATIVE: 1
ENDOCRINE NEGATIVE: 1
RESPIRATORY NEGATIVE: 1
HEMATOLOGIC/LYMPHATIC NEGATIVE: 1
EYES NEGATIVE: 1
GASTROINTESTINAL NEGATIVE: 1
CARDIOVASCULAR NEGATIVE: 1

## 2024-05-01 NOTE — TELEPHONE ENCOUNTER
Pt's wife called to let MV know [facility] stopped his chlorthalidone because his blood pressure has been running low. She states he still taking his other BP meds but his readings are still low. They stopped the medication over a week ago.

## 2024-05-01 NOTE — PROGRESS NOTES
Patient ID: GENARO Burgess is a 83 y.o. male.  Referring Physician: No referring provider defined for this encounter.  Primary Care Provider: Giacomo Joseph DO  Visit Type: Follow Up      Subjective    HPI How was my platelet count?    Review of Systems   Constitutional: Negative.    HENT:  Negative.     Eyes: Negative.    Respiratory: Negative.     Cardiovascular: Negative.    Gastrointestinal: Negative.    Endocrine: Negative.    Genitourinary: Negative.     Musculoskeletal: Negative.    Skin: Negative.    Neurological: Negative.    Hematological: Negative.    Psychiatric/Behavioral: Negative.          Objective   BSA: 2.58 meters squared  BP 95/57 (BP Location: Right arm)   Pulse 86   Temp 36 °C (96.8 °F) (Temporal)   Resp 18   Wt 131 kg (289 lb 3.9 oz)   SpO2 100%   BMI 39.23 kg/m²      has a past medical history of Abdominal pain, chronic, right upper quadrant, ACP (advance care planning), Anemia, Aneurysm (CMS-[facility]), Body mass index (BMI) 39.0-39.9, adult (12/06/2021), Body mass index (BMI) 39.0-39.9, adult (07/11/2022), Body mass index (BMI) 39.0-39.9, adult (04/24/2022), Body mass index (BMI) 39.0-39.9, adult (04/24/2022), Body mass index (BMI) 39.0-39.9, adult (04/24/2022), Cramp and spasm (07/19/2022), Difficulty breathing, Encounter for general adult medical examination without abnormal findings (09/25/2020), Encounter for screening for malignant neoplasm of prostate (04/14/2021), Encounter for screening for malignant neoplasm of prostate (09/25/2020), Forearm injury, atrial flutter, Hyperlipidemia, Hypertension, Hyperuricemia, Incontinence, Obesity, unspecified (04/26/2022), Other symptoms and signs involving the musculoskeletal system (10/12/2022), Personal history of other diseases of the circulatory system (09/29/2021), Personal history of other diseases of the circulatory system (10/26/2021), Personal history of other endocrine, nutritional and metabolic disease (12/06/2021), Personal history  "of other specified conditions (09/29/2021), Rotator cuff injury, Sinus node dysfunction (Multi), and Valvular heart disease.   has a past surgical history that includes Other surgical history (05/14/2020); Other surgical history (05/14/2020); Other surgical history (05/14/2020); Other surgical history (05/14/2020); Other surgical history (05/14/2020); Other surgical history (09/29/2021); Other surgical history (09/29/2021); Other surgical history (09/29/2021); Other surgical history (09/29/2021); MR angio chest w and wo IV contrast (09/18/2019); MR angio chest w and wo IV contrast (01/25/2023); MR angio chest w and wo IV contrast (01/25/2023); Cardiac electrophysiology procedure (Left, 11/21/2023); Bone marrow biopsy; Bone marrow biopsy w/ aspiration (01/23/2024); and Cystoscopy (02/20/2024).  Family History   Problem Relation Name Age of Onset    Other (polio) Mother      Heart disease Father      Heart disease Brother      Polymyalgia rheumatica Brother      Other (mitral valve disorder) Brother      Other (Coronary artery bypass graft) Brother      Other (Arterisclerotic cardiovascular disease) Brother       Oncology History    No history exists.       Holden Burgess \"PAT\"  reports that he quit smoking about 55 years ago. His smoking use included cigarettes. He started smoking about 65 years ago. He has a 30 pack-year smoking history. He has never been exposed to tobacco smoke. He has never used smokeless tobacco.  He  reports no history of alcohol use.  He  reports no history of drug use.    Physical Exam  Vitals reviewed.   Constitutional:       Appearance: Normal appearance.   HENT:      Head: Normocephalic.      Mouth/Throat:      Mouth: Mucous membranes are moist.   Eyes:      Extraocular Movements: Extraocular movements intact.      Pupils: Pupils are equal, round, and reactive to light.   Cardiovascular:      Rate and Rhythm: Normal rate and regular rhythm.      Heart sounds: Normal heart sounds. "   Pulmonary:      Breath sounds: Normal breath sounds.   Abdominal:      General: Bowel sounds are normal.      Palpations: Abdomen is soft.   Genitourinary:     Penis: Normal.       Testes: Normal.   Musculoskeletal:         General: Normal range of motion.      Cervical back: Normal range of motion and neck supple.   Skin:     General: Skin is warm.   Neurological:      General: No focal deficit present.      Mental Status: He is alert and oriented to person, place, and time.   Psychiatric:         Mood and Affect: Mood normal.         Behavior: Behavior normal.         WBC   Date/Time Value Ref Range Status   04/24/2024 08:32 AM 6.4 4.4 - 11.3 x10*3/uL Final   04/07/2024 06:50 AM 5.8 4.4 - 11.3 x10*3/uL Final   04/06/2024 10:19 AM 7.4 4.4 - 11.3 x10*3/uL Final     nRBC   Date Value Ref Range Status   04/24/2024 0.0 0.0 - 0.0 /100 WBCs Final   04/07/2024 0.0 0.0 - 0.0 /100 WBCs Final   04/06/2024 0.0 0.0 - 0.0 /100 WBCs Final     RBC   Date Value Ref Range Status   04/24/2024 3.46 (L) 4.50 - 5.90 x10*6/uL Final   04/07/2024 3.07 (L) 4.50 - 5.90 x10*6/uL Final   04/06/2024 3.26 (L) 4.50 - 5.90 x10*6/uL Final     POC Hemoglobin   Date Value Ref Range Status   04/18/2024 10 (A) 13.5 - 17.5 g/dL Final   10/31/2023 11.1 (A) 13.5 - 17.5 g/dL Final   09/26/2023 11.8 (A) 13.5 - 17.5 g/dL Final     Hemoglobin   Date Value Ref Range Status   04/24/2024 11.0 (L) 13.5 - 17.5 g/dL Final   04/07/2024 9.5 (L) 13.5 - 17.5 g/dL Final   04/06/2024 10.2 (L) 13.5 - 17.5 g/dL Final     Hematocrit   Date Value Ref Range Status   04/24/2024 34.9 (L) 41.0 - 52.0 % Final   04/07/2024 30.4 (L) 41.0 - 52.0 % Final   04/06/2024 32.1 (L) 41.0 - 52.0 % Final     MCV   Date/Time Value Ref Range Status   04/24/2024 08:32  (H) 80 - 100 fL Final   04/07/2024 06:50 AM 99 80 - 100 fL Final   04/06/2024 10:19 AM 99 80 - 100 fL Final     MCH   Date/Time Value Ref Range Status   04/24/2024 08:32 AM 31.8 26.0 - 34.0 pg Final   04/07/2024 06:50 AM  30.9 26.0 - 34.0 pg Final   04/06/2024 10:19 AM 31.3 26.0 - 34.0 pg Final     MCHC   Date/Time Value Ref Range Status   04/24/2024 08:32 AM 31.5 (L) 32.0 - 36.0 g/dL Final   04/07/2024 06:50 AM 31.3 (L) 32.0 - 36.0 g/dL Final   04/06/2024 10:19 AM 31.8 (L) 32.0 - 36.0 g/dL Final     RDW   Date/Time Value Ref Range Status   04/24/2024 08:32 AM 18.9 (H) 11.5 - 14.5 % Final   04/07/2024 06:50 AM 18.9 (H) 11.5 - 14.5 % Final   04/06/2024 10:19 AM 19.1 (H) 11.5 - 14.5 % Final     Platelets   Date/Time Value Ref Range Status   04/24/2024 08:32 AM 84 (L) 150 - 450 x10*3/uL Final   04/07/2024 06:50 AM 65 (L) 150 - 450 x10*3/uL Final   04/06/2024 10:19 AM 90 (L) 150 - 450 x10*3/uL Final     MPV   Date/Time Value Ref Range Status   04/03/2024 01:43 PM 11.3 7.5 - 11.5 fL Final     Neutrophils %   Date/Time Value Ref Range Status   04/24/2024 08:32 AM 77.4 40.0 - 80.0 % Final   04/04/2024 07:41 PM 70.1 40.0 - 80.0 % Final   04/03/2024 01:43 PM 75.1 40.0 - 80.0 % Final     Comment:     Not Measured     Immature Granulocytes %, Automated   Date/Time Value Ref Range Status   04/24/2024 08:32 AM 2.3 (H) 0.0 - 0.9 % Final     Comment:     Immature Granulocyte Count (IG) includes promyelocytes, myelocytes and metamyelocytes but does not include bands. Percent differential counts (%) should be interpreted in the context of the absolute cell counts (cells/UL).   04/04/2024 07:41 PM 0.8 0.0 - 0.9 % Final     Comment:     Immature Granulocyte Count (IG) includes promyelocytes, myelocytes and metamyelocytes but does not include bands. Percent differential counts (%) should be interpreted in the context of the absolute cell counts (cells/UL).   04/03/2024 01:43 PM 0.8 0.0 - 0.9 % Final     Comment:     Not Measured  Immature Granulocyte Count (IG) includes promyelocytes, myelocytes and metamyelocytes but does not include bands. Percent differential counts (%) should be interpreted in the context of the absolute cell counts (cells/UL).      Lymphocytes %   Date/Time Value Ref Range Status   04/24/2024 08:32 AM 14.2 13.0 - 44.0 % Final   04/04/2024 07:41 PM 20.0 13.0 - 44.0 % Final   04/03/2024 01:43 PM 15.0 13.0 - 44.0 % Final     Comment:     Not Measured     Monocytes %   Date/Time Value Ref Range Status   04/24/2024 08:32 AM 3.9 2.0 - 10.0 % Final   04/04/2024 07:41 PM 6.6 2.0 - 10.0 % Final   04/03/2024 01:43 PM 5.6 2.0 - 10.0 % Final     Comment:     Not Measured     Eosinophils %   Date/Time Value Ref Range Status   04/24/2024 08:32 AM 1.9 0.0 - 6.0 % Final   04/04/2024 07:41 PM 2.2 0.0 - 6.0 % Final   04/03/2024 01:43 PM 3.3 0.0 - 6.0 % Final     Comment:     Not Measured     Basophils %   Date/Time Value Ref Range Status   04/24/2024 08:32 AM 0.3 0.0 - 2.0 % Final   04/04/2024 07:41 PM 0.3 0.0 - 2.0 % Final   04/03/2024 01:43 PM 0.2 0.0 - 2.0 % Final     Comment:     Not Measured     Neutrophils Absolute   Date/Time Value Ref Range Status   04/24/2024 08:32 AM 4.95 1.60 - 5.50 x10*3/uL Final     Comment:     Percent differential counts (%) should be interpreted in the context of the absolute cell counts (cells/uL).   04/04/2024 07:41 PM 4.38 1.60 - 5.50 x10*3/uL Final     Comment:     Percent differential counts (%) should be interpreted in the context of the absolute cell counts (cells/uL).   04/03/2024 01:43 PM 3.90 1.60 - 5.50 x10*3/uL Final     Comment:     Not Measured  Percent differential counts (%) should be interpreted in the context of the absolute cell counts (cells/uL).     Immature Granulocytes Absolute, Automated   Date/Time Value Ref Range Status   04/24/2024 08:32 AM 0.15 0.00 - 0.50 x10*3/uL Final   04/04/2024 07:41 PM 0.05 0.00 - 0.50 x10*3/uL Final   04/03/2024 01:43 PM 0.04 0.00 - 0.50 x10*3/uL Final     Comment:     Not Measured     Lymphocytes Absolute   Date/Time Value Ref Range Status   04/24/2024 08:32 AM 0.91 0.80 - 3.00 x10*3/uL Final   04/04/2024 07:41 PM 1.25 0.80 - 3.00 x10*3/uL Final   04/03/2024 01:43 PM 0.78  "(L) 0.80 - 3.00 x10*3/uL Final     Comment:     Not Measured     Monocytes Absolute   Date/Time Value Ref Range Status   04/24/2024 08:32 AM 0.25 0.05 - 0.80 x10*3/uL Final   04/04/2024 07:41 PM 0.41 0.05 - 0.80 x10*3/uL Final   04/03/2024 01:43 PM 0.29 0.05 - 0.80 x10*3/uL Final     Comment:     Not Measured     Eosinophils Absolute   Date/Time Value Ref Range Status   04/24/2024 08:32 AM 0.12 0.00 - 0.40 x10*3/uL Final   04/04/2024 07:41 PM 0.14 0.00 - 0.40 x10*3/uL Final   04/03/2024 01:43 PM 0.17 0.00 - 0.40 x10*3/uL Final     Comment:     Not Measured     Basophils Absolute   Date/Time Value Ref Range Status   04/24/2024 08:32 AM 0.02 0.00 - 0.10 x10*3/uL Final   04/04/2024 07:41 PM 0.02 0.00 - 0.10 x10*3/uL Final   04/03/2024 01:43 PM 0.01 0.00 - 0.10 x10*3/uL Final     Comment:     Not Measured       No components found for: \"PT\"  aPTT   Date/Time Value Ref Range Status   06/23/2020 02:38 PM 34 25 - 35 sec Final     Comment:      Note new reference range as of 05/26/2020.    THE APTT IS NO LONGER USED FOR MONITORING     UNFRACTIONATED HEPARIN THERAPY.    FOR MONITORING HEPARIN THERAPY,     USE THE HEPARIN ASSAY.       Medication Documentation Review Audit       Reviewed by Adeola Jiménez MA (Medical Assistant) on 04/26/24 at 1107      Medication Order Taking? Sig Documenting Provider Last Dose Status   acetaminophen (Tylenol) 500 mg tablet [id number] Yes Take 2 tablets (1,000 mg) by mouth every 6 hours if needed for mild pain (1 - 3). Isac Mukherjee MD Taking Active   allopurinol (Zyloprim) 100 mg tablet [id number] Yes Take 1 tablet (100 mg) by mouth 2 times a day. Giacomo Joseph,  Taking Active   ascorbic acid (Vitamin C) 1,000 mg tablet [id number] Yes Take 1 tablet (1,000 mg) by mouth once daily. Historical Provider, MD Taking Active   atorvastatin (Lipitor) 10 mg tablet [id number] Yes Take 1 tablet (10 mg) by mouth once daily at bedtime. Historical Provider, MD Taking Active   calcitriol (Rocaltrol) 0.25 " mcg capsule [id number] Yes Take 1 capsule (0.25 mcg) by mouth every other day. Historical Provider, MD Taking Active   chlorthalidone (Hygroton) 25 mg tablet [id number] No Take 1 tablet (25 mg) by mouth once daily.   Patient not taking: Reported on 4/26/2024    Nicola Brooks MD Not Taking Active   cholecalciferol (Vitamin D-3) 125 MCG (5000 UT) capsule [id number] Yes Take 1 capsule (125 mcg) by mouth once daily. Historical MD Zay Taking Active   cyanocobalamin (Vitamin B-12) 1,000 mcg tablet [id number] Yes Take 1 tablet (1,000 mcg) by mouth once daily. Historical Provider, MD Taking Active   eltrombopag olamine (Promacta) 50 mg tablet [id number] Yes Take 1 tablet (50 mg) by mouth once daily in the morning. Take before meals. Take on an empty stomach, 1 hour before or 2 hours after a meal. Kevin Murphy MD Taking Active   ezetimibe (Zetia) 10 mg tablet [id number] Yes Take 1 tablet (10 mg) by mouth once daily. Giacomo Joseph DO Taking Active   ferrous sulfate 325 (65 Fe) MG tablet [id number] Yes Take 1 tablet by mouth once daily. Lester Collazo MD Taking Active   folic acid (Folvite) 1 mg tablet [id number] Yes Take 1 tablet (1 mg) by mouth once daily. Giacomo Joseph DO Taking Active   furosemide (Lasix) 40 mg tablet [id number] Yes Take 1 tablet (40 mg) by mouth once daily. Martin Howard APRN-CNP Taking Active   latanoprost (Xalatan) 0.005 % ophthalmic solution [id number] Yes Administer 1 drop into affected eye(s) once daily at bedtime. Historical Provider, MD Taking Active   MAGNESIUM ORAL [id number] Yes Take 550 mg by mouth once daily at bedtime. Take 1 tablet 550 mg at night. Lester Provider, MD Taking Active   niacin (Niaspan) 500 mg ER tablet [id number] Yes Take 1 tablet (500 mg) by mouth once daily at bedtime. Historical Provider, MD Taking Active   NON FORMULARY [id number] Yes Take 1 each by mouth once daily. Prevagen Historical MD Zay Taking Active   polyethylene glycol (Glycolax, Miralax) 17  gram packet [id number] Yes Take 17 g by mouth once daily. Isac Mukherjee MD Taking Active   rOPINIRole (Requip) 0.25 mg tablet [id number] Yes Take 1 tablet (0.25 mg) by mouth once daily at bedtime. Giacomo Joseph,  Taking Active   temazepam (Restoril) 15 mg capsule [id number] Yes Take 1 capsule (15 mg) by mouth as needed at bedtime for sleep. Giacomo Joseph,  Taking Active   traMADol (Ultram) 50 mg tablet [id number] Yes Take 1 tablet (50 mg) by mouth every 6 hours if needed for severe pain (7 - 10). Giacomo Joseph, DO Taking Active   TURMERIC ORAL [id number] Yes Take 1 capsule by mouth once daily. Historical Provider, MD Taking Active   valsartan (Diovan) 320 mg tablet [id number] Yes Take 1 tablet (320 mg) by mouth once daily. Nicola Brooks MD Taking Active   zinc sulfate 50 mg zinc (220 mg) tablet [id number] Yes Take by mouth. 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; he completed XRT over 20 yrs ago  -here to review  bmbx done on 1/23/2024 -showed mildly hypercellular bone marrow (80%) with maturing trilineage hematopoiesis and moderate increase in megakaryocytes; small lymphoid aggregates favor benign; marrow shows mild reticulin fibrosis and increased number of megakaryocytes; flow cytometry showed no immunophenotypic evidence of lymphoproliferative disorder and no increased and/or abnormal blast population  -finding of SRSF2 mutation; 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  -CBC done on day of marrow showed wbc 4.0, hgb 13.3, plt 60,000, ANC 2380  -behavior of cytopenias and marrow findings suggest MDS, but specimen itself could not corroborate this, probably secondary to sampling variation  -there is possibility this could be ITP--will give him prednisone trial--will take prednisone 50 mg PO daily for next 3 days--if he  has ITP--there should be dramatic rise in platelet count on Friday--if no response, then he does not have ITP, and so depending on nature of surgical procedure--he should be transfused a unit of platelets if appropriate  -about a week after his last office followup, hematopathologist issued an addendum to his bone marrow path--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  -I then reviewed his case in malignant hematology tumor board--panel consensus was to try promacta vs consider CASE 4919 clinical trial  -reviewed briefly what is entailed in CASE 4919--treatment alternates between decitabine and azacytidine, [place] twice a week, [place] [facility] [place] would need [place] marrow bx done 3 months in then 6 months in--he would for now prefer taking a pill if he can get it  -EQOL-MDS trial used promacta 50 mg daily in patients with low grade MDS and severe thrombocytopenia--leading to significant platelet response that was sustained in at least 42% of patients  -he would like to try promacta  -he has been taking promacta 50 mg daily for the last 2 weeks--reports increased reflux symptoms, although he has had GERD for years  -after his sphincter surgery, he was re-admitted to the hospital ([facility]) for acute urinary retention  -labs done on 4/24/2024 included CBC + COMP  -results reviewed--wbc 6.4, hgb 11, plt 84,000, creatinine 1.77, alk phos 96, AST 47,   -given bump up in LFTs--will continue watching and maintain promacta dosing at 50 mg daily  -will see him again in 6 weeks, or earlier if necessary     2) gout  -on allopurinol     3) hyperlipidemia  -on atorvastatin  -on ezetimibe  -on lasix     4) hypertension  -on chlorthalidone  -on valsartan     5) CAD  -s/p PCI  -has pacemaker in place  -on ASA     6) stress incontinence  -has history of prostate cancer, s/p XRT >20 years ago  -s/p TURP in 6/2023  -on 3/28/2024 he had insertion of inflatable urethral/bladder neck sphincter by Dr Phan--he was  transfused platelets intra-operatively        Problem List Items Addressed This Visit             ICD-10-CM    Myelodysplastic syndrome (Multi) - Primary D46.9    Relevant Orders    Clinic Appointment Request Follow Up; KEVIN MURPHY; [facility]    CBC and Auto Differential    Comprehensive metabolic panel            Kevin Murphy MD

## 2024-05-02 NOTE — TELEPHONE ENCOUNTER
Per Dr. Waylon Benjamin, DO patient to continue holding chlorthalidone and take BP daily and as long as it remains systolic over 100 with no symptoms continue. I called and L/M for patient.

## 2024-05-02 NOTE — TELEPHONE ENCOUNTER
Printed for Dr. Waylon Benjamin, DO review. 110/58 on Monday 110/60 Tuesday and over the weekend it was 86/60 with symptoms of dizziness and weakness.

## 2024-05-03 ENCOUNTER — SPECIALTY PHARMACY (OUTPATIENT)
Dept: PHARMACY | Facility: CLINIC | Age: 84
End: 2024-05-03

## 2024-05-03 PROCEDURE — RXMED WILLOW AMBULATORY MEDICATION CHARGE

## 2024-05-07 ENCOUNTER — PHARMACY VISIT (OUTPATIENT)
Dept: PHARMACY | Facility: CLINIC | Age: 84
End: 2024-05-07
Payer: COMMERCIAL

## 2024-05-08 ENCOUNTER — OFFICE VISIT (OUTPATIENT)
Dept: NEPHROLOGY | Facility: CLINIC | Age: 84
End: 2024-05-08
Payer: MEDICARE

## 2024-05-08 ENCOUNTER — LAB (OUTPATIENT)
Dept: LAB | Facility: CLINIC | Age: 84
End: 2024-05-08
Payer: MEDICARE

## 2024-05-08 VITALS
WEIGHT: 288 LBS | DIASTOLIC BLOOD PRESSURE: 75 MMHG | BODY MASS INDEX: 39.01 KG/M2 | HEART RATE: 82 BPM | SYSTOLIC BLOOD PRESSURE: 125 MMHG | HEIGHT: 72 IN

## 2024-05-08 DIAGNOSIS — N18.32 STAGE 3B CHRONIC KIDNEY DISEASE (MULTI): Primary | ICD-10-CM

## 2024-05-08 DIAGNOSIS — N18.32 STAGE 3B CHRONIC KIDNEY DISEASE (MULTI): ICD-10-CM

## 2024-05-08 DIAGNOSIS — I10 ESSENTIAL HYPERTENSION: ICD-10-CM

## 2024-05-08 LAB
25(OH)D3 SERPL-MCNC: 51 NG/ML (ref 30–100)
ANION GAP SERPL CALC-SCNC: 11 MMOL/L (ref 10–20)
BUN SERPL-MCNC: 60 MG/DL (ref 6–23)
CALCIUM SERPL-MCNC: 9.4 MG/DL (ref 8.6–10.3)
CHLORIDE SERPL-SCNC: 107 MMOL/L (ref 98–107)
CO2 SERPL-SCNC: 23 MMOL/L (ref 21–32)
CREAT SERPL-MCNC: 1.61 MG/DL (ref 0.5–1.3)
EGFRCR SERPLBLD CKD-EPI 2021: 42 ML/MIN/1.73M*2
GLUCOSE SERPL-MCNC: 96 MG/DL (ref 74–99)
POTASSIUM SERPL-SCNC: 4.3 MMOL/L (ref 3.5–5.3)
PTH-INTACT SERPL-MCNC: 74.5 PG/ML (ref 18.5–88)
SODIUM SERPL-SCNC: 137 MMOL/L (ref 136–145)

## 2024-05-08 PROCEDURE — 3078F DIAST BP <80 MM HG: CPT | Performed by: INTERNAL MEDICINE

## 2024-05-08 PROCEDURE — 3074F SYST BP LT 130 MM HG: CPT | Performed by: INTERNAL MEDICINE

## 2024-05-08 PROCEDURE — 1036F TOBACCO NON-USER: CPT | Performed by: INTERNAL MEDICINE

## 2024-05-08 PROCEDURE — 1160F RVW MEDS BY RX/DR IN RCRD: CPT | Performed by: INTERNAL MEDICINE

## 2024-05-08 PROCEDURE — 36415 COLL VENOUS BLD VENIPUNCTURE: CPT

## 2024-05-08 PROCEDURE — 83970 ASSAY OF PARATHORMONE: CPT | Performed by: INTERNAL MEDICINE

## 2024-05-08 PROCEDURE — 80048 BASIC METABOLIC PNL TOTAL CA: CPT

## 2024-05-08 PROCEDURE — 1159F MED LIST DOCD IN RCRD: CPT | Performed by: INTERNAL MEDICINE

## 2024-05-08 PROCEDURE — 82306 VITAMIN D 25 HYDROXY: CPT | Performed by: INTERNAL MEDICINE

## 2024-05-08 PROCEDURE — 99214 OFFICE O/P EST MOD 30 MIN: CPT | Performed by: INTERNAL MEDICINE

## 2024-05-08 NOTE — PROGRESS NOTES
Holden Burgess   83 y.o.    @@  [facility]/Room: [id number]/Room/bed info not found    Subjective:   The patient is being seen for a routine clinic follow-up of chronic kidney disease. Recently, the disease has been stable. Disease complications:  No hyperkalemia, no hypocalcemia, no hyperphosphatemia, no metabolic acidosis, no coagulopathy, no uremic encephalopathy, no neuropathy and no renal osteodystrophy. The patient is currently asymptomatic. No associated symptoms are reported.       Meds:   Current Outpatient Medications   Medication Sig Dispense Refill    acetaminophen (Tylenol) 500 mg tablet Take 2 tablets (1,000 mg) by mouth every 6 hours if needed for mild pain (1 - 3). 30 tablet 0    allopurinol (Zyloprim) 100 mg tablet Take 1 tablet (100 mg) by mouth 2 times a day. 180 tablet 1    ascorbic acid (Vitamin C) 1,000 mg tablet Take 1 tablet (1,000 mg) by mouth once daily.      atorvastatin (Lipitor) 10 mg tablet Take 1 tablet (10 mg) by mouth once daily at bedtime.      calcitriol (Rocaltrol) 0.25 mcg capsule Take 1 capsule (0.25 mcg) by mouth every other day.      cholecalciferol (Vitamin D-3) 125 MCG (5000 UT) capsule Take 1 capsule (125 mcg) by mouth once daily.      cyanocobalamin (Vitamin B-12) 1,000 mcg tablet Take 1 tablet (1,000 mcg) by mouth once daily.      eltrombopag olamine (Promacta) 50 mg tablet Take 1 tablet (50 mg) by mouth once daily in the morning. Take before meals. Take on an empty stomach, 1 hour before or 2 hours after a meal. 30 tablet 2    ezetimibe (Zetia) 10 mg tablet Take 1 tablet (10 mg) by mouth once daily. 90 tablet 0    ferrous sulfate 325 (65 Fe) MG tablet Take 1 tablet by mouth once daily.      folic acid (Folvite) 1 mg tablet Take 1 tablet (1 mg) by mouth once daily. 90 tablet 0    furosemide (Lasix) 40 mg tablet Take 1 tablet (40 mg) by mouth once daily. (Patient taking differently: Take 1 tablet (40 mg) by mouth once daily. prn) 30 tablet 11    latanoprost (Xalatan) 0.005 %  ophthalmic solution Administer 1 drop into affected eye(s) once daily at bedtime.      MAGNESIUM ORAL Take 550 mg by mouth once daily at bedtime. Take 1 tablet 550 mg at night.      niacin (Niaspan) 500 mg ER tablet Take 1 tablet (500 mg) by mouth once daily at bedtime.      rOPINIRole (Requip) 0.25 mg tablet Take 1 tablet (0.25 mg) by mouth once daily at bedtime. 90 tablet 1    temazepam (Restoril) 15 mg capsule Take 1 capsule (15 mg) by mouth as needed at bedtime for sleep. 90 capsule 0    traMADol (Ultram) 50 mg tablet Take 1 tablet (50 mg) by mouth every 6 hours if needed for severe pain (7 - 10). 30 tablet 1    TURMERIC ORAL Take 1 capsule by mouth once daily.      valsartan (Diovan) 320 mg tablet Take 1 tablet (320 mg) by mouth once daily. 90 tablet 3    zinc sulfate 50 mg zinc (220 mg) tablet Take by mouth.      NON FORMULARY Take 1 each by mouth once daily. Prevagen       No current facility-administered medications for this visit.          ROS:  The patient is awake and oriented. No dizziness or lightheadedness. No chills and no fever. No headaches. No nausea and no vomiting. No shortness of breath. No cough. No sputum. No chest pain. No chest tightness. No abdominal pain. No diarrhea and no constipation. No hematemesis or hemoptysis. No hematuria. No rectal bleeding. No melena. No epistaxis. No urinary symptoms. No flank pain. No leg edema. No leg pain. No weakness. No itching. Overall, the rest of the review of systems is also negative.  12 point review of systems otherwise negative as stated in HPI.        Physical Examination:        Vitals:    05/08/24 1039   BP: 125/75   Pulse: 82     General: The patient is awake, oriented, and is not in any distress.  Head and Neck: Normocephalic. No periorbital edema.  Eyes: non-icteric  Respiratory: Symmetric air entry. Symmetric chest expansion.No respiratory distress.  Cardiovascular: Symmetric peripheral pulses.  Skin: No maculopapular rash.  Abdomen: soft,  nt/nd  Musculoskeletal: No peripheral edema in both left and right upper extremities.  No edema in either left or right lower extremities.  Neuro Exam: Speech is fluent. Moves extremities.    Imaging:  === 03/08/23 ===    US RIGHT UPPER QUADRANT    - Impression -  Cholelithiasis, however no specific findings to suggest acute  cholecystitis.    Overall, no acute right upper quadrant pathology is demonstrated.    Hepatomegaly and steatosis are suspected.       Blood Labs:  No results found for this or any previous visit (from the past 24 hour(s)).   Lab Results   Component Value Date    PTH 75.1 01/04/2024    PROTUR 30 (1+) (N) 03/31/2024    PROTUR >=300 (3+) (A) 12/04/2023    PHOS 3.4 04/26/2023      Lab Results   Component Value Date    GLUCOSE 102 (H) 04/24/2024    CALCIUM 8.8 04/24/2024     04/24/2024    K 4.2 04/24/2024    CO2 25 04/24/2024     04/24/2024    BUN 77 (H) 04/24/2024    CREATININE 1.77 (H) 04/24/2024         Assessment and Plan:  #1 chronic kidney disease stage III. Creatinine level is 1.77.  Stable kidney function over the past few days but overall worse kidney function compared to last year.  Normal potassium and bicarb level.  I asked for PTH, phosphorus, and 25-hydroxy vitamin D level.     #2 hypertension. Blood pressure is under control. Continue the current meds.     #3 dyslipidemia. He is on a statin.        I will see him in about 4 months for follow-up.          Nicola Brooks MD  Senior Attending Physician  Director of Onco-Nephrology Program  Division of Nephrology & Hypertension  [facility]

## 2024-05-13 ENCOUNTER — OFFICE VISIT (OUTPATIENT)
Dept: UROLOGY | Facility: CLINIC | Age: 84
End: 2024-05-13
Payer: MEDICARE

## 2024-05-13 ENCOUNTER — APPOINTMENT (OUTPATIENT)
Dept: PHYSICAL THERAPY | Facility: CLINIC | Age: 84
End: 2024-05-13
Payer: MEDICARE

## 2024-05-13 VITALS
HEART RATE: 79 BPM | DIASTOLIC BLOOD PRESSURE: 80 MMHG | HEIGHT: 72 IN | SYSTOLIC BLOOD PRESSURE: 124 MMHG | BODY MASS INDEX: 39.55 KG/M2 | WEIGHT: 292 LBS

## 2024-05-13 DIAGNOSIS — N39.3 SUI (STRESS URINARY INCONTINENCE), MALE: Primary | ICD-10-CM

## 2024-05-13 PROCEDURE — 3074F SYST BP LT 130 MM HG: CPT | Performed by: UROLOGY

## 2024-05-13 PROCEDURE — 99024 POSTOP FOLLOW-UP VISIT: CPT | Performed by: UROLOGY

## 2024-05-13 PROCEDURE — 1160F RVW MEDS BY RX/DR IN RCRD: CPT | Performed by: UROLOGY

## 2024-05-13 PROCEDURE — 3079F DIAST BP 80-89 MM HG: CPT | Performed by: UROLOGY

## 2024-05-13 PROCEDURE — 1159F MED LIST DOCD IN RCRD: CPT | Performed by: UROLOGY

## 2024-05-13 NOTE — PROGRESS NOTES
83-year-old male who had an AUS placed in March of this year.  4.5 cm cuff was used.  Intraoperatively, he received platelet transfusion for thrombocytopenia.    Postoperatively, course was complicated by urinary retention and massive bruising and swelling requiring a visit to the ER.  He eventually had to have a catheter put in for about 72 hours that was then removed in clinic.    He comes back in follow-up.  Swelling has resolved, and he is leaking urine without any issues.  Continues to wear pads.    He looks well in clinic today.  Abdomen is soft.  Incisions are healthy.  There is some induration of the pump and soft freely mobile right now.    I recommended that we wait a few more weeks to try and activate this.  I recommended we do a cystoscopy at the same time to make sure there is not any issues after catheterization that he had.    He is agreeable.

## 2024-05-15 ENCOUNTER — SPECIALTY PHARMACY (OUTPATIENT)
Dept: HEMATOLOGY/ONCOLOGY | Facility: CLINIC | Age: 84
End: 2024-05-15
Payer: MEDICARE

## 2024-05-16 ENCOUNTER — SPECIALTY PHARMACY (OUTPATIENT)
Dept: HEMATOLOGY/ONCOLOGY | Facility: CLINIC | Age: 84
End: 2024-05-16
Payer: MEDICARE

## 2024-05-16 ENCOUNTER — SPECIALTY PHARMACY (OUTPATIENT)
Dept: PHARMACY | Facility: CLINIC | Age: 84
End: 2024-05-16

## 2024-05-23 ENCOUNTER — PATIENT OUTREACH (OUTPATIENT)
Dept: PRIMARY CARE | Facility: CLINIC | Age: 84
End: 2024-05-23
Payer: MEDICARE

## 2024-05-23 NOTE — PROGRESS NOTES
Successful outreach to patient regarding hospitalization as patient continues TCM program.   At time of outreach call the patient feels as if their condition has (improved) since initial visit with PCP or specialist.  Questions or concerns addressed at this time with patient.   Provided contact information to patient if any further non-emergent needs arise.

## 2024-05-28 ENCOUNTER — TELEPHONE (OUTPATIENT)
Dept: PRIMARY CARE | Facility: CLINIC | Age: 84
End: 2024-05-28
Payer: MEDICARE

## 2024-05-28 NOTE — TELEPHONE ENCOUNTER
Wife calling to inform COLETTE pt had a hard time waking up this morning and seemed extra tired/had a fever, she is just wondering if this is a side effect from lasix? Took lasix yesterday because lower extremities were swollen.  Please advise and let wife know if there are any next steps.   [phone]

## 2024-05-28 NOTE — TELEPHONE ENCOUNTER
Spoke with wife. Wife states that symptoms are improving and he is doing well. Denies dyspnea. Fever resolved. No exposure to covid/RSV.     Advised wife if symptoms persist or worsen to let us know. If having any breathing difficulties go to ER.

## 2024-05-29 PROCEDURE — RXMED WILLOW AMBULATORY MEDICATION CHARGE

## 2024-05-30 ENCOUNTER — PHARMACY VISIT (OUTPATIENT)
Dept: PHARMACY | Facility: CLINIC | Age: 84
End: 2024-05-30
Payer: COMMERCIAL

## 2024-06-04 ENCOUNTER — HOSPITAL ENCOUNTER (OUTPATIENT)
Dept: CARDIOLOGY | Facility: HOSPITAL | Age: 84
Discharge: HOME | End: 2024-06-04
Payer: MEDICARE

## 2024-06-04 DIAGNOSIS — Z95.0 PACEMAKER: ICD-10-CM

## 2024-06-04 PROCEDURE — 93294 REM INTERROG EVL PM/LDLS PM: CPT | Performed by: INTERNAL MEDICINE

## 2024-06-04 PROCEDURE — 93296 REM INTERROG EVL PM/IDS: CPT

## 2024-06-05 ENCOUNTER — LAB (OUTPATIENT)
Dept: LAB | Facility: LAB | Age: 84
End: 2024-06-05
Payer: MEDICARE

## 2024-06-05 DIAGNOSIS — D46.9 MYELODYSPLASTIC SYNDROME (MULTI): ICD-10-CM

## 2024-06-05 LAB
ALBUMIN SERPL BCP-MCNC: 4.1 G/DL (ref 3.4–5)
ALP SERPL-CCNC: 85 U/L (ref 33–136)
ALT SERPL W P-5'-P-CCNC: 30 U/L (ref 10–52)
ANION GAP SERPL CALC-SCNC: 10 MMOL/L (ref 10–20)
AST SERPL W P-5'-P-CCNC: 21 U/L (ref 9–39)
BASOPHILS # BLD AUTO: 0.02 X10*3/UL (ref 0–0.1)
BASOPHILS NFR BLD AUTO: 0.7 %
BILIRUB SERPL-MCNC: 1 MG/DL (ref 0–1.2)
BUN SERPL-MCNC: 32 MG/DL (ref 6–23)
CALCIUM SERPL-MCNC: 9.1 MG/DL (ref 8.6–10.3)
CHLORIDE SERPL-SCNC: 110 MMOL/L (ref 98–107)
CO2 SERPL-SCNC: 24 MMOL/L (ref 21–32)
CREAT SERPL-MCNC: 1.39 MG/DL (ref 0.5–1.3)
EGFRCR SERPLBLD CKD-EPI 2021: 50 ML/MIN/1.73M*2
EOSINOPHIL # BLD AUTO: 0.04 X10*3/UL (ref 0–0.4)
EOSINOPHIL NFR BLD AUTO: 1.3 %
ERYTHROCYTE [DISTWIDTH] IN BLOOD BY AUTOMATED COUNT: 19.5 % (ref 11.5–14.5)
GLUCOSE SERPL-MCNC: 98 MG/DL (ref 74–99)
HCT VFR BLD AUTO: 32.1 % (ref 41–52)
HGB BLD-MCNC: 9.8 G/DL (ref 13.5–17.5)
IMM GRANULOCYTES # BLD AUTO: 0.06 X10*3/UL (ref 0–0.5)
IMM GRANULOCYTES NFR BLD AUTO: 2 % (ref 0–0.9)
LYMPHOCYTES # BLD AUTO: 0.88 X10*3/UL (ref 0.8–3)
LYMPHOCYTES NFR BLD AUTO: 28.9 %
MCH RBC QN AUTO: 31.1 PG (ref 26–34)
MCHC RBC AUTO-ENTMCNC: 30.5 G/DL (ref 32–36)
MCV RBC AUTO: 102 FL (ref 80–100)
MONOCYTES # BLD AUTO: 0.17 X10*3/UL (ref 0.05–0.8)
MONOCYTES NFR BLD AUTO: 5.6 %
NEUTROPHILS # BLD AUTO: 1.87 X10*3/UL (ref 1.6–5.5)
NEUTROPHILS NFR BLD AUTO: 61.5 %
NRBC BLD-RTO: 1 /100 WBCS (ref 0–0)
PLATELET # BLD AUTO: 44 X10*3/UL (ref 150–450)
POTASSIUM SERPL-SCNC: 5 MMOL/L (ref 3.5–5.3)
PROT SERPL-MCNC: 5.9 G/DL (ref 6.4–8.2)
RBC # BLD AUTO: 3.15 X10*6/UL (ref 4.5–5.9)
SODIUM SERPL-SCNC: 139 MMOL/L (ref 136–145)
WBC # BLD AUTO: 3 X10*3/UL (ref 4.4–11.3)

## 2024-06-05 PROCEDURE — 36415 COLL VENOUS BLD VENIPUNCTURE: CPT

## 2024-06-05 PROCEDURE — 85025 COMPLETE CBC W/AUTO DIFF WBC: CPT

## 2024-06-05 PROCEDURE — 80053 COMPREHEN METABOLIC PANEL: CPT

## 2024-06-07 ENCOUNTER — OFFICE VISIT (OUTPATIENT)
Dept: HEMATOLOGY/ONCOLOGY | Facility: CLINIC | Age: 84
End: 2024-06-07
Payer: MEDICARE

## 2024-06-07 VITALS
RESPIRATION RATE: 17 BRPM | DIASTOLIC BLOOD PRESSURE: 79 MMHG | OXYGEN SATURATION: 98 % | WEIGHT: 295.86 LBS | BODY MASS INDEX: 40.13 KG/M2 | SYSTOLIC BLOOD PRESSURE: 152 MMHG | TEMPERATURE: 96.8 F | HEART RATE: 89 BPM

## 2024-06-07 DIAGNOSIS — N39.3 STRESS INCONTINENCE OF URINE: ICD-10-CM

## 2024-06-07 DIAGNOSIS — E78.2 HYPERLIPEMIA, MIXED: Primary | ICD-10-CM

## 2024-06-07 DIAGNOSIS — I25.10 CAD S/P PERCUTANEOUS CORONARY ANGIOPLASTY: ICD-10-CM

## 2024-06-07 DIAGNOSIS — D69.6 THROMBOCYTOPENIA (CMS-HCC): ICD-10-CM

## 2024-06-07 DIAGNOSIS — D46.9 MDS (MYELODYSPLASTIC SYNDROME) (MULTI): ICD-10-CM

## 2024-06-07 DIAGNOSIS — Z98.61 CAD S/P PERCUTANEOUS CORONARY ANGIOPLASTY: ICD-10-CM

## 2024-06-07 DIAGNOSIS — D46.9 MYELODYSPLASTIC SYNDROME (MULTI): ICD-10-CM

## 2024-06-07 DIAGNOSIS — I10 PRIMARY HYPERTENSION: ICD-10-CM

## 2024-06-07 PROCEDURE — 3078F DIAST BP <80 MM HG: CPT | Performed by: INTERNAL MEDICINE

## 2024-06-07 PROCEDURE — 99214 OFFICE O/P EST MOD 30 MIN: CPT | Performed by: INTERNAL MEDICINE

## 2024-06-07 PROCEDURE — 3077F SYST BP >= 140 MM HG: CPT | Performed by: INTERNAL MEDICINE

## 2024-06-07 PROCEDURE — 1159F MED LIST DOCD IN RCRD: CPT | Performed by: INTERNAL MEDICINE

## 2024-06-07 PROCEDURE — 1126F AMNT PAIN NOTED NONE PRSNT: CPT | Performed by: INTERNAL MEDICINE

## 2024-06-07 ASSESSMENT — PAIN SCALES - GENERAL: PAINLEVEL: 0-NO PAIN

## 2024-06-07 NOTE — PATIENT INSTRUCTIONS
Increase the promacta to 100 mg ( 50 mg x 2) each day    You will have your bloodwork rechecked on June 21.    I will see you again in early July

## 2024-06-07 NOTE — PROGRESS NOTES
Holden Burgess is a 83 y.o. year old male patient who had a patient care encounter with Dr. 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  Pat is currently being treated with off-label eltrombopag.    Results from last 7 days   Lab Units 06/05/24  0833   WBC AUTO x10*3/uL 3.0*   HEMOGLOBIN g/dL 9.8*   HEMATOCRIT % 32.1*   PLATELETS AUTO x10*3/uL 44*   NEUTROS PCT AUTO % 61.5   LYMPHS PCT AUTO % 28.9   MONOS PCT AUTO % 5.6   EOS PCT AUTO % 1.3      Results from last 7 days   Lab Units 06/05/24  0833   SODIUM mmol/L 139   POTASSIUM mmol/L 5.0   CHLORIDE mmol/L 110*   CO2 mmol/L 24   BUN mg/dL 32*   CREATININE mg/dL 1.39*   CALCIUM mg/dL 9.1   PROTEIN TOTAL g/dL 5.9*   BILIRUBIN TOTAL mg/dL 1.0   ALK PHOS U/L 85   ALT U/L 30   AST U/L 21   GLUCOSE mg/dL 98      Given that PLTs continue to be lower around 44 K, and that CO has upcoming surgical procedures, Dr. Murphy discussed use of eltrombopag for MDS at Tumor Board yesterday.  Wants to incr dosing to 100 mg (2 x 50 mg) PO once daily.      Meanwhile, Rachelle just received a new dispense of eltrombopag 50 mg PO once daily on 5/30/24, and he will use this supply at the new dose of 100 mg (2 x 50 mg) PO once daily.    As such, per collaborative practice agreement with Dr. Murphy, on 6/7/24 I pended a new prescription for eltrombopag 100 mg (2 x 50 mg) PO once daily, 30-day cycle.  Qty # 60 with 3 refills.  The prescription was sent to Dr. Murphy for approval, pending subsequent electronic transmission to  [facility].    Next FUV is 8/13/24.      Eamon Haskins, [facility], MS, BCOP  Clinical Pharmacist Specialist - Ambulatory Oncology

## 2024-06-08 PROBLEM — D46.9 MDS (MYELODYSPLASTIC SYNDROME) (MULTI): Status: ACTIVE | Noted: 2024-06-08

## 2024-06-08 ASSESSMENT — ENCOUNTER SYMPTOMS
CONSTITUTIONAL NEGATIVE: 1
ENDOCRINE NEGATIVE: 1
PSYCHIATRIC NEGATIVE: 1
EYES NEGATIVE: 1
CARDIOVASCULAR NEGATIVE: 1
MUSCULOSKELETAL NEGATIVE: 1
HEMATOLOGIC/LYMPHATIC NEGATIVE: 1
NEUROLOGICAL NEGATIVE: 1
GASTROINTESTINAL NEGATIVE: 1
RESPIRATORY NEGATIVE: 1

## 2024-06-08 NOTE — PROGRESS NOTES
Patient ID: GENARO Brugess is a 83 y.o. male.  Referring Physician: Kevin Murphy MD  [address]  [place] [address]  Primary Care Provider: Giacomo Joseph DO  Visit Type: Follow Up      Subjective    HPI How is my platelet count?    Review of Systems   Constitutional: Negative.    HENT:  Negative.     Eyes: Negative.    Respiratory: Negative.     Cardiovascular: Negative.    Gastrointestinal: Negative.    Endocrine: Negative.    Genitourinary: Negative.     Musculoskeletal: Negative.    Skin: Negative.    Neurological: Negative.    Hematological: Negative.    Psychiatric/Behavioral: Negative.          Objective   BSA: 2.61 meters squared  /79 (BP Location: Left arm, Patient Position: Sitting, BP Cuff Size: Adult)   Pulse 89   Temp 36 °C (96.8 °F) (Temporal)   Resp 17   Wt 134 kg (295 lb 13.7 oz)   SpO2 98%   BMI 40.13 kg/m²      has a past medical history of Abdominal pain, chronic, right upper quadrant, ACP (advance care planning), Anemia, Aneurysm (CMS-[facility]), Body mass index (BMI) 39.0-39.9, adult (12/06/2021), Body mass index (BMI) 39.0-39.9, adult (07/11/2022), Body mass index (BMI) 39.0-39.9, adult (04/24/2022), Body mass index (BMI) 39.0-39.9, adult (04/24/2022), Body mass index (BMI) 39.0-39.9, adult (04/24/2022), Cramp and spasm (07/19/2022), Difficulty breathing, Encounter for general adult medical examination without abnormal findings (09/25/2020), Encounter for screening for malignant neoplasm of prostate (04/14/2021), Encounter for screening for malignant neoplasm of prostate (09/25/2020), Forearm injury, atrial flutter, Hyperlipidemia, Hypertension, Hyperuricemia, Incontinence, Obesity, unspecified (04/26/2022), Other symptoms and signs involving the musculoskeletal system (10/12/2022), Personal history of other diseases of the circulatory system (09/29/2021), Personal history of other diseases of the circulatory system (10/26/2021), Personal history of other  "endocrine, nutritional and metabolic disease (12/06/2021), Personal history of other specified conditions (09/29/2021), Rotator cuff injury, Sinus node dysfunction (Multi), and Valvular heart disease.   has a past surgical history that includes Other surgical history (05/14/2020); Other surgical history (05/14/2020); Other surgical history (05/14/2020); Other surgical history (05/14/2020); Other surgical history (05/14/2020); Other surgical history (09/29/2021); Other surgical history (09/29/2021); Other surgical history (09/29/2021); Other surgical history (09/29/2021); MR angio chest w and wo IV contrast (09/18/2019); MR angio chest w and wo IV contrast (01/25/2023); MR angio chest w and wo IV contrast (01/25/2023); Cardiac electrophysiology procedure (Left, 11/21/2023); Bone marrow biopsy; Bone marrow biopsy w/ aspiration (01/23/2024); Cystoscopy (02/20/2024); and Urinary sphincter implant (03/28/2024).  Family History   Problem Relation Name Age of Onset    Other (polio) Mother      Heart disease Father      Heart disease Brother      Polymyalgia rheumatica Brother      Other (mitral valve disorder) Brother      Other (Coronary artery bypass graft) Brother      Other (Arterisclerotic cardiovascular disease) Brother       Oncology History    No history exists.       Holden Burgess \"PAT\"  reports that he quit smoking about 55 years ago. His smoking use included cigarettes. He started smoking about 65 years ago. He has a 30 pack-year smoking history. He has never been exposed to tobacco smoke. He has never used smokeless tobacco.  He  reports no history of alcohol use.  He  reports no history of drug use.    Physical Exam  Vitals reviewed.   Constitutional:       Appearance: Normal appearance.   HENT:      Head: Normocephalic.      Mouth/Throat:      Mouth: Mucous membranes are moist.   Eyes:      Extraocular Movements: Extraocular movements intact.      Pupils: Pupils are equal, round, and reactive to light. "   Cardiovascular:      Rate and Rhythm: Normal rate and regular rhythm.      Heart sounds: Normal heart sounds.   Pulmonary:      Breath sounds: Normal breath sounds.   Abdominal:      General: Bowel sounds are normal.      Palpations: Abdomen is soft.   Musculoskeletal:         General: Normal range of motion.      Cervical back: Normal range of motion and neck supple.   Skin:     General: Skin is warm and dry.   Neurological:      General: No focal deficit present.      Mental Status: He is alert and oriented to person, place, and time.   Psychiatric:         Mood and Affect: Mood normal.         Behavior: Behavior normal.         WBC   Date/Time Value Ref Range Status   06/05/2024 08:33 AM 3.0 (L) 4.4 - 11.3 x10*3/uL Final   04/24/2024 08:32 AM 6.4 4.4 - 11.3 x10*3/uL Final   04/07/2024 06:50 AM 5.8 4.4 - 11.3 x10*3/uL Final     nRBC   Date Value Ref Range Status   06/05/2024 1.0 (H) 0.0 - 0.0 /100 WBCs Final   04/24/2024 0.0 0.0 - 0.0 /100 WBCs Final   04/07/2024 0.0 0.0 - 0.0 /100 WBCs Final     RBC   Date Value Ref Range Status   06/05/2024 3.15 (L) 4.50 - 5.90 x10*6/uL Final   04/24/2024 3.46 (L) 4.50 - 5.90 x10*6/uL Final   04/07/2024 3.07 (L) 4.50 - 5.90 x10*6/uL Final     POC Hemoglobin   Date Value Ref Range Status   04/18/2024 10 (A) 13.5 - 17.5 g/dL Final   10/31/2023 11.1 (A) 13.5 - 17.5 g/dL Final   09/26/2023 11.8 (A) 13.5 - 17.5 g/dL Final     Hemoglobin   Date Value Ref Range Status   06/05/2024 9.8 (L) 13.5 - 17.5 g/dL Final   04/24/2024 11.0 (L) 13.5 - 17.5 g/dL Final   04/07/2024 9.5 (L) 13.5 - 17.5 g/dL Final     Hematocrit   Date Value Ref Range Status   06/05/2024 32.1 (L) 41.0 - 52.0 % Final   04/24/2024 34.9 (L) 41.0 - 52.0 % Final   04/07/2024 30.4 (L) 41.0 - 52.0 % Final     MCV   Date/Time Value Ref Range Status   06/05/2024 08:33  (H) 80 - 100 fL Final   04/24/2024 08:32  (H) 80 - 100 fL Final   04/07/2024 06:50 AM 99 80 - 100 fL Final     MCH   Date/Time Value Ref Range  Status   06/05/2024 08:33 AM 31.1 26.0 - 34.0 pg Final   04/24/2024 08:32 AM 31.8 26.0 - 34.0 pg Final   04/07/2024 06:50 AM 30.9 26.0 - 34.0 pg Final     MCHC   Date/Time Value Ref Range Status   06/05/2024 08:33 AM 30.5 (L) 32.0 - 36.0 g/dL Final   04/24/2024 08:32 AM 31.5 (L) 32.0 - 36.0 g/dL Final   04/07/2024 06:50 AM 31.3 (L) 32.0 - 36.0 g/dL Final     RDW   Date/Time Value Ref Range Status   06/05/2024 08:33 AM 19.5 (H) 11.5 - 14.5 % Final   04/24/2024 08:32 AM 18.9 (H) 11.5 - 14.5 % Final   04/07/2024 06:50 AM 18.9 (H) 11.5 - 14.5 % Final     Platelets   Date/Time Value Ref Range Status   06/05/2024 08:33 AM 44 (L) 150 - 450 x10*3/uL Final   04/24/2024 08:32 AM 84 (L) 150 - 450 x10*3/uL Final   04/07/2024 06:50 AM 65 (L) 150 - 450 x10*3/uL Final     MPV   Date/Time Value Ref Range Status   04/03/2024 01:43 PM 11.3 7.5 - 11.5 fL Final     Neutrophils %   Date/Time Value Ref Range Status   06/05/2024 08:33 AM 61.5 40.0 - 80.0 % Final   04/24/2024 08:32 AM 77.4 40.0 - 80.0 % Final   04/04/2024 07:41 PM 70.1 40.0 - 80.0 % Final     Immature Granulocytes %, Automated   Date/Time Value Ref Range Status   06/05/2024 08:33 AM 2.0 (H) 0.0 - 0.9 % Final     Comment:     Immature Granulocyte Count (IG) includes promyelocytes, myelocytes and metamyelocytes but does not include bands. Percent differential counts (%) should be interpreted in the context of the absolute cell counts (cells/UL).   04/24/2024 08:32 AM 2.3 (H) 0.0 - 0.9 % Final     Comment:     Immature Granulocyte Count (IG) includes promyelocytes, myelocytes and metamyelocytes but does not include bands. Percent differential counts (%) should be interpreted in the context of the absolute cell counts (cells/UL).   04/04/2024 07:41 PM 0.8 0.0 - 0.9 % Final     Comment:     Immature Granulocyte Count (IG) includes promyelocytes, myelocytes and metamyelocytes but does not include bands. Percent differential counts (%) should be interpreted in the context of the  absolute cell counts (cells/UL).     Lymphocytes %   Date/Time Value Ref Range Status   06/05/2024 08:33 AM 28.9 13.0 - 44.0 % Final   04/24/2024 08:32 AM 14.2 13.0 - 44.0 % Final   04/04/2024 07:41 PM 20.0 13.0 - 44.0 % Final     Monocytes %   Date/Time Value Ref Range Status   06/05/2024 08:33 AM 5.6 2.0 - 10.0 % Final   04/24/2024 08:32 AM 3.9 2.0 - 10.0 % Final   04/04/2024 07:41 PM 6.6 2.0 - 10.0 % Final     Eosinophils %   Date/Time Value Ref Range Status   06/05/2024 08:33 AM 1.3 0.0 - 6.0 % Final   04/24/2024 08:32 AM 1.9 0.0 - 6.0 % Final   04/04/2024 07:41 PM 2.2 0.0 - 6.0 % Final     Basophils %   Date/Time Value Ref Range Status   06/05/2024 08:33 AM 0.7 0.0 - 2.0 % Final   04/24/2024 08:32 AM 0.3 0.0 - 2.0 % Final   04/04/2024 07:41 PM 0.3 0.0 - 2.0 % Final     Neutrophils Absolute   Date/Time Value Ref Range Status   06/05/2024 08:33 AM 1.87 1.60 - 5.50 x10*3/uL Final     Comment:     Percent differential counts (%) should be interpreted in the context of the absolute cell counts (cells/uL).   04/24/2024 08:32 AM 4.95 1.60 - 5.50 x10*3/uL Final     Comment:     Percent differential counts (%) should be interpreted in the context of the absolute cell counts (cells/uL).   04/04/2024 07:41 PM 4.38 1.60 - 5.50 x10*3/uL Final     Comment:     Percent differential counts (%) should be interpreted in the context of the absolute cell counts (cells/uL).     Immature Granulocytes Absolute, Automated   Date/Time Value Ref Range Status   06/05/2024 08:33 AM 0.06 0.00 - 0.50 x10*3/uL Final   04/24/2024 08:32 AM 0.15 0.00 - 0.50 x10*3/uL Final   04/04/2024 07:41 PM 0.05 0.00 - 0.50 x10*3/uL Final     Lymphocytes Absolute   Date/Time Value Ref Range Status   06/05/2024 08:33 AM 0.88 0.80 - 3.00 x10*3/uL Final   04/24/2024 08:32 AM 0.91 0.80 - 3.00 x10*3/uL Final   04/04/2024 07:41 PM 1.25 0.80 - 3.00 x10*3/uL Final     Monocytes Absolute   Date/Time Value Ref Range Status   06/05/2024 08:33 AM 0.17 0.05 - 0.80  "x10*3/uL Final   04/24/2024 08:32 AM 0.25 0.05 - 0.80 x10*3/uL Final   04/04/2024 07:41 PM 0.41 0.05 - 0.80 x10*3/uL Final     Eosinophils Absolute   Date/Time Value Ref Range Status   06/05/2024 08:33 AM 0.04 0.00 - 0.40 x10*3/uL Final   04/24/2024 08:32 AM 0.12 0.00 - 0.40 x10*3/uL Final   04/04/2024 07:41 PM 0.14 0.00 - 0.40 x10*3/uL Final     Basophils Absolute   Date/Time Value Ref Range Status   06/05/2024 08:33 AM 0.02 0.00 - 0.10 x10*3/uL Final   04/24/2024 08:32 AM 0.02 0.00 - 0.10 x10*3/uL Final   04/04/2024 07:41 PM 0.02 0.00 - 0.10 x10*3/uL Final       No components found for: \"PT\"  aPTT   Date/Time Value Ref Range Status   06/23/2020 02:38 PM 34 25 - 35 sec Final     Comment:      Note new reference range as of 05/26/2020.    THE APTT IS NO LONGER USED FOR MONITORING     UNFRACTIONATED HEPARIN THERAPY.    FOR MONITORING HEPARIN THERAPY,     USE THE HEPARIN ASSAY.       Medication Documentation Review Audit       Reviewed by Velia Guo MA (Medical Assistant) on 06/07/24 at 1108      Medication Order Taking? Sig Documenting Provider Last Dose Status   acetaminophen (Tylenol) 500 mg tablet [id number] Yes Take 2 tablets (1,000 mg) by mouth every 6 hours if needed for mild pain (1 - 3). Isac Mukherjee MD Taking Active   allopurinol (Zyloprim) 100 mg tablet [id number] Yes Take 1 tablet (100 mg) by mouth 2 times a day. Giacomo Joseph DO Taking Active   ascorbic acid (Vitamin C) 1,000 mg tablet [id number] Yes Take 1 tablet (1,000 mg) by mouth once daily. Historical Provider, MD Taking Active   atorvastatin (Lipitor) 10 mg tablet [id number] Yes Take 1 tablet (10 mg) by mouth once daily at bedtime. Historical Provider, MD Taking Active   calcitriol (Rocaltrol) 0.25 mcg capsule [id number] Yes Take 1 capsule (0.25 mcg) by mouth every other day. Historical Provider, MD Taking Active   cholecalciferol (Vitamin D-3) 125 MCG (5000 UT) capsule [id number] Yes Take 1 capsule (125 mcg) by mouth once daily. Historical " Provider, MD Taking Active   cyanocobalamin (Vitamin B-12) 1,000 mcg tablet [id number] Yes Take 1 tablet (1,000 mcg) by mouth once daily. Historical Provider, MD Taking Active   eltrombopag olamine (Promacta) 50 mg tablet [id number] Yes Take 1 tablet (50 mg) by mouth once daily in the morning. Take before meals. Take on an empty stomach, 1 hour before or 2 hours after a meal. Kevin Murphy MD Taking Active   ezetimibe (Zetia) 10 mg tablet [id number] Yes Take 1 tablet (10 mg) by mouth once daily. Giacomo Joseph DO Taking Active   ferrous sulfate 325 (65 Fe) MG tablet [id number] Yes Take 1 tablet by mouth once daily. Historical Provider, MD Taking Active   folic acid (Folvite) 1 mg tablet [id number] Yes Take 1 tablet (1 mg) by mouth once daily. Giacomo Joseph DO Taking Active   furosemide (Lasix) 40 mg tablet [id number] Yes Take 1 tablet (40 mg) by mouth once daily.   Patient taking differently: Take 1 tablet (40 mg) by mouth once daily. prn    MICHELE Leggett-CNP Taking Active   latanoprost (Xalatan) 0.005 % ophthalmic solution [id number] Yes Administer 1 drop into affected eye(s) once daily at bedtime. Historical Provider, MD Taking Active   MAGNESIUM ORAL [id number] Yes Take 550 mg by mouth once daily at bedtime. Take 1 tablet 550 mg at night. Historical Provider, MD Taking Active   niacin (Niaspan) 500 mg ER tablet [id number] Yes Take 1 tablet (500 mg) by mouth once daily at bedtime. Historical Provider, MD Taking Active   NON FORMULARY [id number] Yes Take 1 each by mouth once daily. Prevagen Lester Provider, MD Taking Active   rOPINIRole (Requip) 0.25 mg tablet [id number] Yes Take 1 tablet (0.25 mg) by mouth once daily at bedtime. Giacomo Joseph DO Taking Active   temazepam (Restoril) 15 mg capsule [id number] Yes Take 1 capsule (15 mg) by mouth as needed at bedtime for sleep. Giacomo Joseph DO Taking Active   traMADol (Ultram) 50 mg tablet [id number] Yes Take 1 tablet (50 mg) by mouth every 6 hours if  needed for severe pain (7 - 10). Giacomo Joseph,  Taking Active   TURMERIC ORAL [id number] Yes Take 1 capsule by mouth once daily. Historical Provider, MD Taking Active   valsartan (Diovan) 320 mg tablet [id number] Yes Take 1 tablet (320 mg) by mouth once daily. Nicola Brooks MD Taking Active   zinc sulfate 50 mg zinc (220 mg) tablet [id number] Yes Take by mouth. 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; he completed XRT over 20 yrs ago  -here to review  bmbx done on 1/23/2024 -showed mildly hypercellular bone marrow (80%) with maturing trilineage hematopoiesis and moderate increase in megakaryocytes; small lymphoid aggregates favor benign; marrow shows mild reticulin fibrosis and increased number of megakaryocytes; flow cytometry showed no immunophenotypic evidence of lymphoproliferative disorder and no increased and/or abnormal blast population  -finding of SRSF2 mutation; 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  -CBC done on day of marrow showed wbc 4.0, hgb 13.3, plt 60,000, ANC 2380  -behavior of cytopenias and marrow findings suggest MDS, but specimen itself could not corroborate this, probably secondary to sampling variation  -there is possibility this could be ITP--will give him prednisone trial--will take prednisone 50 mg PO daily for next 3 days--if he has ITP--there should be dramatic rise in platelet count on Friday--if no response, then he does not have ITP, and so depending on nature of surgical procedure--he should be transfused a unit of platelets if appropriate  -about a week after his last office followup, hematopathologist issued an addendum to his bone marrow path--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  -I then reviewed his case in malignant hematology tumor board--panel consensus was to try promacta vs consider CASE  4919 clinical trial  -reviewed briefly what is entailed in CASE 4919--treatment alternates between decitabine and azacytidine, shots are administered twice a week, available only at [facility], and he would need bone marrow bx done 3 months in then 6 months in--he would for now prefer taking a pill if he can get it  -EQOL-MDS trial used promacta 50 mg daily in patients with low grade MDS and severe thrombocytopenia--leading to significant platelet response that was sustained in at least 42% of patients; dose can be titrated up to 150 mg daily if necessary  -he would like to try promacta  -he has been taking promacta 50 mg daily  -labs done on 6/5/2024 included CBC + COMP  -results reviewed--wbc 3.0, hgb 9.8, plt 44,000, creatinine 1.39, alk phos 85, AST 21, ALT 30  -will increase promacta to 100 mg daily  -will recheck his labs in 2 weeks and see him again in 4 weeks     2) gout  -on allopurinol     3) hyperlipidemia  -on atorvastatin  -on ezetimibe  -on lasix     4) hypertension  -on chlorthalidone  -on valsartan     5) CAD  -s/p PCI  -has pacemaker in place  -on ASA     6) stress incontinence  -has history of prostate cancer, s/p XRT >20 years ago  -s/p TURP in 6/2023  -on 3/28/2024 he had insertion of inflatable urethral/bladder neck sphincter by Dr Phan--he was transfused platelets intra-operatively     Problem List Items Addressed This Visit             ICD-10-CM    Thrombocytopenia (CMS-HCC) D69.6    Relevant Medications    eltrombopag olamine (Promacta) 50 mg tablet    Myelodysplastic syndrome (Multi) D46.9    Relevant Medications    eltrombopag olamine (Promacta) 50 mg tablet    Other Relevant Orders    CBC and Auto Differential    Comprehensive Metabolic Panel    Clinic Appointment Request Follow Up; [facility]UD[facility]; [facility]    CBC and Auto Differential    Comprehensive metabolic panel     Other Visit Diagnoses         Codes    MDS (myelodysplastic syndrome) (Multi)     D46.9    Relevant Medications     eltrombopag olamine (Promacta) 50 mg tablet                 Kevin Murphy MD

## 2024-06-10 PROCEDURE — RXMED WILLOW AMBULATORY MEDICATION CHARGE

## 2024-06-17 ENCOUNTER — APPOINTMENT (OUTPATIENT)
Dept: UROLOGY | Facility: CLINIC | Age: 84
End: 2024-06-17
Payer: MEDICARE

## 2024-06-17 VITALS
TEMPERATURE: 96.8 F | SYSTOLIC BLOOD PRESSURE: 137 MMHG | HEIGHT: 72 IN | WEIGHT: 275 LBS | BODY MASS INDEX: 37.25 KG/M2 | HEART RATE: 82 BPM | DIASTOLIC BLOOD PRESSURE: 74 MMHG

## 2024-06-17 DIAGNOSIS — N39.3 STRESS INCONTINENCE OF URINE: Primary | ICD-10-CM

## 2024-06-17 LAB
POC APPEARANCE, URINE: CLEAR
POC BILIRUBIN, URINE: NEGATIVE
POC BLOOD, URINE: ABNORMAL
POC COLOR, URINE: YELLOW
POC GLUCOSE, URINE: NEGATIVE MG/DL
POC KETONES, URINE: NEGATIVE MG/DL
POC LEUKOCYTES, URINE: ABNORMAL
POC NITRITE,URINE: NEGATIVE
POC PH, URINE: 6 PH
POC PROTEIN, URINE: ABNORMAL MG/DL
POC SPECIFIC GRAVITY, URINE: 1.02
POC UROBILINOGEN, URINE: 0.2 EU/DL

## 2024-06-17 PROCEDURE — 99024 POSTOP FOLLOW-UP VISIT: CPT | Performed by: UROLOGY

## 2024-06-17 PROCEDURE — 81002 URINALYSIS NONAUTO W/O SCOPE: CPT | Performed by: UROLOGY

## 2024-06-17 PROCEDURE — 52000 CYSTOURETHROSCOPY: CPT | Performed by: UROLOGY

## 2024-06-17 NOTE — PROGRESS NOTES
83-year-old male who had an artificial urinary sphincter placed a few months ago.  Because of his low platelets, he had a large hematoma, and had to have a temporary catheter placed postoperatively due to retention.  He comes back in follow-up.  He has been doing well.  He has been leaking urine continuously.    Because of his catheterization, I recommended that we do a scope today to make sure there is no cuff erosion.  He was agreeable.  He was prepped and draped in standard fashion.  Informed consent was obtained.  Flexible cystoscope was advanced per urethra.  Pendulous urethra was normal.  In the mid bulbar urethra, the AUS cuff was noted.  This was in an open position.  There was no erosion.  Proximal to this the rest of the urethra was normal.  I did not go into the bladder.  Scope was removed.  The AUS was activated, and the patient was taught how to use it.    I recommended they do timed voiding for a few weeks.  Recommended he see me back in about 6 weeks

## 2024-06-21 ENCOUNTER — SPECIALTY PHARMACY (OUTPATIENT)
Dept: PHARMACY | Facility: CLINIC | Age: 84
End: 2024-06-21

## 2024-06-21 ENCOUNTER — PHARMACY VISIT (OUTPATIENT)
Dept: PHARMACY | Facility: CLINIC | Age: 84
End: 2024-06-21
Payer: COMMERCIAL

## 2024-06-21 ENCOUNTER — LAB (OUTPATIENT)
Dept: LAB | Facility: CLINIC | Age: 84
End: 2024-06-21
Payer: MEDICARE

## 2024-06-21 DIAGNOSIS — D46.9 MYELODYSPLASTIC SYNDROME (MULTI): ICD-10-CM

## 2024-06-21 LAB
ALBUMIN SERPL BCP-MCNC: 4 G/DL (ref 3.4–5)
ALP SERPL-CCNC: 83 U/L (ref 33–136)
ALT SERPL W P-5'-P-CCNC: 19 U/L (ref 10–52)
ANION GAP SERPL CALC-SCNC: 12 MMOL/L (ref 10–20)
AST SERPL W P-5'-P-CCNC: 18 U/L (ref 9–39)
BASOPHILS # BLD MANUAL: 0 X10*3/UL (ref 0–0.1)
BASOPHILS NFR BLD MANUAL: 0 %
BILIRUB SERPL-MCNC: 1.2 MG/DL (ref 0–1.2)
BUN SERPL-MCNC: 35 MG/DL (ref 6–23)
CALCIUM SERPL-MCNC: 9.4 MG/DL (ref 8.6–10.3)
CHLORIDE SERPL-SCNC: 110 MMOL/L (ref 98–107)
CO2 SERPL-SCNC: 22 MMOL/L (ref 21–32)
CREAT SERPL-MCNC: 1.38 MG/DL (ref 0.5–1.3)
EGFRCR SERPLBLD CKD-EPI 2021: 51 ML/MIN/1.73M*2
EOSINOPHIL # BLD MANUAL: 0.03 X10*3/UL (ref 0–0.4)
EOSINOPHIL NFR BLD MANUAL: 1 %
ERYTHROCYTE [DISTWIDTH] IN BLOOD BY AUTOMATED COUNT: 19.7 % (ref 11.5–14.5)
GIANT PLATELETS BLD QL SMEAR: ABNORMAL
GLUCOSE SERPL-MCNC: 102 MG/DL (ref 74–99)
HCT VFR BLD AUTO: 28.1 % (ref 41–52)
HGB BLD-MCNC: 9 G/DL (ref 13.5–17.5)
IMM GRANULOCYTES # BLD AUTO: 0.04 X10*3/UL (ref 0–0.5)
IMM GRANULOCYTES NFR BLD AUTO: 1.6 % (ref 0–0.9)
LYMPHOCYTES # BLD MANUAL: 0.55 X10*3/UL (ref 0.8–3)
LYMPHOCYTES NFR BLD MANUAL: 21 %
MCH RBC QN AUTO: 30.8 PG (ref 26–34)
MCHC RBC AUTO-ENTMCNC: 32 G/DL (ref 32–36)
MCV RBC AUTO: 96 FL (ref 80–100)
MONOCYTES # BLD MANUAL: 0.08 X10*3/UL (ref 0.05–0.8)
MONOCYTES NFR BLD MANUAL: 3 %
NEUTROPHILS # BLD MANUAL: 1.95 X10*3/UL (ref 1.6–5.5)
NEUTS BAND # BLD MANUAL: 0.31 X10*3/UL (ref 0–0.5)
NEUTS BAND NFR BLD MANUAL: 12 %
NEUTS SEG # BLD MANUAL: 1.64 X10*3/UL (ref 1.6–5)
NEUTS SEG NFR BLD MANUAL: 63 %
NRBC BLD-RTO: ABNORMAL /100{WBCS}
OVALOCYTES BLD QL SMEAR: ABNORMAL
PLATELET # BLD AUTO: 27 X10*3/UL (ref 150–450)
POLYCHROMASIA BLD QL SMEAR: ABNORMAL
POTASSIUM SERPL-SCNC: 4.5 MMOL/L (ref 3.5–5.3)
PROT SERPL-MCNC: 6.1 G/DL (ref 6.4–8.2)
RBC # BLD AUTO: 2.92 X10*6/UL (ref 4.5–5.9)
RBC MORPH BLD: ABNORMAL
SODIUM SERPL-SCNC: 139 MMOL/L (ref 136–145)
TOTAL CELLS COUNTED BLD: 100
WBC # BLD AUTO: 2.6 X10*3/UL (ref 4.4–11.3)

## 2024-06-21 PROCEDURE — 85027 COMPLETE CBC AUTOMATED: CPT

## 2024-06-21 PROCEDURE — 80053 COMPREHEN METABOLIC PANEL: CPT

## 2024-06-21 PROCEDURE — 85007 BL SMEAR W/DIFF WBC COUNT: CPT

## 2024-06-21 PROCEDURE — 36415 COLL VENOUS BLD VENIPUNCTURE: CPT

## 2024-06-28 ENCOUNTER — PATIENT OUTREACH (OUTPATIENT)
Dept: PRIMARY CARE | Facility: CLINIC | Age: 84
End: 2024-06-28
Payer: MEDICARE

## 2024-06-28 NOTE — PROGRESS NOTES
Unsuccessful outreach to this patient for the 90 day post discharge outreach. Left a voice-mail message including my direct call back number for the patient to call regarding any questions or concerns.    
Former smoker

## 2024-07-05 ENCOUNTER — OFFICE VISIT (OUTPATIENT)
Dept: HEMATOLOGY/ONCOLOGY | Facility: CLINIC | Age: 84
End: 2024-07-05
Payer: MEDICARE

## 2024-07-05 ENCOUNTER — SPECIALTY PHARMACY (OUTPATIENT)
Dept: HEMATOLOGY/ONCOLOGY | Facility: CLINIC | Age: 84
End: 2024-07-05
Payer: MEDICARE

## 2024-07-05 ENCOUNTER — LAB (OUTPATIENT)
Dept: LAB | Facility: CLINIC | Age: 84
End: 2024-07-05
Payer: MEDICARE

## 2024-07-05 ENCOUNTER — EDUCATION (OUTPATIENT)
Dept: HEMATOLOGY/ONCOLOGY | Facility: CLINIC | Age: 84
End: 2024-07-05
Payer: MEDICARE

## 2024-07-05 VITALS
SYSTOLIC BLOOD PRESSURE: 138 MMHG | BODY MASS INDEX: 40.42 KG/M2 | RESPIRATION RATE: 18 BRPM | WEIGHT: 298.06 LBS | TEMPERATURE: 97.2 F | DIASTOLIC BLOOD PRESSURE: 78 MMHG | HEART RATE: 85 BPM | OXYGEN SATURATION: 96 %

## 2024-07-05 DIAGNOSIS — I25.10 CAD S/P PERCUTANEOUS CORONARY ANGIOPLASTY: ICD-10-CM

## 2024-07-05 DIAGNOSIS — T45.1X5A ANEMIA DUE TO CHEMOTHERAPY FOR MYELODYSPLASTIC SYNDROME TREATED WITH ERYTHROPOIETIN (MULTI): Primary | ICD-10-CM

## 2024-07-05 DIAGNOSIS — D46.9 MYELODYSPLASTIC SYNDROME (MULTI): ICD-10-CM

## 2024-07-05 DIAGNOSIS — D64.81 ANEMIA DUE TO CHEMOTHERAPY FOR MYELODYSPLASTIC SYNDROME TREATED WITH ERYTHROPOIETIN (MULTI): Primary | ICD-10-CM

## 2024-07-05 DIAGNOSIS — M1A.9XX0 CHRONIC GOUT WITHOUT TOPHUS, UNSPECIFIED CAUSE, UNSPECIFIED SITE: ICD-10-CM

## 2024-07-05 DIAGNOSIS — D46.9 ANEMIA DUE TO CHEMOTHERAPY FOR MYELODYSPLASTIC SYNDROME TREATED WITH ERYTHROPOIETIN (MULTI): Primary | ICD-10-CM

## 2024-07-05 DIAGNOSIS — E78.2 HYPERLIPEMIA, MIXED: ICD-10-CM

## 2024-07-05 DIAGNOSIS — I10 PRIMARY HYPERTENSION: ICD-10-CM

## 2024-07-05 DIAGNOSIS — N39.3 SUI (STRESS URINARY INCONTINENCE), MALE: ICD-10-CM

## 2024-07-05 DIAGNOSIS — Z98.61 CAD S/P PERCUTANEOUS CORONARY ANGIOPLASTY: ICD-10-CM

## 2024-07-05 LAB
ALBUMIN SERPL BCP-MCNC: 4 G/DL (ref 3.4–5)
ALP SERPL-CCNC: 81 U/L (ref 33–136)
ALT SERPL W P-5'-P-CCNC: 16 U/L (ref 10–52)
ANION GAP SERPL CALC-SCNC: 12 MMOL/L (ref 10–20)
AST SERPL W P-5'-P-CCNC: 17 U/L (ref 9–39)
BASOPHILS # BLD AUTO: 0.01 X10*3/UL (ref 0–0.1)
BASOPHILS NFR BLD AUTO: 0.4 %
BILIRUB SERPL-MCNC: 1.3 MG/DL (ref 0–1.2)
BUN SERPL-MCNC: 37 MG/DL (ref 6–23)
CALCIUM SERPL-MCNC: 9.2 MG/DL (ref 8.6–10.3)
CHLORIDE SERPL-SCNC: 108 MMOL/L (ref 98–107)
CO2 SERPL-SCNC: 23 MMOL/L (ref 21–32)
CREAT SERPL-MCNC: 1.46 MG/DL (ref 0.5–1.3)
DACRYOCYTES BLD QL SMEAR: NORMAL
EGFRCR SERPLBLD CKD-EPI 2021: 47 ML/MIN/1.73M*2
EOSINOPHIL # BLD AUTO: 0.07 X10*3/UL (ref 0–0.4)
EOSINOPHIL NFR BLD AUTO: 2.7 %
ERYTHROCYTE [DISTWIDTH] IN BLOOD BY AUTOMATED COUNT: 20.3 % (ref 11.5–14.5)
GLUCOSE SERPL-MCNC: 140 MG/DL (ref 74–99)
HCT VFR BLD AUTO: 26.1 % (ref 41–52)
HGB BLD-MCNC: 8.4 G/DL (ref 13.5–17.5)
HOLD SPECIMEN: NORMAL
IMM GRANULOCYTES # BLD AUTO: 0.03 X10*3/UL (ref 0–0.5)
IMM GRANULOCYTES NFR BLD AUTO: 1.2 % (ref 0–0.9)
LYMPHOCYTES # BLD AUTO: 0.66 X10*3/UL (ref 0.8–3)
LYMPHOCYTES NFR BLD AUTO: 25.9 %
MCH RBC QN AUTO: 31.1 PG (ref 26–34)
MCHC RBC AUTO-ENTMCNC: 32.2 G/DL (ref 32–36)
MCV RBC AUTO: 97 FL (ref 80–100)
MONOCYTES # BLD AUTO: 0.19 X10*3/UL (ref 0.05–0.8)
MONOCYTES NFR BLD AUTO: 7.5 %
NEUTROPHILS # BLD AUTO: 1.59 X10*3/UL (ref 1.6–5.5)
NEUTROPHILS NFR BLD AUTO: 62.3 %
NRBC BLD-RTO: ABNORMAL /100{WBCS}
OVALOCYTES BLD QL SMEAR: NORMAL
PLATELET # BLD AUTO: 71 X10*3/UL (ref 150–450)
POLYCHROMASIA BLD QL SMEAR: NORMAL
POTASSIUM SERPL-SCNC: 4.5 MMOL/L (ref 3.5–5.3)
PROT SERPL-MCNC: 6.1 G/DL (ref 6.4–8.2)
RBC # BLD AUTO: 2.7 X10*6/UL (ref 4.5–5.9)
RBC MORPH BLD: NORMAL
SODIUM SERPL-SCNC: 138 MMOL/L (ref 136–145)
WBC # BLD AUTO: 2.6 X10*3/UL (ref 4.4–11.3)

## 2024-07-05 PROCEDURE — 99214 OFFICE O/P EST MOD 30 MIN: CPT | Performed by: INTERNAL MEDICINE

## 2024-07-05 PROCEDURE — 1126F AMNT PAIN NOTED NONE PRSNT: CPT | Performed by: INTERNAL MEDICINE

## 2024-07-05 PROCEDURE — 3075F SYST BP GE 130 - 139MM HG: CPT | Performed by: INTERNAL MEDICINE

## 2024-07-05 PROCEDURE — 36415 COLL VENOUS BLD VENIPUNCTURE: CPT

## 2024-07-05 PROCEDURE — 1159F MED LIST DOCD IN RCRD: CPT | Performed by: INTERNAL MEDICINE

## 2024-07-05 PROCEDURE — 80053 COMPREHEN METABOLIC PANEL: CPT

## 2024-07-05 PROCEDURE — 3078F DIAST BP <80 MM HG: CPT | Performed by: INTERNAL MEDICINE

## 2024-07-05 PROCEDURE — 85025 COMPLETE CBC W/AUTO DIFF WBC: CPT

## 2024-07-05 RX ORDER — FAMOTIDINE 10 MG/ML
20 INJECTION INTRAVENOUS ONCE AS NEEDED
Status: CANCELLED | OUTPATIENT
Start: 2024-07-12

## 2024-07-05 RX ORDER — EPINEPHRINE 0.3 MG/.3ML
0.3 INJECTION SUBCUTANEOUS EVERY 5 MIN PRN
Status: CANCELLED | OUTPATIENT
Start: 2024-07-12

## 2024-07-05 RX ORDER — ALBUTEROL SULFATE 0.83 MG/ML
3 SOLUTION RESPIRATORY (INHALATION) AS NEEDED
Status: CANCELLED | OUTPATIENT
Start: 2024-07-12

## 2024-07-05 RX ORDER — DIPHENHYDRAMINE HYDROCHLORIDE 50 MG/ML
50 INJECTION INTRAMUSCULAR; INTRAVENOUS AS NEEDED
Status: CANCELLED | OUTPATIENT
Start: 2024-07-12

## 2024-07-05 RX ORDER — HEPARIN 100 UNIT/ML
500 SYRINGE INTRAVENOUS AS NEEDED
OUTPATIENT
Start: 2024-07-05

## 2024-07-05 RX ORDER — HEPARIN SODIUM,PORCINE/PF 10 UNIT/ML
50 SYRINGE (ML) INTRAVENOUS AS NEEDED
OUTPATIENT
Start: 2024-07-05

## 2024-07-05 ASSESSMENT — PAIN SCALES - GENERAL: PAINLEVEL: 0-NO PAIN

## 2024-07-05 NOTE — PROGRESS NOTES
Pat to begin aranesp for MDS.  Pat and spouse Luanne provided education and possible side effects-including but not limited to-hypertension, thrombotic events, edema and injection site irritation.  Reviewed plan for injections every 2 weeks, expected response, and follow up.  They will need ongoing support and reinforcement

## 2024-07-05 NOTE — PATIENT INSTRUCTIONS
Continue taking promacta 100 mg daily    You will also start aranesp (for MDS)--primarily to help with the hemoglobin    See you again in 6 weeks or so

## 2024-07-05 NOTE — PROGRESS NOTES
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   - Verified that the medication was sent to [facility] and reviewed that the pharmacy supplied welcome kit will be given upon first delivery of the medication    Clinical Background:  - The Patient's medication list, allergies, and comorbid conditions were verified. No contraindications to therapy noted at this time.  - Patient advised to contact the pharmacy if there are any changes to her medication list, including prescriptions, OTC medications, herbal products, or supplements.   - Current clinically significant drug-drug interactions include:  potential interaction between eltrombopag and oral magnesium (may decrease serum concentration of eltrombopag).  ** Will  patient to administer eltrombopag at least 2 hours before or 4 hours after oral administration of oral magnesium. ** (During the FFA counseling, patient was advised to avoid calcium-containing foods and medications at least 2 hrs before or 4 hrs after eltrombopag administration).    - Labs were reviewed and no concerns were noted regarding the patient's therapy at this time     -Medication is clinically appropriate.    Education/Discussion:  Patient accepted the pharmacist's offer of counseling.    Indication:  Eltrombopag, is being used  for this patient's MDS-induced thrombocytopenia (off-label use;  J Clin Oncol 2023; [id number])    Dose:   Eltrombopag 50 mg PO once daily.  As needed, titrate dose to response in 50 mg increments every 2 weeks to a maximum dose of 300 mg PO once daily.    Administration:     Tablet, Oral:    Take 1 x 50 mg tablets by mouth once a day  Swallow tablets whole. Do not split, chew, or crush and mix with food or liquids.     Take tablets/suspension on an empty stomach or with a low-calcium (<= 50 mg) meal.   At least 2 hours before or 4 hours after antacids, foods high in calcium (e.g. dairy products, calcium fortified juices, certain fruits/vegetables) or supplements containing polyvalent cations (e.g. iron, calcium, aluminum, magnesium, selenium, zinc).  ** Will follow up on this again with patient, as he is taking an oral magnesium supplement. **    Does the patient have any barriers to self-administration (including physical and mental)? No ;  wife Huy assists him with his daily medication regimen as needed.    List barriers:   See above.     Describe actions taken to mitigate barriers:  See above.     Patient/caregiver counseled on proper technique for self-administration: Yes     Missed Doses:  The importance of adherence was discussed with the patient and they were advised to take the medication as prescribed by their provider.   We discussed the use of a calendar, oral chemo tracking sheet, or cell phone alarm to keep track of their doses.   If you miss a dose,  skip the missed dose and take your next dose at the regularly scheduled time.  Do not administer more than one dose within 24 hours.   Patient was encouraged to call their physician's office if they have a question regarding a missed dose.   What barriers to adherence does the patient have? (answer Y/N for all):  Patient has a difficult time remembering to take medications? Most of the time no - see above.   Difficult administration technique?   No  Medication cost?  TBD  Patient does not think medication is beneficial?  No  Other? N/A     What actions were taken to address barriers?  See above.      Warnings, Precautions, and Adverse Reactions:   - Discussed common adverse effects, warnings and precautions pertinent to the medication including but not limited to:   Nausea, vomiting, diarrhea  Skin rash  Myalgia, musculoskeletal pain, paresthesia     - Patient was encouraged to reach out to their doctor's office if they develop:   Signs of liver problems -- like weakness, itching, dark-colored urine, light-colored stool, or yellowing of the skin or whites of the eyes  Signs of a blood clot - one-sided numbness or weakness; pain, redness, tenderness, warmth, or swelling  Signs of cataracts - like new or worsening clouding of the lens, blurred vision, or eye pain  Any other unexplained, new or concerning symptoms    - Reviewed that the patient should call and discuss with their provider regarding any vaccinations while they are on therapy    Pregnancy/Reproductive Considerations (if applicable):   Patients who become pregnant should use effective contraception during eltrombopag therapy and for at least 7 days after last eltrobopag dose.     Handling and Storage   Store tablets at room temperature. Dispense in original container.     Disposal:  If you have any unused medication:  Do not flush unused or  tablets/packets or pour suspension down the drain.  Safely dispose of any unused medication in the trash by mixing it with an unpalatable substance (i.e. coffee grounds, cat litter) or take it to an appropriate police station for proper disposal. (You can use [employer] to see police stations in your area that are able to take back the medication).    Goals of therapy:  Oncology goals of therapy:  Stimulate the growth and development of platelets in the bone marrow  Minimize risks of bleeding/bruising  Continuously evaluate safety and  appropriateness of medication  Report and evaluate adverse reactions to prescriber team  Maintain therapy adherence as appropriate  Patient goals:  To bring my platelets back up;  avoid transfusions and complications of MDS.    Efficacy timeline:   Patient's prescriber will monitor for continued efficacy, progression, and therapy appropriateness    Conclusion:  - Quality of life:  not discussed.  - Pharmacy Satisfaction:  not discussed.  - High risk status (pregnancy, geriatric, pediatric):  Geriatric  - Is clinical intervention necessary? No   - If yes, what additional action was taken?  N/A  Patient was advised of [facility]'s dispensing process, refill timeline, contact information ([phone]), and patient management follow up. Patient confirmed understanding of education conducted during assessment. All patient questions and concerns were addressed to the best of my ability. Patient was encouraged to contact the specialty pharmacy with any questions or concerns.

## 2024-07-07 ASSESSMENT — ENCOUNTER SYMPTOMS
FATIGUE: 1
PSYCHIATRIC NEGATIVE: 1
NEUROLOGICAL NEGATIVE: 1
GASTROINTESTINAL NEGATIVE: 1
CARDIOVASCULAR NEGATIVE: 1
HEMATOLOGIC/LYMPHATIC NEGATIVE: 1
MUSCULOSKELETAL NEGATIVE: 1
ENDOCRINE NEGATIVE: 1
RESPIRATORY NEGATIVE: 1
EYES NEGATIVE: 1

## 2024-07-07 NOTE — PROGRESS NOTES
Patient ID: GENARO Burgess is a 83 y.o. male.  Referring Physician: Kevin Murphy MD  [address]  [place] [address]  Primary Care Provider: Giacomo Joseph, DO  Visit Type: Follow Up      Subjective    HPI How is my platelet count?  I'm also so exhausted, what can I do?    Review of Systems   Constitutional:  Positive for fatigue.   HENT:  Negative.     Eyes: Negative.    Respiratory: Negative.     Cardiovascular: Negative.    Gastrointestinal: Negative.    Endocrine: Negative.    Genitourinary: Negative.     Musculoskeletal: Negative.    Skin: Negative.    Neurological: Negative.    Hematological: Negative.    Psychiatric/Behavioral: Negative.          Objective   BSA: 2.62 meters squared  /78 (BP Location: Right arm)   Pulse 85   Temp 36.2 °C (97.2 °F) (Temporal)   Resp 18   Wt 135 kg (298 lb 1 oz)   SpO2 96%   BMI 40.42 kg/m²      has a past medical history of Abdominal pain, chronic, right upper quadrant, ACP (advance care planning), Anemia, Aneurysm (CMS-[facility]), Body mass index (BMI) 39.0-39.9, adult (12/06/2021), Body mass index (BMI) 39.0-39.9, adult (07/11/2022), Body mass index (BMI) 39.0-39.9, adult (04/24/2022), Body mass index (BMI) 39.0-39.9, adult (04/24/2022), Body mass index (BMI) 39.0-39.9, adult (04/24/2022), Cramp and spasm (07/19/2022), Difficulty breathing, Encounter for general adult medical examination without abnormal findings (09/25/2020), Encounter for screening for malignant neoplasm of prostate (04/14/2021), Encounter for screening for malignant neoplasm of prostate (09/25/2020), Forearm injury, atrial flutter, Hyperlipidemia, Hypertension, Hyperuricemia, Incontinence, Obesity, unspecified (04/26/2022), Other symptoms and signs involving the musculoskeletal system (10/12/2022), Personal history of other diseases of the circulatory system (09/29/2021), Personal history of other diseases of the circulatory system (10/26/2021), Personal history of other  "endocrine, nutritional and metabolic disease (12/06/2021), Personal history of other specified conditions (09/29/2021), Rotator cuff injury, Sinus node dysfunction (Multi), and Valvular heart disease.   has a past surgical history that includes Other surgical history (05/14/2020); Other surgical history (05/14/2020); Other surgical history (05/14/2020); Other surgical history (05/14/2020); Other surgical history (05/14/2020); Other surgical history (09/29/2021); Other surgical history (09/29/2021); Other surgical history (09/29/2021); Other surgical history (09/29/2021); MR angio chest w and wo IV contrast (09/18/2019); MR angio chest w and wo IV contrast (01/25/2023); MR angio chest w and wo IV contrast (01/25/2023); Cardiac electrophysiology procedure (Left, 11/21/2023); Bone marrow biopsy; Bone marrow biopsy w/ aspiration (01/23/2024); Cystoscopy (02/20/2024); and Urinary sphincter implant (03/28/2024).  Family History   Problem Relation Name Age of Onset    Other (polio) Mother      Heart disease Father      Heart disease Brother      Polymyalgia rheumatica Brother      Other (mitral valve disorder) Brother      Other (Coronary artery bypass graft) Brother      Other (Arterisclerotic cardiovascular disease) Brother       Oncology History    No history exists.       Holden Burgess \"PAT\"  reports that he quit smoking about 55 years ago. His smoking use included cigarettes. He started smoking about 65 years ago. He has a 30 pack-year smoking history. He has never been exposed to tobacco smoke. He has never used smokeless tobacco.  He  reports no history of alcohol use.  He  reports no history of drug use.    Physical Exam  Vitals reviewed.   Constitutional:       Appearance: Normal appearance.   HENT:      Head: Normocephalic.      Mouth/Throat:      Mouth: Mucous membranes are moist.   Eyes:      Extraocular Movements: Extraocular movements intact.      Pupils: Pupils are equal, round, and reactive to light. "   Cardiovascular:      Rate and Rhythm: Normal rate and regular rhythm.      Heart sounds: Normal heart sounds.   Pulmonary:      Breath sounds: Normal breath sounds.   Abdominal:      General: Bowel sounds are normal.      Palpations: Abdomen is soft.   Musculoskeletal:         General: Normal range of motion.      Cervical back: Normal range of motion and neck supple.   Skin:     General: Skin is warm.   Neurological:      General: No focal deficit present.      Mental Status: He is alert and oriented to person, place, and time.   Psychiatric:         Mood and Affect: Mood normal.         Behavior: Behavior normal.         WBC   Date/Time Value Ref Range Status   07/05/2024 09:14 AM 2.6 (L) 4.4 - 11.3 x10*3/uL Final   06/21/2024 08:27 AM 2.6 (L) 4.4 - 11.3 x10*3/uL Final   06/05/2024 08:33 AM 3.0 (L) 4.4 - 11.3 x10*3/uL Final     nRBC   Date Value Ref Range Status   07/05/2024   Final     Comment:     Not Measured   06/21/2024   Final     Comment:     Not Measured   06/05/2024 1.0 (H) 0.0 - 0.0 /100 WBCs Final     RBC   Date Value Ref Range Status   07/05/2024 2.70 (L) 4.50 - 5.90 x10*6/uL Final   06/21/2024 2.92 (L) 4.50 - 5.90 x10*6/uL Final   06/05/2024 3.15 (L) 4.50 - 5.90 x10*6/uL Final     POC Hemoglobin   Date Value Ref Range Status   04/18/2024 10 (A) 13.5 - 17.5 g/dL Final   10/31/2023 11.1 (A) 13.5 - 17.5 g/dL Final   09/26/2023 11.8 (A) 13.5 - 17.5 g/dL Final     Hemoglobin   Date Value Ref Range Status   07/05/2024 8.4 (L) 13.5 - 17.5 g/dL Final   06/21/2024 9.0 (L) 13.5 - 17.5 g/dL Final   06/05/2024 9.8 (L) 13.5 - 17.5 g/dL Final     Hematocrit   Date Value Ref Range Status   07/05/2024 26.1 (L) 41.0 - 52.0 % Final   06/21/2024 28.1 (L) 41.0 - 52.0 % Final   06/05/2024 32.1 (L) 41.0 - 52.0 % Final     MCV   Date/Time Value Ref Range Status   07/05/2024 09:14 AM 97 80 - 100 fL Final   06/21/2024 08:27 AM 96 80 - 100 fL Final   06/05/2024 08:33  (H) 80 - 100 fL Final     MCH   Date/Time Value Ref  Range Status   07/05/2024 09:14 AM 31.1 26.0 - 34.0 pg Final   06/21/2024 08:27 AM 30.8 26.0 - 34.0 pg Final   06/05/2024 08:33 AM 31.1 26.0 - 34.0 pg Final     MCHC   Date/Time Value Ref Range Status   07/05/2024 09:14 AM 32.2 32.0 - 36.0 g/dL Final   06/21/2024 08:27 AM 32.0 32.0 - 36.0 g/dL Final   06/05/2024 08:33 AM 30.5 (L) 32.0 - 36.0 g/dL Final     RDW   Date/Time Value Ref Range Status   07/05/2024 09:14 AM 20.3 (H) 11.5 - 14.5 % Final   06/21/2024 08:27 AM 19.7 (H) 11.5 - 14.5 % Final   06/05/2024 08:33 AM 19.5 (H) 11.5 - 14.5 % Final     Platelets   Date/Time Value Ref Range Status   07/05/2024 09:14 AM 71 (L) 150 - 450 x10*3/uL Final   06/21/2024 08:27 AM 27 (LL) 150 - 450 x10*3/uL Final   06/05/2024 08:33 AM 44 (L) 150 - 450 x10*3/uL Final     MPV   Date/Time Value Ref Range Status   04/03/2024 01:43 PM 11.3 7.5 - 11.5 fL Final     Neutrophils %   Date/Time Value Ref Range Status   07/05/2024 09:14 AM 62.3 40.0 - 80.0 % Final   06/05/2024 08:33 AM 61.5 40.0 - 80.0 % Final   04/24/2024 08:32 AM 77.4 40.0 - 80.0 % Final     Immature Granulocytes %, Automated   Date/Time Value Ref Range Status   07/05/2024 09:14 AM 1.2 (H) 0.0 - 0.9 % Final     Comment:     Immature Granulocyte Count (IG) includes promyelocytes, myelocytes and metamyelocytes but does not include bands. Percent differential counts (%) should be interpreted in the context of the absolute cell counts (cells/UL).   06/21/2024 08:27 AM 1.6 (H) 0.0 - 0.9 % Final     Comment:     Immature Granulocyte Count (IG) includes promyelocytes, myelocytes and metamyelocytes but does not include bands. Percent differential counts (%) should be interpreted in the context of the absolute cell counts (cells/UL).   06/05/2024 08:33 AM 2.0 (H) 0.0 - 0.9 % Final     Comment:     Immature Granulocyte Count (IG) includes promyelocytes, myelocytes and metamyelocytes but does not include bands. Percent differential counts (%) should be interpreted in the context of  the absolute cell counts (cells/UL).     Lymphocytes %, Manual   Date/Time Value Ref Range Status   06/21/2024 08:27 AM 21.0 13.0 - 44.0 % Final     Lymphocytes %   Date/Time Value Ref Range Status   07/05/2024 09:14 AM 25.9 13.0 - 44.0 % Final   06/05/2024 08:33 AM 28.9 13.0 - 44.0 % Final   04/24/2024 08:32 AM 14.2 13.0 - 44.0 % Final     Monocytes %, Manual   Date/Time Value Ref Range Status   06/21/2024 08:27 AM 3.0 2.0 - 10.0 % Final     Monocytes %   Date/Time Value Ref Range Status   07/05/2024 09:14 AM 7.5 2.0 - 10.0 % Final   06/05/2024 08:33 AM 5.6 2.0 - 10.0 % Final   04/24/2024 08:32 AM 3.9 2.0 - 10.0 % Final     Eosinophils %, Manual   Date/Time Value Ref Range Status   06/21/2024 08:27 AM 1.0 0.0 - 6.0 % Final     Eosinophils %   Date/Time Value Ref Range Status   07/05/2024 09:14 AM 2.7 0.0 - 6.0 % Final   06/05/2024 08:33 AM 1.3 0.0 - 6.0 % Final   04/24/2024 08:32 AM 1.9 0.0 - 6.0 % Final     Basophils %, Manual   Date/Time Value Ref Range Status   06/21/2024 08:27 AM 0.0 0.0 - 2.0 % Final     Basophils %   Date/Time Value Ref Range Status   07/05/2024 09:14 AM 0.4 0.0 - 2.0 % Final   06/05/2024 08:33 AM 0.7 0.0 - 2.0 % Final   04/24/2024 08:32 AM 0.3 0.0 - 2.0 % Final     Neutrophils Absolute   Date/Time Value Ref Range Status   07/05/2024 09:14 AM 1.59 (L) 1.60 - 5.50 x10*3/uL Final     Comment:     Percent differential counts (%) should be interpreted in the context of the absolute cell counts (cells/uL).   06/05/2024 08:33 AM 1.87 1.60 - 5.50 x10*3/uL Final     Comment:     Percent differential counts (%) should be interpreted in the context of the absolute cell counts (cells/uL).   04/24/2024 08:32 AM 4.95 1.60 - 5.50 x10*3/uL Final     Comment:     Percent differential counts (%) should be interpreted in the context of the absolute cell counts (cells/uL).     Immature Granulocytes Absolute, Automated   Date/Time Value Ref Range Status   07/05/2024 09:14 AM 0.03 0.00 - 0.50 x10*3/uL Final  "  06/21/2024 08:27 AM 0.04 0.00 - 0.50 x10*3/uL Final   06/05/2024 08:33 AM 0.06 0.00 - 0.50 x10*3/uL Final     Lymphocytes Absolute   Date/Time Value Ref Range Status   07/05/2024 09:14 AM 0.66 (L) 0.80 - 3.00 x10*3/uL Final   06/05/2024 08:33 AM 0.88 0.80 - 3.00 x10*3/uL Final   04/24/2024 08:32 AM 0.91 0.80 - 3.00 x10*3/uL Final     Monocytes Absolute   Date/Time Value Ref Range Status   07/05/2024 09:14 AM 0.19 0.05 - 0.80 x10*3/uL Final   06/05/2024 08:33 AM 0.17 0.05 - 0.80 x10*3/uL Final   04/24/2024 08:32 AM 0.25 0.05 - 0.80 x10*3/uL Final     Eosinophils Absolute   Date/Time Value Ref Range Status   07/05/2024 09:14 AM 0.07 0.00 - 0.40 x10*3/uL Final   06/05/2024 08:33 AM 0.04 0.00 - 0.40 x10*3/uL Final   04/24/2024 08:32 AM 0.12 0.00 - 0.40 x10*3/uL Final     Eosinophils Absolute, Manual   Date/Time Value Ref Range Status   06/21/2024 08:27 AM 0.03 0.00 - 0.40 x10*3/uL Final     Basophils Absolute   Date/Time Value Ref Range Status   07/05/2024 09:14 AM 0.01 0.00 - 0.10 x10*3/uL Final   06/05/2024 08:33 AM 0.02 0.00 - 0.10 x10*3/uL Final   04/24/2024 08:32 AM 0.02 0.00 - 0.10 x10*3/uL Final     Basophils Absolute, Manual   Date/Time Value Ref Range Status   06/21/2024 08:27 AM 0.00 0.00 - 0.10 x10*3/uL Final       No components found for: \"PT\"  aPTT   Date/Time Value Ref Range Status   06/23/2020 02:38 PM 34 25 - 35 sec Final     Comment:      Note new reference range as of 05/26/2020.    THE APTT IS NO LONGER USED FOR MONITORING     UNFRACTIONATED HEPARIN THERAPY.    FOR MONITORING HEPARIN THERAPY,     USE THE HEPARIN ASSAY.       Medication Documentation Review Audit       Reviewed by Adeola Jiménez MA (Medical Assistant) on 07/05/24 at 0929      Medication Order Taking? Sig Documenting Provider Last Dose Status   acetaminophen (Tylenol) 500 mg tablet [id number] Yes Take 2 tablets (1,000 mg) by mouth every 6 hours if needed for mild pain (1 - 3). Isac Mukherjee MD Taking Active   allopurinol (Zyloprim) " 100 mg tablet [id number] Yes Take 1 tablet (100 mg) by mouth 2 times a day. Giacomo Joseph DO Taking Active   ascorbic acid (Vitamin C) 1,000 mg tablet [id number] Yes Take 1 tablet (1,000 mg) by mouth once daily. Historical Provider, MD Taking Active   atorvastatin (Lipitor) 10 mg tablet [id number] Yes Take 1 tablet (10 mg) by mouth once daily at bedtime. Historical Provider, MD Taking Active   calcitriol (Rocaltrol) 0.25 mcg capsule [id number] Yes Take 1 capsule (0.25 mcg) by mouth every other day. Historical Provider, MD Taking Active   cholecalciferol (Vitamin D-3) 125 MCG (5000 UT) capsule [id number]  Take 1 capsule (125 mcg) by mouth once daily. Historical Provider, MD  Active   cyanocobalamin (Vitamin B-12) 1,000 mcg tablet [id number] Yes Take 1 tablet (1,000 mcg) by mouth once daily. Historical Provider, MD Taking Active   eltrombopag olamine (Promacta) 50 mg tablet [id number] Yes Take 2 tablets (100 mg) by mouth once daily in the morning. Take before meals. Take on an empty stomach, 1 hour before or 2 hours after a meal. eKvin Murphy MD Taking Active   ezetimibe (Zetia) 10 mg tablet [id number] Yes Take 1 tablet (10 mg) by mouth once daily. Giacomo Joseph DO Taking Active   ferrous sulfate 325 (65 Fe) MG tablet [id number] Yes Take 1 tablet by mouth once daily. Historical MD Zay Taking Active   folic acid (Folvite) 1 mg tablet [id number] Yes Take 1 tablet (1 mg) by mouth once daily. Giacomo Joseph DO Taking Active   furosemide (Lasix) 40 mg tablet [id number] Yes Take 1 tablet (40 mg) by mouth once daily.   Patient taking differently: Take 1 tablet (40 mg) by mouth once daily. prn    MICHELE Leggett-CNP Taking Active   latanoprost (Xalatan) 0.005 % ophthalmic solution [id number] Yes Administer 1 drop into affected eye(s) once daily at bedtime. Historical Provider, MD Taking Active   MAGNESIUM ORAL [id number] Yes Take 550 mg by mouth once daily at bedtime. Take 1 tablet 550 mg at night. Historical  Provider, MD Taking Active   niacin (Niaspan) 500 mg ER tablet [id number] Yes Take 1 tablet (500 mg) by mouth once daily at bedtime. Historical Provider, MD Taking Active   NON FORMULARY [id number] Yes Take 1 each by mouth once daily. Prevagen Historical Provider, MD Taking Active   rOPINIRole (Requip) 0.25 mg tablet [id number] Yes Take 1 tablet (0.25 mg) by mouth once daily at bedtime. Giacomo Joseph,  Taking Active   temazepam (Restoril) 15 mg capsule [id number] Yes Take 1 capsule (15 mg) by mouth as needed at bedtime for sleep. Giacomo Joseph,  Taking Active   traMADol (Ultram) 50 mg tablet [id number] Yes Take 1 tablet (50 mg) by mouth every 6 hours if needed for severe pain (7 - 10). Giacomo Joseph,  Taking Active   TURMERIC ORAL [id number] Yes Take 1 capsule by mouth once daily. Historical Provider, MD Taking Active   valsartan (Diovan) 320 mg tablet [id number] Yes Take 1 tablet (320 mg) by mouth once daily. Nicola Brooks MD Taking Active   zinc sulfate 50 mg zinc (220 mg) tablet [id number] Yes Take by mouth. 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; he completed XRT over 20 yrs ago  -here to review  bmbx done on 1/23/2024 -showed mildly hypercellular bone marrow (80%) with maturing trilineage hematopoiesis and moderate increase in megakaryocytes; small lymphoid aggregates favor benign; marrow shows mild reticulin fibrosis and increased number of megakaryocytes; flow cytometry showed no immunophenotypic evidence of lymphoproliferative disorder and no increased and/or abnormal blast population  -finding of SRSF2 mutation; 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  -CBC done on day of marrow showed wbc 4.0, hgb 13.3, plt 60,000, ANC 2380  -behavior of cytopenias and marrow findings suggest MDS, but specimen itself could not  corroborate this, probably secondary to sampling variation  -there is possibility this could be ITP--will give him prednisone trial--will take prednisone 50 mg PO daily for next 3 days--if he has ITP--there should be dramatic rise in platelet count on Friday--if no response, then he does not have ITP, and so depending on nature of surgical procedure--he should be transfused a unit of platelets if appropriate  -about a week after his last office followup, hematopathologist issued an addendum to his bone marrow path--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  -I then reviewed his case in malignant hematology tumor board--panel consensus was to try promacta vs consider CASE 4919 clinical trial  -reviewed briefly what is entailed in CASE 4919--treatment alternates between decitabine and azacytidine, shots are administered twice a week, available only at [facility] [place], and he would need bone marrow bx done 3 months in then 6 months in--he would for now prefer taking a pill if he can get it  -EQOL-MDS trial used promacta 50 mg daily in patients with low grade MDS and severe thrombocytopenia--leading to significant platelet response that was sustained in at least 42% of patients; dose can be titrated up to 150 mg daily if necessary  -he would like to try promacta  -he has been taking promacta 100 mg daily  -labs done on 7/5/2024 included CBC + COMP  -results reviewed--wbc 2.6, hgb 8.4, plt 71,000, creatinine 1.46, alk phos 81, AST 17, ALT 16  -advised him to continue with promacta  100 mg daily  -hgb continues to trend down--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  -luspatercept is FDA approved for anemia secondary to low grade MDS  -aranesp is FDA approved for both anemia secondary to MDS and anemia secondary to 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  -he would like to try aranesp first  -benefits, risks, potential morbidity related to aranesp (darbepoieitin) were reviewed with Rachelle and he provided  informed consent to proceed  -he will start with darbepoietin 100 mcg subcutaneous Q2 weeks for 4 to 6 consecutive doses  -if he fails trial of aranesp, then we will try luspatercept     2) gout  -on allopurinol     3) hyperlipidemia  -on atorvastatin  -on ezetimibe  -on lasix     4) hypertension  -on chlorthalidone  -on valsartan     5) CAD  -s/p PCI  -has pacemaker in place  -on ASA     6) stress incontinence  -has history of prostate cancer, s/p XRT >20 years ago  -s/p TURP in 6/2023  -on 3/28/2024 he had insertion of inflatable urethral/bladder neck sphincter by Dr Phan--he was transfused platelets intra-operatively        Problem List Items Addressed This Visit             ICD-10-CM    Myelodysplastic syndrome (Multi) D46.9    Relevant Orders    Clinic Appointment Request Chemo Follow Up; KEVIN MURPHY; [facility] MEDONC[facility]    Infusion Appointment Request [facility] INFUSION    Infusion Appointment Request [facility] INFUSION    Infusion Appointment Request [facility] INFUSION    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

## 2024-07-12 ENCOUNTER — INFUSION (OUTPATIENT)
Dept: HEMATOLOGY/ONCOLOGY | Facility: CLINIC | Age: 84
End: 2024-07-12
Payer: MEDICARE

## 2024-07-12 ENCOUNTER — LAB (OUTPATIENT)
Dept: LAB | Facility: CLINIC | Age: 84
End: 2024-07-12
Payer: MEDICARE

## 2024-07-12 ENCOUNTER — TELEPHONE (OUTPATIENT)
Dept: HEMATOLOGY/ONCOLOGY | Facility: CLINIC | Age: 84
End: 2024-07-12
Payer: MEDICARE

## 2024-07-12 VITALS
SYSTOLIC BLOOD PRESSURE: 132 MMHG | TEMPERATURE: 97.2 F | HEART RATE: 83 BPM | DIASTOLIC BLOOD PRESSURE: 75 MMHG | RESPIRATION RATE: 16 BRPM | OXYGEN SATURATION: 94 % | WEIGHT: 295.86 LBS | BODY MASS INDEX: 40.13 KG/M2

## 2024-07-12 DIAGNOSIS — D64.81 ANEMIA DUE TO CHEMOTHERAPY FOR MYELODYSPLASTIC SYNDROME TREATED WITH ERYTHROPOIETIN (MULTI): ICD-10-CM

## 2024-07-12 DIAGNOSIS — D46.9 MDS (MYELODYSPLASTIC SYNDROME) (MULTI): ICD-10-CM

## 2024-07-12 DIAGNOSIS — D46.9 ANEMIA DUE TO CHEMOTHERAPY FOR MYELODYSPLASTIC SYNDROME TREATED WITH ERYTHROPOIETIN (MULTI): ICD-10-CM

## 2024-07-12 DIAGNOSIS — T45.1X5A ANEMIA DUE TO CHEMOTHERAPY FOR MYELODYSPLASTIC SYNDROME TREATED WITH ERYTHROPOIETIN (MULTI): ICD-10-CM

## 2024-07-12 DIAGNOSIS — D46.9 MYELODYSPLASTIC SYNDROME (MULTI): ICD-10-CM

## 2024-07-12 LAB
ALBUMIN SERPL BCP-MCNC: 4.1 G/DL (ref 3.4–5)
ALP SERPL-CCNC: 73 U/L (ref 33–136)
ALT SERPL W P-5'-P-CCNC: 15 U/L (ref 10–52)
ANION GAP SERPL CALC-SCNC: 13 MMOL/L (ref 10–20)
AST SERPL W P-5'-P-CCNC: 18 U/L (ref 9–39)
BASOPHILS # BLD AUTO: 0 X10*3/UL (ref 0–0.1)
BASOPHILS NFR BLD AUTO: 0 %
BILIRUB SERPL-MCNC: 1.5 MG/DL (ref 0–1.2)
BUN SERPL-MCNC: 34 MG/DL (ref 6–23)
CALCIUM SERPL-MCNC: 9.4 MG/DL (ref 8.6–10.3)
CHLORIDE SERPL-SCNC: 107 MMOL/L (ref 98–107)
CO2 SERPL-SCNC: 22 MMOL/L (ref 21–32)
CREAT SERPL-MCNC: 1.45 MG/DL (ref 0.5–1.3)
DACRYOCYTES BLD QL SMEAR: NORMAL
EGFRCR SERPLBLD CKD-EPI 2021: 48 ML/MIN/1.73M*2
EOSINOPHIL # BLD AUTO: 0.03 X10*3/UL (ref 0–0.4)
EOSINOPHIL NFR BLD AUTO: 1.1 %
ERYTHROCYTE [DISTWIDTH] IN BLOOD BY AUTOMATED COUNT: 21.2 % (ref 11.5–14.5)
FERRITIN SERPL-MCNC: 555 NG/ML (ref 20–300)
GLUCOSE SERPL-MCNC: 104 MG/DL (ref 74–99)
HCT VFR BLD AUTO: 25.1 % (ref 41–52)
HGB BLD-MCNC: 7.9 G/DL (ref 13.5–17.5)
IMM GRANULOCYTES # BLD AUTO: 0.03 X10*3/UL (ref 0–0.5)
IMM GRANULOCYTES NFR BLD AUTO: 1.1 % (ref 0–0.9)
IRON SATN MFR SERPL: 48 % (ref 25–45)
IRON SERPL-MCNC: 160 UG/DL (ref 35–150)
LYMPHOCYTES # BLD AUTO: 0.74 X10*3/UL (ref 0.8–3)
LYMPHOCYTES NFR BLD AUTO: 28.4 %
MCH RBC QN AUTO: 30 PG (ref 26–34)
MCHC RBC AUTO-ENTMCNC: 31.5 G/DL (ref 32–36)
MCV RBC AUTO: 95 FL (ref 80–100)
MONOCYTES # BLD AUTO: 0.17 X10*3/UL (ref 0.05–0.8)
MONOCYTES NFR BLD AUTO: 6.5 %
NEUTROPHILS # BLD AUTO: 1.64 X10*3/UL (ref 1.6–5.5)
NEUTROPHILS NFR BLD AUTO: 62.9 %
NRBC BLD-RTO: ABNORMAL /100{WBCS}
OVALOCYTES BLD QL SMEAR: NORMAL
PATH REVIEW-CBC DIFFERENTIAL: NORMAL
PHOSPHATE SERPL-MCNC: 3.8 MG/DL (ref 2.5–4.9)
PLATELET # BLD AUTO: 26 X10*3/UL (ref 150–450)
POLYCHROMASIA BLD QL SMEAR: NORMAL
POTASSIUM SERPL-SCNC: 4.3 MMOL/L (ref 3.5–5.3)
PROT SERPL-MCNC: 6.3 G/DL (ref 6.4–8.2)
RBC # BLD AUTO: 2.63 X10*6/UL (ref 4.5–5.9)
RBC MORPH BLD: NORMAL
SCHISTOCYTES BLD QL SMEAR: NORMAL
SODIUM SERPL-SCNC: 138 MMOL/L (ref 136–145)
TIBC SERPL-MCNC: 333 UG/DL (ref 240–445)
UIBC SERPL-MCNC: 173 UG/DL (ref 110–370)
WBC # BLD AUTO: 2.6 X10*3/UL (ref 4.4–11.3)

## 2024-07-12 PROCEDURE — 96372 THER/PROPH/DIAG INJ SC/IM: CPT

## 2024-07-12 PROCEDURE — 2500000004 HC RX 250 GENERAL PHARMACY W/ HCPCS (ALT 636 FOR OP/ED): Mod: JZ | Performed by: INTERNAL MEDICINE

## 2024-07-12 PROCEDURE — 85025 COMPLETE CBC W/AUTO DIFF WBC: CPT

## 2024-07-12 PROCEDURE — 80053 COMPREHEN METABOLIC PANEL: CPT

## 2024-07-12 PROCEDURE — 84100 ASSAY OF PHOSPHORUS: CPT | Performed by: INTERNAL MEDICINE

## 2024-07-12 PROCEDURE — 82728 ASSAY OF FERRITIN: CPT | Performed by: INTERNAL MEDICINE

## 2024-07-12 PROCEDURE — 83540 ASSAY OF IRON: CPT | Performed by: INTERNAL MEDICINE

## 2024-07-12 PROCEDURE — 36415 COLL VENOUS BLD VENIPUNCTURE: CPT

## 2024-07-12 RX ORDER — EPINEPHRINE 0.3 MG/.3ML
0.3 INJECTION SUBCUTANEOUS EVERY 5 MIN PRN
OUTPATIENT
Start: 2024-07-26

## 2024-07-12 RX ORDER — FAMOTIDINE 10 MG/ML
20 INJECTION INTRAVENOUS ONCE AS NEEDED
Status: DISCONTINUED | OUTPATIENT
Start: 2024-07-12 | End: 2024-07-12 | Stop reason: HOSPADM

## 2024-07-12 RX ORDER — FAMOTIDINE 10 MG/ML
20 INJECTION INTRAVENOUS ONCE AS NEEDED
OUTPATIENT
Start: 2024-07-26

## 2024-07-12 RX ORDER — DIPHENHYDRAMINE HYDROCHLORIDE 50 MG/ML
50 INJECTION INTRAMUSCULAR; INTRAVENOUS AS NEEDED
OUTPATIENT
Start: 2024-07-26

## 2024-07-12 RX ORDER — ALBUTEROL SULFATE 0.83 MG/ML
3 SOLUTION RESPIRATORY (INHALATION) AS NEEDED
Status: DISCONTINUED | OUTPATIENT
Start: 2024-07-12 | End: 2024-07-12 | Stop reason: HOSPADM

## 2024-07-12 RX ORDER — DIPHENHYDRAMINE HYDROCHLORIDE 50 MG/ML
50 INJECTION INTRAMUSCULAR; INTRAVENOUS AS NEEDED
Status: DISCONTINUED | OUTPATIENT
Start: 2024-07-12 | End: 2024-07-12 | Stop reason: HOSPADM

## 2024-07-12 RX ORDER — EPINEPHRINE 0.3 MG/.3ML
0.3 INJECTION SUBCUTANEOUS EVERY 5 MIN PRN
Status: DISCONTINUED | OUTPATIENT
Start: 2024-07-12 | End: 2024-07-12 | Stop reason: HOSPADM

## 2024-07-12 RX ORDER — ALBUTEROL SULFATE 0.83 MG/ML
3 SOLUTION RESPIRATORY (INHALATION) AS NEEDED
OUTPATIENT
Start: 2024-07-26

## 2024-07-12 ASSESSMENT — PAIN SCALES - GENERAL: PAINLEVEL: 0-NO PAIN

## 2024-07-12 NOTE — TELEPHONE ENCOUNTER
Pt being seen in treatment area today (Aranesp). Treatment team aware of result ot discuss with Dr. Granda.

## 2024-07-15 ENCOUNTER — SPECIALTY PHARMACY (OUTPATIENT)
Dept: PHARMACY | Facility: CLINIC | Age: 84
End: 2024-07-15

## 2024-07-15 PROCEDURE — RXMED WILLOW AMBULATORY MEDICATION CHARGE

## 2024-07-17 ENCOUNTER — PHARMACY VISIT (OUTPATIENT)
Dept: PHARMACY | Facility: CLINIC | Age: 84
End: 2024-07-17
Payer: COMMERCIAL

## 2024-07-19 ENCOUNTER — INFUSION (OUTPATIENT)
Dept: HEMATOLOGY/ONCOLOGY | Facility: CLINIC | Age: 84
End: 2024-07-19
Payer: MEDICARE

## 2024-07-19 ENCOUNTER — TELEPHONE (OUTPATIENT)
Dept: HEMATOLOGY/ONCOLOGY | Facility: CLINIC | Age: 84
End: 2024-07-19

## 2024-07-19 ENCOUNTER — LAB (OUTPATIENT)
Dept: LAB | Facility: CLINIC | Age: 84
End: 2024-07-19
Payer: MEDICARE

## 2024-07-19 VITALS
WEIGHT: 294.75 LBS | SYSTOLIC BLOOD PRESSURE: 155 MMHG | OXYGEN SATURATION: 97 % | HEART RATE: 84 BPM | DIASTOLIC BLOOD PRESSURE: 71 MMHG | BODY MASS INDEX: 39.98 KG/M2 | RESPIRATION RATE: 17 BRPM | TEMPERATURE: 97 F

## 2024-07-19 DIAGNOSIS — D46.9 MDS (MYELODYSPLASTIC SYNDROME) (MULTI): ICD-10-CM

## 2024-07-19 LAB
BASOPHILS # BLD AUTO: 0 X10*3/UL (ref 0–0.1)
BASOPHILS NFR BLD AUTO: 0 %
DACRYOCYTES BLD QL SMEAR: NORMAL
EOSINOPHIL # BLD AUTO: 0.04 X10*3/UL (ref 0–0.4)
EOSINOPHIL NFR BLD AUTO: 1.4 %
ERYTHROCYTE [DISTWIDTH] IN BLOOD BY AUTOMATED COUNT: 21.9 % (ref 11.5–14.5)
HCT VFR BLD AUTO: 23.5 % (ref 41–52)
HGB BLD-MCNC: 7.3 G/DL (ref 13.5–17.5)
HOLD SPECIMEN: NORMAL
IMM GRANULOCYTES # BLD AUTO: 0.05 X10*3/UL (ref 0–0.5)
IMM GRANULOCYTES NFR BLD AUTO: 1.8 % (ref 0–0.9)
LYMPHOCYTES # BLD AUTO: 0.82 X10*3/UL (ref 0.8–3)
LYMPHOCYTES NFR BLD AUTO: 29.5 %
MCH RBC QN AUTO: 29.8 PG (ref 26–34)
MCHC RBC AUTO-ENTMCNC: 31.1 G/DL (ref 32–36)
MCV RBC AUTO: 96 FL (ref 80–100)
MONOCYTES # BLD AUTO: 0.2 X10*3/UL (ref 0.05–0.8)
MONOCYTES NFR BLD AUTO: 7.2 %
NEUTROPHILS # BLD AUTO: 1.67 X10*3/UL (ref 1.6–5.5)
NEUTROPHILS NFR BLD AUTO: 60.1 %
NRBC BLD-RTO: ABNORMAL /100{WBCS}
OVALOCYTES BLD QL SMEAR: NORMAL
PLATELET # BLD AUTO: 36 X10*3/UL (ref 150–450)
POLYCHROMASIA BLD QL SMEAR: NORMAL
RBC # BLD AUTO: 2.45 X10*6/UL (ref 4.5–5.9)
RBC MORPH BLD: NORMAL
SCHISTOCYTES BLD QL SMEAR: NORMAL
WBC # BLD AUTO: 2.8 X10*3/UL (ref 4.4–11.3)

## 2024-07-19 PROCEDURE — 36415 COLL VENOUS BLD VENIPUNCTURE: CPT

## 2024-07-19 PROCEDURE — 85025 COMPLETE CBC W/AUTO DIFF WBC: CPT

## 2024-07-19 ASSESSMENT — PAIN SCALES - GENERAL: PAINLEVEL: 0-NO PAIN

## 2024-07-19 NOTE — PROGRESS NOTES
"Spoke with Dr Garcia"s team patient to return Wed 7/24 for repeat CBC due to HGB slowly trending down . Patient aware of signs and symptoms to watch with low HGB levels . Patient understands if symptoms worsen to go to ED   "

## 2024-07-23 ENCOUNTER — APPOINTMENT (OUTPATIENT)
Dept: PRIMARY CARE | Facility: CLINIC | Age: 84
End: 2024-07-23
Payer: MEDICARE

## 2024-07-24 ENCOUNTER — LAB (OUTPATIENT)
Dept: LAB | Facility: CLINIC | Age: 84
End: 2024-07-24
Payer: MEDICARE

## 2024-07-24 ENCOUNTER — INFUSION (OUTPATIENT)
Dept: HEMATOLOGY/ONCOLOGY | Facility: CLINIC | Age: 84
End: 2024-07-24
Payer: MEDICARE

## 2024-07-24 VITALS
BODY MASS INDEX: 39.74 KG/M2 | HEART RATE: 86 BPM | OXYGEN SATURATION: 96 % | SYSTOLIC BLOOD PRESSURE: 154 MMHG | TEMPERATURE: 97.3 F | DIASTOLIC BLOOD PRESSURE: 76 MMHG | RESPIRATION RATE: 16 BRPM | WEIGHT: 292.99 LBS

## 2024-07-24 DIAGNOSIS — T45.1X5A ANEMIA DUE TO CHEMOTHERAPY FOR MYELODYSPLASTIC SYNDROME TREATED WITH ERYTHROPOIETIN (MULTI): ICD-10-CM

## 2024-07-24 DIAGNOSIS — D64.81 ANEMIA DUE TO CHEMOTHERAPY FOR MYELODYSPLASTIC SYNDROME TREATED WITH ERYTHROPOIETIN (MULTI): ICD-10-CM

## 2024-07-24 DIAGNOSIS — D46.9 ANEMIA DUE TO CHEMOTHERAPY FOR MYELODYSPLASTIC SYNDROME TREATED WITH ERYTHROPOIETIN (MULTI): ICD-10-CM

## 2024-07-24 DIAGNOSIS — D46.9 MDS (MYELODYSPLASTIC SYNDROME) (MULTI): ICD-10-CM

## 2024-07-24 LAB
ALBUMIN SERPL BCP-MCNC: 4.1 G/DL (ref 3.4–5)
ALP SERPL-CCNC: 74 U/L (ref 33–136)
ALT SERPL W P-5'-P-CCNC: 20 U/L (ref 10–52)
ANION GAP SERPL CALC-SCNC: 13 MMOL/L (ref 10–20)
AST SERPL W P-5'-P-CCNC: 20 U/L (ref 9–39)
BASO STIPL BLD QL SMEAR: PRESENT
BASOPHILS # BLD AUTO: 0 X10*3/UL (ref 0–0.1)
BASOPHILS NFR BLD AUTO: 0 %
BILIRUB SERPL-MCNC: 1.4 MG/DL (ref 0–1.2)
BUN SERPL-MCNC: 28 MG/DL (ref 6–23)
CALCIUM SERPL-MCNC: 9.4 MG/DL (ref 8.6–10.3)
CHLORIDE SERPL-SCNC: 107 MMOL/L (ref 98–107)
CO2 SERPL-SCNC: 22 MMOL/L (ref 21–32)
CREAT SERPL-MCNC: 1.38 MG/DL (ref 0.5–1.3)
DACRYOCYTES BLD QL SMEAR: NORMAL
EGFRCR SERPLBLD CKD-EPI 2021: 51 ML/MIN/1.73M*2
EOSINOPHIL # BLD AUTO: 0.04 X10*3/UL (ref 0–0.4)
EOSINOPHIL NFR BLD AUTO: 1.2 %
ERYTHROCYTE [DISTWIDTH] IN BLOOD BY AUTOMATED COUNT: 22.8 % (ref 11.5–14.5)
GLUCOSE SERPL-MCNC: 103 MG/DL (ref 74–99)
HCT VFR BLD AUTO: 24.9 % (ref 41–52)
HGB BLD-MCNC: 7.7 G/DL (ref 13.5–17.5)
HOLD SPECIMEN: NORMAL
IMM GRANULOCYTES # BLD AUTO: 0.05 X10*3/UL (ref 0–0.5)
IMM GRANULOCYTES NFR BLD AUTO: 1.5 % (ref 0–0.9)
LYMPHOCYTES # BLD AUTO: 0.91 X10*3/UL (ref 0.8–3)
LYMPHOCYTES NFR BLD AUTO: 26.7 %
MCH RBC QN AUTO: 29.6 PG (ref 26–34)
MCHC RBC AUTO-ENTMCNC: 30.9 G/DL (ref 32–36)
MCV RBC AUTO: 96 FL (ref 80–100)
MONOCYTES # BLD AUTO: 0.19 X10*3/UL (ref 0.05–0.8)
MONOCYTES NFR BLD AUTO: 5.6 %
NEUTROPHILS # BLD AUTO: 2.22 X10*3/UL (ref 1.6–5.5)
NEUTROPHILS NFR BLD AUTO: 65 %
NRBC BLD-RTO: ABNORMAL /100{WBCS}
OVALOCYTES BLD QL SMEAR: NORMAL
PLATELET # BLD AUTO: 43 X10*3/UL (ref 150–450)
POLYCHROMASIA BLD QL SMEAR: NORMAL
POTASSIUM SERPL-SCNC: 4.4 MMOL/L (ref 3.5–5.3)
PROT SERPL-MCNC: 6.3 G/DL (ref 6.4–8.2)
RBC # BLD AUTO: 2.6 X10*6/UL (ref 4.5–5.9)
RBC MORPH BLD: NORMAL
SODIUM SERPL-SCNC: 138 MMOL/L (ref 136–145)
WBC # BLD AUTO: 3.4 X10*3/UL (ref 4.4–11.3)

## 2024-07-24 PROCEDURE — 80053 COMPREHEN METABOLIC PANEL: CPT

## 2024-07-24 PROCEDURE — 85025 COMPLETE CBC W/AUTO DIFF WBC: CPT

## 2024-07-24 PROCEDURE — 36415 COLL VENOUS BLD VENIPUNCTURE: CPT

## 2024-07-24 ASSESSMENT — PAIN SCALES - GENERAL: PAINLEVEL: 0-NO PAIN

## 2024-07-25 ENCOUNTER — OFFICE VISIT (OUTPATIENT)
Dept: PRIMARY CARE | Facility: CLINIC | Age: 84
End: 2024-07-25
Payer: MEDICARE

## 2024-07-25 VITALS
BODY MASS INDEX: 37.83 KG/M2 | HEART RATE: 82 BPM | OXYGEN SATURATION: 97 % | RESPIRATION RATE: 16 BRPM | SYSTOLIC BLOOD PRESSURE: 130 MMHG | TEMPERATURE: 98.7 F | DIASTOLIC BLOOD PRESSURE: 70 MMHG | HEIGHT: 74 IN | WEIGHT: 294.8 LBS

## 2024-07-25 DIAGNOSIS — T14.8XXA INFECTED WOUND: Primary | ICD-10-CM

## 2024-07-25 DIAGNOSIS — E66.01 CLASS 2 SEVERE OBESITY WITH SERIOUS COMORBIDITY AND BODY MASS INDEX (BMI) OF 37.0 TO 37.9 IN ADULT, UNSPECIFIED OBESITY TYPE (MULTI): ICD-10-CM

## 2024-07-25 DIAGNOSIS — L08.9 INFECTED WOUND: Primary | ICD-10-CM

## 2024-07-25 DIAGNOSIS — S61.402A AVULSION OF SKIN OF HAND, LEFT, INITIAL ENCOUNTER: ICD-10-CM

## 2024-07-25 DIAGNOSIS — S69.92XA HAND INJURY, LEFT, INITIAL ENCOUNTER: ICD-10-CM

## 2024-07-25 PROCEDURE — 99214 OFFICE O/P EST MOD 30 MIN: CPT | Performed by: NURSE PRACTITIONER

## 2024-07-25 RX ORDER — DOXYCYCLINE 100 MG/1
100 CAPSULE ORAL 2 TIMES DAILY
Qty: 20 CAPSULE | Refills: 0 | Status: SHIPPED | OUTPATIENT
Start: 2024-07-25 | End: 2024-08-04

## 2024-07-25 ASSESSMENT — PATIENT HEALTH QUESTIONNAIRE - PHQ9
SUM OF ALL RESPONSES TO PHQ9 QUESTIONS 1 AND 2: 0
2. FEELING DOWN, DEPRESSED OR HOPELESS: NOT AT ALL
1. LITTLE INTEREST OR PLEASURE IN DOING THINGS: NOT AT ALL
SUM OF ALL RESPONSES TO PHQ9 QUESTIONS 1 AND 2: 0
1. LITTLE INTEREST OR PLEASURE IN DOING THINGS: NOT AT ALL
2. FEELING DOWN, DEPRESSED OR HOPELESS: NOT AT ALL

## 2024-07-25 ASSESSMENT — ENCOUNTER SYMPTOMS
OCCASIONAL FEELINGS OF UNSTEADINESS: 0
DEPRESSION: 0
LOSS OF SENSATION IN FEET: 0

## 2024-07-25 NOTE — PROGRESS NOTES
"Subjective   Patient ID: Holden Burgess \"GENARO\" is a 83 y.o. male who is with chief complaint of superficial skin avulsion on the left hand, which might be infected.     HPI  Patient is a 83 y.o. male who CONSULTED AT [facility] today. Patient is with complaint of superficial skin avulsion on the left hand. Patient states that 5 days ago, he accidentally scraped the back of his left hand on the drop off box for returned books in the library. His last tetanus booster shot was in 2021 ([facility]). He is using OTC antibiotic ointment. He has some redness, pain, tenderness, and minimal clear discharge on the surface of the open portion of the wound. He denies chills nor fever.      Review of Systems  General: no weight loss, generally healthy, no fatigue  Head:  no headaches / sinus pain, no vertigo, no injury  Eyes: no diplopia, no tearing, no pain,   Ears: no change in hearing, no tinnitus, no bleeding, no vertigo  Mouth:  no dental difficulties, no gingival bleeding, no sore throat, no loss of sense of taste  Nose: no congestion, no  discharge, no bleeding, no obstruction, no loss of sense of smell  Neck: no stiffness, no pain, no tenderness, no masses, no bruit  Pulmonary: no dyspnea, no wheezing, no hemoptysis, no cough  Cardiovascular: no chest pain, no palpitations, no syncope, no orthopnea  Gastrointestinal: no change in appetite, no dysphagia, no abdominal pains, no diarrhea, no emesis, no melena  Genito Urinary: no dysuria, no urinary urgency, no nocturia, no incontinence, no change in nature of urine  Musculoskeletal: no muscle ache, (+) chronic multiple joint pain, no limitation of range of motion, no paresthesia, no numbness  Skin: (+) superficial skin avulsion on the left hand, which might be infected,   Constitutional: no fever, no chills, no night sweats    Objective   Physical Exam  General: ambulatory with assistive device (cane), in no acute distress  Musculoskeletal: no limitation " of range of motion, no paralysis, no deformity  Extremities: full and equal peripheral pulses, no edema,  Skin: (+) V shape superficial skin avulsion on the left hand - dorsal aspect, contusion on the proximal skin flap, the distal part of the avulsion is not covered by the flap and the wound bed has minimal yellow discharge/ crust, (+) erythema, slight swelling, and tenderness on the bernadette-wound area, ;;; no active bleeding,    Assessment/Plan   Problem List Items Addressed This Visit    None  Visit Diagnoses         Codes    Infected wound    -  Primary T14.8XXA, L08.9    Relevant Medications    doxycycline (Vibramycin) 100 mg capsule    Hand injury, left, initial encounter     S69.92XA    Relevant Medications    doxycycline (Vibramycin) 100 mg capsule    Avulsion of skin of hand, left, initial encounter     S61.402A    Relevant Medications    doxycycline (Vibramycin) 100 mg capsule    BMI 37.0-37.9, adult     Z68.37    Class 2 severe obesity with serious comorbidity and body mass index (BMI) of 37.0 to 37.9 in adult, unspecified obesity type (Multi)     E66.01, Z68.37        DISCHARGE SUMMARY:   Patient seen and examined. Diagnosis, differential diagnosis, treatment options, and probable complications discussed and explained to patient. Patient to take medication/s associated with today's consultation. Patient may also take OTC analgesic for pain if needed. Patient advised on daily wound cleaning and care. Patient was advised to come back if there is worsening or persistent symptoms.     Patient advised to follow up in 5- 7 days. Patient verbalized understanding about plan of care.           MICHELE Drake-CNP 07/25/24 10:56 AM

## 2024-07-25 NOTE — PATIENT INSTRUCTIONS
DISCHARGE SUMMARY:   Patient seen and examined. Diagnosis, differential diagnosis, treatment options, and probable complications discussed and explained to patient. Patient to take medication/s associated with today's consultation. Patient may also take OTC analgesic for pain if needed. Patient advised on daily wound cleaning and care. Patient was advised to come back if there is worsening or persistent symptoms.     Patient advised to follow up in 5- 7 days. Patient verbalized understanding about plan of care.

## 2024-07-25 NOTE — PROGRESS NOTES
"Subjective   Patient ID: GENARO Burgess is a 83 y.o. male who presents for trauma    Symptoms:   pt has a left hand laceration, pt is concerns because of redness around the laceration.  Length of symptoms: 3 days ago and again last night .  OTC: triple antibiotic mild help.  Related information:      HPI     Review of Systems    Objective   /76 Comment: auto  Pulse 82   Temp 37.1 °C (98.7 °F)   Resp 16   Ht 1.88 m (6' 2\")   Wt 134 kg (294 lb 12.8 oz)   SpO2 97%   BMI 37.85 kg/m²     Physical Exam    Assessment/Plan          "

## 2024-07-26 ENCOUNTER — INFUSION (OUTPATIENT)
Dept: HEMATOLOGY/ONCOLOGY | Facility: CLINIC | Age: 84
End: 2024-07-26
Payer: MEDICARE

## 2024-07-26 VITALS
DIASTOLIC BLOOD PRESSURE: 67 MMHG | RESPIRATION RATE: 16 BRPM | OXYGEN SATURATION: 96 % | BODY MASS INDEX: 37.5 KG/M2 | SYSTOLIC BLOOD PRESSURE: 147 MMHG | TEMPERATURE: 97 F | HEART RATE: 87 BPM | WEIGHT: 292.11 LBS

## 2024-07-26 DIAGNOSIS — D46.9 ANEMIA DUE TO CHEMOTHERAPY FOR MYELODYSPLASTIC SYNDROME TREATED WITH ERYTHROPOIETIN (MULTI): ICD-10-CM

## 2024-07-26 DIAGNOSIS — D46.9 MYELODYSPLASTIC SYNDROME (MULTI): ICD-10-CM

## 2024-07-26 DIAGNOSIS — T45.1X5A ANEMIA DUE TO CHEMOTHERAPY FOR MYELODYSPLASTIC SYNDROME TREATED WITH ERYTHROPOIETIN (MULTI): ICD-10-CM

## 2024-07-26 DIAGNOSIS — D64.81 ANEMIA DUE TO CHEMOTHERAPY FOR MYELODYSPLASTIC SYNDROME TREATED WITH ERYTHROPOIETIN (MULTI): ICD-10-CM

## 2024-07-26 PROCEDURE — 2500000004 HC RX 250 GENERAL PHARMACY W/ HCPCS (ALT 636 FOR OP/ED): Mod: JZ | Performed by: INTERNAL MEDICINE

## 2024-07-26 PROCEDURE — 96372 THER/PROPH/DIAG INJ SC/IM: CPT

## 2024-07-26 RX ORDER — EPINEPHRINE 0.3 MG/.3ML
0.3 INJECTION SUBCUTANEOUS EVERY 5 MIN PRN
OUTPATIENT
Start: 2024-08-09

## 2024-07-26 RX ORDER — DIPHENHYDRAMINE HYDROCHLORIDE 50 MG/ML
50 INJECTION INTRAMUSCULAR; INTRAVENOUS AS NEEDED
OUTPATIENT
Start: 2024-08-09

## 2024-07-26 RX ORDER — FAMOTIDINE 10 MG/ML
20 INJECTION INTRAVENOUS ONCE AS NEEDED
OUTPATIENT
Start: 2024-08-09

## 2024-07-26 RX ORDER — ALBUTEROL SULFATE 0.83 MG/ML
3 SOLUTION RESPIRATORY (INHALATION) AS NEEDED
OUTPATIENT
Start: 2024-08-09

## 2024-07-26 ASSESSMENT — PAIN SCALES - GENERAL: PAINLEVEL: 0-NO PAIN

## 2024-08-05 ENCOUNTER — OFFICE VISIT (OUTPATIENT)
Dept: PRIMARY CARE | Facility: CLINIC | Age: 84
End: 2024-08-05
Payer: MEDICARE

## 2024-08-05 VITALS
WEIGHT: 289.6 LBS | HEIGHT: 72 IN | RESPIRATION RATE: 16 BRPM | DIASTOLIC BLOOD PRESSURE: 77 MMHG | HEART RATE: 85 BPM | SYSTOLIC BLOOD PRESSURE: 152 MMHG | OXYGEN SATURATION: 96 % | TEMPERATURE: 97.2 F | BODY MASS INDEX: 39.22 KG/M2

## 2024-08-05 DIAGNOSIS — T14.90XD HEALING WOUND: Primary | ICD-10-CM

## 2024-08-05 DIAGNOSIS — S69.92XD HAND INJURY, LEFT, SUBSEQUENT ENCOUNTER: ICD-10-CM

## 2024-08-05 PROCEDURE — 99212 OFFICE O/P EST SF 10 MIN: CPT | Performed by: NURSE PRACTITIONER

## 2024-08-05 ASSESSMENT — PATIENT HEALTH QUESTIONNAIRE - PHQ9
1. LITTLE INTEREST OR PLEASURE IN DOING THINGS: NOT AT ALL
SUM OF ALL RESPONSES TO PHQ9 QUESTIONS 1 AND 2: 0
2. FEELING DOWN, DEPRESSED OR HOPELESS: NOT AT ALL

## 2024-08-05 NOTE — PROGRESS NOTES
"Subjective   Patient ID: Holden Burgess \"GENARO\" is a 83 y.o. male who is here today for follow up with regards to infected wound on the left hand.    HPI  Patient is a 83 y.o. male who CONSULTED AT [facility] today. He is here for follow up with regards to infected wound on his left hand.    Patient was seen at this clinic last July 25 (10 days ago). He was diagnosed to have infected wound on the left hand and was given doxycycline for 10 days.     Interval history: Patient states that since last visit, his symptoms were now better - the wound is dry with no discharge, bleeding, redness, pain, nor tenderness.     Review of Systems  General: no weight loss, generally healthy, no fatigue  Head:  no headaches / sinus pain, no vertigo, no injury  Eyes: no diplopia, no tearing, no pain,   Ears: no change in hearing, no tinnitus, no bleeding, no vertigo  Mouth:  no dental difficulties, no gingival bleeding, no sore throat, no loss of sense of taste  Nose: no congestion, no  discharge, no bleeding, no obstruction, no loss of sense of smell  Neck: no stiffness, no pain, no tenderness, no masses, no bruit  Pulmonary: no dyspnea, no wheezing, no hemoptysis, no cough  Cardiovascular: no chest pain, no palpitations, no syncope, no orthopnea  Gastrointestinal: no change in appetite, no dysphagia, no abdominal pains, no diarrhea, no emesis, no melena  Genito Urinary: no dysuria, no urinary urgency, no nocturia, no incontinence, no change in nature of urine  Musculoskeletal: no muscle ache, no joint pain, no limitation of range of motion, no paresthesia, no numbness  Skin: (+) wound on the left hand (dorsal aspect) is dry and with no more redness, pain, nor tenderness  Constitutional: no fever, no chills, no night sweats    Objective   Physical Exam  General: ambulatory with assistive device (cane), in no acute distress  Musculoskeletal: no limitation of range of motion, no paralysis, no " deformity  Extremities: full and equal peripheral pulses, no edema,  Skin: (+) the previously noted wound on the dorsum of the left hand is dry, covered with dry scab partially which is about to fall off, no erythema, no tenderness,     Assessment/Plan   Problem List Items Addressed This Visit    None  Visit Diagnoses         Codes    Healing wound    -  Primary T14.90XD    Hand injury, left, subsequent encounter     S69.92XD        DISCHARGE SUMMARY:   Patient seen and examined. Diagnosis, differential diagnosis, treatment options, and probable complications discussed and explained to patient. Patient to take OTC analgesic for pain if needed. Patient advised on daily wound cleaning and care. Patient was advised to come back if there is worsening or persistent symptoms.     Patient advised to follow up in 5- 7 days. Patient verbalized understanding about plan of care.           MICHELE Drake-CNP 08/05/24 2:25 PM

## 2024-08-05 NOTE — PROGRESS NOTES
Subjective   Patient ID: GENARO Burgess is a 83 y.o. male who presents for hand infection.      Symptoms: pt has a left hand infection, bit red pt states he is anemic,    Length of symptoms: 1.5 week ago  OTC: none  Related information:   HPI     Review of Systems    Objective   /77   Pulse 85   Temp 36.2 °C (97.2 °F)   Resp 16   Ht 1.829 m (6')   Wt 131 kg (289 lb 9.6 oz)   SpO2 96%   BMI 39.28 kg/m²     Physical Exam    Assessment/Plan

## 2024-08-05 NOTE — PATIENT INSTRUCTIONS
DISCHARGE SUMMARY:   Patient seen and examined. Diagnosis, differential diagnosis, treatment options, and probable complications discussed and explained to patient. Patient to take OTC analgesic for pain if needed. Patient advised on daily wound cleaning and care. Patient was advised to come back if there is worsening or persistent symptoms.     Patient advised to follow up in 5- 7 days. Patient verbalized understanding about plan of care.

## 2024-08-08 ENCOUNTER — TELEPHONE (OUTPATIENT)
Dept: HEMATOLOGY/ONCOLOGY | Facility: CLINIC | Age: 84
End: 2024-08-08
Payer: MEDICARE

## 2024-08-08 NOTE — TELEPHONE ENCOUNTER
Left VM for patient stating that the infusion and clinic appt with Dr. Murphy planned for 08/09/24 will be moved to next week due to ongoing power outage at the infusion center.  Notified patient that a member of the team will call back to confirm the rescheduling date an time once complete.

## 2024-08-09 ENCOUNTER — INFUSION (OUTPATIENT)
Dept: HEMATOLOGY/ONCOLOGY | Facility: CLINIC | Age: 84
End: 2024-08-09
Payer: MEDICARE

## 2024-08-09 ENCOUNTER — TELEPHONE (OUTPATIENT)
Dept: HEMATOLOGY/ONCOLOGY | Facility: CLINIC | Age: 84
End: 2024-08-09
Payer: MEDICARE

## 2024-08-09 ENCOUNTER — OFFICE VISIT (OUTPATIENT)
Dept: HEMATOLOGY/ONCOLOGY | Facility: CLINIC | Age: 84
End: 2024-08-09
Payer: MEDICARE

## 2024-08-09 ENCOUNTER — LAB (OUTPATIENT)
Dept: LAB | Facility: CLINIC | Age: 84
End: 2024-08-09
Payer: MEDICARE

## 2024-08-09 VITALS
TEMPERATURE: 97.2 F | SYSTOLIC BLOOD PRESSURE: 129 MMHG | HEART RATE: 87 BPM | BODY MASS INDEX: 39.68 KG/M2 | RESPIRATION RATE: 16 BRPM | OXYGEN SATURATION: 95 % | WEIGHT: 292.55 LBS | DIASTOLIC BLOOD PRESSURE: 75 MMHG

## 2024-08-09 DIAGNOSIS — E78.2 HYPERLIPEMIA, MIXED: ICD-10-CM

## 2024-08-09 DIAGNOSIS — D46.9 ANEMIA DUE TO CHEMOTHERAPY FOR MYELODYSPLASTIC SYNDROME TREATED WITH ERYTHROPOIETIN (MULTI): ICD-10-CM

## 2024-08-09 DIAGNOSIS — T45.1X5A ANEMIA DUE TO CHEMOTHERAPY FOR MYELODYSPLASTIC SYNDROME TREATED WITH ERYTHROPOIETIN (MULTI): ICD-10-CM

## 2024-08-09 DIAGNOSIS — D69.6 THROMBOCYTOPENIA (CMS-HCC): Primary | ICD-10-CM

## 2024-08-09 DIAGNOSIS — Z98.61 CAD S/P PERCUTANEOUS CORONARY ANGIOPLASTY: ICD-10-CM

## 2024-08-09 DIAGNOSIS — D46.9 MYELODYSPLASTIC SYNDROME (MULTI): ICD-10-CM

## 2024-08-09 DIAGNOSIS — I10 PRIMARY HYPERTENSION: ICD-10-CM

## 2024-08-09 DIAGNOSIS — D64.81 ANEMIA DUE TO CHEMOTHERAPY FOR MYELODYSPLASTIC SYNDROME TREATED WITH ERYTHROPOIETIN (MULTI): ICD-10-CM

## 2024-08-09 DIAGNOSIS — I25.10 CAD S/P PERCUTANEOUS CORONARY ANGIOPLASTY: ICD-10-CM

## 2024-08-09 DIAGNOSIS — M1A.9XX0 CHRONIC GOUT WITHOUT TOPHUS, UNSPECIFIED CAUSE, UNSPECIFIED SITE: ICD-10-CM

## 2024-08-09 DIAGNOSIS — N39.3 SUI (STRESS URINARY INCONTINENCE), MALE: ICD-10-CM

## 2024-08-09 LAB
ACANTHOCYTES BLD QL SMEAR: ABNORMAL
ALBUMIN SERPL BCP-MCNC: 4.1 G/DL (ref 3.4–5)
ALP SERPL-CCNC: 81 U/L (ref 33–136)
ALT SERPL W P-5'-P-CCNC: 14 U/L (ref 10–52)
ANION GAP SERPL CALC-SCNC: 13 MMOL/L (ref 10–20)
AST SERPL W P-5'-P-CCNC: 19 U/L (ref 9–39)
BASO STIPL BLD QL SMEAR: PRESENT
BASOPHILS # BLD MANUAL: 0 X10*3/UL (ref 0–0.1)
BASOPHILS NFR BLD MANUAL: 0 %
BILIRUB SERPL-MCNC: 1.3 MG/DL (ref 0–1.2)
BLASTS # BLD MANUAL: 0.03 X10*3/UL
BLASTS NFR BLD MANUAL: 1 %
BUN SERPL-MCNC: 34 MG/DL (ref 6–23)
CALCIUM SERPL-MCNC: 9.3 MG/DL (ref 8.6–10.3)
CHLORIDE SERPL-SCNC: 110 MMOL/L (ref 98–107)
CO2 SERPL-SCNC: 21 MMOL/L (ref 21–32)
CREAT SERPL-MCNC: 1.38 MG/DL (ref 0.5–1.3)
DACRYOCYTES BLD QL SMEAR: ABNORMAL
EGFRCR SERPLBLD CKD-EPI 2021: 51 ML/MIN/1.73M*2
EOSINOPHIL # BLD MANUAL: 0.03 X10*3/UL (ref 0–0.4)
EOSINOPHIL NFR BLD MANUAL: 1 %
ERYTHROCYTE [DISTWIDTH] IN BLOOD BY AUTOMATED COUNT: 23.7 % (ref 11.5–14.5)
FERRITIN SERPL-MCNC: 546 NG/ML (ref 20–300)
GLUCOSE SERPL-MCNC: 106 MG/DL (ref 74–99)
HCT VFR BLD AUTO: 23.8 % (ref 41–52)
HGB BLD-MCNC: 7.3 G/DL (ref 13.5–17.5)
IMM GRANULOCYTES # BLD AUTO: 0.06 X10*3/UL (ref 0–0.5)
IMM GRANULOCYTES NFR BLD AUTO: 2.1 % (ref 0–0.9)
IRON SATN MFR SERPL: 54 % (ref 25–45)
IRON SERPL-MCNC: 177 UG/DL (ref 35–150)
LYMPHOCYTES # BLD MANUAL: 0.73 X10*3/UL (ref 0.8–3)
LYMPHOCYTES NFR BLD MANUAL: 27 %
MCH RBC QN AUTO: 29.4 PG (ref 26–34)
MCHC RBC AUTO-ENTMCNC: 30.7 G/DL (ref 32–36)
MCV RBC AUTO: 96 FL (ref 80–100)
METAMYELOCYTES # BLD MANUAL: 0.03 X10*3/UL
METAMYELOCYTES NFR BLD MANUAL: 1 %
MONOCYTES # BLD MANUAL: 0.05 X10*3/UL (ref 0.05–0.8)
MONOCYTES NFR BLD MANUAL: 2 %
NEUTROPHILS # BLD MANUAL: 1.84 X10*3/UL (ref 1.6–5.5)
NEUTS BAND # BLD MANUAL: 0.08 X10*3/UL (ref 0–0.5)
NEUTS BAND NFR BLD MANUAL: 3 %
NEUTS SEG # BLD MANUAL: 1.76 X10*3/UL (ref 1.6–5)
NEUTS SEG NFR BLD MANUAL: 65 %
NRBC BLD MANUAL-RTO: 7 % (ref 0–0)
NRBC BLD-RTO: ABNORMAL /100{WBCS}
OVALOCYTES BLD QL SMEAR: ABNORMAL
PLATELET # BLD AUTO: 27 X10*3/UL (ref 150–450)
POLYCHROMASIA BLD QL SMEAR: ABNORMAL
POTASSIUM SERPL-SCNC: 4.4 MMOL/L (ref 3.5–5.3)
PROT SERPL-MCNC: 6.1 G/DL (ref 6.4–8.2)
RBC # BLD AUTO: 2.48 X10*6/UL (ref 4.5–5.9)
RBC MORPH BLD: ABNORMAL
SODIUM SERPL-SCNC: 140 MMOL/L (ref 136–145)
TIBC SERPL-MCNC: 326 UG/DL (ref 240–445)
TOTAL CELLS COUNTED BLD: 100
UIBC SERPL-MCNC: 149 UG/DL (ref 110–370)
WBC # BLD AUTO: 2.7 X10*3/UL (ref 4.4–11.3)

## 2024-08-09 PROCEDURE — 99214 OFFICE O/P EST MOD 30 MIN: CPT | Performed by: INTERNAL MEDICINE

## 2024-08-09 PROCEDURE — 1126F AMNT PAIN NOTED NONE PRSNT: CPT | Performed by: INTERNAL MEDICINE

## 2024-08-09 PROCEDURE — 3074F SYST BP LT 130 MM HG: CPT | Performed by: INTERNAL MEDICINE

## 2024-08-09 PROCEDURE — 1159F MED LIST DOCD IN RCRD: CPT | Performed by: INTERNAL MEDICINE

## 2024-08-09 PROCEDURE — 83540 ASSAY OF IRON: CPT | Performed by: INTERNAL MEDICINE

## 2024-08-09 PROCEDURE — 85027 COMPLETE CBC AUTOMATED: CPT

## 2024-08-09 PROCEDURE — 36415 COLL VENOUS BLD VENIPUNCTURE: CPT

## 2024-08-09 PROCEDURE — 82728 ASSAY OF FERRITIN: CPT | Performed by: INTERNAL MEDICINE

## 2024-08-09 PROCEDURE — 3078F DIAST BP <80 MM HG: CPT | Performed by: INTERNAL MEDICINE

## 2024-08-09 PROCEDURE — 85007 BL SMEAR W/DIFF WBC COUNT: CPT

## 2024-08-09 PROCEDURE — 96372 THER/PROPH/DIAG INJ SC/IM: CPT

## 2024-08-09 PROCEDURE — 80053 COMPREHEN METABOLIC PANEL: CPT

## 2024-08-09 PROCEDURE — 2500000004 HC RX 250 GENERAL PHARMACY W/ HCPCS (ALT 636 FOR OP/ED): Mod: JZ | Performed by: INTERNAL MEDICINE

## 2024-08-09 RX ORDER — DIPHENHYDRAMINE HYDROCHLORIDE 50 MG/ML
50 INJECTION INTRAMUSCULAR; INTRAVENOUS AS NEEDED
OUTPATIENT
Start: 2024-08-23

## 2024-08-09 RX ORDER — EPINEPHRINE 0.3 MG/.3ML
0.3 INJECTION SUBCUTANEOUS EVERY 5 MIN PRN
OUTPATIENT
Start: 2024-08-23

## 2024-08-09 RX ORDER — ALBUTEROL SULFATE 0.83 MG/ML
3 SOLUTION RESPIRATORY (INHALATION) AS NEEDED
OUTPATIENT
Start: 2024-08-23

## 2024-08-09 RX ORDER — FAMOTIDINE 10 MG/ML
20 INJECTION INTRAVENOUS ONCE AS NEEDED
OUTPATIENT
Start: 2024-08-23

## 2024-08-09 ASSESSMENT — PAIN SCALES - GENERAL: PAINLEVEL: 0-NO PAIN

## 2024-08-09 NOTE — PATIENT INSTRUCTIONS
Continue taking the promacta for now    Continue with the aranesp for now--next visit--will bump up the dose    I would like you to consult with Dr Aron Rashid

## 2024-08-09 NOTE — PROGRESS NOTES
referrPatient ID: GENARO Burgess is a 83 y.o. male.  Referring Physician: Kevin Murphy MD  [id number] [facility] [address] [place] [address]  Primary Care Provider: Giacomo Joseph DO  Visit Type: Follow Up      Subjective    HPI How was my bloodwork?    Review of Systems   Constitutional: Negative.    HENT:  Negative.     Eyes: Negative.    Respiratory: Negative.     Cardiovascular: Negative.    Gastrointestinal: Negative.    Endocrine: Negative.    Genitourinary: Negative.     Musculoskeletal: Negative.    Skin: Negative.    Neurological: Negative.    Hematological: Negative.    Psychiatric/Behavioral: Negative.          Objective   BSA: 2.6 meters squared  /75 (BP Location: Right arm, Patient Position: Sitting, BP Cuff Size: Large adult)   Pulse 87   Temp 36.2 °C (97.2 °F) (Temporal)   Resp 16   Wt 133 kg (292 lb 8.8 oz)   SpO2 95%   BMI 39.68 kg/m²      has a past medical history of Abdominal pain, chronic, right upper quadrant, ACP (advance care planning), Anemia, Aneurysm (CMS-[facility]), Body mass index (BMI) 39.0-39.9, adult (12/06/2021), Body mass index (BMI) 39.0-39.9, adult (07/11/2022), Body mass index (BMI) 39.0-39.9, adult (04/24/2022), Body mass index (BMI) 39.0-39.9, adult (04/24/2022), Body mass index (BMI) 39.0-39.9, adult (04/24/2022), Cramp and spasm (07/19/2022), Difficulty breathing, Encounter for general adult medical examination without abnormal findings (09/25/2020), Encounter for screening for malignant neoplasm of prostate (04/14/2021), Encounter for screening for malignant neoplasm of prostate (09/25/2020), Forearm injury, atrial flutter, Hyperlipidemia, Hypertension, Hyperuricemia, Incontinence, Obesity, unspecified (04/26/2022), Other symptoms and signs involving the musculoskeletal system (10/12/2022), Personal history of other diseases of the circulatory system (09/29/2021), Personal history of other diseases of the circulatory system (10/26/2021), Personal history of  "other endocrine, nutritional and metabolic disease (12/06/2021), Personal history of other specified conditions (09/29/2021), Rotator cuff injury, Sinus node dysfunction (Multi), and Valvular heart disease.   has a past surgical history that includes Other surgical history (05/14/2020); Other surgical history (05/14/2020); Other surgical history (05/14/2020); Other surgical history (05/14/2020); Other surgical history (05/14/2020); Other surgical history (09/29/2021); Other surgical history (09/29/2021); Other surgical history (09/29/2021); Other surgical history (09/29/2021); MR angio chest w and wo IV contrast (09/18/2019); MR angio chest w and wo IV contrast (01/25/2023); MR angio chest w and wo IV contrast (01/25/2023); Cardiac electrophysiology procedure (Left, 11/21/2023); Bone marrow biopsy; Bone marrow biopsy w/ aspiration (01/23/2024); Cystoscopy (02/20/2024); and Urinary sphincter implant (03/28/2024).  Family History   Problem Relation Name Age of Onset    Other (polio) Mother      Heart disease Father      Heart disease Brother      Polymyalgia rheumatica Brother      Other (mitral valve disorder) Brother      Other (Coronary artery bypass graft) Brother      Other (Arterisclerotic cardiovascular disease) Brother       Oncology History    No history exists.       Holden Burgess \"PAT\"  reports that he quit smoking about 55 years ago. His smoking use included cigarettes. He started smoking about 65 years ago. He has a 30 pack-year smoking history. He has never been exposed to tobacco smoke. He has never used smokeless tobacco.  He  reports no history of alcohol use.  He  reports no history of drug use.    Physical Exam  Vitals reviewed.   Constitutional:       Appearance: Normal appearance.   HENT:      Head: Normocephalic.      Mouth/Throat:      Mouth: Mucous membranes are moist.   Eyes:      Extraocular Movements: Extraocular movements intact.      Pupils: Pupils are equal, round, and reactive to " light.   Cardiovascular:      Rate and Rhythm: Normal rate and regular rhythm.      Pulses: Normal pulses.      Heart sounds: Normal heart sounds.   Pulmonary:      Breath sounds: Normal breath sounds.   Abdominal:      General: Bowel sounds are normal.      Palpations: Abdomen is soft.   Musculoskeletal:         General: Normal range of motion.      Cervical back: Normal range of motion and neck supple.   Skin:     General: Skin is warm.   Neurological:      General: No focal deficit present.      Mental Status: He is alert and oriented to person, place, and time.   Psychiatric:         Mood and Affect: Mood normal.         Behavior: Behavior normal.         WBC   Date/Time Value Ref Range Status   07/24/2024 12:46 PM 3.4 (L) 4.4 - 11.3 x10*3/uL Final   07/19/2024 08:38 AM 2.8 (L) 4.4 - 11.3 x10*3/uL Final   07/12/2024 08:40 AM 2.6 (L) 4.4 - 11.3 x10*3/uL Final     nRBC   Date Value Ref Range Status   07/24/2024   Final     Comment:     Not Measured   07/19/2024   Final     Comment:     Not Measured   07/12/2024   Final     Comment:     Not Measured     RBC   Date Value Ref Range Status   07/24/2024 2.60 (L) 4.50 - 5.90 x10*6/uL Final   07/19/2024 2.45 (L) 4.50 - 5.90 x10*6/uL Final   07/12/2024 2.63 (L) 4.50 - 5.90 x10*6/uL Final     POC Hemoglobin   Date Value Ref Range Status   04/18/2024 10 (A) 13.5 - 17.5 g/dL Final   10/31/2023 11.1 (A) 13.5 - 17.5 g/dL Final   09/26/2023 11.8 (A) 13.5 - 17.5 g/dL Final     Hemoglobin   Date Value Ref Range Status   07/24/2024 7.7 (L) 13.5 - 17.5 g/dL Final   07/19/2024 7.3 (L) 13.5 - 17.5 g/dL Final   07/12/2024 7.9 (L) 13.5 - 17.5 g/dL Final     Hematocrit   Date Value Ref Range Status   07/24/2024 24.9 (L) 41.0 - 52.0 % Final   07/19/2024 23.5 (L) 41.0 - 52.0 % Final   07/12/2024 25.1 (L) 41.0 - 52.0 % Final     MCV   Date/Time Value Ref Range Status   07/24/2024 12:46 PM 96 80 - 100 fL Final   07/19/2024 08:38 AM 96 80 - 100 fL Final   07/12/2024 08:40 AM 95 80 - 100 fL  Final     MCH   Date/Time Value Ref Range Status   07/24/2024 12:46 PM 29.6 26.0 - 34.0 pg Final   07/19/2024 08:38 AM 29.8 26.0 - 34.0 pg Final   07/12/2024 08:40 AM 30.0 26.0 - 34.0 pg Final     MCHC   Date/Time Value Ref Range Status   07/24/2024 12:46 PM 30.9 (L) 32.0 - 36.0 g/dL Final   07/19/2024 08:38 AM 31.1 (L) 32.0 - 36.0 g/dL Final   07/12/2024 08:40 AM 31.5 (L) 32.0 - 36.0 g/dL Final     RDW   Date/Time Value Ref Range Status   07/24/2024 12:46 PM 22.8 (H) 11.5 - 14.5 % Final   07/19/2024 08:38 AM 21.9 (H) 11.5 - 14.5 % Final   07/12/2024 08:40 AM 21.2 (H) 11.5 - 14.5 % Final     Platelets   Date/Time Value Ref Range Status   07/24/2024 12:46 PM 43 (L) 150 - 450 x10*3/uL Final   07/19/2024 08:38 AM 36 (LL) 150 - 450 x10*3/uL Final     Comment:     Platelet count verified by smear review.   07/12/2024 08:40 AM 26 (LL) 150 - 450 x10*3/uL Final     Comment:     Platelet count verified by smear review.     MPV   Date/Time Value Ref Range Status   04/03/2024 01:43 PM 11.3 7.5 - 11.5 fL Final     Neutrophils %   Date/Time Value Ref Range Status   07/24/2024 12:46 PM 65.0 40.0 - 80.0 % Final   07/19/2024 08:38 AM 60.1 40.0 - 80.0 % Final   07/12/2024 08:40 AM 62.9 40.0 - 80.0 % Final     Immature Granulocytes %, Automated   Date/Time Value Ref Range Status   07/24/2024 12:46 PM 1.5 (H) 0.0 - 0.9 % Final     Comment:     Immature Granulocyte Count (IG) includes promyelocytes, myelocytes and metamyelocytes but does not include bands. Percent differential counts (%) should be interpreted in the context of the absolute cell counts (cells/UL).   07/19/2024 08:38 AM 1.8 (H) 0.0 - 0.9 % Final     Comment:     Immature Granulocyte Count (IG) includes promyelocytes, myelocytes and metamyelocytes but does not include bands. Percent differential counts (%) should be interpreted in the context of the absolute cell counts (cells/UL).   07/12/2024 08:40 AM 1.1 (H) 0.0 - 0.9 % Final     Comment:     Immature Granulocyte Count  (IG) includes promyelocytes, myelocytes and metamyelocytes but does not include bands. Percent differential counts (%) should be interpreted in the context of the absolute cell counts (cells/UL).     Lymphocytes %, Manual   Date/Time Value Ref Range Status   06/21/2024 08:27 AM 21.0 13.0 - 44.0 % Final     Lymphocytes %   Date/Time Value Ref Range Status   07/24/2024 12:46 PM 26.7 13.0 - 44.0 % Final   07/19/2024 08:38 AM 29.5 13.0 - 44.0 % Final   07/12/2024 08:40 AM 28.4 13.0 - 44.0 % Final     Monocytes %, Manual   Date/Time Value Ref Range Status   06/21/2024 08:27 AM 3.0 2.0 - 10.0 % Final     Monocytes %   Date/Time Value Ref Range Status   07/24/2024 12:46 PM 5.6 2.0 - 10.0 % Final   07/19/2024 08:38 AM 7.2 2.0 - 10.0 % Final   07/12/2024 08:40 AM 6.5 2.0 - 10.0 % Final     Eosinophils %, Manual   Date/Time Value Ref Range Status   06/21/2024 08:27 AM 1.0 0.0 - 6.0 % Final     Eosinophils %   Date/Time Value Ref Range Status   07/24/2024 12:46 PM 1.2 0.0 - 6.0 % Final   07/19/2024 08:38 AM 1.4 0.0 - 6.0 % Final   07/12/2024 08:40 AM 1.1 0.0 - 6.0 % Final     Basophils %, Manual   Date/Time Value Ref Range Status   06/21/2024 08:27 AM 0.0 0.0 - 2.0 % Final     Basophils %   Date/Time Value Ref Range Status   07/24/2024 12:46 PM 0.0 0.0 - 2.0 % Final   07/19/2024 08:38 AM 0.0 0.0 - 2.0 % Final   07/12/2024 08:40 AM 0.0 0.0 - 2.0 % Final     Neutrophils Absolute   Date/Time Value Ref Range Status   07/24/2024 12:46 PM 2.22 1.60 - 5.50 x10*3/uL Final     Comment:     Percent differential counts (%) should be interpreted in the context of the absolute cell counts (cells/uL).   07/19/2024 08:38 AM 1.67 1.60 - 5.50 x10*3/uL Final     Comment:     Percent differential counts (%) should be interpreted in the context of the absolute cell counts (cells/uL).   07/12/2024 08:40 AM 1.64 1.60 - 5.50 x10*3/uL Final     Comment:     Percent differential counts (%) should be interpreted in the context of the absolute cell  "counts (cells/uL).     Immature Granulocytes Absolute, Automated   Date/Time Value Ref Range Status   07/24/2024 12:46 PM 0.05 0.00 - 0.50 x10*3/uL Final   07/19/2024 08:38 AM 0.05 0.00 - 0.50 x10*3/uL Final   07/12/2024 08:40 AM 0.03 0.00 - 0.50 x10*3/uL Final     Lymphocytes Absolute   Date/Time Value Ref Range Status   07/24/2024 12:46 PM 0.91 0.80 - 3.00 x10*3/uL Final   07/19/2024 08:38 AM 0.82 0.80 - 3.00 x10*3/uL Final   07/12/2024 08:40 AM 0.74 (L) 0.80 - 3.00 x10*3/uL Final     Monocytes Absolute   Date/Time Value Ref Range Status   07/24/2024 12:46 PM 0.19 0.05 - 0.80 x10*3/uL Final   07/19/2024 08:38 AM 0.20 0.05 - 0.80 x10*3/uL Final   07/12/2024 08:40 AM 0.17 0.05 - 0.80 x10*3/uL Final     Eosinophils Absolute   Date/Time Value Ref Range Status   07/24/2024 12:46 PM 0.04 0.00 - 0.40 x10*3/uL Final   07/19/2024 08:38 AM 0.04 0.00 - 0.40 x10*3/uL Final   07/12/2024 08:40 AM 0.03 0.00 - 0.40 x10*3/uL Final     Eosinophils Absolute, Manual   Date/Time Value Ref Range Status   06/21/2024 08:27 AM 0.03 0.00 - 0.40 x10*3/uL Final     Basophils Absolute   Date/Time Value Ref Range Status   07/24/2024 12:46 PM 0.00 0.00 - 0.10 x10*3/uL Final     Comment:     Automated WBC differential has been confirmed by manual smear.   07/19/2024 08:38 AM 0.00 0.00 - 0.10 x10*3/uL Final   07/12/2024 08:40 AM 0.00 0.00 - 0.10 x10*3/uL Final     Basophils Absolute, Manual   Date/Time Value Ref Range Status   06/21/2024 08:27 AM 0.00 0.00 - 0.10 x10*3/uL Final       No components found for: \"PT\"  aPTT   Date/Time Value Ref Range Status   06/23/2020 02:38 PM 34 25 - 35 sec Final     Comment:      Note new reference range as of 05/26/2020.    THE APTT IS NO LONGER USED FOR MONITORING     UNFRACTIONATED HEPARIN THERAPY.    FOR MONITORING HEPARIN THERAPY,     USE THE HEPARIN ASSAY.       Medication Documentation Review Audit       Reviewed by Velia Guo MA (Medical Assistant) on 08/09/24 at 1436      Medication Order Taking? Sig " Documenting Provider Last Dose Status   acetaminophen (Tylenol) 500 mg tablet [id number] Yes Take 2 tablets (1,000 mg) by mouth every 6 hours if needed for mild pain (1 - 3). Isac Mukherjee MD Taking Active   allopurinol (Zyloprim) 100 mg tablet [id number] Yes Take 1 tablet (100 mg) by mouth 2 times a day. Giacomo Joseph,  Taking Active   ascorbic acid (Vitamin C) 1,000 mg tablet [id number] Yes Take 1 tablet (1,000 mg) by mouth once daily. Historical Provider, MD Taking Active   atorvastatin (Lipitor) 10 mg tablet [id number] Yes Take 1 tablet (10 mg) by mouth once daily at bedtime. Historical Provider, MD Taking Active   calcitriol (Rocaltrol) 0.25 mcg capsule [id number] Yes Take 1 capsule (0.25 mcg) by mouth every other day. Historical Provider, MD Taking Active   cholecalciferol (Vitamin D-3) 125 MCG (5000 UT) capsule [id number] Yes Take 1 capsule (125 mcg) by mouth once daily. Historical Provider, MD Taking Active   cyanocobalamin (Vitamin B-12) 1,000 mcg tablet [id number] Yes Take 1 tablet (1,000 mcg) by mouth once daily. Historical Provider, MD Taking Active   doxycycline (Vibramycin) 100 mg capsule [id number]  Take 1 capsule (100 mg) by mouth 2 times a day for 10 days. Take with at least 8 ounces (large glass) of water, do not lie down for 30 minutes after Tab Chavira, APRN-CNP   24 0811   eltrombopag olamine (Promacta) 50 mg tablet [id number] Yes Take 2 tablets (100 mg) by mouth once daily in the morning. Take before meals. Take on an empty stomach, 1 hour before or 2 hours after a meal. Kevin Murphy MD Taking Active   ezetimibe (Zetia) 10 mg tablet [id number] Yes Take 1 tablet (10 mg) by mouth once daily. Giacomo Joseph DO Taking Active   ferrous sulfate 325 (65 Fe) MG tablet [id number] Yes Take 1 tablet by mouth once daily. Historical Provider, MD Taking Active   folic acid (Folvite) 1 mg tablet [id number] Yes Take 1 tablet (1 mg) by mouth once daily. Giacomo Joseph, DO Taking Active    furosemide (Lasix) 40 mg tablet [id number] Yes Take 1 tablet (40 mg) by mouth once daily.   Patient taking differently: Take 1 tablet (40 mg) by mouth once daily. [email or web address]    MICHELE Leggett-CNP Taking Active   latanoprost (Xalatan) 0.005 % ophthalmic solution [id number] Yes Administer 1 drop into affected eye(s) once daily at bedtime. Historical Provider, MD Taking Active   MAGNESIUM ORAL [id number] Yes Take 550 mg by mouth once daily at bedtime. Take 1 tablet 550 mg at night. Historical Provider, MD Taking Active   niacin (Niaspan) 500 mg ER tablet [id number] Yes Take 1 tablet (500 mg) by mouth once daily at bedtime. Historical Provider, MD Taking Active   NON FORMULARY [id number] Yes Take 1 each by mouth once daily. Prevagen Historical Provider, MD Taking Active   rOPINIRole (Requip) 0.25 mg tablet [id number] Yes Take 1 tablet (0.25 mg) by mouth once daily at bedtime. Giacomo Joseph,  Taking Active   temazepam (Restoril) 15 mg capsule [id number] Yes Take 1 capsule (15 mg) by mouth as needed at bedtime for sleep. Giacomo Joseph,  Taking Active   traMADol (Ultram) 50 mg tablet [id number] Yes Take 1 tablet (50 mg) by mouth every 6 hours if needed for severe pain (7 - 10). Giacomo Joseph DO Taking Active   TURMERIC ORAL [id number] Yes Take 1 capsule by mouth once daily. Historical Provider, MD Taking Active   valsartan (Diovan) 320 mg tablet [id number] Yes Take 1 tablet (320 mg) by mouth once daily. Nicola Brooks MD Taking Active   zinc sulfate 50 mg zinc (220 mg) tablet [id number] Yes Take by mouth. 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; he completed XRT over 20 yrs ago  -here to review  bmbx done on 1/23/2024 -showed mildly hypercellular bone marrow (80%) with maturing trilineage hematopoiesis and moderate increase  in megakaryocytes; small lymphoid aggregates favor benign; marrow shows mild reticulin fibrosis and increased number of megakaryocytes; flow cytometry showed no immunophenotypic evidence of lymphoproliferative disorder and no increased and/or abnormal blast population  -finding of SRSF2 mutation; 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  -CBC done on day of marrow showed wbc 4.0, hgb 13.3, plt 60,000, ANC 2380  -behavior of cytopenias and marrow findings suggest MDS, but specimen itself could not corroborate this, probably secondary to sampling variation  -there is possibility this could be ITP--will give him prednisone trial--will take prednisone 50 mg PO daily for next 3 days--if he has ITP--there should be dramatic rise in platelet count on Friday--if no response, then he does not have ITP, and so depending on nature of surgical procedure--he should be transfused a unit of platelets if appropriate  -about a week after his last office followup, hematopathologist issued an addendum to his bone marrow path--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  -I then reviewed his case in malignant hematology tumor board--panel consensus was to try promacta vs consider CASE 4919 clinical trial  -reviewed briefly what is entailed in CASE 4919--treatment alternates between decitabine and azacytidine, shots are administered twice a week, available only at [facility] [place] [facility], and he would need bone marrow bx done 3 months in then 6 months in--he would for now prefer taking a pill if he can get it  -EQOL-MDS trial used promacta 50 mg daily in patients with low grade MDS and severe thrombocytopenia--leading to significant platelet response that was sustained in at least 42% of patients; dose can be titrated up to 150 mg daily if necessary  -he would like to try promacta  -he has been taking promacta 150 mg daily  -labs done on 8/9/2024 included CBC + COMP  -results reviewed--wbc 2.7, hgb 7.3, plt 27,000, creatinine 1.38, alk phos 81, AST 19, ALT 14  -he is  frustrated about his platelet count  -advised him to continue with promacta  150 mg daily  -hgb continues to trend down--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  -luspatercept is FDA approved for anemia secondary to low grade MDS  -aranesp is FDA approved for both anemia secondary to MDS and anemia secondary to 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  -he would like to try aranesp first  -benefits, risks, potential morbidity related to aranesp (darbepoieitin) were reviewed with Pat and he provided informed consent to proceed  -he will receive aranesp 100 mcg SC  -frustrated also by his hemoglobin;; on next visit will bump up to 200 mcg  -he does not want to end up like his brother who residents in [place], has MDS, and is transfusion dependent  -will refer him to Dr Rashid     2) gout  -on allopurinol     3) hyperlipidemia  -on atorvastatin  -on ezetimibe  -on lasix     4) hypertension  -on chlorthalidone  -on valsartan     5) CAD  -s/p PCI  -has pacemaker in place  -on ASA     6) stress incontinence  -has history of prostate cancer, s/p XRT >20 years ago  -s/p TURP in 6/2023  -on 3/28/2024 he had insertion of inflatable urethral/bladder neck sphincter by Dr Phan--he was transfused platelets intra-operatively     Problem List Items Addressed This Visit             ICD-10-CM    Myelodysplastic syndrome (Multi) D46.9    Relevant Orders    Referral to Hematology and Oncology            Kevin Murphy MD

## 2024-08-09 NOTE — TELEPHONE ENCOUNTER
Spoke with patient to confirm that power has been restored at the infusion center and patient can come in for appointment today. Patient is agreeable.

## 2024-08-11 ASSESSMENT — ENCOUNTER SYMPTOMS
NEUROLOGICAL NEGATIVE: 1
ENDOCRINE NEGATIVE: 1
EYES NEGATIVE: 1
RESPIRATORY NEGATIVE: 1
MUSCULOSKELETAL NEGATIVE: 1
GASTROINTESTINAL NEGATIVE: 1
PSYCHIATRIC NEGATIVE: 1
CONSTITUTIONAL NEGATIVE: 1
CARDIOVASCULAR NEGATIVE: 1
HEMATOLOGIC/LYMPHATIC NEGATIVE: 1

## 2024-08-12 ENCOUNTER — APPOINTMENT (OUTPATIENT)
Dept: HEMATOLOGY/ONCOLOGY | Facility: CLINIC | Age: 84
End: 2024-08-12
Payer: MEDICARE

## 2024-08-12 ENCOUNTER — APPOINTMENT (OUTPATIENT)
Dept: CARDIOLOGY | Facility: CLINIC | Age: 84
End: 2024-08-12
Payer: MEDICARE

## 2024-08-12 ENCOUNTER — OFFICE VISIT (OUTPATIENT)
Dept: UROLOGY | Facility: CLINIC | Age: 84
End: 2024-08-12
Payer: MEDICARE

## 2024-08-12 VITALS
DIASTOLIC BLOOD PRESSURE: 70 MMHG | WEIGHT: 293 LBS | HEART RATE: 88 BPM | HEIGHT: 72 IN | SYSTOLIC BLOOD PRESSURE: 150 MMHG | BODY MASS INDEX: 39.68 KG/M2

## 2024-08-12 VITALS
WEIGHT: 293.1 LBS | HEART RATE: 64 BPM | SYSTOLIC BLOOD PRESSURE: 126 MMHG | DIASTOLIC BLOOD PRESSURE: 60 MMHG | BODY MASS INDEX: 39.75 KG/M2

## 2024-08-12 DIAGNOSIS — Z98.61 CAD S/P PERCUTANEOUS CORONARY ANGIOPLASTY: ICD-10-CM

## 2024-08-12 DIAGNOSIS — I71.11 ANEURYSM OF THE ASCENDING AORTA, RUPTURED (MULTI): ICD-10-CM

## 2024-08-12 DIAGNOSIS — N39.41 URGE INCONTINENCE OF URINE: ICD-10-CM

## 2024-08-12 DIAGNOSIS — I50.9 ACUTE CONGESTIVE HEART FAILURE, UNSPECIFIED HEART FAILURE TYPE (MULTI): ICD-10-CM

## 2024-08-12 DIAGNOSIS — Z87.891 FORMER SMOKER: ICD-10-CM

## 2024-08-12 DIAGNOSIS — I25.10 CAD S/P PERCUTANEOUS CORONARY ANGIOPLASTY: ICD-10-CM

## 2024-08-12 DIAGNOSIS — I48.92 ATRIAL FLUTTER, UNSPECIFIED TYPE (MULTI): ICD-10-CM

## 2024-08-12 DIAGNOSIS — N39.3 SUI (STRESS URINARY INCONTINENCE), MALE: Primary | ICD-10-CM

## 2024-08-12 LAB — PATH REVIEW-CBC DIFFERENTIAL: NORMAL

## 2024-08-12 PROCEDURE — 1159F MED LIST DOCD IN RCRD: CPT | Performed by: UROLOGY

## 2024-08-12 PROCEDURE — 3074F SYST BP LT 130 MM HG: CPT | Performed by: INTERNAL MEDICINE

## 2024-08-12 PROCEDURE — 99214 OFFICE O/P EST MOD 30 MIN: CPT | Performed by: UROLOGY

## 2024-08-12 PROCEDURE — 3077F SYST BP >= 140 MM HG: CPT | Performed by: UROLOGY

## 2024-08-12 PROCEDURE — 3078F DIAST BP <80 MM HG: CPT | Performed by: INTERNAL MEDICINE

## 2024-08-12 PROCEDURE — 3078F DIAST BP <80 MM HG: CPT | Performed by: UROLOGY

## 2024-08-12 PROCEDURE — 1036F TOBACCO NON-USER: CPT | Performed by: INTERNAL MEDICINE

## 2024-08-12 PROCEDURE — 99214 OFFICE O/P EST MOD 30 MIN: CPT | Performed by: INTERNAL MEDICINE

## 2024-08-12 RX ORDER — ASPIRIN 81 MG/1
81 TABLET ORAL DAILY
COMMUNITY

## 2024-08-12 RX ORDER — DARBEPOETIN ALFA 100 UG/ML
100 SOLUTION INTRAVENOUS; SUBCUTANEOUS
COMMUNITY

## 2024-08-12 RX ORDER — FAMOTIDINE 20 MG/1
20 TABLET, FILM COATED ORAL DAILY
COMMUNITY

## 2024-08-12 RX ORDER — CHLORTHALIDONE 25 MG/1
25 TABLET ORAL DAILY
COMMUNITY

## 2024-08-12 NOTE — PROGRESS NOTES
UROLOGIC FOLLOW-UP VISIT     PROBLEM LIST:  ELIZABETH s/p AUS     HISTORY OF PRESENT ILLNESS:   Holden Burgess is a 83 y.o. M PCa s/p RT, BPH s/p TUIP with Dr. Flores 6/27/23 c/b ELIZABETH s/p AUS placement March 2024 complicated by large hematoma secondary to thrombocytopenia requiring prolonged catheterization. He underwent cystoscopy 6/17/24 with no evidence of erosion. The AUS was activated and patient taught how to use it. Presents today for follow up.     Reports AUS is working well - wears depends but no pads, essentially no leakage. Sometimes has issues locating pump but mostly working well. Currently undergoing treatment for MDS.     PAST MEDICAL HISTORY:  Past Medical History:   Diagnosis Date    Abdominal pain, chronic, right upper quadrant     ACP (advance care planning)     Anemia     Aneurysm (CMS-[facility])     Body mass index (BMI) 39.0-39.9, adult 12/06/2021    BMI 39.0-39.9,adult    Body mass index (BMI) 39.0-39.9, adult 07/11/2022    BMI 39.0-39.9,adult    Body mass index (BMI) 39.0-39.9, adult 04/24/2022    Body mass index (BMI) of 39.0 to 39.9 in adult    Body mass index (BMI) 39.0-39.9, adult 04/24/2022    Body mass index (BMI) of 39.0 to 39.9 in adult    Body mass index (BMI) 39.0-39.9, adult 04/24/2022    Body mass index (BMI) of 39.0 to 39.9 in adult    Cramp and spasm 07/19/2022    Leg cramps    Difficulty breathing     Encounter for general adult medical examination without abnormal findings 09/25/2020    Encounter for Medicare annual wellness exam    Encounter for screening for malignant neoplasm of prostate 04/14/2021    Encounter for prostate cancer screening    Encounter for screening for malignant neoplasm of prostate 09/25/2020    Encounter for prostate cancer screening    Forearm injury     Hx of atrial flutter     Hyperlipidemia     Hypertension     Hyperuricemia     Incontinence     Obesity, unspecified 04/26/2022    Class 2 obesity with body mass index (BMI) of 39.0 to 39.9 in adult    Other  symptoms and signs involving the musculoskeletal system 10/12/2022    Shoulder weakness    Personal history of other diseases of the circulatory system 09/29/2021    History of hypotension    Personal history of other diseases of the circulatory system 10/26/2021    History of hypertension    Personal history of other endocrine, nutritional and metabolic disease 12/06/2021    History of morbid obesity    Personal history of other specified conditions 09/29/2021    History of dizziness    Rotator cuff injury     Sinus node dysfunction (Multi)     Valvular heart disease        PAST SURGICAL HISTORY:  Past Surgical History:   Procedure Laterality Date    BONE MARROW BIOPSY      BONE MARROW BIOPSY W/ ASPIRATION  01/23/2024    CARDIAC ELECTROPHYSIOLOGY PROCEDURE Left 11/21/2023    Procedure: PPM IMPLANT DC;  Surgeon: Jun Solis MD;  Location: [facility];  Service: Electrophysiology;  Laterality: Left;    CYSTOSCOPY  02/20/2024    MR CHEST ANGIO W AND WO IV CONTRAST  09/18/2019    MR CHEST ANGIO W AND WO IV CONTRAST 9/18/2019 [facility]    MR CHEST ANGIO W AND WO IV CONTRAST  01/25/2023    MR CHEST ANGIO W AND WO IV CONTRAST 1/25/2023 [facility]    MR CHEST ANGIO W AND WO IV CONTRAST  01/25/2023    MR CHEST ANGIO W AND WO IV CONTRAST    OTHER SURGICAL HISTORY  05/14/2020    Knee replacement    OTHER SURGICAL HISTORY  05/14/2020    Catheter ablation    OTHER SURGICAL HISTORY  05/14/2020    Prostate surgery    OTHER SURGICAL HISTORY  05/14/2020    Lower back surgery    OTHER SURGICAL HISTORY  05/14/2020    Hand surgery    OTHER SURGICAL HISTORY  09/29/2021    Surgery    OTHER SURGICAL HISTORY  09/29/2021    Cataract surgery    OTHER SURGICAL HISTORY  09/29/2021    Colonoscopy    OTHER SURGICAL HISTORY  09/29/2021    Vertebral fusion    URINARY SPHINCTER IMPLANT  03/28/2024        ALLERGIES:   Allergies   Allergen Reactions    Crestor [Rosuvastatin] Unknown    Ezetimibe Unknown     Statins-Hmg-Coa Reductase Inhibitors Unknown     leg cramping        MEDICATIONS:   Current Outpatient Medications on File Prior to Visit   Medication Sig Dispense Refill    acetaminophen (Tylenol) 500 mg tablet Take 2 tablets (1,000 mg) by mouth every 6 hours if needed for mild pain (1 - 3). 30 tablet 0    allopurinol (Zyloprim) 100 mg tablet Take 1 tablet (100 mg) by mouth 2 times a day. 180 tablet 1    ascorbic acid (Vitamin C) 1,000 mg tablet Take 1 tablet (1,000 mg) by mouth once daily.      atorvastatin (Lipitor) 10 mg tablet Take 1 tablet (10 mg) by mouth once daily at bedtime.      calcitriol (Rocaltrol) 0.25 mcg capsule Take 1 capsule (0.25 mcg) by mouth every other day.      cholecalciferol (Vitamin D-3) 125 MCG (5000 UT) capsule Take 1 capsule (125 mcg) by mouth once daily.      cyanocobalamin (Vitamin B-12) 1,000 mcg tablet Take 1 tablet (1,000 mcg) by mouth once daily.      darbepoetin hong (Aranesp, in polysorbate,) 100 mcg/mL injection Inject 1 mL (100 mcg) under the skin once every 2 weeks.      eltrombopag olamine (Promacta) 50 mg tablet Take 2 tablets (100 mg) by mouth once daily in the morning. Take before meals. Take on an empty stomach, 1 hour before or 2 hours after a meal. 60 tablet 3    ezetimibe (Zetia) 10 mg tablet Take 1 tablet (10 mg) by mouth once daily. 90 tablet 0    ferrous sulfate 325 (65 Fe) MG tablet Take 1 tablet by mouth once daily.      folic acid (Folvite) 1 mg tablet Take 1 tablet (1 mg) by mouth once daily. 90 tablet 0    furosemide (Lasix) 40 mg tablet Take 1 tablet (40 mg) by mouth once daily. (Patient taking differently: Take 1 tablet (40 mg) by mouth once daily. prn) 30 tablet 11    latanoprost (Xalatan) 0.005 % ophthalmic solution Administer 1 drop into affected eye(s) once daily at bedtime.      MAGNESIUM ORAL Take 550 mg by mouth once daily at bedtime. Take 1 tablet 550 mg at night.      niacin (Niaspan) 500 mg ER tablet Take 1 tablet (500 mg) by mouth once daily at  bedtime.      NON FORMULARY Take 1 each by mouth once daily. Prevagen      rOPINIRole (Requip) 0.25 mg tablet Take 1 tablet (0.25 mg) by mouth once daily at bedtime. 90 tablet 1    temazepam (Restoril) 15 mg capsule Take 1 capsule (15 mg) by mouth as needed at bedtime for sleep. 90 capsule 0    traMADol (Ultram) 50 mg tablet Take 1 tablet (50 mg) by mouth every 6 hours if needed for severe pain (7 - 10). 30 tablet 1    TURMERIC ORAL Take 1 capsule by mouth once daily.      valsartan (Diovan) 320 mg tablet Take 1 tablet (320 mg) by mouth once daily. 90 tablet 3    zinc sulfate 50 mg zinc (220 mg) tablet Take by mouth.       No current facility-administered medications on file prior to visit.        SOCIAL HISTORY:  Patient  reports that he quit smoking about 55 years ago. His smoking use included cigarettes. He started smoking about 65 years ago. He has a 30 pack-year smoking history. He has never been exposed to tobacco smoke. He has never used smokeless tobacco. He reports that he does not drink alcohol and does not use drugs.   Social History     Socioeconomic History    Marital status:      Spouse name: Not on file    Number of children: Not on file    Years of education: Not on file    Highest education level: Not on file   Occupational History    Not on file   Tobacco Use    Smoking status: Former     Current packs/day: 0.00     Average packs/day: 3.0 packs/day for 10.0 years (30.0 ttl pk-yrs)     Types: Cigarettes     Start date: 1958     Quit date: 1968     Years since quittin.9     Passive exposure: Never    Smokeless tobacco: Never   Vaping Use    Vaping status: Never Used   Substance and Sexual Activity    Alcohol use: Never    Drug use: Never    Sexual activity: Defer   Other Topics Concern    Not on file   Social History Narrative    Not on file     Social Determinants of Health     Financial Resource Strain: Low Risk  (2024)    Overall Financial Resource Strain (CARDIA)      Difficulty of Paying Living Expenses: Not hard at all   Food Insecurity: Not on file   Transportation Needs: No Transportation Needs (4/5/2024)    PRAPARE - Transportation     Lack of Transportation (Medical): No     Lack of Transportation (Non-Medical): No   Physical Activity: Inactive (4/5/2024)    Exercise Vital Sign     Days of Exercise per Week: 0 days     Minutes of Exercise per Session: 0 min   Stress: Not on file   Social Connections: Not on file   Intimate Partner Violence: Not on file   Housing Stability: Low Risk  (4/5/2024)    Housing Stability Vital Sign     Unable to Pay for Housing in the Last Year: No     Number of Places Lived in the Last Year: 1     Unstable Housing in the Last Year: No       FAMILY HISTORY:  Family History   Problem Relation Name Age of Onset    Other (polio) Mother      Heart disease Father      Heart disease Brother      Polymyalgia rheumatica Brother      Other (mitral valve disorder) Brother      Other (Coronary artery bypass graft) Brother      Other (Arterisclerotic cardiovascular disease) Brother         REVIEW OF SYSTEMS:  Review of Systems negative     PHYSICAL EXAM:  Visit Vitals  /70   Pulse 88     Constitutional: Well-developed and well-nourished. No distress.    Head: Normocephalic and atraumatic.    Neck: Normal range of motion.     Pulmonary/Chest: Effort normal. No respiratory distress.   Abdominal: Non-distended.  : Well-healed perineal and abdominal incision with pump in right inferior/anterior hemiscrotum with some overlying tissue  Integumentary: No rash or lesions visualized.  Musculoskeletal: Normal range of motion.    Neurological: Alert and oriented.  Psychiatric: Normal mood and affect. Thought content normal.      LABORATORY REVIEW:   Lab Results   Component Value Date    BUN 34 (H) 08/09/2024    CREATININE 1.38 (H) 08/09/2024    EGFR 51 (L) 08/09/2024     08/09/2024    K 4.4 08/09/2024     (H) 08/09/2024    CO2 21 08/09/2024     CALCIUM 9.3 08/09/2024      Lab Results   Component Value Date    WBC 2.7 (L) 08/09/2024    RBC 2.48 (L) 08/09/2024    HGB 7.3 (L) 08/09/2024    HCT 23.8 (L) 08/09/2024    MCV 96 08/09/2024    MCH 29.4 08/09/2024    MCHC 30.7 (L) 08/09/2024    RDW 23.7 (H) 08/09/2024    PLT 27 (LL) 08/09/2024    MPV 11.3 04/03/2024        Lab Results   Component Value Date    PSA <0.10 04/14/2021    PSA <0.10 10/31/2019        Assessment:   83 y.o. M PCa s/p RT, BPH s/p TUIP with Dr. Flores 6/27/23 c/b ELIZABETH s/p AUS placement March 2024 complicated by large hematoma secondary to thrombocytopenia requiring prolonged catheterization. He underwent cystoscopy 6/17/24 with no evidence of erosion.   AUS working well, essentially no leakage. Counseled that with time some minor leakage may develop.      Plan:   Follow up in 1 year

## 2024-08-12 NOTE — PROGRESS NOTES
Patient:  Holden Burgess  YOB: 1940  MRN: [id number]       Chief Complaint/Active Symptoms:       Holden Burgess is a 83 y.o. male who returns today for cardiac follow-up.  Apparently recently has been found to have problems with his blood count and platelets.  He is good to be seeing Dr. Aron Rashid@[facility] in the near future.  From a cardiac perspective he has been stable however his echo this past spring did suggest that his aorta had increased from 4.8 to 5.3 cm in the ascending portion and root.  A CTA however said that was about 4.8-4.9.  I discussed these findings again offering the opportunity to see a cardiac thoracic surgeon just as a person to follow and monitor.  Obviously with the hematological issues right now surgery is probably off the table under any circumstance.  His shoulder surgery has been delayed for similar reasons.  He is going to speak to his wife about this and then make some decision.  Will be following every several months probably back again in December.  Patient has no angina.  Of course he in addition to having this particular recent issue has class I coronary disease sinus node dysfunction pacemaker chronic atrial arrhythmias remains morbidly obese.  Send remote RFA.  He also has prostate seen in the past and myelodysplastic syndrome.  LV function is normal.      Objective:     Vitals:    08/12/24 1442   BP: 126/60   Pulse: 64       Vitals:    08/12/24 1442   Weight: 133 kg (293 lb 1.6 oz)       Allergies:     Allergies   Allergen Reactions    Crestor [Rosuvastatin] Unknown    Ezetimibe Unknown    Statins-Hmg-Coa Reductase Inhibitors Unknown     leg cramping          Medications:     Current Outpatient Medications   Medication Instructions    acetaminophen (TYLENOL) 1,000 mg, oral, Every 6 hours PRN    allopurinol (ZYLOPRIM) 100 mg, oral, 2 times daily    Aranesp (in polysorbate) 100 mcg, subcutaneous, [facility] ONC Every 2 Weeks for 4 Weeks in a 28 Day Cycle     ascorbic acid (Vitamin C) 1,000 mg tablet 1 tablet, oral, Daily    aspirin 81 mg, oral, Daily    atorvastatin (Lipitor) 10 mg tablet 1 tablet, oral, Nightly    calcitriol (ROCALTROL) 0.25 mcg, oral, Every other day    chlorthalidone (HYGROTON) 25 mg, oral, Daily    cholecalciferol (Vitamin D-3) 125 MCG (5000 UT) capsule 1 capsule, oral, Daily    cyanocobalamin (Vitamin B-12) 1,000 mcg tablet 1 tablet, oral, Daily    ezetimibe (ZETIA) 10 mg, oral, Daily    famotidine (PEPCID) 20 mg, oral, Daily, prn    ferrous sulfate 325 (65 Fe) MG tablet 1 tablet, oral, Daily    folic acid (FOLVITE) 1 mg, oral, Daily    furosemide (LASIX) 40 mg, oral, Daily    latanoprost (Xalatan) 0.005 % ophthalmic solution 1 drop, ophthalmic (eye), Nightly    MAGNESIUM ORAL 550 mg, oral, Nightly, Take 1 tablet 550 mg at night.    niacin (Niaspan) 500 mg ER tablet 1 tablet, oral, Nightly    NON FORMULARY 1 each, oral, Daily, Prevagen    Promacta 100 mg, oral, Daily before breakfast, Take on an empty stomach, 1 hour before or 2 hours after a meal.    rOPINIRole (REQUIP) 0.25 mg, oral, Nightly    temazepam (RESTORIL) 15 mg, oral, Nightly PRN    traMADol (ULTRAM) 50 mg, oral, Every 6 hours PRN    TURMERIC ORAL 1 capsule, oral, Daily    valsartan (DIOVAN) 320 mg, oral, Daily    zinc sulfate 50 mg zinc (220 mg) tablet oral       Physical Examination:   Constitutional:       Appearance: Healthy appearance. Not in distress.   Neck:      Vascular: No JVR. JVD normal.   Pulmonary:      Effort: Pulmonary effort is normal.      Breath sounds: Normal breath sounds. No wheezing. No rhonchi. No rales.   Chest:      Chest wall: Not tender to palpatation.   Cardiovascular:      PMI at left midclavicular line. Normal rate. Regular rhythm. Normal S1. Normal S2.       Murmurs: There is no murmur.      No gallop.  No click. No rub.   Pulses:     Intact distal pulses.   Edema:     Peripheral edema absent.   Abdominal:      General: Bowel sounds are normal.       Palpations: Abdomen is soft.      Tenderness: There is no abdominal tenderness.   Musculoskeletal: Normal range of motion.         General: No tenderness. Skin:     General: Skin is warm and dry.   Neurological:      General: No focal deficit present.      Mental Status: Alert and oriented to person, place and time.            Lab:     CBC:   Lab Results   Component Value Date    WBC 2.7 (L) 08/09/2024    RBC 2.48 (L) 08/09/2024    HGB 7.3 (L) 08/09/2024    HGB 10 (A) 04/18/2024    HCT 23.8 (L) 08/09/2024    PLT 27 (LL) 08/09/2024        CMP:    Lab Results   Component Value Date     08/09/2024    K 4.4 08/09/2024     (H) 08/09/2024    CO2 21 08/09/2024    BUN 34 (H) 08/09/2024    CREATININE 1.38 (H) 08/09/2024    GLUCOSE 106 (H) 08/09/2024    CALCIUM 9.3 08/09/2024       Magnesium:    Lab Results   Component Value Date    MG 2.00 08/16/2022       Lipid Profile:    Lab Results   Component Value Date    TRIG 131 02/01/2023    HDL 28.8 (A) 02/01/2023       TSH:    Lab Results   Component Value Date    TSH 2.65 07/27/2022       BNP:   Lab Results   Component Value Date    BNP 15 04/04/2024        PT/INR:    Lab Results   Component Value Date    PROTIME 14.3 (H) 04/04/2024    INR 1.3 (H) 04/04/2024       HgBA1c:    Lab Results   Component Value Date    HGBA1C 5.2 04/18/2024       BMP:  Lab Results   Component Value Date     08/09/2024     07/24/2024     07/12/2024    K 4.4 08/09/2024    K 4.4 07/24/2024    K 4.3 07/12/2024     (H) 08/09/2024     07/24/2024     07/12/2024    CO2 21 08/09/2024    CO2 22 07/24/2024    CO2 22 07/12/2024    BUN 34 (H) 08/09/2024    BUN 28 (H) 07/24/2024    BUN 34 (H) 07/12/2024    CREATININE 1.38 (H) 08/09/2024    CREATININE 1.38 (H) 07/24/2024    CREATININE 1.45 (H) 07/12/2024       CBC:  Lab Results   Component Value Date    WBC 2.7 (L) 08/09/2024    WBC 3.4 (L) 07/24/2024    WBC 2.8 (L) 07/19/2024    RBC 2.48 (L) 08/09/2024    RBC 2.60 (L)  07/24/2024    RBC 2.45 (L) 07/19/2024    HGB 7.3 (L) 08/09/2024    HGB 7.7 (L) 07/24/2024    HGB 7.3 (L) 07/19/2024    HGB 10 (A) 04/18/2024    HGB 11.1 (A) 10/31/2023    HGB 11.8 (A) 09/26/2023    HCT 23.8 (L) 08/09/2024    HCT 24.9 (L) 07/24/2024    HCT 23.5 (L) 07/19/2024    MCV 96 08/09/2024    MCV 96 07/24/2024    MCV 96 07/19/2024    MCH 29.4 08/09/2024    MCH 29.6 07/24/2024    MCH 29.8 07/19/2024    MCHC 30.7 (L) 08/09/2024    MCHC 30.9 (L) 07/24/2024    MCHC 31.1 (L) 07/19/2024    RDW 23.7 (H) 08/09/2024    RDW 22.8 (H) 07/24/2024    RDW 21.9 (H) 07/19/2024    PLT 27 (LL) 08/09/2024    PLT 43 (L) 07/24/2024    PLT 36 (LL) 07/19/2024    MPV 11.3 04/03/2024       Cardiac Enzymes:    Lab Results   Component Value Date    TROPHS 5 04/04/2024    TROPHS 5 04/04/2024       Hepatic Function Panel:    Lab Results   Component Value Date    ALKPHOS 81 08/09/2024    ALT 14 08/09/2024    AST 19 08/09/2024    PROT 6.1 (L) 08/09/2024    BILITOT 1.3 (H) 08/09/2024    BILIDIR 0.2 04/04/2024         Diagnostic Studies:     No results found.    EKG:          [place] [address]   Tel [phone] Fax [phone]     TRANSTHORACIC ECHOCARDIOGRAM REPORT        Patient Name:      CALOS BONILLA    Reading Physician:    [id number] Rico Gould MD, [facility]  Study Date:        4/5/2024             Ordering Provider:    [id number] KATE RAYMOND  MRN/PID:           [id number]             Fellow:  Accession#:        [id number]         Nurse:  Date of Birth/Age: 1940 / 83      Sonographer:          Shannon Fernandez RDCS                     years  Gender:            M                    Additional Staff:  Height:            182.88 cm            Admit Date:           4/4/2024  Weight:            143.34 kg            Admission Status:     Inpatient -                                                                 Routine  BSA / BMI:         2.59 m2 / 42.86      Department Location:  [facility]                     kg/m2                                      Echo Lab  Blood Pressure: 154 /70 mmHg     Study Type:    TRANSTHORACIC ECHO (TTE) COMPLETE  Diagnosis/ICD: Thoracic aortic aneurysm, without rupture, unspecified-I71.20;                 Personal history of pulmonary embolism-Z86.711; Shortness of                 breath-R06.02  Indication:    Acute PE, Shortness of Breath, Ascending aortic Aneurysm  CPT Codes:     Echo Complete w Full [id number]     Patient History:  Pacer/Defib:       Permanent pacemaker  Pertinent History: Chest Pain, A-Fib, Evaluate for Acute PE, Acute Combined                     Systolic and Diastolic CHF, MARTINEZ, Bradycardia, CAD s/p PTCA,                     Ascending Aortic Aneurysm, SA Node Dysfunction, Elevated                     D-Dimer, Anemia, CKD, PVD, MARCO, Edema, Gout, Ablation, HTN                     and Hyperlipidemia.     Study Detail: The following Echo studies were performed: 2D, M-Mode, Doppler and                color flow. Technically challenging study due to poor acoustic                windows, prominent lung artifact and body habitus.        PHYSICIAN INTERPRETATION:  Left Ventricle: Left ventricular systolic function is normal, with an estimated ejection fraction of 65-70%. There are no regional wall motion abnormalities. The left ventricular cavity size is normal. There is moderate concentric left ventricular hypertrophy. Spectral Doppler shows a normal pattern of left ventricular diastolic filling. Currently normal diastolic function.  Left Atrium: The left atrium is normal in size.  Right Ventricle: The right ventricle is normal in size. There is normal right ventricular global systolic function. Normal RV size and function  Mild pulmonary hypertension RVSP 32 mmHg.  Right Atrium: The right atrium is normal in size.  Aortic Valve: The aortic  "valve is trileaflet. There is mild aortic valve regurgitation. The peak instantaneous gradient of the aortic valve is 14.4 mmHg. The mean gradient of the aortic valve is 7.0 mmHg. 1+ AI. Mild aortic sclerosis without hemodynamically significant stenosis.  Mitral Valve: The mitral valve is normal in structure. There is no evidence of mitral valve regurgitation. Normal mitral valve.  Tricuspid Valve: The tricuspid valve is structurally normal. There is mild tricuspid regurgitation.  Pulmonic Valve: The pulmonic valve is structurally normal. There is no indication of pulmonic valve regurgitation.  Pericardium: There is a trivial pericardial effusion. There is a pericardial fat pad present.  Aorta: The aortic root is abnormal. There is moderate dilatation of the ascending aorta. There is moderate dilatation the aortic root. Aortic root sinus effacement with ascending aorta dilatation aneurysm with maximal diameter 5.3 cm. Study of 2022 4.8 cm.        CONCLUSIONS:   1. Left ventricular systolic function is normal with a 65-70% estimated ejection fraction.   2. Currently normal diastolic function.   3. There is moderate concentric left ventricular hypertrophy.   4. Normal RV size and function      Mild pulmonary hypertension RVSP 32 mmHg.   5. Normal mitral valve.   6. 1+ AI. Mild aortic sclerosis without hemodynamically significant stenosis.   7. Mild aortic valve regurgitation.   8. Aortic root sinus effacement with ascending aorta dilatation aneurysm with maximal diameter 5.3 cm. Study of 2022 4.8 cm.   9. There is moderate dilatation of the aortic root.  10. There is moderate dilatation of the ascending aorta.  No results found for: \"EKG\"    Interpreted By:  Stevenson Presley,   STUDY:  CT CHEST WO IV CONTRAST;  4/4/2024 4:32 pm      INDICATION:  Signs/Symptoms:SOB, R/O PE.      COMPARISON:  CT abdomen/pelvis 03/31/2024      ACCESSION NUMBER(S):  [id number]      ORDERING CLINICIAN:  ROBERT SALCIDO"   TECHNIQUE:  Contiguous axial images of the chest and upper abdomen were obtained  without contrast. Coronal and sagittal reformatted images were  reconstructed from the axial data. Intravenous contrast was not  performed for CT PE protocol due to low GFR.      FINDINGS:          MEDIASTINUM AND LYMPH NODES:  The esophageal wall appears within  normal limits.  No enlarged intrathoracic or axillary lymph nodes by  imaging criteria. No pneumomediastinum.      VESSELS: The ascending aorta is aneurysmal, measuring 4.9 cm x 4.8  cm. No evidence of acute intramural hematoma. Mild aortic  atherosclerosis. The pulmonary artery is enlarged, measuring up to  3.8 cm, indicative of pulmonary hypertension.      HEART: Normal in size. Pacer/AICD leads are noted in the right atrium  and right ventricle. Moderate coronary artery calcifications. No  significant pericardial effusion.      LUNG, AIRWAYS, AND PLEURA: No suspicious pulmonary nodules. Calcified  granuloma in the right middle lobe. Mild subsegmental atelectasis in  the lingula and lower lobes (right > left). No consolidation,  pulmonary edema, pleural effusion or pneumothorax.      OSSEOUS STRUCTURES: No acute osseous abnormality. There are acute old  bilateral rib fractures. 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      CHEST WALL SOFT TISSUES: No discernible abnormality.      UPPER ABDOMEN/OTHER: The gallbladder is distended with  hyperattenuating homogeneous material, similar to CT abdomen/pelvis  03/31/2024, which could be sludge or vicarious contrast from a prior  procedure. There is no gallbladder wall thickening or pericholecystic  fluid.      IMPRESSION:  No acute pulmonary process. Mild subsegmental atelectasis in the  lower lobes (right > left).      Aneurysmal ascending aorta measuring 4.9 cm x 4.8 cm.  No evidence of  acute intramural hematoma.      Dilated main pulmonary artery measuring up to 3.8 cm suggestive of  pulmonary hypertension. Intravenous contrast was not performed for CT  PE protocol due to low GFR. A perfusion scan can be performed if  there persistent concern for acute pulmonary embolism.      MACRO:  Stevenson Presley discussed the significance and urgency of this  critical finding by RAMIRO SEN with  ROBERT SALCIDO on 4/4/2024 at  6:20 pm.  (**-RCF-**) Findings:  See findings.      Signed by: Stevenson Presley 4/4/2024 6:22 PM  Dictation workstation:   [id number]  Radiology:     No orders to display       Assessment/Plan:         Patient Active Problem List   Diagnosis    Acute pain of right shoulder    Insomnia    Anemia    Atrial flutter (Multi)    Bilateral sensorineural hearing loss    Bradycardia    Cervical spondylosis with myelopathy    CAD S/P percutaneous coronary angioplasty    Chronic kidney disease (CKD), stage III (moderate) (Multi)    Depression, recurrent (CMS-HCC)    Diarrhea    Fatigue    Folic acid deficiency    Gait abnormality    Hip pain, right    Pain in both lower extremities    Hyperlipemia, mixed    Hyperuricemia    Ischemic foot pain at rest    Leukoplakia of tongue    Muscle cramps    Aneurysm (CMS-HCC)    Hypertension    PVD (peripheral vascular disease) (CMS-HCC)    Renal insufficiency    Restless leg syndrome    Rib pain on right side    Sleep apnea    Lumbar radiculopathy    Spondylosis of lumbar spine    Stiffness of right shoulder joint    Vitamin B12 deficiency    Thoracic ascending aortic aneurysm (CMS-HCC)    Increased urinary frequency    Incontinence    Sinus node dysfunction (Multi)    Abdominal pain, chronic, right upper quadrant    Calculus of gallbladder without cholecystitis without obstruction    Chronic cholecystitis    Urge incontinence of urine    Weak urinary stream    Urinary incontinence    Edema    Anxiety    Benign neoplasm    Chronic back pain     Dermatochalasis of both eyelids    Dextroscoliosis    Estevez catheter problem (CMS-HCC)    Gastroesophageal reflux disease without esophagitis    Glaucoma    Incomplete emptying of bladder    Localized osteoarthrosis    Lumbosacral spondylosis without myelopathy    MGD (meibomian gland dysfunction)    Osteoarthritis of right knee    Osteoarthritis of spine with radiculopathy, lumbar region    Pain in right ankle and joints of right foot    Pain in joint involving pelvic region and thigh    Primary open-angle glaucoma, bilateral, mild stage    RPE mottling of macula    ELIZABETH (stress urinary incontinence), male    Lumbar stenosis    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[facility])    Former smoker    PFD (pelvic floor dysfunction)    BMI 40.0-44.9, adult (Multi)    Thrombocytopenia (CMS-[facility])    Gout    BMI 39.0-39.9,adult    Dizziness and giddiness    History of radiofrequency ablation (RFA) procedure for cardiac arrhythmia    Personal history of malignant neoplasm of prostate    Presence of cardiac pacemaker    Myelodysplastic syndrome (Multi)    Dyspnea on exertion    Acute congestive heart failure (Multi)    Elevated d-dimer    Abdominal wall hematoma    Hyperglycemia    Chronic renal disease, stage IV (Multi)    Aneurysm of the ascending aorta, ruptured (Multi)    MDS (myelodysplastic syndrome) (Multi)    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    Meds, vitals, examination as noted.    Chart reviewed in detail discussed the patient at length.    Impression:  Chronic thoracic aneurysm  Myelodysplastic syndrome  Coronary artery disease functional class I  History of radiofrequency ablation of cardiac arrhythmia  Morbid obesity  Chronic dyspnea  Permanent pacemaker  PLAN     Recommendation:  Continue current meds  Patient will discuss with his wife and family whether or not he wants to have a thoracic  consultation at this point to wait until he sees Dr. Rashid to see what prognosis there is with his hematological issues  Maintain usual exercise pattern  Call if any problems  Plan to see me in 3 to 4 months  Any major condition change notify me as soon as possible  Notify me as soon as possible as well as through his wishes to proceed with consultation or not

## 2024-08-12 NOTE — PATIENT INSTRUCTIONS
Continue same medications/treatment.  Patient educated on proper medication use.  Patient educated on risk factor modification.  Please bring any lab results from other providers/physicians to your next appointment.    Please bring all medicines, vitamins, and herbal supplements with you when you come to the office.    Prescriptions will not be filled unless you are compliant with your follow up appointments or have a follow up appointment scheduled as per instruction of your physician. Refills should be requested at the time of your visit.    Follow up in December I, LISANDRO MCKEON RN, AM SCRIBING FOR AND IN THE PRESENCE OF DR. ROBERT SALCIDO, DO, FACC

## 2024-08-13 ENCOUNTER — APPOINTMENT (OUTPATIENT)
Dept: HEMATOLOGY/ONCOLOGY | Facility: CLINIC | Age: 84
End: 2024-08-13
Payer: MEDICARE

## 2024-08-13 PROCEDURE — RXMED WILLOW AMBULATORY MEDICATION CHARGE

## 2024-08-14 ENCOUNTER — SPECIALTY PHARMACY (OUTPATIENT)
Dept: PHARMACY | Facility: CLINIC | Age: 84
End: 2024-08-14

## 2024-08-15 ENCOUNTER — PHARMACY VISIT (OUTPATIENT)
Dept: PHARMACY | Facility: CLINIC | Age: 84
End: 2024-08-15
Payer: COMMERCIAL

## 2024-08-23 ENCOUNTER — TELEPHONE (OUTPATIENT)
Dept: HEMATOLOGY/ONCOLOGY | Facility: CLINIC | Age: 84
End: 2024-08-23

## 2024-08-23 ENCOUNTER — INFUSION (OUTPATIENT)
Dept: HEMATOLOGY/ONCOLOGY | Facility: CLINIC | Age: 84
End: 2024-08-23
Payer: MEDICARE

## 2024-08-23 ENCOUNTER — LAB (OUTPATIENT)
Dept: LAB | Facility: CLINIC | Age: 84
End: 2024-08-23
Payer: MEDICARE

## 2024-08-23 ENCOUNTER — APPOINTMENT (OUTPATIENT)
Dept: PRIMARY CARE | Facility: CLINIC | Age: 84
End: 2024-08-23
Payer: MEDICARE

## 2024-08-23 VITALS
SYSTOLIC BLOOD PRESSURE: 137 MMHG | HEART RATE: 87 BPM | BODY MASS INDEX: 39.83 KG/M2 | TEMPERATURE: 97.2 F | DIASTOLIC BLOOD PRESSURE: 70 MMHG | RESPIRATION RATE: 17 BRPM | OXYGEN SATURATION: 98 % | WEIGHT: 293.65 LBS

## 2024-08-23 VITALS
HEIGHT: 72 IN | SYSTOLIC BLOOD PRESSURE: 120 MMHG | RESPIRATION RATE: 16 BRPM | BODY MASS INDEX: 39.68 KG/M2 | TEMPERATURE: 97.3 F | HEART RATE: 66 BPM | OXYGEN SATURATION: 97 % | DIASTOLIC BLOOD PRESSURE: 60 MMHG | WEIGHT: 293 LBS

## 2024-08-23 DIAGNOSIS — R26.9 GAIT DISTURBANCE: ICD-10-CM

## 2024-08-23 DIAGNOSIS — Z79.899 MEDICATION MANAGEMENT: ICD-10-CM

## 2024-08-23 DIAGNOSIS — Z00.00 MEDICARE ANNUAL WELLNESS VISIT, SUBSEQUENT: Primary | ICD-10-CM

## 2024-08-23 DIAGNOSIS — H92.01 OTALGIA, RIGHT: ICD-10-CM

## 2024-08-23 DIAGNOSIS — T45.1X5A ANEMIA DUE TO CHEMOTHERAPY FOR MYELODYSPLASTIC SYNDROME TREATED WITH ERYTHROPOIETIN (MULTI): ICD-10-CM

## 2024-08-23 DIAGNOSIS — D64.81 ANEMIA DUE TO CHEMOTHERAPY FOR MYELODYSPLASTIC SYNDROME TREATED WITH ERYTHROPOIETIN (MULTI): ICD-10-CM

## 2024-08-23 DIAGNOSIS — Z00.00 ROUTINE GENERAL MEDICAL EXAMINATION AT HEALTH CARE FACILITY: ICD-10-CM

## 2024-08-23 DIAGNOSIS — H92.01 OTALGIA, RIGHT: Primary | ICD-10-CM

## 2024-08-23 DIAGNOSIS — D46.9 ANEMIA DUE TO CHEMOTHERAPY FOR MYELODYSPLASTIC SYNDROME TREATED WITH ERYTHROPOIETIN (MULTI): ICD-10-CM

## 2024-08-23 DIAGNOSIS — D69.6 THROMBOCYTOPENIA (CMS-HCC): ICD-10-CM

## 2024-08-23 LAB
ACANTHOCYTES BLD QL SMEAR: ABNORMAL
ALBUMIN SERPL BCP-MCNC: 4.1 G/DL (ref 3.4–5)
ALP SERPL-CCNC: 83 U/L (ref 33–136)
ALT SERPL W P-5'-P-CCNC: 14 U/L (ref 10–52)
ANION GAP SERPL CALC-SCNC: 13 MMOL/L (ref 10–20)
AST SERPL W P-5'-P-CCNC: 18 U/L (ref 9–39)
BASO STIPL BLD QL SMEAR: PRESENT
BASOPHILS # BLD MANUAL: 0.03 X10*3/UL (ref 0–0.1)
BASOPHILS NFR BLD MANUAL: 1 %
BILIRUB SERPL-MCNC: 1.4 MG/DL (ref 0–1.2)
BUN SERPL-MCNC: 30 MG/DL (ref 6–23)
CALCIUM SERPL-MCNC: 9.5 MG/DL (ref 8.6–10.3)
CHLORIDE SERPL-SCNC: 110 MMOL/L (ref 98–107)
CO2 SERPL-SCNC: 22 MMOL/L (ref 21–32)
CREAT SERPL-MCNC: 1.41 MG/DL (ref 0.5–1.3)
DACRYOCYTES BLD QL SMEAR: ABNORMAL
EGFRCR SERPLBLD CKD-EPI 2021: 49 ML/MIN/1.73M*2
EOSINOPHIL # BLD MANUAL: 0.05 X10*3/UL (ref 0–0.4)
EOSINOPHIL NFR BLD MANUAL: 2 %
ERYTHROCYTE [DISTWIDTH] IN BLOOD BY AUTOMATED COUNT: 24.5 % (ref 11.5–14.5)
GLUCOSE SERPL-MCNC: 107 MG/DL (ref 74–99)
HCT VFR BLD AUTO: 24.2 % (ref 41–52)
HGB BLD-MCNC: 7.5 G/DL (ref 13.5–17.5)
HOLD SPECIMEN: NORMAL
HYPOCHROMIA BLD QL SMEAR: ABNORMAL
IMM GRANULOCYTES # BLD AUTO: 0.03 X10*3/UL (ref 0–0.5)
IMM GRANULOCYTES NFR BLD AUTO: 1.1 % (ref 0–0.9)
LYMPHOCYTES # BLD MANUAL: 0.55 X10*3/UL (ref 0.8–3)
LYMPHOCYTES NFR BLD MANUAL: 21 %
MCH RBC QN AUTO: 29.1 PG (ref 26–34)
MCHC RBC AUTO-ENTMCNC: 31 G/DL (ref 32–36)
MCV RBC AUTO: 94 FL (ref 80–100)
MONOCYTES # BLD MANUAL: 0.08 X10*3/UL (ref 0.05–0.8)
MONOCYTES NFR BLD MANUAL: 3 %
MYELOCYTES # BLD MANUAL: 0.03 X10*3/UL
MYELOCYTES NFR BLD MANUAL: 1 %
NEUTROPHILS # BLD MANUAL: 1.87 X10*3/UL (ref 1.6–5.5)
NEUTS BAND # BLD MANUAL: 0.1 X10*3/UL (ref 0–0.5)
NEUTS BAND NFR BLD MANUAL: 4 %
NEUTS SEG # BLD MANUAL: 1.77 X10*3/UL (ref 1.6–5)
NEUTS SEG NFR BLD MANUAL: 68 %
NRBC BLD MANUAL-RTO: 3 % (ref 0–0)
NRBC BLD-RTO: ABNORMAL /100{WBCS}
OVALOCYTES BLD QL SMEAR: ABNORMAL
PLATELET # BLD AUTO: 24 X10*3/UL (ref 150–450)
POLYCHROMASIA BLD QL SMEAR: ABNORMAL
POTASSIUM SERPL-SCNC: 4.7 MMOL/L (ref 3.5–5.3)
PROT SERPL-MCNC: 6.3 G/DL (ref 6.4–8.2)
RBC # BLD AUTO: 2.58 X10*6/UL (ref 4.5–5.9)
RBC MORPH BLD: ABNORMAL
SODIUM SERPL-SCNC: 140 MMOL/L (ref 136–145)
TOTAL CELLS COUNTED BLD: 100
TOXIC GRANULES BLD QL SMEAR: PRESENT
WBC # BLD AUTO: 2.6 X10*3/UL (ref 4.4–11.3)

## 2024-08-23 PROCEDURE — 80373 DRUG SCREENING TRAMADOL: CPT

## 2024-08-23 PROCEDURE — 80307 DRUG TEST PRSMV CHEM ANLYZR: CPT

## 2024-08-23 PROCEDURE — 36415 COLL VENOUS BLD VENIPUNCTURE: CPT

## 2024-08-23 PROCEDURE — 80368 SEDATIVE HYPNOTICS: CPT

## 2024-08-23 PROCEDURE — 85027 COMPLETE CBC AUTOMATED: CPT

## 2024-08-23 PROCEDURE — 80354 DRUG SCREENING FENTANYL: CPT

## 2024-08-23 PROCEDURE — 85007 BL SMEAR W/DIFF WBC COUNT: CPT

## 2024-08-23 PROCEDURE — 82570 ASSAY OF URINE CREATININE: CPT

## 2024-08-23 PROCEDURE — 80346 BENZODIAZEPINES1-12: CPT

## 2024-08-23 PROCEDURE — 80365 DRUG SCREENING OXYCODONE: CPT

## 2024-08-23 PROCEDURE — 80358 DRUG SCREENING METHADONE: CPT

## 2024-08-23 PROCEDURE — 80361 OPIATES 1 OR MORE: CPT

## 2024-08-23 PROCEDURE — 2500000004 HC RX 250 GENERAL PHARMACY W/ HCPCS (ALT 636 FOR OP/ED): Mod: JZ | Performed by: INTERNAL MEDICINE

## 2024-08-23 PROCEDURE — 80053 COMPREHEN METABOLIC PANEL: CPT

## 2024-08-23 RX ORDER — DIPHENHYDRAMINE HYDROCHLORIDE 50 MG/ML
50 INJECTION INTRAMUSCULAR; INTRAVENOUS AS NEEDED
Status: DISCONTINUED | OUTPATIENT
Start: 2024-08-23 | End: 2024-08-23 | Stop reason: HOSPADM

## 2024-08-23 RX ORDER — EPINEPHRINE 0.3 MG/.3ML
0.3 INJECTION SUBCUTANEOUS EVERY 5 MIN PRN
Status: DISCONTINUED | OUTPATIENT
Start: 2024-08-23 | End: 2024-08-23 | Stop reason: HOSPADM

## 2024-08-23 RX ORDER — ALBUTEROL SULFATE 0.83 MG/ML
3 SOLUTION RESPIRATORY (INHALATION) AS NEEDED
OUTPATIENT
Start: 2024-09-06

## 2024-08-23 RX ORDER — DIPHENHYDRAMINE HYDROCHLORIDE 50 MG/ML
50 INJECTION INTRAMUSCULAR; INTRAVENOUS AS NEEDED
OUTPATIENT
Start: 2024-09-06

## 2024-08-23 RX ORDER — FLUTICASONE PROPIONATE 50 MCG
1 SPRAY, SUSPENSION (ML) NASAL DAILY
Qty: 16 G | Refills: 1 | Status: SHIPPED | OUTPATIENT
Start: 2024-08-23 | End: 2024-08-28 | Stop reason: ALTCHOICE

## 2024-08-23 RX ORDER — ASPIRIN 81 MG/1
81 TABLET ORAL DAILY
Status: ON HOLD | COMMUNITY

## 2024-08-23 RX ORDER — ALBUTEROL SULFATE 0.83 MG/ML
3 SOLUTION RESPIRATORY (INHALATION) AS NEEDED
Status: DISCONTINUED | OUTPATIENT
Start: 2024-08-23 | End: 2024-08-23 | Stop reason: HOSPADM

## 2024-08-23 RX ORDER — FAMOTIDINE 10 MG/ML
20 INJECTION INTRAVENOUS ONCE AS NEEDED
Status: DISCONTINUED | OUTPATIENT
Start: 2024-08-23 | End: 2024-08-23 | Stop reason: HOSPADM

## 2024-08-23 RX ORDER — EPINEPHRINE 0.3 MG/.3ML
0.3 INJECTION SUBCUTANEOUS EVERY 5 MIN PRN
OUTPATIENT
Start: 2024-09-06

## 2024-08-23 RX ORDER — FAMOTIDINE 10 MG/ML
20 INJECTION INTRAVENOUS ONCE AS NEEDED
OUTPATIENT
Start: 2024-09-06

## 2024-08-23 ASSESSMENT — ENCOUNTER SYMPTOMS
SLEEP DISTURBANCE: 0
DYSPHORIC MOOD: 0
PHOTOPHOBIA: 0
TROUBLE SWALLOWING: 0
LOSS OF SENSATION IN FEET: 0
CONSTITUTIONAL NEGATIVE: 1
DIARRHEA: 0
EYE PAIN: 0
NECK STIFFNESS: 0
POLYDIPSIA: 0
RECTAL PAIN: 0
POLYPHAGIA: 0
DYSURIA: 0
COLOR CHANGE: 0
SORE THROAT: 0
ADENOPATHY: 0
BLOOD IN STOOL: 0
ARTHRALGIAS: 0
RHINORRHEA: 0
STRIDOR: 0
SEIZURES: 0
FEVER: 0
NERVOUS/ANXIOUS: 0
SINUS PAIN: 0
SHORTNESS OF BREATH: 0
CONSTIPATION: 0
ACTIVITY CHANGE: 0
HEADACHES: 0
OCCASIONAL FEELINGS OF UNSTEADINESS: 1
DIZZINESS: 0
FATIGUE: 0
MYALGIAS: 0
CHEST TIGHTNESS: 0
PALPITATIONS: 0
ABDOMINAL DISTENTION: 0
HEMATURIA: 0
APPETITE CHANGE: 0
DEPRESSION: 0
COUGH: 0
FLANK PAIN: 0
SPEECH DIFFICULTY: 0
AGITATION: 0
ABDOMINAL PAIN: 0
CONFUSION: 0
SINUS PRESSURE: 0
DECREASED CONCENTRATION: 0

## 2024-08-23 ASSESSMENT — PATIENT HEALTH QUESTIONNAIRE - PHQ9
SUM OF ALL RESPONSES TO PHQ9 QUESTIONS 1 AND 2: 1
2. FEELING DOWN, DEPRESSED OR HOPELESS: NOT AT ALL
1. LITTLE INTEREST OR PLEASURE IN DOING THINGS: SEVERAL DAYS
10. IF YOU CHECKED OFF ANY PROBLEMS, HOW DIFFICULT HAVE THESE PROBLEMS MADE IT FOR YOU TO DO YOUR WORK, TAKE CARE OF THINGS AT HOME, OR GET ALONG WITH OTHER PEOPLE: NOT DIFFICULT AT ALL

## 2024-08-23 ASSESSMENT — ACTIVITIES OF DAILY LIVING (ADL)
DOING_HOUSEWORK: INDEPENDENT
TAKING_MEDICATION: INDEPENDENT
MANAGING_FINANCES: INDEPENDENT
GROCERY_SHOPPING: INDEPENDENT
DRESSING: INDEPENDENT
BATHING: INDEPENDENT

## 2024-08-23 ASSESSMENT — PAIN SCALES - GENERAL: PAINLEVEL: 0-NO PAIN

## 2024-08-23 NOTE — PROGRESS NOTES
Subjective   Reason for Visit: Holden Burgess is an 83 y.o. male here for a Medicare Wellness visit.     Past Medical, Surgical, and Family History reviewed and updated in chart.         HPI  The patient is present in the office for his Medicare annual exam.     He wishes to discuss concerns regarding his ears, as he feels a sensation of water in his ears as if he went swimming, although he has not actually been swimming.     Additionally, the patient would like to discuss the possibility of consulting a therapist to assist with his balance.    Patient Care Team:  Giacomo Joseph DO as PCP - General (Family Medicine)  [facility]iac[facility] Joseph DO as PCP - [facility]ributed [facility]  Kevin Murphy MD as Consulting Physician (Hematology and Oncology)  Waylon Benjamin DO as Cardiologist (Cardiology)  Nicola Brooks MD as Nephrologist (Nephrology)  Jun Solis MD as Cardiologist (Cardiology)  Sebastien Rashid MD as Consulting Physician (Hematology and Oncology)     Review of Systems   Constitutional: Negative.  Negative for activity change, appetite change, fatigue and fever.   HENT:  Positive for ear pain and hearing loss. Negative for congestion, dental problem, ear discharge, mouth sores, rhinorrhea, sinus pressure, sinus pain, sore throat, tinnitus and trouble swallowing.    Eyes:  Negative for photophobia, pain and visual disturbance.   Respiratory:  Negative for cough, chest tightness, shortness of breath and stridor.    Cardiovascular:  Negative for chest pain and palpitations.   Gastrointestinal:  Negative for abdominal distention, abdominal pain, blood in stool, constipation, diarrhea and rectal pain.   Endocrine: Negative for cold intolerance, heat intolerance, polydipsia, polyphagia and polyuria.   Genitourinary:  Negative for dysuria, flank pain, hematuria and urgency.   Musculoskeletal:  Positive for gait problem. Negative for arthralgias, myalgias and neck stiffness.   Skin:  Negative for  color change and rash.   Allergic/Immunologic: Negative for environmental allergies and food allergies.   Neurological:  Negative for dizziness, seizures, syncope, speech difficulty and headaches.   Hematological:  Negative for adenopathy.   Psychiatric/Behavioral:  Negative for agitation, confusion, decreased concentration, dysphoric mood and sleep disturbance. The patient is not nervous/anxious.        Objective   Vitals:  /60 (BP Location: Left arm, Patient Position: Sitting, BP Cuff Size: Large adult)   Pulse 66   Temp 36.3 °C (97.3 °F) (Temporal)   Resp 16   Ht 1.829 m (6')   Wt 133 kg (293 lb)   SpO2 97%   BMI 39.74 kg/m²       Physical Exam  Vitals reviewed.   Constitutional:       General: He is not in acute distress.     Appearance: He is obese. He is not ill-appearing or diaphoretic.   HENT:      Head: Normocephalic.      Right Ear: Tympanic membrane and external ear normal.      Left Ear: Tympanic membrane and external ear normal.      Nose: Nose normal. No congestion.      Mouth/Throat:      Pharynx: No posterior oropharyngeal erythema.   Eyes:      General:         Right eye: No discharge.         Left eye: No discharge.      Extraocular Movements: Extraocular movements intact.      Conjunctiva/sclera: Conjunctivae normal.      Pupils: Pupils are equal, round, and reactive to light.   Cardiovascular:      Rate and Rhythm: Normal rate and regular rhythm.      Pulses: Normal pulses.      Heart sounds: Normal heart sounds. No murmur heard.  Pulmonary:      Effort: Pulmonary effort is normal. No respiratory distress.      Breath sounds: Normal breath sounds. No wheezing or rales.   Chest:      Chest wall: No tenderness.   Abdominal:      General: Bowel sounds are normal. There is distension.      Palpations: There is no mass.      Tenderness: There is no abdominal tenderness. There is no guarding.   Musculoskeletal:         General: No tenderness. Normal range of motion.      Cervical back:  Normal range of motion and neck supple. No tenderness.      Right lower leg: No edema.      Left lower leg: No edema.   Skin:     General: Skin is dry.      Coloration: Skin is pale. Skin is not jaundiced.      Findings: No bruising, erythema or rash.   Neurological:      General: No focal deficit present.      Mental Status: He is alert and oriented to person, place, and time. Mental status is at baseline.      Cranial Nerves: No cranial nerve deficit.      Sensory: No sensory deficit.      Coordination: Coordination abnormal.      Gait: Gait abnormal.   Psychiatric:         Mood and Affect: Mood normal.         Thought Content: Thought content normal.         Judgment: Judgment normal.         Assessment/Plan   Problem List Items Addressed This Visit    None  Visit Diagnoses         Codes    Otalgia, right    -  Primary H92.01    Relevant Orders    Referral to ENT    Routine general medical examination at health care facility     Z00.00    Gait disturbance     R26.9    Relevant Orders    Referral to Physical Therapy    Medication management     Z79.899    Relevant Orders    Opiate/Opioid/Benzo Prescription Compliance (Completed)    OOB Internal Tracking (Completed)          Advance Directives Discussion  16 - 20 minutes were spent discussing Advanced Care Planning (including a Living Will, Medical Power Of , as well as specific end of life choices and/or directives). The details of that discussion were documented in Advanced Directives Discussion section of the medical record.     Scribe Attestation  By signing my name below, I, Gloria Breaux MA   attest that this documentation has been prepared under the direction and in the presence of Giacomo Joseph DO.    Provider Attestation - Scribe documentation    All medical record entries made by the Scribe were at my direction and personally dictated by me. I have reviewed the chart and agree that the record accurately reflects my personal performance  of the history, physical exam, discussion and plan.

## 2024-08-23 NOTE — PATIENT INSTRUCTIONS
Follow up in 3 months    Continue current medications and therapy for chronic medical conditions.    Patient was advised importance of proper diet/nutrition in addition adequate hydration. Patient was encouraged moderate exercise program to include 30 minutes daily for 5 days of the week or 150 minutes weekly. Patient will follow-up with us as scheduled.    I personally reviewed the OARRS report for this patient. I have considered the risks of abuse, dependence, addiction, and diversion.    UDS/CSA: 08/23/2024    Vestibular PT ordered     Start Flonase 1 spray intranasally twice daily    Refer to ENT    Complete physical/exam

## 2024-08-24 LAB
AMPHETAMINES UR QL SCN: NORMAL
BARBITURATES UR QL SCN: NORMAL
BZE UR QL SCN: NORMAL
CANNABINOIDS UR QL SCN: NORMAL
CREAT UR-MCNC: 79.2 MG/DL (ref 20–370)
PCP UR QL SCN: NORMAL

## 2024-08-28 ENCOUNTER — APPOINTMENT (OUTPATIENT)
Dept: CARDIOLOGY | Facility: CLINIC | Age: 84
End: 2024-08-28
Payer: MEDICARE

## 2024-08-28 ENCOUNTER — HOSPITAL ENCOUNTER (OUTPATIENT)
Dept: CARDIOLOGY | Facility: HOSPITAL | Age: 84
Discharge: HOME | End: 2024-08-28
Payer: MEDICARE

## 2024-08-28 VITALS
HEART RATE: 78 BPM | SYSTOLIC BLOOD PRESSURE: 108 MMHG | WEIGHT: 290 LBS | HEIGHT: 72 IN | DIASTOLIC BLOOD PRESSURE: 56 MMHG | BODY MASS INDEX: 39.28 KG/M2

## 2024-08-28 DIAGNOSIS — Z95.0 PACEMAKER: ICD-10-CM

## 2024-08-28 DIAGNOSIS — Z95.0 PACEMAKER: Primary | ICD-10-CM

## 2024-08-28 DIAGNOSIS — R00.1 BRADYCARDIA: ICD-10-CM

## 2024-08-28 DIAGNOSIS — I48.3 TYPICAL ATRIAL FLUTTER (MULTI): ICD-10-CM

## 2024-08-28 DIAGNOSIS — Z87.891 FORMER SMOKER: ICD-10-CM

## 2024-08-28 DIAGNOSIS — I49.5 SINUS NODE DYSFUNCTION (MULTI): ICD-10-CM

## 2024-08-28 DIAGNOSIS — F51.01 PRIMARY INSOMNIA: ICD-10-CM

## 2024-08-28 PROCEDURE — 1159F MED LIST DOCD IN RCRD: CPT | Performed by: INTERNAL MEDICINE

## 2024-08-28 PROCEDURE — 99214 OFFICE O/P EST MOD 30 MIN: CPT | Performed by: INTERNAL MEDICINE

## 2024-08-28 PROCEDURE — 93280 PM DEVICE PROGR EVAL DUAL: CPT

## 2024-08-28 PROCEDURE — 1036F TOBACCO NON-USER: CPT | Performed by: INTERNAL MEDICINE

## 2024-08-28 PROCEDURE — 3078F DIAST BP <80 MM HG: CPT | Performed by: INTERNAL MEDICINE

## 2024-08-28 PROCEDURE — 93000 ELECTROCARDIOGRAM COMPLETE: CPT | Mod: DISTINCT PROCEDURAL SERVICE | Performed by: INTERNAL MEDICINE

## 2024-08-28 PROCEDURE — 1123F ACP DISCUSS/DSCN MKR DOCD: CPT | Performed by: INTERNAL MEDICINE

## 2024-08-28 PROCEDURE — 3074F SYST BP LT 130 MM HG: CPT | Performed by: INTERNAL MEDICINE

## 2024-08-28 PROCEDURE — 93280 PM DEVICE PROGR EVAL DUAL: CPT | Performed by: INTERNAL MEDICINE

## 2024-08-28 RX ORDER — TEMAZEPAM 15 MG/1
15 CAPSULE ORAL NIGHTLY PRN
Qty: 90 CAPSULE | Refills: 0 | Status: ON HOLD | OUTPATIENT
Start: 2024-08-28

## 2024-08-28 ASSESSMENT — ENCOUNTER SYMPTOMS
DYSPNEA ON EXERTION: 0
PALPITATIONS: 0

## 2024-08-28 NOTE — PROGRESS NOTES
CARDIOLOGY OFFICE VISIT      CHIEF COMPLAINT  Chief Complaint   Patient presents with    Follow-up     6m       HISTORY OF PRESENT ILLNESS  HPI  83-year-old  male who is followed for paroxysmal atrial flutter status post radiofrequency catheter ablation on March 22, 2018 with no clinical recurrence. He developed a right femoral pseudoaneurysm and underwent repair with endovascular stent graft and evacuation of a right thigh hematoma on April 8, 2018. He has underlying sinus node dysfunction, minimal coronary disease per left heart catheterization, and normal left ventricular function. He presents to the office today for follow-up evaluation of sinus node dysfunction.     Patient had an echocardiogram in October 2022 that shows left ventricular contraction 60% with mild to moderate mitral regurgitation moderate tricuspid regurgitation and moderate dilatation of the aortic root and ascending aorta. Patient underwent a chest MRI that shows aortic root 44 mm, ascending aortic area 40 mm unchanged from prior MRI.     Patient states that he is under treatment for bladder issues with hyperbaric chamber.  One of the days that he was on his way for his treatment, he had an episode of dizziness and lightheadedness.  He was found to have a heart rate in the 40s.  Procedure was canceled.     Since then he has been noticing that most of the times his heart rate is bouncing between 40 to 50 bpm.  He underwent a dual-chamber pacemaker in November 2023 with no complications.     During the pacemaker evaluation, patient had evidence of thrombocytopenia.  He was referred to hematology service.  Apparently they are discussing the option of myelodysplastic syndrome.  Bone marrow biopsy was performed    Patient was admitted to [facility] in April 2024. Patient has been having progressive shortness of breath which worsened. Also gained weight, elevated D-dimer CT angiogram could not be obtained due to CKD, troponin 2  times negative, Echocardiogram from 2022 showed EF of 60%, lower extremity DVT study was negative, was admitted for dyspnea, VQ scan showed low probability for PE, echocardiogram showed normal ejection fraction.  Patient was treated with IV diuretics for diastolic heart failure, switch to p.o. diuretics.  Patient developed urinary retention and Estevez catheter was placed by urology.  As per urology patient will be discharged with Estevez catheter and follow-up with his own urologist Dr. Phan To discuss further.    Patient also is being followed by general cardiology service who has been discussed with patient regarding increase of the size of the aorta from 4.8 to 5.3 cm in the ascending portion and the root.    Patient states that he continues being treated for anemia.  He has an appointment with hematology service tomorrow.    Patient denies any chest pain.  No worsening shortness of breath.    Device interrogation today at the device clinic shows dual-chamber pacemaker Medtronic with battery longevity 12 years.  Barclay of atrial fibrillation less than 0.1% of the time.  No ventricular arrhythmias detected.    EKG performed today shows atrial paced rhythm right bundle branch block at a rate of 78 bpm QRS ration 154 ms QT corrected 487 ms.  Rhythm strip shows the same pattern.        Past Medical History  Past Medical History:   Diagnosis Date    Abdominal pain, chronic, right upper quadrant     ACP (advance care planning)     Anemia     Aneurysm (CMS-[facility])     Body mass index (BMI) 39.0-39.9, adult 12/06/2021    BMI 39.0-39.9,adult    Body mass index (BMI) 39.0-39.9, adult 07/11/2022    BMI 39.0-39.9,adult    Body mass index (BMI) 39.0-39.9, adult 04/24/2022    Body mass index (BMI) of 39.0 to 39.9 in adult    Body mass index (BMI) 39.0-39.9, adult 04/24/2022    Body mass index (BMI) of 39.0 to 39.9 in adult    Body mass index (BMI) 39.0-39.9, adult 04/24/2022    Body mass index (BMI) of 39.0 to 39.9 in adult     Cramp and spasm 2022    Leg cramps    Difficulty breathing     Encounter for general adult medical examination without abnormal findings 2020    Encounter for Medicare annual wellness exam    Encounter for screening for malignant neoplasm of prostate 2021    Encounter for prostate cancer screening    Encounter for screening for malignant neoplasm of prostate 2020    Encounter for prostate cancer screening    Forearm injury     Hx of atrial flutter     Hyperlipidemia     Hypertension     Hyperuricemia     Incontinence     Obesity, unspecified 2022    Class 2 obesity with body mass index (BMI) of 39.0 to 39.9 in adult    Other symptoms and signs involving the musculoskeletal system 10/12/2022    Shoulder weakness    Personal history of other diseases of the circulatory system 2021    History of hypotension    Personal history of other diseases of the circulatory system 10/26/2021    History of hypertension    Personal history of other endocrine, nutritional and metabolic disease 2021    History of morbid obesity    Personal history of other specified conditions 2021    History of dizziness    Rotator cuff injury     Sinus node dysfunction (Multi)     Valvular heart disease        Social History  Social History     Tobacco Use    Smoking status: Former     Current packs/day: 0.00     Average packs/day: 3.0 packs/day for 10.0 years (30.0 ttl pk-yrs)     Types: Cigarettes     Start date: 1958     Quit date: 1968     Years since quittin.0     Passive exposure: Never    Smokeless tobacco: Never   Vaping Use    Vaping status: Never Used   Substance Use Topics    Alcohol use: Not Currently     Comment: quit 2000    Drug use: Never       Family History     Family History   Problem Relation Name Age of Onset    Other (polio) Mother      Heart disease Father      Heart disease Brother      Polymyalgia rheumatica Brother      Other (mitral valve disorder)  Brother      Other (Coronary artery bypass graft) Brother      Other (Arterisclerotic cardiovascular disease) Brother          Allergies:  Allergies   Allergen Reactions    Crestor [Rosuvastatin] Unknown    Ezetimibe Unknown    Statins-Hmg-Coa Reductase Inhibitors Unknown     leg cramping        Outpatient Medications:  Current Outpatient Medications   Medication Instructions    acetaminophen (TYLENOL) 1,000 mg, oral, Every 6 hours PRN    allopurinol (ZYLOPRIM) 100 mg, oral, 2 times daily    Aranesp (in polysorbate) 100 mcg, subcutaneous, [facility] ONC Every 2 Weeks for 4 Weeks in a 28 Day Cycle    ascorbic acid (Vitamin C) 1,000 mg tablet 1 tablet, oral, Daily    aspirin 81 mg, oral, Daily    atorvastatin (Lipitor) 10 mg tablet 1 tablet, oral, Nightly    calcitriol (ROCALTROL) 0.25 mcg, oral, Every other day    cholecalciferol (Vitamin D-3) 125 MCG (5000 UT) capsule 1 capsule, oral, Daily    cyanocobalamin (Vitamin B-12) 1,000 mcg tablet 1 tablet, oral, Daily    ezetimibe (ZETIA) 10 mg, oral, Daily    famotidine (PEPCID) 20 mg, oral, Daily, prn    ferrous sulfate 325 (65 Fe) MG tablet 1 tablet, oral, Daily    folic acid (FOLVITE) 1 mg, oral, Daily    furosemide (LASIX) 40 mg, oral, Daily    latanoprost (Xalatan) 0.005 % ophthalmic solution 1 drop, ophthalmic (eye), Nightly    MAGNESIUM ORAL 550 mg, oral, Nightly, Take 1 tablet 550 mg at night.    niacin (Niaspan) 500 mg ER tablet 1 tablet, oral, Nightly    NON FORMULARY 1 each, oral, Daily, Prevagen    Promacta 100 mg, oral, Daily before breakfast, Take on an empty stomach, 1 hour before or 2 hours after a meal.    rOPINIRole (REQUIP) 0.25 mg, oral, Nightly    temazepam (RESTORIL) 15 mg, oral, Nightly PRN    traMADol (ULTRAM) 50 mg, oral, Every 6 hours PRN    TURMERIC ORAL 1 capsule, oral, Daily    valsartan (DIOVAN) 320 mg, oral, Daily    zinc sulfate 50 mg zinc (220 mg) tablet oral          REVIEW OF SYSTEMS  Review of Systems   Cardiovascular:  Negative for chest pain,  dyspnea on exertion and palpitations.   All other systems reviewed and are negative.        VITALS  Vitals:    08/28/24 1108   BP: 108/56   Pulse: 78       PHYSICAL EXAM  Constitutional:       General: Awake.      Appearance: Normal and healthy appearance. Not in distress. Obese.   Neck:      Vascular: No JVR. JVD normal.   Pulmonary:      Effort: Pulmonary effort is normal.      Breath sounds: Normal breath sounds. No wheezing. No rhonchi. No rales.   Chest:      Chest wall: Not tender to palpatation.      Comments: Left sided device pocket- healed and well approximated. No swelling or hematoma      Cardiovascular:      PMI at left midclavicular line. Normal rate. Regular rhythm. Normal S1. Normal S2.       Murmurs: There is no murmur.      No gallop.  No click. No rub.   Pulses:     Intact distal pulses.   Edema:     Peripheral edema absent.   Abdominal:      Tenderness: There is no abdominal tenderness.   Musculoskeletal: Normal range of motion.         General: No tenderness. Skin:     General: Skin is warm and dry.   Neurological:      General: No focal deficit present.      Mental Status: Alert and oriented to person, place and time.           ASSESSMENT AND PLAN    Clinical impressions:  1. Atrial flutter status post radiofrequency catheter ablation on March 22, 2018 with no clinical recurrence.  2. Repair of a leaking right femoral pseudoaneurysm with endovascular stent graft and evacuation of a right thigh hematoma on April 3, 2018.  3. Normal left ventricular function per 2-D echo dated August 28, 2019.  Echocardiogram in 2022 shows left ventricular atrial fraction of 60%  4. Underlying sinus node dysfunction with EP study dated March 22, 2018 revealing mild sinus node dysfunction.  5. Minimal coronary disease per remote left heart catheterization.  6. Hypertension, controlled   7. Dyslipidemia on statin.  8. Obstructive sleep apnea on CPAP.  9. Morbid obesity with a BMI of 41.91.  10. 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  11.  Status post dual-chamber pacemaker implanted in 2023 no complications.  Medtronic dual-chamber pacemaker Bisi XT  MRI  12.  Evidence of atrial fibrillation by device interrogation.      Plan recommendations    From the electrophysiology standpoint he is stable.  Will continue with follow-up in office every 6 months or sooner if needed.    Patient is to have a follow-up very closely with hematology service.    Follow device clinic as scheduled.    Risk factor modification and lifestyle modification discussed with patient. Diet , exercise and hydration discussed with patient.    I have personally review with patient during this office visit, laboratory data, echocardiogram results, stress test results, Holter-event monitor results prior and after the last electrophysiology visit. All questions has been answered.    Please excuse any errors in grammar or translation related to this dictation.  Voice recognition software was utilized to prepare this document.

## 2024-08-28 NOTE — PATIENT INSTRUCTIONS
Continue same medications/treatment.  Patient educated on proper medication use.  Patient educated on risk factor modification.  Please bring any lab results from other providers/physicians to your next appointment.    Please bring all medicines, vitamins, and herbal supplements with you when you come to the office.    Prescriptions will not be filled unless you are compliant with your follow up appointments or have a follow up appointment scheduled as per instruction of your physician. Refills should be requested at the time of your visit.    Follow up with Dr. Dent in 6 months with device check   Continue remote checks at 3 and 9 months     Ashley DANIELLE RN, AM SCRIBING FOR, AND IN THE PRESENCE OF DR. ANA PAULA DENT MD

## 2024-08-29 ENCOUNTER — DOCUMENTATION (OUTPATIENT)
Dept: HEMATOLOGY/ONCOLOGY | Facility: HOSPITAL | Age: 84
End: 2024-08-29
Payer: MEDICARE

## 2024-08-29 ENCOUNTER — LAB (OUTPATIENT)
Dept: LAB | Facility: CLINIC | Age: 84
End: 2024-08-29
Payer: MEDICARE

## 2024-08-29 ENCOUNTER — OFFICE VISIT (OUTPATIENT)
Dept: HEMATOLOGY/ONCOLOGY | Facility: CLINIC | Age: 84
End: 2024-08-29
Payer: MEDICARE

## 2024-08-29 VITALS
WEIGHT: 289.9 LBS | SYSTOLIC BLOOD PRESSURE: 134 MMHG | OXYGEN SATURATION: 95 % | TEMPERATURE: 98.2 F | HEART RATE: 87 BPM | RESPIRATION RATE: 16 BRPM | DIASTOLIC BLOOD PRESSURE: 77 MMHG | BODY MASS INDEX: 39.32 KG/M2

## 2024-08-29 DIAGNOSIS — D46.9 MYELODYSPLASTIC SYNDROME (MULTI): ICD-10-CM

## 2024-08-29 DIAGNOSIS — I71.21 ANEURYSM OF ASCENDING AORTA WITHOUT RUPTURE (CMS-HCC): ICD-10-CM

## 2024-08-29 DIAGNOSIS — D46.9 MYELODYSPLASTIC SYNDROME (MULTI): Primary | ICD-10-CM

## 2024-08-29 LAB
ALBUMIN SERPL BCP-MCNC: 4.1 G/DL (ref 3.4–5)
ALP SERPL-CCNC: 76 U/L (ref 33–136)
ALT SERPL W P-5'-P-CCNC: 16 U/L (ref 10–52)
ANION GAP SERPL CALC-SCNC: 12 MMOL/L (ref 10–20)
APTT PPP: 35 SECONDS (ref 27–38)
AST SERPL W P-5'-P-CCNC: 19 U/L (ref 9–39)
BASOPHILS # BLD MANUAL: 0.03 X10*3/UL (ref 0–0.1)
BASOPHILS NFR BLD MANUAL: 1 %
BILIRUB SERPL-MCNC: 1.4 MG/DL (ref 0–1.2)
BLASTS # BLD MANUAL: 0.03 X10*3/UL
BLASTS NFR BLD MANUAL: 1 %
BUN SERPL-MCNC: 31 MG/DL (ref 6–23)
CALCIUM SERPL-MCNC: 9.2 MG/DL (ref 8.6–10.3)
CHLORIDE SERPL-SCNC: 108 MMOL/L (ref 98–107)
CO2 SERPL-SCNC: 24 MMOL/L (ref 21–32)
CREAT SERPL-MCNC: 1.46 MG/DL (ref 0.5–1.3)
DACRYOCYTES BLD QL SMEAR: ABNORMAL
EGFRCR SERPLBLD CKD-EPI 2021: 47 ML/MIN/1.73M*2
EOSINOPHIL # BLD MANUAL: 0.08 X10*3/UL (ref 0–0.4)
EOSINOPHIL NFR BLD MANUAL: 3 %
ERYTHROCYTE [DISTWIDTH] IN BLOOD BY AUTOMATED COUNT: 24.6 % (ref 11.5–14.5)
GLUCOSE SERPL-MCNC: 112 MG/DL (ref 74–99)
HCT VFR BLD AUTO: 23.1 % (ref 41–52)
HGB BLD-MCNC: 7.2 G/DL (ref 13.5–17.5)
HGB RETIC QN: 28 PG (ref 28–38)
IMM GRANULOCYTES # BLD AUTO: 0.03 X10*3/UL (ref 0–0.5)
IMM GRANULOCYTES NFR BLD AUTO: 1.1 % (ref 0–0.9)
IMMATURE RETIC FRACTION: 33.6 %
INR PPP: 1.3 (ref 0.9–1.1)
IRON SATN MFR SERPL: 49 % (ref 25–45)
IRON SERPL-MCNC: 152 UG/DL (ref 35–150)
LDH SERPL L TO P-CCNC: 528 U/L (ref 84–246)
LYMPHOCYTES # BLD MANUAL: 0.75 X10*3/UL (ref 0.8–3)
LYMPHOCYTES NFR BLD MANUAL: 29 %
MCH RBC QN AUTO: 29.1 PG (ref 26–34)
MCHC RBC AUTO-ENTMCNC: 31.2 G/DL (ref 32–36)
MCV RBC AUTO: 94 FL (ref 80–100)
METAMYELOCYTES # BLD MANUAL: 0.03 X10*3/UL
METAMYELOCYTES NFR BLD MANUAL: 1 %
MONOCYTES # BLD MANUAL: 0.08 X10*3/UL (ref 0.05–0.8)
MONOCYTES NFR BLD MANUAL: 3 %
NEUTS SEG # BLD MANUAL: 1.61 X10*3/UL (ref 1.6–5)
NEUTS SEG NFR BLD MANUAL: 62 %
NRBC BLD MANUAL-RTO: 6 % (ref 0–0)
NRBC BLD-RTO: ABNORMAL /100{WBCS}
OVALOCYTES BLD QL SMEAR: ABNORMAL
PLATELET # BLD AUTO: 33 X10*3/UL (ref 150–450)
POLYCHROMASIA BLD QL SMEAR: ABNORMAL
POTASSIUM SERPL-SCNC: 4.9 MMOL/L (ref 3.5–5.3)
PROT SERPL-MCNC: 6.2 G/DL (ref 6.4–8.2)
PROTHROMBIN TIME: 14.1 SECONDS (ref 9.8–12.8)
RBC # BLD AUTO: 2.47 X10*6/UL (ref 4.5–5.9)
RBC MORPH BLD: ABNORMAL
RETICS #: 0.08 X10*6/UL (ref 0.02–0.11)
RETICS/RBC NFR AUTO: 3 % (ref 0.5–2)
SCHISTOCYTES BLD QL SMEAR: ABNORMAL
SODIUM SERPL-SCNC: 139 MMOL/L (ref 136–145)
TIBC SERPL-MCNC: 312 UG/DL (ref 240–445)
TOTAL CELLS COUNTED BLD: 100
UIBC SERPL-MCNC: 160 UG/DL (ref 110–370)
WBC # BLD AUTO: 2.6 X10*3/UL (ref 4.4–11.3)

## 2024-08-29 PROCEDURE — 82607 VITAMIN B-12: CPT | Performed by: INTERNAL MEDICINE

## 2024-08-29 PROCEDURE — 1126F AMNT PAIN NOTED NONE PRSNT: CPT | Performed by: INTERNAL MEDICINE

## 2024-08-29 PROCEDURE — 85610 PROTHROMBIN TIME: CPT | Performed by: INTERNAL MEDICINE

## 2024-08-29 PROCEDURE — 3078F DIAST BP <80 MM HG: CPT | Performed by: INTERNAL MEDICINE

## 2024-08-29 PROCEDURE — 83550 IRON BINDING TEST: CPT | Performed by: INTERNAL MEDICINE

## 2024-08-29 PROCEDURE — 1123F ACP DISCUSS/DSCN MKR DOCD: CPT | Performed by: INTERNAL MEDICINE

## 2024-08-29 PROCEDURE — 99215 OFFICE O/P EST HI 40 MIN: CPT | Performed by: INTERNAL MEDICINE

## 2024-08-29 PROCEDURE — 82668 ASSAY OF ERYTHROPOIETIN: CPT

## 2024-08-29 PROCEDURE — 1160F RVW MEDS BY RX/DR IN RCRD: CPT | Performed by: INTERNAL MEDICINE

## 2024-08-29 PROCEDURE — 85730 THROMBOPLASTIN TIME PARTIAL: CPT | Performed by: INTERNAL MEDICINE

## 2024-08-29 PROCEDURE — 80053 COMPREHEN METABOLIC PANEL: CPT

## 2024-08-29 PROCEDURE — 36415 COLL VENOUS BLD VENIPUNCTURE: CPT

## 2024-08-29 PROCEDURE — 83010 ASSAY OF HAPTOGLOBIN QUANT: CPT | Performed by: INTERNAL MEDICINE

## 2024-08-29 PROCEDURE — 3075F SYST BP GE 130 - 139MM HG: CPT | Performed by: INTERNAL MEDICINE

## 2024-08-29 PROCEDURE — 85045 AUTOMATED RETICULOCYTE COUNT: CPT | Performed by: INTERNAL MEDICINE

## 2024-08-29 PROCEDURE — 85007 BL SMEAR W/DIFF WBC COUNT: CPT

## 2024-08-29 PROCEDURE — 83615 LACTATE (LD) (LDH) ENZYME: CPT | Performed by: INTERNAL MEDICINE

## 2024-08-29 PROCEDURE — 1159F MED LIST DOCD IN RCRD: CPT | Performed by: INTERNAL MEDICINE

## 2024-08-29 PROCEDURE — 85027 COMPLETE CBC AUTOMATED: CPT

## 2024-08-29 ASSESSMENT — PAIN SCALES - GENERAL: PAINLEVEL: 0-NO PAIN

## 2024-08-29 NOTE — LETTER
"August 30, 2024     Kevin Murphy MD  [id number]  [place] [id number]    Patient: GENARO Burgess   YOB: 1940   Date of Visit: 8/29/2024       Dear Dr. Kevin Murphy MD:    Thank you for referring GENARO Burgess to me for evaluation. Below are my notes for this consultation.  If you have questions, please do not hesitate to call me. I look forward to following your patient along with you.       Sincerely,     Sebastien Rashid MD      CC: No Recipients  ______________________________________________________________________________________    Patient ID: Holden Burgess \"GENARO\" is a 83 y.o. male.  Referring Physician: Kevin Murphy MD  [id number] [place] [address] [address]  [place] [id number]  Primary Care Provider: Giacomo Joseph DO    Date of Service:  8/29/2024    Assessment & Plan  Myelodysplastic syndrome (Multi)  8/29/2024: Today I had a long conversation with Mr. Espinoza and his spouse regarding his myelodysplastic syndrome diagnosis.  Unfortunately and spite of supportive growth factors he is actually had progressive cytopenias.  Moreover I am concerned that on his peripheral blood including on my personal review of his blood smear today, that he has a small percentage of circulating blasts.  All of this is suggestive that his disease course may have been atypically aggressive given his original restratification.  IPSS-M is actually a dynamic risk calculator applicable at various times of the disease course, I illustrated to him how with his current hemoglobin and platelet values he now actually would be considered to have moderate high risk disease just based on the bone marrow results from January.  In that setting I highly recommend we obtain a new interval bone marrow biopsy as there does seem to be evidence of disease evolution.    As noted below, patient is very concerned that has been advised by Dr. Solis about his increasing ascending arctic aneurysm and that " ideally they would like to complete endovascular repair on a semiurgent timeline.  Unfortunately his current platelet counts make endovascular procedures very dangerous and high risk and difficult to manage.  Mr. Espinoza is therefore interested in therapy that could have the potential to quickly improve his blood counts.    I reviewed that most likely our options not include treatments that can correct things any faster than 1 to 2 months, and that all treatment options will have both the chance of being effective but also a chance of being ineffective.  If he has a high blast percentage now, I think treatment similar to acute myeloid leukemia with hypomethylating agent and venetoclax may be appropriate.  This will be a major change for him and will require transient transfusion dependence.    The other thing of note is that his brother also has MDS, and based on the description from his spouse may be a high risk form of the disease with frequent transfusion dependence.  Given the presence of a first-degree relative I also recommended that we extend our genetic testing to include a panel that test for mutations in genes with known familial risk.  We are learning that while familial syndromes leading to increased risk of myeloid malignancies are still relatively rare, there can be incomplete penetrance with these and they can be associated with that late in life.  Identification of 1 of these genes would not be confirmatory that there is a familial syndrome, but would trigger a genetic counseling referral.   Diagnostics:  - repeat bone marrow biopsy - include send out genetic testing panel inclusive of genes (filled out financial assistance application for [facility] today)   Treatment:  - TBD - consider HMA/Twin, low dose weekly decitabine/venetoclax vs other   Disease/Toxicity Monitoring:  - TBD  Supportive Care:  - TBD  Antimicrobial Prophylaxis:   - TBD  IV access:  - for now, continue with PIV    Follow  up in 1.5-2 weeks after results of repeat bone marrow    Aneurysm of ascending aorta without rupture (CMS-HCC)  8/29/2024: I discussed the patient's concerns that he may need semiurgent repair, I reviewed that we will likely need to try to prioritize recovery of his counts with what ever treatment option we select, but we should also include what are his chances of long-term durability in this decision.  Unfortunately, this is probably not something that can be addressed in the next 1 to 3 months given the usual timeline for improvement in blood counts.                Oncology History   Myelodysplastic syndrome (Multi)   1/23/2024 Initial Diagnosis    Myelodysplastic syndrome:   Diagnosis:   Originally referred to Dr. Murphy in 2023 for longstanding thrombocytopenia.  At the time had mild leukopenia, no anemia.  Was refractory to a trial of prednisone, and so Bone marrow biopsy was completed completed on 1/23/2024 and demonstrated:  BONE MARROW CLOT, CORE BIOPSY, ASPIRATE, LEFT ILIAC CREST:   -- MILDLY HYPERCELLULAR BONE MARROW (80%) WITH MATURING TRILINEAGE HEMATOPOIESIS AND MODERATE INCREASE IN MEGAKARYOCYTES, SEE NOTE.  -- SMALL LYMPHOID AGGREGATES, FAVOR BENIGN.  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 Given the presence of persistent cytopenias, hypercellularity with increase megakaryocytes with abnormal clustering, and no increase in blasts, the overall findings are most consistent with:  -- MDS with low blasts (WHO-HAEM5 Classification) /   -- MDS-NOS with single lineage dysplasia (MDS-NOS-SLD) (ICC 2022 Classification).  NGS: SRSF2  Chromosome Analysis: 47,XY,+8[15]/46,XY[5]  IPSS-M: -0.99 - low risks disease     4/4/2024 - 8/29/2024 Supportive Treatment    Treatment:   I. Eltrombopag -on 4/4/2024 patient continued with persistent thrombocytopenia but also had some progressive anemia with hemoglobin down to 10.  Patient was offered [id number],  preferred supportive management with oral medication opted for eltrombopag in the setting of thrombocytopenia starting at 50 and ramping up to 150 mg  From 4/20/2024 through 8/20/2024 patient had progressive anemia and progressive thrombocytopenia and spite of treatment.    II. Darbepoietin -in the setting of worsening anemia darbepoetin was added 7/12/2024 at 100 mcg every 2 weeks                Subjective      History of Present Illness:  Mr. Smith reported that in Nov 2023, he went to get a pacemaker put in, and when they went in, and the blood work over 5 years, he's had a low plt count for a long time.  So, at the time, they went ahead with the procedure, but his spouse (who is an RN) recommended to look for a hematologist.  Had his 1st meeting in January or so; was trialed on eltrombopag back in January.  Was on 50mg, then 100mg - had also tried a short course of steroids.      He's noticed that with his anemia, he's been extremely tired , sometimes unbelievably exhausted.  Otherwise he doesn't seem to feel anything like sick, or excessive fatigue. He notes that after excessive activity, he'll be very tired on other days.          Past Medical History:  Pacemaker - for sick sinus syndrome, and history of paroxysmal atrial flutter where his is s/p ablation  CAD - mild  Prostate cancer: diagnosed by high PSA in Dec 2000 - then had brachytherapy in December 2001; then 2 years ago, had hematuria, and and treatments in a hyperbaric and   History of spinal stenosis  CKDIII  - follows with Dr. Brooks (also on allopurinol for hyperuricemia)  HTN - well controlled  Ascending Aortic Aneurysm that is apparently changing and is high risk for rupture.   Restless legs syndrome      Past Surgical History:  R femoral stent/graft (compliction from catheterization)  Artifical urethral sphincter   R & L TKA  Lumbar spinal laminectomy/fusion    Family History:   Brother - diagnosed 3 years ago - very transfusion dependent  however, (sounds like)  Sister - lung cancer (was a smoker)     Social History:  Retired CPA   Livers with spouse, no one else at home - they do have a basset hound  Two daughters, one in [place], on in [place]  Smoker until 1968; December 21, 2000 was told should stop drinking, so stopped at that time.       Review of Systems    Home Medications and Adherence Reviewed with Patient.       Objective     VS:  /77 (BP Location: Right arm, Patient Position: Sitting, BP Cuff Size: Adult)   Pulse 87   Temp 36.8 °C (98.2 °F) (Temporal)   Resp 16   Wt 132 kg (289 lb 14.5 oz)   SpO2 95%   BMI 39.32 kg/m²   BSA: 2.59 meters squared    Physical Exam    Laboratory:  Lab on 08/29/2024   Component Date Value Ref Range Status   • WBC 08/29/2024 2.6 (L)  4.4 - 11.3 x10*3/uL Final   • nRBC 08/29/2024    Final   • RBC 08/29/2024 2.47 (L)  4.50 - 5.90 x10*6/uL Final   • Hemoglobin 08/29/2024 7.2 (L)  13.5 - 17.5 g/dL Final   • Hematocrit 08/29/2024 23.1 (L)  41.0 - 52.0 % Final   • MCV 08/29/2024 94  80 - 100 fL Final   • MCH 08/29/2024 29.1  26.0 - 34.0 pg Final   • MCHC 08/29/2024 31.2 (L)  32.0 - 36.0 g/dL Final   • RDW 08/29/2024 24.6 (H)  11.5 - 14.5 % Final   • Platelets 08/29/2024 33 (LL)  150 - 450 x10*3/uL Final   • Immature Granulocytes %, Automated 08/29/2024 1.1 (H)  0.0 - 0.9 % Final   • Immature Granulocytes Absolute, Au* 08/29/2024 0.03  0.00 - 0.50 x10*3/uL Final   • Glucose 08/29/2024 112 (H)  74 - 99 mg/dL Final   • Sodium 08/29/2024 139  136 - 145 mmol/L Final   • Potassium 08/29/2024 4.9  3.5 - 5.3 mmol/L Final   • Chloride 08/29/2024 108 (H)  98 - 107 mmol/L Final   • Bicarbonate 08/29/2024 24  21 - 32 mmol/L Final   • Anion Gap 08/29/2024 12  10 - 20 mmol/L Final   • Urea Nitrogen 08/29/2024 31 (H)  6 - 23 mg/dL Final   • Creatinine 08/29/2024 1.46 (H)  0.50 - 1.30 mg/dL Final   • eGFR 08/29/2024 47 (L)  >60 mL/min/1.73m*2 Final   • Calcium 08/29/2024 9.2  8.6 - 10.3 mg/dL Final   • Albumin 08/29/2024  4.1  3.4 - 5.0 g/dL Final   • Alkaline Phosphatase 08/29/2024 76  33 - 136 U/L Final   • Total Protein 08/29/2024 6.2 (L)  6.4 - 8.2 g/dL Final   • AST 08/29/2024 19  9 - 39 U/L Final   • Bilirubin, Total 08/29/2024 1.4 (H)  0.0 - 1.2 mg/dL Final   • ALT 08/29/2024 16  10 - 52 U/L Final   • LDH 08/29/2024 528 (H)  84 - 246 U/L Final   • aPTT 08/29/2024 35  27 - 38 seconds Final   • Protime 08/29/2024 14.1 (H)  9.8 - 12.8 seconds Final   • INR 08/29/2024 1.3 (H)  0.9 - 1.1 Final   • Haptoglobin 08/29/2024 <30 (L)  30 - 200 mg/dL Final   • Iron 08/29/2024 152 (H)  35 - 150 ug/dL Final   • UIBC 08/29/2024 160  110 - 370 ug/dL Final   • TIBC 08/29/2024 312  240 - 445 ug/dL Final   • % Saturation 08/29/2024 49 (H)  25 - 45 % Final   • Vitamin B12 08/29/2024 669  211 - 911 pg/mL Final   • Retic % 08/29/2024 3.0 (H)  0.5 - 2.0 % Final   • Retic Absolute 08/29/2024 0.076  0.017 - 0.110 x10*6/uL Final   • Reticulocyte Hemoglobin 08/29/2024 28  28 - 38 pg Final   • Immature Retic fraction 08/29/2024 33.6 (H)  <=16.0 % Final   • Neutrophils %, Manual 08/29/2024 62.0  40.0 - 80.0 % Final   • Lymphocytes %, Manual 08/29/2024 29.0  13.0 - 44.0 % Final   • Monocytes %, Manual 08/29/2024 3.0  2.0 - 10.0 % Final   • Eosinophils %, Manual 08/29/2024 3.0  0.0 - 6.0 % Final   • Basophils %, Manual 08/29/2024 1.0  0.0 - 2.0 % Final   • Metamyelocytes %, Manual 08/29/2024 1.0  0.0 - 0.0 % Final   • Blasts %, Manual 08/29/2024 1.0  0.0 - 0.0 % Final   • Seg Neutrophils Absolute, Manual 08/29/2024 1.61  1.60 - 5.00 x10*3/uL Final   • Lymphocytes Absolute, Manual 08/29/2024 0.75 (L)  0.80 - 3.00 x10*3/uL Final   • Monocytes Absolute, Manual 08/29/2024 0.08  0.05 - 0.80 x10*3/uL Final   • Eosinophils Absolute, Manual 08/29/2024 0.08  0.00 - 0.40 x10*3/uL Final   • Basophils Absolute, Manual 08/29/2024 0.03  0.00 - 0.10 x10*3/uL Final   • Metamyelocytes Absolute, Manual 08/29/2024 0.03  0.00 - 0.00 x10*3/uL Final   • Blasts Absolute, Manual  08/29/2024 0.03  0.00 - 0.00 x10*3/uL Final   • Total Cells Counted 08/29/2024 100   Final   • Manual nRBC per 100 Cells 08/29/2024 6.0 (H)  0.0 - 0.0 % Final   • RBC Morphology 08/29/2024 See Below   Final   • Polychromasia 08/29/2024 Mild   Final   • RBC Fragments 08/29/2024 Few   Final   • Ovalocytes 08/29/2024 Few   Final   • Teardrop Cells 08/29/2024 Few   Final                   Sebastien Rashid MD

## 2024-08-29 NOTE — PROGRESS NOTES
"Patient ID: Holden Burgess \"GENARO\" is a 83 y.o. male.  Referring Physician: Kevin Murphy MD  [address]  [place] [address]  Primary Care Provider: Giacomo Joseph DO    Date of Service:  8/29/2024    Assessment & Plan  Myelodysplastic syndrome (Multi)  8/29/2024: Today I had a long conversation with Mr. Espinoza and his spouse regarding his myelodysplastic syndrome diagnosis.  Unfortunately and spite of supportive growth factors he is actually had progressive cytopenias.  Moreover I am concerned that on his peripheral blood including on my personal review of his blood smear today, that he has a small percentage of circulating blasts.  All of this is suggestive that his disease course may have been atypically aggressive given his original restratification.  IPSS-M is actually a dynamic risk calculator applicable at various times of the disease course, I illustrated to him how with his current hemoglobin and platelet values he now actually would be considered to have moderate high risk disease just based on the bone marrow results from January.  In that setting I highly recommend we obtain a new interval bone marrow biopsy as there does seem to be evidence of disease evolution.    As noted below, patient is very concerned that has been advised by Dr. Solis about his increasing ascending arctic aneurysm and that ideally they would like to complete endovascular repair on a semiurgent timeline.  Unfortunately his current platelet counts make endovascular procedures very dangerous and high risk and difficult to manage.  Mr. Espinoza is therefore interested in therapy that could have the potential to quickly improve his blood counts.    I reviewed that most likely our options not include treatments that can correct things any faster than 1 to 2 months, and that all treatment options will have both the chance of being effective but also a chance of being ineffective.  If he has a high blast " percentage now, I think treatment similar to acute myeloid leukemia with hypomethylating agent and venetoclax may be appropriate.  This will be a major change for him and will require transient transfusion dependence.    The other thing of note is that his brother also has MDS, and based on the description from his spouse may be a high risk form of the disease with frequent transfusion dependence.  Given the presence of a first-degree relative I also recommended that we extend our genetic testing to include a panel that test for mutations in genes with known familial risk.  We are learning that while familial syndromes leading to increased risk of myeloid malignancies are still relatively rare, there can be incomplete penetrance with these and they can be associated with that late in life.  Identification of 1 of these genes would not be confirmatory that there is a familial syndrome, but would trigger a genetic counseling referral.   Diagnostics:  - repeat bone marrow biopsy - include send out genetic testing panel inclusive of genes (filled out financial assistance application for [facility] today)   Treatment:  - TBD - consider HMA/Twin, low dose weekly decitabine/venetoclax vs other   Disease/Toxicity Monitoring:  - TBD  Supportive Care:  - TBD  Antimicrobial Prophylaxis:   - TBD  IV access:  - for now, continue with PIV    Follow up in 1.5-2 weeks after results of repeat bone marrow    Aneurysm of ascending aorta without rupture (CMS-HCC)  8/29/2024: I discussed the patient's concerns that he may need semiurgent repair, I reviewed that we will likely need to try to prioritize recovery of his counts with what ever treatment option we select, but we should also include what are his chances of long-term durability in this decision.  Unfortunately, this is probably not something that can be addressed in the next 1 to 3 months given the usual timeline for improvement in blood counts.                Oncology  History   Myelodysplastic syndrome (Multi)   1/23/2024 Initial Diagnosis    Myelodysplastic syndrome:   Diagnosis:   Originally referred to Dr. Murphy in 2023 for longstanding thrombocytopenia.  At the time had mild leukopenia, no anemia.  Was refractory to a trial of prednisone, and so Bone marrow biopsy was completed completed on 1/23/2024 and demonstrated:  BONE MARROW CLOT, CORE BIOPSY, ASPIRATE, LEFT ILIAC CREST:   -- MILDLY HYPERCELLULAR BONE MARROW (80%) WITH MATURING TRILINEAGE HEMATOPOIESIS AND MODERATE INCREASE IN MEGAKARYOCYTES, SEE NOTE.  -- SMALL LYMPHOID AGGREGATES, FAVOR BENIGN.  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 Given the presence of persistent cytopenias, hypercellularity with increase megakaryocytes with abnormal clustering, and no increase in blasts, the overall findings are most consistent with:  -- MDS with low blasts (WHO-HAEM5 Classification) /   -- MDS-NOS with single lineage dysplasia (MDS-NOS-SLD) (ICC 2022 Classification).  NGS: SRSF2  Chromosome Analysis: 47,XY,+8[15]/46,XY[5]  IPSS-M: -0.99 - low risks disease     4/4/2024 - 8/29/2024 Supportive Treatment    Treatment:   I. Eltrombopag -on 4/4/2024 patient continued with persistent thrombocytopenia but also had some progressive anemia with hemoglobin down to 10.  Patient was offered [id number], preferred supportive management with oral medication opted for eltrombopag in the setting of thrombocytopenia starting at 50 and ramping up to 150 mg  From 4/20/2024 through 8/20/2024 patient had progressive anemia and progressive thrombocytopenia and spite of treatment.    II. Darbepoietin -in the setting of worsening anemia darbepoetin was added 7/12/2024 at 100 mcg every 2 weeks                Subjective       History of Present Illness:  Mr. Smith reported that in Nov 2023, he went to get a pacemaker put in, and when they went in, and the blood work over 5 years, he's had a low  plt count for a long time.  So, at the time, they went ahead with the procedure, but his spouse (who is an RN) recommended to look for a hematologist.  Had his 1st meeting in January or so; was trialed on eltrombopag back in January.  Was on 50mg, then 100mg - had also tried a short course of steroids.      He's noticed that with his anemia, he's been extremely tired , sometimes unbelievably exhausted.  Otherwise he doesn't seem to feel anything like sick, or excessive fatigue. He notes that after excessive activity, he'll be very tired on other days.          Past Medical History:  Pacemaker - for sick sinus syndrome, and history of paroxysmal atrial flutter where his is s/p ablation  CAD - mild  Prostate cancer: diagnosed by high PSA in Dec 2000 - then had brachytherapy in December 2001; then 2 years ago, had hematuria, and and treatments in a hyperbaric and   History of spinal stenosis  CKDIII  - follows with Dr. Brooks (also on allopurinol for hyperuricemia)  HTN - well controlled  Ascending Aortic Aneurysm that is apparently changing and is high risk for rupture.   Restless legs syndrome      Past Surgical History:  R femoral stent/graft (compliction from catheterization)  Artifical urethral sphincter   R & L TKA  Lumbar spinal laminectomy/fusion    Family History:   Brother - diagnosed 3 years ago - very transfusion dependent however, (sounds like)  Sister - lung cancer (was a smoker)     Social History:  Retired [employer]   Livers with spouse, no one else at home - they do have a basset hound  Two daughters, one in [place], on in [place]  Smoker until 1968; December 21, 2000 was told should stop drinking, so stopped at that time.       Review of Systems    Home Medications and Adherence Reviewed with Patient.       Objective      VS:  /77 (BP Location: Right arm, Patient Position: Sitting, BP Cuff Size: Adult)   Pulse 87   Temp 36.8 °C (98.2 °F) (Temporal)   Resp 16   Wt 132 kg (289 lb 14.5 oz)   SpO2 95%    BMI 39.32 kg/m²   BSA: 2.59 meters squared    Physical Exam    Laboratory:  Lab on 08/29/2024   Component Date Value Ref Range Status    WBC 08/29/2024 2.6 (L)  4.4 - 11.3 x10*3/uL Final    nRBC 08/29/2024    Final    RBC 08/29/2024 2.47 (L)  4.50 - 5.90 x10*6/uL Final    Hemoglobin 08/29/2024 7.2 (L)  13.5 - 17.5 g/dL Final    Hematocrit 08/29/2024 23.1 (L)  41.0 - 52.0 % Final    MCV 08/29/2024 94  80 - 100 fL Final    MCH 08/29/2024 29.1  26.0 - 34.0 pg Final    MCHC 08/29/2024 31.2 (L)  32.0 - 36.0 g/dL Final    RDW 08/29/2024 24.6 (H)  11.5 - 14.5 % Final    Platelets 08/29/2024 33 (LL)  150 - 450 x10*3/uL Final    Immature Granulocytes %, Automated 08/29/2024 1.1 (H)  0.0 - 0.9 % Final    Immature Granulocytes Absolute, Au* 08/29/2024 0.03  0.00 - 0.50 x10*3/uL Final    Glucose 08/29/2024 112 (H)  74 - 99 mg/dL Final    Sodium 08/29/2024 139  136 - 145 mmol/L Final    Potassium 08/29/2024 4.9  3.5 - 5.3 mmol/L Final    Chloride 08/29/2024 108 (H)  98 - 107 mmol/L Final    Bicarbonate 08/29/2024 24  21 - 32 mmol/L Final    Anion Gap 08/29/2024 12  10 - 20 mmol/L Final    Urea Nitrogen 08/29/2024 31 (H)  6 - 23 mg/dL Final    Creatinine 08/29/2024 1.46 (H)  0.50 - 1.30 mg/dL Final    eGFR 08/29/2024 47 (L)  >60 mL/min/1.73m*2 Final    Calcium 08/29/2024 9.2  8.6 - 10.3 mg/dL Final    Albumin 08/29/2024 4.1  3.4 - 5.0 g/dL Final    Alkaline Phosphatase 08/29/2024 76  33 - 136 U/L Final    Total Protein 08/29/2024 6.2 (L)  6.4 - 8.2 g/dL Final    AST 08/29/2024 19  9 - 39 U/L Final    Bilirubin, Total 08/29/2024 1.4 (H)  0.0 - 1.2 mg/dL Final    ALT 08/29/2024 16  10 - 52 U/L Final    LDH 08/29/2024 528 (H)  84 - 246 U/L Final    aPTT 08/29/2024 35  27 - 38 seconds Final    Protime 08/29/2024 14.1 (H)  9.8 - 12.8 seconds Final    INR 08/29/2024 1.3 (H)  0.9 - 1.1 Final    Haptoglobin 08/29/2024 <30 (L)  30 - 200 mg/dL Final    Iron 08/29/2024 152 (H)  35 - 150 ug/dL Final    UIBC 08/29/2024 160  110 - 370 ug/dL  Final    TIBC 08/29/2024 312  240 - 445 ug/dL Final    % Saturation 08/29/2024 49 (H)  25 - 45 % Final    Vitamin B12 08/29/2024 669  211 - 911 pg/mL Final    Retic % 08/29/2024 3.0 (H)  0.5 - 2.0 % Final    Retic Absolute 08/29/2024 0.076  0.017 - 0.110 x10*6/uL Final    Reticulocyte Hemoglobin 08/29/2024 28  28 - 38 pg Final    Immature Retic fraction 08/29/2024 33.6 (H)  <=16.0 % Final    Neutrophils %, Manual 08/29/2024 62.0  40.0 - 80.0 % Final    Lymphocytes %, Manual 08/29/2024 29.0  13.0 - 44.0 % Final    Monocytes %, Manual 08/29/2024 3.0  2.0 - 10.0 % Final    Eosinophils %, Manual 08/29/2024 3.0  0.0 - 6.0 % Final    Basophils %, Manual 08/29/2024 1.0  0.0 - 2.0 % Final    Metamyelocytes %, Manual 08/29/2024 1.0  0.0 - 0.0 % Final    Blasts %, Manual 08/29/2024 1.0  0.0 - 0.0 % Final    Seg Neutrophils Absolute, Manual 08/29/2024 1.61  1.60 - 5.00 x10*3/uL Final    Lymphocytes Absolute, Manual 08/29/2024 0.75 (L)  0.80 - 3.00 x10*3/uL Final    Monocytes Absolute, Manual 08/29/2024 0.08  0.05 - 0.80 x10*3/uL Final    Eosinophils Absolute, Manual 08/29/2024 0.08  0.00 - 0.40 x10*3/uL Final    Basophils Absolute, Manual 08/29/2024 0.03  0.00 - 0.10 x10*3/uL Final    Metamyelocytes Absolute, Manual 08/29/2024 0.03  0.00 - 0.00 x10*3/uL Final    Blasts Absolute, Manual 08/29/2024 0.03  0.00 - 0.00 x10*3/uL Final    Total Cells Counted 08/29/2024 100   Final    Manual nRBC per 100 Cells 08/29/2024 6.0 (H)  0.0 - 0.0 % Final    RBC Morphology 08/29/2024 See Below   Final    Polychromasia 08/29/2024 Mild   Final    RBC Fragments 08/29/2024 Few   Final    Ovalocytes 08/29/2024 Few   Final    Teardrop Cells 08/29/2024 Few   Final                   Sebastien Rashid MD

## 2024-08-30 ENCOUNTER — TELEPHONE (OUTPATIENT)
Dept: HEMATOLOGY/ONCOLOGY | Facility: CLINIC | Age: 84
End: 2024-08-30
Payer: MEDICARE

## 2024-08-30 LAB
1OH-MIDAZOLAM UR CFM-MCNC: <25 NG/ML
6MAM UR CFM-MCNC: <25 NG/ML
7AMINOCLONAZEPAM UR CFM-MCNC: <25 NG/ML
A-OH ALPRAZ UR CFM-MCNC: <25 NG/ML
ALPRAZ UR CFM-MCNC: <25 NG/ML
CHLORDIAZEP UR CFM-MCNC: <25 NG/ML
CLONAZEPAM UR CFM-MCNC: <25 NG/ML
CODEINE UR CFM-MCNC: <50 NG/ML
DIAZEPAM UR CFM-MCNC: <25 NG/ML
EDDP UR CFM-MCNC: <25 NG/ML
FENTANYL UR CFM-MCNC: <2.5 NG/ML
HAPTOGLOB SERPL NEPH-MCNC: <30 MG/DL (ref 30–200)
HYDROCODONE CTO UR CFM-MCNC: <25 NG/ML
HYDROMORPHONE UR CFM-MCNC: <25 NG/ML
LORAZEPAM UR CFM-MCNC: <25 NG/ML
METHADONE UR CFM-MCNC: <25 NG/ML
MIDAZOLAM UR CFM-MCNC: <25 NG/ML
MORPHINE UR CFM-MCNC: <50 NG/ML
NORDIAZEPAM UR CFM-MCNC: <25 NG/ML
NORFENTANYL UR CFM-MCNC: <2.5 NG/ML
NORHYDROCODONE UR CFM-MCNC: <25 NG/ML
NOROXYCODONE UR CFM-MCNC: <25 NG/ML
NORTRAMADOL UR-MCNC: <50 NG/ML
OXAZEPAM UR CFM-MCNC: 203 NG/ML
OXYCODONE UR CFM-MCNC: <25 NG/ML
OXYMORPHONE UR CFM-MCNC: <25 NG/ML
TEMAZEPAM UR CFM-MCNC: >1000 NG/ML
TRAMADOL UR CFM-MCNC: <50 NG/ML
VIT B12 SERPL-MCNC: 669 PG/ML (ref 211–911)
ZOLPIDEM UR CFM-MCNC: <25 NG/ML
ZOLPIDEM UR-MCNC: <25 NG/ML

## 2024-08-30 NOTE — ASSESSMENT & PLAN NOTE
8/29/2024: I discussed the patient's concerns that he may need semiurgent repair, I reviewed that we will likely need to try to prioritize recovery of his counts with what ever treatment option we select, but we should also include what are his chances of long-term durability in this decision.  Unfortunately, this is probably not something that can be addressed in the next 1 to 3 months given the usual timeline for improvement in blood counts.

## 2024-08-30 NOTE — TELEPHONE ENCOUNTER
I called and spoke with Holden to review upcoming appointments. Patient scheduled for bone marrow biopsy 9/5 at 1000 AM at [facility] - pt confirmed date/time. [place] [facility] [place], no appointments available sooner than 9/5. Let patient know that we will schedule a FUV 1.5-2 weeks after BMB with Dr. Rashid to review results. Also updated him that per Dr. Murphy FUV on 9/6 cancelled as he has follow up with Gay for workup. Pt verbalized understanding and had no further questions.

## 2024-08-30 NOTE — ASSESSMENT & PLAN NOTE
8/29/2024: Today I had a long conversation with Mr. Espinoza and his spouse regarding his myelodysplastic syndrome diagnosis.  Unfortunately and spite of supportive growth factors he is actually had progressive cytopenias.  Moreover I am concerned that on his peripheral blood including on my personal review of his blood smear today, that he has a small percentage of circulating blasts.  All of this is suggestive that his disease course may have been atypically aggressive given his original restratification.  IPSS-M is actually a dynamic risk calculator applicable at various times of the disease course, I illustrated to him how with his current hemoglobin and platelet values he now actually would be considered to have moderate high risk disease just based on the bone marrow results from January.  In that setting I highly recommend we obtain a new interval bone marrow biopsy as there does seem to be evidence of disease evolution.    As noted below, patient is very concerned that has been advised by Dr. Solis about his increasing ascending arctic aneurysm and that ideally they would like to complete endovascular repair on a semiurgent timeline.  Unfortunately his current platelet counts make endovascular procedures very dangerous and high risk and difficult to manage.  Mr. Espinoza is therefore interested in therapy that could have the potential to quickly improve his blood counts.    I reviewed that most likely our options not include treatments that can correct things any faster than 1 to 2 months, and that all treatment options will have both the chance of being effective but also a chance of being ineffective.  If he has a high blast percentage now, I think treatment similar to acute myeloid leukemia with hypomethylating agent and venetoclax may be appropriate.  This will be a major change for him and will require transient transfusion dependence.    The other thing of note is that his brother also has MDS, and  based on the description from his spouse may be a high risk form of the disease with frequent transfusion dependence.  Given the presence of a first-degree relative I also recommended that we extend our genetic testing to include a panel that test for mutations in genes with known familial risk.  We are learning that while familial syndromes leading to increased risk of myeloid malignancies are still relatively rare, there can be incomplete penetrance with these and they can be associated with that late in life.  Identification of 1 of these genes would not be confirmatory that there is a familial syndrome, but would trigger a genetic counseling referral.   Diagnostics:  - repeat bone marrow biopsy - include send out genetic testing panel inclusive of genes (filled out financial assistance application for [facility] today)   Treatment:  - TBD - consider HMA/Twin, low dose weekly decitabine/venetoclax vs other   Disease/Toxicity Monitoring:  - TBD  Supportive Care:  - TBD  Antimicrobial Prophylaxis:   - TBD  IV access:  - for now, continue with PIV    Follow up in 1.5-2 weeks after results of repeat bone marrow

## 2024-08-31 ENCOUNTER — HOSPITAL ENCOUNTER (INPATIENT)
Facility: HOSPITAL | Age: 84
DRG: 689 | End: 2024-08-31
Attending: STUDENT IN AN ORGANIZED HEALTH CARE EDUCATION/TRAINING PROGRAM | Admitting: STUDENT IN AN ORGANIZED HEALTH CARE EDUCATION/TRAINING PROGRAM
Payer: MEDICARE

## 2024-08-31 ENCOUNTER — APPOINTMENT (OUTPATIENT)
Dept: RADIOLOGY | Facility: HOSPITAL | Age: 84
DRG: 689 | End: 2024-08-31
Payer: MEDICARE

## 2024-08-31 ENCOUNTER — APPOINTMENT (OUTPATIENT)
Dept: CARDIOLOGY | Facility: HOSPITAL | Age: 84
DRG: 689 | End: 2024-08-31
Payer: MEDICARE

## 2024-08-31 DIAGNOSIS — N30.01 ACUTE CYSTITIS WITH HEMATURIA: Primary | ICD-10-CM

## 2024-08-31 DIAGNOSIS — R41.82 ALTERED MENTAL STATUS, UNSPECIFIED ALTERED MENTAL STATUS TYPE: ICD-10-CM

## 2024-08-31 PROBLEM — R06.00 DYSPNEA: Status: ACTIVE | Noted: 2024-08-31

## 2024-08-31 PROBLEM — G93.40 ACUTE ENCEPHALOPATHY: Status: ACTIVE | Noted: 2024-08-31

## 2024-08-31 LAB
ALBUMIN SERPL BCP-MCNC: 4 G/DL (ref 3.4–5)
ALP SERPL-CCNC: 75 U/L (ref 33–136)
ALT SERPL W P-5'-P-CCNC: 14 U/L (ref 10–52)
AMORPH CRY #/AREA UR COMP ASSIST: ABNORMAL /HPF
ANION GAP SERPL CALC-SCNC: 15 MMOL/L (ref 10–20)
APPEARANCE UR: ABNORMAL
AST SERPL W P-5'-P-CCNC: 16 U/L (ref 9–39)
ATRIAL RATE: 60 BPM
BACTERIA #/AREA URNS AUTO: ABNORMAL /HPF
BASOPHILS # BLD AUTO: 0.01 X10*3/UL (ref 0–0.1)
BASOPHILS NFR BLD AUTO: 0.4 %
BILIRUB SERPL-MCNC: 1.5 MG/DL (ref 0–1.2)
BILIRUB UR STRIP.AUTO-MCNC: NEGATIVE MG/DL
BNP SERPL-MCNC: 32 PG/ML (ref 0–99)
BUN SERPL-MCNC: 32 MG/DL (ref 6–23)
CALCIUM SERPL-MCNC: 9 MG/DL (ref 8.6–10.3)
CARDIAC TROPONIN I PNL SERPL HS: 7 NG/L (ref 0–20)
CHLORIDE SERPL-SCNC: 106 MMOL/L (ref 98–107)
CO2 SERPL-SCNC: 19 MMOL/L (ref 21–32)
COLOR UR: YELLOW
CREAT SERPL-MCNC: 1.49 MG/DL (ref 0.5–1.3)
DACRYOCYTES BLD QL SMEAR: NORMAL
EGFRCR SERPLBLD CKD-EPI 2021: 46 ML/MIN/1.73M*2
EOSINOPHIL # BLD AUTO: 0.02 X10*3/UL (ref 0–0.4)
EOSINOPHIL NFR BLD AUTO: 0.7 %
EPO SERPL-ACNC: 259 MU/ML (ref 4–27)
ERYTHROCYTE [DISTWIDTH] IN BLOOD BY AUTOMATED COUNT: 24.6 % (ref 11.5–14.5)
GLUCOSE BLD MANUAL STRIP-MCNC: 85 MG/DL (ref 74–99)
GLUCOSE SERPL-MCNC: 101 MG/DL (ref 74–99)
GLUCOSE UR STRIP.AUTO-MCNC: NORMAL MG/DL
HCT VFR BLD AUTO: 23.2 % (ref 41–52)
HGB BLD-MCNC: 7.2 G/DL (ref 13.5–17.5)
HOLD SPECIMEN: NORMAL
HOLD SPECIMEN: NORMAL
IMM GRANULOCYTES # BLD AUTO: 0.03 X10*3/UL (ref 0–0.5)
IMM GRANULOCYTES NFR BLD AUTO: 1.1 % (ref 0–0.9)
KETONES UR STRIP.AUTO-MCNC: NEGATIVE MG/DL
LACTATE SERPL-SCNC: 1.6 MMOL/L (ref 0.4–2)
LACTATE SERPL-SCNC: 2.4 MMOL/L (ref 0.4–2)
LEUKOCYTE ESTERASE UR QL STRIP.AUTO: ABNORMAL
LIPASE SERPL-CCNC: 17 U/L (ref 9–82)
LYMPHOCYTES # BLD AUTO: 0.23 X10*3/UL (ref 0.8–3)
LYMPHOCYTES NFR BLD AUTO: 8.5 %
MCH RBC QN AUTO: 28.9 PG (ref 26–34)
MCHC RBC AUTO-ENTMCNC: 31 G/DL (ref 32–36)
MCV RBC AUTO: 93 FL (ref 80–100)
MONOCYTES # BLD AUTO: 0.22 X10*3/UL (ref 0.05–0.8)
MONOCYTES NFR BLD AUTO: 8.1 %
NEUTROPHILS # BLD AUTO: 2.2 X10*3/UL (ref 1.6–5.5)
NEUTROPHILS NFR BLD AUTO: 81.2 %
NITRITE UR QL STRIP.AUTO: NEGATIVE
NRBC BLD-RTO: 5.5 /100 WBCS (ref 0–0)
OVALOCYTES BLD QL SMEAR: NORMAL
P AXIS: -1 DEGREES
PH UR STRIP.AUTO: 6 [PH]
PLATELET # BLD AUTO: 26 X10*3/UL (ref 150–450)
POLYCHROMASIA BLD QL SMEAR: NORMAL
POTASSIUM SERPL-SCNC: 4.4 MMOL/L (ref 3.5–5.3)
PR INTERVAL: 234 MS
PROT SERPL-MCNC: 6.2 G/DL (ref 6.4–8.2)
PROT UR STRIP.AUTO-MCNC: ABNORMAL MG/DL
Q ONSET: 226 MS
QRS COUNT: 10 BEATS
QRS DURATION: 152 MS
QT INTERVAL: 412 MS
QTC CALCULATION(BAZETT): 414 MS
QTC FREDERICIA: 414 MS
R AXIS: -17 DEGREES
RBC # BLD AUTO: 2.49 X10*6/UL (ref 4.5–5.9)
RBC # UR STRIP.AUTO: ABNORMAL /UL
RBC #/AREA URNS AUTO: ABNORMAL /HPF
RBC MORPH BLD: NORMAL
SCHISTOCYTES BLD QL SMEAR: NORMAL
SODIUM SERPL-SCNC: 136 MMOL/L (ref 136–145)
SP GR UR STRIP.AUTO: 1.02
T AXIS: 18 DEGREES
T OFFSET: 432 MS
UROBILINOGEN UR STRIP.AUTO-MCNC: NORMAL MG/DL
VENTRICULAR RATE: 61 BPM
WBC # BLD AUTO: 2.7 X10*3/UL (ref 4.4–11.3)
WBC #/AREA URNS AUTO: >50 /HPF
WBC CLUMPS #/AREA URNS AUTO: ABNORMAL /HPF

## 2024-08-31 PROCEDURE — 71045 X-RAY EXAM CHEST 1 VIEW: CPT | Performed by: RADIOLOGY

## 2024-08-31 PROCEDURE — 70450 CT HEAD/BRAIN W/O DYE: CPT | Performed by: STUDENT IN AN ORGANIZED HEALTH CARE EDUCATION/TRAINING PROGRAM

## 2024-08-31 PROCEDURE — 83880 ASSAY OF NATRIURETIC PEPTIDE: CPT | Performed by: STUDENT IN AN ORGANIZED HEALTH CARE EDUCATION/TRAINING PROGRAM

## 2024-08-31 PROCEDURE — 1210000001 HC SEMI-PRIVATE ROOM DAILY

## 2024-08-31 PROCEDURE — 71045 X-RAY EXAM CHEST 1 VIEW: CPT

## 2024-08-31 PROCEDURE — 80053 COMPREHEN METABOLIC PANEL: CPT | Performed by: STUDENT IN AN ORGANIZED HEALTH CARE EDUCATION/TRAINING PROGRAM

## 2024-08-31 PROCEDURE — 36415 COLL VENOUS BLD VENIPUNCTURE: CPT | Performed by: STUDENT IN AN ORGANIZED HEALTH CARE EDUCATION/TRAINING PROGRAM

## 2024-08-31 PROCEDURE — 84484 ASSAY OF TROPONIN QUANT: CPT | Performed by: STUDENT IN AN ORGANIZED HEALTH CARE EDUCATION/TRAINING PROGRAM

## 2024-08-31 PROCEDURE — 99223 1ST HOSP IP/OBS HIGH 75: CPT | Performed by: STUDENT IN AN ORGANIZED HEALTH CARE EDUCATION/TRAINING PROGRAM

## 2024-08-31 PROCEDURE — 85025 COMPLETE CBC W/AUTO DIFF WBC: CPT | Performed by: STUDENT IN AN ORGANIZED HEALTH CARE EDUCATION/TRAINING PROGRAM

## 2024-08-31 PROCEDURE — 70450 CT HEAD/BRAIN W/O DYE: CPT

## 2024-08-31 PROCEDURE — 83690 ASSAY OF LIPASE: CPT | Performed by: STUDENT IN AN ORGANIZED HEALTH CARE EDUCATION/TRAINING PROGRAM

## 2024-08-31 PROCEDURE — 96365 THER/PROPH/DIAG IV INF INIT: CPT

## 2024-08-31 PROCEDURE — 82947 ASSAY GLUCOSE BLOOD QUANT: CPT

## 2024-08-31 PROCEDURE — 83605 ASSAY OF LACTIC ACID: CPT | Performed by: STUDENT IN AN ORGANIZED HEALTH CARE EDUCATION/TRAINING PROGRAM

## 2024-08-31 PROCEDURE — 87086 URINE CULTURE/COLONY COUNT: CPT | Mod: ELYLAB | Performed by: STUDENT IN AN ORGANIZED HEALTH CARE EDUCATION/TRAINING PROGRAM

## 2024-08-31 PROCEDURE — 81001 URINALYSIS AUTO W/SCOPE: CPT | Performed by: STUDENT IN AN ORGANIZED HEALTH CARE EDUCATION/TRAINING PROGRAM

## 2024-08-31 PROCEDURE — 87040 BLOOD CULTURE FOR BACTERIA: CPT | Mod: ELYLAB | Performed by: STUDENT IN AN ORGANIZED HEALTH CARE EDUCATION/TRAINING PROGRAM

## 2024-08-31 PROCEDURE — 2500000004 HC RX 250 GENERAL PHARMACY W/ HCPCS (ALT 636 FOR OP/ED): Performed by: STUDENT IN AN ORGANIZED HEALTH CARE EDUCATION/TRAINING PROGRAM

## 2024-08-31 PROCEDURE — 2500000001 HC RX 250 WO HCPCS SELF ADMINISTERED DRUGS (ALT 637 FOR MEDICARE OP): Performed by: STUDENT IN AN ORGANIZED HEALTH CARE EDUCATION/TRAINING PROGRAM

## 2024-08-31 PROCEDURE — 99285 EMERGENCY DEPT VISIT HI MDM: CPT | Mod: 25

## 2024-08-31 PROCEDURE — 93005 ELECTROCARDIOGRAM TRACING: CPT

## 2024-08-31 RX ORDER — CEFTRIAXONE 1 G/50ML
1 INJECTION, SOLUTION INTRAVENOUS EVERY 24 HOURS
Status: DISCONTINUED | OUTPATIENT
Start: 2024-09-01 | End: 2024-09-03 | Stop reason: HOSPADM

## 2024-08-31 RX ORDER — TALC
3 POWDER (GRAM) TOPICAL NIGHTLY PRN
Status: DISCONTINUED | OUTPATIENT
Start: 2024-08-31 | End: 2024-09-03 | Stop reason: HOSPADM

## 2024-08-31 RX ORDER — ASCORBIC ACID 250 MG
1000 TABLET ORAL DAILY
Status: DISCONTINUED | OUTPATIENT
Start: 2024-09-01 | End: 2024-09-03 | Stop reason: HOSPADM

## 2024-08-31 RX ORDER — NIACIN 500 MG/1
500 TABLET, EXTENDED RELEASE ORAL NIGHTLY
Status: DISCONTINUED | OUTPATIENT
Start: 2024-08-31 | End: 2024-09-03 | Stop reason: HOSPADM

## 2024-08-31 RX ORDER — CALCITRIOL 0.25 UG/1
0.25 CAPSULE ORAL EVERY OTHER DAY
Status: DISCONTINUED | OUTPATIENT
Start: 2024-09-01 | End: 2024-09-03 | Stop reason: HOSPADM

## 2024-08-31 RX ORDER — FUROSEMIDE 40 MG/1
40 TABLET ORAL DAILY
Status: DISCONTINUED | OUTPATIENT
Start: 2024-09-01 | End: 2024-09-03 | Stop reason: HOSPADM

## 2024-08-31 RX ORDER — ONDANSETRON 4 MG/1
4 TABLET, FILM COATED ORAL EVERY 8 HOURS PRN
Status: DISCONTINUED | OUTPATIENT
Start: 2024-08-31 | End: 2024-09-03 | Stop reason: HOSPADM

## 2024-08-31 RX ORDER — ATORVASTATIN CALCIUM 10 MG/1
10 TABLET, FILM COATED ORAL NIGHTLY
Status: DISCONTINUED | OUTPATIENT
Start: 2024-08-31 | End: 2024-09-03 | Stop reason: HOSPADM

## 2024-08-31 RX ORDER — ROPINIROLE 0.25 MG/1
0.25 TABLET, FILM COATED ORAL NIGHTLY
Status: DISCONTINUED | OUTPATIENT
Start: 2024-08-31 | End: 2024-09-03 | Stop reason: HOSPADM

## 2024-08-31 RX ORDER — FERROUS SULFATE 325(65) MG
1 TABLET ORAL DAILY
Status: DISCONTINUED | OUTPATIENT
Start: 2024-09-01 | End: 2024-09-03 | Stop reason: HOSPADM

## 2024-08-31 RX ORDER — ACETAMINOPHEN 650 MG/1
650 SUPPOSITORY RECTAL EVERY 4 HOURS PRN
Status: DISCONTINUED | OUTPATIENT
Start: 2024-08-31 | End: 2024-09-03 | Stop reason: HOSPADM

## 2024-08-31 RX ORDER — VALSARTAN 160 MG/1
320 TABLET ORAL DAILY
Status: DISCONTINUED | OUTPATIENT
Start: 2024-09-01 | End: 2024-09-03 | Stop reason: HOSPADM

## 2024-08-31 RX ORDER — POLYETHYLENE GLYCOL 3350 17 G/17G
17 POWDER, FOR SOLUTION ORAL DAILY
Status: DISCONTINUED | OUTPATIENT
Start: 2024-09-01 | End: 2024-09-03 | Stop reason: HOSPADM

## 2024-08-31 RX ORDER — CHOLECALCIFEROL (VITAMIN D3) 125 MCG
5000 CAPSULE ORAL DAILY
Status: DISCONTINUED | OUTPATIENT
Start: 2024-09-01 | End: 2024-09-03 | Stop reason: HOSPADM

## 2024-08-31 RX ORDER — CEFTRIAXONE 1 G/50ML
1 INJECTION, SOLUTION INTRAVENOUS ONCE
Status: COMPLETED | OUTPATIENT
Start: 2024-08-31 | End: 2024-08-31

## 2024-08-31 RX ORDER — UBIDECARENONE 75 MG
1000 CAPSULE ORAL DAILY
Status: DISCONTINUED | OUTPATIENT
Start: 2024-09-01 | End: 2024-09-03 | Stop reason: HOSPADM

## 2024-08-31 RX ORDER — ACETAMINOPHEN 160 MG/5ML
650 SOLUTION ORAL EVERY 4 HOURS PRN
Status: DISCONTINUED | OUTPATIENT
Start: 2024-08-31 | End: 2024-09-03 | Stop reason: HOSPADM

## 2024-08-31 RX ORDER — EZETIMIBE 10 MG/1
10 TABLET ORAL DAILY
Status: DISCONTINUED | OUTPATIENT
Start: 2024-09-01 | End: 2024-09-03 | Stop reason: HOSPADM

## 2024-08-31 RX ORDER — ONDANSETRON HYDROCHLORIDE 2 MG/ML
4 INJECTION, SOLUTION INTRAVENOUS EVERY 8 HOURS PRN
Status: DISCONTINUED | OUTPATIENT
Start: 2024-08-31 | End: 2024-09-03 | Stop reason: HOSPADM

## 2024-08-31 RX ORDER — TEMAZEPAM 15 MG/1
15 CAPSULE ORAL NIGHTLY PRN
Status: DISCONTINUED | OUTPATIENT
Start: 2024-08-31 | End: 2024-09-03 | Stop reason: HOSPADM

## 2024-08-31 RX ORDER — ACETAMINOPHEN 325 MG/1
650 TABLET ORAL EVERY 4 HOURS PRN
Status: DISCONTINUED | OUTPATIENT
Start: 2024-08-31 | End: 2024-09-03 | Stop reason: HOSPADM

## 2024-08-31 RX ADMIN — SODIUM CHLORIDE 1000 ML: 9 INJECTION, SOLUTION INTRAVENOUS at 20:24

## 2024-08-31 RX ADMIN — CEFTRIAXONE SODIUM 1 G: 1 INJECTION, SOLUTION INTRAVENOUS at 19:16

## 2024-08-31 RX ADMIN — ROPINIROLE 0.25 MG: 0.25 TABLET, FILM COATED ORAL at 23:28

## 2024-08-31 RX ADMIN — ATORVASTATIN CALCIUM 10 MG: 10 TABLET, FILM COATED ORAL at 23:28

## 2024-08-31 RX ADMIN — NIACIN 500 MG: 500 TABLET, FILM COATED, EXTENDED RELEASE ORAL at 23:28

## 2024-08-31 SDOH — SOCIAL STABILITY: SOCIAL INSECURITY: ARE YOU OR HAVE YOU BEEN THREATENED OR ABUSED PHYSICALLY, EMOTIONALLY, OR SEXUALLY BY ANYONE?: NO

## 2024-08-31 SDOH — SOCIAL STABILITY: SOCIAL INSECURITY: DO YOU FEEL UNSAFE GOING BACK TO THE PLACE WHERE YOU ARE LIVING?: NO

## 2024-08-31 SDOH — SOCIAL STABILITY: SOCIAL INSECURITY: ABUSE: ADULT

## 2024-08-31 SDOH — SOCIAL STABILITY: SOCIAL INSECURITY: DO YOU FEEL ANYONE HAS EXPLOITED OR TAKEN ADVANTAGE OF YOU FINANCIALLY OR OF YOUR PERSONAL PROPERTY?: NO

## 2024-08-31 SDOH — SOCIAL STABILITY: SOCIAL INSECURITY: ARE THERE ANY APPARENT SIGNS OF INJURIES/BEHAVIORS THAT COULD BE RELATED TO ABUSE/NEGLECT?: NO

## 2024-08-31 SDOH — SOCIAL STABILITY: SOCIAL INSECURITY: DOES ANYONE TRY TO KEEP YOU FROM HAVING/CONTACTING OTHER FRIENDS OR DOING THINGS OUTSIDE YOUR HOME?: NO

## 2024-08-31 SDOH — SOCIAL STABILITY: SOCIAL INSECURITY: HAVE YOU HAD ANY THOUGHTS OF HARMING ANYONE ELSE?: NO

## 2024-08-31 SDOH — SOCIAL STABILITY: SOCIAL INSECURITY: HAS ANYONE EVER THREATENED TO HURT YOUR FAMILY OR YOUR PETS?: NO

## 2024-08-31 ASSESSMENT — ACTIVITIES OF DAILY LIVING (ADL)
LACK_OF_TRANSPORTATION: NO
JUDGMENT_ADEQUATE_SAFELY_COMPLETE_DAILY_ACTIVITIES: YES
DRESSING YOURSELF: NEEDS ASSISTANCE
HEARING - RIGHT EAR: DIFFICULTY WITH NOISE
ADEQUATE_TO_COMPLETE_ADL: YES
WALKS IN HOME: INDEPENDENT
GROOMING: NEEDS ASSISTANCE
FEEDING YOURSELF: INDEPENDENT
PATIENT'S MEMORY ADEQUATE TO SAFELY COMPLETE DAILY ACTIVITIES?: YES
TOILETING: INDEPENDENT
BATHING: INDEPENDENT
HEARING - LEFT EAR: DIFFICULTY WITH NOISE

## 2024-08-31 ASSESSMENT — COGNITIVE AND FUNCTIONAL STATUS - GENERAL
HELP NEEDED FOR BATHING: A LITTLE
PERSONAL GROOMING: A LITTLE
TOILETING: A LITTLE
MOVING TO AND FROM BED TO CHAIR: A LOT
TURNING FROM BACK TO SIDE WHILE IN FLAT BAD: A LITTLE
DAILY ACTIVITIY SCORE: 19
STANDING UP FROM CHAIR USING ARMS: A LOT
DRESSING REGULAR LOWER BODY CLOTHING: A LITTLE
MOVING FROM LYING ON BACK TO SITTING ON SIDE OF FLAT BED WITH BEDRAILS: A LITTLE
WALKING IN HOSPITAL ROOM: A LOT
CLIMB 3 TO 5 STEPS WITH RAILING: A LOT
MOBILITY SCORE: 14
PATIENT BASELINE BEDBOUND: NO
DRESSING REGULAR UPPER BODY CLOTHING: A LITTLE

## 2024-08-31 ASSESSMENT — LIFESTYLE VARIABLES
TOTAL SCORE: 0
EVER FELT BAD OR GUILTY ABOUT YOUR DRINKING: NO
HAVE YOU EVER FELT YOU SHOULD CUT DOWN ON YOUR DRINKING: NO
HAVE PEOPLE ANNOYED YOU BY CRITICIZING YOUR DRINKING: NO
EVER HAD A DRINK FIRST THING IN THE MORNING TO STEADY YOUR NERVES TO GET RID OF A HANGOVER: NO

## 2024-08-31 ASSESSMENT — PAIN - FUNCTIONAL ASSESSMENT: PAIN_FUNCTIONAL_ASSESSMENT: 0-10

## 2024-08-31 ASSESSMENT — PAIN SCALES - GENERAL
PAINLEVEL_OUTOF10: 0 - NO PAIN
PAINLEVEL_OUTOF10: 0 - NO PAIN

## 2024-09-01 VITALS
DIASTOLIC BLOOD PRESSURE: 70 MMHG | HEART RATE: 61 BPM | OXYGEN SATURATION: 98 % | TEMPERATURE: 96.8 F | WEIGHT: 290 LBS | RESPIRATION RATE: 18 BRPM | HEIGHT: 73 IN | SYSTOLIC BLOOD PRESSURE: 153 MMHG | BODY MASS INDEX: 38.43 KG/M2

## 2024-09-01 LAB
ABO GROUP (TYPE) IN BLOOD: NORMAL
ANION GAP SERPL CALC-SCNC: 11 MMOL/L (ref 10–20)
ANTIBODY SCREEN: NORMAL
BLOOD EXPIRATION DATE: NORMAL
BUN SERPL-MCNC: 36 MG/DL (ref 6–23)
CALCIUM SERPL-MCNC: 8.5 MG/DL (ref 8.6–10.3)
CHLORIDE SERPL-SCNC: 108 MMOL/L (ref 98–107)
CO2 SERPL-SCNC: 20 MMOL/L (ref 21–32)
CREAT SERPL-MCNC: 1.55 MG/DL (ref 0.5–1.3)
DISPENSE STATUS: NORMAL
EGFRCR SERPLBLD CKD-EPI 2021: 44 ML/MIN/1.73M*2
ERYTHROCYTE [DISTWIDTH] IN BLOOD BY AUTOMATED COUNT: 24.6 % (ref 11.5–14.5)
GLUCOSE SERPL-MCNC: 97 MG/DL (ref 74–99)
HCT VFR BLD AUTO: 21.3 % (ref 41–52)
HGB BLD-MCNC: 6.4 G/DL (ref 13.5–17.5)
HOLD SPECIMEN: NORMAL
HOLD SPECIMEN: NORMAL
MCH RBC QN AUTO: 28.3 PG (ref 26–34)
MCHC RBC AUTO-ENTMCNC: 30 G/DL (ref 32–36)
MCV RBC AUTO: 94 FL (ref 80–100)
NRBC BLD-RTO: 4.2 /100 WBCS (ref 0–0)
PLATELET # BLD AUTO: 22 X10*3/UL (ref 150–450)
POTASSIUM SERPL-SCNC: 4.3 MMOL/L (ref 3.5–5.3)
PRODUCT BLOOD TYPE: 5100
PRODUCT CODE: NORMAL
RBC # BLD AUTO: 2.26 X10*6/UL (ref 4.5–5.9)
RH FACTOR (ANTIGEN D): NORMAL
SODIUM SERPL-SCNC: 135 MMOL/L (ref 136–145)
UNIT ABO: NORMAL
UNIT NUMBER: NORMAL
UNIT RH: NORMAL
UNIT VOLUME: 331
WBC # BLD AUTO: 2.6 X10*3/UL (ref 4.4–11.3)
XM INTEP: NORMAL

## 2024-09-01 PROCEDURE — 86923 COMPATIBILITY TEST ELECTRIC: CPT

## 2024-09-01 PROCEDURE — 99232 SBSQ HOSP IP/OBS MODERATE 35: CPT | Performed by: STUDENT IN AN ORGANIZED HEALTH CARE EDUCATION/TRAINING PROGRAM

## 2024-09-01 PROCEDURE — 85027 COMPLETE CBC AUTOMATED: CPT | Performed by: STUDENT IN AN ORGANIZED HEALTH CARE EDUCATION/TRAINING PROGRAM

## 2024-09-01 PROCEDURE — 36415 COLL VENOUS BLD VENIPUNCTURE: CPT | Performed by: STUDENT IN AN ORGANIZED HEALTH CARE EDUCATION/TRAINING PROGRAM

## 2024-09-01 PROCEDURE — 2500000001 HC RX 250 WO HCPCS SELF ADMINISTERED DRUGS (ALT 637 FOR MEDICARE OP): Performed by: STUDENT IN AN ORGANIZED HEALTH CARE EDUCATION/TRAINING PROGRAM

## 2024-09-01 PROCEDURE — P9016 RBC LEUKOCYTES REDUCED: HCPCS

## 2024-09-01 PROCEDURE — 2500000002 HC RX 250 W HCPCS SELF ADMINISTERED DRUGS (ALT 637 FOR MEDICARE OP, ALT 636 FOR OP/ED): Performed by: STUDENT IN AN ORGANIZED HEALTH CARE EDUCATION/TRAINING PROGRAM

## 2024-09-01 PROCEDURE — 1210000001 HC SEMI-PRIVATE ROOM DAILY

## 2024-09-01 PROCEDURE — 2500000004 HC RX 250 GENERAL PHARMACY W/ HCPCS (ALT 636 FOR OP/ED): Performed by: STUDENT IN AN ORGANIZED HEALTH CARE EDUCATION/TRAINING PROGRAM

## 2024-09-01 PROCEDURE — 80048 BASIC METABOLIC PNL TOTAL CA: CPT | Performed by: STUDENT IN AN ORGANIZED HEALTH CARE EDUCATION/TRAINING PROGRAM

## 2024-09-01 PROCEDURE — 36430 TRANSFUSION BLD/BLD COMPNT: CPT

## 2024-09-01 PROCEDURE — 86901 BLOOD TYPING SEROLOGIC RH(D): CPT | Performed by: STUDENT IN AN ORGANIZED HEALTH CARE EDUCATION/TRAINING PROGRAM

## 2024-09-01 RX ADMIN — FERROUS SULFATE TAB 325 MG (65 MG ELEMENTAL FE) 325 MG: 325 (65 FE) TAB at 10:12

## 2024-09-01 RX ADMIN — VALSARTAN 320 MG: 160 TABLET, FILM COATED ORAL at 10:12

## 2024-09-01 RX ADMIN — FUROSEMIDE 40 MG: 40 TABLET ORAL at 10:12

## 2024-09-01 RX ADMIN — EZETIMIBE 10 MG: 10 TABLET ORAL at 10:13

## 2024-09-01 RX ADMIN — CALCITRIOL 0.25 MCG: 0.25 CAPSULE, LIQUID FILLED ORAL at 10:12

## 2024-09-01 RX ADMIN — CEFTRIAXONE SODIUM 1 G: 1 INJECTION, SOLUTION INTRAVENOUS at 18:06

## 2024-09-01 RX ADMIN — CYANOCOBALAMIN TAB 500 MCG 1000 MCG: 500 TAB at 10:13

## 2024-09-01 RX ADMIN — Medication 125 MCG: at 10:12

## 2024-09-01 RX ADMIN — ATORVASTATIN CALCIUM 10 MG: 10 TABLET, FILM COATED ORAL at 20:33

## 2024-09-01 RX ADMIN — Medication 1000 MG: at 10:13

## 2024-09-01 RX ADMIN — NIACIN 500 MG: 500 TABLET, FILM COATED, EXTENDED RELEASE ORAL at 20:33

## 2024-09-01 RX ADMIN — ROPINIROLE 0.25 MG: 0.25 TABLET, FILM COATED ORAL at 20:33

## 2024-09-01 SDOH — SOCIAL STABILITY: SOCIAL INSECURITY: WERE YOU ABLE TO COMPLETE ALL THE BEHAVIORAL HEALTH SCREENINGS?: YES

## 2024-09-01 SDOH — SOCIAL STABILITY: SOCIAL INSECURITY: DO YOU FEEL ANYONE HAS EXPLOITED OR TAKEN ADVANTAGE OF YOU FINANCIALLY OR OF YOUR PERSONAL PROPERTY?: NO

## 2024-09-01 SDOH — SOCIAL STABILITY: SOCIAL INSECURITY: DOES ANYONE TRY TO KEEP YOU FROM HAVING/CONTACTING OTHER FRIENDS OR DOING THINGS OUTSIDE YOUR HOME?: NO

## 2024-09-01 SDOH — SOCIAL STABILITY: SOCIAL INSECURITY: HAVE YOU HAD ANY THOUGHTS OF HARMING ANYONE ELSE?: NO

## 2024-09-01 SDOH — SOCIAL STABILITY: SOCIAL INSECURITY: ARE THERE ANY APPARENT SIGNS OF INJURIES/BEHAVIORS THAT COULD BE RELATED TO ABUSE/NEGLECT?: NO

## 2024-09-01 SDOH — SOCIAL STABILITY: SOCIAL INSECURITY: HAS ANYONE EVER THREATENED TO HURT YOUR FAMILY OR YOUR PETS?: NO

## 2024-09-01 SDOH — SOCIAL STABILITY: SOCIAL INSECURITY: DO YOU FEEL UNSAFE GOING BACK TO THE PLACE WHERE YOU ARE LIVING?: NO

## 2024-09-01 SDOH — SOCIAL STABILITY: SOCIAL INSECURITY: ARE YOU OR HAVE YOU BEEN THREATENED OR ABUSED PHYSICALLY, EMOTIONALLY, OR SEXUALLY BY ANYONE?: NO

## 2024-09-01 SDOH — SOCIAL STABILITY: SOCIAL INSECURITY: ABUSE: ADULT

## 2024-09-01 SDOH — SOCIAL STABILITY: SOCIAL INSECURITY: HAVE YOU HAD THOUGHTS OF HARMING ANYONE ELSE?: NO

## 2024-09-01 ASSESSMENT — LIFESTYLE VARIABLES
AUDIT-C TOTAL SCORE: 0
SKIP TO QUESTIONS 9-10: 1
HOW OFTEN DO YOU HAVE A DRINK CONTAINING ALCOHOL: NEVER
HOW MANY STANDARD DRINKS CONTAINING ALCOHOL DO YOU HAVE ON A TYPICAL DAY: PATIENT DOES NOT DRINK
HOW OFTEN DO YOU HAVE 6 OR MORE DRINKS ON ONE OCCASION: NEVER
AUDIT-C TOTAL SCORE: 0

## 2024-09-01 ASSESSMENT — PATIENT HEALTH QUESTIONNAIRE - PHQ9
SUM OF ALL RESPONSES TO PHQ9 QUESTIONS 1 & 2: 0
1. LITTLE INTEREST OR PLEASURE IN DOING THINGS: NOT AT ALL
2. FEELING DOWN, DEPRESSED OR HOPELESS: NOT AT ALL

## 2024-09-01 ASSESSMENT — COGNITIVE AND FUNCTIONAL STATUS - GENERAL
HELP NEEDED FOR BATHING: A LITTLE
MOVING TO AND FROM BED TO CHAIR: A LITTLE
DAILY ACTIVITIY SCORE: 19
MOBILITY SCORE: 15
DRESSING REGULAR UPPER BODY CLOTHING: A LITTLE
PERSONAL GROOMING: A LITTLE
TURNING FROM BACK TO SIDE WHILE IN FLAT BAD: A LITTLE
WALKING IN HOSPITAL ROOM: A LOT
MOVING FROM LYING ON BACK TO SITTING ON SIDE OF FLAT BED WITH BEDRAILS: A LITTLE
DRESSING REGULAR LOWER BODY CLOTHING: A LITTLE
STANDING UP FROM CHAIR USING ARMS: A LOT
CLIMB 3 TO 5 STEPS WITH RAILING: A LOT
TOILETING: A LITTLE

## 2024-09-01 ASSESSMENT — PAIN SCALES - GENERAL
PAINLEVEL_OUTOF10: 0 - NO PAIN
PAINLEVEL_OUTOF10: 0 - NO PAIN

## 2024-09-01 NOTE — ED PROVIDER NOTES
"HPI   Chief Complaint   Patient presents with    Altered Mental Status     \"Wife states that the patient became confused around 1200.  Patient does not have history of dementia.\" Per EMS patient has a hx of blood cancer       Patient is an 83-year-old male presenting to the emergency department for complaints of altered mentation.  The wife states that the patient has been confused today not acting himself.  Patient does have a history of MDS but is not currently undergoing any chemotherapy.  Patient is slated for a bone marrow biopsy and follow-up within MDS expert this next Thursday.  Patient was not capable of giving me any specific information and just endorses that he \"does not feel right.  There was no reported fevers, chills, vomiting, diarrhea, changes in urinary habits.      History provided by:  Patient and spouse  History limited by:  Mental status change          Patient History   Past Medical History:   Diagnosis Date    Abdominal pain, chronic, right upper quadrant     ACP (advance care planning)     Anemia     Aneurysm (CMS-[facility])     Body mass index (BMI) 39.0-39.9, adult 12/06/2021    BMI 39.0-39.9,adult    Body mass index (BMI) 39.0-39.9, adult 07/11/2022    BMI 39.0-39.9,adult    Body mass index (BMI) 39.0-39.9, adult 04/24/2022    Body mass index (BMI) of 39.0 to 39.9 in adult    Body mass index (BMI) 39.0-39.9, adult 04/24/2022    Body mass index (BMI) of 39.0 to 39.9 in adult    Body mass index (BMI) 39.0-39.9, adult 04/24/2022    Body mass index (BMI) of 39.0 to 39.9 in adult    Cramp and spasm 07/19/2022    Leg cramps    Difficulty breathing     Encounter for general adult medical examination without abnormal findings 09/25/2020    Encounter for Medicare annual wellness exam    Encounter for screening for malignant neoplasm of prostate 04/14/2021    Encounter for prostate cancer screening    Encounter for screening for malignant neoplasm of prostate 09/25/2020    Encounter for prostate cancer " screening    Forearm injury     Hx of atrial flutter     Hyperlipidemia     Hypertension     Hyperuricemia     Incontinence     Obesity, unspecified 04/26/2022    Class 2 obesity with body mass index (BMI) of 39.0 to 39.9 in adult    Other symptoms and signs involving the musculoskeletal system 10/12/2022    Shoulder weakness    Personal history of other diseases of the circulatory system 09/29/2021    History of hypotension    Personal history of other diseases of the circulatory system 10/26/2021    History of hypertension    Personal history of other endocrine, nutritional and metabolic disease 12/06/2021    History of morbid obesity    Personal history of other specified conditions 09/29/2021    History of dizziness    Rotator cuff injury     Sinus node dysfunction (Multi)     Valvular heart disease      Past Surgical History:   Procedure Laterality Date    BONE MARROW BIOPSY      BONE MARROW BIOPSY W/ ASPIRATION  01/23/2024    CARDIAC ELECTROPHYSIOLOGY PROCEDURE Left 11/21/2023    Procedure: PPM IMPLANT DC;  Surgeon: Jun Solis MD;  Location: [facility];  Service: Electrophysiology;  Laterality: Left;    CYSTOSCOPY  02/20/2024    MR CHEST ANGIO W AND WO IV CONTRAST  09/18/2019    MR CHEST ANGIO W AND WO IV CONTRAST 9/18/2019 [facility]    MR CHEST ANGIO W AND WO IV CONTRAST  01/25/2023    MR CHEST ANGIO W AND WO IV CONTRAST 1/25/2023 [facility]GAC[facility]    MR CHEST ANGIO W AND WO IV CONTRAST  01/25/2023    MR CHEST ANGIO W AND WO IV CONTRAST    OTHER SURGICAL HISTORY  05/14/2020    Knee replacement    OTHER SURGICAL HISTORY  05/14/2020    Catheter ablation    OTHER SURGICAL HISTORY  05/14/2020    Prostate surgery    OTHER SURGICAL HISTORY  05/14/2020    Lower back surgery    OTHER SURGICAL HISTORY  05/14/2020    Hand surgery    OTHER SURGICAL HISTORY  09/29/2021    Surgery    OTHER SURGICAL HISTORY  09/29/2021    Cataract surgery    OTHER SURGICAL HISTORY  09/29/2021    Colonoscopy     OTHER SURGICAL HISTORY  2021    Vertebral fusion    URINARY SPHINCTER IMPLANT  2024     Family History   Problem Relation Name Age of Onset    Other (polio) Mother      Heart disease Father      Heart disease Brother      Polymyalgia rheumatica Brother      Other (mitral valve disorder) Brother      Other (Coronary artery bypass graft) Brother      Other (Arterisclerotic cardiovascular disease) Brother       Social History     Tobacco Use    Smoking status: Former     Current packs/day: 0.00     Average packs/day: 3.0 packs/day for 10.0 years (30.0 ttl pk-yrs)     Types: Cigarettes     Start date: 1958     Quit date: 1968     Years since quittin.0     Passive exposure: Never    Smokeless tobacco: Never   Vaping Use    Vaping status: Never Used   Substance Use Topics    Alcohol use: Not Currently     Comment: quit 2000    Drug use: Never       Physical Exam   ED Triage Vitals [24 1736]   Temp Heart Rate Resp BP   37.8 °C (100 °F) 93 19 122/53      SpO2 Temp Source Heart Rate Source Patient Position   (!) 91 % Temporal Monitor Lying      BP Location FiO2 (%)     Left arm --       Physical Exam  Vitals and nursing note reviewed.   Constitutional:       General: He is not in acute distress.     Appearance: Normal appearance. He is ill-appearing. He is not toxic-appearing or diaphoretic.   HENT:      Head: Normocephalic and atraumatic.      Nose: Nose normal.      Mouth/Throat:      Mouth: Mucous membranes are moist.      Pharynx: No oropharyngeal exudate or posterior oropharyngeal erythema.   Eyes:      General: No scleral icterus.     Extraocular Movements: Extraocular movements intact.      Pupils: Pupils are equal, round, and reactive to light.   Cardiovascular:      Rate and Rhythm: Normal rate and regular rhythm.      Pulses: Normal pulses.      Heart sounds: Normal heart sounds. No murmur heard.     No friction rub. No gallop.   Pulmonary:      Effort: Pulmonary effort is  "normal. No respiratory distress.      Breath sounds: Normal breath sounds. No stridor. No wheezing, rhonchi or rales.   Chest:      Chest wall: No tenderness.   Abdominal:      General: Abdomen is flat. There is no distension.      Palpations: Abdomen is soft. There is no mass.      Tenderness: There is no abdominal tenderness. There is no guarding.      Hernia: No hernia is present.   Musculoskeletal:         General: No swelling, tenderness, deformity or signs of injury. Normal range of motion.      Cervical back: Normal range of motion and neck supple. No rigidity.   Skin:     General: Skin is warm and dry.      Capillary Refill: Capillary refill takes less than 2 seconds.      Coloration: Skin is not jaundiced or pale.      Findings: No bruising, erythema, lesion or rash.   Neurological:      General: No focal deficit present.      Mental Status: He is alert. He is confused.      GCS: GCS eye subscore is 4. GCS verbal subscore is 4. GCS motor subscore is 6.      Cranial Nerves: No cranial nerve deficit.      Sensory: No sensory deficit.   Psychiatric:         Mood and Affect: Mood normal.         Behavior: Behavior normal.           ED Course & MDM   Diagnoses as of 08/31/24 2337   Acute cystitis with hematuria   Altered mental status, unspecified altered mental status type                 No data recorded                                 Medical Decision Making  Patient is an 83-year-old male presenting to the emergency department for complaints of confusion.  Patient was unable to provide any further detailed information other than \"I do not feel well\".  Information primarily obtained from chart review and the patient's family who is at bedside.  Patient is denying any pain or difficulty breathing.  Labs, blood cultures, urinalysis, chest x-ray, head CT were ordered for further evaluation.  CBC obtained with leukopenia, anemia, thrombocytopenia which is all baseline for the patient and I believe related to his " MDS.  Metabolic panel reviewed with elevated BUN and creatinine but no significant changes from patient's prior.  Troponin was negative.  Lipase was negative.  Urinalysis concerning for urinary tract infection and Rocephin was ordered.  Patient's lactate level was slightly elevated at 2.4 and IV fluids were ordered.  Chest x-ray with some mild vascular congestion.  Patient did not have any adventitious lung sounds.  Patient was not complaining of any difficulty breathing.  CT head with no acute intracranial abnormalities.  Given the patient's altered mentation and medical history in setting of urinary tract infection recommended admission.  Hospital service was contacted case discussed the patient was accepted for admission.  Patient family at bedside were advised of laboratory and imaging findings and current care plan and they are in agreement with this.  Patient admitted without incident.    Amount and/or Complexity of Data Reviewed  Labs: ordered. Decision-making details documented in ED Course.  Radiology: ordered. Decision-making details documented in ED Course.  ECG/medicine tests: independent interpretation performed.     Details: 1821 hrs.: Atrial paced rhythm with ventricular rate of 61 bpm.  QRS interval 152 ms.  QTc 414 ms.  Right bundle branch block pattern noted.      Labs Reviewed   CBC WITH AUTO DIFFERENTIAL - Abnormal       Result Value    WBC 2.7 (*)     nRBC 5.5 (*)     RBC 2.49 (*)     Hemoglobin 7.2 (*)     Hematocrit 23.2 (*)     MCV 93      MCH 28.9      MCHC 31.0 (*)     RDW 24.6 (*)     Platelets 26 (*)     Neutrophils % 81.2      Immature Granulocytes %, Automated 1.1 (*)     Lymphocytes % 8.5      Monocytes % 8.1      Eosinophils % 0.7      Basophils % 0.4      Neutrophils Absolute 2.20      Immature Granulocytes Absolute, Automated 0.03      Lymphocytes Absolute 0.23 (*)     Monocytes Absolute 0.22      Eosinophils Absolute 0.02      Basophils Absolute 0.01     COMPREHENSIVE METABOLIC  PANEL - Abnormal    Glucose 101 (*)     Sodium 136      Potassium 4.4      Chloride 106      Bicarbonate 19 (*)     Anion Gap 15      Urea Nitrogen 32 (*)     Creatinine 1.49 (*)     eGFR 46 (*)     Calcium 9.0      Albumin 4.0      Alkaline Phosphatase 75      Total Protein 6.2 (*)     AST 16      Bilirubin, Total 1.5 (*)     ALT 14     LACTATE - Abnormal    Lactate 2.4 (*)     Narrative:     Venipuncture immediately after or during the administration of Metamizole may lead to falsely low results. Testing should be performed immediately  prior to Metamizole dosing.   URINALYSIS WITH REFLEX CULTURE AND MICROSCOPIC - Abnormal    Color, Urine Yellow      Appearance, Urine Turbid (*)     Specific Gravity, Urine 1.023      pH, Urine 6.0      Protein, Urine 50 (1+) (*)     Glucose, Urine Normal      Blood, Urine 0.03 (TRACE) (*)     Ketones, Urine NEGATIVE      Bilirubin, Urine NEGATIVE      Urobilinogen, Urine Normal      Nitrite, Urine NEGATIVE      Leukocyte Esterase, Urine 500 Yesy/µL (*)    MICROSCOPIC ONLY, URINE - Abnormal    WBC, Urine >50 (*)     WBC Clumps, Urine RARE      RBC, Urine 11-20 (*)     Bacteria, Urine 1+ (*)     Amorphous Crystals, Urine 1+     TROPONIN I, HIGH SENSITIVITY - Normal    Troponin I, High Sensitivity 7      Narrative:     Less than 99th percentile of normal range cutoff-  Female and children under 18 years old <14 ng/L; Male <21 ng/L: Negative  Repeat testing should be performed if clinically indicated.     Female and children under 18 years old 14-50 ng/L; Male 21-50 ng/L:  Consistent with possible cardiac damage and possible increased clinical   risk. Serial measurements may help to assess extent of myocardial damage.     >50 ng/L: Consistent with cardiac damage, increased clinical risk and  myocardial infarction. Serial measurements may help assess extent of   myocardial damage.      NOTE: Children less than 1 year old may have higher baseline troponin   levels and results should be  interpreted in conjunction with the overall   clinical context.     NOTE: Troponin I testing is performed using a different   testing methodology at [facility] than at other   [facility]. Direct result comparisons should only   be made within the same method.   LIPASE - Normal    Lipase 17      Narrative:     Venipuncture immediately after or during the administration of Metamizole may lead to falsely low results. Testing should be performed immediately prior to Metamizole dosing.   LACTATE - Normal    Lactate 1.6      Narrative:     Venipuncture immediately after or during the administration of Metamizole may lead to falsely low results. Testing should be performed immediately  prior to Metamizole dosing.   B-TYPE NATRIURETIC PEPTIDE - Normal    BNP 32      Narrative:        <100 pg/mL - Heart failure unlikely  100-299 pg/mL - Intermediate probability of acute heart                  failure exacerbation. Correlate with clinical                  context and patient history.    >=300 pg/mL - Heart Failure likely. Correlate with clinical                  context and patient history.    BNP testing is performed using different testing methodology at [facility] than at other [facility]. Direct result comparisons should only be made within the same method.      POCT GLUCOSE - Normal    POCT Glucose 85     BLOOD CULTURE   BLOOD CULTURE   URINE CULTURE   URINALYSIS WITH REFLEX CULTURE AND MICROSCOPIC    Narrative:     The following orders were created for panel order Urinalysis with Reflex Culture and Microscopic.  Procedure                               Abnormality         Status                     ---------                               -----------         ------                     Urinalysis with Reflex C...[[id number]]  Abnormal            Final result               Extra Urine Gray Tube[[id number]]                            In process                   Please view results for  these tests on the individual orders.   EXTRA URINE GRAY TUBE   CBC   BASIC METABOLIC PANEL   POCT GLUCOSE METER   MORPHOLOGY    RBC Morphology See Below      Polychromasia Mild      RBC Fragments Few      Ovalocytes Few      Teardrop Cells Few       CT head wo IV contrast   Final Result   No acute intracranial hemorrhage, mass effect, or CT apparent acute   infarct. Chronic microvascular ischemia and involutional changes.        Signed by: Nadir Powers 8/31/2024 8:28 PM   Dictation workstation:   [id number]      XR chest 1 view   Final Result   Mild vascular congestion.        MACRO:   None        Signed by: Velasquez Guthrie 8/31/2024 7:43 PM   Dictation workstation:   [id number]            Procedure  Procedures     Kristian Winter DO  08/31/24 2335

## 2024-09-01 NOTE — CARE PLAN
The patient's goals for the shift include Pt wants to rest.    The clinical goals for the shift include Pt will free from fall/injury throughout this shift.

## 2024-09-01 NOTE — CARE PLAN
Problem: Skin  Goal: Decreased wound size/increased tissue granulation at next dressing change  Outcome: Progressing  Flowsheets (Taken 9/1/2024 1127)  Decreased wound size/increased tissue granulation at next dressing change: Promote sleep for wound healing  Goal: Participates in plan/prevention/treatment measures  Outcome: Progressing  Flowsheets (Taken 9/1/2024 1127)  Participates in plan/prevention/treatment measures:   Discuss with provider PT/OT consult   Elevate heels   Increase activity/out of bed for meals  Goal: Prevent/manage excess moisture  Outcome: Progressing  Flowsheets (Taken 9/1/2024 1127)  Prevent/manage excess moisture:   Monitor for/manage infection if present   Cleanse incontinence/protect with barrier cream  Goal: Prevent/minimize sheer/friction injuries  Outcome: Progressing  Flowsheets (Taken 9/1/2024 1127)  Prevent/minimize sheer/friction injuries:   HOB 30 degrees or less   Increase activity/out of bed for meals  Goal: Promote/optimize nutrition  Outcome: Progressing  Flowsheets (Taken 9/1/2024 1127)  Promote/optimize nutrition:   Monitor/record intake including meals   Consume > 50% meals/supplements  Goal: Promote skin healing:   Outcome: Progressing  Flowsheets (Taken 9/1/2024 1127)  Promote skin healing:   Rotate device position/do not position patient on device   Assess skin/pad under line(s)/device(s)   The patient's goals for the shift include Pt wants to rest.    The clinical goals for the shift include Pt will free from fall/injury throughout this shift.

## 2024-09-01 NOTE — PROGRESS NOTES
Medical Group Progress Note  ASSESSMENT & PLAN:       #.  Acute cystitis with hematuria  #.  Acute encephalopathy  -Urinalysis showing greater than 50 WBC, 11-20 RBC, 500 leukocyte esterase  -CT head negative, suspect encephalopathy is secondary to underlying UTI  -He has grown resistant organisms in the past however given clinical stability we will continue ceftriaxone for now and follow-up on repeat urine culture.  no current symptoms.  No fevers or chills.  Awaiting growth.  Encephalopathy improved.     #.  Myelodysplastic syndrome  -Not currently on therapy, plans for repeat bone marrow biopsy next week as outpatient  -Pancytopenia present but appears stable, no neutropenia  -On darbepoetin hong as outpatient, eltrombopag recently discontinued   -Daily CBC  -No pharmacologic DVT prophylaxis  -  MDS has seemingly been well-managed up to this point.  First time patient has needed a transfusion.  Will transfuse 1 unit.  Patient consented.   Patient does follow with hematology at [facility]     #.  Dyspnea  #.  Chronic diastolic heart failure  -CXR with possible mild vascular congestion and with LE edema, BNP normal  -Will hold off on further fluids and continue home Lasix dosing     #.  CAD s/p PCI  #.  HTN  #.  HLD  #.  CKD3a  -Continuing home medications     VTE PPX: SCDs only        Total time >35 minutes; > 50% spent counseling/coordinating care    -----This note was prepared using Dragon Medical voice recognition software. As a result, errors may occur. When identified, these errors have been corrected. While every attempt is made to correct errors during dictation, errors may still exist.------    Niranjan Gresham MD    SUBJECTIVE      seen and assessed.  In no acute distress.    OBJECTIVE:     Last Recorded Vitals:  Vitals:    09/01/24 1325 09/01/24 1330 09/01/24 1430 09/01/24 1530   BP: 130/62 130/60 115/67 117/56   Pulse: 59 60 60 59   Resp: 18 18 18 18   Temp: 36.6 °C (97.9 °F) 36.9 °C (98.4 °F) 36.5  °C (97.7 °F) 36.6 °C (97.9 °F)   TempSrc: Temporal Temporal Temporal Temporal   SpO2: 100% 100% 100% 100%   Weight:       Height:         Last I/O:  I/O last 3 completed shifts:  In: - (0 mL/kg)   Out: 200 (1.5 mL/kg) [Urine:200 (0 mL/kg/hr)]  Weight: 131.5 kg     Physical Exam  Constitutional:       Appearance: Normal appearance.   HENT:      Head: Normocephalic.      Mouth/Throat:      Mouth: Mucous membranes are dry.   Eyes:      Extraocular Movements: Extraocular movements intact.      Pupils: Pupils are equal, round, and reactive to light.   Cardiovascular:      Rate and Rhythm: Normal rate and regular rhythm.      Pulses: Normal pulses.      Heart sounds: Normal heart sounds.   Pulmonary:      Comments:   Decreased breath sounds in bases bilaterally  Abdominal:      General: Bowel sounds are normal.      Palpations: Abdomen is soft.   Musculoskeletal:      Comments:  +1 pitting edema in legs bilaterally to 6 inches above ankle   Skin:     General: Skin is dry.      Capillary Refill: Capillary refill takes less than 2 seconds.      Coloration: Skin is pale.   Neurological:      General: No focal deficit present.      Mental Status: He is alert. Mental status is at baseline.   Psychiatric:         Mood and Affect: Mood normal.             Inpatient Medications:  ascorbic acid, 1,000 mg, oral, Daily  atorvastatin, 10 mg, oral, Nightly  calcitriol, 0.25 mcg, oral, Every other day  cefTRIAXone, 1 g, intravenous, q24h  cholecalciferol, 5,000 Units, oral, Daily  cyanocobalamin, 1,000 mcg, oral, Daily  ezetimibe, 10 mg, oral, Daily  ferrous sulfate (325 mg ferrous sulfate), 1 tablet, oral, Daily  furosemide, 40 mg, oral, Daily  niacin, 500 mg, oral, Nightly  polyethylene glycol, 17 g, oral, Daily  rOPINIRole, 0.25 mg, oral, Nightly  valsartan, 320 mg, oral, Daily    PRN Medications  PRN medications: acetaminophen **OR** acetaminophen **OR** acetaminophen, melatonin, ondansetron **OR** ondansetron,  temazepam  Continuous Medications:     LABS AND IMAGING:     Labs:  Results from last 7 days   Lab Units 09/01/24  0650 08/31/24  1802 08/29/24  1503   WBC AUTO x10*3/uL 2.6* 2.7* 2.6*   RBC AUTO x10*6/uL 2.26* 2.49* 2.47*   HEMOGLOBIN g/dL 6.4* 7.2* 7.2*   HEMATOCRIT % 21.3* 23.2* 23.1*   MCV fL 94 93 94   MCH pg 28.3 28.9 29.1   MCHC g/dL 30.0* 31.0* 31.2*   RDW % 24.6* 24.6* 24.6*   PLATELETS AUTO x10*3/uL 22* 26* 33*     Results from last 7 days   Lab Units 09/01/24  0650 08/31/24  1802 08/29/24  1503   SODIUM mmol/L 135* 136 139   POTASSIUM mmol/L 4.3 4.4 4.9   CHLORIDE mmol/L 108* 106 108*   CO2 mmol/L 20* 19* 24   BUN mg/dL 36* 32* 31*   CREATININE mg/dL 1.55* 1.49* 1.46*   GLUCOSE mg/dL 97 101* 112*   PROTEIN TOTAL g/dL  --  6.2* 6.2*   CALCIUM mg/dL 8.5* 9.0 9.2   BILIRUBIN TOTAL mg/dL  --  1.5* 1.4*   ALK PHOS U/L  --  75 76   AST U/L  --  16 19   ALT U/L  --  14 16         Results from last 7 days   Lab Units 08/31/24  1802   TROPHS ng/L 7     Imaging:  CT head wo IV contrast  Narrative: Interpreted By:  Nadir Powers,   STUDY:  CT HEAD WO IV CONTRAST;  8/31/2024 7:52 pm      INDICATION:  Signs/Symptoms:ams.      COMPARISON:  CT brain 07/05/2019      ACCESSION NUMBER(S):  [id number]      ORDERING CLINICIAN:  ADELSO ASHLEY      TECHNIQUE:  Noncontrast axial CT scan of head was performed.      FINDINGS:  Parenchyma: There is no intracranial hemorrhage. The grey-white  differentiation is intact. There is no mass effect or midline shift.  Patchy supratentorial hypodensity, nonspecific, but likely secondary  to chronic microvascular ischemia.      CSF Spaces: Mild generalized volume loss with concordant  ventriculomegaly.      Extra-Axial Fluid: There is no extraaxial fluid collection.      Calvarium: The calvarium is unremarkable.      Paranasal sinuses: Visualized paranasal sinuses are clear.      Mastoids: Large right mastoid effusions. Left mastoid air cells are  clear.      Orbits: Bilateral lens  replacements.      Soft tissues: Unremarkable.      Impression: No acute intracranial hemorrhage, mass effect, or CT apparent acute  infarct. Chronic microvascular ischemia and involutional changes.      Signed by: Nadir Powers 8/31/2024 8:28 PM  Dictation workstation:   [id number]  XR chest 1 view  Narrative: Interpreted By:  Velasquez Guthrie,   STUDY:  XR CHEST 1 VIEW;  8/31/2024 7:08 pm      INDICATION:  Signs/Symptoms:ams.      COMPARISON:  02/20/2024      ACCESSION NUMBER(S):  [id number]      ORDERING CLINICIAN:  ADELSO ASHLEY      FINDINGS:  Moderate cardiomegaly. Left-sided pacemaker is seen. Minor vascular  congestion. No consolidation or pneumothorax.      Impression: Mild vascular congestion.      MACRO:  None      Signed by: Velasquez Guthrie 8/31/2024 7:43 PM  Dictation workstation:   [id number]  ECG 12 lead  Atrial-paced rhythm with prolonged AV conduction  Right bundle branch block  Abnormal ECG  When compared with ECG of 04-APR-2024 19:49,  No significant change was found    See ED provider note for full interpretation and clinical correlation  Confirmed by Cesia Villa ([id number]) on 8/31/2024 6:34:36 PM

## 2024-09-01 NOTE — H&P
"   Medical Group History and Physical      ASSESSMENT & PLAN:     #.  Acute cystitis with hematuria  #.  Acute encephalopathy  -Urinalysis showing greater than 50 WBC, 11-20 RBC, 500 leukocyte esterase  -CT head negative, suspect encephalopathy is secondary to underlying UTI  -He has grown resistant organisms in the past however given clinical stability we will continue ceftriaxone for now and follow-up on repeat urine culture    #.  Myelodysplastic syndrome  -Not currently on therapy, plans for repeat bone marrow biopsy next week as outpatient  -Pancytopenia present but appears stable, no neutropenia  -On darbepoetin hong as outpatient, eltrombopag recently discontinued   -Daily CBC  -No pharmacologic DVT prophylaxis    #.  Dyspnea  #.  Chronic diastolic heart failure  -CXR with possible mild vascular congestion and with LE edema, BNP normal  -Will hold off on further fluids and continue home Lasix dosing    #.  CAD s/p PCI  #.  HTN  #.  HLD  #.  CKD3a  -Continuing home medications    VTE PPX: SCDs only      Harvinder Forrester MD    --Of note, this documentation is completed using the Dragon Dictation system (voice recognition software). There may be spelling and/or grammatical errors that were not corrected prior to final submission.--    HISTORY OF PRESENT ILLNESS:   Chief Complaint: Confusion    Holden Burgses \"GENARO\" is a 83 y.o. male with a history of MDS, CAD s/p PCI, chronic TAA, atrial flutter s/p radiofrequency ablation with no recurrence, sinus node dysfunction s/p PPM, prostate cancer s/p RT, BPH s/p TUIP and artifical sphincter, HTN, HLD, CKD3a, chronic diastolic heart failure presenting with confusion.  Patient's family states that he seemed off today.  He has also increased urinary frequency.  Denies any fever or chills.  He does use an artificial sphincter to urinate.  He has also been feeling short of breath.  Denies any productive cough.  States that his legs are more swollen.       ER Course: " /53, HR 93, RR 19, T37.8.  Labs notable for chronic pancytopenia related to underlying MDS that appears to be at baseline, creatinine 1.49, BUN 32, lactate 2.4.  Urinalysis showed greater than 50 WBC, 11-20 RBC, 500 leukocyte esterase.  Patient was started on ceftriaxone and urine culture and blood cultures were sent.  CT head was negative.  CXR showed very mild vascular congestion.    ROS  10 point review of systems negative except per HPI     PAST HISTORIES:     Past Medical History  He has a past medical history of Abdominal pain, chronic, right upper quadrant, ACP (advance care planning), Anemia, Aneurysm (CMS-[facility], located within [facility]), Body mass index (BMI) 39.0-39.9, adult (12/06/2021), Body mass index (BMI) 39.0-39.9, adult (07/11/2022), Body mass index (BMI) 39.0-39.9, adult (04/24/2022), Body mass index (BMI) 39.0-39.9, adult (04/24/2022), Body mass index (BMI) 39.0-39.9, adult (04/24/2022), Cramp and spasm (07/19/2022), Difficulty breathing, Encounter for general adult medical examination without abnormal findings (09/25/2020), Encounter for screening for malignant neoplasm of prostate (04/14/2021), Encounter for screening for malignant neoplasm of prostate (09/25/2020), Forearm injury, atrial flutter, Hyperlipidemia, Hypertension, Hyperuricemia, Incontinence, Obesity, unspecified (04/26/2022), Other symptoms and signs involving the musculoskeletal system (10/12/2022), Personal history of other diseases of the circulatory system (09/29/2021), Personal history of other diseases of the circulatory system (10/26/2021), Personal history of other endocrine, nutritional and metabolic disease (12/06/2021), Personal history of other specified conditions (09/29/2021), Rotator cuff injury, Sinus node dysfunction (Multi), and Valvular heart disease.    Surgical History  He has a past surgical history that includes Other surgical history (05/14/2020); Other surgical history (05/14/2020); Other surgical history (05/14/2020); Other  surgical history (05/14/2020); Other surgical history (05/14/2020); Other surgical history (09/29/2021); Other surgical history (09/29/2021); Other surgical history (09/29/2021); Other surgical history (09/29/2021); MR angio chest w and wo IV contrast (09/18/2019); MR angio chest w and wo IV contrast (01/25/2023); MR angio chest w and wo IV contrast (01/25/2023); Cardiac electrophysiology procedure (Left, 11/21/2023); Bone marrow biopsy; Bone marrow biopsy w/ aspiration (01/23/2024); Cystoscopy (02/20/2024); and Urinary sphincter implant (03/28/2024).     Social History  He reports that he quit smoking about 56 years ago. His smoking use included cigarettes. He started smoking about 66 years ago. He has a 30 pack-year smoking history. He has never been exposed to tobacco smoke. He has never used smokeless tobacco. He reports that he does not currently use alcohol. He reports that he does not use drugs.    Family History  Family History   Problem Relation Name Age of Onset    Other (polio) Mother      Heart disease Father      Heart disease Brother      Polymyalgia rheumatica Brother      Other (mitral valve disorder) Brother      Other (Coronary artery bypass graft) Brother      Other (Arterisclerotic cardiovascular disease) Brother         Allergies:  Crestor [rosuvastatin], Ezetimibe, and Statins-hmg-coa reductase inhibitors      OBJECTIVE:      Last Recorded Vitals  /78 (BP Location: Left arm, Patient Position: Sitting)   Pulse 63   Temp 37.8 °C (100 °F) (Temporal)   Resp 19   Wt 132 kg (290 lb)   SpO2 97%     Last I/O:  No intake/output data recorded.    Physical Exam   Gen: NAD, appears stated age  HEENT: EOM, MMM  CV: RRR, no murmurs rubs or gallops  Resp: Diminished in bilateral bases, normal effort  Abdomen: soft, NT,+BS  LE: 1+ pitting edema bilaterally, no deformity  Neuro: A&Ox4, moving all extremities    LABS AND IMAGING:       Relevant Results  Labs Reviewed   CBC WITH AUTO DIFFERENTIAL -  Abnormal       Result Value    WBC 2.7 (*)     nRBC 5.5 (*)     RBC 2.49 (*)     Hemoglobin 7.2 (*)     Hematocrit 23.2 (*)     MCV 93      MCH 28.9      MCHC 31.0 (*)     RDW 24.6 (*)     Platelets 26 (*)     Neutrophils % 81.2      Immature Granulocytes %, Automated 1.1 (*)     Lymphocytes % 8.5      Monocytes % 8.1      Eosinophils % 0.7      Basophils % 0.4      Neutrophils Absolute 2.20      Immature Granulocytes Absolute, Automated 0.03      Lymphocytes Absolute 0.23 (*)     Monocytes Absolute 0.22      Eosinophils Absolute 0.02      Basophils Absolute 0.01     COMPREHENSIVE METABOLIC PANEL - Abnormal    Glucose 101 (*)     Sodium 136      Potassium 4.4      Chloride 106      Bicarbonate 19 (*)     Anion Gap 15      Urea Nitrogen 32 (*)     Creatinine 1.49 (*)     eGFR 46 (*)     Calcium 9.0      Albumin 4.0      Alkaline Phosphatase 75      Total Protein 6.2 (*)     AST 16      Bilirubin, Total 1.5 (*)     ALT 14     LACTATE - Abnormal    Lactate 2.4 (*)     Narrative:     Venipuncture immediately after or during the administration of Metamizole may lead to falsely low results. Testing should be performed immediately  prior to Metamizole dosing.   URINALYSIS WITH REFLEX CULTURE AND MICROSCOPIC - Abnormal    Color, Urine Yellow      Appearance, Urine Turbid (*)     Specific Gravity, Urine 1.023      pH, Urine 6.0      Protein, Urine 50 (1+) (*)     Glucose, Urine Normal      Blood, Urine 0.03 (TRACE) (*)     Ketones, Urine NEGATIVE      Bilirubin, Urine NEGATIVE      Urobilinogen, Urine Normal      Nitrite, Urine NEGATIVE      Leukocyte Esterase, Urine 500 Yesy/µL (*)    MICROSCOPIC ONLY, URINE - Abnormal    WBC, Urine >50 (*)     WBC Clumps, Urine RARE      RBC, Urine 11-20 (*)     Bacteria, Urine 1+ (*)     Amorphous Crystals, Urine 1+     TROPONIN I, HIGH SENSITIVITY - Normal    Troponin I, High Sensitivity 7      Narrative:     Less than 99th percentile of normal range cutoff-  Female and children under 18  years old <14 ng/L; Male <21 ng/L: Negative  Repeat testing should be performed if clinically indicated.     Female and children under 18 years old 14-50 ng/L; Male 21-50 ng/L:  Consistent with possible cardiac damage and possible increased clinical   risk. Serial measurements may help to assess extent of myocardial damage.     >50 ng/L: Consistent with cardiac damage, increased clinical risk and  myocardial infarction. Serial measurements may help assess extent of   myocardial damage.      NOTE: Children less than 1 year old may have higher baseline troponin   levels and results should be interpreted in conjunction with the overall   clinical context.     NOTE: Troponin I testing is performed using a different   testing methodology at [facility] than at other   [facility]. Direct result comparisons should only   be made within the same method.   LIPASE - Normal    Lipase 17      Narrative:     Venipuncture immediately after or during the administration of Metamizole may lead to falsely low results. Testing should be performed immediately prior to Metamizole dosing.   POCT GLUCOSE - Normal    POCT Glucose 85     BLOOD CULTURE   BLOOD CULTURE   URINE CULTURE   URINALYSIS WITH REFLEX CULTURE AND MICROSCOPIC    Narrative:     The following orders were created for panel order Urinalysis with Reflex Culture and Microscopic.  Procedure                               Abnormality         Status                     ---------                               -----------         ------                     Urinalysis with Reflex C...[[id number]]  Abnormal            Final result               Extra Urine Gray Tube[[id number]]                            In process                   Please view results for these tests on the individual orders.   EXTRA URINE GRAY TUBE   LACTATE   POCT GLUCOSE METER   MORPHOLOGY    RBC Morphology See Below      Polychromasia Mild      RBC Fragments Few      Ovalocytes Few       Teardrop Cells Few       CT head wo IV contrast   Final Result   No acute intracranial hemorrhage, mass effect, or CT apparent acute   infarct. Chronic microvascular ischemia and involutional changes.        Signed by: Nadir Powers 8/31/2024 8:28 PM   Dictation workstation:   [id number]      XR chest 1 view   Final Result   Mild vascular congestion.        MACRO:   None        Signed by: Velasquez Guthrie 8/31/2024 7:43 PM   Dictation workstation:   [id number]

## 2024-09-02 LAB
ANION GAP SERPL CALC-SCNC: 12 MMOL/L (ref 10–20)
APPEARANCE UR: CLEAR
BACTERIA UR CULT: ABNORMAL
BILIRUB UR STRIP.AUTO-MCNC: NEGATIVE MG/DL
BUN SERPL-MCNC: 34 MG/DL (ref 6–23)
CALCIUM SERPL-MCNC: 8.6 MG/DL (ref 8.6–10.3)
CHLORIDE SERPL-SCNC: 109 MMOL/L (ref 98–107)
CO2 SERPL-SCNC: 20 MMOL/L (ref 21–32)
COLOR UR: COLORLESS
CREAT SERPL-MCNC: 1.5 MG/DL (ref 0.5–1.3)
EGFRCR SERPLBLD CKD-EPI 2021: 46 ML/MIN/1.73M*2
ERYTHROCYTE [DISTWIDTH] IN BLOOD BY AUTOMATED COUNT: 23.2 % (ref 11.5–14.5)
GLUCOSE SERPL-MCNC: 111 MG/DL (ref 74–99)
GLUCOSE UR STRIP.AUTO-MCNC: NORMAL MG/DL
HCT VFR BLD AUTO: 22.5 % (ref 41–52)
HGB BLD-MCNC: 7.1 G/DL (ref 13.5–17.5)
HOLD SPECIMEN: NORMAL
KETONES UR STRIP.AUTO-MCNC: NEGATIVE MG/DL
LEUKOCYTE ESTERASE UR QL STRIP.AUTO: NEGATIVE
MCH RBC QN AUTO: 29 PG (ref 26–34)
MCHC RBC AUTO-ENTMCNC: 31.6 G/DL (ref 32–36)
MCV RBC AUTO: 92 FL (ref 80–100)
NITRITE UR QL STRIP.AUTO: NEGATIVE
NRBC BLD-RTO: 3.5 /100 WBCS (ref 0–0)
PH UR STRIP.AUTO: 5 [PH]
PLATELET # BLD AUTO: 23 X10*3/UL (ref 150–450)
POTASSIUM SERPL-SCNC: 4.3 MMOL/L (ref 3.5–5.3)
PROT UR STRIP.AUTO-MCNC: NEGATIVE MG/DL
RBC # BLD AUTO: 2.45 X10*6/UL (ref 4.5–5.9)
RBC # UR STRIP.AUTO: NEGATIVE /UL
SODIUM SERPL-SCNC: 137 MMOL/L (ref 136–145)
SP GR UR STRIP.AUTO: 1.01
UROBILINOGEN UR STRIP.AUTO-MCNC: NORMAL MG/DL
WBC # BLD AUTO: 2.6 X10*3/UL (ref 4.4–11.3)

## 2024-09-02 PROCEDURE — P9016 RBC LEUKOCYTES REDUCED: HCPCS

## 2024-09-02 PROCEDURE — 2500000004 HC RX 250 GENERAL PHARMACY W/ HCPCS (ALT 636 FOR OP/ED): Performed by: STUDENT IN AN ORGANIZED HEALTH CARE EDUCATION/TRAINING PROGRAM

## 2024-09-02 PROCEDURE — 80048 BASIC METABOLIC PNL TOTAL CA: CPT | Performed by: STUDENT IN AN ORGANIZED HEALTH CARE EDUCATION/TRAINING PROGRAM

## 2024-09-02 PROCEDURE — 36430 TRANSFUSION BLD/BLD COMPNT: CPT

## 2024-09-02 PROCEDURE — 81003 URINALYSIS AUTO W/O SCOPE: CPT | Performed by: STUDENT IN AN ORGANIZED HEALTH CARE EDUCATION/TRAINING PROGRAM

## 2024-09-02 PROCEDURE — 85027 COMPLETE CBC AUTOMATED: CPT | Performed by: STUDENT IN AN ORGANIZED HEALTH CARE EDUCATION/TRAINING PROGRAM

## 2024-09-02 PROCEDURE — 1210000001 HC SEMI-PRIVATE ROOM DAILY

## 2024-09-02 PROCEDURE — 36415 COLL VENOUS BLD VENIPUNCTURE: CPT | Performed by: STUDENT IN AN ORGANIZED HEALTH CARE EDUCATION/TRAINING PROGRAM

## 2024-09-02 PROCEDURE — 2500000002 HC RX 250 W HCPCS SELF ADMINISTERED DRUGS (ALT 637 FOR MEDICARE OP, ALT 636 FOR OP/ED): Performed by: STUDENT IN AN ORGANIZED HEALTH CARE EDUCATION/TRAINING PROGRAM

## 2024-09-02 PROCEDURE — 2500000001 HC RX 250 WO HCPCS SELF ADMINISTERED DRUGS (ALT 637 FOR MEDICARE OP): Performed by: STUDENT IN AN ORGANIZED HEALTH CARE EDUCATION/TRAINING PROGRAM

## 2024-09-02 PROCEDURE — 99232 SBSQ HOSP IP/OBS MODERATE 35: CPT | Performed by: STUDENT IN AN ORGANIZED HEALTH CARE EDUCATION/TRAINING PROGRAM

## 2024-09-02 RX ADMIN — NIACIN 500 MG: 500 TABLET, FILM COATED, EXTENDED RELEASE ORAL at 20:00

## 2024-09-02 RX ADMIN — ROPINIROLE 0.25 MG: 0.25 TABLET, FILM COATED ORAL at 20:00

## 2024-09-02 RX ADMIN — POLYETHYLENE GLYCOL 3350 17 G: 17 POWDER, FOR SOLUTION ORAL at 08:30

## 2024-09-02 RX ADMIN — ATORVASTATIN CALCIUM 10 MG: 10 TABLET, FILM COATED ORAL at 20:00

## 2024-09-02 RX ADMIN — FUROSEMIDE 40 MG: 40 TABLET ORAL at 08:30

## 2024-09-02 RX ADMIN — CYANOCOBALAMIN TAB 500 MCG 1000 MCG: 500 TAB at 08:30

## 2024-09-02 RX ADMIN — Medication 1000 MG: at 08:30

## 2024-09-02 RX ADMIN — CEFTRIAXONE SODIUM 1 G: 1 INJECTION, SOLUTION INTRAVENOUS at 18:40

## 2024-09-02 RX ADMIN — EZETIMIBE 10 MG: 10 TABLET ORAL at 08:30

## 2024-09-02 RX ADMIN — Medication 125 MCG: at 08:30

## 2024-09-02 RX ADMIN — FERROUS SULFATE TAB 325 MG (65 MG ELEMENTAL FE) 325 MG: 325 (65 FE) TAB at 08:30

## 2024-09-02 RX ADMIN — VALSARTAN 320 MG: 160 TABLET, FILM COATED ORAL at 08:31

## 2024-09-02 ASSESSMENT — COGNITIVE AND FUNCTIONAL STATUS - GENERAL
STANDING UP FROM CHAIR USING ARMS: A LITTLE
WALKING IN HOSPITAL ROOM: A LITTLE
CLIMB 3 TO 5 STEPS WITH RAILING: A LITTLE
DAILY ACTIVITIY SCORE: 23
MOVING TO AND FROM BED TO CHAIR: A LITTLE
TOILETING: A LITTLE
DAILY ACTIVITIY SCORE: 23
TOILETING: A LITTLE
MOVING TO AND FROM BED TO CHAIR: A LITTLE
WALKING IN HOSPITAL ROOM: A LITTLE
STANDING UP FROM CHAIR USING ARMS: A LITTLE
MOBILITY SCORE: 20
MOBILITY SCORE: 20
CLIMB 3 TO 5 STEPS WITH RAILING: A LITTLE

## 2024-09-02 ASSESSMENT — PAIN SCALES - GENERAL
PAINLEVEL_OUTOF10: 0 - NO PAIN
PAINLEVEL_OUTOF10: 0 - NO PAIN

## 2024-09-02 NOTE — CARE PLAN
The patient's goals for the shift include Pt wants to rest.    The clinical goals for the shift include pt hgb will be >7.0 by discharge      Problem: Nutrition  Goal: Less than 5 days NPO/clear liquids  Outcome: Progressing  Goal: Oral intake greater than 50%  Outcome: Progressing  Goal: Oral intake greater 75%  Outcome: Progressing  Goal: Consume prescribed supplement  Outcome: Progressing  Goal: Adequate PO fluid intake  Outcome: Progressing  Goal: Nutrition support goals are met within 48 hrs  Outcome: Progressing  Goal: Nutrition support is meeting 75% of nutrient needs  Outcome: Progressing  Goal: Tube feed tolerance  Outcome: Progressing  Goal: BG  mg/dL  Outcome: Progressing  Goal: Lab values WNL  Outcome: Progressing  Goal: Electrolytes WNL  Outcome: Progressing  Goal: Promote healing  Outcome: Progressing  Goal: Maintain stable weight  Outcome: Progressing  Goal: Reduce weight from edema/fluid  Outcome: Progressing  Goal: Gradual weight gain  Outcome: Progressing  Goal: Improve ostomy output  Outcome: Progressing     Problem: Pain - Adult  Goal: Verbalizes/displays adequate comfort level or baseline comfort level  Outcome: Progressing     Problem: Safety - Adult  Goal: Free from fall injury  Outcome: Progressing     Problem: Discharge Planning  Goal: Discharge to home or other facility with appropriate resources  Outcome: Progressing     Problem: Chronic Conditions and Co-morbidities  Goal: Patient's chronic conditions and co-morbidity symptoms are monitored and maintained or improved  Outcome: Progressing     Problem: Fall/Injury  Goal: Not fall by end of shift  Outcome: Progressing  Goal: Be free from injury by end of the shift  Outcome: Progressing  Goal: Verbalize understanding of personal risk factors for fall in the hospital  Outcome: Progressing  Goal: Verbalize understanding of risk factor reduction measures to prevent injury from fall in the home  Outcome: Progressing  Goal: Use assistive  devices by end of the shift  Outcome: Progressing  Goal: Pace activities to prevent fatigue by end of the shift  Outcome: Progressing     Problem: Skin  Goal: Decreased wound size/increased tissue granulation at next dressing change  Outcome: Progressing  Flowsheets (Taken 9/2/2024 0210)  Decreased wound size/increased tissue granulation at next dressing change: Promote sleep for wound healing  Goal: Participates in plan/prevention/treatment measures  Outcome: Progressing  Flowsheets (Taken 9/2/2024 0210)  Participates in plan/prevention/treatment measures: Elevate heels  Goal: Prevent/manage excess moisture  Outcome: Progressing  Flowsheets (Taken 9/2/2024 0210)  Prevent/manage excess moisture: Monitor for/manage infection if present  Goal: Prevent/minimize sheer/friction injuries  Outcome: Progressing  Flowsheets (Taken 9/2/2024 0210)  Prevent/minimize sheer/friction injuries: Increase activity/out of bed for meals  Goal: Promote/optimize nutrition  Outcome: Progressing  Flowsheets (Taken 9/2/2024 0210)  Promote/optimize nutrition: Offer water/supplements/favorite foods  Goal: Promote skin healing  Outcome: Progressing  Flowsheets (Taken 9/2/2024 0210)  Promote skin healing: Turn/reposition every 2 hours/use positioning/transfer devices

## 2024-09-02 NOTE — PROGRESS NOTES
Medical Group Progress Note  ASSESSMENT & PLAN:       #.  Acute cystitis with hematuria  #.  Acute encephalopathy  -Urinalysis showing greater than 50 WBC, 11-20 RBC, 500 leukocyte esterase  -CT head negative, suspect encephalopathy is secondary to underlying UTI  -He has grown resistant organisms in the past however given clinical stability we will continue ceftriaxone for now and follow-up on repeat urine culture.  no current symptoms.  No fevers or chills.   initial read on urine culture shows expected area.  Will send new culture   Given that patient has grown drug-resistant organisms in the past.  Will follow-up for growth speciation and susceptibilities.     #.  Myelodysplastic syndrome  -Not currently on therapy, plans for repeat bone marrow biopsy next week as outpatient  -Pancytopenia present but appears stable, no neutropenia  -On darbepoetin hong as outpatient, eltrombopag recently discontinued   -Daily CBC  -No pharmacologic DVT prophylaxis  -   Patient tolerated transfusion of 1 unit yesterday and incremented up to 7.1 today.  Will continue to monitor.  Will transfuse an additional unit given that patient has extensive cardiac history as well as the fact that patient has MDS.  Platelets are stable.     #.  Dyspnea  #.  Chronic diastolic heart failure  -CXR with possible mild vascular congestion and with LE edema, BNP normal  -Will hold off on further fluids and continue home Lasix dosing     #.  CAD s/p PCI  #.  HTN  #.  HLD  #.  CKD3a  -Continuing home medications     VTE PPX: SCDs only    Total time >35 minutes; > 50% spent counseling/coordinating care    -----This note was prepared using Dragon Medical voice recognition software. As a result, errors may occur. When identified, these errors have been corrected. While every attempt is made to correct errors during dictation, errors may still exist.------    Niranjan Gresham MD    SUBJECTIVE       Patient seen and assessed in no acute distress.   Denies any current pain.  Still endorses some edema in the lower extremities particular in the right leg which is his baseline.    OBJECTIVE:     Last Recorded Vitals:  Vitals:    09/01/24 1530 09/01/24 1645 09/01/24 1924 09/02/24 0343   BP: 117/56 117/56 153/70 158/75   BP Location:   Right arm Left arm   Patient Position:   Sitting Sitting   Pulse: 59 59 61 79   Resp: 18  18 18   Temp: 36.6 °C (97.9 °F) 36.9 °C (98.4 °F) 36 °C (96.8 °F) 36.4 °C (97.5 °F)   TempSrc: Temporal Temporal Temporal Temporal   SpO2: 100% 100% 98% 96%   Weight:       Height:         Last I/O:  I/O last 3 completed shifts:  In: 1921 (14.6 mL/kg) [P.O.:1540; Blood:331; IV Piggyback:50]  Out: 350 (2.7 mL/kg) [Urine:350 (0.1 mL/kg/hr)]  Weight: 131.5 kg     Physical Exam    Constitutional:       Appearance: Normal appearance.   HENT:      Head: Normocephalic.      Mouth/Throat:      Mouth: Mucous membranes are dry.   Eyes:      Extraocular Movements: Extraocular movements intact.      Pupils: Pupils are equal, round, and reactive to light.   Cardiovascular:      Rate and Rhythm: Normal rate and regular rhythm.      Pulses: Normal pulses.      Heart sounds: Normal heart sounds.   Pulmonary:      Comments:   Decreased breath sounds in bases bilaterally  Abdominal:      General: Bowel sounds are normal.      Palpations: Abdomen is soft.   Musculoskeletal:      Comments:  +1 pitting edema in legs bilaterally to 6 inches above ankle   Skin:     General: Skin is dry.      Capillary Refill: Capillary refill takes less than 2 seconds.      Coloration: Skin is pale.   Neurological:      General: No focal deficit present.      Mental Status: He is alert. Mental status is at baseline.   Psychiatric:         Mood and Affect: Mood normal.     Inpatient Medications:  ascorbic acid, 1,000 mg, oral, Daily  atorvastatin, 10 mg, oral, Nightly  calcitriol, 0.25 mcg, oral, Every other day  cefTRIAXone, 1 g, intravenous, q24h  cholecalciferol, 5,000 Units, oral,  Daily  cyanocobalamin, 1,000 mcg, oral, Daily  ezetimibe, 10 mg, oral, Daily  ferrous sulfate (325 mg ferrous sulfate), 1 tablet, oral, Daily  furosemide, 40 mg, oral, Daily  niacin, 500 mg, oral, Nightly  polyethylene glycol, 17 g, oral, Daily  rOPINIRole, 0.25 mg, oral, Nightly  valsartan, 320 mg, oral, Daily    PRN Medications  PRN medications: acetaminophen **OR** acetaminophen **OR** acetaminophen, melatonin, ondansetron **OR** ondansetron, temazepam  Continuous Medications:     LABS AND IMAGING:     Labs:  Results from last 7 days   Lab Units 09/02/24  0533 09/01/24  0650 08/31/24  1802   WBC AUTO x10*3/uL 2.6* 2.6* 2.7*   RBC AUTO x10*6/uL 2.45* 2.26* 2.49*   HEMOGLOBIN g/dL 7.1* 6.4* 7.2*   HEMATOCRIT % 22.5* 21.3* 23.2*   MCV fL 92 94 93   MCH pg 29.0 28.3 28.9   MCHC g/dL 31.6* 30.0* 31.0*   RDW % 23.2* 24.6* 24.6*   PLATELETS AUTO x10*3/uL 23* 22* 26*     Results from last 7 days   Lab Units 09/02/24  0533 09/01/24  0650 08/31/24  1802 08/29/24  1503   SODIUM mmol/L 137 135* 136 139   POTASSIUM mmol/L 4.3 4.3 4.4 4.9   CHLORIDE mmol/L 109* 108* 106 108*   CO2 mmol/L 20* 20* 19* 24   BUN mg/dL 34* 36* 32* 31*   CREATININE mg/dL 1.50* 1.55* 1.49* 1.46*   GLUCOSE mg/dL 111* 97 101* 112*   PROTEIN TOTAL g/dL  --   --  6.2* 6.2*   CALCIUM mg/dL 8.6 8.5* 9.0 9.2   BILIRUBIN TOTAL mg/dL  --   --  1.5* 1.4*   ALK PHOS U/L  --   --  75 76   AST U/L  --   --  16 19   ALT U/L  --   --  14 16         Results from last 7 days   Lab Units 08/31/24  1802   TROPHS ng/L 7     Imaging:  CT head wo IV contrast  Narrative: Interpreted By:  Nadir Powers,   STUDY:  CT HEAD WO IV CONTRAST;  8/31/2024 7:52 pm      INDICATION:  Signs/Symptoms:ams.      COMPARISON:  CT brain 07/05/2019      ACCESSION NUMBER(S):  [id number]      ORDERING CLINICIAN:  ADELSO ASHLEY      TECHNIQUE:  Noncontrast axial CT scan of head was performed.      FINDINGS:  Parenchyma: There is no intracranial hemorrhage. The grey-white  differentiation  is intact. There is no mass effect or midline shift.  Patchy supratentorial hypodensity, nonspecific, but likely secondary  to chronic microvascular ischemia.      CSF Spaces: Mild generalized volume loss with concordant  ventriculomegaly.      Extra-Axial Fluid: There is no extraaxial fluid collection.      Calvarium: The calvarium is unremarkable.      Paranasal sinuses: Visualized paranasal sinuses are clear.      Mastoids: Large right mastoid effusions. Left mastoid air cells are  clear.      Orbits: Bilateral lens replacements.      Soft tissues: Unremarkable.      Impression: No acute intracranial hemorrhage, mass effect, or CT apparent acute  infarct. Chronic microvascular ischemia and involutional changes.      Signed by: Nadir Powers 8/31/2024 8:28 PM  Dictation workstation:   [id number]  XR chest 1 view  Narrative: Interpreted By:  Velasquez Guthrie,   STUDY:  XR CHEST 1 VIEW;  8/31/2024 7:08 pm      INDICATION:  Signs/Symptoms:ams.      COMPARISON:  02/20/2024      ACCESSION NUMBER(S):  [id number]      ORDERING CLINICIAN:  ADELSO ASHLEY      FINDINGS:  Moderate cardiomegaly. Left-sided pacemaker is seen. Minor vascular  congestion. No consolidation or pneumothorax.      Impression: Mild vascular congestion.      MACRO:  None      Signed by: Velasquez Guthrie 8/31/2024 7:43 PM  Dictation workstation:   [id number]  ECG 12 lead  Atrial-paced rhythm with prolonged AV conduction  Right bundle branch block  Abnormal ECG  When compared with ECG of 04-APR-2024 19:49,  No significant change was found    See ED provider note for full interpretation and clinical correlation  Confirmed by Cesia Villa ([id number]) on 8/31/2024 6:34:36 PM

## 2024-09-03 ENCOUNTER — TELEPHONE (OUTPATIENT)
Dept: HEMATOLOGY/ONCOLOGY | Facility: CLINIC | Age: 84
End: 2024-09-03
Payer: MEDICARE

## 2024-09-03 VITALS
DIASTOLIC BLOOD PRESSURE: 74 MMHG | OXYGEN SATURATION: 98 % | WEIGHT: 290 LBS | HEIGHT: 73 IN | TEMPERATURE: 96.8 F | RESPIRATION RATE: 16 BRPM | HEART RATE: 59 BPM | SYSTOLIC BLOOD PRESSURE: 159 MMHG | BODY MASS INDEX: 38.43 KG/M2

## 2024-09-03 LAB
ANION GAP SERPL CALC-SCNC: 12 MMOL/L (ref 10–20)
BLOOD EXPIRATION DATE: NORMAL
BUN SERPL-MCNC: 30 MG/DL (ref 6–23)
CALCIUM SERPL-MCNC: 9.1 MG/DL (ref 8.6–10.3)
CHLORIDE SERPL-SCNC: 107 MMOL/L (ref 98–107)
CO2 SERPL-SCNC: 22 MMOL/L (ref 21–32)
CREAT SERPL-MCNC: 1.42 MG/DL (ref 0.5–1.3)
DISPENSE STATUS: NORMAL
EGFRCR SERPLBLD CKD-EPI 2021: 49 ML/MIN/1.73M*2
ERYTHROCYTE [DISTWIDTH] IN BLOOD BY AUTOMATED COUNT: 22.4 % (ref 11.5–14.5)
GLUCOSE SERPL-MCNC: 112 MG/DL (ref 74–99)
HCT VFR BLD AUTO: 25.3 % (ref 41–52)
HGB BLD-MCNC: 8.1 G/DL (ref 13.5–17.5)
HOLD SPECIMEN: NORMAL
HOLD SPECIMEN: NORMAL
MCH RBC QN AUTO: 29 PG (ref 26–34)
MCHC RBC AUTO-ENTMCNC: 32 G/DL (ref 32–36)
MCV RBC AUTO: 91 FL (ref 80–100)
NRBC BLD-RTO: 4.9 /100 WBCS (ref 0–0)
PLATELET # BLD AUTO: 26 X10*3/UL (ref 150–450)
POTASSIUM SERPL-SCNC: 4.3 MMOL/L (ref 3.5–5.3)
PRODUCT BLOOD TYPE: 5100
PRODUCT CODE: NORMAL
RBC # BLD AUTO: 2.79 X10*6/UL (ref 4.5–5.9)
SODIUM SERPL-SCNC: 137 MMOL/L (ref 136–145)
UNIT ABO: NORMAL
UNIT NUMBER: NORMAL
UNIT RH: NORMAL
UNIT VOLUME: 350
WBC # BLD AUTO: 3.3 X10*3/UL (ref 4.4–11.3)
XM INTEP: NORMAL

## 2024-09-03 PROCEDURE — 85027 COMPLETE CBC AUTOMATED: CPT | Performed by: STUDENT IN AN ORGANIZED HEALTH CARE EDUCATION/TRAINING PROGRAM

## 2024-09-03 PROCEDURE — 2500000002 HC RX 250 W HCPCS SELF ADMINISTERED DRUGS (ALT 637 FOR MEDICARE OP, ALT 636 FOR OP/ED): Performed by: STUDENT IN AN ORGANIZED HEALTH CARE EDUCATION/TRAINING PROGRAM

## 2024-09-03 PROCEDURE — 2500000001 HC RX 250 WO HCPCS SELF ADMINISTERED DRUGS (ALT 637 FOR MEDICARE OP): Performed by: STUDENT IN AN ORGANIZED HEALTH CARE EDUCATION/TRAINING PROGRAM

## 2024-09-03 PROCEDURE — 97161 PT EVAL LOW COMPLEX 20 MIN: CPT | Mod: GP | Performed by: PHYSICAL THERAPIST

## 2024-09-03 PROCEDURE — 99238 HOSP IP/OBS DSCHRG MGMT 30/<: CPT | Performed by: INTERNAL MEDICINE

## 2024-09-03 PROCEDURE — 2500000004 HC RX 250 GENERAL PHARMACY W/ HCPCS (ALT 636 FOR OP/ED): Performed by: STUDENT IN AN ORGANIZED HEALTH CARE EDUCATION/TRAINING PROGRAM

## 2024-09-03 PROCEDURE — 36415 COLL VENOUS BLD VENIPUNCTURE: CPT | Performed by: STUDENT IN AN ORGANIZED HEALTH CARE EDUCATION/TRAINING PROGRAM

## 2024-09-03 PROCEDURE — 80048 BASIC METABOLIC PNL TOTAL CA: CPT | Performed by: STUDENT IN AN ORGANIZED HEALTH CARE EDUCATION/TRAINING PROGRAM

## 2024-09-03 RX ORDER — CIPROFLOXACIN 500 MG/1
500 TABLET ORAL 2 TIMES DAILY
Qty: 10 TABLET | Refills: 0 | Status: SHIPPED | OUTPATIENT
Start: 2024-09-03 | End: 2024-09-10 | Stop reason: WASHOUT

## 2024-09-03 RX ADMIN — POLYETHYLENE GLYCOL 3350 17 G: 17 POWDER, FOR SOLUTION ORAL at 09:43

## 2024-09-03 RX ADMIN — Medication 125 MCG: at 09:42

## 2024-09-03 RX ADMIN — Medication 1000 MG: at 09:42

## 2024-09-03 RX ADMIN — VALSARTAN 320 MG: 160 TABLET, FILM COATED ORAL at 09:42

## 2024-09-03 RX ADMIN — EZETIMIBE 10 MG: 10 TABLET ORAL at 09:42

## 2024-09-03 RX ADMIN — FERROUS SULFATE TAB 325 MG (65 MG ELEMENTAL FE) 325 MG: 325 (65 FE) TAB at 09:43

## 2024-09-03 RX ADMIN — CYANOCOBALAMIN TAB 500 MCG 1000 MCG: 500 TAB at 09:43

## 2024-09-03 RX ADMIN — CALCITRIOL 0.25 MCG: 0.25 CAPSULE, LIQUID FILLED ORAL at 09:42

## 2024-09-03 RX ADMIN — FUROSEMIDE 40 MG: 40 TABLET ORAL at 09:42

## 2024-09-03 ASSESSMENT — COGNITIVE AND FUNCTIONAL STATUS - GENERAL
TOILETING: A LITTLE
CLIMB 3 TO 5 STEPS WITH RAILING: A LITTLE
CLIMB 3 TO 5 STEPS WITH RAILING: A LITTLE
MOBILITY SCORE: 23
WALKING IN HOSPITAL ROOM: A LITTLE
DAILY ACTIVITIY SCORE: 23
STANDING UP FROM CHAIR USING ARMS: A LITTLE
MOBILITY SCORE: 20
MOVING TO AND FROM BED TO CHAIR: A LITTLE

## 2024-09-03 ASSESSMENT — ACTIVITIES OF DAILY LIVING (ADL): LACK_OF_TRANSPORTATION: NO

## 2024-09-03 ASSESSMENT — PAIN SCALES - GENERAL
PAINLEVEL_OUTOF10: 0 - NO PAIN
PAINLEVEL_OUTOF10: 0 - NO PAIN

## 2024-09-03 ASSESSMENT — PAIN - FUNCTIONAL ASSESSMENT: PAIN_FUNCTIONAL_ASSESSMENT: 0-10

## 2024-09-03 NOTE — NURSING NOTE
Pt discharged home to self care, IV removed and belonging are with pt. Discharge instructions given,  verbal understanding of both prescription and follow-up appointments made. Direction given for pt to call and make PT/OT appointment. Wife is at bedside to transport home. Alert and orientated at this time.

## 2024-09-03 NOTE — TELEPHONE ENCOUNTER
Huy stated the patient is currently hospitalized and she wanted to discuss his upcoming appointment this week with .

## 2024-09-03 NOTE — CARE PLAN
The patient's goals for the shift include Pt wants to rest.    The clinical goals for the shift include pt hgb will be >7.0 through discharge    Over the shift, the patient did not make progress toward the following goals. Barriers to progression include . Recommendations to address these barriers include .

## 2024-09-03 NOTE — PROGRESS NOTES
09/03/24 1320   Discharge Planning   Living Arrangements Spouse/significant other   Support Systems Spouse/significant other   Assistance Needed walker and cane as needed ,  pt states independent   Type of Residence Private residence   Number of Stairs to Enter Residence 2   Number of Stairs Within Residence 13   Do you have animals or pets at home? Yes   Type of Animals or Pets dog   Home or Post Acute Services In home services   Type of Home Care Services Home OT;Home PT   Expected Discharge Disposition Home   Does the patient need discharge transport arranged? No   Transportation Needs   In the past 12 months, has lack of transportation kept you from medical appointments or from getting medications? no   In the past 12 months, has lack of transportation kept you from meetings, work, or from getting things needed for daily living? No   Patient Choice   Patient / Family choosing to utilize agency / facility established prior to hospitalization No     1145- met with patient to discuss dc planning needs and concerns.  Pt resides at home with spouse, Luanne.  Follows with Dr Joseph PCP.  [facility]   Pt uses walker and cane very little as needed.  He resides in 2 story home.  States he has hand rails to upstairs.  Declines trouble getting up and down steps.  PT/OT have been ordered,  awaiting evaluations.  Anticipate possible need for continued therapy [facility].  Pt preference is home no needs.  Will follow up post evaluations as needed.

## 2024-09-03 NOTE — DISCHARGE SUMMARY
Discharge Diagnosis  Acute cystitis with hematuria  Metabolic encephalopathy  Myelodysplastic syndrome    Issues Requiring Follow-Up  Hematology    Discharge Meds     Medication List      START taking these medications     ciprofloxacin 500 mg tablet; Commonly known as: Cipro; Take 1 tablet   (500 mg) by mouth 2 times a day for 5 days.     CONTINUE taking these medications     acetaminophen 500 mg tablet; Commonly known as: Tylenol; Take 2 tablets   (1,000 mg) by mouth every 6 hours if needed for mild pain (1 - 3).   allopurinol 100 mg tablet; Commonly known as: Zyloprim; Take 1 tablet   (100 mg) by mouth 2 times a day.   Aranesp (in polysorbate) 100 mcg/mL injection; Generic drug: darbepoetin   hong   ascorbic acid 1,000 mg tablet; Commonly known as: Vitamin C   aspirin 81 mg EC tablet   atorvastatin 10 mg tablet; Commonly known as: Lipitor   calcitriol 0.25 mcg capsule; Commonly known as: Rocaltrol   cholecalciferol 125 MCG (5000 UT) capsule; Commonly known as: Vitamin   D-3   cyanocobalamin 1,000 mcg tablet; Commonly known as: Vitamin B-12   ezetimibe 10 mg tablet; Commonly known as: Zetia; Take 1 tablet (10 mg)   by mouth once daily.   famotidine 20 mg tablet; Commonly known as: Pepcid   ferrous sulfate (325 mg ferrous sulfate) tablet   folic acid 1 mg tablet; Commonly known as: Folvite; Take 1 tablet (1 mg)   by mouth once daily.   furosemide 40 mg tablet; Commonly known as: Lasix; Take 1 tablet (40 mg)   by mouth once daily.   latanoprost 0.005 % ophthalmic solution; Commonly known as: Xalatan   MAGNESIUM ORAL   niacin 500 mg ER tablet; Commonly known as: Niaspan   NON FORMULARY   Promacta 50 mg tablet; Generic drug: eltrombopag olamine; Take 2 tablets   (100 mg) by mouth once daily in the morning. Take before meals. Take on an   empty stomach, 1 hour before or 2 hours after a meal.   rOPINIRole 0.25 mg tablet; Commonly known as: Requip; Take 1 tablet   (0.25 mg) by mouth once daily at bedtime.   temazepam 15  Subjective     Diamond Mejía is a 35 y.o. female who presents for the following: Skin Check.     Relevant Full Skin Exam History    Prior Full Skin Exam with a dermatologist: no   Previous hx of melanoma:   no   Hx of abnormal (dysplastic) moles:    no   Hx of sunburn:    yes   Family hx of Melanoma:   no   Immunosuppressive condition: no        Review of Systems:  No other skin or systemic complaints other than what is documented elsewhere in the note.    The following portions of the chart were reviewed this encounter and updated as appropriate:  Tobacco  Allergies  Meds  Problems  Med Hx  Surg Hx         Skin Cancer History  No skin cancer on file.      Specialty Problems          Dermatology Problems    Urticaria due to heat        Objective   Well appearing patient in no apparent distress; mood and affect are within normal limits.    A full examination was performed including scalp, head, eyes, ears, nose, lips, neck, chest, axillae, abdomen, back, buttocks, bilateral upper extremities, bilateral lower extremities, hands, feet, fingers, toes, fingernails, and toenails. All findings within normal limits unless otherwise noted below.    Assessment/Plan   1. Multiple benign nevi  Brown and tan macules and papules with reassuring findings on dermoscopy    -These lesions have benign, reassuring patterns on dermoscopy  -Recommend continued self observation, and to contact the office if any changes in nevi are noticed    2. Lentigo  Tan macules    -Benign appearing on exam  -Reassurance, recommend observation    3. Screening exam for skin cancer    Full body skin exam  -No lesions concerning for malignancy on the remainder the skin exam today   - The ugly duckling sign was discussed. Monitor for any skin lesions that are different in color, shape, or size than others on body  -Sun protection was discussed. Recommend SPF 30+, hats with brims, sun protective clothing, and avoiding sun exposure between 10 AM  and 2 PM whenever possible  -Recommend regular skin exams or sooner if new or changing lesions       4. Milia  Head - Anterior (Face)  Minute yellow-white papule(s) mostly on cheeks under eyes    -Reviewed nature of diagnosis  -Reassurance, recommend continued observation    - try OTC sal acid cleanser, info in AVS        Follow up 2-3 years Full Skin Exam    mg capsule; Commonly known as: Restoril; TAKE ONE CAPSULE   BY MOUTH EVERY NIGHT AT BEDTIME AS NEEDED FOR SLEEP.   traMADol 50 mg tablet; Commonly known as: Ultram; Take 1 tablet (50 mg)   by mouth every 6 hours if needed for severe pain (7 - 10).   TURMERIC ORAL   valsartan 320 mg tablet; Commonly known as: Diovan; Take 1 tablet (320   mg) by mouth once daily.   zinc sulfate 50 mg zinc (220 mg) tablet       Test Results Pending At Discharge  Pending Labs       Order Current Status    Blood Culture Preliminary result    Blood Culture Preliminary result            Hospital Course   83-year-old male with past medical history of myelodysplastic syndrome, CAD, thoracic aortic aneurysm, atrial flutter sinus node dysfunction, prostate cancer, hypertension, CKD, who presented to the emergency department with confusion.  He is found to have a urinary tract infection for which she was started on IV Rocephin.  His mentation significantly improved with treatment and he is essentially back to his baseline this morning.  He remains hemodynamically stable.  His lab work while here did show a significant anemia requiring transfusion x 2.  This is likely related to his underlying myelodysplastic syndrome.  He has stable thrombocytopenia without evidence of ongoing bleeding.  He has close follow-up with his hematologist scheduled for later this week.  Otherwise he remained stable for discharge home today.    Pertinent Physical Exam At Time of Discharge  Physical Exam  GEN: healthy appearing, appears stated age, NAD  HEENT: NCAT, PERRLA, EOMI, moist mucous membranes  NECK: supple, no masses  CV: RRR, no m/r/g, no LE edema  LUNGS: CTAB, no w/r/c  ABD: soft, NT, ND, NBS  SKIN: no rashes  MSK; no gross deformities, normal joints; bilateral LE swelling  NEURO: A+Ox3, no FND  PSYCH: appropriate mood, affect      Outpatient Follow-Up  Future Appointments   Date Time Provider Department Center   9/5/2024 10:00 AM Shae Lunsford  APRN-CNP [id number] [facility]   9/6/2024 10:15 AM INF 10 ST[facility] [id number] [facility]   9/10/2024  2:50 PM Nicola Brooks MD [id number] [facility]   12/2/2024  1:30 PM Giacomo Joseph DO [id number] [facility]   12/16/2024  2:45 PM Waylon Benjamin DO [id number] [facility]   3/12/2025 10:40 AM [facility] [id number] [place] [address]   3/12/2025 11:15 AM Jun Solis MD [id number] [facility]   8/18/2025 10:45 AM Jamarcus Phan MD [id number] [place]         Brandon Ames MD

## 2024-09-03 NOTE — CONSULTS
"Nutrition Initial Assessment:   Nutrition Assessment         Patient is a 83 y.o. male presenting with acute cystitis with hematuria      Nutrition History:  Energy Intake: Good > 75 %  Food and Nutrient History: RD consulted for MST = 2. Pt reports intentional wt loss over the last few years. Has a history of CHF, CKD3, HTN, HLD, MDS, not on chemo. Per pt and nursing reports, is consuming % at meals. Says usually eats well at home as well. Does not use salt while cooking at home, will add low sodium diet. Finds it difficult to drink adequate amounts of water, says daughter bought something to remind him to drink more.  Vitamin/Herbal Supplement Use: vitamin C, vitamin D, vitamin B12, ferrous sulfate, folic acid, magnesium, turmeric, zinc sulfate  Food Allergies/Intolerances:  None  GI Symptoms: None  Oral Problems: None       Anthropometrics:  Height: 185.4 cm (6' 1\")   Weight: 132 kg (290 lb)   BMI (Calculated): 38.27  IBW/kg (Dietitian Calculated): 83.6 kg          Weight History:     Wt Readings from Last 40 Encounters:   08/31/24 132 kg (290 lb)   08/29/24 132 kg (289 lb 14.5 oz)   08/28/24 132 kg (290 lb)   08/23/24 133 kg (293 lb)   08/23/24 133 kg (293 lb 10.4 oz)   08/12/24 133 kg (293 lb 1.6 oz)   08/12/24 133 kg (293 lb)   08/09/24 133 kg (292 lb 8.8 oz)   08/05/24 131 kg (289 lb 9.6 oz)   07/26/24 132 kg (292 lb 1.8 oz)   07/25/24 134 kg (294 lb 12.8 oz)   07/24/24 133 kg (292 lb 15.9 oz)   07/19/24 134 kg (294 lb 12.1 oz)   07/12/24 134 kg (295 lb 13.7 oz)   07/05/24 135 kg (298 lb 1 oz)   06/17/24 125 kg (275 lb)   06/07/24 134 kg (295 lb 13.7 oz)   05/13/24 132 kg (292 lb)   05/08/24 131 kg (288 lb)   04/26/24 131 kg (289 lb 3.9 oz)   04/18/24 135 kg (296 lb 9.6 oz)   04/18/24 134 kg (296 lb)   04/10/24 134 kg (294 lb 9.6 oz)   04/06/24 137 kg (301 lb 12.8 oz)   04/04/24 141 kg (310 lb)   04/03/24 143 kg (315 lb 14.7 oz)   03/31/24 136 kg (300 lb)   03/28/24 139 kg (307 lb)   03/18/24 140 kg " (307 lb 11.2 oz)   02/28/24 138 kg (304 lb)   02/20/24 137 kg (301 lb)   02/15/24 139 kg (306 lb 1.6 oz)   02/13/24 139 kg (306 lb)   02/05/24 136 kg (300 lb 0.7 oz)   01/23/24 137 kg (301 lb 2.4 oz)   01/16/24 138 kg (304 lb 3.8 oz)   01/03/24 139 kg (306 lb)   12/04/23 136 kg (299 lb)   11/21/23 137 kg (300 lb 14.9 oz)   11/01/23 136 kg (300 lb)         Weight Change %:  Significant Weight Loss: No    Nutrition Focused Physical Exam Findings:    Subcutaneous Fat Loss:   Orbital Fat Pads: Well nourished (slightly bulging fat pads)  Buccal Fat Pads: Well nourished (full, rounded cheeks)  Triceps: Well nourished (ample fat tissue)  Ribs: Defer  Muscle Wasting:  Temporalis: Well nourished (well-defined muscle)  Pectoralis (Clavicular Region): Well nourished (clavicle not visible)  Deltoid/Trapezius: Well nourished (rounded appearance at arm, shoulder, neck)  Interosseous: Well nourished (muscle bulges)  Trapezius/Infraspinatus/Supraspinatus (Scapular Region): Defer  Quadriceps: Defer  Gastrocnemius: Defer  Edema:  Edema: +1 trace  Edema Location: BLE per nursing assessment  Physical Findings:  Skin: Negative (for pressure injuries)    Nutrition Significant Labs:  BMP Trend:   Results from last 7 days   Lab Units 09/03/24  0602 09/02/24  0533 09/01/24  0650 08/31/24  1802   GLUCOSE mg/dL 112* 111* 97 101*   CALCIUM mg/dL 9.1 8.6 8.5* 9.0   SODIUM mmol/L 137 137 135* 136   POTASSIUM mmol/L 4.3 4.3 4.3 4.4   CO2 mmol/L 22 20* 20* 19*   CHLORIDE mmol/L 107 109* 108* 106   BUN mg/dL 30* 34* 36* 32*   CREATININE mg/dL 1.42* 1.50* 1.55* 1.49*    , Renal Lab Trend:   Results from last 7 days   Lab Units 09/03/24  0602 09/02/24  0533 09/01/24  0650 08/31/24  1802   POTASSIUM mmol/L 4.3 4.3 4.3 4.4   SODIUM mmol/L 137 137 135* 136   EGFR mL/min/1.73m*2 49* 46* 44* 46*   BUN mg/dL 30* 34* 36* 32*   CREATININE mg/dL 1.42* 1.50* 1.55* 1.49*        Nutrition Specific Medications:  Reviewed    I/O:   Last BM Date: 09/02/24;       Dietary Orders (From admission, onward)       Start     Ordered    09/03/24 0948  Adult diet 2-3 grams sodium  Diet effective now        Question:  Diet type  Answer:  2-3 grams sodium    09/03/24 0947    08/31/24 2207  May Participate in Room Service  Once        Question:  .  Answer:  Yes    08/31/24 2207                     Estimated Needs:   Total Energy Estimated Needs (kCal): 2190 kCal  Method for Estimating Needs: MSJ x 1.3 activity factor, - 500 kcal for wt loss  Total Protein Estimated Needs (g): 84 g  Method for Estimating Needs: 1 g/kg IBW or as renal function allows  Total Fluid Estimated Needs (mL): 2190 mL  Method for Estimating Needs: 1 ml/kcal or per MD        Nutrition Diagnosis   Malnutrition Diagnosis  Patient has Malnutrition Diagnosis: No    Nutrition Diagnosis  Patient has Nutrition Diagnosis: Yes  Diagnosis Status (1): New  Nutrition Diagnosis 1: Decreased nutrient needs  Related to (1): cardiac dysfunction (history of CHF)  As Evidenced by (1): need for low sodium diet       Nutrition Interventions/Recommendations         Nutrition Prescription:  Individualized Nutrition Prescription Provided for : Low sodium diet        Nutrition Interventions:   Interventions: Meals and snacks  Goal: Consumes 3 meals per day        Nutrition Education:      Declined    Nutrition Monitoring and Evaluation   Food/Nutrient Related History Monitoring  Monitoring and Evaluation Plan: Energy intake, Amount of food, Fluid intake  Energy Intake: Estimated energy intake  Criteria: Pt meets >75% of estimated energy needs  Fluid Intake: Estimated fluid intake  Criteria: Meets >75% of estimated fluid needs  Amount of Food: Estimated amout of food  Criteria: Pt consumes >75% of meals    Body Composition/Growth/Weight History  Monitoring and Evaluation Plan: Weight  Weight: Measured weight  Criteria: Maintains stable weight    Biochemical Data, Medical Tests and Procedures  Monitoring and Evaluation Plan:  Glucose/endocrine profile, Electrolyte/renal panel  Electrolyte and Renal Panel: Sodium, Potassium, Phosphorus  Criteria: Electrolytes WNL  Glucose/Endocrine Profile: Glucose, casual  Criteria: BG within desirable range              Time Spent (min): 45 minutes

## 2024-09-04 ENCOUNTER — PATIENT OUTREACH (OUTPATIENT)
Dept: PRIMARY CARE | Facility: CLINIC | Age: 84
End: 2024-09-04
Payer: MEDICARE

## 2024-09-04 ENCOUNTER — TELEPHONE (OUTPATIENT)
Dept: ADMISSION | Facility: HOSPITAL | Age: 84
End: 2024-09-04
Payer: MEDICARE

## 2024-09-04 NOTE — TELEPHONE ENCOUNTER
Spouse called because Pat was discharged from the hospital yesterday after being diagnosed with a UTI.  He is scheduled for a bone marrow biopsy tomorrow and Darbepoetin on 9/6.     He is on Cipro for 5 days which he will complete on Saturday.    Asking if he needs to reschedule his appointments/if this will interfere with his biopsy tomorrow.

## 2024-09-04 NOTE — TELEPHONE ENCOUNTER
Spouse aware no need to reschedule appointments.   No further questions or concerns at this time.

## 2024-09-04 NOTE — PROGRESS NOTES
Discharge Facility:  [facility]    Discharge Diagnosis:  Acute cystitis with hematuria  Metabolic encephalopathy  Myelodysplastic syndrome  significant anemia requiring transfusion x 2   Admission Date:  8/31/24  Discharge Date:   9/3/24    PCP Appointment Date:  -Message sent to clerical staff of PCP office   Visit Type: Primary Care Established          Date: 12/2/2024                  Dept: [facility]                  Provider: Giacomo Joseph                  Time: 1:30 PM  Specialist Appointment Date:   Visit Type: HEM ONC PROCEDURE-Biopsy           Date: 9/5/2024                  Dept: [facility]                   Provider: Shae Lunsford                  Time: 10:00 AM    Visit Type: Infusion          Date: 9/6/2024                  Dept: [facility]                   Provider: INF 10 [id number]                  Time: 10:15 AM    Visit Type: Nephrology Established Patient          Date: 9/10/2024                  Dept: [facility]                   Provider: Nicola Brooks                  Time: 2:50 PM     Visit Type: Cardiology Clinic Visit           Date: 12/16/2024                  Dept: [facility]                  Provider: Waylon Benjamin                  Time: 2:45 PM    Hospital Encounter and Summary Linked: Yes    decline [facility].  Provided pt with list of  outpt rehab service locations.     [id number] [place], [place]   START taking these medications     ciprofloxacin 500 mg tablet; Commonly known as: Cipro; Take 1 tablet   (500 mg) by mouth 2 times a day for 5 days.    Two attempts were made to reach patient within two business days after discharge. I left voicemail to introduce myself and the TCM program to Holden Burgess. I encouraged patient to contact office or myself for follow up appt with PCP. Noted that wife was informed that patient may still have his bone marrow biopsy  tomorrow. I gave my contact information to return my call so we can go over discharge instructions and see if I can assist in anyway.

## 2024-09-05 ENCOUNTER — LAB (OUTPATIENT)
Dept: LAB | Facility: CLINIC | Age: 84
End: 2024-09-05
Payer: MEDICARE

## 2024-09-05 ENCOUNTER — PROCEDURE VISIT (OUTPATIENT)
Dept: HEMATOLOGY/ONCOLOGY | Facility: CLINIC | Age: 84
End: 2024-09-05
Payer: MEDICARE

## 2024-09-05 ENCOUNTER — DOCUMENTATION (OUTPATIENT)
Dept: HEMATOLOGY/ONCOLOGY | Facility: HOSPITAL | Age: 84
End: 2024-09-05
Payer: MEDICARE

## 2024-09-05 VITALS
RESPIRATION RATE: 16 BRPM | OXYGEN SATURATION: 97 % | DIASTOLIC BLOOD PRESSURE: 71 MMHG | SYSTOLIC BLOOD PRESSURE: 150 MMHG | HEART RATE: 83 BPM | BODY MASS INDEX: 37.43 KG/M2 | WEIGHT: 283.73 LBS | TEMPERATURE: 97 F

## 2024-09-05 DIAGNOSIS — D46.9 MYELODYSPLASTIC SYNDROME (MULTI): Primary | ICD-10-CM

## 2024-09-05 DIAGNOSIS — D46.9 MYELODYSPLASTIC SYNDROME (MULTI): ICD-10-CM

## 2024-09-05 LAB
BACTERIA BLD CULT: NORMAL
BACTERIA BLD CULT: NORMAL
BASO STIPL BLD QL SMEAR: PRESENT
BASOPHILS # BLD MANUAL: 0.03 X10*3/UL (ref 0–0.1)
BASOPHILS NFR BLD MANUAL: 1 %
DACRYOCYTES BLD QL SMEAR: ABNORMAL
EOSINOPHIL # BLD MANUAL: 0.05 X10*3/UL (ref 0–0.4)
EOSINOPHIL NFR BLD MANUAL: 2 %
ERYTHROCYTE [DISTWIDTH] IN BLOOD BY AUTOMATED COUNT: 23.1 % (ref 11.5–14.5)
HCT VFR BLD AUTO: 27.7 % (ref 41–52)
HGB BLD-MCNC: 8.8 G/DL (ref 13.5–17.5)
HOLD SPECIMEN: NORMAL
HYPOCHROMIA BLD QL SMEAR: ABNORMAL
IMM GRANULOCYTES # BLD AUTO: 0.06 X10*3/UL (ref 0–0.5)
IMM GRANULOCYTES NFR BLD AUTO: 2.1 % (ref 0–0.9)
LYMPHOCYTES # BLD MANUAL: 0.65 X10*3/UL (ref 0.8–3)
LYMPHOCYTES NFR BLD MANUAL: 24 %
MCH RBC QN AUTO: 29.3 PG (ref 26–34)
MCHC RBC AUTO-ENTMCNC: 31.8 G/DL (ref 32–36)
MCV RBC AUTO: 92 FL (ref 80–100)
MONOCYTES # BLD MANUAL: 0.16 X10*3/UL (ref 0.05–0.8)
MONOCYTES NFR BLD MANUAL: 6 %
NEUTROPHILS # BLD MANUAL: 1.81 X10*3/UL (ref 1.6–5.5)
NEUTS BAND # BLD MANUAL: 0.35 X10*3/UL (ref 0–0.5)
NEUTS BAND NFR BLD MANUAL: 13 %
NEUTS HYPERSEG BLD QL SMEAR: PRESENT
NEUTS SEG # BLD MANUAL: 1.46 X10*3/UL (ref 1.6–5)
NEUTS SEG NFR BLD MANUAL: 54 %
NRBC BLD MANUAL-RTO: 5 % (ref 0–0)
NRBC BLD-RTO: ABNORMAL /100{WBCS}
OVALOCYTES BLD QL SMEAR: ABNORMAL
PLATELET # BLD AUTO: 24 X10*3/UL (ref 150–450)
POLYCHROMASIA BLD QL SMEAR: ABNORMAL
RBC # BLD AUTO: 3 X10*6/UL (ref 4.5–5.9)
RBC MORPH BLD: ABNORMAL
TOTAL CELLS COUNTED BLD: 100
WBC # BLD AUTO: 2.7 X10*3/UL (ref 4.4–11.3)

## 2024-09-05 PROCEDURE — 88189 FLOWCYTOMETRY/READ 16 & >: CPT | Performed by: INTERNAL MEDICINE

## 2024-09-05 PROCEDURE — 88311 DECALCIFY TISSUE: CPT | Performed by: PATHOLOGY

## 2024-09-05 PROCEDURE — 88305 TISSUE EXAM BY PATHOLOGIST: CPT | Mod: TC,SUR | Performed by: INTERNAL MEDICINE

## 2024-09-05 PROCEDURE — 36415 COLL VENOUS BLD VENIPUNCTURE: CPT

## 2024-09-05 PROCEDURE — 85007 BL SMEAR W/DIFF WBC COUNT: CPT

## 2024-09-05 PROCEDURE — 88185 FLOWCYTOMETRY/TC ADD-ON: CPT | Mod: TC | Performed by: INTERNAL MEDICINE

## 2024-09-05 PROCEDURE — 38222 DX BONE MARROW BX & ASPIR: CPT | Performed by: NURSE PRACTITIONER

## 2024-09-05 PROCEDURE — 88237 TISSUE CULTURE BONE MARROW: CPT | Performed by: INTERNAL MEDICINE

## 2024-09-05 PROCEDURE — 85027 COMPLETE CBC AUTOMATED: CPT

## 2024-09-05 PROCEDURE — 88305 TISSUE EXAM BY PATHOLOGIST: CPT | Performed by: PATHOLOGY

## 2024-09-05 ASSESSMENT — PAIN SCALES - GENERAL: PAINLEVEL: 0-NO PAIN

## 2024-09-05 NOTE — PROGRESS NOTES
"Patient ID: Holden Burgess \"GENARO\" is a 83 y.o. male.    Biopsy bone marrow    Date/Time: 9/5/2024 11:34 AM    Performed by: ALYSE Starr  Authorized by: ALYSE Starr    Consent:     Consent obtained:  Verbal    Consent given by:  Patient    Risks, benefits, and alternatives were discussed: yes      Risks discussed:  Bleeding, infection and pain    Alternatives discussed:  No treatment  Universal protocol:     Procedure explained and questions answered to patient or proxy's satisfaction: yes      Test results available: yes      Site/side marked: yes      Immediately prior to procedure, a time out was called: yes      Patient identity confirmed:  Verbally with patient and provided demographic data  Indications:     Indications:  MDS  Pre-procedure details:     Skin preparation:  Povidone-iodine    Preparation: Patient was prepped and draped in the usual sterile fashion    Sedation:     Sedation type:  None  Anesthesia:     Anesthesia method:  Local infiltration    Local anesthetic:  Lidocaine 1% w/o epi  Procedure specific details:      The left posterior iliac crest was prepped with povidone-iodine.     10 ml of lidocaine 1% local anesthesia infiltrated into the subcutaneous tissue.    Left bone marrow biopsy and left bone marrow aspirate was obtained.   Difficulty with aspiration.    The procedure was tolerated well and there were no complications.    Specimens sent for: Heme path protocol   Post-procedure details:     Procedure completion:  Tolerated well, no immediate complications    "

## 2024-09-06 ENCOUNTER — INFUSION (OUTPATIENT)
Dept: HEMATOLOGY/ONCOLOGY | Facility: CLINIC | Age: 84
End: 2024-09-06
Payer: MEDICARE

## 2024-09-06 ENCOUNTER — APPOINTMENT (OUTPATIENT)
Dept: HEMATOLOGY/ONCOLOGY | Facility: CLINIC | Age: 84
End: 2024-09-06
Payer: MEDICARE

## 2024-09-06 ENCOUNTER — LAB (OUTPATIENT)
Dept: LAB | Facility: CLINIC | Age: 84
End: 2024-09-06
Payer: MEDICARE

## 2024-09-06 VITALS
SYSTOLIC BLOOD PRESSURE: 131 MMHG | OXYGEN SATURATION: 97 % | WEIGHT: 287.26 LBS | RESPIRATION RATE: 16 BRPM | DIASTOLIC BLOOD PRESSURE: 89 MMHG | BODY MASS INDEX: 37.9 KG/M2 | HEART RATE: 88 BPM | TEMPERATURE: 96.6 F

## 2024-09-06 DIAGNOSIS — T45.1X5A ANEMIA DUE TO CHEMOTHERAPY FOR MYELODYSPLASTIC SYNDROME TREATED WITH ERYTHROPOIETIN (MULTI): ICD-10-CM

## 2024-09-06 DIAGNOSIS — D46.9 ANEMIA DUE TO CHEMOTHERAPY FOR MYELODYSPLASTIC SYNDROME TREATED WITH ERYTHROPOIETIN (MULTI): ICD-10-CM

## 2024-09-06 DIAGNOSIS — D64.81 ANEMIA DUE TO CHEMOTHERAPY FOR MYELODYSPLASTIC SYNDROME TREATED WITH ERYTHROPOIETIN (MULTI): ICD-10-CM

## 2024-09-06 LAB
ALBUMIN SERPL BCP-MCNC: 4.1 G/DL (ref 3.4–5)
ALP SERPL-CCNC: 88 U/L (ref 33–136)
ALT SERPL W P-5'-P-CCNC: 18 U/L (ref 10–52)
ANION GAP SERPL CALC-SCNC: 12 MMOL/L (ref 10–20)
AST SERPL W P-5'-P-CCNC: 19 U/L (ref 9–39)
BASO STIPL BLD QL SMEAR: PRESENT
BASOPHILS # BLD AUTO: 0 X10*3/UL (ref 0–0.1)
BASOPHILS NFR BLD AUTO: 0 %
BILIRUB SERPL-MCNC: 1 MG/DL (ref 0–1.2)
BUN SERPL-MCNC: 34 MG/DL (ref 6–23)
CALCIUM SERPL-MCNC: 9.6 MG/DL (ref 8.6–10.3)
CHLORIDE SERPL-SCNC: 107 MMOL/L (ref 98–107)
CO2 SERPL-SCNC: 24 MMOL/L (ref 21–32)
CREAT SERPL-MCNC: 1.46 MG/DL (ref 0.5–1.3)
DACRYOCYTES BLD QL SMEAR: NORMAL
EGFRCR SERPLBLD CKD-EPI 2021: 47 ML/MIN/1.73M*2
EOSINOPHIL # BLD AUTO: 0.07 X10*3/UL (ref 0–0.4)
EOSINOPHIL NFR BLD AUTO: 2.1 %
ERYTHROCYTE [DISTWIDTH] IN BLOOD BY AUTOMATED COUNT: 23 % (ref 11.5–14.5)
FERRITIN SERPL-MCNC: 644 NG/ML (ref 20–300)
GLUCOSE SERPL-MCNC: 94 MG/DL (ref 74–99)
HCT VFR BLD AUTO: 27 % (ref 41–52)
HGB BLD-MCNC: 8.6 G/DL (ref 13.5–17.5)
IMM GRANULOCYTES # BLD AUTO: 0.07 X10*3/UL (ref 0–0.5)
IMM GRANULOCYTES NFR BLD AUTO: 2.1 % (ref 0–0.9)
IRON SATN MFR SERPL: 38 % (ref 25–45)
IRON SERPL-MCNC: 109 UG/DL (ref 35–150)
LYMPHOCYTES # BLD AUTO: 0.96 X10*3/UL (ref 0.8–3)
LYMPHOCYTES NFR BLD AUTO: 28.8 %
MCH RBC QN AUTO: 29.8 PG (ref 26–34)
MCHC RBC AUTO-ENTMCNC: 31.9 G/DL (ref 32–36)
MCV RBC AUTO: 93 FL (ref 80–100)
MONOCYTES # BLD AUTO: 0.24 X10*3/UL (ref 0.05–0.8)
MONOCYTES NFR BLD AUTO: 7.2 %
NEUTROPHILS # BLD AUTO: 1.99 X10*3/UL (ref 1.6–5.5)
NEUTROPHILS NFR BLD AUTO: 59.8 %
NRBC BLD-RTO: ABNORMAL /100{WBCS}
OVALOCYTES BLD QL SMEAR: NORMAL
PATH REPORT.COMMENTS IMP SPEC: NORMAL
PATH REPORT.FINAL DX SPEC: NORMAL
PATH REPORT.GROSS SPEC: NORMAL
PATH REPORT.MICROSCOPIC SPEC OTHER STN: NORMAL
PATH REPORT.RELEVANT HX SPEC: NORMAL
PATH REPORT.TOTAL CANCER: NORMAL
PLATELET # BLD AUTO: 27 X10*3/UL (ref 150–450)
POLYCHROMASIA BLD QL SMEAR: NORMAL
POTASSIUM SERPL-SCNC: 4.3 MMOL/L (ref 3.5–5.3)
PROT SERPL-MCNC: 6.2 G/DL (ref 6.4–8.2)
RBC # BLD AUTO: 2.89 X10*6/UL (ref 4.5–5.9)
RBC MORPH BLD: NORMAL
SODIUM SERPL-SCNC: 139 MMOL/L (ref 136–145)
TIBC SERPL-MCNC: 284 UG/DL (ref 240–445)
UIBC SERPL-MCNC: 175 UG/DL (ref 110–370)
WBC # BLD AUTO: 3.3 X10*3/UL (ref 4.4–11.3)

## 2024-09-06 PROCEDURE — 83540 ASSAY OF IRON: CPT | Performed by: INTERNAL MEDICINE

## 2024-09-06 PROCEDURE — 96372 THER/PROPH/DIAG INJ SC/IM: CPT

## 2024-09-06 PROCEDURE — 36415 COLL VENOUS BLD VENIPUNCTURE: CPT

## 2024-09-06 PROCEDURE — 84075 ASSAY ALKALINE PHOSPHATASE: CPT

## 2024-09-06 PROCEDURE — 2500000004 HC RX 250 GENERAL PHARMACY W/ HCPCS (ALT 636 FOR OP/ED): Mod: JZ | Performed by: INTERNAL MEDICINE

## 2024-09-06 PROCEDURE — 85025 COMPLETE CBC W/AUTO DIFF WBC: CPT

## 2024-09-06 PROCEDURE — 82728 ASSAY OF FERRITIN: CPT | Performed by: INTERNAL MEDICINE

## 2024-09-06 RX ORDER — ALBUTEROL SULFATE 0.83 MG/ML
3 SOLUTION RESPIRATORY (INHALATION) AS NEEDED
Status: DISCONTINUED | OUTPATIENT
Start: 2024-09-06 | End: 2024-09-06 | Stop reason: HOSPADM

## 2024-09-06 RX ORDER — ALBUTEROL SULFATE 0.83 MG/ML
3 SOLUTION RESPIRATORY (INHALATION) AS NEEDED
OUTPATIENT
Start: 2024-09-20

## 2024-09-06 RX ORDER — EPINEPHRINE 0.3 MG/.3ML
0.3 INJECTION SUBCUTANEOUS EVERY 5 MIN PRN
Status: DISCONTINUED | OUTPATIENT
Start: 2024-09-06 | End: 2024-09-06 | Stop reason: HOSPADM

## 2024-09-06 RX ORDER — DIPHENHYDRAMINE HYDROCHLORIDE 50 MG/ML
50 INJECTION INTRAMUSCULAR; INTRAVENOUS AS NEEDED
Status: DISCONTINUED | OUTPATIENT
Start: 2024-09-06 | End: 2024-09-06 | Stop reason: HOSPADM

## 2024-09-06 RX ORDER — FAMOTIDINE 10 MG/ML
20 INJECTION INTRAVENOUS ONCE AS NEEDED
Status: DISCONTINUED | OUTPATIENT
Start: 2024-09-06 | End: 2024-09-06 | Stop reason: HOSPADM

## 2024-09-06 RX ORDER — FAMOTIDINE 10 MG/ML
20 INJECTION INTRAVENOUS ONCE AS NEEDED
OUTPATIENT
Start: 2024-09-20

## 2024-09-06 RX ORDER — DIPHENHYDRAMINE HYDROCHLORIDE 50 MG/ML
50 INJECTION INTRAMUSCULAR; INTRAVENOUS AS NEEDED
OUTPATIENT
Start: 2024-09-20

## 2024-09-06 RX ORDER — EPINEPHRINE 0.3 MG/.3ML
0.3 INJECTION SUBCUTANEOUS EVERY 5 MIN PRN
OUTPATIENT
Start: 2024-09-20

## 2024-09-06 ASSESSMENT — PAIN SCALES - GENERAL: PAINLEVEL: 0-NO PAIN

## 2024-09-06 NOTE — PATIENT INSTRUCTIONS
Hgb-8.6. Pt received aranesp injection without incident. Pt will have a virtual visit with Dr Rashid next week and will RTC in 2 weeks for aranesp injection.

## 2024-09-07 LAB
CELL COUNT (BLOOD): 3.62 X10*3/UL
CELL POPULATIONS: NORMAL
DIAGNOSIS: NORMAL
FLOW DIFFERENTIAL: NORMAL
FLOW TEST ORDERED: NORMAL
LAB TEST METHOD: NORMAL
NUMBER OF CELLS COLLECTED: NORMAL PER TUBE
PATH REPORT.TOTAL CANCER: NORMAL
SIGNATURE COMMENT: NORMAL
SPECIMEN VIABILITY: NORMAL

## 2024-09-10 ENCOUNTER — APPOINTMENT (OUTPATIENT)
Dept: NEPHROLOGY | Facility: CLINIC | Age: 84
End: 2024-09-10
Payer: MEDICARE

## 2024-09-10 VITALS
HEART RATE: 87 BPM | HEIGHT: 73 IN | SYSTOLIC BLOOD PRESSURE: 132 MMHG | DIASTOLIC BLOOD PRESSURE: 77 MMHG | WEIGHT: 286 LBS | BODY MASS INDEX: 37.91 KG/M2

## 2024-09-10 DIAGNOSIS — I10 ESSENTIAL HYPERTENSION: ICD-10-CM

## 2024-09-10 DIAGNOSIS — N18.32 STAGE 3B CHRONIC KIDNEY DISEASE (MULTI): Primary | ICD-10-CM

## 2024-09-10 DIAGNOSIS — R80.8 OTHER PROTEINURIA: ICD-10-CM

## 2024-09-10 PROCEDURE — 1036F TOBACCO NON-USER: CPT | Performed by: INTERNAL MEDICINE

## 2024-09-10 PROCEDURE — 1111F DSCHRG MED/CURRENT MED MERGE: CPT | Performed by: INTERNAL MEDICINE

## 2024-09-10 PROCEDURE — 1159F MED LIST DOCD IN RCRD: CPT | Performed by: INTERNAL MEDICINE

## 2024-09-10 PROCEDURE — 3078F DIAST BP <80 MM HG: CPT | Performed by: INTERNAL MEDICINE

## 2024-09-10 PROCEDURE — 99213 OFFICE O/P EST LOW 20 MIN: CPT | Performed by: INTERNAL MEDICINE

## 2024-09-10 PROCEDURE — 1123F ACP DISCUSS/DSCN MKR DOCD: CPT | Performed by: INTERNAL MEDICINE

## 2024-09-10 PROCEDURE — 3075F SYST BP GE 130 - 139MM HG: CPT | Performed by: INTERNAL MEDICINE

## 2024-09-12 ENCOUNTER — LAB (OUTPATIENT)
Dept: LAB | Facility: LAB | Age: 84
End: 2024-09-12
Payer: MEDICARE

## 2024-09-12 ENCOUNTER — TELEMEDICINE (OUTPATIENT)
Dept: HEMATOLOGY/ONCOLOGY | Facility: HOSPITAL | Age: 84
End: 2024-09-12
Payer: MEDICARE

## 2024-09-12 DIAGNOSIS — D46.9 MYELODYSPLASTIC SYNDROME (MULTI): Primary | ICD-10-CM

## 2024-09-12 DIAGNOSIS — D46.9 MYELODYSPLASTIC SYNDROME (MULTI): ICD-10-CM

## 2024-09-12 LAB
ABO GROUP (TYPE) IN BLOOD: NORMAL
ANTIBODY SCREEN: NORMAL
BASOPHILS # BLD AUTO: 0.03 X10*3/UL (ref 0–0.1)
BASOPHILS NFR BLD AUTO: 1.2 %
DACRYOCYTES BLD QL SMEAR: NORMAL
EOSINOPHIL # BLD AUTO: 0.07 X10*3/UL (ref 0–0.4)
EOSINOPHIL NFR BLD AUTO: 2.9 %
ERYTHROCYTE [DISTWIDTH] IN BLOOD BY AUTOMATED COUNT: 23.7 % (ref 11.5–14.5)
HCT VFR BLD AUTO: 27.4 % (ref 41–52)
HGB BLD-MCNC: 8.4 G/DL (ref 13.5–17.5)
IMM GRANULOCYTES # BLD AUTO: 0.04 X10*3/UL (ref 0–0.5)
IMM GRANULOCYTES NFR BLD AUTO: 1.7 % (ref 0–0.9)
LYMPHOCYTES # BLD AUTO: 0.55 X10*3/UL (ref 0.8–3)
LYMPHOCYTES NFR BLD AUTO: 22.7 %
MCH RBC QN AUTO: 28.6 PG (ref 26–34)
MCHC RBC AUTO-ENTMCNC: 30.7 G/DL (ref 32–36)
MCV RBC AUTO: 93 FL (ref 80–100)
MONOCYTES # BLD AUTO: 0.21 X10*3/UL (ref 0.05–0.8)
MONOCYTES NFR BLD AUTO: 8.7 %
NEUTROPHILS # BLD AUTO: 1.52 X10*3/UL (ref 1.6–5.5)
NEUTROPHILS NFR BLD AUTO: 62.8 %
NRBC BLD-RTO: 3.3 /100 WBCS (ref 0–0)
OVALOCYTES BLD QL SMEAR: NORMAL
PLATELET # BLD AUTO: 30 X10*3/UL (ref 150–450)
POLYCHROMASIA BLD QL SMEAR: NORMAL
RBC # BLD AUTO: 2.94 X10*6/UL (ref 4.5–5.9)
RBC MORPH BLD: NORMAL
RH FACTOR (ANTIGEN D): NORMAL
SCHISTOCYTES BLD QL SMEAR: NORMAL
WBC # BLD AUTO: 2.4 X10*3/UL (ref 4.4–11.3)

## 2024-09-12 PROCEDURE — 1160F RVW MEDS BY RX/DR IN RCRD: CPT | Performed by: INTERNAL MEDICINE

## 2024-09-12 PROCEDURE — 86901 BLOOD TYPING SEROLOGIC RH(D): CPT

## 2024-09-12 PROCEDURE — 1123F ACP DISCUSS/DSCN MKR DOCD: CPT | Performed by: INTERNAL MEDICINE

## 2024-09-12 PROCEDURE — 1159F MED LIST DOCD IN RCRD: CPT | Performed by: INTERNAL MEDICINE

## 2024-09-12 PROCEDURE — 36415 COLL VENOUS BLD VENIPUNCTURE: CPT

## 2024-09-12 PROCEDURE — 1111F DSCHRG MED/CURRENT MED MERGE: CPT | Performed by: INTERNAL MEDICINE

## 2024-09-12 PROCEDURE — 86900 BLOOD TYPING SEROLOGIC ABO: CPT

## 2024-09-12 PROCEDURE — 85025 COMPLETE CBC W/AUTO DIFF WBC: CPT

## 2024-09-12 PROCEDURE — 86850 RBC ANTIBODY SCREEN: CPT

## 2024-09-12 PROCEDURE — 99214 OFFICE O/P EST MOD 30 MIN: CPT | Performed by: INTERNAL MEDICINE

## 2024-09-13 LAB
CHROM ANALY OVERALL INTERP-IMP: NORMAL
ELECTRONICALLY SIGNED BY CYTOGENETICS: NORMAL

## 2024-09-13 NOTE — ASSESSMENT & PLAN NOTE
2024: Unfortunately patient's bone marrow biopsy was an insufficient sample to completely assess the degree of his progression/resistance to treatment.  Based on the flow cytometry results, it seems unlikely he has developed excess blasts.  However, additional biopsy may be needed to thoroughly assess.  I discussed with him 4 different treatment options.  1 of these would be the standard of care azacitidine, I think if he had increased blasts to 3 or 4% this would be absolutely respiratory since that has a proven overall survival benefit for high risk disease.  However, even in the past 2 weeks he had admission with UTI and has demonstrated high degree of comorbid conditions and relative frailty over the last 2 years.  That setting I think it would also be reasonable to discuss low-dose strategies.  This may include weekly decitabine regimens, weekly decitabine with venetoclax based regimens, as well as potential evaluation for [id number] (I did discuss that this would absolutely require a bone marrow to be repeated to ensure that he truly still has low to intermediate risk disease).    Unfortunately Mr. Espinoza reported that his brother, also diagnosed with myelodysplastic syndrome, possibly recently he is hoping to be able to travel for a  October.  This would be about 1 weekend of travel, with probably not be in [place] for an extended period of time.  I advised that regard to treatment, I would be cautious if we used full dose azacitidine, and that coordination with a local oncology office in order to potentially have a supportive care visit during travel may need advisable.  The low-dose weekly regimens may be more amenable to safely facilitating travel    The sample from his bone marrow was also inadequate for [facility], a outpatient phlebotomy visit was arranged to try to complete this in order to evaluate [id number]  Diagnostics:  -Follow-up [facility]  -Consider repeat bone  marrow  Treatment:  -Patient interested in learning more about [id number], providing literature on azacitidine, decitabine, venetoclax; likely pursuing low dose weekly based regimens either weekly decitabine, weekly decitabine/venetoclax vs [id number] depending on eligibility   Disease/Toxicity Monitoring:  - TBD  Supportive Care:  - TBD  Antimicrobial Prophylaxis:   - TBD  IV access:  - for now, continue with PIV    Follow up in 1.5-2 weeks after results of repeat bone marrow

## 2024-09-13 NOTE — PROGRESS NOTES
"Patient ID: Holden Burgess \"GENARO\" is a 83 y.o. male.  Referring Physician: No referring provider defined for this encounter.  Primary Care Provider: Giacomo Joseph DO    Date of Service:  2024      Assessment & Plan  Myelodysplastic syndrome (Multi)  2024: Unfortunately patient's bone marrow biopsy was an insufficient sample to completely assess the degree of his progression/resistance to treatment.  Based on the flow cytometry results, it seems unlikely he has developed excess blasts.  However, additional biopsy may be needed to thoroughly assess.  I discussed with him 4 different treatment options.  1 of these would be the standard of care azacitidine, I think if he had increased blasts to 3 or 4% this would be absolutely respiratory since that has a proven overall survival benefit for high risk disease.  However, even in the past 2 weeks he had admission with UTI and has demonstrated high degree of comorbid conditions and relative frailty over the last 2 years.  That setting I think it would also be reasonable to discuss low-dose strategies.  This may include weekly decitabine regimens, weekly decitabine with venetoclax based regimens, as well as potential evaluation for [id number] (I did discuss that this would absolutely require a bone marrow to be repeated to ensure that he truly still has low to intermediate risk disease).    Unfortunately Mr. Espinoza reported that his brother, also diagnosed with myelodysplastic syndrome, possibly recently he is hoping to be able to travel for a  October.  This would be about 1 weekend of travel, with probably not be in [place] for an extended period of time.  I advised that regard to treatment, I would be cautious if we used full dose azacitidine, and that coordination with a local oncology office in order to potentially have a supportive care visit during travel may need advisable.  The low-dose weekly regimens may be more amenable to safely " facilitating travel    The sample from his bone marrow was also inadequate for [facility], a outpatient phlebotomy visit was arranged to try to complete this in order to evaluate DDX41  Diagnostics:  -Follow-up [facility]  -Consider repeat bone marrow  Treatment:  -Patient interested in learning more about [id number], providing literature on azacitidine, decitabine, venetoclax; likely pursuing low dose weekly based regimens either weekly decitabine, weekly decitabine/venetoclax vs [id number] depending on eligibility   Disease/Toxicity Monitoring:  - TBD  Supportive Care:  - TBD  Antimicrobial Prophylaxis:   - TBD  IV access:  - for now, continue with PIV    Follow up in 1.5-2 weeks after results of repeat bone marrow                 Sebastien Rashid MD      Verbal consent was requested and obtained from patient on this date for a telehealth visit.    Oncology History   Myelodysplastic syndrome (Multi)   1/23/2024 Initial Diagnosis    Myelodysplastic syndrome:   Diagnosis:   Originally referred to Dr. Murphy in 2023 for longstanding thrombocytopenia.  At the time had mild leukopenia, no anemia.  Was refractory to a trial of prednisone, and so Bone marrow biopsy was completed completed on 1/23/2024 and demonstrated:  BONE MARROW CLOT, CORE BIOPSY, ASPIRATE, LEFT ILIAC CREST:   -- MILDLY HYPERCELLULAR BONE MARROW (80%) WITH MATURING TRILINEAGE HEMATOPOIESIS AND MODERATE INCREASE IN MEGAKARYOCYTES, SEE NOTE.  -- SMALL LYMPHOID AGGREGATES, FAVOR BENIGN.  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 Given the presence of persistent cytopenias, hypercellularity with increase megakaryocytes with abnormal clustering, and no increase in blasts, the overall findings are most consistent with:  -- MDS with low blasts (WHO-HAEM5 Classification) /   -- MDS-NOS with single lineage dysplasia (MDS-NOS-SLD) (ICC 2022 Classification).  NGS: SRSF2  Chromosome  Analysis: 47,XY,+8[15]/46,XY[5]  IPSS-M: -0.99 - low risks disease     4/4/2024 - 8/29/2024 Supportive Treatment    Treatment:   I. Eltrombopag -on 4/4/2024 patient continued with persistent thrombocytopenia but also had some progressive anemia with hemoglobin down to 10.  Patient was offered [id number], preferred supportive management with oral medication opted for eltrombopag in the setting of thrombocytopenia starting at 50 and ramping up to 150 mg  From 4/20/2024 through 8/20/2024 patient had progressive anemia and progressive thrombocytopenia and spite of treatment.    II. Darbepoietin -in the setting of worsening anemia darbepoetin was added 7/12/2024 at 100 mcg every 2 weeks            SUBJECTIVE:     History of Present Illness:  HPI    Reviewed past medical, surgical and family history for updates  Reviewed Social history for updates.    Review of Systems    Reviewed Home Medications & Adherence:     OBJECTIVE:    This was a virtual visit - no vital signs or physical exam could be performed.       Laboratory:  Lab on 09/12/2024   Component Date Value Ref Range Status    WBC 09/12/2024 2.4 (L)  4.4 - 11.3 x10*3/uL Final    nRBC 09/12/2024 3.3 (H)  0.0 - 0.0 /100 WBCs Final    RBC 09/12/2024 2.94 (L)  4.50 - 5.90 x10*6/uL Final    Hemoglobin 09/12/2024 8.4 (L)  13.5 - 17.5 g/dL Final    Hematocrit 09/12/2024 27.4 (L)  41.0 - 52.0 % Final    MCV 09/12/2024 93  80 - 100 fL Final    MCH 09/12/2024 28.6  26.0 - 34.0 pg Final    MCHC 09/12/2024 30.7 (L)  32.0 - 36.0 g/dL Final    RDW 09/12/2024 23.7 (H)  11.5 - 14.5 % Final    Platelets 09/12/2024 30 (LL)  150 - 450 x10*3/uL Final    Neutrophils % 09/12/2024 62.8  40.0 - 80.0 % Final    Immature Granulocytes %, Automated 09/12/2024 1.7 (H)  0.0 - 0.9 % Final    Lymphocytes % 09/12/2024 22.7  13.0 - 44.0 % Final    Monocytes % 09/12/2024 8.7  2.0 - 10.0 % Final    Eosinophils % 09/12/2024 2.9  0.0 - 6.0 % Final    Basophils % 09/12/2024 1.2  0.0 - 2.0 % Final     Neutrophils Absolute 09/12/2024 1.52 (L)  1.60 - 5.50 x10*3/uL Final    Immature Granulocytes Absolute, Au* 09/12/2024 0.04  0.00 - 0.50 x10*3/uL Final    Lymphocytes Absolute 09/12/2024 0.55 (L)  0.80 - 3.00 x10*3/uL Final    Monocytes Absolute 09/12/2024 0.21  0.05 - 0.80 x10*3/uL Final    Eosinophils Absolute 09/12/2024 0.07  0.00 - 0.40 x10*3/uL Final    Basophils Absolute 09/12/2024 0.03  0.00 - 0.10 x10*3/uL Final    ABO TYPE 09/12/2024 O   Final    Rh TYPE 09/12/2024 POS   Final    ANTIBODY SCREEN 09/12/2024 NEG   Final    RBC Morphology 09/12/2024 See Below   Final    Polychromasia 09/12/2024 Mild   Final    RBC Fragments 09/12/2024 Few   Final    Ovalocytes 09/12/2024 Many   Final    Teardrop Cells 09/12/2024 Few   Final         Pathology:  Bone Marrow Biopsy:   FINAL DIAGNOSIS   Date Value Ref Range Status   09/05/2024   Final    A, B & C. BONE MARROW CLOT WITH ASPIRATE AND CORE WITH TOUCH PREP, LEFT ILIAC CREST:    -- LIMITED BONE MARROW SPECIMEN, SEE NOTE.    NOTE:  The bone marrow evaluation is limited by an aspicular and hypocellular aspirate smear, an aspicular clot and a small bone marrow biopsy consisting of primarily bone and cartilage with no evaluable marrow spaces. There were no apparent blasts being seen on the peripheral smear. Clinical correlation recommended.        Bone Marrow Differential   Date Value Ref Range Status   01/23/2024   Final    Cell Type Value Reference Range   Promyelocytes 4.0 1-5 %   Myelocytes 5.5 5-10 %   Metamyelocytes 10.5 10-25 %   Myelocytes/Metamyelocytes 0.0 15-35 %   Bands 11.0 10-20 %   Segmented Neutrophils 14.5 5-30 %   Bands/Segmented Neutrophils 0.0 15-50 %   Eosinophils 3.5 2-4 %   Basophils 1.0 0-1 %        Lymphocytes 6.0 5-25 %   Monocytes 0.0 0-2 %   Plasma Cells 0.0 0-2 %        Blasts 1.0 0-1 %        Total Erythroid 43.0 17-35 %        Total Cells Counted 200    Myeloid/Erythroid Ratio 1.2 1.5-4.1   (Touch prep)       Microscopic Description   Date  "Value Ref Range Status   09/05/2024   Final    PERIPHERAL SMEAR: Submitted.  Red cells: Anemia with anisopoikilocytosis, teardrop cells, few nRBCs, and increased polychromasia.  White cells: Borderline neutropenia and neutrophils with toxic granulation  Platelets: Normal morphology, decreased.  Comments: No definitive circulating blasts seen.    ASPIRATE SMEAR: Submitted.  Specimen: Aspicular, hemodilute.  Erythropoiesis: Apparent progressive maturation without dysplasia.  Granulopoiesis: Few precursor forms seen with normal-appearing morphology  Megakaryocytes: Not seen.     TOUCH PREP: Submitted.  Specimen: Hypocellular.     ASPIRATE CLOT: Submitted.  Specimen: Aspicular, only blood.    CORE BIOPSY: Submitted.  Specimen: Limited, predominantly cartilage with minuscule fragment of crushed marrow space.    SPECIAL STAINS:    Iron: Unable to assess due to lack of particles.     IMMUNOHISTOCHEMISTRY: Not performed.    FLOW CYTOMETRY: See separate report for details.    The microscopic and flow cytometric findings were reviewed in conjunction with hematopathology resident Jea Wood MD, PhD.        Gross Description   Date Value Ref Range Status   09/05/2024   Final    A: Received in formalin, labeled with the patient's name and hospital number and \"clot\", is an irregular fragment of blood clot measuring 0.9 x 0.3 x 0.2 cm. The specimen is submitted in toto in one cassette.  DMB    B: Received in B-plus fixative, labeled with the patient's name and hospital number and \"bone\", is a cylindrical segment of bone measuring 0.8 x 0.3 x 0.3 cm. The specimen is submitted in toto in one cassette following decalcification.   DMB                 "

## 2024-09-16 ENCOUNTER — APPOINTMENT (OUTPATIENT)
Dept: PRIMARY CARE | Facility: CLINIC | Age: 84
End: 2024-09-16
Payer: MEDICARE

## 2024-09-16 VITALS
SYSTOLIC BLOOD PRESSURE: 114 MMHG | OXYGEN SATURATION: 94 % | TEMPERATURE: 97.9 F | HEART RATE: 63 BPM | HEIGHT: 73 IN | WEIGHT: 282 LBS | DIASTOLIC BLOOD PRESSURE: 58 MMHG | BODY MASS INDEX: 37.37 KG/M2

## 2024-09-16 DIAGNOSIS — N30.01 ACUTE CYSTITIS WITH HEMATURIA: ICD-10-CM

## 2024-09-16 DIAGNOSIS — Z09 ENCOUNTER FOR EXAMINATION FOLLOWING TREATMENT AT HOSPITAL: Primary | ICD-10-CM

## 2024-09-16 DIAGNOSIS — I10 PRIMARY HYPERTENSION: ICD-10-CM

## 2024-09-16 DIAGNOSIS — E66.09 EXOGENOUS OBESITY: ICD-10-CM

## 2024-09-16 DIAGNOSIS — F33.9 DEPRESSION, RECURRENT (CMS-HCC): ICD-10-CM

## 2024-09-16 DIAGNOSIS — D46.9 MYELODYSPLASTIC SYNDROME (MULTI): ICD-10-CM

## 2024-09-16 LAB
POC APPEARANCE, URINE: ABNORMAL
POC BILIRUBIN, URINE: NEGATIVE
POC BLOOD, URINE: ABNORMAL
POC COLOR, URINE: YELLOW
POC GLUCOSE, URINE: NEGATIVE MG/DL
POC KETONES, URINE: NEGATIVE MG/DL
POC LEUKOCYTES, URINE: ABNORMAL
POC NITRITE,URINE: NEGATIVE
POC PH, URINE: 5.5 PH
POC PROTEIN, URINE: NEGATIVE MG/DL
POC SPECIFIC GRAVITY, URINE: 1.01
POC UROBILINOGEN, URINE: 0.2 EU/DL

## 2024-09-16 PROCEDURE — 87086 URINE CULTURE/COLONY COUNT: CPT

## 2024-09-16 PROCEDURE — 81003 URINALYSIS AUTO W/O SCOPE: CPT | Performed by: FAMILY MEDICINE

## 2024-09-16 PROCEDURE — 99495 TRANSJ CARE MGMT MOD F2F 14D: CPT | Performed by: FAMILY MEDICINE

## 2024-09-16 ASSESSMENT — ENCOUNTER SYMPTOMS
DIARRHEA: 0
SHORTNESS OF BREATH: 0
COLOR CHANGE: 0
NECK STIFFNESS: 0
CONFUSION: 0
EYE PAIN: 0
SINUS PRESSURE: 0
CONSTIPATION: 0
ABDOMINAL DISTENTION: 0
ACTIVITY CHANGE: 0
SPEECH DIFFICULTY: 0
PALPITATIONS: 0
MYALGIAS: 0
POLYDIPSIA: 0
STRIDOR: 0
CHEST TIGHTNESS: 0
AGITATION: 0
DYSURIA: 0
FATIGUE: 1
PHOTOPHOBIA: 0
BLOOD IN STOOL: 0
DECREASED CONCENTRATION: 0
SINUS PAIN: 0
TROUBLE SWALLOWING: 0
DIZZINESS: 0
RHINORRHEA: 0
POLYPHAGIA: 0
ABDOMINAL PAIN: 0
SEIZURES: 0
SLEEP DISTURBANCE: 0
COUGH: 0
APPETITE CHANGE: 0
ADENOPATHY: 0
HEMATURIA: 0
HEADACHES: 0
SORE THROAT: 0
RECTAL PAIN: 0
DYSPHORIC MOOD: 0
FEVER: 0
FLANK PAIN: 0
NERVOUS/ANXIOUS: 0

## 2024-09-16 NOTE — PROGRESS NOTES
Subjective   Patient ID: GENARO Burgess is a 83 y.o. male who presents for Hospital Follow-up.    [facility]   Hospital follow up   Patient was admitted to [facility] 08/31/2024-09/03/2024  Diagnosis included acute cystitis, metabolic encephalopathy, and myelodysplastic syndrome  Discharged on ciprofloxacin, therapy completed  No new medications or changes to current medications  Patient states he is feeling well, just a little fatigued due to the anemia    Sees hematologist Dr. Aguero and Dr. Rashid  Had biopsy bone marrow done 09/05/2024 by hematology    Review of Systems   Constitutional:  Positive for fatigue. Negative for activity change, appetite change and fever.   HENT:  Negative for congestion, dental problem, ear discharge, ear pain, mouth sores, rhinorrhea, sinus pressure, sinus pain, sore throat, tinnitus and trouble swallowing.    Eyes:  Negative for photophobia, pain and visual disturbance.   Respiratory:  Negative for cough, chest tightness, shortness of breath and stridor.    Cardiovascular:  Negative for chest pain and palpitations.   Gastrointestinal:  Negative for abdominal distention, abdominal pain, blood in stool, constipation, diarrhea and rectal pain.   Endocrine: Negative for cold intolerance, heat intolerance, polydipsia, polyphagia and polyuria.   Genitourinary:  Negative for dysuria, flank pain, hematuria and urgency.   Musculoskeletal:  Positive for arthralgias, back pain and gait problem. Negative for myalgias and neck stiffness.   Skin:  Negative for color change and rash.   Allergic/Immunologic: Negative for environmental allergies and food allergies.   Neurological:  Negative for dizziness, seizures, syncope, speech difficulty and headaches.   Hematological:  Negative for adenopathy.   Psychiatric/Behavioral:  Negative for agitation, confusion, decreased concentration, dysphoric mood and sleep disturbance. The patient is not nervous/anxious.        Objective   /58 (BP Location: Left  "arm, Patient Position: Sitting, BP Cuff Size: Adult)   Pulse 63   Temp 36.6 °C (97.9 °F)   Ht 1.854 m (6' 1\")   Wt 128 kg (282 lb)   SpO2 94%   BMI 37.21 kg/m²     Physical Exam  Vitals reviewed.   Constitutional:       General: He is not in acute distress.     Appearance: He is obese. He is not ill-appearing or diaphoretic.   HENT:      Head: Normocephalic.      Right Ear: Tympanic membrane and external ear normal.      Left Ear: Tympanic membrane and external ear normal.      Nose: Nose normal. No congestion.      Mouth/Throat:      Pharynx: No posterior oropharyngeal erythema.   Eyes:      General:         Right eye: No discharge.         Left eye: No discharge.      Extraocular Movements: Extraocular movements intact.      Conjunctiva/sclera: Conjunctivae normal.      Pupils: Pupils are equal, round, and reactive to light.   Cardiovascular:      Rate and Rhythm: Normal rate and regular rhythm.      Pulses: Normal pulses.      Heart sounds: Normal heart sounds. No murmur heard.  Pulmonary:      Effort: Pulmonary effort is normal. No respiratory distress.      Breath sounds: Normal breath sounds. No wheezing or rales.   Chest:      Chest wall: No tenderness.   Abdominal:      General: Bowel sounds are normal. There is distension.      Palpations: There is no mass.      Tenderness: There is no abdominal tenderness. There is no guarding.   Musculoskeletal:         General: No tenderness. Normal range of motion.      Cervical back: Normal range of motion and neck supple. No tenderness.      Right lower leg: Edema present.      Left lower leg: Edema present.   Skin:     General: Skin is dry.      Coloration: Skin is pale. Skin is not jaundiced.      Findings: No bruising, erythema or rash.   Neurological:      General: No focal deficit present.      Mental Status: He is alert and oriented to person, place, and time. Mental status is at baseline.      Cranial Nerves: No cranial nerve deficit.      Sensory: No " sensory deficit.      Motor: Weakness present.      Coordination: Coordination normal.      Gait: Gait abnormal.   Psychiatric:         Mood and Affect: Mood normal.         Thought Content: Thought content normal.         Judgment: Judgment normal.         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   Provider Attestation - Scribe documentation    All medical record entries made by the Scribe were at my direction and personally dictated by me. I have reviewed the chart and agree that the record accurately reflects my personal performance of the history, physical exam, discussion and plan.    Patient was identified as a fall risk. Risk prevention instructions provided.

## 2024-09-16 NOTE — LETTER
September 20, 2024     GENARO IRWIN Burgess  [address] [place] [address]      Dear Mr. Burgess:    Below are the results from your recent visit:    LAB RESULTS ARE NORMAL     Resulted Orders   POCT UA Automated manually resulted   Result Value Ref Range    POC Color, Urine Yellow Straw, Yellow, Light-Yellow    POC Appearance, Urine Cloudy (A) Clear    POC Glucose, Urine NEGATIVE NEGATIVE mg/dl    POC Bilirubin, Urine NEGATIVE NEGATIVE    POC Ketones, Urine NEGATIVE NEGATIVE mg/dl    POC Specific Gravity, Urine 1.015 1.005 - 1.035    POC Blood, Urine TRACE-Intact (A) NEGATIVE    POC PH, Urine 5.5 No Reference Range Established PH    POC Protein, Urine NEGATIVE NEGATIVE, 30 (1+) mg/dl    POC Urobilinogen, Urine 0.2 0.2, 1.0 EU/DL    Poc Nitrite, Urine NEGATIVE NEGATIVE    POC Leukocytes, Urine SMALL (1+) (A) NEGATIVE   Urine Culture   Result Value Ref Range    Urine Culture (A)      Multiple organisms present, probable contamination. Repeat culture if clinically indicated.     The test results show that your current treatment is working. Please continue your current medication and plan.     If you have any questions or concerns, please don't hesitate to call.         Sincerely,        Giacomo Joseph, DO

## 2024-09-16 NOTE — PATIENT INSTRUCTIONS
Follow up in 10 weeks    Continue current medications and therapy for chronic medical conditions.    Patient was advised importance of proper diet/nutrition in addition adequate hydration. Patient was encouraged moderate exercise program to include 30 minutes daily for 5 days of the week or 150 minutes weekly. Patient will follow-up with us as scheduled.    I personally reviewed the OARRS report for this patient. I have considered the risks of abuse, dependence, addiction, and diversion.    UDS/CSA: 08/23/2024    IO UA today     Review hospitalization labs and diagnostic test results    Review postobservation medications and discharge instructions  Reviewed discharge      Ways to Help Prevent Falls at Home    Quick Tips   ? Ask for help if you need it. Most people want to help!   ? Get up slowly after sitting or laying down   ? Wear a medical alert device or keep cell phone in your pocket   ? Use night lights, especially areas near a bathroom   ? Keep the items you use often within reach on a small stool or end table   ? Use an assistive device such as walker or cane, as directed by provider/physical therapy   ? Use a non-slip mat and grab bars in your bathroom. Look for home health sections for best options     Other Areas to Focus On   ? Exercise and nutrition: Regular exercise or taking a falls prevention class are great ways improve strength and balance. Don’t forget to stay hydrated and bring a snack!   ? Medicine side effects: Some medicines can make you sleepy or dizzy, which could cause a fall. Ask your healthcare provider about the side effects your medicines could cause. Be sure to let them know if you take any vitamins or supplements as well.   ? Tripping hazards: Remove items you could trip on, such as loose mats, rugs, cords, and clutter. Wear closed toe shoes with rubber soles.   ? Health and wellness: Get regular checkups with your healthcare provider, plus routine vision and hearing screenings. Talk  with your healthcare provider about:   o Your medicines and the possible side effects - bring them in a bag if that is easier!   o Problems with balance or feeling dizzy   o Ways to promote bone health, such as Vitamin D and calcium supplements   o Questions or concerns about falling     *Ask your healthcare team if you have questions     ©[facility], 2022

## 2024-09-17 ENCOUNTER — TELEPHONE (OUTPATIENT)
Dept: ADMISSION | Facility: HOSPITAL | Age: 84
End: 2024-09-17
Payer: MEDICARE

## 2024-09-17 NOTE — TELEPHONE ENCOUNTER
Wife Luanne called nurse line and wanted to talk to someone on team regarding Holden care plan and research going forward. She also wanted to know if Holden should get the other BMB that was mentioned since last one was an insufficient sample? She is asking for a call back today at her cell [phone]. Messaged team.

## 2024-09-18 ENCOUNTER — OFFICE VISIT (OUTPATIENT)
Dept: HEMATOLOGY/ONCOLOGY | Facility: HOSPITAL | Age: 84
End: 2024-09-18
Payer: MEDICARE

## 2024-09-18 VITALS
SYSTOLIC BLOOD PRESSURE: 137 MMHG | HEART RATE: 79 BPM | BODY MASS INDEX: 37.75 KG/M2 | WEIGHT: 286.1 LBS | TEMPERATURE: 96.1 F | DIASTOLIC BLOOD PRESSURE: 60 MMHG | OXYGEN SATURATION: 100 % | RESPIRATION RATE: 16 BRPM

## 2024-09-18 DIAGNOSIS — D46.9 MYELODYSPLASTIC SYNDROME (MULTI): Primary | ICD-10-CM

## 2024-09-18 LAB — BACTERIA UR CULT: ABNORMAL

## 2024-09-18 PROCEDURE — 1160F RVW MEDS BY RX/DR IN RCRD: CPT | Performed by: INTERNAL MEDICINE

## 2024-09-18 PROCEDURE — 1126F AMNT PAIN NOTED NONE PRSNT: CPT | Performed by: INTERNAL MEDICINE

## 2024-09-18 PROCEDURE — 3078F DIAST BP <80 MM HG: CPT | Performed by: INTERNAL MEDICINE

## 2024-09-18 PROCEDURE — 1159F MED LIST DOCD IN RCRD: CPT | Performed by: INTERNAL MEDICINE

## 2024-09-18 PROCEDURE — 99214 OFFICE O/P EST MOD 30 MIN: CPT | Performed by: INTERNAL MEDICINE

## 2024-09-18 PROCEDURE — 3075F SYST BP GE 130 - 139MM HG: CPT | Performed by: INTERNAL MEDICINE

## 2024-09-18 PROCEDURE — 1111F DSCHRG MED/CURRENT MED MERGE: CPT | Performed by: INTERNAL MEDICINE

## 2024-09-18 PROCEDURE — 1123F ACP DISCUSS/DSCN MKR DOCD: CPT | Performed by: INTERNAL MEDICINE

## 2024-09-18 ASSESSMENT — PAIN SCALES - GENERAL: PAINLEVEL_OUTOF10: 0-NO PAIN

## 2024-09-18 NOTE — ASSESSMENT & PLAN NOTE
9/18/2024: Patient is here to discuss potential participation in [id number], a low-dose alternating strategy of azacitidine decitabine; on review of his restratification he is IPSS R-intermediate risk with an IPSS-M score near 0, so this would be appropriate; moreover with his comorbid conditions a low-dose hypomethylating agent strategy sounds attractive.  1 concern I am may have his his code diagnosis with his that abdominal aortic aneurysm; I would like to reach out to his cardiologist to assess his 1 year risk of rupture of this becoming a major issue, notably with his current blood counts this is not a correctable issue should he respond to therapy intravascular correction of this may be plausible.  If his 1 year risk of rupture is over 10%, I do not think it is appropriate for him to participate in a clinical trial and I would recommend a form of standard of care and I discussed weekly decitabine venetoclax in that setting.  Diagnostics:  -Foundation medicine testing did not identify a DDX41 mutation  -If eligible for trial, he will need a repeat bone marrow biopsy for baseline  Treatment:  -Would very much like to participate in [id number], will depend on overall risk for aneurysm rupture; if not we will plan on starting decitabine and venetoclax  Disease/Toxicity Monitoring:  - TBD  Supportive Care:  - TBD  Antimicrobial Prophylaxis:   - TBD  IV access:  - for now, continue with PIV; may benefit from port placement or other central line access    Will follow-up next week, potentially for repeat bone marrow biopsy and hopefully treatment and initiation

## 2024-09-18 NOTE — PROGRESS NOTES
"Patient ID: Holden Burgess \"GENARO\" is a 83 y.o. male.  Referring Physician: No referring provider defined for this encounter.  Primary Care Provider: Giacomo Joseph DO    Date of Service:  9/18/2024      Assessment & Plan  Myelodysplastic syndrome (Multi)  9/18/2024: Patient is here to discuss potential participation in [id number], a low-dose alternating strategy of azacitidine decitabine; on review of his restratification he is IPSS R-intermediate risk with an IPSS-M score near 0, so this would be appropriate; moreover with his comorbid conditions a low-dose hypomethylating agent strategy sounds attractive.  1 concern I am may have his his code diagnosis with his that abdominal aortic aneurysm; I would like to reach out to his cardiologist to assess his 1 year risk of rupture of this becoming a major issue, notably with his current blood counts this is not a correctable issue should he respond to therapy intravascular correction of this may be plausible.  If his 1 year risk of rupture is over 10%, I do not think it is appropriate for him to participate in a clinical trial and I would recommend a form of standard of care and I discussed weekly decitabine venetoclax in that setting.  Diagnostics:  -Foundation medicine testing did not identify a DDX41 mutation  -If eligible for trial, he will need a repeat bone marrow biopsy for baseline  Treatment:  -Would very much like to participate in [id number], will depend on overall risk for aneurysm rupture; if not we will plan on starting decitabine and venetoclax  Disease/Toxicity Monitoring:  - TBD  Supportive Care:  - TBD  Antimicrobial Prophylaxis:   - TBD  IV access:  - for now, continue with PIV; may benefit from port placement or other central line access    Will follow-up next week, potentially for repeat bone marrow biopsy and hopefully treatment and initiation               Oncology History   Myelodysplastic syndrome (Multi)   1/23/2024 Initial Diagnosis    " Myelodysplastic syndrome:   Diagnosis:   Originally referred to Dr. Murphy in 2023 for longstanding thrombocytopenia.  At the time had mild leukopenia, no anemia.  Was refractory to a trial of prednisone, and so Bone marrow biopsy was completed completed on 1/23/2024 and demonstrated:  BONE MARROW CLOT, CORE BIOPSY, ASPIRATE, LEFT ILIAC CREST:   -- MILDLY HYPERCELLULAR BONE MARROW (80%) WITH MATURING TRILINEAGE HEMATOPOIESIS AND MODERATE INCREASE IN MEGAKARYOCYTES, SEE NOTE.  -- SMALL LYMPHOID AGGREGATES, FAVOR BENIGN.  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 Given the presence of persistent cytopenias, hypercellularity with increase megakaryocytes with abnormal clustering, and no increase in blasts, the overall findings are most consistent with:  -- MDS with low blasts (WHO-HAEM5 Classification) /   -- MDS-NOS with single lineage dysplasia (MDS-NOS-SLD) (ICC 2022 Classification).  NGS: SRSF2  Chromosome Analysis: 47,XY,+8[15]/46,XY[5]  IPSS-M: -0.99 - low risks disease     4/4/2024 - 8/29/2024 Supportive Treatment    Treatment:   I. Eltrombopag -on 4/4/2024 patient continued with persistent thrombocytopenia but also had some progressive anemia with hemoglobin down to 10.  Patient was offered [id number], preferred supportive management with oral medication opted for eltrombopag in the setting of thrombocytopenia starting at 50 and ramping up to 150 mg  From 4/20/2024 through 8/20/2024 patient had progressive anemia and progressive thrombocytopenia and spite of treatment.    II. Darbepoietin -in the setting of worsening anemia darbepoetin was added 7/12/2024 at 100 mcg every 2 weeks                Subjective       History of Present Illness:  Mr. Espinoza has been more stable since his last visit, last time we saw him virtually had just been admitted with a UTI.  Fortunately he has gotten better with that and has been stable since that time        Reviewed and  Past Medical History, Past Surgical History, Family History, and Social History:    Review of Systems   Constitutional:  Positive for fatigue. Negative for activity change, appetite change, chills, diaphoresis, fever and unexpected weight change.   HENT:  Negative for congestion, mouth sores, nosebleeds, rhinorrhea, sinus pressure, sinus pain and sore throat.    Eyes:  Negative for visual disturbance.   Respiratory:  Negative for cough and shortness of breath.    Cardiovascular:  Positive for leg swelling. Negative for chest pain.   Gastrointestinal:  Negative for abdominal pain, constipation, diarrhea, nausea and vomiting.   Genitourinary:  Negative for difficulty urinating and flank pain.   Musculoskeletal:  Negative for back pain and joint swelling.   Skin:  Negative for rash.   Neurological:  Negative for weakness, light-headedness, numbness and headaches.   Hematological:  Negative for adenopathy. Does not bruise/bleed easily.   Psychiatric/Behavioral:  Negative for confusion and dysphoric mood. The patient is not nervous/anxious.        Home Medications and Adherence Reviewed with Patient.       Objective      VS:  /60 (BP Location: Left arm, Patient Position: Sitting, BP Cuff Size: Large adult)   Pulse 79   Temp 35.6 °C (96.1 °F) (Skin)   Resp 16   Wt 130 kg (286 lb 1.6 oz)   SpO2 100%   BMI 37.75 kg/m²   BSA: 2.59 meters squared    KPS is 70%     Physical Exam  Constitutional:       Comments: Somewhat frail-appearing, but otherwise alert doing well   HENT:      Mouth/Throat:      Mouth: Mucous membranes are moist.   Eyes:      Conjunctiva/sclera: Conjunctivae normal.      Pupils: Pupils are equal, round, and reactive to light.   Cardiovascular:      Rate and Rhythm: Normal rate and regular rhythm.      Heart sounds: Normal heart sounds.   Pulmonary:      Effort: Pulmonary effort is normal.      Breath sounds: Normal breath sounds.   Abdominal:      General: Abdomen is flat.      Palpations:  Abdomen is soft.   Musculoskeletal:         General: Normal range of motion.      Right lower leg: Edema present.      Left lower leg: Edema present.   Skin:     General: Skin is warm and dry.   Neurological:      General: No focal deficit present.      Mental Status: He is oriented to person, place, and time.   Psychiatric:         Mood and Affect: Mood normal.         Behavior: Behavior normal.         Laboratory:  Office Visit on 09/16/2024   Component Date Value Ref Range Status    POC Color, Urine 09/16/2024 Yellow  Straw, Yellow, Light-Yellow Final    POC Appearance, Urine 09/16/2024 Cloudy (A)  Clear Final    POC Glucose, Urine 09/16/2024 NEGATIVE  NEGATIVE mg/dl Final    POC Bilirubin, Urine 09/16/2024 NEGATIVE  NEGATIVE Final    POC Ketones, Urine 09/16/2024 NEGATIVE  NEGATIVE mg/dl Final    POC Specific Gravity, Urine 09/16/2024 1.015  1.005 - 1.035 Final    POC Blood, Urine 09/16/2024 TRACE-Intact (A)  NEGATIVE Final    POC PH, Urine 09/16/2024 5.5  No Reference Range Established PH Final    POC Protein, Urine 09/16/2024 NEGATIVE  NEGATIVE, 30 (1+) mg/dl Final    POC Urobilinogen, Urine 09/16/2024 0.2  0.2, 1.0 EU/DL Final    Poc Nitrite, Urine 09/16/2024 NEGATIVE  NEGATIVE Final    POC Leukocytes, Urine 09/16/2024 SMALL (1+) (A)  NEGATIVE Final    Urine Culture 09/16/2024 Multiple organisms present, probable contamination. Repeat culture if clinically indicated. (A)   Final           Sebastien Rashid MD

## 2024-09-19 ENCOUNTER — APPOINTMENT (OUTPATIENT)
Dept: HEMATOLOGY/ONCOLOGY | Facility: HOSPITAL | Age: 84
End: 2024-09-19
Payer: MEDICARE

## 2024-09-19 ENCOUNTER — PATIENT OUTREACH (OUTPATIENT)
Dept: PRIMARY CARE | Facility: CLINIC | Age: 84
End: 2024-09-19
Payer: MEDICARE

## 2024-09-19 NOTE — PROGRESS NOTES
Call regarding appt. with PCP on 9/16/24 after hospitalization.  At time of outreach call the patient feels as if their condition is stable since last visit.  Reviewed the PCP appointment with the pt and addressed any questions or concerns.  Working closely with the Oncology team at this time.

## 2024-09-20 ENCOUNTER — LAB (OUTPATIENT)
Dept: LAB | Facility: CLINIC | Age: 84
End: 2024-09-20
Payer: MEDICARE

## 2024-09-20 ENCOUNTER — INFUSION (OUTPATIENT)
Dept: HEMATOLOGY/ONCOLOGY | Facility: CLINIC | Age: 84
End: 2024-09-20
Payer: MEDICARE

## 2024-09-20 ENCOUNTER — TELEPHONE (OUTPATIENT)
Dept: PRIMARY CARE | Facility: CLINIC | Age: 84
End: 2024-09-20
Payer: MEDICARE

## 2024-09-20 VITALS
BODY MASS INDEX: 37.75 KG/M2 | WEIGHT: 286.16 LBS | HEART RATE: 90 BPM | TEMPERATURE: 96.8 F | SYSTOLIC BLOOD PRESSURE: 108 MMHG | OXYGEN SATURATION: 98 % | DIASTOLIC BLOOD PRESSURE: 63 MMHG | RESPIRATION RATE: 17 BRPM

## 2024-09-20 DIAGNOSIS — D46.9 ANEMIA DUE TO CHEMOTHERAPY FOR MYELODYSPLASTIC SYNDROME TREATED WITH ERYTHROPOIETIN (MULTI): ICD-10-CM

## 2024-09-20 DIAGNOSIS — T45.1X5A ANEMIA DUE TO CHEMOTHERAPY FOR MYELODYSPLASTIC SYNDROME TREATED WITH ERYTHROPOIETIN (MULTI): ICD-10-CM

## 2024-09-20 DIAGNOSIS — D64.81 ANEMIA DUE TO CHEMOTHERAPY FOR MYELODYSPLASTIC SYNDROME TREATED WITH ERYTHROPOIETIN (MULTI): ICD-10-CM

## 2024-09-20 LAB
ALBUMIN SERPL BCP-MCNC: 3.9 G/DL (ref 3.4–5)
ALP SERPL-CCNC: 73 U/L (ref 33–136)
ALT SERPL W P-5'-P-CCNC: 13 U/L (ref 10–52)
ANION GAP SERPL CALC-SCNC: 11 MMOL/L (ref 10–20)
AST SERPL W P-5'-P-CCNC: 16 U/L (ref 9–39)
BASOPHILS # BLD AUTO: 0 X10*3/UL (ref 0–0.1)
BASOPHILS NFR BLD AUTO: 0 %
BILIRUB SERPL-MCNC: 1.1 MG/DL (ref 0–1.2)
BUN SERPL-MCNC: 38 MG/DL (ref 6–23)
CALCIUM SERPL-MCNC: 9.2 MG/DL (ref 8.6–10.3)
CHLORIDE SERPL-SCNC: 110 MMOL/L (ref 98–107)
CO2 SERPL-SCNC: 23 MMOL/L (ref 21–32)
CREAT SERPL-MCNC: 1.39 MG/DL (ref 0.5–1.3)
DACRYOCYTES BLD QL SMEAR: NORMAL
EGFRCR SERPLBLD CKD-EPI 2021: 50 ML/MIN/1.73M*2
EOSINOPHIL # BLD AUTO: 0.09 X10*3/UL (ref 0–0.4)
EOSINOPHIL NFR BLD AUTO: 4.2 %
ERYTHROCYTE [DISTWIDTH] IN BLOOD BY AUTOMATED COUNT: 23.6 % (ref 11.5–14.5)
GLUCOSE SERPL-MCNC: 136 MG/DL (ref 74–99)
HCT VFR BLD AUTO: 25.2 % (ref 41–52)
HGB BLD-MCNC: 8 G/DL (ref 13.5–17.5)
IMM GRANULOCYTES # BLD AUTO: 0.03 X10*3/UL (ref 0–0.5)
IMM GRANULOCYTES NFR BLD AUTO: 1.4 % (ref 0–0.9)
LYMPHOCYTES # BLD AUTO: 0.64 X10*3/UL (ref 0.8–3)
LYMPHOCYTES NFR BLD AUTO: 29.8 %
MCH RBC QN AUTO: 29.7 PG (ref 26–34)
MCHC RBC AUTO-ENTMCNC: 31.7 G/DL (ref 32–36)
MCV RBC AUTO: 94 FL (ref 80–100)
MONOCYTES # BLD AUTO: 0.12 X10*3/UL (ref 0.05–0.8)
MONOCYTES NFR BLD AUTO: 5.6 %
NEUTROPHILS # BLD AUTO: 1.27 X10*3/UL (ref 1.6–5.5)
NEUTROPHILS NFR BLD AUTO: 59 %
NRBC BLD-RTO: ABNORMAL /100{WBCS}
OVALOCYTES BLD QL SMEAR: NORMAL
PLATELET # BLD AUTO: 22 X10*3/UL (ref 150–450)
POLYCHROMASIA BLD QL SMEAR: NORMAL
POTASSIUM SERPL-SCNC: 4.6 MMOL/L (ref 3.5–5.3)
PROT SERPL-MCNC: 5.9 G/DL (ref 6.4–8.2)
RBC # BLD AUTO: 2.69 X10*6/UL (ref 4.5–5.9)
RBC MORPH BLD: NORMAL
SCHISTOCYTES BLD QL SMEAR: NORMAL
SODIUM SERPL-SCNC: 139 MMOL/L (ref 136–145)
WBC # BLD AUTO: 2.2 X10*3/UL (ref 4.4–11.3)

## 2024-09-20 PROCEDURE — 84075 ASSAY ALKALINE PHOSPHATASE: CPT

## 2024-09-20 PROCEDURE — 2500000004 HC RX 250 GENERAL PHARMACY W/ HCPCS (ALT 636 FOR OP/ED): Mod: JZ | Performed by: INTERNAL MEDICINE

## 2024-09-20 PROCEDURE — 36415 COLL VENOUS BLD VENIPUNCTURE: CPT

## 2024-09-20 PROCEDURE — 85025 COMPLETE CBC W/AUTO DIFF WBC: CPT

## 2024-09-20 PROCEDURE — 96372 THER/PROPH/DIAG INJ SC/IM: CPT

## 2024-09-20 RX ORDER — EPINEPHRINE 0.3 MG/.3ML
0.3 INJECTION SUBCUTANEOUS EVERY 5 MIN PRN
OUTPATIENT
Start: 2024-10-04

## 2024-09-20 RX ORDER — FAMOTIDINE 10 MG/ML
20 INJECTION INTRAVENOUS ONCE AS NEEDED
OUTPATIENT
Start: 2024-10-04

## 2024-09-20 RX ORDER — DIPHENHYDRAMINE HYDROCHLORIDE 50 MG/ML
50 INJECTION INTRAMUSCULAR; INTRAVENOUS AS NEEDED
OUTPATIENT
Start: 2024-10-04

## 2024-09-20 RX ORDER — ALBUTEROL SULFATE 0.83 MG/ML
3 SOLUTION RESPIRATORY (INHALATION) AS NEEDED
OUTPATIENT
Start: 2024-10-04

## 2024-09-20 ASSESSMENT — PAIN SCALES - GENERAL: PAINLEVEL: 0-NO PAIN

## 2024-09-22 ASSESSMENT — ENCOUNTER SYMPTOMS
HEADACHES: 0
FLANK PAIN: 0
ABDOMINAL PAIN: 0
DYSPHORIC MOOD: 0
ACTIVITY CHANGE: 0
APPETITE CHANGE: 0
UNEXPECTED WEIGHT CHANGE: 0
BACK PAIN: 0
BRUISES/BLEEDS EASILY: 0
ARTHRALGIAS: 1
NAUSEA: 0
ADENOPATHY: 0
FATIGUE: 1
LIGHT-HEADEDNESS: 0
NERVOUS/ANXIOUS: 0
WEAKNESS: 0
VOMITING: 0
SINUS PRESSURE: 0
SHORTNESS OF BREATH: 0
COUGH: 0
JOINT SWELLING: 0
SORE THROAT: 0
CHILLS: 0
RHINORRHEA: 0
CONFUSION: 0
BACK PAIN: 1
NUMBNESS: 0
FEVER: 0
CONSTIPATION: 0
SINUS PAIN: 0
DIFFICULTY URINATING: 0
DIAPHORESIS: 0
DIARRHEA: 0

## 2024-09-23 DIAGNOSIS — D46.9 MYELODYSPLASTIC SYNDROME (MULTI): ICD-10-CM

## 2024-09-23 DIAGNOSIS — Z13.820 ENCOUNTER FOR SCREENING FOR OSTEOPOROSIS: ICD-10-CM

## 2024-09-23 DIAGNOSIS — Z00.6 EXAMINATION OF PARTICIPANT IN CLINICAL TRIAL: Primary | ICD-10-CM

## 2024-09-24 ENCOUNTER — LAB (OUTPATIENT)
Dept: LAB | Facility: HOSPITAL | Age: 84
End: 2024-09-24
Payer: MEDICARE

## 2024-09-24 ENCOUNTER — OFFICE VISIT (OUTPATIENT)
Dept: HEMATOLOGY/ONCOLOGY | Facility: HOSPITAL | Age: 84
End: 2024-09-24
Payer: MEDICARE

## 2024-09-24 ENCOUNTER — INFUSION (OUTPATIENT)
Dept: HEMATOLOGY/ONCOLOGY | Facility: HOSPITAL | Age: 84
End: 2024-09-24
Payer: MEDICARE

## 2024-09-24 VITALS
SYSTOLIC BLOOD PRESSURE: 145 MMHG | HEIGHT: 73 IN | WEIGHT: 283.6 LBS | OXYGEN SATURATION: 100 % | HEART RATE: 87 BPM | BODY MASS INDEX: 37.59 KG/M2 | DIASTOLIC BLOOD PRESSURE: 50 MMHG | TEMPERATURE: 97.3 F | RESPIRATION RATE: 18 BRPM

## 2024-09-24 DIAGNOSIS — D46.9 MYELODYSPLASTIC SYNDROME (MULTI): ICD-10-CM

## 2024-09-24 DIAGNOSIS — Z00.6 EXAMINATION OF PARTICIPANT IN CLINICAL TRIAL: ICD-10-CM

## 2024-09-24 DIAGNOSIS — R80.8 OTHER PROTEINURIA: ICD-10-CM

## 2024-09-24 DIAGNOSIS — I10 ESSENTIAL HYPERTENSION: ICD-10-CM

## 2024-09-24 DIAGNOSIS — N18.32 STAGE 3B CHRONIC KIDNEY DISEASE (MULTI): ICD-10-CM

## 2024-09-24 DIAGNOSIS — D46.9 MYELODYSPLASTIC SYNDROME (MULTI): Primary | ICD-10-CM

## 2024-09-24 DIAGNOSIS — Z00.6 EVALUATED FOR CLINICAL TRIAL ENROLLMENT: ICD-10-CM

## 2024-09-24 LAB
ALBUMIN SERPL BCP-MCNC: 4.4 G/DL (ref 3.4–5)
ALP SERPL-CCNC: 83 U/L (ref 33–136)
ALT SERPL W P-5'-P-CCNC: 15 U/L (ref 10–52)
ANION GAP SERPL CALC-SCNC: 15 MMOL/L (ref 10–20)
AST SERPL W P-5'-P-CCNC: 19 U/L (ref 9–39)
BASOPHILS # BLD AUTO: 0.03 X10*3/UL (ref 0–0.1)
BASOPHILS NFR BLD AUTO: 1 %
BILIRUB SERPL-MCNC: 1.4 MG/DL (ref 0–1.2)
BUN SERPL-MCNC: 26 MG/DL (ref 6–23)
CALCIUM SERPL-MCNC: 9.3 MG/DL (ref 8.6–10.3)
CHLORIDE SERPL-SCNC: 106 MMOL/L (ref 98–107)
CO2 SERPL-SCNC: 22 MMOL/L (ref 21–32)
CREAT SERPL-MCNC: 1.33 MG/DL (ref 0.5–1.3)
DACRYOCYTES BLD QL SMEAR: NORMAL
EGFRCR SERPLBLD CKD-EPI 2021: 53 ML/MIN/1.73M*2
EOSINOPHIL # BLD AUTO: 0.08 X10*3/UL (ref 0–0.4)
EOSINOPHIL NFR BLD AUTO: 2.7 %
ERYTHROCYTE [DISTWIDTH] IN BLOOD BY AUTOMATED COUNT: 24.8 % (ref 11.5–14.5)
GLUCOSE SERPL-MCNC: 92 MG/DL (ref 74–99)
HCT VFR BLD AUTO: 28.7 % (ref 41–52)
HGB BLD-MCNC: 8.9 G/DL (ref 13.5–17.5)
HGB RETIC QN: 32 PG (ref 28–38)
HOLD SPECIMEN: NORMAL
HOLD SPECIMEN: NORMAL
IMM GRANULOCYTES # BLD AUTO: 0.05 X10*3/UL (ref 0–0.5)
IMM GRANULOCYTES NFR BLD AUTO: 1.7 % (ref 0–0.9)
IMMATURE RETIC FRACTION: 28.1 %
LDH SERPL L TO P-CCNC: 323 U/L (ref 84–246)
LYMPHOCYTES # BLD AUTO: 0.79 X10*3/UL (ref 0.8–3)
LYMPHOCYTES NFR BLD AUTO: 26.5 %
MAGNESIUM SERPL-MCNC: 1.88 MG/DL (ref 1.6–2.4)
MCH RBC QN AUTO: 29 PG (ref 26–34)
MCHC RBC AUTO-ENTMCNC: 31 G/DL (ref 32–36)
MCV RBC AUTO: 94 FL (ref 80–100)
MONOCYTES # BLD AUTO: 0.2 X10*3/UL (ref 0.05–0.8)
MONOCYTES NFR BLD AUTO: 6.7 %
NEUTROPHILS # BLD AUTO: 1.83 X10*3/UL (ref 1.6–5.5)
NEUTROPHILS NFR BLD AUTO: 61.4 %
NRBC BLD-RTO: 3 /100 WBCS (ref 0–0)
OVALOCYTES BLD QL SMEAR: NORMAL
PHOSPHATE SERPL-MCNC: 3 MG/DL (ref 2.5–4.9)
PLATELET # BLD AUTO: 41 X10*3/UL (ref 150–450)
POLYCHROMASIA BLD QL SMEAR: NORMAL
POTASSIUM SERPL-SCNC: 3.8 MMOL/L (ref 3.5–5.3)
PROT SERPL-MCNC: 6.6 G/DL (ref 6.4–8.2)
PTH-INTACT SERPL-MCNC: 51.8 PG/ML (ref 18.5–88)
RBC # BLD AUTO: 3.07 X10*6/UL (ref 4.5–5.9)
RBC MORPH BLD: NORMAL
RETICS #: 0.1 X10*6/UL (ref 0.02–0.11)
RETICS/RBC NFR AUTO: 3.2 % (ref 0.5–2)
SCHISTOCYTES BLD QL SMEAR: NORMAL
SODIUM SERPL-SCNC: 139 MMOL/L (ref 136–145)
URATE SERPL-MCNC: 6 MG/DL (ref 4–7.5)
WBC # BLD AUTO: 3 X10*3/UL (ref 4.4–11.3)

## 2024-09-24 PROCEDURE — 85045 AUTOMATED RETICULOCYTE COUNT: CPT

## 2024-09-24 PROCEDURE — 88184 FLOWCYTOMETRY/ TC 1 MARKER: CPT | Mod: TC

## 2024-09-24 PROCEDURE — 83735 ASSAY OF MAGNESIUM: CPT

## 2024-09-24 PROCEDURE — 83615 LACTATE (LD) (LDH) ENZYME: CPT

## 2024-09-24 PROCEDURE — 83970 ASSAY OF PARATHORMONE: CPT

## 2024-09-24 PROCEDURE — 38222 DX BONE MARROW BX & ASPIR: CPT

## 2024-09-24 PROCEDURE — 1111F DSCHRG MED/CURRENT MED MERGE: CPT

## 2024-09-24 PROCEDURE — 1123F ACP DISCUSS/DSCN MKR DOCD: CPT

## 2024-09-24 PROCEDURE — 88342 IMHCHEM/IMCYTCHM 1ST ANTB: CPT | Mod: TC,59,SUR

## 2024-09-24 PROCEDURE — 36415 COLL VENOUS BLD VENIPUNCTURE: CPT

## 2024-09-24 PROCEDURE — 84100 ASSAY OF PHOSPHORUS: CPT

## 2024-09-24 PROCEDURE — 85097 BONE MARROW INTERPRETATION: CPT

## 2024-09-24 PROCEDURE — 80053 COMPREHEN METABOLIC PANEL: CPT

## 2024-09-24 PROCEDURE — 84550 ASSAY OF BLOOD/URIC ACID: CPT

## 2024-09-24 PROCEDURE — 38222 DX BONE MARROW BX & ASPIR: CPT | Mod: RT

## 2024-09-24 PROCEDURE — 85025 COMPLETE CBC W/AUTO DIFF WBC: CPT

## 2024-09-24 ASSESSMENT — PAIN SCALES - GENERAL: PAINLEVEL: 0-NO PAIN

## 2024-09-24 NOTE — PROGRESS NOTES
Bone Marrow Biopsy and Aspiration Procedure Note     Informed consent was obtained and potential risks including bleeding, infection and pain were reviewed with the patient.     The right posterior iliac crest was prepped with chlorhexidine.     10 ml of lidocaine 1% local anesthesia infiltrated into the subcutaneous tissue.    Right bone marrow biopsy and right bone marrow aspirate was obtained.     The procedure was tolerated well and there were no complications.    Specimens sent for: routine histopathologic stains and sectioning, flow cytometry, molecular analysis, and clinical trial samples.    Procedure completed by: ALYSE Serna

## 2024-09-25 LAB
CELL COUNT (BLOOD): 1.5 X10*3/UL
CELL POPULATIONS: NORMAL
DIAGNOSIS: NORMAL
FLOW DIFFERENTIAL: NORMAL
FLOW TEST ORDERED: NORMAL
LAB TEST METHOD: NORMAL
NUMBER OF CELLS COLLECTED: NORMAL PER TUBE
PATH REPORT.TOTAL CANCER: NORMAL
SIGNATURE COMMENT: NORMAL
SPECIMEN VIABILITY: NORMAL

## 2024-09-26 DIAGNOSIS — D46.9 MYELODYSPLASTIC SYNDROME (MULTI): Primary | ICD-10-CM

## 2024-09-26 LAB
PATH REPORT.COMMENTS IMP SPEC: NORMAL
PATH REPORT.FINAL DX SPEC: NORMAL
PATH REPORT.GROSS SPEC: NORMAL
PATH REPORT.MICROSCOPIC SPEC OTHER STN: NORMAL
PATH REPORT.RELEVANT HX SPEC: NORMAL
PATH REPORT.TOTAL CANCER: NORMAL

## 2024-09-30 ENCOUNTER — INFUSION (OUTPATIENT)
Dept: HEMATOLOGY/ONCOLOGY | Facility: HOSPITAL | Age: 84
End: 2024-09-30
Payer: MEDICARE

## 2024-09-30 ENCOUNTER — HOSPITAL ENCOUNTER (OUTPATIENT)
Dept: RADIOLOGY | Facility: HOSPITAL | Age: 84
Discharge: HOME | End: 2024-09-30
Payer: MEDICARE

## 2024-09-30 ENCOUNTER — OFFICE VISIT (OUTPATIENT)
Dept: HEMATOLOGY/ONCOLOGY | Facility: HOSPITAL | Age: 84
End: 2024-09-30
Payer: MEDICARE

## 2024-09-30 ENCOUNTER — DOCUMENTATION (OUTPATIENT)
Dept: HEMATOLOGY/ONCOLOGY | Facility: HOSPITAL | Age: 84
End: 2024-09-30
Payer: MEDICARE

## 2024-09-30 ENCOUNTER — DOCUMENTATION (OUTPATIENT)
Dept: HEMATOLOGY/ONCOLOGY | Facility: HOSPITAL | Age: 84
End: 2024-09-30

## 2024-09-30 ENCOUNTER — LAB (OUTPATIENT)
Dept: LAB | Facility: HOSPITAL | Age: 84
End: 2024-09-30
Payer: MEDICARE

## 2024-09-30 VITALS
OXYGEN SATURATION: 97 % | BODY MASS INDEX: 38.14 KG/M2 | HEART RATE: 79 BPM | SYSTOLIC BLOOD PRESSURE: 125 MMHG | TEMPERATURE: 96.8 F | WEIGHT: 289 LBS | DIASTOLIC BLOOD PRESSURE: 61 MMHG | RESPIRATION RATE: 16 BRPM

## 2024-09-30 DIAGNOSIS — R06.09 DYSPNEA ON EXERTION: ICD-10-CM

## 2024-09-30 DIAGNOSIS — R06.09 DYSPNEA ON EXERTION: Primary | ICD-10-CM

## 2024-09-30 DIAGNOSIS — D46.9 MYELODYSPLASTIC SYNDROME (MULTI): ICD-10-CM

## 2024-09-30 LAB
ALBUMIN SERPL BCP-MCNC: 3.9 G/DL (ref 3.4–5)
ALP SERPL-CCNC: 76 U/L (ref 33–136)
ALT SERPL W P-5'-P-CCNC: 13 U/L (ref 10–52)
ANION GAP SERPL CALC-SCNC: 15 MMOL/L (ref 10–20)
AST SERPL W P-5'-P-CCNC: 15 U/L (ref 9–39)
BASOPHILS # BLD AUTO: 0.01 X10*3/UL (ref 0–0.1)
BASOPHILS NFR BLD AUTO: 0.3 %
BILIRUB SERPL-MCNC: 1.3 MG/DL (ref 0–1.2)
BNP SERPL-MCNC: 67 PG/ML (ref 0–99)
BUN SERPL-MCNC: 28 MG/DL (ref 6–23)
CALCIUM SERPL-MCNC: 9.1 MG/DL (ref 8.6–10.3)
CHLORIDE SERPL-SCNC: 108 MMOL/L (ref 98–107)
CO2 SERPL-SCNC: 22 MMOL/L (ref 21–32)
CREAT SERPL-MCNC: 1.4 MG/DL (ref 0.5–1.3)
DACRYOCYTES BLD QL SMEAR: NORMAL
EGFRCR SERPLBLD CKD-EPI 2021: 50 ML/MIN/1.73M*2
EOSINOPHIL # BLD AUTO: 0.06 X10*3/UL (ref 0–0.4)
EOSINOPHIL NFR BLD AUTO: 1.8 %
ERYTHROCYTE [DISTWIDTH] IN BLOOD BY AUTOMATED COUNT: 24.7 % (ref 11.5–14.5)
GLUCOSE SERPL-MCNC: 85 MG/DL (ref 74–99)
HBV CORE AB SER QL: NONREACTIVE
HBV SURFACE AB SER-ACNC: 7.9 MIU/ML
HBV SURFACE AG SERPL QL IA: NONREACTIVE
HCT VFR BLD AUTO: 26.3 % (ref 41–52)
HGB BLD-MCNC: 8.1 G/DL (ref 13.5–17.5)
HGB RETIC QN: 30 PG (ref 28–38)
IMM GRANULOCYTES # BLD AUTO: 0.03 X10*3/UL (ref 0–0.5)
IMM GRANULOCYTES NFR BLD AUTO: 0.9 % (ref 0–0.9)
IMMATURE RETIC FRACTION: 27 %
LDH SERPL L TO P-CCNC: 271 U/L (ref 84–246)
LYMPHOCYTES # BLD AUTO: 0.66 X10*3/UL (ref 0.8–3)
LYMPHOCYTES NFR BLD AUTO: 19.6 %
MAGNESIUM SERPL-MCNC: 1.8 MG/DL (ref 1.6–2.4)
MCH RBC QN AUTO: 29.5 PG (ref 26–34)
MCHC RBC AUTO-ENTMCNC: 30.8 G/DL (ref 32–36)
MCV RBC AUTO: 96 FL (ref 80–100)
MONOCYTES # BLD AUTO: 0.25 X10*3/UL (ref 0.05–0.8)
MONOCYTES NFR BLD AUTO: 7.4 %
NEUTROPHILS # BLD AUTO: 2.36 X10*3/UL (ref 1.6–5.5)
NEUTROPHILS NFR BLD AUTO: 70 %
NRBC BLD-RTO: 1.5 /100 WBCS (ref 0–0)
OVALOCYTES BLD QL SMEAR: NORMAL
PLATELET # BLD AUTO: 35 X10*3/UL (ref 150–450)
POLYCHROMASIA BLD QL SMEAR: NORMAL
POTASSIUM SERPL-SCNC: 4.6 MMOL/L (ref 3.5–5.3)
PROT SERPL-MCNC: 6.1 G/DL (ref 6.4–8.2)
RBC # BLD AUTO: 2.75 X10*6/UL (ref 4.5–5.9)
RBC MORPH BLD: NORMAL
RETICS #: 0.09 X10*6/UL (ref 0.02–0.11)
RETICS/RBC NFR AUTO: 3.3 % (ref 0.5–2)
SCHISTOCYTES BLD QL SMEAR: NORMAL
SODIUM SERPL-SCNC: 140 MMOL/L (ref 136–145)
URATE SERPL-MCNC: 6 MG/DL (ref 4–7.5)
WBC # BLD AUTO: 3.4 X10*3/UL (ref 4.4–11.3)

## 2024-09-30 PROCEDURE — 84550 ASSAY OF BLOOD/URIC ACID: CPT

## 2024-09-30 PROCEDURE — 83880 ASSAY OF NATRIURETIC PEPTIDE: CPT

## 2024-09-30 PROCEDURE — 1123F ACP DISCUSS/DSCN MKR DOCD: CPT | Performed by: INTERNAL MEDICINE

## 2024-09-30 PROCEDURE — 2500000005 HC RX 250 GENERAL PHARMACY W/O HCPCS: Performed by: INTERNAL MEDICINE

## 2024-09-30 PROCEDURE — 85025 COMPLETE CBC W/AUTO DIFF WBC: CPT

## 2024-09-30 PROCEDURE — 1111F DSCHRG MED/CURRENT MED MERGE: CPT | Performed by: INTERNAL MEDICINE

## 2024-09-30 PROCEDURE — 3074F SYST BP LT 130 MM HG: CPT | Performed by: INTERNAL MEDICINE

## 2024-09-30 PROCEDURE — 85045 AUTOMATED RETICULOCYTE COUNT: CPT

## 2024-09-30 PROCEDURE — 83735 ASSAY OF MAGNESIUM: CPT

## 2024-09-30 PROCEDURE — 99214 OFFICE O/P EST MOD 30 MIN: CPT | Performed by: INTERNAL MEDICINE

## 2024-09-30 PROCEDURE — 1160F RVW MEDS BY RX/DR IN RCRD: CPT | Performed by: INTERNAL MEDICINE

## 2024-09-30 PROCEDURE — 99214 OFFICE O/P EST MOD 30 MIN: CPT | Mod: 25 | Performed by: INTERNAL MEDICINE

## 2024-09-30 PROCEDURE — 83615 LACTATE (LD) (LDH) ENZYME: CPT

## 2024-09-30 PROCEDURE — 2560000001 HC RX 256 EXPERIMENTAL DRUGS: Performed by: INTERNAL MEDICINE

## 2024-09-30 PROCEDURE — 3078F DIAST BP <80 MM HG: CPT | Performed by: INTERNAL MEDICINE

## 2024-09-30 PROCEDURE — 71046 X-RAY EXAM CHEST 2 VIEWS: CPT | Performed by: RADIOLOGY

## 2024-09-30 PROCEDURE — 71046 X-RAY EXAM CHEST 2 VIEWS: CPT

## 2024-09-30 PROCEDURE — 86706 HEP B SURFACE ANTIBODY: CPT

## 2024-09-30 PROCEDURE — 82374 ASSAY BLOOD CARBON DIOXIDE: CPT

## 2024-09-30 PROCEDURE — 86704 HEP B CORE ANTIBODY TOTAL: CPT

## 2024-09-30 PROCEDURE — 96401 CHEMO ANTI-NEOPL SQ/IM: CPT

## 2024-09-30 PROCEDURE — 87340 HEPATITIS B SURFACE AG IA: CPT

## 2024-09-30 PROCEDURE — 36415 COLL VENOUS BLD VENIPUNCTURE: CPT

## 2024-09-30 PROCEDURE — 1159F MED LIST DOCD IN RCRD: CPT | Performed by: INTERNAL MEDICINE

## 2024-09-30 PROCEDURE — 1126F AMNT PAIN NOTED NONE PRSNT: CPT | Performed by: INTERNAL MEDICINE

## 2024-09-30 RX ORDER — ONDANSETRON HYDROCHLORIDE 8 MG/1
8 TABLET, FILM COATED ORAL ONCE
OUTPATIENT
Start: 2024-10-17

## 2024-09-30 RX ORDER — EPINEPHRINE 0.3 MG/.3ML
0.3 INJECTION SUBCUTANEOUS EVERY 5 MIN PRN
Status: CANCELLED | OUTPATIENT
Start: 2024-10-03

## 2024-09-30 RX ORDER — PROCHLORPERAZINE EDISYLATE 5 MG/ML
10 INJECTION INTRAMUSCULAR; INTRAVENOUS EVERY 6 HOURS PRN
OUTPATIENT
Start: 2024-10-14

## 2024-09-30 RX ORDER — FAMOTIDINE 10 MG/ML
20 INJECTION INTRAVENOUS ONCE AS NEEDED
OUTPATIENT
Start: 2024-11-07

## 2024-09-30 RX ORDER — ALBUTEROL SULFATE 0.83 MG/ML
3 SOLUTION RESPIRATORY (INHALATION) AS NEEDED
OUTPATIENT
Start: 2024-10-31

## 2024-09-30 RX ORDER — PROCHLORPERAZINE MALEATE 10 MG
10 TABLET ORAL EVERY 6 HOURS PRN
Status: CANCELLED | OUTPATIENT
Start: 2024-10-03

## 2024-09-30 RX ORDER — PROCHLORPERAZINE EDISYLATE 5 MG/ML
10 INJECTION INTRAMUSCULAR; INTRAVENOUS EVERY 6 HOURS PRN
OUTPATIENT
Start: 2024-10-10

## 2024-09-30 RX ORDER — FAMOTIDINE 10 MG/ML
20 INJECTION INTRAVENOUS ONCE AS NEEDED
OUTPATIENT
Start: 2024-10-17

## 2024-09-30 RX ORDER — EPINEPHRINE 0.3 MG/.3ML
0.3 INJECTION SUBCUTANEOUS EVERY 5 MIN PRN
OUTPATIENT
Start: 2024-10-31

## 2024-09-30 RX ORDER — ALBUTEROL SULFATE 0.83 MG/ML
3 SOLUTION RESPIRATORY (INHALATION) AS NEEDED
OUTPATIENT
Start: 2024-11-04

## 2024-09-30 RX ORDER — PROCHLORPERAZINE EDISYLATE 5 MG/ML
10 INJECTION INTRAMUSCULAR; INTRAVENOUS EVERY 6 HOURS PRN
OUTPATIENT
Start: 2024-10-31

## 2024-09-30 RX ORDER — DIPHENHYDRAMINE HYDROCHLORIDE 50 MG/ML
50 INJECTION INTRAMUSCULAR; INTRAVENOUS AS NEEDED
OUTPATIENT
Start: 2024-10-24

## 2024-09-30 RX ORDER — DIPHENHYDRAMINE HYDROCHLORIDE 50 MG/ML
50 INJECTION INTRAMUSCULAR; INTRAVENOUS AS NEEDED
OUTPATIENT
Start: 2024-11-07

## 2024-09-30 RX ORDER — PROCHLORPERAZINE MALEATE 10 MG
10 TABLET ORAL EVERY 6 HOURS PRN
OUTPATIENT
Start: 2024-10-24

## 2024-09-30 RX ORDER — PROCHLORPERAZINE MALEATE 10 MG
10 TABLET ORAL EVERY 6 HOURS PRN
Status: DISCONTINUED | OUTPATIENT
Start: 2024-09-30 | End: 2024-09-30 | Stop reason: HOSPADM

## 2024-09-30 RX ORDER — ALBUTEROL SULFATE 0.83 MG/ML
3 SOLUTION RESPIRATORY (INHALATION) AS NEEDED
OUTPATIENT
Start: 2024-11-07

## 2024-09-30 RX ORDER — EPINEPHRINE 0.3 MG/.3ML
0.3 INJECTION SUBCUTANEOUS EVERY 5 MIN PRN
OUTPATIENT
Start: 2024-10-21

## 2024-09-30 RX ORDER — ONDANSETRON HYDROCHLORIDE 8 MG/1
8 TABLET, FILM COATED ORAL ONCE
OUTPATIENT
Start: 2024-11-04

## 2024-09-30 RX ORDER — FAMOTIDINE 10 MG/ML
20 INJECTION INTRAVENOUS ONCE AS NEEDED
OUTPATIENT
Start: 2024-10-10

## 2024-09-30 RX ORDER — PROCHLORPERAZINE MALEATE 10 MG
10 TABLET ORAL EVERY 6 HOURS PRN
OUTPATIENT
Start: 2024-11-07

## 2024-09-30 RX ORDER — EPINEPHRINE 0.3 MG/.3ML
0.3 INJECTION SUBCUTANEOUS EVERY 5 MIN PRN
OUTPATIENT
Start: 2024-10-17

## 2024-09-30 RX ORDER — FAMOTIDINE 10 MG/ML
20 INJECTION INTRAVENOUS ONCE AS NEEDED
OUTPATIENT
Start: 2024-11-04

## 2024-09-30 RX ORDER — DIPHENHYDRAMINE HYDROCHLORIDE 50 MG/ML
50 INJECTION INTRAMUSCULAR; INTRAVENOUS AS NEEDED
OUTPATIENT
Start: 2024-10-10

## 2024-09-30 RX ORDER — ONDANSETRON HYDROCHLORIDE 8 MG/1
8 TABLET, FILM COATED ORAL ONCE
OUTPATIENT
Start: 2024-10-31

## 2024-09-30 RX ORDER — ONDANSETRON HYDROCHLORIDE 8 MG/1
8 TABLET, FILM COATED ORAL ONCE
Status: CANCELLED | OUTPATIENT
Start: 2024-09-30

## 2024-09-30 RX ORDER — EPINEPHRINE 0.3 MG/.3ML
0.3 INJECTION SUBCUTANEOUS EVERY 5 MIN PRN
Status: CANCELLED | OUTPATIENT
Start: 2024-09-30

## 2024-09-30 RX ORDER — ALBUTEROL SULFATE 0.83 MG/ML
3 SOLUTION RESPIRATORY (INHALATION) AS NEEDED
OUTPATIENT
Start: 2024-10-24

## 2024-09-30 RX ORDER — PROCHLORPERAZINE EDISYLATE 5 MG/ML
10 INJECTION INTRAMUSCULAR; INTRAVENOUS EVERY 6 HOURS PRN
OUTPATIENT
Start: 2024-10-24

## 2024-09-30 RX ORDER — ALBUTEROL SULFATE 0.83 MG/ML
3 SOLUTION RESPIRATORY (INHALATION) AS NEEDED
Status: DISCONTINUED | OUTPATIENT
Start: 2024-09-30 | End: 2024-09-30 | Stop reason: HOSPADM

## 2024-09-30 RX ORDER — PROCHLORPERAZINE EDISYLATE 5 MG/ML
10 INJECTION INTRAMUSCULAR; INTRAVENOUS EVERY 6 HOURS PRN
Status: DISCONTINUED | OUTPATIENT
Start: 2024-09-30 | End: 2024-09-30 | Stop reason: HOSPADM

## 2024-09-30 RX ORDER — FAMOTIDINE 10 MG/ML
20 INJECTION INTRAVENOUS ONCE AS NEEDED
OUTPATIENT
Start: 2024-10-14

## 2024-09-30 RX ORDER — FAMOTIDINE 10 MG/ML
20 INJECTION INTRAVENOUS ONCE AS NEEDED
Status: CANCELLED | OUTPATIENT
Start: 2024-10-03

## 2024-09-30 RX ORDER — PROCHLORPERAZINE EDISYLATE 5 MG/ML
10 INJECTION INTRAMUSCULAR; INTRAVENOUS EVERY 6 HOURS PRN
OUTPATIENT
Start: 2024-11-07

## 2024-09-30 RX ORDER — ONDANSETRON HYDROCHLORIDE 8 MG/1
8 TABLET, FILM COATED ORAL ONCE
OUTPATIENT
Start: 2024-11-07

## 2024-09-30 RX ORDER — ALBUTEROL SULFATE 0.83 MG/ML
3 SOLUTION RESPIRATORY (INHALATION) AS NEEDED
OUTPATIENT
Start: 2024-10-07

## 2024-09-30 RX ORDER — ALBUTEROL SULFATE 0.83 MG/ML
3 SOLUTION RESPIRATORY (INHALATION) AS NEEDED
OUTPATIENT
Start: 2024-10-21

## 2024-09-30 RX ORDER — ONDANSETRON HYDROCHLORIDE 8 MG/1
8 TABLET, FILM COATED ORAL ONCE
OUTPATIENT
Start: 2024-10-10

## 2024-09-30 RX ORDER — PROCHLORPERAZINE MALEATE 10 MG
10 TABLET ORAL EVERY 6 HOURS PRN
OUTPATIENT
Start: 2024-10-21

## 2024-09-30 RX ORDER — PROCHLORPERAZINE MALEATE 10 MG
10 TABLET ORAL EVERY 6 HOURS PRN
OUTPATIENT
Start: 2024-10-28

## 2024-09-30 RX ORDER — EPINEPHRINE 0.3 MG/.3ML
0.3 INJECTION SUBCUTANEOUS EVERY 5 MIN PRN
OUTPATIENT
Start: 2024-11-07

## 2024-09-30 RX ORDER — PROCHLORPERAZINE EDISYLATE 5 MG/ML
10 INJECTION INTRAMUSCULAR; INTRAVENOUS EVERY 6 HOURS PRN
OUTPATIENT
Start: 2024-10-28

## 2024-09-30 RX ORDER — FAMOTIDINE 10 MG/ML
20 INJECTION INTRAVENOUS ONCE AS NEEDED
Status: CANCELLED | OUTPATIENT
Start: 2024-09-30

## 2024-09-30 RX ORDER — PROCHLORPERAZINE MALEATE 10 MG
10 TABLET ORAL EVERY 6 HOURS PRN
OUTPATIENT
Start: 2024-10-10

## 2024-09-30 RX ORDER — PROCHLORPERAZINE MALEATE 10 MG
10 TABLET ORAL EVERY 6 HOURS PRN
OUTPATIENT
Start: 2024-10-07

## 2024-09-30 RX ORDER — FAMOTIDINE 10 MG/ML
20 INJECTION INTRAVENOUS ONCE AS NEEDED
OUTPATIENT
Start: 2024-10-21

## 2024-09-30 RX ORDER — FAMOTIDINE 10 MG/ML
20 INJECTION INTRAVENOUS ONCE AS NEEDED
Status: DISCONTINUED | OUTPATIENT
Start: 2024-09-30 | End: 2024-09-30 | Stop reason: HOSPADM

## 2024-09-30 RX ORDER — ONDANSETRON HYDROCHLORIDE 8 MG/1
8 TABLET, FILM COATED ORAL ONCE
OUTPATIENT
Start: 2024-10-07

## 2024-09-30 RX ORDER — ONDANSETRON HYDROCHLORIDE 8 MG/1
8 TABLET, FILM COATED ORAL ONCE
Status: CANCELLED | OUTPATIENT
Start: 2024-10-03

## 2024-09-30 RX ORDER — ALBUTEROL SULFATE 0.83 MG/ML
3 SOLUTION RESPIRATORY (INHALATION) AS NEEDED
OUTPATIENT
Start: 2024-10-10

## 2024-09-30 RX ORDER — EPINEPHRINE 0.3 MG/.3ML
0.3 INJECTION SUBCUTANEOUS EVERY 5 MIN PRN
OUTPATIENT
Start: 2024-11-04

## 2024-09-30 RX ORDER — DIPHENHYDRAMINE HYDROCHLORIDE 50 MG/ML
50 INJECTION INTRAMUSCULAR; INTRAVENOUS AS NEEDED
OUTPATIENT
Start: 2024-10-21

## 2024-09-30 RX ORDER — ONDANSETRON HYDROCHLORIDE 8 MG/1
8 TABLET, FILM COATED ORAL ONCE
OUTPATIENT
Start: 2024-10-21

## 2024-09-30 RX ORDER — PROCHLORPERAZINE EDISYLATE 5 MG/ML
10 INJECTION INTRAMUSCULAR; INTRAVENOUS EVERY 6 HOURS PRN
OUTPATIENT
Start: 2024-10-17

## 2024-09-30 RX ORDER — EPINEPHRINE 0.3 MG/.3ML
0.3 INJECTION SUBCUTANEOUS EVERY 5 MIN PRN
OUTPATIENT
Start: 2024-10-14

## 2024-09-30 RX ORDER — DIPHENHYDRAMINE HYDROCHLORIDE 50 MG/ML
50 INJECTION INTRAMUSCULAR; INTRAVENOUS AS NEEDED
OUTPATIENT
Start: 2024-10-14

## 2024-09-30 RX ORDER — PROCHLORPERAZINE EDISYLATE 5 MG/ML
10 INJECTION INTRAMUSCULAR; INTRAVENOUS EVERY 6 HOURS PRN
OUTPATIENT
Start: 2024-11-04

## 2024-09-30 RX ORDER — PROCHLORPERAZINE EDISYLATE 5 MG/ML
10 INJECTION INTRAMUSCULAR; INTRAVENOUS EVERY 6 HOURS PRN
Status: CANCELLED | OUTPATIENT
Start: 2024-09-30

## 2024-09-30 RX ORDER — ONDANSETRON HYDROCHLORIDE 8 MG/1
8 TABLET, FILM COATED ORAL ONCE
OUTPATIENT
Start: 2024-10-14

## 2024-09-30 RX ORDER — ONDANSETRON HYDROCHLORIDE 8 MG/1
8 TABLET, FILM COATED ORAL ONCE
OUTPATIENT
Start: 2024-10-24

## 2024-09-30 RX ORDER — EPINEPHRINE 0.3 MG/.3ML
0.3 INJECTION SUBCUTANEOUS EVERY 5 MIN PRN
OUTPATIENT
Start: 2024-10-07

## 2024-09-30 RX ORDER — DIPHENHYDRAMINE HYDROCHLORIDE 50 MG/ML
50 INJECTION INTRAMUSCULAR; INTRAVENOUS AS NEEDED
OUTPATIENT
Start: 2024-10-28

## 2024-09-30 RX ORDER — FAMOTIDINE 10 MG/ML
20 INJECTION INTRAVENOUS ONCE AS NEEDED
OUTPATIENT
Start: 2024-10-31

## 2024-09-30 RX ORDER — PROCHLORPERAZINE EDISYLATE 5 MG/ML
10 INJECTION INTRAMUSCULAR; INTRAVENOUS EVERY 6 HOURS PRN
OUTPATIENT
Start: 2024-10-07

## 2024-09-30 RX ORDER — DIPHENHYDRAMINE HYDROCHLORIDE 50 MG/ML
50 INJECTION INTRAMUSCULAR; INTRAVENOUS AS NEEDED
OUTPATIENT
Start: 2024-10-07

## 2024-09-30 RX ORDER — PROCHLORPERAZINE EDISYLATE 5 MG/ML
10 INJECTION INTRAMUSCULAR; INTRAVENOUS EVERY 6 HOURS PRN
Status: CANCELLED | OUTPATIENT
Start: 2024-10-03

## 2024-09-30 RX ORDER — FAMOTIDINE 10 MG/ML
20 INJECTION INTRAVENOUS ONCE AS NEEDED
OUTPATIENT
Start: 2024-10-07

## 2024-09-30 RX ORDER — DIPHENHYDRAMINE HYDROCHLORIDE 50 MG/ML
50 INJECTION INTRAMUSCULAR; INTRAVENOUS AS NEEDED
Status: CANCELLED | OUTPATIENT
Start: 2024-09-30

## 2024-09-30 RX ORDER — DIPHENHYDRAMINE HYDROCHLORIDE 50 MG/ML
50 INJECTION INTRAMUSCULAR; INTRAVENOUS AS NEEDED
OUTPATIENT
Start: 2024-10-17

## 2024-09-30 RX ORDER — FAMOTIDINE 10 MG/ML
20 INJECTION INTRAVENOUS ONCE AS NEEDED
OUTPATIENT
Start: 2024-10-28

## 2024-09-30 RX ORDER — EPINEPHRINE 0.3 MG/.3ML
0.3 INJECTION SUBCUTANEOUS EVERY 5 MIN PRN
OUTPATIENT
Start: 2024-10-24

## 2024-09-30 RX ORDER — PROCHLORPERAZINE MALEATE 10 MG
10 TABLET ORAL EVERY 6 HOURS PRN
OUTPATIENT
Start: 2024-10-31

## 2024-09-30 RX ORDER — PROCHLORPERAZINE EDISYLATE 5 MG/ML
10 INJECTION INTRAMUSCULAR; INTRAVENOUS EVERY 6 HOURS PRN
OUTPATIENT
Start: 2024-10-21

## 2024-09-30 RX ORDER — FAMOTIDINE 10 MG/ML
20 INJECTION INTRAVENOUS ONCE AS NEEDED
OUTPATIENT
Start: 2024-10-24

## 2024-09-30 RX ORDER — ALBUTEROL SULFATE 0.83 MG/ML
3 SOLUTION RESPIRATORY (INHALATION) AS NEEDED
OUTPATIENT
Start: 2024-10-28

## 2024-09-30 RX ORDER — PROCHLORPERAZINE MALEATE 10 MG
10 TABLET ORAL EVERY 6 HOURS PRN
Status: CANCELLED | OUTPATIENT
Start: 2024-09-30

## 2024-09-30 RX ORDER — ALBUTEROL SULFATE 0.83 MG/ML
3 SOLUTION RESPIRATORY (INHALATION) AS NEEDED
Status: CANCELLED | OUTPATIENT
Start: 2024-09-30

## 2024-09-30 RX ORDER — PROCHLORPERAZINE MALEATE 10 MG
10 TABLET ORAL EVERY 6 HOURS PRN
OUTPATIENT
Start: 2024-10-17

## 2024-09-30 RX ORDER — ONDANSETRON HYDROCHLORIDE 8 MG/1
8 TABLET, FILM COATED ORAL ONCE
OUTPATIENT
Start: 2024-10-28

## 2024-09-30 RX ORDER — DIPHENHYDRAMINE HYDROCHLORIDE 50 MG/ML
50 INJECTION INTRAMUSCULAR; INTRAVENOUS AS NEEDED
OUTPATIENT
Start: 2024-10-31

## 2024-09-30 RX ORDER — ALBUTEROL SULFATE 0.83 MG/ML
3 SOLUTION RESPIRATORY (INHALATION) AS NEEDED
Status: CANCELLED | OUTPATIENT
Start: 2024-10-03

## 2024-09-30 RX ORDER — EPINEPHRINE 0.3 MG/.3ML
0.3 INJECTION SUBCUTANEOUS EVERY 5 MIN PRN
OUTPATIENT
Start: 2024-10-10

## 2024-09-30 RX ORDER — DIPHENHYDRAMINE HYDROCHLORIDE 50 MG/ML
50 INJECTION INTRAMUSCULAR; INTRAVENOUS AS NEEDED
Status: DISCONTINUED | OUTPATIENT
Start: 2024-09-30 | End: 2024-09-30 | Stop reason: HOSPADM

## 2024-09-30 RX ORDER — EPINEPHRINE 0.3 MG/.3ML
0.3 INJECTION SUBCUTANEOUS EVERY 5 MIN PRN
OUTPATIENT
Start: 2024-10-28

## 2024-09-30 RX ORDER — ALBUTEROL SULFATE 0.83 MG/ML
3 SOLUTION RESPIRATORY (INHALATION) AS NEEDED
OUTPATIENT
Start: 2024-10-17

## 2024-09-30 RX ORDER — ALBUTEROL SULFATE 0.83 MG/ML
3 SOLUTION RESPIRATORY (INHALATION) AS NEEDED
OUTPATIENT
Start: 2024-10-14

## 2024-09-30 RX ORDER — DIPHENHYDRAMINE HYDROCHLORIDE 50 MG/ML
50 INJECTION INTRAMUSCULAR; INTRAVENOUS AS NEEDED
Status: CANCELLED | OUTPATIENT
Start: 2024-10-03

## 2024-09-30 RX ORDER — EPINEPHRINE 0.3 MG/.3ML
0.3 INJECTION SUBCUTANEOUS EVERY 5 MIN PRN
Status: DISCONTINUED | OUTPATIENT
Start: 2024-09-30 | End: 2024-09-30 | Stop reason: HOSPADM

## 2024-09-30 RX ORDER — PROCHLORPERAZINE MALEATE 10 MG
10 TABLET ORAL EVERY 6 HOURS PRN
OUTPATIENT
Start: 2024-11-04

## 2024-09-30 RX ORDER — DIPHENHYDRAMINE HYDROCHLORIDE 50 MG/ML
50 INJECTION INTRAMUSCULAR; INTRAVENOUS AS NEEDED
OUTPATIENT
Start: 2024-11-04

## 2024-09-30 RX ORDER — ONDANSETRON HYDROCHLORIDE 8 MG/1
8 TABLET, FILM COATED ORAL ONCE
Status: COMPLETED | OUTPATIENT
Start: 2024-09-30 | End: 2024-09-30

## 2024-09-30 RX ORDER — PROCHLORPERAZINE MALEATE 10 MG
10 TABLET ORAL EVERY 6 HOURS PRN
OUTPATIENT
Start: 2024-10-14

## 2024-09-30 ASSESSMENT — PAIN SCALES - GENERAL: PAINLEVEL: 0-NO PAIN

## 2024-09-30 NOTE — ASSESSMENT & PLAN NOTE
9/30/2024: Reports short episodes of shortness of breath, typically after small bits of exertion.  On exam he had slight right basilar crackles; checked a two-view chest x-ray today which looked unremarkable, and we will recheck a BNP to ensure that we have a baseline prior to starting his therapy.  His symptoms are mild, and I do not think should preclude the initiation of his treatment, particularly as his hemoglobin could be contributing.  -Two-view chest x-ray, normal  -BNP level

## 2024-09-30 NOTE — PROGRESS NOTES
"Patient ID: Holden Burgess \"GENARO\" is a 83 y.o. male.  Referring Physician: Sebastien Rashid MD  [id number] [place] [address]  Primary Care Provider: Giacomo Joseph DO    Date of Service:  9/30/2024      Assessment & Plan  Myelodysplastic syndrome (Multi)  9/30/2024: Patient is here prior to his first dose of low-dose azacitidine and on [id number].  Repeat bone marrow biopsy remained consistent with IPSS-R score of 4, or intermediate risk disease; more importantly there was no evidence of increased blasts.  Patient successfully screened and was enrolled last week, and the start today.  See below, for dyspnea on exertion, this is mild and I do not think should preclude treatment initiation  Diagnostics:  -None additional at this time  Treatment:  -Alternating azacitidine 50 mg/m2 with decitabine 5 mg/m² per [id number]  Disease/Toxicity Monitoring:  -CBC minimum weekly for for 6 weeks, given his hemoglobin and age I will check this with each visit  Supportive Care:  -Blood products as indicated  Antimicrobial Prophylaxis:   -None indicated at this time, ANC is in the normal range  IV access:  - for now, continue with PIV; may benefit from port placement or other central line access    Will follow-up next week, potentially for repeat bone marrow biopsy and hopefully treatment and initiation  Dyspnea on exertion  9/30/2024: Reports short episodes of shortness of breath, typically after small bits of exertion.  On exam he had slight right basilar crackles; checked a two-view chest x-ray today which looked unremarkable, and we will recheck a BNP to ensure that we have a baseline prior to starting his therapy.  His symptoms are mild, and I do not think should preclude the initiation of his treatment, particularly as his hemoglobin could be contributing.  -Two-view chest x-ray, normal  -BNP level               Oncology History   Myelodysplastic syndrome (Multi)   1/23/2024 Initial Diagnosis    " Myelodysplastic syndrome:   Diagnosis:   Originally referred to Dr. Murphy in 2023 for longstanding thrombocytopenia.  At the time had mild leukopenia, no anemia.  Was refractory to a trial of prednisone, and so Bone marrow biopsy was completed completed on 1/23/2024 and demonstrated:  BONE MARROW CLOT, CORE BIOPSY, ASPIRATE, LEFT ILIAC CREST:   -- MILDLY HYPERCELLULAR BONE MARROW (80%) WITH MATURING TRILINEAGE HEMATOPOIESIS AND MODERATE INCREASE IN MEGAKARYOCYTES, SEE NOTE.  -- SMALL LYMPHOID AGGREGATES, FAVOR BENIGN.  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 Given the presence of persistent cytopenias, hypercellularity with increase megakaryocytes with abnormal clustering, and no increase in blasts, the overall findings are most consistent with:  -- MDS with low blasts (WHO-HAEM5 Classification) /   -- MDS-NOS with single lineage dysplasia (MDS-NOS-SLD) (ICC 2022 Classification).  NGS: SRSF2  Chromosome Analysis: 47,XY,+8[15]/46,XY[5]  IPSS-M: -0.99 - low risks disease     4/4/2024 - 8/29/2024 Supportive Treatment    Treatment:   I. Eltrombopag -on 4/4/2024 patient continued with persistent thrombocytopenia but also had some progressive anemia with hemoglobin down to 10.  Patient was offered [id number], preferred supportive management with oral medication opted for eltrombopag in the setting of thrombocytopenia starting at 50 and ramping up to 150 mg  From 4/20/2024 through 8/20/2024 patient had progressive anemia and progressive thrombocytopenia and spite of treatment.    II. Darbepoietin -in the setting of worsening anemia darbepoetin was added 7/12/2024 at 100 mcg every 2 weeks       9/30/2024 -  Research Study Participant    ([facility]) [id number] - AzaCITIDine / Decitabine, 42 Day Cycle Followed by 28 Day Cycles  Plan Provider: Sebastien Rashid MD  Treatment goal: Palliative  Line of treatment: First Line  Associated studies: 5-Azacitidine and Decitabine  Epigenetic Therapy for Myeloid Malignancies              Subjective       History of Present Illness:  Reprots feeling a little shortness of breath.  On the way down here, was having some shortness of breath.  Yesterday, spouse reported that he was trying to fix a toilet and was struggling some.  Then he had a little temperature elevation, but nothing after he went to bed.  Yesterday, he didn't feel well overall.          Reviewed and Past Medical History, Past Surgical History, Family History, and Social History:    Review of Systems    Home Medications and Adherence Reviewed with Patient.       Objective      VS:  /61 (BP Location: Left arm, Patient Position: Sitting, BP Cuff Size: Large adult)   Pulse 79   Temp 36 °C (96.8 °F) (Skin)   Resp 16   Wt 131 kg (289 lb)   SpO2 97%   BMI 38.14 kg/m²   BSA: 2.6 meters squared    Physical Exam    Laboratory:  Lab on 09/30/2024   Component Date Value Ref Range Status    WBC 09/30/2024 3.4 (L)  4.4 - 11.3 x10*3/uL Preliminary    nRBC 09/30/2024 1.5 (H)  0.0 - 0.0 /100 WBCs Preliminary    RBC 09/30/2024 2.75 (L)  4.50 - 5.90 x10*6/uL Preliminary    Hemoglobin 09/30/2024 8.1 (L)  13.5 - 17.5 g/dL Preliminary    Hematocrit 09/30/2024 26.3 (L)  41.0 - 52.0 % Preliminary    MCV 09/30/2024 96  80 - 100 fL Preliminary    MCH 09/30/2024 29.5  26.0 - 34.0 pg Preliminary    MCHC 09/30/2024 30.8 (L)  32.0 - 36.0 g/dL Preliminary    RDW 09/30/2024 24.7 (H)  11.5 - 14.5 % Preliminary    Platelets 09/30/2024 35 (LL)  150 - 450 x10*3/uL Preliminary    Retic % 09/30/2024 3.3 (H)  0.5 - 2.0 % Preliminary    Retic Absolute 09/30/2024 0.090  0.017 - 0.110 x10*6/uL Preliminary    Reticulocyte Hemoglobin 09/30/2024 30  28 - 38 pg Preliminary    Immature Retic fraction 09/30/2024 27.0 (H)  <=16.0 % Preliminary         Pathology:  Bone Marrow Biopsy:   FINAL DIAGNOSIS   Date Value Ref Range Status   09/24/2024   Final    A, B & C. BONE MARROW CLOT WITH ASPIRATE AND CORE WITH TOUCH  PREP, RIGHT ILIAC CREST:    -- MILDLY HYPERCELLULAR BONE MARROW (50%) WITH ERYTHROID DOMINANT HEMATOPOIESIS, INCREASED NUMBER OF MEGAKARYOCYTES AND NO INCREASE IN BLASTS, SEE NOTE.    NOTE: The aspirate smears are aspicular, precluding accurate assessment of erythropoiesis/granulopoiesis. The core biopsy is adequate and shows an increased number of megakaryocytes, some in clusters. Some megakaryocytes are small and hypolobate. There is no increase in blasts. The overall findings are consistent with persistent myeloid neoplasm. Clinical correlation is recommended.        Bone Marrow Differential   Date Value Ref Range Status   09/24/2024   Final    Not performed (aspicular and hemodilute aspirate smears and paucicellular touch prep).       Microscopic Description   Date Value Ref Range Status   09/24/2024   Final    PERIPHERAL SMEAR: Submitted              Red cells: Anemia with anisopoikilocytosis, increased polychromasia, and few nucleated RBCs.       White cells: Lymphopenia.        Platelets: Thrombocytopenia.    ASPIRATE SMEAR: Submitted                     Specimen: Aspicular.       Erythropoiesis: Unable to evaluate.       Granulopoiesis: Unable to evaluate.       Megakaryocytes: Not seen.    TOUCH PREP: Submitted       Specimen: Hypocellular.        Comments: Similar to aspirate smear.    ASPIRATE CLOT: Submitted                                    Specimen: Aspicular, only blood.    CORE BIOPSY: Submitted                            Specimen: Adequate.       Cellularity: 50%       Estimated M:E ratio: 0.5:1       Bony trabeculae: Normal.       Megakaryocytes: Increased. Morphology: Some loose clusters of megakaryocytes are identified; some are hyperchromatic.       Granulomas: Absent.       Lymphoid aggregates: Occasional medium-sized interstitial aggregates of small lymphoid cells.    SPECIAL STAINS:         Iron: Unable to assess due to lack of particles.    IMMUNOHISTOCHEMISTRY:   CD34: No increase in  "blasts (<1%).   CD61: Highlights an increased number of megakaryocytes, some in clusters; some megakaryocytes are small and hypolobate.   CD3: Highlights scattered T cells   CD20:Highlights scattered B cells    FLOW CYTOMETRY: Performed, see separate report.    Immunostains were performed in addition to flow cytometry to fully characterize the phenotype, architecture, and extent of the marrow population(s).  This was medically necessary for the best possible diagnosis.       Gross Description   Date Value Ref Range Status   09/24/2024   Final    A: Received in formalin, labeled with the patient's name and hospital number and \"BM clot CASSI\", is an irregular fragment of blood clot measuring 1.7 x 1.3 x 0.2 cm. The specimen is submitted in toto in one cassette.   SMS  B:  Received in B-plus fixative, labeled with the patient's name and hospital number and \"BM core CASSI\", is a cylindrical segment of bone with attached blood clot measuring 2.2 x 0.7 x 0.4 cm. The specimen is submitted in toto in one cassette following decalcification.   [facility]                  Sebastien Rashid MD    "

## 2024-09-30 NOTE — RESEARCH NOTES
Research Note Treatment Day    Holden Burgess is here today for treatment on CASE [id number]. Today is C1D1.   Procedures completed per protocol. AE's and con-meds reviewed with patient. Patient is aware of treatment plan.    [x]   Received treatment as planned   OR  []    Treatment delayed; patient calendar updated as required   Treatment delayed because:    []   AE    []   Physician Discretion    []   Clinical Deterioration or Progression     []   Other    Education Documentation  Treatment Plan and Schedule, taught by Judi Patel RN at 9/30/2024  5:43 PM.  Learner: Patient  Readiness: Eager  Method: Explanation  Response: Verbalizes Understanding    Education Comments  No comments found.

## 2024-09-30 NOTE — PROGRESS NOTES
Notified by [facility] that PLT 35. Secure Chat sent to Dr. Rashid.

## 2024-09-30 NOTE — PROGRESS NOTES
Patient presents to infusion appointment from clinic in stable condition. C1D1 [id number] azacitidine treatment tolerated without incident. First dose education, lab results, and schedule reviewed with patient and spouse. Discharged in stable condition.

## 2024-09-30 NOTE — ASSESSMENT & PLAN NOTE
9/30/2024: Patient is here prior to his first dose of low-dose azacitidine and on [id number].  Repeat bone marrow biopsy remained consistent with IPSS-R score of 4, or intermediate risk disease; more importantly there was no evidence of increased blasts.  Patient successfully screened and was enrolled last week, and the start today.  See below, for dyspnea on exertion, this is mild and I do not think should preclude treatment initiation  Diagnostics:  -None additional at this time  Treatment:  -Alternating azacitidine 50 mg/m2 with decitabine 5 mg/m² per [id number]  Disease/Toxicity Monitoring:  -CBC minimum weekly for for 6 weeks, given his hemoglobin and age I will check this with each visit  Supportive Care:  -Blood products as indicated  Antimicrobial Prophylaxis:   -None indicated at this time, ANC is in the normal range  IV access:  - for now, continue with PIV; may benefit from port placement or other central line access    Will follow-up next week, potentially for repeat bone marrow biopsy and hopefully treatment and initiation

## 2024-10-02 LAB
CHROM ANALY OVERALL INTERP-IMP: NORMAL
ELECTRONICALLY SIGNED BY CYTOGENETICS: NORMAL

## 2024-10-03 ENCOUNTER — LAB (OUTPATIENT)
Dept: LAB | Facility: HOSPITAL | Age: 84
End: 2024-10-03
Payer: MEDICARE

## 2024-10-03 ENCOUNTER — INFUSION (OUTPATIENT)
Dept: HEMATOLOGY/ONCOLOGY | Facility: HOSPITAL | Age: 84
End: 2024-10-03
Payer: MEDICARE

## 2024-10-03 ENCOUNTER — DOCUMENTATION (OUTPATIENT)
Dept: HEMATOLOGY/ONCOLOGY | Facility: HOSPITAL | Age: 84
End: 2024-10-03

## 2024-10-03 VITALS
DIASTOLIC BLOOD PRESSURE: 56 MMHG | RESPIRATION RATE: 18 BRPM | SYSTOLIC BLOOD PRESSURE: 124 MMHG | BODY MASS INDEX: 37.79 KG/M2 | OXYGEN SATURATION: 100 % | HEART RATE: 83 BPM | TEMPERATURE: 96.6 F | WEIGHT: 286.38 LBS

## 2024-10-03 DIAGNOSIS — T45.1X5A ANEMIA DUE TO CHEMOTHERAPY FOR MYELODYSPLASTIC SYNDROME TREATED WITH ERYTHROPOIETIN (MULTI): ICD-10-CM

## 2024-10-03 DIAGNOSIS — D46.9 ANEMIA DUE TO CHEMOTHERAPY FOR MYELODYSPLASTIC SYNDROME TREATED WITH ERYTHROPOIETIN (MULTI): ICD-10-CM

## 2024-10-03 DIAGNOSIS — D46.9 MYELODYSPLASTIC SYNDROME (MULTI): ICD-10-CM

## 2024-10-03 DIAGNOSIS — N18.32 STAGE 3B CHRONIC KIDNEY DISEASE (MULTI): ICD-10-CM

## 2024-10-03 DIAGNOSIS — D64.81 ANEMIA DUE TO CHEMOTHERAPY FOR MYELODYSPLASTIC SYNDROME TREATED WITH ERYTHROPOIETIN (MULTI): ICD-10-CM

## 2024-10-03 DIAGNOSIS — R80.8 OTHER PROTEINURIA: ICD-10-CM

## 2024-10-03 DIAGNOSIS — I10 ESSENTIAL HYPERTENSION: ICD-10-CM

## 2024-10-03 LAB
ALBUMIN SERPL BCP-MCNC: 4.1 G/DL (ref 3.4–5)
ALP SERPL-CCNC: 84 U/L (ref 33–136)
ALT SERPL W P-5'-P-CCNC: 14 U/L (ref 10–52)
ANION GAP SERPL CALC-SCNC: 13 MMOL/L (ref 10–20)
AST SERPL W P-5'-P-CCNC: 17 U/L (ref 9–39)
BASOPHILS # BLD AUTO: 0.02 X10*3/UL (ref 0–0.1)
BASOPHILS NFR BLD AUTO: 0.9 %
BILIRUB SERPL-MCNC: 1.2 MG/DL (ref 0–1.2)
BUN SERPL-MCNC: 27 MG/DL (ref 6–23)
CALCIUM SERPL-MCNC: 9.5 MG/DL (ref 8.6–10.3)
CHLORIDE SERPL-SCNC: 106 MMOL/L (ref 98–107)
CO2 SERPL-SCNC: 24 MMOL/L (ref 21–32)
CREAT SERPL-MCNC: 1.35 MG/DL (ref 0.5–1.3)
DACRYOCYTES BLD QL SMEAR: NORMAL
EGFRCR SERPLBLD CKD-EPI 2021: 52 ML/MIN/1.73M*2
EOSINOPHIL # BLD AUTO: 0.08 X10*3/UL (ref 0–0.4)
EOSINOPHIL NFR BLD AUTO: 3.7 %
ERYTHROCYTE [DISTWIDTH] IN BLOOD BY AUTOMATED COUNT: 25 % (ref 11.5–14.5)
FERRITIN SERPL-MCNC: 654 NG/ML (ref 20–300)
GLUCOSE SERPL-MCNC: 107 MG/DL (ref 74–99)
HCT VFR BLD AUTO: 27.8 % (ref 41–52)
HGB BLD-MCNC: 8.5 G/DL (ref 13.5–17.5)
HGB RETIC QN: 31 PG (ref 28–38)
IMM GRANULOCYTES # BLD AUTO: 0.03 X10*3/UL (ref 0–0.5)
IMM GRANULOCYTES NFR BLD AUTO: 1.4 % (ref 0–0.9)
IMMATURE RETIC FRACTION: 24.2 %
IRON SATN MFR SERPL: 38 % (ref 25–45)
IRON SERPL-MCNC: 100 UG/DL (ref 35–150)
LDH SERPL L TO P-CCNC: 273 U/L (ref 84–246)
LYMPHOCYTES # BLD AUTO: 0.61 X10*3/UL (ref 0.8–3)
LYMPHOCYTES NFR BLD AUTO: 28.1 %
MAGNESIUM SERPL-MCNC: 1.92 MG/DL (ref 1.6–2.4)
MCH RBC QN AUTO: 29.2 PG (ref 26–34)
MCHC RBC AUTO-ENTMCNC: 30.6 G/DL (ref 32–36)
MCV RBC AUTO: 96 FL (ref 80–100)
MONOCYTES # BLD AUTO: 0.14 X10*3/UL (ref 0.05–0.8)
MONOCYTES NFR BLD AUTO: 6.5 %
NEUTROPHILS # BLD AUTO: 1.29 X10*3/UL (ref 1.6–5.5)
NEUTROPHILS NFR BLD AUTO: 59.4 %
NRBC BLD-RTO: 1.4 /100 WBCS (ref 0–0)
OVALOCYTES BLD QL SMEAR: NORMAL
PLATELET # BLD AUTO: 41 X10*3/UL (ref 150–450)
POLYCHROMASIA BLD QL SMEAR: NORMAL
POTASSIUM SERPL-SCNC: 5.3 MMOL/L (ref 3.5–5.3)
PROT SERPL-MCNC: 6.4 G/DL (ref 6.4–8.2)
PTH-INTACT SERPL-MCNC: 50.9 PG/ML (ref 18.5–88)
RBC # BLD AUTO: 2.91 X10*6/UL (ref 4.5–5.9)
RBC MORPH BLD: NORMAL
RETICS #: 0.1 X10*6/UL (ref 0.02–0.11)
RETICS/RBC NFR AUTO: 3.4 % (ref 0.5–2)
SCHISTOCYTES BLD QL SMEAR: NORMAL
SODIUM SERPL-SCNC: 138 MMOL/L (ref 136–145)
TIBC SERPL-MCNC: 262 UG/DL (ref 240–445)
UIBC SERPL-MCNC: 162 UG/DL (ref 110–370)
URATE SERPL-MCNC: 6 MG/DL (ref 4–7.5)
WBC # BLD AUTO: 2.2 X10*3/UL (ref 4.4–11.3)

## 2024-10-03 PROCEDURE — 83540 ASSAY OF IRON: CPT

## 2024-10-03 PROCEDURE — 83735 ASSAY OF MAGNESIUM: CPT

## 2024-10-03 PROCEDURE — 96372 THER/PROPH/DIAG INJ SC/IM: CPT

## 2024-10-03 PROCEDURE — 96401 CHEMO ANTI-NEOPL SQ/IM: CPT

## 2024-10-03 PROCEDURE — 2560000001 HC RX 256 EXPERIMENTAL DRUGS: Performed by: INTERNAL MEDICINE

## 2024-10-03 PROCEDURE — 85045 AUTOMATED RETICULOCYTE COUNT: CPT

## 2024-10-03 PROCEDURE — 2500000004 HC RX 250 GENERAL PHARMACY W/ HCPCS (ALT 636 FOR OP/ED): Mod: JZ | Performed by: INTERNAL MEDICINE

## 2024-10-03 PROCEDURE — 85025 COMPLETE CBC W/AUTO DIFF WBC: CPT

## 2024-10-03 PROCEDURE — 80053 COMPREHEN METABOLIC PANEL: CPT

## 2024-10-03 PROCEDURE — 36415 COLL VENOUS BLD VENIPUNCTURE: CPT

## 2024-10-03 PROCEDURE — 84550 ASSAY OF BLOOD/URIC ACID: CPT

## 2024-10-03 PROCEDURE — 83615 LACTATE (LD) (LDH) ENZYME: CPT

## 2024-10-03 PROCEDURE — 83970 ASSAY OF PARATHORMONE: CPT

## 2024-10-03 PROCEDURE — 2500000005 HC RX 250 GENERAL PHARMACY W/O HCPCS: Performed by: INTERNAL MEDICINE

## 2024-10-03 PROCEDURE — 82728 ASSAY OF FERRITIN: CPT

## 2024-10-03 RX ORDER — PROCHLORPERAZINE MALEATE 10 MG
10 TABLET ORAL EVERY 6 HOURS PRN
Status: DISCONTINUED | OUTPATIENT
Start: 2024-10-03 | End: 2024-10-03 | Stop reason: HOSPADM

## 2024-10-03 RX ORDER — DIPHENHYDRAMINE HYDROCHLORIDE 50 MG/ML
50 INJECTION INTRAMUSCULAR; INTRAVENOUS AS NEEDED
OUTPATIENT
Start: 2024-10-04

## 2024-10-03 RX ORDER — PROCHLORPERAZINE EDISYLATE 5 MG/ML
10 INJECTION INTRAMUSCULAR; INTRAVENOUS EVERY 6 HOURS PRN
Status: DISCONTINUED | OUTPATIENT
Start: 2024-10-03 | End: 2024-10-03 | Stop reason: HOSPADM

## 2024-10-03 RX ORDER — EPINEPHRINE 0.3 MG/.3ML
0.3 INJECTION SUBCUTANEOUS EVERY 5 MIN PRN
OUTPATIENT
Start: 2024-10-04

## 2024-10-03 RX ORDER — FAMOTIDINE 10 MG/ML
20 INJECTION INTRAVENOUS ONCE AS NEEDED
OUTPATIENT
Start: 2024-10-04

## 2024-10-03 RX ORDER — ONDANSETRON HYDROCHLORIDE 8 MG/1
8 TABLET, FILM COATED ORAL ONCE
Status: COMPLETED | OUTPATIENT
Start: 2024-10-03 | End: 2024-10-03

## 2024-10-03 RX ORDER — ALBUTEROL SULFATE 0.83 MG/ML
3 SOLUTION RESPIRATORY (INHALATION) AS NEEDED
OUTPATIENT
Start: 2024-10-04

## 2024-10-03 ASSESSMENT — PAIN SCALES - GENERAL: PAINLEVEL: 0-NO PAIN

## 2024-10-04 ENCOUNTER — APPOINTMENT (OUTPATIENT)
Dept: HEMATOLOGY/ONCOLOGY | Facility: CLINIC | Age: 84
End: 2024-10-04
Payer: MEDICARE

## 2024-10-04 DIAGNOSIS — D46.9 MYELODYSPLASTIC SYNDROME (MULTI): ICD-10-CM

## 2024-10-04 LAB
LAB AP CONSOLIDATED THERANOSTIC REPORT: NORMAL
PATH REPORT.ADDENDUM SPEC: NORMAL
PATH REPORT.COMMENTS IMP SPEC: NORMAL
PATH REPORT.FINAL DX SPEC: NORMAL
PATH REPORT.GROSS SPEC: NORMAL
PATH REPORT.MICROSCOPIC SPEC OTHER STN: NORMAL
PATH REPORT.RELEVANT HX SPEC: NORMAL
PATH REPORT.TOTAL CANCER: NORMAL

## 2024-10-07 ENCOUNTER — LAB (OUTPATIENT)
Dept: LAB | Facility: HOSPITAL | Age: 84
End: 2024-10-07
Payer: MEDICARE

## 2024-10-07 ENCOUNTER — INFUSION (OUTPATIENT)
Dept: HEMATOLOGY/ONCOLOGY | Facility: HOSPITAL | Age: 84
End: 2024-10-07
Payer: MEDICARE

## 2024-10-07 VITALS
HEART RATE: 79 BPM | TEMPERATURE: 98.1 F | WEIGHT: 289.68 LBS | SYSTOLIC BLOOD PRESSURE: 133 MMHG | OXYGEN SATURATION: 98 % | DIASTOLIC BLOOD PRESSURE: 47 MMHG | RESPIRATION RATE: 18 BRPM | BODY MASS INDEX: 38.23 KG/M2

## 2024-10-07 DIAGNOSIS — D46.9 MYELODYSPLASTIC SYNDROME (MULTI): ICD-10-CM

## 2024-10-07 DIAGNOSIS — R11.0 NAUSEA: Primary | ICD-10-CM

## 2024-10-07 LAB
ALBUMIN SERPL BCP-MCNC: 4.1 G/DL (ref 3.4–5)
ALP SERPL-CCNC: 95 U/L (ref 33–136)
ALT SERPL W P-5'-P-CCNC: 15 U/L (ref 10–52)
ANION GAP SERPL CALC-SCNC: 14 MMOL/L (ref 10–20)
AST SERPL W P-5'-P-CCNC: 17 U/L (ref 9–39)
BASOPHILS # BLD AUTO: 0.03 X10*3/UL (ref 0–0.1)
BASOPHILS NFR BLD AUTO: 0.8 %
BILIRUB SERPL-MCNC: 0.9 MG/DL (ref 0–1.2)
BUN SERPL-MCNC: 29 MG/DL (ref 6–23)
BURR CELLS BLD QL SMEAR: NORMAL
CALCIUM SERPL-MCNC: 9.5 MG/DL (ref 8.6–10.3)
CHLORIDE SERPL-SCNC: 108 MMOL/L (ref 98–107)
CO2 SERPL-SCNC: 23 MMOL/L (ref 21–32)
CREAT SERPL-MCNC: 1.51 MG/DL (ref 0.5–1.3)
DACRYOCYTES BLD QL SMEAR: NORMAL
EGFRCR SERPLBLD CKD-EPI 2021: 46 ML/MIN/1.73M*2
EOSINOPHIL # BLD AUTO: 0.09 X10*3/UL (ref 0–0.4)
EOSINOPHIL NFR BLD AUTO: 2.3 %
ERYTHROCYTE [DISTWIDTH] IN BLOOD BY AUTOMATED COUNT: 25.2 % (ref 11.5–14.5)
GLUCOSE SERPL-MCNC: 77 MG/DL (ref 74–99)
HCT VFR BLD AUTO: 27.8 % (ref 41–52)
HGB BLD-MCNC: 8.4 G/DL (ref 13.5–17.5)
HGB RETIC QN: 33 PG (ref 28–38)
HYPOCHROMIA BLD QL SMEAR: NORMAL
IMM GRANULOCYTES # BLD AUTO: 0.09 X10*3/UL (ref 0–0.5)
IMM GRANULOCYTES NFR BLD AUTO: 2.3 % (ref 0–0.9)
IMMATURE RETIC FRACTION: 24.7 %
LDH SERPL L TO P-CCNC: 257 U/L (ref 84–246)
LYMPHOCYTES # BLD AUTO: 0.63 X10*3/UL (ref 0.8–3)
LYMPHOCYTES NFR BLD AUTO: 16 %
MAGNESIUM SERPL-MCNC: 1.88 MG/DL (ref 1.6–2.4)
MCH RBC QN AUTO: 29.7 PG (ref 26–34)
MCHC RBC AUTO-ENTMCNC: 30.2 G/DL (ref 32–36)
MCV RBC AUTO: 98 FL (ref 80–100)
MONOCYTES # BLD AUTO: 0.22 X10*3/UL (ref 0.05–0.8)
MONOCYTES NFR BLD AUTO: 5.6 %
NEUTROPHILS # BLD AUTO: 2.88 X10*3/UL (ref 1.6–5.5)
NEUTROPHILS NFR BLD AUTO: 73 %
NRBC BLD-RTO: 0.8 /100 WBCS (ref 0–0)
OVALOCYTES BLD QL SMEAR: NORMAL
PLATELET # BLD AUTO: 43 X10*3/UL (ref 150–450)
POLYCHROMASIA BLD QL SMEAR: NORMAL
POTASSIUM SERPL-SCNC: 4.6 MMOL/L (ref 3.5–5.3)
PROT SERPL-MCNC: 6.4 G/DL (ref 6.4–8.2)
RBC # BLD AUTO: 2.83 X10*6/UL (ref 4.5–5.9)
RBC MORPH BLD: NORMAL
RETICS #: 0.08 X10*6/UL (ref 0.02–0.11)
RETICS/RBC NFR AUTO: 2.7 % (ref 0.5–2)
SCHISTOCYTES BLD QL SMEAR: NORMAL
SODIUM SERPL-SCNC: 140 MMOL/L (ref 136–145)
URATE SERPL-MCNC: 5.8 MG/DL (ref 4–7.5)
WBC # BLD AUTO: 3.9 X10*3/UL (ref 4.4–11.3)

## 2024-10-07 PROCEDURE — 36415 COLL VENOUS BLD VENIPUNCTURE: CPT

## 2024-10-07 PROCEDURE — 83735 ASSAY OF MAGNESIUM: CPT

## 2024-10-07 PROCEDURE — 96401 CHEMO ANTI-NEOPL SQ/IM: CPT

## 2024-10-07 PROCEDURE — 83615 LACTATE (LD) (LDH) ENZYME: CPT

## 2024-10-07 PROCEDURE — 85045 AUTOMATED RETICULOCYTE COUNT: CPT

## 2024-10-07 PROCEDURE — 2500000005 HC RX 250 GENERAL PHARMACY W/O HCPCS: Performed by: INTERNAL MEDICINE

## 2024-10-07 PROCEDURE — 85025 COMPLETE CBC W/AUTO DIFF WBC: CPT

## 2024-10-07 PROCEDURE — 80053 COMPREHEN METABOLIC PANEL: CPT

## 2024-10-07 PROCEDURE — 2560000001 HC RX 256 EXPERIMENTAL DRUGS: Performed by: INTERNAL MEDICINE

## 2024-10-07 PROCEDURE — 84550 ASSAY OF BLOOD/URIC ACID: CPT

## 2024-10-07 RX ORDER — PROCHLORPERAZINE MALEATE 10 MG
10 TABLET ORAL EVERY 6 HOURS PRN
Status: DISCONTINUED | OUTPATIENT
Start: 2024-10-07 | End: 2024-10-07 | Stop reason: HOSPADM

## 2024-10-07 RX ORDER — PROCHLORPERAZINE MALEATE 10 MG
10 TABLET ORAL EVERY 6 HOURS PRN
Qty: 30 TABLET | Refills: 3 | Status: SHIPPED | OUTPATIENT
Start: 2024-10-07 | End: 2024-12-06

## 2024-10-07 RX ORDER — ONDANSETRON HYDROCHLORIDE 8 MG/1
8 TABLET, FILM COATED ORAL ONCE
Status: COMPLETED | OUTPATIENT
Start: 2024-10-07 | End: 2024-10-07

## 2024-10-07 RX ORDER — PROCHLORPERAZINE EDISYLATE 5 MG/ML
10 INJECTION INTRAMUSCULAR; INTRAVENOUS EVERY 6 HOURS PRN
Status: DISCONTINUED | OUTPATIENT
Start: 2024-10-07 | End: 2024-10-07 | Stop reason: HOSPADM

## 2024-10-07 RX ORDER — ONDANSETRON 4 MG/1
8 TABLET, FILM COATED ORAL EVERY 8 HOURS PRN
Qty: 20 TABLET | Refills: 0 | Status: SHIPPED | OUTPATIENT
Start: 2024-10-07 | End: 2024-10-10 | Stop reason: SDUPTHER

## 2024-10-07 ASSESSMENT — PAIN SCALES - GENERAL: PAINLEVEL: 0-NO PAIN

## 2024-10-08 ENCOUNTER — APPOINTMENT (OUTPATIENT)
Dept: PRIMARY CARE | Facility: CLINIC | Age: 84
End: 2024-10-08
Payer: MEDICARE

## 2024-10-08 DIAGNOSIS — Z23 NEED FOR INFLUENZA VACCINATION: ICD-10-CM

## 2024-10-08 DIAGNOSIS — Z23 NEED FOR COVID-19 VACCINE: ICD-10-CM

## 2024-10-08 NOTE — PROGRESS NOTES
Patient presents in office for COVID and flu vaccines. Patient tolerated administration of vaccines well without any complications at the time of administration.

## 2024-10-10 ENCOUNTER — LAB (OUTPATIENT)
Dept: LAB | Facility: HOSPITAL | Age: 84
End: 2024-10-10
Payer: MEDICARE

## 2024-10-10 ENCOUNTER — INFUSION (OUTPATIENT)
Dept: HEMATOLOGY/ONCOLOGY | Facility: HOSPITAL | Age: 84
End: 2024-10-10
Payer: MEDICARE

## 2024-10-10 ENCOUNTER — DOCUMENTATION (OUTPATIENT)
Dept: HEMATOLOGY/ONCOLOGY | Facility: HOSPITAL | Age: 84
End: 2024-10-10
Payer: MEDICARE

## 2024-10-10 VITALS
SYSTOLIC BLOOD PRESSURE: 120 MMHG | DIASTOLIC BLOOD PRESSURE: 62 MMHG | BODY MASS INDEX: 37.35 KG/M2 | WEIGHT: 283.07 LBS | OXYGEN SATURATION: 100 % | HEART RATE: 84 BPM | RESPIRATION RATE: 18 BRPM | TEMPERATURE: 97.7 F

## 2024-10-10 DIAGNOSIS — D46.9 MYELODYSPLASTIC SYNDROME (MULTI): ICD-10-CM

## 2024-10-10 DIAGNOSIS — R11.0 NAUSEA: ICD-10-CM

## 2024-10-10 LAB
ALBUMIN SERPL BCP-MCNC: 4.1 G/DL (ref 3.4–5)
ALP SERPL-CCNC: 87 U/L (ref 33–136)
ALT SERPL W P-5'-P-CCNC: 14 U/L (ref 10–52)
ANION GAP SERPL CALC-SCNC: 14 MMOL/L (ref 10–20)
AST SERPL W P-5'-P-CCNC: 16 U/L (ref 9–39)
BASOPHILS # BLD AUTO: 0.01 X10*3/UL (ref 0–0.1)
BASOPHILS NFR BLD AUTO: 0.4 %
BILIRUB SERPL-MCNC: 1.2 MG/DL (ref 0–1.2)
BUN SERPL-MCNC: 27 MG/DL (ref 6–23)
CALCIUM SERPL-MCNC: 9.5 MG/DL (ref 8.6–10.3)
CHLORIDE SERPL-SCNC: 106 MMOL/L (ref 98–107)
CO2 SERPL-SCNC: 23 MMOL/L (ref 21–32)
CREAT SERPL-MCNC: 1.38 MG/DL (ref 0.5–1.3)
DACRYOCYTES BLD QL SMEAR: NORMAL
EGFRCR SERPLBLD CKD-EPI 2021: 51 ML/MIN/1.73M*2
EOSINOPHIL # BLD AUTO: 0.08 X10*3/UL (ref 0–0.4)
EOSINOPHIL NFR BLD AUTO: 3 %
ERYTHROCYTE [DISTWIDTH] IN BLOOD BY AUTOMATED COUNT: 25.2 % (ref 11.5–14.5)
GLUCOSE SERPL-MCNC: 103 MG/DL (ref 74–99)
HCT VFR BLD AUTO: 27 % (ref 41–52)
HGB BLD-MCNC: 8.4 G/DL (ref 13.5–17.5)
HYPOCHROMIA BLD QL SMEAR: NORMAL
IMM GRANULOCYTES # BLD AUTO: 0.07 X10*3/UL (ref 0–0.5)
IMM GRANULOCYTES NFR BLD AUTO: 2.6 % (ref 0–0.9)
LDH SERPL L TO P-CCNC: 262 U/L (ref 84–246)
LYMPHOCYTES # BLD AUTO: 0.66 X10*3/UL (ref 0.8–3)
LYMPHOCYTES NFR BLD AUTO: 24.8 %
MAGNESIUM SERPL-MCNC: 1.86 MG/DL (ref 1.6–2.4)
MCH RBC QN AUTO: 30.1 PG (ref 26–34)
MCHC RBC AUTO-ENTMCNC: 31.1 G/DL (ref 32–36)
MCV RBC AUTO: 97 FL (ref 80–100)
MONOCYTES # BLD AUTO: 0.19 X10*3/UL (ref 0.05–0.8)
MONOCYTES NFR BLD AUTO: 7.1 %
NEUTROPHILS # BLD AUTO: 1.65 X10*3/UL (ref 1.6–5.5)
NEUTROPHILS NFR BLD AUTO: 62.1 %
NRBC BLD-RTO: 0.8 /100 WBCS (ref 0–0)
OVALOCYTES BLD QL SMEAR: NORMAL
PLATELET # BLD AUTO: 35 X10*3/UL (ref 150–450)
POLYCHROMASIA BLD QL SMEAR: NORMAL
POTASSIUM SERPL-SCNC: 4.6 MMOL/L (ref 3.5–5.3)
PROT SERPL-MCNC: 6.3 G/DL (ref 6.4–8.2)
RBC # BLD AUTO: 2.79 X10*6/UL (ref 4.5–5.9)
RBC MORPH BLD: NORMAL
SCHISTOCYTES BLD QL SMEAR: NORMAL
SODIUM SERPL-SCNC: 138 MMOL/L (ref 136–145)
URATE SERPL-MCNC: 5.8 MG/DL (ref 4–7.5)
WBC # BLD AUTO: 2.7 X10*3/UL (ref 4.4–11.3)

## 2024-10-10 PROCEDURE — 83615 LACTATE (LD) (LDH) ENZYME: CPT

## 2024-10-10 PROCEDURE — 83735 ASSAY OF MAGNESIUM: CPT

## 2024-10-10 PROCEDURE — 36415 COLL VENOUS BLD VENIPUNCTURE: CPT

## 2024-10-10 PROCEDURE — 80053 COMPREHEN METABOLIC PANEL: CPT

## 2024-10-10 PROCEDURE — 85045 AUTOMATED RETICULOCYTE COUNT: CPT

## 2024-10-10 PROCEDURE — 85025 COMPLETE CBC W/AUTO DIFF WBC: CPT

## 2024-10-10 PROCEDURE — 96401 CHEMO ANTI-NEOPL SQ/IM: CPT

## 2024-10-10 PROCEDURE — 2500000005 HC RX 250 GENERAL PHARMACY W/O HCPCS: Performed by: INTERNAL MEDICINE

## 2024-10-10 PROCEDURE — 2560000001 HC RX 256 EXPERIMENTAL DRUGS: Performed by: INTERNAL MEDICINE

## 2024-10-10 PROCEDURE — 84550 ASSAY OF BLOOD/URIC ACID: CPT

## 2024-10-10 RX ORDER — ALBUTEROL SULFATE 0.83 MG/ML
3 SOLUTION RESPIRATORY (INHALATION) AS NEEDED
Status: DISCONTINUED | OUTPATIENT
Start: 2024-10-10 | End: 2024-10-10 | Stop reason: HOSPADM

## 2024-10-10 RX ORDER — DIPHENHYDRAMINE HYDROCHLORIDE 50 MG/ML
50 INJECTION INTRAMUSCULAR; INTRAVENOUS AS NEEDED
Status: DISCONTINUED | OUTPATIENT
Start: 2024-10-10 | End: 2024-10-10 | Stop reason: HOSPADM

## 2024-10-10 RX ORDER — PROCHLORPERAZINE MALEATE 10 MG
10 TABLET ORAL EVERY 6 HOURS PRN
Status: DISCONTINUED | OUTPATIENT
Start: 2024-10-10 | End: 2024-10-10 | Stop reason: HOSPADM

## 2024-10-10 RX ORDER — PROCHLORPERAZINE EDISYLATE 5 MG/ML
10 INJECTION INTRAMUSCULAR; INTRAVENOUS EVERY 6 HOURS PRN
Status: DISCONTINUED | OUTPATIENT
Start: 2024-10-10 | End: 2024-10-10 | Stop reason: HOSPADM

## 2024-10-10 RX ORDER — FAMOTIDINE 10 MG/ML
20 INJECTION INTRAVENOUS ONCE AS NEEDED
Status: DISCONTINUED | OUTPATIENT
Start: 2024-10-10 | End: 2024-10-10 | Stop reason: HOSPADM

## 2024-10-10 RX ORDER — ONDANSETRON 4 MG/1
8 TABLET, FILM COATED ORAL EVERY 8 HOURS PRN
Qty: 20 TABLET | Refills: 0 | Status: SHIPPED | OUTPATIENT
Start: 2024-10-10 | End: 2024-10-17

## 2024-10-10 RX ORDER — ONDANSETRON HYDROCHLORIDE 8 MG/1
8 TABLET, FILM COATED ORAL ONCE
Status: COMPLETED | OUTPATIENT
Start: 2024-10-10 | End: 2024-10-10

## 2024-10-10 RX ORDER — EPINEPHRINE 0.3 MG/.3ML
0.3 INJECTION SUBCUTANEOUS EVERY 5 MIN PRN
Status: DISCONTINUED | OUTPATIENT
Start: 2024-10-10 | End: 2024-10-10 | Stop reason: HOSPADM

## 2024-10-10 ASSESSMENT — PAIN SCALES - GENERAL: PAINLEVEL: 0-NO PAIN

## 2024-10-10 NOTE — PROGRESS NOTES
Patient arrived to unit for scheduled C1D11 [id number] Decitabine. Patient denies new complaints at this time. Tolerated subcutaneous injection to RLQ abdomen without incident. Zarxio held per orders as most recent ANC 1.65. Discharged ambulatory and in stable condition.

## 2024-10-15 ENCOUNTER — INFUSION (OUTPATIENT)
Dept: HEMATOLOGY/ONCOLOGY | Facility: HOSPITAL | Age: 84
End: 2024-10-15
Payer: MEDICARE

## 2024-10-15 ENCOUNTER — LAB (OUTPATIENT)
Dept: LAB | Facility: HOSPITAL | Age: 84
End: 2024-10-15
Payer: MEDICARE

## 2024-10-15 VITALS
WEIGHT: 283.29 LBS | TEMPERATURE: 97.3 F | OXYGEN SATURATION: 98 % | HEART RATE: 79 BPM | RESPIRATION RATE: 20 BRPM | HEIGHT: 73 IN | BODY MASS INDEX: 37.55 KG/M2 | DIASTOLIC BLOOD PRESSURE: 55 MMHG | SYSTOLIC BLOOD PRESSURE: 118 MMHG

## 2024-10-15 DIAGNOSIS — D46.9 MYELODYSPLASTIC SYNDROME (MULTI): ICD-10-CM

## 2024-10-15 DIAGNOSIS — D46.9 MYELODYSPLASTIC SYNDROME (MULTI): Primary | ICD-10-CM

## 2024-10-15 LAB
ALBUMIN SERPL BCP-MCNC: 4.2 G/DL (ref 3.4–5)
ALP SERPL-CCNC: 80 U/L (ref 33–136)
ALT SERPL W P-5'-P-CCNC: 15 U/L (ref 10–52)
ANION GAP SERPL CALC-SCNC: 14 MMOL/L (ref 10–20)
AST SERPL W P-5'-P-CCNC: 17 U/L (ref 9–39)
BASOPHILS # BLD MANUAL: 0.5 X10*3/UL (ref 0–0.1)
BASOPHILS NFR BLD MANUAL: 20 %
BILIRUB SERPL-MCNC: 1.3 MG/DL (ref 0–1.2)
BUN SERPL-MCNC: 29 MG/DL (ref 6–23)
CALCIUM SERPL-MCNC: 9.8 MG/DL (ref 8.6–10.3)
CHLORIDE SERPL-SCNC: 107 MMOL/L (ref 98–107)
CO2 SERPL-SCNC: 23 MMOL/L (ref 21–32)
CREAT SERPL-MCNC: 1.49 MG/DL (ref 0.5–1.3)
DACRYOCYTES BLD QL SMEAR: ABNORMAL
EGFRCR SERPLBLD CKD-EPI 2021: 46 ML/MIN/1.73M*2
EOSINOPHIL # BLD MANUAL: 0.23 X10*3/UL (ref 0–0.4)
EOSINOPHIL NFR BLD MANUAL: 9 %
ERYTHROCYTE [DISTWIDTH] IN BLOOD BY AUTOMATED COUNT: 25.2 % (ref 11.5–14.5)
GLUCOSE SERPL-MCNC: 109 MG/DL (ref 74–99)
HCT VFR BLD AUTO: 28.3 % (ref 41–52)
HGB BLD-MCNC: 8.7 G/DL (ref 13.5–17.5)
HGB RETIC QN: 34 PG (ref 28–38)
IMM GRANULOCYTES # BLD AUTO: 0.02 X10*3/UL (ref 0–0.5)
IMM GRANULOCYTES NFR BLD AUTO: 0.8 % (ref 0–0.9)
IMMATURE RETIC FRACTION: 23.8 %
LDH SERPL L TO P-CCNC: 252 U/L (ref 84–246)
LYMPHOCYTES # BLD MANUAL: 0.45 X10*3/UL (ref 0.8–3)
LYMPHOCYTES NFR BLD MANUAL: 18 %
MAGNESIUM SERPL-MCNC: 1.85 MG/DL (ref 1.6–2.4)
MCH RBC QN AUTO: 29.7 PG (ref 26–34)
MCHC RBC AUTO-ENTMCNC: 30.7 G/DL (ref 32–36)
MCV RBC AUTO: 97 FL (ref 80–100)
METAMYELOCYTES # BLD MANUAL: 0.05 X10*3/UL
METAMYELOCYTES NFR BLD MANUAL: 2 %
MONOCYTES # BLD MANUAL: 0.08 X10*3/UL (ref 0.05–0.8)
MONOCYTES NFR BLD MANUAL: 3 %
NEUTROPHILS # BLD MANUAL: 1.2 X10*3/UL (ref 1.6–5.5)
NEUTS BAND # BLD MANUAL: 0.1 X10*3/UL (ref 0–0.5)
NEUTS BAND NFR BLD MANUAL: 4 %
NEUTS SEG # BLD MANUAL: 1.1 X10*3/UL (ref 1.6–5)
NEUTS SEG NFR BLD MANUAL: 44 %
NRBC BLD-RTO: 0.8 /100 WBCS (ref 0–0)
OVALOCYTES BLD QL SMEAR: ABNORMAL
PLATELET # BLD AUTO: 40 X10*3/UL (ref 150–450)
POLYCHROMASIA BLD QL SMEAR: ABNORMAL
POTASSIUM SERPL-SCNC: 5 MMOL/L (ref 3.5–5.3)
PROT SERPL-MCNC: 6.4 G/DL (ref 6.4–8.2)
RBC # BLD AUTO: 2.93 X10*6/UL (ref 4.5–5.9)
RBC MORPH BLD: ABNORMAL
RETICS #: 0.08 X10*6/UL (ref 0.02–0.11)
RETICS/RBC NFR AUTO: 2.8 % (ref 0.5–2)
SCHISTOCYTES BLD QL SMEAR: ABNORMAL
SODIUM SERPL-SCNC: 139 MMOL/L (ref 136–145)
TOTAL CELLS COUNTED BLD: 100
URATE SERPL-MCNC: 6.2 MG/DL (ref 4–7.5)
WBC # BLD AUTO: 2.5 X10*3/UL (ref 4.4–11.3)

## 2024-10-15 PROCEDURE — 85045 AUTOMATED RETICULOCYTE COUNT: CPT

## 2024-10-15 PROCEDURE — 85007 BL SMEAR W/DIFF WBC COUNT: CPT

## 2024-10-15 PROCEDURE — 85027 COMPLETE CBC AUTOMATED: CPT

## 2024-10-15 PROCEDURE — 80053 COMPREHEN METABOLIC PANEL: CPT

## 2024-10-15 PROCEDURE — 2500000004 HC RX 250 GENERAL PHARMACY W/ HCPCS (ALT 636 FOR OP/ED): Mod: JZ | Performed by: INTERNAL MEDICINE

## 2024-10-15 PROCEDURE — 84550 ASSAY OF BLOOD/URIC ACID: CPT

## 2024-10-15 PROCEDURE — 96401 CHEMO ANTI-NEOPL SQ/IM: CPT

## 2024-10-15 PROCEDURE — 96372 THER/PROPH/DIAG INJ SC/IM: CPT

## 2024-10-15 PROCEDURE — 2560000001 HC RX 256 EXPERIMENTAL DRUGS: Performed by: INTERNAL MEDICINE

## 2024-10-15 PROCEDURE — 83735 ASSAY OF MAGNESIUM: CPT

## 2024-10-15 PROCEDURE — 83615 LACTATE (LD) (LDH) ENZYME: CPT

## 2024-10-15 PROCEDURE — 36415 COLL VENOUS BLD VENIPUNCTURE: CPT

## 2024-10-15 PROCEDURE — 2500000005 HC RX 250 GENERAL PHARMACY W/O HCPCS: Performed by: INTERNAL MEDICINE

## 2024-10-15 RX ORDER — PROCHLORPERAZINE MALEATE 10 MG
10 TABLET ORAL EVERY 6 HOURS PRN
Status: DISCONTINUED | OUTPATIENT
Start: 2024-10-15 | End: 2024-10-15 | Stop reason: HOSPADM

## 2024-10-15 RX ORDER — ONDANSETRON HYDROCHLORIDE 8 MG/1
8 TABLET, FILM COATED ORAL ONCE
Status: COMPLETED | OUTPATIENT
Start: 2024-10-15 | End: 2024-10-15

## 2024-10-15 RX ORDER — LACTULOSE 10 G/15ML
10 SOLUTION ORAL 2 TIMES DAILY
Qty: 960 ML | Refills: 5 | Status: SHIPPED | OUTPATIENT
Start: 2024-10-15

## 2024-10-15 RX ORDER — ALBUTEROL SULFATE 0.83 MG/ML
3 SOLUTION RESPIRATORY (INHALATION) AS NEEDED
Status: DISCONTINUED | OUTPATIENT
Start: 2024-10-15 | End: 2024-10-15 | Stop reason: HOSPADM

## 2024-10-15 RX ORDER — FAMOTIDINE 10 MG/ML
20 INJECTION INTRAVENOUS ONCE AS NEEDED
Status: DISCONTINUED | OUTPATIENT
Start: 2024-10-15 | End: 2024-10-15 | Stop reason: HOSPADM

## 2024-10-15 RX ORDER — EPINEPHRINE 0.3 MG/.3ML
0.3 INJECTION SUBCUTANEOUS EVERY 5 MIN PRN
Status: DISCONTINUED | OUTPATIENT
Start: 2024-10-15 | End: 2024-10-15 | Stop reason: HOSPADM

## 2024-10-15 RX ORDER — DIPHENHYDRAMINE HYDROCHLORIDE 50 MG/ML
50 INJECTION INTRAMUSCULAR; INTRAVENOUS AS NEEDED
Status: DISCONTINUED | OUTPATIENT
Start: 2024-10-15 | End: 2024-10-15 | Stop reason: HOSPADM

## 2024-10-15 RX ORDER — PROCHLORPERAZINE EDISYLATE 5 MG/ML
10 INJECTION INTRAMUSCULAR; INTRAVENOUS EVERY 6 HOURS PRN
Status: DISCONTINUED | OUTPATIENT
Start: 2024-10-15 | End: 2024-10-15 | Stop reason: HOSPADM

## 2024-10-15 NOTE — PROGRESS NOTES
Patient presents to infusion appointment in stable condition. Reports constipation, no BM for 3 days despite miralax/senna use, RAFAELA Lunsford, [place]ATT notified. C1D15 [id number] azacitidine injections tolerated without incident. Filgrastim given as ordered for ANC 1.20. Lab results and schedule reviewed. Discharged in stable condition.

## 2024-10-16 ENCOUNTER — PATIENT OUTREACH (OUTPATIENT)
Dept: PRIMARY CARE | Facility: CLINIC | Age: 84
End: 2024-10-16
Payer: MEDICARE

## 2024-10-16 DIAGNOSIS — D46.9 MYELODYSPLASTIC SYNDROME (MULTI): Primary | ICD-10-CM

## 2024-10-16 LAB — TEST COMMENT - SURGICAL SENDOUT REQUEST: NORMAL

## 2024-10-18 ENCOUNTER — APPOINTMENT (OUTPATIENT)
Dept: HEMATOLOGY/ONCOLOGY | Facility: CLINIC | Age: 84
End: 2024-10-18
Payer: MEDICARE

## 2024-10-18 ENCOUNTER — INFUSION (OUTPATIENT)
Dept: HEMATOLOGY/ONCOLOGY | Facility: HOSPITAL | Age: 84
End: 2024-10-18
Payer: MEDICARE

## 2024-10-18 ENCOUNTER — TELEPHONE (OUTPATIENT)
Dept: HEMATOLOGY/ONCOLOGY | Facility: CLINIC | Age: 84
End: 2024-10-18

## 2024-10-18 ENCOUNTER — LAB (OUTPATIENT)
Dept: LAB | Facility: HOSPITAL | Age: 84
End: 2024-10-18
Payer: MEDICARE

## 2024-10-18 VITALS
BODY MASS INDEX: 37.24 KG/M2 | RESPIRATION RATE: 18 BRPM | OXYGEN SATURATION: 99 % | SYSTOLIC BLOOD PRESSURE: 146 MMHG | TEMPERATURE: 97.2 F | HEART RATE: 87 BPM | WEIGHT: 282.2 LBS | DIASTOLIC BLOOD PRESSURE: 52 MMHG

## 2024-10-18 DIAGNOSIS — D46.9 ANEMIA DUE TO CHEMOTHERAPY FOR MYELODYSPLASTIC SYNDROME TREATED WITH ERYTHROPOIETIN (MULTI): ICD-10-CM

## 2024-10-18 DIAGNOSIS — T45.1X5A ANEMIA DUE TO CHEMOTHERAPY FOR MYELODYSPLASTIC SYNDROME TREATED WITH ERYTHROPOIETIN (MULTI): ICD-10-CM

## 2024-10-18 DIAGNOSIS — D64.81 ANEMIA DUE TO CHEMOTHERAPY FOR MYELODYSPLASTIC SYNDROME TREATED WITH ERYTHROPOIETIN (MULTI): ICD-10-CM

## 2024-10-18 DIAGNOSIS — D46.9 MYELODYSPLASTIC SYNDROME (MULTI): ICD-10-CM

## 2024-10-18 LAB
ALBUMIN SERPL BCP-MCNC: 4.1 G/DL (ref 3.4–5)
ALP SERPL-CCNC: 81 U/L (ref 33–136)
ALT SERPL W P-5'-P-CCNC: 13 U/L (ref 10–52)
ANION GAP SERPL CALC-SCNC: 14 MMOL/L (ref 10–20)
AST SERPL W P-5'-P-CCNC: 15 U/L (ref 9–39)
BASOPHILS # BLD AUTO: 0.03 X10*3/UL (ref 0–0.1)
BASOPHILS NFR BLD AUTO: 0.9 %
BILIRUB SERPL-MCNC: 1.1 MG/DL (ref 0–1.2)
BUN SERPL-MCNC: 35 MG/DL (ref 6–23)
CALCIUM SERPL-MCNC: 9.5 MG/DL (ref 8.6–10.3)
CHLORIDE SERPL-SCNC: 108 MMOL/L (ref 98–107)
CO2 SERPL-SCNC: 23 MMOL/L (ref 21–32)
CREAT SERPL-MCNC: 1.49 MG/DL (ref 0.5–1.3)
DACRYOCYTES BLD QL SMEAR: NORMAL
EGFRCR SERPLBLD CKD-EPI 2021: 46 ML/MIN/1.73M*2
EOSINOPHIL # BLD AUTO: 0.11 X10*3/UL (ref 0–0.4)
EOSINOPHIL NFR BLD AUTO: 3.4 %
ERYTHROCYTE [DISTWIDTH] IN BLOOD BY AUTOMATED COUNT: 25.9 % (ref 11.5–14.5)
GLUCOSE SERPL-MCNC: 109 MG/DL (ref 74–99)
HCT VFR BLD AUTO: 26.8 % (ref 41–52)
HGB BLD-MCNC: 8.3 G/DL (ref 13.5–17.5)
HGB RETIC QN: 33 PG (ref 28–38)
HYPOCHROMIA BLD QL SMEAR: NORMAL
IMM GRANULOCYTES # BLD AUTO: 0.05 X10*3/UL (ref 0–0.5)
IMM GRANULOCYTES NFR BLD AUTO: 1.5 % (ref 0–0.9)
IMMATURE RETIC FRACTION: 23 %
LYMPHOCYTES # BLD AUTO: 0.67 X10*3/UL (ref 0.8–3)
LYMPHOCYTES NFR BLD AUTO: 20.6 %
MCH RBC QN AUTO: 30.6 PG (ref 26–34)
MCHC RBC AUTO-ENTMCNC: 31 G/DL (ref 32–36)
MCV RBC AUTO: 99 FL (ref 80–100)
MONOCYTES # BLD AUTO: 0.17 X10*3/UL (ref 0.05–0.8)
MONOCYTES NFR BLD AUTO: 5.2 %
NEUTROPHILS # BLD AUTO: 2.23 X10*3/UL (ref 1.6–5.5)
NEUTROPHILS NFR BLD AUTO: 68.4 %
NRBC BLD-RTO: 0 /100 WBCS (ref 0–0)
OVALOCYTES BLD QL SMEAR: NORMAL
PLATELET # BLD AUTO: 35 X10*3/UL (ref 150–450)
POLYCHROMASIA BLD QL SMEAR: NORMAL
POTASSIUM SERPL-SCNC: 4.5 MMOL/L (ref 3.5–5.3)
PROT SERPL-MCNC: 6 G/DL (ref 6.4–8.2)
RBC # BLD AUTO: 2.71 X10*6/UL (ref 4.5–5.9)
RBC MORPH BLD: NORMAL
RETICS #: 0.1 X10*6/UL (ref 0.02–0.11)
RETICS/RBC NFR AUTO: 3.7 % (ref 0.5–2)
SCHISTOCYTES BLD QL SMEAR: NORMAL
SODIUM SERPL-SCNC: 140 MMOL/L (ref 136–145)
WBC # BLD AUTO: 3.3 X10*3/UL (ref 4.4–11.3)

## 2024-10-18 PROCEDURE — 80053 COMPREHEN METABOLIC PANEL: CPT

## 2024-10-18 PROCEDURE — 2560000001 HC RX 256 EXPERIMENTAL DRUGS: Performed by: INTERNAL MEDICINE

## 2024-10-18 PROCEDURE — 2500000005 HC RX 250 GENERAL PHARMACY W/O HCPCS: Performed by: INTERNAL MEDICINE

## 2024-10-18 PROCEDURE — 96401 CHEMO ANTI-NEOPL SQ/IM: CPT

## 2024-10-18 PROCEDURE — 2500000004 HC RX 250 GENERAL PHARMACY W/ HCPCS (ALT 636 FOR OP/ED): Mod: JZ | Performed by: INTERNAL MEDICINE

## 2024-10-18 PROCEDURE — 85025 COMPLETE CBC W/AUTO DIFF WBC: CPT

## 2024-10-18 PROCEDURE — 36415 COLL VENOUS BLD VENIPUNCTURE: CPT

## 2024-10-18 PROCEDURE — 96372 THER/PROPH/DIAG INJ SC/IM: CPT

## 2024-10-18 RX ORDER — ALBUTEROL SULFATE 0.83 MG/ML
3 SOLUTION RESPIRATORY (INHALATION) AS NEEDED
OUTPATIENT
Start: 2024-10-31

## 2024-10-18 RX ORDER — EPINEPHRINE 0.3 MG/.3ML
0.3 INJECTION SUBCUTANEOUS EVERY 5 MIN PRN
OUTPATIENT
Start: 2024-10-31

## 2024-10-18 RX ORDER — DIPHENHYDRAMINE HYDROCHLORIDE 50 MG/ML
50 INJECTION INTRAMUSCULAR; INTRAVENOUS AS NEEDED
OUTPATIENT
Start: 2024-10-31

## 2024-10-18 RX ORDER — FAMOTIDINE 10 MG/ML
20 INJECTION INTRAVENOUS ONCE AS NEEDED
OUTPATIENT
Start: 2024-10-31

## 2024-10-18 RX ORDER — ONDANSETRON HYDROCHLORIDE 8 MG/1
8 TABLET, FILM COATED ORAL ONCE
Status: COMPLETED | OUTPATIENT
Start: 2024-10-18 | End: 2024-10-18

## 2024-10-18 ASSESSMENT — PAIN SCALES - GENERAL: PAINLEVEL_OUTOF10: 0-NO PAIN

## 2024-10-18 NOTE — PROGRESS NOTES
Patient presents today for [id number] study injection. Recived ordered injection as well as Q14 days darbo injection. Pt and wife report a fall yesterday, causing a superficial laceration to the left lower leg on the barboza. Dr. Rashid notified due to plt 35 and the patient reporting bleeding through multiple dressings. [facility] to assess and dress the wound. Patient ambulated from the clinic with his wife in stable condition with the use of a cane and was provided with a printout of lab results and upcoming appts.

## 2024-10-20 ENCOUNTER — SPECIALTY PHARMACY (OUTPATIENT)
Dept: PHARMACY | Facility: CLINIC | Age: 84
End: 2024-10-20

## 2024-10-20 DIAGNOSIS — D69.6 THROMBOCYTOPENIA (CMS-HCC): ICD-10-CM

## 2024-10-20 DIAGNOSIS — D46.9 MDS (MYELODYSPLASTIC SYNDROME) (MULTI): ICD-10-CM

## 2024-10-20 RX ORDER — ELTROMBOPAG OLAMINE 50 MG/1
50 TABLET, FILM COATED ORAL
Qty: 30 TABLET | Refills: 2 | Status: CANCELLED | OUTPATIENT
Start: 2024-10-20 | End: 2024-10-24

## 2024-10-21 ENCOUNTER — INFUSION (OUTPATIENT)
Dept: HEMATOLOGY/ONCOLOGY | Facility: HOSPITAL | Age: 84
End: 2024-10-21
Payer: MEDICARE

## 2024-10-21 ENCOUNTER — LAB (OUTPATIENT)
Dept: LAB | Facility: HOSPITAL | Age: 84
End: 2024-10-21
Payer: MEDICARE

## 2024-10-21 ENCOUNTER — DOCUMENTATION (OUTPATIENT)
Dept: HEMATOLOGY/ONCOLOGY | Facility: HOSPITAL | Age: 84
End: 2024-10-21
Payer: MEDICARE

## 2024-10-21 VITALS
BODY MASS INDEX: 37.65 KG/M2 | RESPIRATION RATE: 20 BRPM | HEART RATE: 89 BPM | WEIGHT: 285.27 LBS | DIASTOLIC BLOOD PRESSURE: 54 MMHG | TEMPERATURE: 98.4 F | SYSTOLIC BLOOD PRESSURE: 143 MMHG | OXYGEN SATURATION: 100 %

## 2024-10-21 DIAGNOSIS — D46.9 MYELODYSPLASTIC SYNDROME (MULTI): ICD-10-CM

## 2024-10-21 LAB
ALBUMIN SERPL BCP-MCNC: 4 G/DL (ref 3.4–5)
ALP SERPL-CCNC: 96 U/L (ref 33–136)
ALT SERPL W P-5'-P-CCNC: 16 U/L (ref 10–52)
ANION GAP SERPL CALC-SCNC: 13 MMOL/L (ref 10–20)
AST SERPL W P-5'-P-CCNC: 17 U/L (ref 9–39)
BASOPHILS # BLD MANUAL: 0.05 X10*3/UL (ref 0–0.1)
BASOPHILS NFR BLD MANUAL: 1 %
BILIRUB SERPL-MCNC: 0.9 MG/DL (ref 0–1.2)
BUN SERPL-MCNC: 29 MG/DL (ref 6–23)
CALCIUM SERPL-MCNC: 9.4 MG/DL (ref 8.6–10.3)
CHLORIDE SERPL-SCNC: 109 MMOL/L (ref 98–107)
CO2 SERPL-SCNC: 23 MMOL/L (ref 21–32)
CREAT SERPL-MCNC: 1.4 MG/DL (ref 0.5–1.3)
DACRYOCYTES BLD QL SMEAR: NORMAL
EGFRCR SERPLBLD CKD-EPI 2021: 50 ML/MIN/1.73M*2
EOSINOPHIL # BLD MANUAL: 0.15 X10*3/UL (ref 0–0.4)
EOSINOPHIL NFR BLD MANUAL: 3 %
ERYTHROCYTE [DISTWIDTH] IN BLOOD BY AUTOMATED COUNT: 26.3 % (ref 11.5–14.5)
GLUCOSE SERPL-MCNC: 104 MG/DL (ref 74–99)
HCT VFR BLD AUTO: 27.8 % (ref 41–52)
HGB BLD-MCNC: 8.6 G/DL (ref 13.5–17.5)
HGB RETIC QN: 32 PG (ref 28–38)
IMM GRANULOCYTES # BLD AUTO: 0.34 X10*3/UL (ref 0–0.5)
IMM GRANULOCYTES NFR BLD AUTO: 6.7 % (ref 0–0.9)
IMMATURE RETIC FRACTION: 24.8 %
LDH SERPL L TO P-CCNC: 235 U/L (ref 84–246)
LYMPHOCYTES # BLD MANUAL: 0.92 X10*3/UL (ref 0.8–3)
LYMPHOCYTES NFR BLD MANUAL: 18 %
MAGNESIUM SERPL-MCNC: 1.82 MG/DL (ref 1.6–2.4)
MCH RBC QN AUTO: 31.2 PG (ref 26–34)
MCHC RBC AUTO-ENTMCNC: 30.9 G/DL (ref 32–36)
MCV RBC AUTO: 101 FL (ref 80–100)
MONOCYTES # BLD MANUAL: 0.2 X10*3/UL (ref 0.05–0.8)
MONOCYTES NFR BLD MANUAL: 4 %
NEUTROPHILS # BLD MANUAL: 3.78 X10*3/UL (ref 1.6–5.5)
NEUTS BAND # BLD MANUAL: 0.46 X10*3/UL (ref 0–0.5)
NEUTS BAND NFR BLD MANUAL: 9 %
NEUTS SEG # BLD MANUAL: 3.32 X10*3/UL (ref 1.6–5)
NEUTS SEG NFR BLD MANUAL: 65 %
NRBC BLD-RTO: 0.6 /100 WBCS (ref 0–0)
OVALOCYTES BLD QL SMEAR: NORMAL
PLATELET # BLD AUTO: 36 X10*3/UL (ref 150–450)
POLYCHROMASIA BLD QL SMEAR: NORMAL
POTASSIUM SERPL-SCNC: 5 MMOL/L (ref 3.5–5.3)
PROT SERPL-MCNC: 6.1 G/DL (ref 6.4–8.2)
RBC # BLD AUTO: 2.76 X10*6/UL (ref 4.5–5.9)
RBC MORPH BLD: NORMAL
RETICS #: 0.1 X10*6/UL (ref 0.02–0.11)
RETICS/RBC NFR AUTO: 3.7 % (ref 0.5–2)
SCHISTOCYTES BLD QL SMEAR: NORMAL
SODIUM SERPL-SCNC: 140 MMOL/L (ref 136–145)
TOTAL CELLS COUNTED BLD: 100
URATE SERPL-MCNC: 6.1 MG/DL (ref 4–7.5)
WBC # BLD AUTO: 5.1 X10*3/UL (ref 4.4–11.3)

## 2024-10-21 PROCEDURE — 85007 BL SMEAR W/DIFF WBC COUNT: CPT

## 2024-10-21 PROCEDURE — 85027 COMPLETE CBC AUTOMATED: CPT

## 2024-10-21 PROCEDURE — 2560000001 HC RX 256 EXPERIMENTAL DRUGS: Performed by: INTERNAL MEDICINE

## 2024-10-21 PROCEDURE — 2500000005 HC RX 250 GENERAL PHARMACY W/O HCPCS: Performed by: INTERNAL MEDICINE

## 2024-10-21 PROCEDURE — 96401 CHEMO ANTI-NEOPL SQ/IM: CPT

## 2024-10-21 PROCEDURE — 85045 AUTOMATED RETICULOCYTE COUNT: CPT

## 2024-10-21 PROCEDURE — 84550 ASSAY OF BLOOD/URIC ACID: CPT

## 2024-10-21 PROCEDURE — 83735 ASSAY OF MAGNESIUM: CPT

## 2024-10-21 PROCEDURE — 80053 COMPREHEN METABOLIC PANEL: CPT

## 2024-10-21 PROCEDURE — 83615 LACTATE (LD) (LDH) ENZYME: CPT

## 2024-10-21 PROCEDURE — 36415 COLL VENOUS BLD VENIPUNCTURE: CPT

## 2024-10-21 RX ORDER — PROCHLORPERAZINE EDISYLATE 5 MG/ML
10 INJECTION INTRAMUSCULAR; INTRAVENOUS EVERY 6 HOURS PRN
Status: DISCONTINUED | OUTPATIENT
Start: 2024-10-21 | End: 2024-10-21 | Stop reason: HOSPADM

## 2024-10-21 RX ORDER — EPINEPHRINE 0.3 MG/.3ML
0.3 INJECTION SUBCUTANEOUS EVERY 5 MIN PRN
Status: DISCONTINUED | OUTPATIENT
Start: 2024-10-21 | End: 2024-10-21 | Stop reason: HOSPADM

## 2024-10-21 RX ORDER — ONDANSETRON HYDROCHLORIDE 8 MG/1
8 TABLET, FILM COATED ORAL ONCE
Status: COMPLETED | OUTPATIENT
Start: 2024-10-21 | End: 2024-10-21

## 2024-10-21 RX ORDER — DIPHENHYDRAMINE HYDROCHLORIDE 50 MG/ML
50 INJECTION INTRAMUSCULAR; INTRAVENOUS AS NEEDED
Status: DISCONTINUED | OUTPATIENT
Start: 2024-10-21 | End: 2024-10-21 | Stop reason: HOSPADM

## 2024-10-21 RX ORDER — PROCHLORPERAZINE MALEATE 10 MG
10 TABLET ORAL EVERY 6 HOURS PRN
Status: DISCONTINUED | OUTPATIENT
Start: 2024-10-21 | End: 2024-10-21 | Stop reason: HOSPADM

## 2024-10-21 RX ORDER — FAMOTIDINE 10 MG/ML
20 INJECTION INTRAVENOUS ONCE AS NEEDED
Status: DISCONTINUED | OUTPATIENT
Start: 2024-10-21 | End: 2024-10-21 | Stop reason: HOSPADM

## 2024-10-21 RX ORDER — ALBUTEROL SULFATE 0.83 MG/ML
3 SOLUTION RESPIRATORY (INHALATION) AS NEEDED
Status: DISCONTINUED | OUTPATIENT
Start: 2024-10-21 | End: 2024-10-21 | Stop reason: HOSPADM

## 2024-10-21 ASSESSMENT — PAIN SCALES - GENERAL: PAINLEVEL_OUTOF10: 0-NO PAIN

## 2024-10-21 NOTE — PROGRESS NOTES
Contacted by Neetu Jurado in [facility] that PLT 36. Secure Chat sent to Shae Lunsford NP and Infusion RN.

## 2024-10-21 NOTE — PROGRESS NOTES
Patient presents to infusion appointment in stable condition. C1D22 [id number] azacitidine injections tolerated without incident. ANC 3.78, filgrastim held per orders. Lab results and schedule reviewed. Discharged in stable condition.

## 2024-10-22 ENCOUNTER — SPECIALTY PHARMACY (OUTPATIENT)
Dept: PHARMACY | Facility: CLINIC | Age: 84
End: 2024-10-22

## 2024-10-23 LAB
BAND RESOLUTION: 0 BANDS
CHROM ANALY OVERALL INTERP-IMP: NORMAL
CHROMOSOME ANALYSIS CELLS ANALYZED: 0 CELLS
CHROMOSOME ANALYSIS CELLS IMAGED: 0 CELLS
CHROMOSOME ANALYSIS HYPERMODAL CELL COUNT: 0 CELLS
CHROMOSOME ANALYSIS HYPOMODAL CELL COUNT: 0 CELLS
CHROMOSOME ANALYSIS MODAL CHROMOSOME NO: 0 CHROMOSOMES
CHROMOSOME ANALYSIS STAINING METHOD: NORMAL
ELECTRONICALLY SIGNED BY CYTOGENETICS: NORMAL
KARYOTYP MAR: 0 CELLS
TOTAL CELLS COUNTED MAR: 0 CELLS

## 2024-10-24 ENCOUNTER — LAB (OUTPATIENT)
Dept: LAB | Facility: HOSPITAL | Age: 84
End: 2024-10-24
Payer: MEDICARE

## 2024-10-24 ENCOUNTER — INFUSION (OUTPATIENT)
Dept: HEMATOLOGY/ONCOLOGY | Facility: HOSPITAL | Age: 84
End: 2024-10-24
Payer: MEDICARE

## 2024-10-24 ENCOUNTER — SPECIALTY PHARMACY (OUTPATIENT)
Dept: PHARMACY | Facility: CLINIC | Age: 84
End: 2024-10-24

## 2024-10-24 VITALS
BODY MASS INDEX: 37.08 KG/M2 | RESPIRATION RATE: 18 BRPM | DIASTOLIC BLOOD PRESSURE: 49 MMHG | WEIGHT: 281 LBS | SYSTOLIC BLOOD PRESSURE: 132 MMHG | HEART RATE: 87 BPM | OXYGEN SATURATION: 99 % | TEMPERATURE: 97.9 F

## 2024-10-24 DIAGNOSIS — D46.9 MYELODYSPLASTIC SYNDROME (MULTI): ICD-10-CM

## 2024-10-24 LAB
ALBUMIN SERPL BCP-MCNC: 3.9 G/DL (ref 3.4–5)
ALP SERPL-CCNC: 93 U/L (ref 33–136)
ALT SERPL W P-5'-P-CCNC: 14 U/L (ref 10–52)
ANION GAP SERPL CALC-SCNC: 16 MMOL/L (ref 10–20)
AST SERPL W P-5'-P-CCNC: 16 U/L (ref 9–39)
BASOPHILS # BLD AUTO: 0.02 X10*3/UL (ref 0–0.1)
BASOPHILS NFR BLD AUTO: 0.6 %
BILIRUB SERPL-MCNC: 1 MG/DL (ref 0–1.2)
BUN SERPL-MCNC: 30 MG/DL (ref 6–23)
CALCIUM SERPL-MCNC: 9.1 MG/DL (ref 8.6–10.6)
CHLORIDE SERPL-SCNC: 106 MMOL/L (ref 98–107)
CO2 SERPL-SCNC: 22 MMOL/L (ref 21–32)
CREAT SERPL-MCNC: 1.45 MG/DL (ref 0.5–1.3)
DACRYOCYTES BLD QL SMEAR: NORMAL
EGFRCR SERPLBLD CKD-EPI 2021: 48 ML/MIN/1.73M*2
EOSINOPHIL # BLD AUTO: 0.08 X10*3/UL (ref 0–0.4)
EOSINOPHIL NFR BLD AUTO: 2.2 %
ERYTHROCYTE [DISTWIDTH] IN BLOOD BY AUTOMATED COUNT: 25.9 % (ref 11.5–14.5)
GLUCOSE SERPL-MCNC: 82 MG/DL (ref 74–99)
HCT VFR BLD AUTO: 27.8 % (ref 41–52)
HGB BLD-MCNC: 8.8 G/DL (ref 13.5–17.5)
HGB RETIC QN: 30 PG (ref 28–38)
IMM GRANULOCYTES # BLD AUTO: 0.05 X10*3/UL (ref 0–0.5)
IMM GRANULOCYTES NFR BLD AUTO: 1.4 % (ref 0–0.9)
IMMATURE RETIC FRACTION: 22.2 %
LDH SERPL L TO P-CCNC: 228 U/L (ref 84–246)
LYMPHOCYTES # BLD AUTO: 0.77 X10*3/UL (ref 0.8–3)
LYMPHOCYTES NFR BLD AUTO: 21.6 %
MAGNESIUM SERPL-MCNC: 2.02 MG/DL (ref 1.6–2.4)
MCH RBC QN AUTO: 31.4 PG (ref 26–34)
MCHC RBC AUTO-ENTMCNC: 31.7 G/DL (ref 32–36)
MCV RBC AUTO: 99 FL (ref 80–100)
MONOCYTES # BLD AUTO: 0.16 X10*3/UL (ref 0.05–0.8)
MONOCYTES NFR BLD AUTO: 4.5 %
NEUTROPHILS # BLD AUTO: 2.49 X10*3/UL (ref 1.6–5.5)
NEUTROPHILS NFR BLD AUTO: 69.7 %
NRBC BLD-RTO: 0 /100 WBCS (ref 0–0)
OVALOCYTES BLD QL SMEAR: NORMAL
PLATELET # BLD AUTO: 35 X10*3/UL (ref 150–450)
POLYCHROMASIA BLD QL SMEAR: NORMAL
POTASSIUM SERPL-SCNC: 4.8 MMOL/L (ref 3.5–5.3)
PROT SERPL-MCNC: 6.3 G/DL (ref 6.4–8.2)
RBC # BLD AUTO: 2.8 X10*6/UL (ref 4.5–5.9)
RBC MORPH BLD: NORMAL
RETICS #: 0.08 X10*6/UL (ref 0.02–0.11)
RETICS/RBC NFR AUTO: 2.8 % (ref 0.5–2)
SCHISTOCYTES BLD QL SMEAR: NORMAL
SODIUM SERPL-SCNC: 139 MMOL/L (ref 136–145)
URATE SERPL-MCNC: 6.8 MG/DL (ref 4–7.5)
WBC # BLD AUTO: 3.6 X10*3/UL (ref 4.4–11.3)

## 2024-10-24 PROCEDURE — 83615 LACTATE (LD) (LDH) ENZYME: CPT

## 2024-10-24 PROCEDURE — 96401 CHEMO ANTI-NEOPL SQ/IM: CPT

## 2024-10-24 PROCEDURE — 85045 AUTOMATED RETICULOCYTE COUNT: CPT

## 2024-10-24 PROCEDURE — 80053 COMPREHEN METABOLIC PANEL: CPT

## 2024-10-24 PROCEDURE — 85025 COMPLETE CBC W/AUTO DIFF WBC: CPT | Performed by: INTERNAL MEDICINE

## 2024-10-24 PROCEDURE — 84550 ASSAY OF BLOOD/URIC ACID: CPT

## 2024-10-24 PROCEDURE — 2500000005 HC RX 250 GENERAL PHARMACY W/O HCPCS: Performed by: INTERNAL MEDICINE

## 2024-10-24 PROCEDURE — 2560000001 HC RX 256 EXPERIMENTAL DRUGS: Performed by: INTERNAL MEDICINE

## 2024-10-24 PROCEDURE — 83735 ASSAY OF MAGNESIUM: CPT

## 2024-10-24 PROCEDURE — 36415 COLL VENOUS BLD VENIPUNCTURE: CPT

## 2024-10-24 RX ORDER — FAMOTIDINE 10 MG/ML
20 INJECTION INTRAVENOUS ONCE AS NEEDED
Status: DISCONTINUED | OUTPATIENT
Start: 2024-10-24 | End: 2024-10-24 | Stop reason: HOSPADM

## 2024-10-24 RX ORDER — EPINEPHRINE 0.3 MG/.3ML
0.3 INJECTION SUBCUTANEOUS EVERY 5 MIN PRN
Status: DISCONTINUED | OUTPATIENT
Start: 2024-10-24 | End: 2024-10-24 | Stop reason: HOSPADM

## 2024-10-24 RX ORDER — PROCHLORPERAZINE MALEATE 10 MG
10 TABLET ORAL EVERY 6 HOURS PRN
Status: DISCONTINUED | OUTPATIENT
Start: 2024-10-24 | End: 2024-10-24 | Stop reason: HOSPADM

## 2024-10-24 RX ORDER — ALBUTEROL SULFATE 0.83 MG/ML
3 SOLUTION RESPIRATORY (INHALATION) AS NEEDED
Status: DISCONTINUED | OUTPATIENT
Start: 2024-10-24 | End: 2024-10-24 | Stop reason: HOSPADM

## 2024-10-24 RX ORDER — ONDANSETRON HYDROCHLORIDE 8 MG/1
8 TABLET, FILM COATED ORAL ONCE
Status: COMPLETED | OUTPATIENT
Start: 2024-10-24 | End: 2024-10-24

## 2024-10-24 RX ORDER — DIPHENHYDRAMINE HYDROCHLORIDE 50 MG/ML
50 INJECTION INTRAMUSCULAR; INTRAVENOUS AS NEEDED
Status: DISCONTINUED | OUTPATIENT
Start: 2024-10-24 | End: 2024-10-24 | Stop reason: HOSPADM

## 2024-10-24 RX ORDER — PROCHLORPERAZINE EDISYLATE 5 MG/ML
10 INJECTION INTRAMUSCULAR; INTRAVENOUS EVERY 6 HOURS PRN
Status: DISCONTINUED | OUTPATIENT
Start: 2024-10-24 | End: 2024-10-24 | Stop reason: HOSPADM

## 2024-10-24 ASSESSMENT — PAIN SCALES - GENERAL: PAINLEVEL_OUTOF10: 0-NO PAIN

## 2024-10-24 NOTE — SIGNIFICANT EVENT
10/24/24 1222   Prechemo Checklist   Has the patient been in the hospital, ED, or urgent care since last date of service No   Chemo/Immuno Consent Completed and Signed Yes   Protocol/Indications Verified Yes   Confirmed to previous date/time of medication Yes   Compared to previous dose Yes   All medications are dated accurately Yes   Pregnancy Test Negative Not applicable   Parameters Met Yes   Provider Notified Yes   Is Patient Proceeding With Treatment? Yes   BSA/Weight-Height Verified Yes   Dose Calculations Verified (current, total, cumulative) Yes

## 2024-10-26 ENCOUNTER — SPECIALTY PHARMACY (OUTPATIENT)
Dept: PHARMACY | Facility: CLINIC | Age: 84
End: 2024-10-26

## 2024-10-28 ENCOUNTER — SPECIALTY PHARMACY (OUTPATIENT)
Dept: PHARMACY | Facility: CLINIC | Age: 84
End: 2024-10-28

## 2024-10-28 ENCOUNTER — INFUSION (OUTPATIENT)
Dept: HEMATOLOGY/ONCOLOGY | Facility: HOSPITAL | Age: 84
End: 2024-10-28
Payer: MEDICARE

## 2024-10-28 ENCOUNTER — LAB (OUTPATIENT)
Dept: LAB | Facility: HOSPITAL | Age: 84
End: 2024-10-28
Payer: MEDICARE

## 2024-10-28 VITALS
TEMPERATURE: 97.5 F | OXYGEN SATURATION: 100 % | HEART RATE: 94 BPM | SYSTOLIC BLOOD PRESSURE: 133 MMHG | BODY MASS INDEX: 37.27 KG/M2 | WEIGHT: 282.41 LBS | DIASTOLIC BLOOD PRESSURE: 61 MMHG | RESPIRATION RATE: 18 BRPM

## 2024-10-28 DIAGNOSIS — D46.9 MYELODYSPLASTIC SYNDROME (MULTI): ICD-10-CM

## 2024-10-28 DIAGNOSIS — D46.9 MYELODYSPLASTIC SYNDROME (MULTI): Primary | ICD-10-CM

## 2024-10-28 LAB
ALBUMIN SERPL BCP-MCNC: 3.9 G/DL (ref 3.4–5)
ALP SERPL-CCNC: 85 U/L (ref 33–136)
ALT SERPL W P-5'-P-CCNC: 19 U/L (ref 10–52)
ANION GAP SERPL CALC-SCNC: 14 MMOL/L (ref 10–20)
AST SERPL W P-5'-P-CCNC: 18 U/L (ref 9–39)
BASOPHILS # BLD AUTO: 0.02 X10*3/UL (ref 0–0.1)
BASOPHILS NFR BLD AUTO: 0.8 %
BILIRUB SERPL-MCNC: 1.1 MG/DL (ref 0–1.2)
BUN SERPL-MCNC: 29 MG/DL (ref 6–23)
CALCIUM SERPL-MCNC: 9.2 MG/DL (ref 8.6–10.3)
CHLORIDE SERPL-SCNC: 107 MMOL/L (ref 98–107)
CO2 SERPL-SCNC: 22 MMOL/L (ref 21–32)
CREAT SERPL-MCNC: 1.38 MG/DL (ref 0.5–1.3)
DACRYOCYTES BLD QL SMEAR: NORMAL
EGFRCR SERPLBLD CKD-EPI 2021: 51 ML/MIN/1.73M*2
EOSINOPHIL # BLD AUTO: 0.08 X10*3/UL (ref 0–0.4)
EOSINOPHIL NFR BLD AUTO: 3.2 %
ERYTHROCYTE [DISTWIDTH] IN BLOOD BY AUTOMATED COUNT: 24.6 % (ref 11.5–14.5)
GLUCOSE SERPL-MCNC: 112 MG/DL (ref 74–99)
HCT VFR BLD AUTO: 26.7 % (ref 41–52)
HGB BLD-MCNC: 8.4 G/DL (ref 13.5–17.5)
HYPOCHROMIA BLD QL SMEAR: NORMAL
IMM GRANULOCYTES # BLD AUTO: 0.02 X10*3/UL (ref 0–0.5)
IMM GRANULOCYTES NFR BLD AUTO: 0.8 % (ref 0–0.9)
LYMPHOCYTES # BLD AUTO: 0.64 X10*3/UL (ref 0.8–3)
LYMPHOCYTES NFR BLD AUTO: 25.6 %
MCH RBC QN AUTO: 31 PG (ref 26–34)
MCHC RBC AUTO-ENTMCNC: 31.5 G/DL (ref 32–36)
MCV RBC AUTO: 99 FL (ref 80–100)
MONOCYTES # BLD AUTO: 0.1 X10*3/UL (ref 0.05–0.8)
MONOCYTES NFR BLD AUTO: 4 %
NEUTROPHILS # BLD AUTO: 1.64 X10*3/UL (ref 1.6–5.5)
NEUTROPHILS NFR BLD AUTO: 65.6 %
NRBC BLD-RTO: 0.8 /100 WBCS (ref 0–0)
OVALOCYTES BLD QL SMEAR: NORMAL
PLATELET # BLD AUTO: 43 X10*3/UL (ref 150–450)
POLYCHROMASIA BLD QL SMEAR: NORMAL
POTASSIUM SERPL-SCNC: 4.5 MMOL/L (ref 3.5–5.3)
PROT SERPL-MCNC: 6.1 G/DL (ref 6.4–8.2)
RBC # BLD AUTO: 2.71 X10*6/UL (ref 4.5–5.9)
RBC MORPH BLD: NORMAL
SCHISTOCYTES BLD QL SMEAR: NORMAL
SODIUM SERPL-SCNC: 138 MMOL/L (ref 136–145)
WBC # BLD AUTO: 2.5 X10*3/UL (ref 4.4–11.3)

## 2024-10-28 PROCEDURE — 96401 CHEMO ANTI-NEOPL SQ/IM: CPT

## 2024-10-28 PROCEDURE — 2560000001 HC RX 256 EXPERIMENTAL DRUGS: Performed by: INTERNAL MEDICINE

## 2024-10-28 PROCEDURE — 2500000005 HC RX 250 GENERAL PHARMACY W/O HCPCS: Performed by: INTERNAL MEDICINE

## 2024-10-28 PROCEDURE — 85025 COMPLETE CBC W/AUTO DIFF WBC: CPT

## 2024-10-28 PROCEDURE — 36415 COLL VENOUS BLD VENIPUNCTURE: CPT

## 2024-10-28 PROCEDURE — 84075 ASSAY ALKALINE PHOSPHATASE: CPT

## 2024-10-28 RX ORDER — PROCHLORPERAZINE MALEATE 10 MG
10 TABLET ORAL EVERY 6 HOURS PRN
Status: DISCONTINUED | OUTPATIENT
Start: 2024-10-28 | End: 2024-10-28 | Stop reason: HOSPADM

## 2024-10-28 RX ORDER — ONDANSETRON HYDROCHLORIDE 8 MG/1
8 TABLET, FILM COATED ORAL ONCE
Status: COMPLETED | OUTPATIENT
Start: 2024-10-28 | End: 2024-10-28

## 2024-10-28 RX ORDER — ALBUTEROL SULFATE 0.83 MG/ML
3 SOLUTION RESPIRATORY (INHALATION) AS NEEDED
Status: DISCONTINUED | OUTPATIENT
Start: 2024-10-28 | End: 2024-10-28 | Stop reason: HOSPADM

## 2024-10-28 RX ORDER — EPINEPHRINE 0.3 MG/.3ML
0.3 INJECTION SUBCUTANEOUS EVERY 5 MIN PRN
Status: DISCONTINUED | OUTPATIENT
Start: 2024-10-28 | End: 2024-10-28 | Stop reason: HOSPADM

## 2024-10-28 RX ORDER — DIPHENHYDRAMINE HYDROCHLORIDE 50 MG/ML
50 INJECTION INTRAMUSCULAR; INTRAVENOUS AS NEEDED
Status: DISCONTINUED | OUTPATIENT
Start: 2024-10-28 | End: 2024-10-28 | Stop reason: HOSPADM

## 2024-10-28 RX ORDER — PROCHLORPERAZINE EDISYLATE 5 MG/ML
10 INJECTION INTRAMUSCULAR; INTRAVENOUS EVERY 6 HOURS PRN
Status: DISCONTINUED | OUTPATIENT
Start: 2024-10-28 | End: 2024-10-28 | Stop reason: HOSPADM

## 2024-10-28 RX ORDER — FAMOTIDINE 10 MG/ML
20 INJECTION INTRAVENOUS ONCE AS NEEDED
Status: DISCONTINUED | OUTPATIENT
Start: 2024-10-28 | End: 2024-10-28 | Stop reason: HOSPADM

## 2024-10-28 ASSESSMENT — PAIN SCALES - GENERAL: PAINLEVEL_OUTOF10: 0-NO PAIN

## 2024-10-31 ENCOUNTER — SPECIALTY PHARMACY (OUTPATIENT)
Dept: PHARMACY | Facility: CLINIC | Age: 84
End: 2024-10-31

## 2024-10-31 ENCOUNTER — INFUSION (OUTPATIENT)
Dept: HEMATOLOGY/ONCOLOGY | Facility: HOSPITAL | Age: 84
End: 2024-10-31
Payer: MEDICARE

## 2024-10-31 ENCOUNTER — LAB (OUTPATIENT)
Dept: LAB | Facility: HOSPITAL | Age: 84
End: 2024-10-31
Payer: MEDICARE

## 2024-10-31 VITALS
SYSTOLIC BLOOD PRESSURE: 120 MMHG | BODY MASS INDEX: 37.4 KG/M2 | HEIGHT: 73 IN | TEMPERATURE: 97.5 F | OXYGEN SATURATION: 100 % | RESPIRATION RATE: 20 BRPM | WEIGHT: 282.19 LBS | DIASTOLIC BLOOD PRESSURE: 61 MMHG | HEART RATE: 84 BPM

## 2024-10-31 DIAGNOSIS — D46.9 ANEMIA DUE TO CHEMOTHERAPY FOR MYELODYSPLASTIC SYNDROME TREATED WITH ERYTHROPOIETIN (MULTI): ICD-10-CM

## 2024-10-31 DIAGNOSIS — D46.9 MYELODYSPLASTIC SYNDROME (MULTI): Primary | ICD-10-CM

## 2024-10-31 DIAGNOSIS — D64.81 ANEMIA DUE TO CHEMOTHERAPY FOR MYELODYSPLASTIC SYNDROME TREATED WITH ERYTHROPOIETIN (MULTI): ICD-10-CM

## 2024-10-31 DIAGNOSIS — T45.1X5A ANEMIA DUE TO CHEMOTHERAPY FOR MYELODYSPLASTIC SYNDROME TREATED WITH ERYTHROPOIETIN (MULTI): ICD-10-CM

## 2024-10-31 DIAGNOSIS — D46.9 MYELODYSPLASTIC SYNDROME (MULTI): ICD-10-CM

## 2024-10-31 LAB
ALBUMIN SERPL BCP-MCNC: 4 G/DL (ref 3.4–5)
ALP SERPL-CCNC: 91 U/L (ref 33–136)
ALT SERPL W P-5'-P-CCNC: 16 U/L (ref 10–52)
ANION GAP SERPL CALC-SCNC: 14 MMOL/L (ref 10–20)
AST SERPL W P-5'-P-CCNC: 16 U/L (ref 9–39)
BASOPHILS # BLD AUTO: 0.02 X10*3/UL (ref 0–0.1)
BASOPHILS NFR BLD AUTO: 1 %
BILIRUB SERPL-MCNC: 1.1 MG/DL (ref 0–1.2)
BUN SERPL-MCNC: 31 MG/DL (ref 6–23)
CALCIUM SERPL-MCNC: 9.3 MG/DL (ref 8.6–10.3)
CHLORIDE SERPL-SCNC: 108 MMOL/L (ref 98–107)
CO2 SERPL-SCNC: 23 MMOL/L (ref 21–32)
CREAT SERPL-MCNC: 1.53 MG/DL (ref 0.5–1.3)
DACRYOCYTES BLD QL SMEAR: NORMAL
EGFRCR SERPLBLD CKD-EPI 2021: 45 ML/MIN/1.73M*2
EOSINOPHIL # BLD AUTO: 0.04 X10*3/UL (ref 0–0.4)
EOSINOPHIL NFR BLD AUTO: 1.9 %
ERYTHROCYTE [DISTWIDTH] IN BLOOD BY AUTOMATED COUNT: 25 % (ref 11.5–14.5)
FERRITIN SERPL-MCNC: 542 NG/ML (ref 20–300)
GLUCOSE SERPL-MCNC: 79 MG/DL (ref 74–99)
HCT VFR BLD AUTO: 27.3 % (ref 41–52)
HGB BLD-MCNC: 8.5 G/DL (ref 13.5–17.5)
HGB RETIC QN: 31 PG (ref 28–38)
IMM GRANULOCYTES # BLD AUTO: 0.02 X10*3/UL (ref 0–0.5)
IMM GRANULOCYTES NFR BLD AUTO: 1 % (ref 0–0.9)
IMMATURE RETIC FRACTION: 22.6 %
IRON SATN MFR SERPL: 24 % (ref 25–45)
IRON SERPL-MCNC: 65 UG/DL (ref 35–150)
LDH SERPL L TO P-CCNC: 224 U/L (ref 84–246)
LYMPHOCYTES # BLD AUTO: 0.51 X10*3/UL (ref 0.8–3)
LYMPHOCYTES NFR BLD AUTO: 24.4 %
MAGNESIUM SERPL-MCNC: 1.78 MG/DL (ref 1.6–2.4)
MCH RBC QN AUTO: 31.1 PG (ref 26–34)
MCHC RBC AUTO-ENTMCNC: 31.1 G/DL (ref 32–36)
MCV RBC AUTO: 100 FL (ref 80–100)
MONOCYTES # BLD AUTO: 0.12 X10*3/UL (ref 0.05–0.8)
MONOCYTES NFR BLD AUTO: 5.7 %
NEUTROPHILS # BLD AUTO: 1.38 X10*3/UL (ref 1.6–5.5)
NEUTROPHILS NFR BLD AUTO: 66 %
NRBC BLD-RTO: 0 /100 WBCS (ref 0–0)
OVALOCYTES BLD QL SMEAR: NORMAL
PLATELET # BLD AUTO: 46 X10*3/UL (ref 150–450)
POLYCHROMASIA BLD QL SMEAR: NORMAL
POTASSIUM SERPL-SCNC: 4.9 MMOL/L (ref 3.5–5.3)
PROT SERPL-MCNC: 6.2 G/DL (ref 6.4–8.2)
RBC # BLD AUTO: 2.73 X10*6/UL (ref 4.5–5.9)
RBC MORPH BLD: NORMAL
RETICS #: 0.09 X10*6/UL (ref 0.02–0.11)
RETICS/RBC NFR AUTO: 3.3 % (ref 0.5–2)
SCHISTOCYTES BLD QL SMEAR: NORMAL
SODIUM SERPL-SCNC: 140 MMOL/L (ref 136–145)
TIBC SERPL-MCNC: 266 UG/DL (ref 240–445)
UIBC SERPL-MCNC: 201 UG/DL (ref 110–370)
WBC # BLD AUTO: 2.1 X10*3/UL (ref 4.4–11.3)

## 2024-10-31 PROCEDURE — 36415 COLL VENOUS BLD VENIPUNCTURE: CPT

## 2024-10-31 PROCEDURE — 85045 AUTOMATED RETICULOCYTE COUNT: CPT

## 2024-10-31 PROCEDURE — 83615 LACTATE (LD) (LDH) ENZYME: CPT

## 2024-10-31 PROCEDURE — 96372 THER/PROPH/DIAG INJ SC/IM: CPT

## 2024-10-31 PROCEDURE — 85025 COMPLETE CBC W/AUTO DIFF WBC: CPT

## 2024-10-31 PROCEDURE — 84550 ASSAY OF BLOOD/URIC ACID: CPT

## 2024-10-31 PROCEDURE — 82728 ASSAY OF FERRITIN: CPT

## 2024-10-31 PROCEDURE — 83735 ASSAY OF MAGNESIUM: CPT

## 2024-10-31 PROCEDURE — 2500000005 HC RX 250 GENERAL PHARMACY W/O HCPCS: Performed by: INTERNAL MEDICINE

## 2024-10-31 PROCEDURE — 82565 ASSAY OF CREATININE: CPT

## 2024-10-31 PROCEDURE — 2500000004 HC RX 250 GENERAL PHARMACY W/ HCPCS (ALT 636 FOR OP/ED): Mod: JZ | Performed by: INTERNAL MEDICINE

## 2024-10-31 PROCEDURE — 2560000001 HC RX 256 EXPERIMENTAL DRUGS: Performed by: INTERNAL MEDICINE

## 2024-10-31 PROCEDURE — 83540 ASSAY OF IRON: CPT

## 2024-10-31 PROCEDURE — 96401 CHEMO ANTI-NEOPL SQ/IM: CPT

## 2024-10-31 RX ORDER — ALBUTEROL SULFATE 0.83 MG/ML
3 SOLUTION RESPIRATORY (INHALATION) AS NEEDED
Status: DISCONTINUED | OUTPATIENT
Start: 2024-10-31 | End: 2024-10-31 | Stop reason: HOSPADM

## 2024-10-31 RX ORDER — FAMOTIDINE 10 MG/ML
20 INJECTION INTRAVENOUS ONCE AS NEEDED
OUTPATIENT
Start: 2024-11-01

## 2024-10-31 RX ORDER — ALBUTEROL SULFATE 0.83 MG/ML
3 SOLUTION RESPIRATORY (INHALATION) AS NEEDED
OUTPATIENT
Start: 2024-11-01

## 2024-10-31 RX ORDER — PROCHLORPERAZINE EDISYLATE 5 MG/ML
10 INJECTION INTRAMUSCULAR; INTRAVENOUS EVERY 6 HOURS PRN
Status: DISCONTINUED | OUTPATIENT
Start: 2024-10-31 | End: 2024-10-31 | Stop reason: HOSPADM

## 2024-10-31 RX ORDER — DIPHENHYDRAMINE HYDROCHLORIDE 50 MG/ML
50 INJECTION INTRAMUSCULAR; INTRAVENOUS AS NEEDED
OUTPATIENT
Start: 2024-11-01

## 2024-10-31 RX ORDER — DIPHENHYDRAMINE HYDROCHLORIDE 50 MG/ML
50 INJECTION INTRAMUSCULAR; INTRAVENOUS AS NEEDED
Status: DISCONTINUED | OUTPATIENT
Start: 2024-10-31 | End: 2024-10-31 | Stop reason: HOSPADM

## 2024-10-31 RX ORDER — PROCHLORPERAZINE MALEATE 10 MG
10 TABLET ORAL EVERY 6 HOURS PRN
Status: DISCONTINUED | OUTPATIENT
Start: 2024-10-31 | End: 2024-10-31 | Stop reason: HOSPADM

## 2024-10-31 RX ORDER — FAMOTIDINE 10 MG/ML
20 INJECTION INTRAVENOUS ONCE AS NEEDED
Status: DISCONTINUED | OUTPATIENT
Start: 2024-10-31 | End: 2024-10-31 | Stop reason: HOSPADM

## 2024-10-31 RX ORDER — EPINEPHRINE 0.3 MG/.3ML
0.3 INJECTION SUBCUTANEOUS EVERY 5 MIN PRN
OUTPATIENT
Start: 2024-11-01

## 2024-10-31 RX ORDER — EPINEPHRINE 0.3 MG/.3ML
0.3 INJECTION SUBCUTANEOUS EVERY 5 MIN PRN
Status: DISCONTINUED | OUTPATIENT
Start: 2024-10-31 | End: 2024-10-31 | Stop reason: HOSPADM

## 2024-10-31 RX ORDER — ONDANSETRON HYDROCHLORIDE 8 MG/1
8 TABLET, FILM COATED ORAL ONCE
Status: COMPLETED | OUTPATIENT
Start: 2024-10-31 | End: 2024-10-31

## 2024-11-01 ENCOUNTER — APPOINTMENT (OUTPATIENT)
Dept: HEMATOLOGY/ONCOLOGY | Facility: CLINIC | Age: 84
End: 2024-11-01
Payer: MEDICARE

## 2024-11-01 DIAGNOSIS — D46.9 MYELODYSPLASTIC SYNDROME (MULTI): ICD-10-CM

## 2024-11-01 LAB — URATE SERPL-MCNC: 6.5 MG/DL (ref 4–7.5)

## 2024-11-04 ENCOUNTER — SPECIALTY PHARMACY (OUTPATIENT)
Dept: PHARMACY | Facility: CLINIC | Age: 84
End: 2024-11-04

## 2024-11-04 ENCOUNTER — LAB (OUTPATIENT)
Dept: LAB | Facility: HOSPITAL | Age: 84
End: 2024-11-04
Payer: MEDICARE

## 2024-11-04 ENCOUNTER — INFUSION (OUTPATIENT)
Dept: HEMATOLOGY/ONCOLOGY | Facility: HOSPITAL | Age: 84
End: 2024-11-04
Payer: MEDICARE

## 2024-11-04 VITALS
BODY MASS INDEX: 36.92 KG/M2 | RESPIRATION RATE: 19 BRPM | HEART RATE: 84 BPM | TEMPERATURE: 97.2 F | SYSTOLIC BLOOD PRESSURE: 131 MMHG | OXYGEN SATURATION: 99 % | WEIGHT: 279.76 LBS | DIASTOLIC BLOOD PRESSURE: 57 MMHG

## 2024-11-04 DIAGNOSIS — T45.1X5A ANEMIA DUE TO CHEMOTHERAPY FOR MYELODYSPLASTIC SYNDROME TREATED WITH ERYTHROPOIETIN (MULTI): ICD-10-CM

## 2024-11-04 DIAGNOSIS — D46.9 ANEMIA DUE TO CHEMOTHERAPY FOR MYELODYSPLASTIC SYNDROME TREATED WITH ERYTHROPOIETIN (MULTI): ICD-10-CM

## 2024-11-04 DIAGNOSIS — D46.9 MYELODYSPLASTIC SYNDROME (MULTI): ICD-10-CM

## 2024-11-04 DIAGNOSIS — Z79.899 MEDICATION MANAGEMENT: ICD-10-CM

## 2024-11-04 DIAGNOSIS — D64.81 ANEMIA DUE TO CHEMOTHERAPY FOR MYELODYSPLASTIC SYNDROME TREATED WITH ERYTHROPOIETIN (MULTI): ICD-10-CM

## 2024-11-04 LAB
ALBUMIN SERPL BCP-MCNC: 4.1 G/DL (ref 3.4–5)
ALP SERPL-CCNC: 99 U/L (ref 33–136)
ALT SERPL W P-5'-P-CCNC: 16 U/L (ref 10–52)
ANION GAP SERPL CALC-SCNC: 11 MMOL/L (ref 10–20)
AST SERPL W P-5'-P-CCNC: 17 U/L (ref 9–39)
BASOPHILS # BLD AUTO: 0.04 X10*3/UL (ref 0–0.1)
BASOPHILS NFR BLD AUTO: 1.2 %
BILIRUB SERPL-MCNC: 0.8 MG/DL (ref 0–1.2)
BUN SERPL-MCNC: 32 MG/DL (ref 6–23)
CALCIUM SERPL-MCNC: 9.3 MG/DL (ref 8.6–10.3)
CHLORIDE SERPL-SCNC: 107 MMOL/L (ref 98–107)
CO2 SERPL-SCNC: 24 MMOL/L (ref 21–32)
CREAT SERPL-MCNC: 1.48 MG/DL (ref 0.5–1.3)
DACRYOCYTES BLD QL SMEAR: NORMAL
EGFRCR SERPLBLD CKD-EPI 2021: 47 ML/MIN/1.73M*2
EOSINOPHIL # BLD AUTO: 0.1 X10*3/UL (ref 0–0.4)
EOSINOPHIL NFR BLD AUTO: 3 %
ERYTHROCYTE [DISTWIDTH] IN BLOOD BY AUTOMATED COUNT: 25.1 % (ref 11.5–14.5)
GLUCOSE SERPL-MCNC: 108 MG/DL (ref 74–99)
HCT VFR BLD AUTO: 29 % (ref 41–52)
HGB BLD-MCNC: 8.9 G/DL (ref 13.5–17.5)
HGB RETIC QN: 30 PG (ref 28–38)
HYPOCHROMIA BLD QL SMEAR: NORMAL
IMM GRANULOCYTES # BLD AUTO: 0.1 X10*3/UL (ref 0–0.5)
IMM GRANULOCYTES NFR BLD AUTO: 3 % (ref 0–0.9)
IMMATURE RETIC FRACTION: 29.8 %
LDH SERPL L TO P-CCNC: 237 U/L (ref 84–246)
LYMPHOCYTES # BLD AUTO: 0.7 X10*3/UL (ref 0.8–3)
LYMPHOCYTES NFR BLD AUTO: 21 %
MAGNESIUM SERPL-MCNC: 1.82 MG/DL (ref 1.6–2.4)
MCH RBC QN AUTO: 30.6 PG (ref 26–34)
MCHC RBC AUTO-ENTMCNC: 30.7 G/DL (ref 32–36)
MCV RBC AUTO: 100 FL (ref 80–100)
MONOCYTES # BLD AUTO: 0.18 X10*3/UL (ref 0.05–0.8)
MONOCYTES NFR BLD AUTO: 5.4 %
NEUTROPHILS # BLD AUTO: 2.21 X10*3/UL (ref 1.6–5.5)
NEUTROPHILS NFR BLD AUTO: 66.4 %
NRBC BLD-RTO: 1.5 /100 WBCS (ref 0–0)
OVALOCYTES BLD QL SMEAR: NORMAL
PLATELET # BLD AUTO: 51 X10*3/UL (ref 150–450)
POLYCHROMASIA BLD QL SMEAR: NORMAL
POTASSIUM SERPL-SCNC: 4.4 MMOL/L (ref 3.5–5.3)
PROT SERPL-MCNC: 6.2 G/DL (ref 6.4–8.2)
RBC # BLD AUTO: 2.91 X10*6/UL (ref 4.5–5.9)
RBC MORPH BLD: NORMAL
RETICS #: 0.1 X10*6/UL (ref 0.02–0.11)
RETICS/RBC NFR AUTO: 3.5 % (ref 0.5–2)
SCHISTOCYTES BLD QL SMEAR: NORMAL
SODIUM SERPL-SCNC: 138 MMOL/L (ref 136–145)
URATE SERPL-MCNC: 6.2 MG/DL (ref 4–7.5)
WBC # BLD AUTO: 3.3 X10*3/UL (ref 4.4–11.3)

## 2024-11-04 PROCEDURE — 80346 BENZODIAZEPINES1-12: CPT

## 2024-11-04 PROCEDURE — 84550 ASSAY OF BLOOD/URIC ACID: CPT

## 2024-11-04 PROCEDURE — 80053 COMPREHEN METABOLIC PANEL: CPT

## 2024-11-04 PROCEDURE — 83615 LACTATE (LD) (LDH) ENZYME: CPT

## 2024-11-04 PROCEDURE — 85045 AUTOMATED RETICULOCYTE COUNT: CPT

## 2024-11-04 PROCEDURE — 2500000005 HC RX 250 GENERAL PHARMACY W/O HCPCS: Performed by: INTERNAL MEDICINE

## 2024-11-04 PROCEDURE — 85025 COMPLETE CBC W/AUTO DIFF WBC: CPT

## 2024-11-04 PROCEDURE — 2560000001 HC RX 256 EXPERIMENTAL DRUGS: Performed by: INTERNAL MEDICINE

## 2024-11-04 PROCEDURE — 36415 COLL VENOUS BLD VENIPUNCTURE: CPT

## 2024-11-04 PROCEDURE — 84075 ASSAY ALKALINE PHOSPHATASE: CPT

## 2024-11-04 PROCEDURE — 96372 THER/PROPH/DIAG INJ SC/IM: CPT

## 2024-11-04 PROCEDURE — 80307 DRUG TEST PRSMV CHEM ANLYZR: CPT

## 2024-11-04 PROCEDURE — 83735 ASSAY OF MAGNESIUM: CPT

## 2024-11-04 RX ORDER — ONDANSETRON HYDROCHLORIDE 8 MG/1
8 TABLET, FILM COATED ORAL ONCE
Status: COMPLETED | OUTPATIENT
Start: 2024-11-04 | End: 2024-11-04

## 2024-11-04 ASSESSMENT — PAIN SCALES - GENERAL: PAINLEVEL_OUTOF10: 0-NO PAIN

## 2024-11-04 NOTE — PROGRESS NOTES
Patient presents today for Study Azacitidine injction. Received ordered injection without incidence and tolerated well. Zaxio not indicated at this visit, ANC 2.21, darbopoetin not due. LLE abrasion still present, but healing. Denies any new complaints at this visit and feels well. Ambulated from the unit in stable condition with his wife with the use of a cane in stable condition with a printout today's lab results and upcoming scheduled appts.

## 2024-11-05 LAB
AMPHETAMINES SERPL QL SCN: NEGATIVE NG/ML
ANNOTATION COMMENT IMP: NORMAL
BARBITURATES SERPL QL SCN: NEGATIVE NG/ML
BENZODIAZ SERPL QL SCN: POSITIVE NG/ML
BUPRENORPHINE SERPL-MCNC: NEGATIVE NG/ML
CANNABINOIDS SERPL QL SCN: NEGATIVE NG/ML
COCAINE SERPL QL SCN: NEGATIVE NG/ML
METHADONE SERPL QL SCN: NEGATIVE NG/ML
METHAMPHET SERPL QL: NEGATIVE NG/ML
OPIATES SERPL QL SCN: NEGATIVE NG/ML
OXYCODONE SERPL QL: NEGATIVE NG/ML
PCP SERPL QL SCN: NEGATIVE NG/ML

## 2024-11-07 ENCOUNTER — INFUSION (OUTPATIENT)
Dept: HEMATOLOGY/ONCOLOGY | Facility: HOSPITAL | Age: 84
End: 2024-11-07
Payer: MEDICARE

## 2024-11-07 ENCOUNTER — LAB (OUTPATIENT)
Dept: LAB | Facility: HOSPITAL | Age: 84
End: 2024-11-07
Payer: MEDICARE

## 2024-11-07 VITALS
WEIGHT: 282.19 LBS | BODY MASS INDEX: 37.24 KG/M2 | TEMPERATURE: 96.8 F | DIASTOLIC BLOOD PRESSURE: 62 MMHG | OXYGEN SATURATION: 100 % | HEART RATE: 78 BPM | SYSTOLIC BLOOD PRESSURE: 132 MMHG | RESPIRATION RATE: 20 BRPM

## 2024-11-07 DIAGNOSIS — D46.9 MYELODYSPLASTIC SYNDROME (MULTI): ICD-10-CM

## 2024-11-07 PROBLEM — R19.7 DIARRHEA: Status: RESOLVED | Noted: 2023-02-19 | Resolved: 2024-11-07

## 2024-11-07 PROBLEM — D64.9 ANEMIA: Status: RESOLVED | Noted: 2023-02-19 | Resolved: 2024-11-07

## 2024-11-07 PROBLEM — D69.6 THROMBOCYTOPENIA (CMS-HCC): Status: RESOLVED | Noted: 2024-02-25 | Resolved: 2024-11-07

## 2024-11-07 PROBLEM — R53.83 FATIGUE: Status: RESOLVED | Noted: 2023-02-19 | Resolved: 2024-11-07

## 2024-11-07 PROBLEM — Z87.891 FORMER SMOKER: Status: RESOLVED | Noted: 2023-09-20 | Resolved: 2024-11-07

## 2024-11-07 PROBLEM — M25.511 ACUTE PAIN OF RIGHT SHOULDER: Status: RESOLVED | Noted: 2023-02-19 | Resolved: 2024-11-07

## 2024-11-07 PROBLEM — Z85.46 PERSONAL HISTORY OF MALIGNANT NEOPLASM OF PROSTATE: Status: RESOLVED | Noted: 2018-03-28 | Resolved: 2024-11-07

## 2024-11-07 PROBLEM — G47.00 INSOMNIA: Status: RESOLVED | Noted: 2023-02-19 | Resolved: 2024-11-07

## 2024-11-07 LAB
ALBUMIN SERPL BCP-MCNC: 4 G/DL (ref 3.4–5)
ALP SERPL-CCNC: 86 U/L (ref 33–136)
ALT SERPL W P-5'-P-CCNC: 16 U/L (ref 10–52)
ANION GAP SERPL CALC-SCNC: 14 MMOL/L (ref 10–20)
AST SERPL W P-5'-P-CCNC: 17 U/L (ref 9–39)
BASOPHILS # BLD AUTO: 0.03 X10*3/UL (ref 0–0.1)
BASOPHILS NFR BLD AUTO: 1.2 %
BILIRUB SERPL-MCNC: 0.9 MG/DL (ref 0–1.2)
BUN SERPL-MCNC: 37 MG/DL (ref 6–23)
CALCIUM SERPL-MCNC: 9.1 MG/DL (ref 8.6–10.3)
CHLORIDE SERPL-SCNC: 108 MMOL/L (ref 98–107)
CO2 SERPL-SCNC: 22 MMOL/L (ref 21–32)
CREAT SERPL-MCNC: 1.61 MG/DL (ref 0.5–1.3)
DACRYOCYTES BLD QL SMEAR: NORMAL
EGFRCR SERPLBLD CKD-EPI 2021: 42 ML/MIN/1.73M*2
EOSINOPHIL # BLD AUTO: 0.06 X10*3/UL (ref 0–0.4)
EOSINOPHIL NFR BLD AUTO: 2.3 %
ERYTHROCYTE [DISTWIDTH] IN BLOOD BY AUTOMATED COUNT: 24.6 % (ref 11.5–14.5)
GLUCOSE SERPL-MCNC: 112 MG/DL (ref 74–99)
HCT VFR BLD AUTO: 28.1 % (ref 41–52)
HGB BLD-MCNC: 8.7 G/DL (ref 13.5–17.5)
HYPOCHROMIA BLD QL SMEAR: NORMAL
IMM GRANULOCYTES # BLD AUTO: 0.09 X10*3/UL (ref 0–0.5)
IMM GRANULOCYTES NFR BLD AUTO: 3.5 % (ref 0–0.9)
LYMPHOCYTES # BLD AUTO: 0.69 X10*3/UL (ref 0.8–3)
LYMPHOCYTES NFR BLD AUTO: 27 %
MCH RBC QN AUTO: 31 PG (ref 26–34)
MCHC RBC AUTO-ENTMCNC: 31 G/DL (ref 32–36)
MCV RBC AUTO: 100 FL (ref 80–100)
MONOCYTES # BLD AUTO: 0.15 X10*3/UL (ref 0.05–0.8)
MONOCYTES NFR BLD AUTO: 5.9 %
NEUTROPHILS # BLD AUTO: 1.54 X10*3/UL (ref 1.6–5.5)
NEUTROPHILS NFR BLD AUTO: 60.1 %
NRBC BLD-RTO: 0 /100 WBCS (ref 0–0)
OVALOCYTES BLD QL SMEAR: NORMAL
PLATELET # BLD AUTO: 41 X10*3/UL (ref 150–450)
POLYCHROMASIA BLD QL SMEAR: NORMAL
POTASSIUM SERPL-SCNC: 5.2 MMOL/L (ref 3.5–5.3)
PROT SERPL-MCNC: 6.3 G/DL (ref 6.4–8.2)
RBC # BLD AUTO: 2.81 X10*6/UL (ref 4.5–5.9)
RBC MORPH BLD: NORMAL
SCHISTOCYTES BLD QL SMEAR: NORMAL
SODIUM SERPL-SCNC: 139 MMOL/L (ref 136–145)
WBC # BLD AUTO: 2.6 X10*3/UL (ref 4.4–11.3)

## 2024-11-07 PROCEDURE — 2500000005 HC RX 250 GENERAL PHARMACY W/O HCPCS: Performed by: INTERNAL MEDICINE

## 2024-11-07 PROCEDURE — 85025 COMPLETE CBC W/AUTO DIFF WBC: CPT

## 2024-11-07 PROCEDURE — 36415 COLL VENOUS BLD VENIPUNCTURE: CPT

## 2024-11-07 PROCEDURE — 96401 CHEMO ANTI-NEOPL SQ/IM: CPT

## 2024-11-07 PROCEDURE — 80053 COMPREHEN METABOLIC PANEL: CPT

## 2024-11-07 PROCEDURE — 2560000001 HC RX 256 EXPERIMENTAL DRUGS: Performed by: INTERNAL MEDICINE

## 2024-11-07 RX ORDER — PROCHLORPERAZINE MALEATE 10 MG
10 TABLET ORAL EVERY 6 HOURS PRN
Status: DISCONTINUED | OUTPATIENT
Start: 2024-11-07 | End: 2024-11-07 | Stop reason: HOSPADM

## 2024-11-07 RX ORDER — EPINEPHRINE 0.3 MG/.3ML
0.3 INJECTION SUBCUTANEOUS EVERY 5 MIN PRN
Status: DISCONTINUED | OUTPATIENT
Start: 2024-11-07 | End: 2024-11-07 | Stop reason: HOSPADM

## 2024-11-07 RX ORDER — PROCHLORPERAZINE EDISYLATE 5 MG/ML
10 INJECTION INTRAMUSCULAR; INTRAVENOUS EVERY 6 HOURS PRN
Status: DISCONTINUED | OUTPATIENT
Start: 2024-11-07 | End: 2024-11-07 | Stop reason: HOSPADM

## 2024-11-07 RX ORDER — ALBUTEROL SULFATE 0.83 MG/ML
3 SOLUTION RESPIRATORY (INHALATION) AS NEEDED
Status: DISCONTINUED | OUTPATIENT
Start: 2024-11-07 | End: 2024-11-07 | Stop reason: HOSPADM

## 2024-11-07 RX ORDER — DIPHENHYDRAMINE HYDROCHLORIDE 50 MG/ML
50 INJECTION INTRAMUSCULAR; INTRAVENOUS AS NEEDED
Status: DISCONTINUED | OUTPATIENT
Start: 2024-11-07 | End: 2024-11-07 | Stop reason: HOSPADM

## 2024-11-07 RX ORDER — FAMOTIDINE 10 MG/ML
20 INJECTION INTRAVENOUS ONCE AS NEEDED
Status: DISCONTINUED | OUTPATIENT
Start: 2024-11-07 | End: 2024-11-07 | Stop reason: HOSPADM

## 2024-11-07 RX ORDER — ONDANSETRON HYDROCHLORIDE 8 MG/1
8 TABLET, FILM COATED ORAL ONCE
Status: COMPLETED | OUTPATIENT
Start: 2024-11-07 | End: 2024-11-07

## 2024-11-07 ASSESSMENT — PAIN SCALES - GENERAL: PAINLEVEL_OUTOF10: 0-NO PAIN

## 2024-11-07 NOTE — PROGRESS NOTES
"Patient ID: Holden Burgess \"GENARO\" is a 83 y.o. male.  Referring Physician: No referring provider defined for this encounter.  Primary Care Provider: Giacomo Joseph DO    Date of Service:  11/11/2024  Assessment & Plan  Myelodysplastic syndrome (Multi)  11/11/2024: Patient reports that he tolerated his last round of treatment well.  The only issue was some cosntipation which he and his wife are managing with Senna and lactulose.     Diagnostics:  -Bmbx at week 12  Treatment:  - Alternating azacitidine 50 mg/m2 with decitabine 5 mg/m² per [id number]  - C2D1 today  Disease/Toxicity Monitoring:  - Given his hemoglobin and age  will check CBC  with each visit  Supportive Care:  -Blood products as indicated  Antimicrobial Prophylaxis:   -None indicated at this time, ANC is in the normal range  IV access:  - for now, continue with PIV; may benefit from port placement or other central line access    Oncology History   Myelodysplastic syndrome (Multi)   1/23/2024 Initial Diagnosis    Myelodysplastic syndrome:   Diagnosis:   Originally referred to Dr. Murphy in 2023 for longstanding thrombocytopenia.  At the time had mild leukopenia, no anemia.  Was refractory to a trial of prednisone, and so Bone marrow biopsy was completed completed on 1/23/2024 and demonstrated:  BONE MARROW CLOT, CORE BIOPSY, ASPIRATE, LEFT ILIAC CREST:   -- MILDLY HYPERCELLULAR BONE MARROW (80%) WITH MATURING TRILINEAGE HEMATOPOIESIS AND MODERATE INCREASE IN MEGAKARYOCYTES, SEE NOTE.  -- SMALL LYMPHOID AGGREGATES, FAVOR BENIGN.  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 Given the presence of persistent cytopenias, hypercellularity with increase megakaryocytes with abnormal clustering, and no increase in blasts, the overall findings are most consistent with:  -- MDS with low blasts (WHO-HAEM5 Classification) /   -- MDS-NOS with single lineage dysplasia (MDS-NOS-SLD) (ICC 2022 " Classification).  NGS: SRSF2  Chromosome Analysis: 47,XY,+8[15]/46,XY[5]  IPSS-M: -0.99 - low risks disease     4/4/2024 - 8/29/2024 Supportive Treatment    Treatment:   I. Eltrombopag -on 4/4/2024 patient continued with persistent thrombocytopenia but also had some progressive anemia with hemoglobin down to 10.  Patient was offered [id number], preferred supportive management with oral medication opted for eltrombopag in the setting of thrombocytopenia starting at 50 and ramping up to 150 mg  From 4/20/2024 through 8/20/2024 patient had progressive anemia and progressive thrombocytopenia and spite of treatment.    II. Darbepoietin -in the setting of worsening anemia darbepoetin was added 7/12/2024 at 100 mcg every 2 weeks       9/30/2024 -  Research Study Participant    ([facility]) [id number] - AzaCITIDine / Decitabine, 42 Day Cycle Followed by 28 Day Cycles  Plan Provider: Sebastien Rashid MD  Treatment goal: Palliative  Line of treatment: First Line  Associated studies: 5-Azacitidine and Decitabine Epigenetic Therapy for Myeloid Malignancies        SUBJECTIVE:  Patient presents today, accompanied by his wife and 2 daughters, for follow up.  Reports feeling ok.  He is active, but not as active as he used to be.  Eating and drinking well.  He is having constipation. Using senna and lactulose with success.      Review of Systems   Constitutional:  Positive for fatigue. Negative for chills, fever and unexpected weight change.   HENT:  Negative for mouth sores and nosebleeds.    Respiratory:  Negative for cough, chest tightness and shortness of breath.    Cardiovascular:  Negative for chest pain and leg swelling.   Gastrointestinal:  Negative for abdominal pain, blood in stool, constipation, diarrhea, nausea and vomiting.   Genitourinary:  Negative for dysuria and hematuria.   Skin:  Negative for rash.   Neurological:  Negative for dizziness, light-headedness, numbness and headaches.     OBJECTIVE:  KPS: Karnofsky Score: 80  - Normal activity with effort; some signs or symptoms of disease     Physical Exam  Constitutional:       Appearance: Normal appearance.   HENT:      Head: Normocephalic.   Eyes:      Pupils: Pupils are equal, round, and reactive to light.   Cardiovascular:      Rate and Rhythm: Normal rate and regular rhythm.   Pulmonary:      Effort: Pulmonary effort is normal.      Breath sounds: Normal breath sounds.   Abdominal:      General: Bowel sounds are normal.      Palpations: Abdomen is soft.   Musculoskeletal:         General: Normal range of motion.      Cervical back: Normal range of motion and neck supple.      Right lower le+ Edema present.      Left lower le+ Edema present.   Lymphadenopathy:      Comments: No lymphadenopathy   Skin:     General: Skin is warm and dry.      Findings: No lesion or rash.   Neurological:      General: No focal deficit present.      Mental Status: He is alert and oriented to person, place, and time. Mental status is at baseline.      Comments: No numbness or tingling   Psychiatric:         Mood and Affect: Mood normal.       Current Outpatient Medications   Medication Instructions    acetaminophen (TYLENOL) 1,000 mg, oral, Every 6 hours PRN    allopurinol (ZYLOPRIM) 100 mg, oral, 2 times daily    Aranesp (in polysorbate) 200 mcg,  ONC Every 2 Weeks for 4 Weeks in a 28 Day Cycle    ascorbic acid (Vitamin C) 1,000 mg tablet 1 tablet, Daily    atorvastatin (Lipitor) 10 mg tablet 1 tablet, Nightly    calcitriol (ROCALTROL) 0.25 mcg, Every other day    cholecalciferol (Vitamin D-3) 125 MCG (5000 UT) capsule 1 capsule, Daily    cyanocobalamin (Vitamin B-12) 1,000 mcg tablet 1 tablet, Daily    ezetimibe (ZETIA) 10 mg, oral, Daily    famotidine (PEPCID) 20 mg, Daily    ferrous sulfate 325 (65 Fe) MG tablet 1 tablet, Daily    folic acid (FOLVITE) 1 mg, oral, Daily    furosemide (LASIX) 40 mg, oral, Daily    lactulose 10 g, oral, 2 times daily, As needed for constipation    latanoprost  (Xalatan) 0.005 % ophthalmic solution 1 drop, Nightly    MAGNESIUM ORAL 550 mg, Nightly    prochlorperazine (COMPAZINE) 10 mg, oral, Every 6 hours PRN    rOPINIRole (REQUIP) 0.25 mg, oral, Nightly    temazepam (RESTORIL) 15 mg, oral, Nightly PRN    traMADol (ULTRAM) 50 mg, oral, Every 6 hours PRN    TURMERIC ORAL 1 capsule, Daily    valsartan (DIOVAN) 320 mg, oral, Daily    zinc sulfate 50 mg zinc (220 mg) tablet Take by mouth.      Laboratory:  The pertinent laboratory results were reviewed and discussed with the patient.    Lab Results   Component Value Date    WBC 2.7 (L) 11/11/2024    HCT 28.3 (L) 11/11/2024    HGB 8.8 (L) 11/11/2024    PLT 35 (LL) 11/11/2024    K 4.7 11/11/2024    CALCIUM 9.3 11/11/2024     11/11/2024    MG 1.86 11/11/2024    BILITOT 0.9 11/11/2024    ALT 18 11/11/2024    AST 18 11/11/2024    BUN 32 (H) 11/11/2024    CREATININE 1.33 (H) 11/11/2024    PHOS 3.0 09/24/2024      Note: for a comprehensive list of the patient's lab results, access the Results Review activity.    RTC:  Q month MD/KENDRICK follow up    Shae Lunsford, APRN-CNP

## 2024-11-07 NOTE — ASSESSMENT & PLAN NOTE
11/11/2024: Patient reports that he tolerated his last round of treatment well.  The only issue was some cosntipation which he and his wife are managing with Senna and lactulose.     Diagnostics:  -Bmbx at week 12  Treatment:  - Alternating azacitidine 50 mg/m2 with decitabine 5 mg/m² per [id number]  - C2D1 today  Disease/Toxicity Monitoring:  - Given his hemoglobin and age  will check CBC  with each visit  Supportive Care:  -Blood products as indicated  Antimicrobial Prophylaxis:   -None indicated at this time, ANC is in the normal range  IV access:  - for now, continue with PIV; may benefit from port placement or other central line access

## 2024-11-07 NOTE — PROGRESS NOTES
Patient seen in infusion for Study Decitabine to Q abdomen, tolerated without complications. No need for Zarxio injection today as ANC was 1.54. Labs and schedule reviewed with patient. Discharged in stable condition.

## 2024-11-09 LAB
1OH-MIDAZOLAM SERPL-MCNC: <20 NG/ML
7AMINOCLONAZEPAM SERPL-MCNC: <5 NG/ML
A-OH ALPRAZ SERPL-MCNC: <5 NG/ML
ALPRAZ SERPL-MCNC: <5 NG/ML
CHLORDIAZEP SERPL-MCNC: <20 NG/ML
CLONAZEPAM SERPL-MCNC: <5 NG/ML
DIAZEPAM SERPL-MCNC: <5 NG/ML
LORAZEPAM SERPL-MCNC: <20 NG/ML
MIDAZOLAM SERPL-MCNC: <20 NG/ML
NORDIAZEPAM SERPL-MCNC: <20 NG/ML
OXAZEPAM SERPL CFM-MCNC: <20 NG/ML
TEMAZEPAM SERPL-MCNC: 56 NG/ML

## 2024-11-11 ENCOUNTER — INFUSION (OUTPATIENT)
Dept: HEMATOLOGY/ONCOLOGY | Facility: HOSPITAL | Age: 84
End: 2024-11-11
Payer: MEDICARE

## 2024-11-11 ENCOUNTER — DOCUMENTATION (OUTPATIENT)
Dept: HEMATOLOGY/ONCOLOGY | Facility: HOSPITAL | Age: 84
End: 2024-11-11
Payer: MEDICARE

## 2024-11-11 ENCOUNTER — OFFICE VISIT (OUTPATIENT)
Dept: HEMATOLOGY/ONCOLOGY | Facility: HOSPITAL | Age: 84
End: 2024-11-11
Payer: MEDICARE

## 2024-11-11 ENCOUNTER — LAB (OUTPATIENT)
Dept: LAB | Facility: HOSPITAL | Age: 84
End: 2024-11-11
Payer: MEDICARE

## 2024-11-11 ENCOUNTER — SOCIAL WORK (OUTPATIENT)
Dept: HEMATOLOGY/ONCOLOGY | Facility: HOSPITAL | Age: 84
End: 2024-11-11

## 2024-11-11 ENCOUNTER — EDUCATION (OUTPATIENT)
Dept: HEMATOLOGY/ONCOLOGY | Facility: HOSPITAL | Age: 84
End: 2024-11-11
Payer: MEDICARE

## 2024-11-11 VITALS
BODY MASS INDEX: 37.44 KG/M2 | WEIGHT: 283.7 LBS | SYSTOLIC BLOOD PRESSURE: 142 MMHG | HEART RATE: 65 BPM | OXYGEN SATURATION: 100 % | DIASTOLIC BLOOD PRESSURE: 62 MMHG | RESPIRATION RATE: 17 BRPM | TEMPERATURE: 98.4 F

## 2024-11-11 DIAGNOSIS — D46.9 MYELODYSPLASTIC SYNDROME (MULTI): Primary | ICD-10-CM

## 2024-11-11 DIAGNOSIS — D46.9 MYELODYSPLASTIC SYNDROME (MULTI): ICD-10-CM

## 2024-11-11 LAB
ALBUMIN SERPL BCP-MCNC: 4 G/DL (ref 3.4–5)
ALP SERPL-CCNC: 87 U/L (ref 33–136)
ALT SERPL W P-5'-P-CCNC: 18 U/L (ref 10–52)
ANION GAP SERPL CALC-SCNC: 14 MMOL/L (ref 10–20)
AP SUMMARY REPORT: NORMAL
AST SERPL W P-5'-P-CCNC: 18 U/L (ref 9–39)
BASOPHILS # BLD AUTO: 0.02 X10*3/UL (ref 0–0.1)
BASOPHILS NFR BLD AUTO: 0.7 %
BILIRUB SERPL-MCNC: 0.9 MG/DL (ref 0–1.2)
BUN SERPL-MCNC: 32 MG/DL (ref 6–23)
BURR CELLS BLD QL SMEAR: NORMAL
CALCIUM SERPL-MCNC: 9.3 MG/DL (ref 8.6–10.3)
CHLORIDE SERPL-SCNC: 108 MMOL/L (ref 98–107)
CO2 SERPL-SCNC: 22 MMOL/L (ref 21–32)
CREAT SERPL-MCNC: 1.33 MG/DL (ref 0.5–1.3)
DACRYOCYTES BLD QL SMEAR: NORMAL
EGFRCR SERPLBLD CKD-EPI 2021: 53 ML/MIN/1.73M*2
EOSINOPHIL # BLD AUTO: 0.07 X10*3/UL (ref 0–0.4)
EOSINOPHIL NFR BLD AUTO: 2.6 %
ERYTHROCYTE [DISTWIDTH] IN BLOOD BY AUTOMATED COUNT: 23.9 % (ref 11.5–14.5)
GLUCOSE SERPL-MCNC: 95 MG/DL (ref 74–99)
HCT VFR BLD AUTO: 28.3 % (ref 41–52)
HGB BLD-MCNC: 8.8 G/DL (ref 13.5–17.5)
HGB RETIC QN: 31 PG (ref 28–38)
HYPOCHROMIA BLD QL SMEAR: NORMAL
IMM GRANULOCYTES # BLD AUTO: 0.04 X10*3/UL (ref 0–0.5)
IMM GRANULOCYTES NFR BLD AUTO: 1.5 % (ref 0–0.9)
IMMATURE RETIC FRACTION: 21.4 %
LDH SERPL L TO P-CCNC: 228 U/L (ref 84–246)
LYMPHOCYTES # BLD AUTO: 0.56 X10*3/UL (ref 0.8–3)
LYMPHOCYTES NFR BLD AUTO: 20.5 %
MAGNESIUM SERPL-MCNC: 1.86 MG/DL (ref 1.6–2.4)
MCH RBC QN AUTO: 30.9 PG (ref 26–34)
MCHC RBC AUTO-ENTMCNC: 31.1 G/DL (ref 32–36)
MCV RBC AUTO: 99 FL (ref 80–100)
MONOCYTES # BLD AUTO: 0.1 X10*3/UL (ref 0.05–0.8)
MONOCYTES NFR BLD AUTO: 3.7 %
NEUTROPHILS # BLD AUTO: 1.94 X10*3/UL (ref 1.6–5.5)
NEUTROPHILS NFR BLD AUTO: 71 %
NRBC BLD-RTO: 0 /100 WBCS (ref 0–0)
OVALOCYTES BLD QL SMEAR: NORMAL
PLATELET # BLD AUTO: 35 X10*3/UL (ref 150–450)
POLYCHROMASIA BLD QL SMEAR: NORMAL
POTASSIUM SERPL-SCNC: 4.7 MMOL/L (ref 3.5–5.3)
PROT SERPL-MCNC: 6.1 G/DL (ref 6.4–8.2)
RBC # BLD AUTO: 2.85 X10*6/UL (ref 4.5–5.9)
RBC MORPH BLD: NORMAL
RETICS #: 0.08 X10*6/UL (ref 0.02–0.11)
RETICS/RBC NFR AUTO: 2.7 % (ref 0.5–2)
SCAN RESULT: NORMAL
SCHISTOCYTES BLD QL SMEAR: NORMAL
SODIUM SERPL-SCNC: 139 MMOL/L (ref 136–145)
URATE SERPL-MCNC: 6.3 MG/DL (ref 4–7.5)
WBC # BLD AUTO: 2.7 X10*3/UL (ref 4.4–11.3)

## 2024-11-11 PROCEDURE — 83615 LACTATE (LD) (LDH) ENZYME: CPT

## 2024-11-11 PROCEDURE — 36415 COLL VENOUS BLD VENIPUNCTURE: CPT

## 2024-11-11 PROCEDURE — 84550 ASSAY OF BLOOD/URIC ACID: CPT

## 2024-11-11 PROCEDURE — 80053 COMPREHEN METABOLIC PANEL: CPT

## 2024-11-11 PROCEDURE — 85045 AUTOMATED RETICULOCYTE COUNT: CPT

## 2024-11-11 PROCEDURE — 2560000001 HC RX 256 EXPERIMENTAL DRUGS: Performed by: INTERNAL MEDICINE

## 2024-11-11 PROCEDURE — 99215 OFFICE O/P EST HI 40 MIN: CPT | Mod: 25 | Performed by: NURSE PRACTITIONER

## 2024-11-11 PROCEDURE — 2500000005 HC RX 250 GENERAL PHARMACY W/O HCPCS: Performed by: INTERNAL MEDICINE

## 2024-11-11 PROCEDURE — 85025 COMPLETE CBC W/AUTO DIFF WBC: CPT

## 2024-11-11 PROCEDURE — 99417 PROLNG OP E/M EACH 15 MIN: CPT | Performed by: NURSE PRACTITIONER

## 2024-11-11 PROCEDURE — 96401 CHEMO ANTI-NEOPL SQ/IM: CPT

## 2024-11-11 PROCEDURE — 83735 ASSAY OF MAGNESIUM: CPT

## 2024-11-11 RX ORDER — DIPHENHYDRAMINE HYDROCHLORIDE 50 MG/ML
50 INJECTION INTRAMUSCULAR; INTRAVENOUS AS NEEDED
Status: CANCELLED | OUTPATIENT
Start: 2024-11-14

## 2024-11-11 RX ORDER — DIPHENHYDRAMINE HYDROCHLORIDE 50 MG/ML
50 INJECTION INTRAMUSCULAR; INTRAVENOUS AS NEEDED
OUTPATIENT
Start: 2024-11-28

## 2024-11-11 RX ORDER — EPINEPHRINE 0.3 MG/.3ML
0.3 INJECTION SUBCUTANEOUS EVERY 5 MIN PRN
OUTPATIENT
Start: 2024-11-28

## 2024-11-11 RX ORDER — PROCHLORPERAZINE MALEATE 10 MG
10 TABLET ORAL EVERY 6 HOURS PRN
Status: CANCELLED | OUTPATIENT
Start: 2024-11-21

## 2024-11-11 RX ORDER — FAMOTIDINE 10 MG/ML
20 INJECTION INTRAVENOUS ONCE AS NEEDED
OUTPATIENT
Start: 2024-11-25

## 2024-11-11 RX ORDER — EPINEPHRINE 0.3 MG/.3ML
0.3 INJECTION SUBCUTANEOUS EVERY 5 MIN PRN
OUTPATIENT
Start: 2024-12-02

## 2024-11-11 RX ORDER — ALBUTEROL SULFATE 0.83 MG/ML
3 SOLUTION RESPIRATORY (INHALATION) AS NEEDED
OUTPATIENT
Start: 2024-12-02

## 2024-11-11 RX ORDER — PROCHLORPERAZINE MALEATE 10 MG
10 TABLET ORAL EVERY 6 HOURS PRN
Status: CANCELLED | OUTPATIENT
Start: 2024-11-11

## 2024-11-11 RX ORDER — PROCHLORPERAZINE EDISYLATE 5 MG/ML
10 INJECTION INTRAMUSCULAR; INTRAVENOUS EVERY 6 HOURS PRN
Status: CANCELLED | OUTPATIENT
Start: 2024-11-14

## 2024-11-11 RX ORDER — ONDANSETRON HYDROCHLORIDE 8 MG/1
8 TABLET, FILM COATED ORAL ONCE
Status: CANCELLED | OUTPATIENT
Start: 2024-11-18

## 2024-11-11 RX ORDER — FAMOTIDINE 10 MG/ML
20 INJECTION INTRAVENOUS ONCE AS NEEDED
Status: CANCELLED | OUTPATIENT
Start: 2024-11-21

## 2024-11-11 RX ORDER — ALBUTEROL SULFATE 0.83 MG/ML
3 SOLUTION RESPIRATORY (INHALATION) AS NEEDED
OUTPATIENT
Start: 2024-11-25

## 2024-11-11 RX ORDER — ALBUTEROL SULFATE 0.83 MG/ML
3 SOLUTION RESPIRATORY (INHALATION) AS NEEDED
OUTPATIENT
Start: 2024-11-28

## 2024-11-11 RX ORDER — ONDANSETRON HYDROCHLORIDE 8 MG/1
8 TABLET, FILM COATED ORAL ONCE
OUTPATIENT
Start: 2024-11-28

## 2024-11-11 RX ORDER — DIPHENHYDRAMINE HYDROCHLORIDE 50 MG/ML
50 INJECTION INTRAMUSCULAR; INTRAVENOUS AS NEEDED
OUTPATIENT
Start: 2024-12-05

## 2024-11-11 RX ORDER — DIPHENHYDRAMINE HYDROCHLORIDE 50 MG/ML
50 INJECTION INTRAMUSCULAR; INTRAVENOUS AS NEEDED
Status: CANCELLED | OUTPATIENT
Start: 2024-11-11

## 2024-11-11 RX ORDER — PROCHLORPERAZINE EDISYLATE 5 MG/ML
10 INJECTION INTRAMUSCULAR; INTRAVENOUS EVERY 6 HOURS PRN
Status: CANCELLED | OUTPATIENT
Start: 2024-11-11

## 2024-11-11 RX ORDER — ONDANSETRON HYDROCHLORIDE 8 MG/1
8 TABLET, FILM COATED ORAL ONCE
Status: CANCELLED | OUTPATIENT
Start: 2024-11-21

## 2024-11-11 RX ORDER — ONDANSETRON HYDROCHLORIDE 8 MG/1
8 TABLET, FILM COATED ORAL ONCE
Status: CANCELLED | OUTPATIENT
Start: 2024-11-11

## 2024-11-11 RX ORDER — FAMOTIDINE 10 MG/ML
20 INJECTION INTRAVENOUS ONCE AS NEEDED
OUTPATIENT
Start: 2024-12-02

## 2024-11-11 RX ORDER — PROCHLORPERAZINE EDISYLATE 5 MG/ML
10 INJECTION INTRAMUSCULAR; INTRAVENOUS EVERY 6 HOURS PRN
Status: CANCELLED | OUTPATIENT
Start: 2024-11-21

## 2024-11-11 RX ORDER — PROCHLORPERAZINE EDISYLATE 5 MG/ML
10 INJECTION INTRAMUSCULAR; INTRAVENOUS EVERY 6 HOURS PRN
Status: DISCONTINUED | OUTPATIENT
Start: 2024-11-11 | End: 2024-11-11 | Stop reason: HOSPADM

## 2024-11-11 RX ORDER — EPINEPHRINE 0.3 MG/.3ML
0.3 INJECTION SUBCUTANEOUS EVERY 5 MIN PRN
Status: CANCELLED | OUTPATIENT
Start: 2024-11-18

## 2024-11-11 RX ORDER — PROCHLORPERAZINE EDISYLATE 5 MG/ML
10 INJECTION INTRAMUSCULAR; INTRAVENOUS EVERY 6 HOURS PRN
OUTPATIENT
Start: 2024-11-25

## 2024-11-11 RX ORDER — ONDANSETRON HYDROCHLORIDE 8 MG/1
8 TABLET, FILM COATED ORAL ONCE
Status: COMPLETED | OUTPATIENT
Start: 2024-11-11 | End: 2024-11-11

## 2024-11-11 RX ORDER — PROCHLORPERAZINE MALEATE 10 MG
10 TABLET ORAL EVERY 6 HOURS PRN
OUTPATIENT
Start: 2024-11-28

## 2024-11-11 RX ORDER — PROCHLORPERAZINE MALEATE 10 MG
10 TABLET ORAL EVERY 6 HOURS PRN
Status: CANCELLED | OUTPATIENT
Start: 2024-11-14

## 2024-11-11 RX ORDER — PROCHLORPERAZINE MALEATE 10 MG
10 TABLET ORAL EVERY 6 HOURS PRN
OUTPATIENT
Start: 2024-12-05

## 2024-11-11 RX ORDER — EPINEPHRINE 0.3 MG/.3ML
0.3 INJECTION SUBCUTANEOUS EVERY 5 MIN PRN
Status: DISCONTINUED | OUTPATIENT
Start: 2024-11-11 | End: 2024-11-11 | Stop reason: HOSPADM

## 2024-11-11 RX ORDER — EPINEPHRINE 0.3 MG/.3ML
0.3 INJECTION SUBCUTANEOUS EVERY 5 MIN PRN
Status: CANCELLED | OUTPATIENT
Start: 2024-11-21

## 2024-11-11 RX ORDER — ONDANSETRON HYDROCHLORIDE 8 MG/1
8 TABLET, FILM COATED ORAL ONCE
OUTPATIENT
Start: 2024-11-25

## 2024-11-11 RX ORDER — PROCHLORPERAZINE MALEATE 10 MG
10 TABLET ORAL EVERY 6 HOURS PRN
Status: DISCONTINUED | OUTPATIENT
Start: 2024-11-11 | End: 2024-11-11 | Stop reason: HOSPADM

## 2024-11-11 RX ORDER — ALBUTEROL SULFATE 0.83 MG/ML
3 SOLUTION RESPIRATORY (INHALATION) AS NEEDED
Status: CANCELLED | OUTPATIENT
Start: 2024-11-21

## 2024-11-11 RX ORDER — FAMOTIDINE 10 MG/ML
20 INJECTION INTRAVENOUS ONCE AS NEEDED
OUTPATIENT
Start: 2024-11-28

## 2024-11-11 RX ORDER — PROCHLORPERAZINE EDISYLATE 5 MG/ML
10 INJECTION INTRAMUSCULAR; INTRAVENOUS EVERY 6 HOURS PRN
OUTPATIENT
Start: 2024-11-28

## 2024-11-11 RX ORDER — ALBUTEROL SULFATE 0.83 MG/ML
3 SOLUTION RESPIRATORY (INHALATION) AS NEEDED
Status: CANCELLED | OUTPATIENT
Start: 2024-11-11

## 2024-11-11 RX ORDER — ONDANSETRON HYDROCHLORIDE 8 MG/1
8 TABLET, FILM COATED ORAL ONCE
Status: CANCELLED | OUTPATIENT
Start: 2024-11-14

## 2024-11-11 RX ORDER — FAMOTIDINE 10 MG/ML
20 INJECTION INTRAVENOUS ONCE AS NEEDED
Status: DISCONTINUED | OUTPATIENT
Start: 2024-11-11 | End: 2024-11-11 | Stop reason: HOSPADM

## 2024-11-11 RX ORDER — PROCHLORPERAZINE EDISYLATE 5 MG/ML
10 INJECTION INTRAMUSCULAR; INTRAVENOUS EVERY 6 HOURS PRN
Status: CANCELLED | OUTPATIENT
Start: 2024-11-18

## 2024-11-11 RX ORDER — FAMOTIDINE 10 MG/ML
20 INJECTION INTRAVENOUS ONCE AS NEEDED
Status: CANCELLED | OUTPATIENT
Start: 2024-11-14

## 2024-11-11 RX ORDER — ONDANSETRON HYDROCHLORIDE 8 MG/1
8 TABLET, FILM COATED ORAL ONCE
OUTPATIENT
Start: 2024-12-05

## 2024-11-11 RX ORDER — ALBUTEROL SULFATE 0.83 MG/ML
3 SOLUTION RESPIRATORY (INHALATION) AS NEEDED
Status: DISCONTINUED | OUTPATIENT
Start: 2024-11-11 | End: 2024-11-11 | Stop reason: HOSPADM

## 2024-11-11 RX ORDER — EPINEPHRINE 0.3 MG/.3ML
0.3 INJECTION SUBCUTANEOUS EVERY 5 MIN PRN
OUTPATIENT
Start: 2024-11-25

## 2024-11-11 RX ORDER — PROCHLORPERAZINE MALEATE 10 MG
10 TABLET ORAL EVERY 6 HOURS PRN
Status: CANCELLED | OUTPATIENT
Start: 2024-11-18

## 2024-11-11 RX ORDER — DIPHENHYDRAMINE HYDROCHLORIDE 50 MG/ML
50 INJECTION INTRAMUSCULAR; INTRAVENOUS AS NEEDED
Status: CANCELLED | OUTPATIENT
Start: 2024-11-18

## 2024-11-11 RX ORDER — ALBUTEROL SULFATE 0.83 MG/ML
3 SOLUTION RESPIRATORY (INHALATION) AS NEEDED
Status: CANCELLED | OUTPATIENT
Start: 2024-11-18

## 2024-11-11 RX ORDER — ALBUTEROL SULFATE 0.83 MG/ML
3 SOLUTION RESPIRATORY (INHALATION) AS NEEDED
OUTPATIENT
Start: 2024-12-05

## 2024-11-11 RX ORDER — EPINEPHRINE 0.3 MG/.3ML
0.3 INJECTION SUBCUTANEOUS EVERY 5 MIN PRN
Status: CANCELLED | OUTPATIENT
Start: 2024-11-14

## 2024-11-11 RX ORDER — EPINEPHRINE 0.3 MG/.3ML
0.3 INJECTION SUBCUTANEOUS EVERY 5 MIN PRN
Status: CANCELLED | OUTPATIENT
Start: 2024-11-11

## 2024-11-11 RX ORDER — ONDANSETRON HYDROCHLORIDE 8 MG/1
8 TABLET, FILM COATED ORAL ONCE
OUTPATIENT
Start: 2024-12-02

## 2024-11-11 RX ORDER — PROCHLORPERAZINE EDISYLATE 5 MG/ML
10 INJECTION INTRAMUSCULAR; INTRAVENOUS EVERY 6 HOURS PRN
OUTPATIENT
Start: 2024-12-02

## 2024-11-11 RX ORDER — FAMOTIDINE 10 MG/ML
20 INJECTION INTRAVENOUS ONCE AS NEEDED
OUTPATIENT
Start: 2024-12-05

## 2024-11-11 RX ORDER — DIPHENHYDRAMINE HYDROCHLORIDE 50 MG/ML
50 INJECTION INTRAMUSCULAR; INTRAVENOUS AS NEEDED
OUTPATIENT
Start: 2024-11-25

## 2024-11-11 RX ORDER — DIPHENHYDRAMINE HYDROCHLORIDE 50 MG/ML
50 INJECTION INTRAMUSCULAR; INTRAVENOUS AS NEEDED
Status: DISCONTINUED | OUTPATIENT
Start: 2024-11-11 | End: 2024-11-11 | Stop reason: HOSPADM

## 2024-11-11 RX ORDER — PROCHLORPERAZINE EDISYLATE 5 MG/ML
10 INJECTION INTRAMUSCULAR; INTRAVENOUS EVERY 6 HOURS PRN
OUTPATIENT
Start: 2024-12-05

## 2024-11-11 RX ORDER — DIPHENHYDRAMINE HYDROCHLORIDE 50 MG/ML
50 INJECTION INTRAMUSCULAR; INTRAVENOUS AS NEEDED
OUTPATIENT
Start: 2024-12-02

## 2024-11-11 RX ORDER — ALBUTEROL SULFATE 0.83 MG/ML
3 SOLUTION RESPIRATORY (INHALATION) AS NEEDED
Status: CANCELLED | OUTPATIENT
Start: 2024-11-14

## 2024-11-11 RX ORDER — FAMOTIDINE 10 MG/ML
20 INJECTION INTRAVENOUS ONCE AS NEEDED
Status: CANCELLED | OUTPATIENT
Start: 2024-11-18

## 2024-11-11 RX ORDER — PROCHLORPERAZINE MALEATE 10 MG
10 TABLET ORAL EVERY 6 HOURS PRN
OUTPATIENT
Start: 2024-12-02

## 2024-11-11 RX ORDER — DIPHENHYDRAMINE HYDROCHLORIDE 50 MG/ML
50 INJECTION INTRAMUSCULAR; INTRAVENOUS AS NEEDED
Status: CANCELLED | OUTPATIENT
Start: 2024-11-21

## 2024-11-11 RX ORDER — PROCHLORPERAZINE MALEATE 10 MG
10 TABLET ORAL EVERY 6 HOURS PRN
OUTPATIENT
Start: 2024-11-25

## 2024-11-11 RX ORDER — EPINEPHRINE 0.3 MG/.3ML
0.3 INJECTION SUBCUTANEOUS EVERY 5 MIN PRN
OUTPATIENT
Start: 2024-12-05

## 2024-11-11 RX ORDER — FAMOTIDINE 10 MG/ML
20 INJECTION INTRAVENOUS ONCE AS NEEDED
Status: CANCELLED | OUTPATIENT
Start: 2024-11-11

## 2024-11-11 ASSESSMENT — ENCOUNTER SYMPTOMS
HEADACHES: 0
LIGHT-HEADEDNESS: 0
NAUSEA: 0
VOMITING: 0
DIZZINESS: 0
COUGH: 0
BLOOD IN STOOL: 0
CHILLS: 0
DYSURIA: 0
ABDOMINAL PAIN: 0
CONSTIPATION: 0
FATIGUE: 1
DIARRHEA: 0
FEVER: 0
NUMBNESS: 0
SHORTNESS OF BREATH: 0
HEMATURIA: 0
UNEXPECTED WEIGHT CHANGE: 0
CHEST TIGHTNESS: 0

## 2024-11-11 ASSESSMENT — PAIN SCALES - GENERAL: PAINLEVEL_OUTOF10: 0-NO PAIN

## 2024-11-11 NOTE — PROGRESS NOTES
SW was consulted by BALJIT Lunsford NP, who states that patient is inquiring about obtaining a lift chair.  SW attempted to reach patient via phone on this day, received voicemail box. Left voicemail requesting callback to discuss.

## 2024-11-11 NOTE — PROGRESS NOTES
Patient presents to infusion appointment from clinic in stable condition. Study [id number] azacitidine injections given without incident. ANC 1.94, filgrastim held per orders. Provided copy of lab results to patient spouse. Discharged in stable condition.

## 2024-11-11 NOTE — RESEARCH NOTES
Research Note Treatment Day    Holden Burgess is here today for treatment on [id number]. Today is C2 D1 or Week 7 D1. Procedures completed per protocol. AE's and con-meds reviewed with patient. Patient is aware of treatment plan.    [x]   Received treatment as planned   OR  []    Treatment delayed; patient calendar updated as required   Treatment delayed because:    []   AE    []   Physician Discretion    []   Clinical Deterioration or Progression     []   Other    Education Documentation  Complete Blood Count with Differential (CBC w/ Diff), taught by Keren Khan RN at 11/11/2024  1:18 PM.  Learner: Significant Other, Family, Patient  Readiness: Eager  Method: Explanation, Handout  Response: Verbalizes Understanding    Constipation, taught by Keren Khan RN at 11/11/2024  1:18 PM.  Learner: Significant Other, Family, Patient  Readiness: Eager  Method: Explanation, Handout  Response: Verbalizes Understanding    Treatment Plan and Schedule, taught by Keren Khan RN at 11/11/2024  1:18 PM.  Learner: Significant Other, Family, Patient  Readiness: Eager  Method: Explanation, Handout  Response: Verbalizes Understanding    Education Comments  No comments found.

## 2024-11-11 NOTE — PROGRESS NOTES
Notified by Charmaine in [facility] that PLT 35. Secure Chat sent to Shae Lunsford NP who is seeing patient today.

## 2024-11-13 ENCOUNTER — SOCIAL WORK (OUTPATIENT)
Dept: HEMATOLOGY/ONCOLOGY | Facility: HOSPITAL | Age: 84
End: 2024-11-13
Payer: MEDICARE

## 2024-11-13 NOTE — PROGRESS NOTES
SW received request from patient for information on how to obtain a stair lift.  SW attempted to reach patient to provide him with the following information:  Contact [employer] to obtain names and information on local agencies, as this is a private pay item not typically covered by insurance.  Left voicemail for patient to return call to discuss. Awaiting callback.

## 2024-11-14 ENCOUNTER — DOCUMENTATION (OUTPATIENT)
Dept: HEMATOLOGY/ONCOLOGY | Facility: HOSPITAL | Age: 84
End: 2024-11-14
Payer: MEDICARE

## 2024-11-14 ENCOUNTER — INFUSION (OUTPATIENT)
Dept: HEMATOLOGY/ONCOLOGY | Facility: HOSPITAL | Age: 84
End: 2024-11-14
Payer: MEDICARE

## 2024-11-14 ENCOUNTER — LAB (OUTPATIENT)
Dept: LAB | Facility: HOSPITAL | Age: 84
End: 2024-11-14
Payer: MEDICARE

## 2024-11-14 VITALS
BODY MASS INDEX: 37.53 KG/M2 | OXYGEN SATURATION: 100 % | DIASTOLIC BLOOD PRESSURE: 66 MMHG | SYSTOLIC BLOOD PRESSURE: 153 MMHG | HEART RATE: 91 BPM | RESPIRATION RATE: 18 BRPM | WEIGHT: 284.39 LBS | TEMPERATURE: 97.5 F

## 2024-11-14 DIAGNOSIS — D46.9 ANEMIA DUE TO CHEMOTHERAPY FOR MYELODYSPLASTIC SYNDROME TREATED WITH ERYTHROPOIETIN (MULTI): ICD-10-CM

## 2024-11-14 DIAGNOSIS — D64.81 ANEMIA DUE TO CHEMOTHERAPY FOR MYELODYSPLASTIC SYNDROME TREATED WITH ERYTHROPOIETIN (MULTI): ICD-10-CM

## 2024-11-14 DIAGNOSIS — D46.9 MYELODYSPLASTIC SYNDROME (MULTI): ICD-10-CM

## 2024-11-14 DIAGNOSIS — T45.1X5A ANEMIA DUE TO CHEMOTHERAPY FOR MYELODYSPLASTIC SYNDROME TREATED WITH ERYTHROPOIETIN (MULTI): ICD-10-CM

## 2024-11-14 LAB
ALBUMIN SERPL BCP-MCNC: 4.4 G/DL (ref 3.4–5)
ALP SERPL-CCNC: 86 U/L (ref 33–136)
ALT SERPL W P-5'-P-CCNC: 17 U/L (ref 10–52)
ANION GAP SERPL CALC-SCNC: 13 MMOL/L (ref 10–20)
AST SERPL W P-5'-P-CCNC: 19 U/L (ref 9–39)
BASOPHILS # BLD AUTO: 0.01 X10*3/UL (ref 0–0.1)
BASOPHILS NFR BLD AUTO: 0.5 %
BILIRUB SERPL-MCNC: 1 MG/DL (ref 0–1.2)
BUN SERPL-MCNC: 32 MG/DL (ref 6–23)
CALCIUM SERPL-MCNC: 9.7 MG/DL (ref 8.6–10.6)
CHLORIDE SERPL-SCNC: 108 MMOL/L (ref 98–107)
CO2 SERPL-SCNC: 24 MMOL/L (ref 21–32)
CREAT SERPL-MCNC: 1.43 MG/DL (ref 0.5–1.3)
DACRYOCYTES BLD QL SMEAR: NORMAL
EGFRCR SERPLBLD CKD-EPI 2021: 49 ML/MIN/1.73M*2
EOSINOPHIL # BLD AUTO: 0.06 X10*3/UL (ref 0–0.4)
EOSINOPHIL NFR BLD AUTO: 3.3 %
ERYTHROCYTE [DISTWIDTH] IN BLOOD BY AUTOMATED COUNT: 23.9 % (ref 11.5–14.5)
GLUCOSE SERPL-MCNC: 94 MG/DL (ref 74–99)
HCT VFR BLD AUTO: 28.3 % (ref 41–52)
HGB BLD-MCNC: 9 G/DL (ref 13.5–17.5)
HYPOCHROMIA BLD QL SMEAR: NORMAL
IMM GRANULOCYTES # BLD AUTO: 0.02 X10*3/UL (ref 0–0.5)
IMM GRANULOCYTES NFR BLD AUTO: 1.1 % (ref 0–0.9)
LYMPHOCYTES # BLD AUTO: 0.6 X10*3/UL (ref 0.8–3)
LYMPHOCYTES NFR BLD AUTO: 32.8 %
MCH RBC QN AUTO: 30.9 PG (ref 26–34)
MCHC RBC AUTO-ENTMCNC: 31.8 G/DL (ref 32–36)
MCV RBC AUTO: 97 FL (ref 80–100)
MONOCYTES # BLD AUTO: 0.12 X10*3/UL (ref 0.05–0.8)
MONOCYTES NFR BLD AUTO: 6.6 %
NEUTROPHILS # BLD AUTO: 1.02 X10*3/UL (ref 1.6–5.5)
NEUTROPHILS NFR BLD AUTO: 55.7 %
NRBC BLD-RTO: 0 /100 WBCS (ref 0–0)
OVALOCYTES BLD QL SMEAR: NORMAL
PLATELET # BLD AUTO: 36 X10*3/UL (ref 150–450)
POLYCHROMASIA BLD QL SMEAR: NORMAL
POTASSIUM SERPL-SCNC: 5.2 MMOL/L (ref 3.5–5.3)
PROT SERPL-MCNC: 6.4 G/DL (ref 6.4–8.2)
RBC # BLD AUTO: 2.91 X10*6/UL (ref 4.5–5.9)
RBC MORPH BLD: NORMAL
SCHISTOCYTES BLD QL SMEAR: NORMAL
SODIUM SERPL-SCNC: 140 MMOL/L (ref 136–145)
WBC # BLD AUTO: 1.8 X10*3/UL (ref 4.4–11.3)

## 2024-11-14 PROCEDURE — 2500000005 HC RX 250 GENERAL PHARMACY W/O HCPCS: Performed by: INTERNAL MEDICINE

## 2024-11-14 PROCEDURE — 96372 THER/PROPH/DIAG INJ SC/IM: CPT

## 2024-11-14 PROCEDURE — 36415 COLL VENOUS BLD VENIPUNCTURE: CPT

## 2024-11-14 PROCEDURE — 96401 CHEMO ANTI-NEOPL SQ/IM: CPT

## 2024-11-14 PROCEDURE — 80053 COMPREHEN METABOLIC PANEL: CPT

## 2024-11-14 PROCEDURE — 2560000001 HC RX 256 EXPERIMENTAL DRUGS: Performed by: INTERNAL MEDICINE

## 2024-11-14 PROCEDURE — 2500000004 HC RX 250 GENERAL PHARMACY W/ HCPCS (ALT 636 FOR OP/ED): Mod: JZ,JG | Performed by: INTERNAL MEDICINE

## 2024-11-14 PROCEDURE — 85025 COMPLETE CBC W/AUTO DIFF WBC: CPT

## 2024-11-14 RX ORDER — PROCHLORPERAZINE EDISYLATE 5 MG/ML
10 INJECTION INTRAMUSCULAR; INTRAVENOUS EVERY 6 HOURS PRN
Status: DISCONTINUED | OUTPATIENT
Start: 2024-11-14 | End: 2024-11-14 | Stop reason: HOSPADM

## 2024-11-14 RX ORDER — ALBUTEROL SULFATE 0.83 MG/ML
3 SOLUTION RESPIRATORY (INHALATION) AS NEEDED
OUTPATIENT
Start: 2024-11-18

## 2024-11-14 RX ORDER — FAMOTIDINE 10 MG/ML
20 INJECTION INTRAVENOUS ONCE AS NEEDED
OUTPATIENT
Start: 2024-11-18

## 2024-11-14 RX ORDER — PROCHLORPERAZINE MALEATE 10 MG
10 TABLET ORAL EVERY 6 HOURS PRN
Status: DISCONTINUED | OUTPATIENT
Start: 2024-11-14 | End: 2024-11-14 | Stop reason: HOSPADM

## 2024-11-14 RX ORDER — ONDANSETRON HYDROCHLORIDE 8 MG/1
8 TABLET, FILM COATED ORAL ONCE
Status: COMPLETED | OUTPATIENT
Start: 2024-11-14 | End: 2024-11-14

## 2024-11-14 RX ORDER — EPINEPHRINE 0.3 MG/.3ML
0.3 INJECTION SUBCUTANEOUS EVERY 5 MIN PRN
OUTPATIENT
Start: 2024-11-18

## 2024-11-14 RX ORDER — DIPHENHYDRAMINE HYDROCHLORIDE 50 MG/ML
50 INJECTION INTRAMUSCULAR; INTRAVENOUS AS NEEDED
OUTPATIENT
Start: 2024-11-18

## 2024-11-14 ASSESSMENT — PAIN SCALES - GENERAL: PAINLEVEL_OUTOF10: 0-NO PAIN

## 2024-11-14 NOTE — PROGRESS NOTES
GENARO Burgess is a 83 y.o. male who presents for OP Infusion.    He is on the following chemotherapy regimen:     Treatment Plans       Name Type Plan Dates Plan Provider         Active    ([facility]) [id number] - AzaCITIDine / Decitabine, 42 Day Cycle Followed by 28 Day Cycles Oncology Treatment 9/29/2024 - Present Sebastien Rashid MD                  .    Since his last visit, he has been doing well.  Overall, he states his energy level is stable. Pt states the day after treatment he is very tired and rests most of the day but energy level returns the following day.  His appetite has been good.  He reports constipation and fatigue. Patient's right lower ABD red, pt states that was from his last injection. He has no other concerns.    Pt tolerated decitabine injection to left lower ABD and darbepoetin to back of right arm well with no incident. Pt discharged home in stable condition accompanied by wife

## 2024-11-15 ENCOUNTER — APPOINTMENT (OUTPATIENT)
Dept: HEMATOLOGY/ONCOLOGY | Facility: CLINIC | Age: 84
End: 2024-11-15
Payer: MEDICARE

## 2024-11-18 ENCOUNTER — LAB (OUTPATIENT)
Dept: LAB | Facility: HOSPITAL | Age: 84
End: 2024-11-18
Payer: MEDICARE

## 2024-11-18 ENCOUNTER — INFUSION (OUTPATIENT)
Dept: HEMATOLOGY/ONCOLOGY | Facility: HOSPITAL | Age: 84
End: 2024-11-18
Payer: MEDICARE

## 2024-11-18 VITALS
SYSTOLIC BLOOD PRESSURE: 130 MMHG | BODY MASS INDEX: 37.35 KG/M2 | HEART RATE: 79 BPM | OXYGEN SATURATION: 100 % | TEMPERATURE: 96.3 F | WEIGHT: 283.07 LBS | DIASTOLIC BLOOD PRESSURE: 62 MMHG | RESPIRATION RATE: 18 BRPM

## 2024-11-18 DIAGNOSIS — D46.9 MYELODYSPLASTIC SYNDROME (MULTI): ICD-10-CM

## 2024-11-18 DIAGNOSIS — T45.1X5A ANEMIA DUE TO CHEMOTHERAPY FOR MYELODYSPLASTIC SYNDROME TREATED WITH ERYTHROPOIETIN (MULTI): ICD-10-CM

## 2024-11-18 DIAGNOSIS — D64.81 ANEMIA DUE TO CHEMOTHERAPY FOR MYELODYSPLASTIC SYNDROME TREATED WITH ERYTHROPOIETIN (MULTI): ICD-10-CM

## 2024-11-18 DIAGNOSIS — D46.9 ANEMIA DUE TO CHEMOTHERAPY FOR MYELODYSPLASTIC SYNDROME TREATED WITH ERYTHROPOIETIN (MULTI): ICD-10-CM

## 2024-11-18 LAB
ALBUMIN SERPL BCP-MCNC: 4.2 G/DL (ref 3.4–5)
ALP SERPL-CCNC: 84 U/L (ref 33–136)
ALT SERPL W P-5'-P-CCNC: 19 U/L (ref 10–52)
ANION GAP SERPL CALC-SCNC: 12 MMOL/L (ref 10–20)
AST SERPL W P-5'-P-CCNC: 18 U/L (ref 9–39)
BASOPHILS # BLD AUTO: 0.02 X10*3/UL (ref 0–0.1)
BASOPHILS NFR BLD AUTO: 1 %
BILIRUB SERPL-MCNC: 1.2 MG/DL (ref 0–1.2)
BUN SERPL-MCNC: 31 MG/DL (ref 6–23)
CALCIUM SERPL-MCNC: 9.4 MG/DL (ref 8.6–10.3)
CHLORIDE SERPL-SCNC: 109 MMOL/L (ref 98–107)
CO2 SERPL-SCNC: 24 MMOL/L (ref 21–32)
CREAT SERPL-MCNC: 1.34 MG/DL (ref 0.5–1.3)
DACRYOCYTES BLD QL SMEAR: NORMAL
EGFRCR SERPLBLD CKD-EPI 2021: 52 ML/MIN/1.73M*2
EOSINOPHIL # BLD AUTO: 0.09 X10*3/UL (ref 0–0.4)
EOSINOPHIL NFR BLD AUTO: 4.6 %
ERYTHROCYTE [DISTWIDTH] IN BLOOD BY AUTOMATED COUNT: 23.2 % (ref 11.5–14.5)
GLUCOSE SERPL-MCNC: 105 MG/DL (ref 74–99)
HCT VFR BLD AUTO: 29.2 % (ref 41–52)
HGB BLD-MCNC: 9.2 G/DL (ref 13.5–17.5)
HYPOCHROMIA BLD QL SMEAR: NORMAL
IMM GRANULOCYTES # BLD AUTO: 0.01 X10*3/UL (ref 0–0.5)
IMM GRANULOCYTES NFR BLD AUTO: 0.5 % (ref 0–0.9)
LYMPHOCYTES # BLD AUTO: 0.59 X10*3/UL (ref 0.8–3)
LYMPHOCYTES NFR BLD AUTO: 30.1 %
MCH RBC QN AUTO: 31.3 PG (ref 26–34)
MCHC RBC AUTO-ENTMCNC: 31.5 G/DL (ref 32–36)
MCV RBC AUTO: 99 FL (ref 80–100)
MONOCYTES # BLD AUTO: 0.12 X10*3/UL (ref 0.05–0.8)
MONOCYTES NFR BLD AUTO: 6.1 %
NEUTROPHILS # BLD AUTO: 1.13 X10*3/UL (ref 1.6–5.5)
NEUTROPHILS NFR BLD AUTO: 57.7 %
NRBC BLD-RTO: 1 /100 WBCS (ref 0–0)
OVALOCYTES BLD QL SMEAR: NORMAL
PLATELET # BLD AUTO: 48 X10*3/UL (ref 150–450)
POLYCHROMASIA BLD QL SMEAR: NORMAL
POTASSIUM SERPL-SCNC: 4.9 MMOL/L (ref 3.5–5.3)
PROT SERPL-MCNC: 6.3 G/DL (ref 6.4–8.2)
RBC # BLD AUTO: 2.94 X10*6/UL (ref 4.5–5.9)
RBC MORPH BLD: NORMAL
SCHISTOCYTES BLD QL SMEAR: NORMAL
SODIUM SERPL-SCNC: 140 MMOL/L (ref 136–145)
WBC # BLD AUTO: 2 X10*3/UL (ref 4.4–11.3)

## 2024-11-18 PROCEDURE — 85025 COMPLETE CBC W/AUTO DIFF WBC: CPT

## 2024-11-18 PROCEDURE — 36415 COLL VENOUS BLD VENIPUNCTURE: CPT

## 2024-11-18 PROCEDURE — 2500000005 HC RX 250 GENERAL PHARMACY W/O HCPCS: Performed by: INTERNAL MEDICINE

## 2024-11-18 PROCEDURE — 96401 CHEMO ANTI-NEOPL SQ/IM: CPT

## 2024-11-18 PROCEDURE — 84075 ASSAY ALKALINE PHOSPHATASE: CPT

## 2024-11-18 PROCEDURE — 2560000001 HC RX 256 EXPERIMENTAL DRUGS: Performed by: INTERNAL MEDICINE

## 2024-11-18 RX ORDER — ALBUTEROL SULFATE 0.83 MG/ML
3 SOLUTION RESPIRATORY (INHALATION) AS NEEDED
Status: DISCONTINUED | OUTPATIENT
Start: 2024-11-18 | End: 2024-11-18 | Stop reason: HOSPADM

## 2024-11-18 RX ORDER — ONDANSETRON HYDROCHLORIDE 8 MG/1
8 TABLET, FILM COATED ORAL ONCE
Status: COMPLETED | OUTPATIENT
Start: 2024-11-18 | End: 2024-11-18

## 2024-11-18 RX ORDER — FAMOTIDINE 10 MG/ML
20 INJECTION INTRAVENOUS ONCE AS NEEDED
Status: DISCONTINUED | OUTPATIENT
Start: 2024-11-18 | End: 2024-11-18 | Stop reason: HOSPADM

## 2024-11-18 RX ORDER — DIPHENHYDRAMINE HYDROCHLORIDE 50 MG/ML
50 INJECTION INTRAMUSCULAR; INTRAVENOUS AS NEEDED
Status: DISCONTINUED | OUTPATIENT
Start: 2024-11-18 | End: 2024-11-18 | Stop reason: HOSPADM

## 2024-11-18 RX ORDER — EPINEPHRINE 0.3 MG/.3ML
0.3 INJECTION SUBCUTANEOUS EVERY 5 MIN PRN
Status: DISCONTINUED | OUTPATIENT
Start: 2024-11-18 | End: 2024-11-18 | Stop reason: HOSPADM

## 2024-11-18 RX ORDER — PROCHLORPERAZINE MALEATE 10 MG
10 TABLET ORAL EVERY 6 HOURS PRN
Status: DISCONTINUED | OUTPATIENT
Start: 2024-11-18 | End: 2024-11-18 | Stop reason: HOSPADM

## 2024-11-18 RX ORDER — PROCHLORPERAZINE EDISYLATE 5 MG/ML
10 INJECTION INTRAMUSCULAR; INTRAVENOUS EVERY 6 HOURS PRN
Status: DISCONTINUED | OUTPATIENT
Start: 2024-11-18 | End: 2024-11-18 | Stop reason: HOSPADM

## 2024-11-18 NOTE — PROGRESS NOTES
Patient presents to infusion appointment in stable condition. Study [id number] azacitidine injections tolerated without incident. ANC 1.13, filgrastim held per orders. Lab results and schedule reviewed. Discharged in stable condition.

## 2024-11-21 ENCOUNTER — LAB (OUTPATIENT)
Dept: LAB | Facility: HOSPITAL | Age: 84
End: 2024-11-21
Payer: MEDICARE

## 2024-11-21 ENCOUNTER — INFUSION (OUTPATIENT)
Dept: HEMATOLOGY/ONCOLOGY | Facility: HOSPITAL | Age: 84
End: 2024-11-21
Payer: MEDICARE

## 2024-11-21 VITALS
SYSTOLIC BLOOD PRESSURE: 141 MMHG | HEART RATE: 81 BPM | DIASTOLIC BLOOD PRESSURE: 60 MMHG | OXYGEN SATURATION: 99 % | RESPIRATION RATE: 18 BRPM | WEIGHT: 282 LBS | BODY MASS INDEX: 37.21 KG/M2 | TEMPERATURE: 97.5 F

## 2024-11-21 DIAGNOSIS — D46.9 MYELODYSPLASTIC SYNDROME (MULTI): ICD-10-CM

## 2024-11-21 DIAGNOSIS — D46.9 ANEMIA DUE TO CHEMOTHERAPY FOR MYELODYSPLASTIC SYNDROME TREATED WITH ERYTHROPOIETIN (MULTI): ICD-10-CM

## 2024-11-21 DIAGNOSIS — T45.1X5A ANEMIA DUE TO CHEMOTHERAPY FOR MYELODYSPLASTIC SYNDROME TREATED WITH ERYTHROPOIETIN (MULTI): ICD-10-CM

## 2024-11-21 DIAGNOSIS — D64.81 ANEMIA DUE TO CHEMOTHERAPY FOR MYELODYSPLASTIC SYNDROME TREATED WITH ERYTHROPOIETIN (MULTI): ICD-10-CM

## 2024-11-21 LAB
ALBUMIN SERPL BCP-MCNC: 4 G/DL (ref 3.4–5)
ALP SERPL-CCNC: 83 U/L (ref 33–136)
ALT SERPL W P-5'-P-CCNC: 16 U/L (ref 10–52)
ANION GAP SERPL CALC-SCNC: 13 MMOL/L (ref 10–20)
AST SERPL W P-5'-P-CCNC: 18 U/L (ref 9–39)
BASOPHILS # BLD AUTO: 0.01 X10*3/UL (ref 0–0.1)
BASOPHILS NFR BLD AUTO: 0.5 %
BILIRUB SERPL-MCNC: 1 MG/DL (ref 0–1.2)
BUN SERPL-MCNC: 31 MG/DL (ref 6–23)
CALCIUM SERPL-MCNC: 9.1 MG/DL (ref 8.6–10.3)
CHLORIDE SERPL-SCNC: 108 MMOL/L (ref 98–107)
CO2 SERPL-SCNC: 25 MMOL/L (ref 21–32)
CREAT SERPL-MCNC: 1.4 MG/DL (ref 0.5–1.3)
DACRYOCYTES BLD QL SMEAR: NORMAL
EGFRCR SERPLBLD CKD-EPI 2021: 50 ML/MIN/1.73M*2
EOSINOPHIL # BLD AUTO: 0.09 X10*3/UL (ref 0–0.4)
EOSINOPHIL NFR BLD AUTO: 4.6 %
ERYTHROCYTE [DISTWIDTH] IN BLOOD BY AUTOMATED COUNT: 23.6 % (ref 11.5–14.5)
FERRITIN SERPL-MCNC: 446 NG/ML (ref 20–300)
GLUCOSE SERPL-MCNC: 94 MG/DL (ref 74–99)
HCT VFR BLD AUTO: 28.3 % (ref 41–52)
HGB BLD-MCNC: 8.9 G/DL (ref 13.5–17.5)
HYPOCHROMIA BLD QL SMEAR: NORMAL
IMM GRANULOCYTES # BLD AUTO: 0.01 X10*3/UL (ref 0–0.5)
IMM GRANULOCYTES NFR BLD AUTO: 0.5 % (ref 0–0.9)
IRON SATN MFR SERPL: 29 % (ref 25–45)
IRON SERPL-MCNC: 66 UG/DL (ref 35–150)
LYMPHOCYTES # BLD AUTO: 0.59 X10*3/UL (ref 0.8–3)
LYMPHOCYTES NFR BLD AUTO: 30.1 %
MCH RBC QN AUTO: 30.6 PG (ref 26–34)
MCHC RBC AUTO-ENTMCNC: 31.4 G/DL (ref 32–36)
MCV RBC AUTO: 97 FL (ref 80–100)
MONOCYTES # BLD AUTO: 0.11 X10*3/UL (ref 0.05–0.8)
MONOCYTES NFR BLD AUTO: 5.6 %
NEUTROPHILS # BLD AUTO: 1.15 X10*3/UL (ref 1.6–5.5)
NEUTROPHILS NFR BLD AUTO: 58.7 %
NRBC BLD-RTO: 1 /100 WBCS (ref 0–0)
OVALOCYTES BLD QL SMEAR: NORMAL
PLATELET # BLD AUTO: 46 X10*3/UL (ref 150–450)
POLYCHROMASIA BLD QL SMEAR: NORMAL
POTASSIUM SERPL-SCNC: 5.2 MMOL/L (ref 3.5–5.3)
PROT SERPL-MCNC: 6.1 G/DL (ref 6.4–8.2)
RBC # BLD AUTO: 2.91 X10*6/UL (ref 4.5–5.9)
RBC MORPH BLD: NORMAL
SCHISTOCYTES BLD QL SMEAR: NORMAL
SODIUM SERPL-SCNC: 141 MMOL/L (ref 136–145)
TIBC SERPL-MCNC: 229 UG/DL (ref 240–445)
UIBC SERPL-MCNC: 163 UG/DL (ref 110–370)
WBC # BLD AUTO: 2 X10*3/UL (ref 4.4–11.3)

## 2024-11-21 PROCEDURE — 83540 ASSAY OF IRON: CPT

## 2024-11-21 PROCEDURE — 80053 COMPREHEN METABOLIC PANEL: CPT

## 2024-11-21 PROCEDURE — 36415 COLL VENOUS BLD VENIPUNCTURE: CPT

## 2024-11-21 PROCEDURE — 2560000001 HC RX 256 EXPERIMENTAL DRUGS: Performed by: INTERNAL MEDICINE

## 2024-11-21 PROCEDURE — 96401 CHEMO ANTI-NEOPL SQ/IM: CPT

## 2024-11-21 PROCEDURE — 2500000005 HC RX 250 GENERAL PHARMACY W/O HCPCS: Performed by: INTERNAL MEDICINE

## 2024-11-21 PROCEDURE — 85025 COMPLETE CBC W/AUTO DIFF WBC: CPT

## 2024-11-21 PROCEDURE — 82728 ASSAY OF FERRITIN: CPT

## 2024-11-21 RX ORDER — PROCHLORPERAZINE EDISYLATE 5 MG/ML
10 INJECTION INTRAMUSCULAR; INTRAVENOUS EVERY 6 HOURS PRN
Status: DISCONTINUED | OUTPATIENT
Start: 2024-11-21 | End: 2024-11-21 | Stop reason: HOSPADM

## 2024-11-21 RX ORDER — ONDANSETRON HYDROCHLORIDE 8 MG/1
8 TABLET, FILM COATED ORAL ONCE
Status: COMPLETED | OUTPATIENT
Start: 2024-11-21 | End: 2024-11-21

## 2024-11-21 RX ORDER — PROCHLORPERAZINE MALEATE 10 MG
10 TABLET ORAL EVERY 6 HOURS PRN
Status: DISCONTINUED | OUTPATIENT
Start: 2024-11-21 | End: 2024-11-21 | Stop reason: HOSPADM

## 2024-11-21 ASSESSMENT — PAIN SCALES - GENERAL: PAINLEVEL_OUTOF10: 0-NO PAIN

## 2024-11-21 NOTE — PROGRESS NOTES
GENARO Burgess is a 84 y.o. male who presents for study [id number] subcutaneous decitabine injection.    He is on the following chemotherapy regimen:     Treatment Plans       Name Type Plan Dates Plan Provider         Active    ([facility]) [id number] - AzaCITIDine / Decitabine, 42 Day Cycle Followed by 28 Day Cycles Oncology Treatment 9/29/2024 - Present Sebastien Rashid MD                  .    Patient assessment unchanged from yesterday. Patient has been struggling with constipation and took lactulose last night and was able to have a BM. Pt has redness on left side of ABD from previous chemo injections, pt says it is mildly sore. ANC 1.15 and filgrastim held per orders.    Pt tolerated decitabine injection to [facility] well without incident. Pt discharged home in stable condition without incident.

## 2024-11-26 ENCOUNTER — INFUSION (OUTPATIENT)
Dept: HEMATOLOGY/ONCOLOGY | Facility: HOSPITAL | Age: 84
End: 2024-11-26
Payer: MEDICARE

## 2024-11-26 ENCOUNTER — LAB (OUTPATIENT)
Dept: LAB | Facility: HOSPITAL | Age: 84
End: 2024-11-26
Payer: MEDICARE

## 2024-11-26 VITALS
OXYGEN SATURATION: 99 % | WEIGHT: 281 LBS | RESPIRATION RATE: 20 BRPM | DIASTOLIC BLOOD PRESSURE: 64 MMHG | BODY MASS INDEX: 37.08 KG/M2 | TEMPERATURE: 97.7 F | SYSTOLIC BLOOD PRESSURE: 134 MMHG | HEART RATE: 87 BPM

## 2024-11-26 DIAGNOSIS — D46.9 MYELODYSPLASTIC SYNDROME (MULTI): ICD-10-CM

## 2024-11-26 LAB
ALBUMIN SERPL BCP-MCNC: 4.2 G/DL (ref 3.4–5)
ALP SERPL-CCNC: 83 U/L (ref 33–136)
ALT SERPL W P-5'-P-CCNC: 18 U/L (ref 10–52)
ANION GAP SERPL CALC-SCNC: 12 MMOL/L (ref 10–20)
AST SERPL W P-5'-P-CCNC: 17 U/L (ref 9–39)
BASOPHILS # BLD AUTO: 0.01 X10*3/UL (ref 0–0.1)
BASOPHILS NFR BLD AUTO: 0.5 %
BILIRUB SERPL-MCNC: 1 MG/DL (ref 0–1.2)
BUN SERPL-MCNC: 29 MG/DL (ref 6–23)
CALCIUM SERPL-MCNC: 9.1 MG/DL (ref 8.6–10.3)
CHLORIDE SERPL-SCNC: 108 MMOL/L (ref 98–107)
CO2 SERPL-SCNC: 24 MMOL/L (ref 21–32)
CREAT SERPL-MCNC: 1.39 MG/DL (ref 0.5–1.3)
DACRYOCYTES BLD QL SMEAR: NORMAL
EGFRCR SERPLBLD CKD-EPI 2021: 50 ML/MIN/1.73M*2
EOSINOPHIL # BLD AUTO: 0.08 X10*3/UL (ref 0–0.4)
EOSINOPHIL NFR BLD AUTO: 3.6 %
ERYTHROCYTE [DISTWIDTH] IN BLOOD BY AUTOMATED COUNT: 22.8 % (ref 11.5–14.5)
GLUCOSE SERPL-MCNC: 95 MG/DL (ref 74–99)
HCT VFR BLD AUTO: 29 % (ref 41–52)
HGB BLD-MCNC: 9.1 G/DL (ref 13.5–17.5)
HYPOCHROMIA BLD QL SMEAR: NORMAL
IMM GRANULOCYTES # BLD AUTO: 0.01 X10*3/UL (ref 0–0.5)
IMM GRANULOCYTES NFR BLD AUTO: 0.5 % (ref 0–0.9)
LYMPHOCYTES # BLD AUTO: 0.64 X10*3/UL (ref 0.8–3)
LYMPHOCYTES NFR BLD AUTO: 28.8 %
MCH RBC QN AUTO: 30.6 PG (ref 26–34)
MCHC RBC AUTO-ENTMCNC: 31.4 G/DL (ref 32–36)
MCV RBC AUTO: 98 FL (ref 80–100)
MONOCYTES # BLD AUTO: 0.12 X10*3/UL (ref 0.05–0.8)
MONOCYTES NFR BLD AUTO: 5.4 %
NEUTROPHILS # BLD AUTO: 1.36 X10*3/UL (ref 1.6–5.5)
NEUTROPHILS NFR BLD AUTO: 61.2 %
NRBC BLD-RTO: 0.9 /100 WBCS (ref 0–0)
OVALOCYTES BLD QL SMEAR: NORMAL
PLATELET # BLD AUTO: 44 X10*3/UL (ref 150–450)
POLYCHROMASIA BLD QL SMEAR: NORMAL
POTASSIUM SERPL-SCNC: 4.6 MMOL/L (ref 3.5–5.3)
PROT SERPL-MCNC: 6.4 G/DL (ref 6.4–8.2)
RBC # BLD AUTO: 2.97 X10*6/UL (ref 4.5–5.9)
RBC MORPH BLD: NORMAL
SCHISTOCYTES BLD QL SMEAR: NORMAL
SODIUM SERPL-SCNC: 139 MMOL/L (ref 136–145)
WBC # BLD AUTO: 2.2 X10*3/UL (ref 4.4–11.3)

## 2024-11-26 PROCEDURE — 2560000001 HC RX 256 EXPERIMENTAL DRUGS: Performed by: INTERNAL MEDICINE

## 2024-11-26 PROCEDURE — 80053 COMPREHEN METABOLIC PANEL: CPT

## 2024-11-26 PROCEDURE — 96401 CHEMO ANTI-NEOPL SQ/IM: CPT

## 2024-11-26 PROCEDURE — 36415 COLL VENOUS BLD VENIPUNCTURE: CPT

## 2024-11-26 PROCEDURE — 2500000005 HC RX 250 GENERAL PHARMACY W/O HCPCS: Performed by: INTERNAL MEDICINE

## 2024-11-26 PROCEDURE — 85025 COMPLETE CBC W/AUTO DIFF WBC: CPT

## 2024-11-26 RX ORDER — PROCHLORPERAZINE EDISYLATE 5 MG/ML
10 INJECTION INTRAMUSCULAR; INTRAVENOUS EVERY 6 HOURS PRN
Status: DISCONTINUED | OUTPATIENT
Start: 2024-11-26 | End: 2024-11-26 | Stop reason: HOSPADM

## 2024-11-26 RX ORDER — PROCHLORPERAZINE MALEATE 10 MG
10 TABLET ORAL EVERY 6 HOURS PRN
Status: DISCONTINUED | OUTPATIENT
Start: 2024-11-26 | End: 2024-11-26 | Stop reason: HOSPADM

## 2024-11-26 RX ORDER — DIPHENHYDRAMINE HYDROCHLORIDE 50 MG/ML
50 INJECTION INTRAMUSCULAR; INTRAVENOUS AS NEEDED
Status: DISCONTINUED | OUTPATIENT
Start: 2024-11-26 | End: 2024-11-26 | Stop reason: HOSPADM

## 2024-11-26 RX ORDER — EPINEPHRINE 0.3 MG/.3ML
0.3 INJECTION SUBCUTANEOUS EVERY 5 MIN PRN
Status: DISCONTINUED | OUTPATIENT
Start: 2024-11-26 | End: 2024-11-26 | Stop reason: HOSPADM

## 2024-11-26 RX ORDER — FAMOTIDINE 10 MG/ML
20 INJECTION INTRAVENOUS ONCE AS NEEDED
Status: DISCONTINUED | OUTPATIENT
Start: 2024-11-26 | End: 2024-11-26 | Stop reason: HOSPADM

## 2024-11-26 RX ORDER — ALBUTEROL SULFATE 0.83 MG/ML
3 SOLUTION RESPIRATORY (INHALATION) AS NEEDED
Status: DISCONTINUED | OUTPATIENT
Start: 2024-11-26 | End: 2024-11-26 | Stop reason: HOSPADM

## 2024-11-26 RX ORDER — ONDANSETRON HYDROCHLORIDE 8 MG/1
8 TABLET, FILM COATED ORAL ONCE
Status: COMPLETED | OUTPATIENT
Start: 2024-11-26 | End: 2024-11-26

## 2024-11-26 ASSESSMENT — PAIN SCALES - GENERAL: PAINLEVEL_OUTOF10: 0-NO PAIN

## 2024-11-26 NOTE — PROGRESS NOTES
Patient presents to infusion appointment in stable condition. Study [id number] azacitidine injections tolerated without incident. ANC 1.36, filgrastim held per orders. Lab results and schedule reviewed. Discharged in stable condition.

## 2024-11-27 DIAGNOSIS — F51.01 PRIMARY INSOMNIA: ICD-10-CM

## 2024-11-27 NOTE — TELEPHONE ENCOUNTER
[id number] - [place], [place] - [address] AT [address] OF [address] & [address]   Pt needs refill on  temazepam (Restoril) 15 mg capsule   Only has 5 left

## 2024-11-27 NOTE — TELEPHONE ENCOUNTER
Recent Visits  Date Type Provider Dept   10/08/24 Clinical Support Zarina Desouza MA [facility]   09/16/24 Office Visit Giacomo Joseph, DO [facility]   08/23/24 Office Visit Giacomo Joseph, DO [facility]   Showing recent visits within past 180 days and meeting all other requirements  Future Appointments  No visits were found meeting these conditions.  Showing future appointments within next 90 days and meeting all other requirements

## 2024-11-29 ENCOUNTER — LAB (OUTPATIENT)
Dept: LAB | Facility: HOSPITAL | Age: 84
End: 2024-11-29
Payer: MEDICARE

## 2024-11-29 ENCOUNTER — INFUSION (OUTPATIENT)
Dept: HEMATOLOGY/ONCOLOGY | Facility: HOSPITAL | Age: 84
End: 2024-11-29
Payer: MEDICARE

## 2024-11-29 VITALS
SYSTOLIC BLOOD PRESSURE: 131 MMHG | HEIGHT: 72 IN | RESPIRATION RATE: 22 BRPM | OXYGEN SATURATION: 100 % | DIASTOLIC BLOOD PRESSURE: 52 MMHG | BODY MASS INDEX: 37.45 KG/M2 | WEIGHT: 276.46 LBS | HEART RATE: 94 BPM | TEMPERATURE: 96.3 F

## 2024-11-29 DIAGNOSIS — D46.9 ANEMIA DUE TO CHEMOTHERAPY FOR MYELODYSPLASTIC SYNDROME TREATED WITH ERYTHROPOIETIN (MULTI): ICD-10-CM

## 2024-11-29 DIAGNOSIS — T45.1X5A ANEMIA DUE TO CHEMOTHERAPY FOR MYELODYSPLASTIC SYNDROME TREATED WITH ERYTHROPOIETIN (MULTI): ICD-10-CM

## 2024-11-29 DIAGNOSIS — D46.9 MYELODYSPLASTIC SYNDROME (MULTI): ICD-10-CM

## 2024-11-29 DIAGNOSIS — D64.81 ANEMIA DUE TO CHEMOTHERAPY FOR MYELODYSPLASTIC SYNDROME TREATED WITH ERYTHROPOIETIN (MULTI): ICD-10-CM

## 2024-11-29 LAB
ALBUMIN SERPL BCP-MCNC: 4 G/DL (ref 3.4–5)
ALP SERPL-CCNC: 77 U/L (ref 33–136)
ALT SERPL W P-5'-P-CCNC: 17 U/L (ref 10–52)
ANION GAP SERPL CALC-SCNC: 13 MMOL/L (ref 10–20)
AST SERPL W P-5'-P-CCNC: 17 U/L (ref 9–39)
BASOPHILS # BLD AUTO: 0.01 X10*3/UL (ref 0–0.1)
BASOPHILS NFR BLD AUTO: 0.5 %
BILIRUB SERPL-MCNC: 1 MG/DL (ref 0–1.2)
BUN SERPL-MCNC: 33 MG/DL (ref 6–23)
CALCIUM SERPL-MCNC: 9.3 MG/DL (ref 8.6–10.3)
CHLORIDE SERPL-SCNC: 108 MMOL/L (ref 98–107)
CO2 SERPL-SCNC: 23 MMOL/L (ref 21–32)
CREAT SERPL-MCNC: 1.39 MG/DL (ref 0.5–1.3)
DACRYOCYTES BLD QL SMEAR: NORMAL
EGFRCR SERPLBLD CKD-EPI 2021: 50 ML/MIN/1.73M*2
EOSINOPHIL # BLD AUTO: 0.07 X10*3/UL (ref 0–0.4)
EOSINOPHIL NFR BLD AUTO: 3.2 %
ERYTHROCYTE [DISTWIDTH] IN BLOOD BY AUTOMATED COUNT: 21.9 % (ref 11.5–14.5)
GLUCOSE SERPL-MCNC: 101 MG/DL (ref 74–99)
HCT VFR BLD AUTO: 28.1 % (ref 41–52)
HGB BLD-MCNC: 8.8 G/DL (ref 13.5–17.5)
HYPOCHROMIA BLD QL SMEAR: NORMAL
IMM GRANULOCYTES # BLD AUTO: 0.01 X10*3/UL (ref 0–0.5)
IMM GRANULOCYTES NFR BLD AUTO: 0.5 % (ref 0–0.9)
LYMPHOCYTES # BLD AUTO: 0.66 X10*3/UL (ref 0.8–3)
LYMPHOCYTES NFR BLD AUTO: 30 %
MCH RBC QN AUTO: 30.7 PG (ref 26–34)
MCHC RBC AUTO-ENTMCNC: 31.3 G/DL (ref 32–36)
MCV RBC AUTO: 98 FL (ref 80–100)
MONOCYTES # BLD AUTO: 0.11 X10*3/UL (ref 0.05–0.8)
MONOCYTES NFR BLD AUTO: 5 %
NEUTROPHILS # BLD AUTO: 1.34 X10*3/UL (ref 1.6–5.5)
NEUTROPHILS NFR BLD AUTO: 60.8 %
NRBC BLD-RTO: 0 /100 WBCS (ref 0–0)
OVALOCYTES BLD QL SMEAR: NORMAL
PLATELET # BLD AUTO: 36 X10*3/UL (ref 150–450)
POLYCHROMASIA BLD QL SMEAR: NORMAL
POTASSIUM SERPL-SCNC: 4.7 MMOL/L (ref 3.5–5.3)
PROT SERPL-MCNC: 6.2 G/DL (ref 6.4–8.2)
RBC # BLD AUTO: 2.87 X10*6/UL (ref 4.5–5.9)
RBC MORPH BLD: NORMAL
SCHISTOCYTES BLD QL SMEAR: NORMAL
SODIUM SERPL-SCNC: 139 MMOL/L (ref 136–145)
WBC # BLD AUTO: 2.2 X10*3/UL (ref 4.4–11.3)

## 2024-11-29 PROCEDURE — 80053 COMPREHEN METABOLIC PANEL: CPT

## 2024-11-29 PROCEDURE — 85025 COMPLETE CBC W/AUTO DIFF WBC: CPT

## 2024-11-29 PROCEDURE — 2500000005 HC RX 250 GENERAL PHARMACY W/O HCPCS: Performed by: INTERNAL MEDICINE

## 2024-11-29 PROCEDURE — 2500000004 HC RX 250 GENERAL PHARMACY W/ HCPCS (ALT 636 FOR OP/ED): Mod: JZ,JG | Performed by: INTERNAL MEDICINE

## 2024-11-29 PROCEDURE — 96372 THER/PROPH/DIAG INJ SC/IM: CPT

## 2024-11-29 PROCEDURE — 36415 COLL VENOUS BLD VENIPUNCTURE: CPT

## 2024-11-29 PROCEDURE — 96401 CHEMO ANTI-NEOPL SQ/IM: CPT

## 2024-11-29 PROCEDURE — 2560000001 HC RX 256 EXPERIMENTAL DRUGS: Performed by: INTERNAL MEDICINE

## 2024-11-29 RX ORDER — ALBUTEROL SULFATE 0.83 MG/ML
3 SOLUTION RESPIRATORY (INHALATION) AS NEEDED
Status: DISCONTINUED | OUTPATIENT
Start: 2024-11-29 | End: 2024-11-29 | Stop reason: HOSPADM

## 2024-11-29 RX ORDER — ALBUTEROL SULFATE 0.83 MG/ML
3 SOLUTION RESPIRATORY (INHALATION) AS NEEDED
OUTPATIENT
Start: 2024-12-02

## 2024-11-29 RX ORDER — ONDANSETRON HYDROCHLORIDE 8 MG/1
8 TABLET, FILM COATED ORAL ONCE
Status: COMPLETED | OUTPATIENT
Start: 2024-11-29 | End: 2024-11-29

## 2024-11-29 RX ORDER — EPINEPHRINE 0.3 MG/.3ML
0.3 INJECTION SUBCUTANEOUS EVERY 5 MIN PRN
Status: DISCONTINUED | OUTPATIENT
Start: 2024-11-29 | End: 2024-11-29 | Stop reason: HOSPADM

## 2024-11-29 RX ORDER — PROCHLORPERAZINE MALEATE 10 MG
10 TABLET ORAL EVERY 6 HOURS PRN
Status: DISCONTINUED | OUTPATIENT
Start: 2024-11-29 | End: 2024-11-29 | Stop reason: HOSPADM

## 2024-11-29 RX ORDER — FAMOTIDINE 10 MG/ML
20 INJECTION INTRAVENOUS ONCE AS NEEDED
Status: DISCONTINUED | OUTPATIENT
Start: 2024-11-29 | End: 2024-11-29 | Stop reason: HOSPADM

## 2024-11-29 RX ORDER — DIPHENHYDRAMINE HYDROCHLORIDE 50 MG/ML
50 INJECTION INTRAMUSCULAR; INTRAVENOUS AS NEEDED
Status: DISCONTINUED | OUTPATIENT
Start: 2024-11-29 | End: 2024-11-29 | Stop reason: HOSPADM

## 2024-11-29 RX ORDER — PROCHLORPERAZINE EDISYLATE 5 MG/ML
10 INJECTION INTRAMUSCULAR; INTRAVENOUS EVERY 6 HOURS PRN
Status: DISCONTINUED | OUTPATIENT
Start: 2024-11-29 | End: 2024-11-29 | Stop reason: HOSPADM

## 2024-11-29 RX ORDER — DIPHENHYDRAMINE HYDROCHLORIDE 50 MG/ML
50 INJECTION INTRAMUSCULAR; INTRAVENOUS AS NEEDED
OUTPATIENT
Start: 2024-12-02

## 2024-11-29 RX ORDER — TEMAZEPAM 15 MG/1
15 CAPSULE ORAL NIGHTLY PRN
Qty: 90 CAPSULE | Refills: 0 | Status: SHIPPED | OUTPATIENT
Start: 2024-11-29

## 2024-11-29 RX ORDER — FAMOTIDINE 10 MG/ML
20 INJECTION INTRAVENOUS ONCE AS NEEDED
OUTPATIENT
Start: 2024-12-02

## 2024-11-29 RX ORDER — EPINEPHRINE 0.3 MG/.3ML
0.3 INJECTION SUBCUTANEOUS EVERY 5 MIN PRN
OUTPATIENT
Start: 2024-12-02

## 2024-11-29 ASSESSMENT — PAIN SCALES - GENERAL
PAINLEVEL_OUTOF10: 0-NO PAIN
PAINLEVEL_OUTOF10: 0-NO PAIN

## 2024-11-29 NOTE — PROGRESS NOTES
Patient presents to infusion appointment in stable condition. Platelets 36, secure chat sent to Dr. Rashid, no new orders. [id number] decitabine and darbepoetin injections tolerated without incident. ANC 1.34, filgrastim held per orders. Lab results and schedule reviewed with patient. Discharged in stable condition.

## 2024-12-02 ENCOUNTER — INFUSION (OUTPATIENT)
Dept: HEMATOLOGY/ONCOLOGY | Facility: HOSPITAL | Age: 84
End: 2024-12-02
Payer: MEDICARE

## 2024-12-02 ENCOUNTER — PATIENT OUTREACH (OUTPATIENT)
Dept: CARDIOLOGY | Facility: CLINIC | Age: 84
End: 2024-12-02

## 2024-12-02 ENCOUNTER — TELEPHONE (OUTPATIENT)
Dept: ADMISSION | Facility: HOSPITAL | Age: 84
End: 2024-12-02

## 2024-12-02 ENCOUNTER — LAB (OUTPATIENT)
Dept: LAB | Facility: HOSPITAL | Age: 84
End: 2024-12-02
Payer: MEDICARE

## 2024-12-02 ENCOUNTER — APPOINTMENT (OUTPATIENT)
Dept: PRIMARY CARE | Facility: CLINIC | Age: 84
End: 2024-12-02
Payer: MEDICARE

## 2024-12-02 VITALS
HEART RATE: 91 BPM | OXYGEN SATURATION: 100 % | TEMPERATURE: 98.1 F | SYSTOLIC BLOOD PRESSURE: 133 MMHG | WEIGHT: 277.78 LBS | BODY MASS INDEX: 37.96 KG/M2 | RESPIRATION RATE: 18 BRPM | DIASTOLIC BLOOD PRESSURE: 53 MMHG

## 2024-12-02 DIAGNOSIS — D46.9 MYELODYSPLASTIC SYNDROME (MULTI): ICD-10-CM

## 2024-12-02 PROBLEM — G93.40 ACUTE ENCEPHALOPATHY: Status: RESOLVED | Noted: 2024-08-31 | Resolved: 2024-12-02

## 2024-12-02 PROBLEM — N28.9 RENAL INSUFFICIENCY: Status: RESOLVED | Noted: 2023-02-19 | Resolved: 2024-12-02

## 2024-12-02 PROBLEM — K13.21 LEUKOPLAKIA OF TONGUE: Status: RESOLVED | Noted: 2023-02-19 | Resolved: 2024-12-02

## 2024-12-02 PROBLEM — R00.1 BRADYCARDIA: Status: RESOLVED | Noted: 2023-02-19 | Resolved: 2024-12-02

## 2024-12-02 PROBLEM — R79.89 ELEVATED D-DIMER: Status: RESOLVED | Noted: 2024-04-05 | Resolved: 2024-12-02

## 2024-12-02 PROBLEM — N30.01 ACUTE CYSTITIS WITH HEMATURIA: Status: RESOLVED | Noted: 2024-08-31 | Resolved: 2024-12-02

## 2024-12-02 PROBLEM — R06.00 DYSPNEA: Status: RESOLVED | Noted: 2024-08-31 | Resolved: 2024-12-02

## 2024-12-02 PROBLEM — R25.2 MUSCLE CRAMPS: Status: RESOLVED | Noted: 2023-02-19 | Resolved: 2024-12-02

## 2024-12-02 LAB
ALBUMIN SERPL BCP-MCNC: 4.2 G/DL (ref 3.4–5)
ALP SERPL-CCNC: 79 U/L (ref 33–136)
ALT SERPL W P-5'-P-CCNC: 17 U/L (ref 10–52)
ANION GAP SERPL CALC-SCNC: 14 MMOL/L (ref 10–20)
AST SERPL W P-5'-P-CCNC: 18 U/L (ref 9–39)
BASOPHILS # BLD AUTO: 0.01 X10*3/UL (ref 0–0.1)
BASOPHILS NFR BLD AUTO: 0.5 %
BILIRUB SERPL-MCNC: 1.1 MG/DL (ref 0–1.2)
BUN SERPL-MCNC: 40 MG/DL (ref 6–23)
CALCIUM SERPL-MCNC: 9.1 MG/DL (ref 8.6–10.3)
CHLORIDE SERPL-SCNC: 108 MMOL/L (ref 98–107)
CO2 SERPL-SCNC: 22 MMOL/L (ref 21–32)
CREAT SERPL-MCNC: 1.61 MG/DL (ref 0.5–1.3)
DACRYOCYTES BLD QL SMEAR: NORMAL
EGFRCR SERPLBLD CKD-EPI 2021: 42 ML/MIN/1.73M*2
EOSINOPHIL # BLD AUTO: 0.08 X10*3/UL (ref 0–0.4)
EOSINOPHIL NFR BLD AUTO: 4.3 %
ERYTHROCYTE [DISTWIDTH] IN BLOOD BY AUTOMATED COUNT: 22.5 % (ref 11.5–14.5)
GLUCOSE SERPL-MCNC: 84 MG/DL (ref 74–99)
HCT VFR BLD AUTO: 27.6 % (ref 41–52)
HGB BLD-MCNC: 8.7 G/DL (ref 13.5–17.5)
HGB RETIC QN: 31 PG (ref 28–38)
HYPOCHROMIA BLD QL SMEAR: NORMAL
IMM GRANULOCYTES # BLD AUTO: 0.02 X10*3/UL (ref 0–0.5)
IMM GRANULOCYTES NFR BLD AUTO: 1.1 % (ref 0–0.9)
IMMATURE RETIC FRACTION: 17.8 %
LDH SERPL L TO P-CCNC: 233 U/L (ref 84–246)
LYMPHOCYTES # BLD AUTO: 0.6 X10*3/UL (ref 0.8–3)
LYMPHOCYTES NFR BLD AUTO: 31.9 %
MAGNESIUM SERPL-MCNC: 1.76 MG/DL (ref 1.6–2.4)
MCH RBC QN AUTO: 30.6 PG (ref 26–34)
MCHC RBC AUTO-ENTMCNC: 31.5 G/DL (ref 32–36)
MCV RBC AUTO: 97 FL (ref 80–100)
MONOCYTES # BLD AUTO: 0.09 X10*3/UL (ref 0.05–0.8)
MONOCYTES NFR BLD AUTO: 4.8 %
NEUTROPHILS # BLD AUTO: 1.08 X10*3/UL (ref 1.6–5.5)
NEUTROPHILS NFR BLD AUTO: 57.4 %
NRBC BLD-RTO: 1.1 /100 WBCS (ref 0–0)
OVALOCYTES BLD QL SMEAR: NORMAL
PLATELET # BLD AUTO: 40 X10*3/UL (ref 150–450)
POLYCHROMASIA BLD QL SMEAR: NORMAL
POTASSIUM SERPL-SCNC: 4.6 MMOL/L (ref 3.5–5.3)
PROT SERPL-MCNC: 6.2 G/DL (ref 6.4–8.2)
RBC # BLD AUTO: 2.84 X10*6/UL (ref 4.5–5.9)
RBC MORPH BLD: NORMAL
RETICS #: 0.06 X10*6/UL (ref 0.02–0.11)
RETICS/RBC NFR AUTO: 2 % (ref 0.5–2)
SCHISTOCYTES BLD QL SMEAR: NORMAL
SODIUM SERPL-SCNC: 139 MMOL/L (ref 136–145)
URATE SERPL-MCNC: 5.7 MG/DL (ref 4–7.5)
WBC # BLD AUTO: 1.9 X10*3/UL (ref 4.4–11.3)

## 2024-12-02 PROCEDURE — 83615 LACTATE (LD) (LDH) ENZYME: CPT

## 2024-12-02 PROCEDURE — 83735 ASSAY OF MAGNESIUM: CPT

## 2024-12-02 PROCEDURE — 2500000005 HC RX 250 GENERAL PHARMACY W/O HCPCS: Performed by: INTERNAL MEDICINE

## 2024-12-02 PROCEDURE — 36415 COLL VENOUS BLD VENIPUNCTURE: CPT

## 2024-12-02 PROCEDURE — 85025 COMPLETE CBC W/AUTO DIFF WBC: CPT

## 2024-12-02 PROCEDURE — 2560000001 HC RX 256 EXPERIMENTAL DRUGS: Performed by: INTERNAL MEDICINE

## 2024-12-02 PROCEDURE — 84075 ASSAY ALKALINE PHOSPHATASE: CPT

## 2024-12-02 PROCEDURE — 96401 CHEMO ANTI-NEOPL SQ/IM: CPT

## 2024-12-02 PROCEDURE — 84550 ASSAY OF BLOOD/URIC ACID: CPT

## 2024-12-02 PROCEDURE — 85045 AUTOMATED RETICULOCYTE COUNT: CPT

## 2024-12-02 RX ORDER — DIPHENHYDRAMINE HYDROCHLORIDE 50 MG/ML
50 INJECTION INTRAMUSCULAR; INTRAVENOUS AS NEEDED
Status: DISCONTINUED | OUTPATIENT
Start: 2024-12-02 | End: 2024-12-02 | Stop reason: HOSPADM

## 2024-12-02 RX ORDER — FAMOTIDINE 10 MG/ML
20 INJECTION INTRAVENOUS ONCE AS NEEDED
Status: DISCONTINUED | OUTPATIENT
Start: 2024-12-02 | End: 2024-12-02 | Stop reason: HOSPADM

## 2024-12-02 RX ORDER — PROCHLORPERAZINE EDISYLATE 5 MG/ML
10 INJECTION INTRAMUSCULAR; INTRAVENOUS EVERY 6 HOURS PRN
Status: DISCONTINUED | OUTPATIENT
Start: 2024-12-02 | End: 2024-12-02 | Stop reason: HOSPADM

## 2024-12-02 RX ORDER — ONDANSETRON HYDROCHLORIDE 8 MG/1
8 TABLET, FILM COATED ORAL ONCE
Status: COMPLETED | OUTPATIENT
Start: 2024-12-02 | End: 2024-12-02

## 2024-12-02 RX ORDER — PROCHLORPERAZINE MALEATE 10 MG
10 TABLET ORAL EVERY 6 HOURS PRN
Status: DISCONTINUED | OUTPATIENT
Start: 2024-12-02 | End: 2024-12-02 | Stop reason: HOSPADM

## 2024-12-02 RX ORDER — EPINEPHRINE 0.3 MG/.3ML
0.3 INJECTION SUBCUTANEOUS EVERY 5 MIN PRN
Status: DISCONTINUED | OUTPATIENT
Start: 2024-12-02 | End: 2024-12-02 | Stop reason: HOSPADM

## 2024-12-02 RX ORDER — ALBUTEROL SULFATE 0.83 MG/ML
3 SOLUTION RESPIRATORY (INHALATION) AS NEEDED
Status: DISCONTINUED | OUTPATIENT
Start: 2024-12-02 | End: 2024-12-02 | Stop reason: HOSPADM

## 2024-12-02 ASSESSMENT — PAIN SCALES - GENERAL: PAINLEVEL_OUTOF10: 0-NO PAIN

## 2024-12-02 NOTE — TELEPHONE ENCOUNTER
Pt awakened middle of night with spontaneous nosebleed.   Bleeding was spotty and lasted approx 10 min. No clots. Used a few tissues and ice pack to stop bleeding.   Pt scheduled today for labs/infusion  Pt denies dizziness, weakness, dyspnea.   Pt is fatigued.   Last infusion 11/29.   Last FUV 11/11, next FUV 12/9

## 2024-12-02 NOTE — PROGRESS NOTES
"Patient ID: Holden Burgess \"GENARO\" is a 84 y.o. male.  Referring Physician: Sebastien Rashid MD  [id number] [id number]  Primary Care Provider: Giacomo Joseph DO    Date of Service:  12/9/2024    SUBJECTIVE:  Patient presents today, accompanied by his wife and daughter, for follow up.  Reports feeling well overall. His legs are weak and he would like to work on strengthening them.  Eating and drinking well.  No other concerns or complaints today.      Oncology History   Myelodysplastic syndrome (Multi)   1/23/2024 Initial Diagnosis    Myelodysplastic syndrome:   Diagnosis:   Originally referred to Dr. Murphy in 2023 for longstanding thrombocytopenia.  At the time had mild leukopenia, no anemia.  Was refractory to a trial of prednisone, and so Bone marrow biopsy was completed completed on 1/23/2024 and demonstrated:  BONE MARROW CLOT, CORE BIOPSY, ASPIRATE, LEFT ILIAC CREST:   -- MILDLY HYPERCELLULAR BONE MARROW (80%) WITH MATURING TRILINEAGE HEMATOPOIESIS AND MODERATE INCREASE IN MEGAKARYOCYTES, SEE NOTE.  -- SMALL LYMPHOID AGGREGATES, FAVOR BENIGN.  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 Given the presence of persistent cytopenias, hypercellularity with increase megakaryocytes with abnormal clustering, and no increase in blasts, the overall findings are most consistent with:  -- MDS with low blasts (WHO-HAEM5 Classification) /   -- MDS-NOS with single lineage dysplasia (MDS-NOS-SLD) (ICC 2022 Classification).  NGS: SRSF2  Chromosome Analysis: 47,XY,+8[15]/46,XY[5]  IPSS-M: -0.99 - low risks disease     4/4/2024 - 8/29/2024 Supportive Treatment    Treatment:   I. Eltrombopag -on 4/4/2024 patient continued with persistent thrombocytopenia but also had some progressive anemia with hemoglobin down to 10.  Patient was offered [id number], preferred supportive management with oral medication opted for eltrombopag in the setting of " thrombocytopenia starting at 50 and ramping up to 150 mg  From 4/20/2024 through 8/20/2024 patient had progressive anemia and progressive thrombocytopenia and spite of treatment.    II. Darbepoietin -in the setting of worsening anemia darbepoetin was added 7/12/2024 at 100 mcg every 2 weeks       9/30/2024 -  Research Study Participant    ([facility]) [id number] - AzaCITIDine / Decitabine, 42 Day Cycle Followed by 28 Day Cycles  Plan Provider: Sebastien Rashid MD  Treatment goal: Palliative  Line of treatment: First Line  Associated studies: 5-Azacitidine and Decitabine Epigenetic Therapy for Myeloid Malignancies        OBJECTIVE:  KPS: Karnofsky Score: 80 - Normal activity with effort; some signs or symptoms of disease     Physical Exam  Constitutional:       Appearance: Normal appearance.   HENT:      Head: Normocephalic.   Eyes:      Pupils: Pupils are equal, round, and reactive to light.   Cardiovascular:      Rate and Rhythm: Normal rate and regular rhythm.   Pulmonary:      Effort: Pulmonary effort is normal.      Breath sounds: Normal breath sounds.   Abdominal:      General: Bowel sounds are normal.      Palpations: Abdomen is soft.   Musculoskeletal:         General: Normal range of motion.      Cervical back: Normal range of motion and neck supple.   Lymphadenopathy:      Comments: No lymphadenopathy   Skin:     General: Skin is warm and dry.      Findings: No lesion or rash.   Neurological:      General: No focal deficit present.      Mental Status: He is alert and oriented to person, place, and time. Mental status is at baseline.      Comments: No numbness or tingling   Psychiatric:         Mood and Affect: Mood normal.       Assessment & Plan  Myelodysplastic syndrome (Multi)  12/9/2024: Patient reports that he tolerated his last round of treatment well.  The only issue was some cosntipation which he and his wife are managing with Senna and lactulose.     Diagnostics:  -Bmbx at week 12  (12/19/24)  Treatment:  - Alternating azacitidine 50 mg/m2 with decitabine 5 mg/m² per [id number]  - C3D1 today  Disease/Toxicity Monitoring:  - Given his hemoglobin and age  will check CBC  with each visit  Supportive Care:  -Blood products as indicated  Antimicrobial Prophylaxis:   -None indicated at this time, ANC is in the normal range  IV access:  - for now, continue with PIV; may benefit from port placement or other central line access    Current Outpatient Medications   Medication Instructions    acetaminophen (TYLENOL) 1,000 mg, oral, Every 6 hours PRN    allopurinol (ZYLOPRIM) 100 mg, oral, 2 times daily    Aranesp (in polysorbate) 200 mcg, [facility] ONC Every 2 Weeks for 4 Weeks in a 28 Day Cycle    ascorbic acid (Vitamin C) 1,000 mg tablet 1 tablet, Daily    atorvastatin (Lipitor) 10 mg tablet 1 tablet, Nightly    calcitriol (ROCALTROL) 0.25 mcg, Every other day    cholecalciferol (Vitamin D-3) 125 MCG (5000 UT) capsule 1 capsule, Daily    cyanocobalamin (Vitamin B-12) 1,000 mcg tablet 1 tablet, Daily    ezetimibe (ZETIA) 10 mg, oral, Daily    famotidine (PEPCID) 20 mg, Daily    ferrous sulfate 325 (65 Fe) MG tablet 1 tablet, Daily    folic acid (FOLVITE) 1 mg, oral, Daily    furosemide (LASIX) 40 mg, oral, Daily    lactulose 10 g, oral, 2 times daily, As needed for constipation    latanoprost (Xalatan) 0.005 % ophthalmic solution 1 drop, Nightly    MAGNESIUM ORAL 550 mg, Nightly    prochlorperazine (COMPAZINE) 10 mg, oral, Every 6 hours PRN    rOPINIRole (REQUIP) 0.25 mg, oral, Nightly    temazepam (RESTORIL) 15 mg, oral, Nightly PRN    traMADol (ULTRAM) 50 mg, oral, Every 6 hours PRN    TURMERIC ORAL 1 capsule, Daily    valsartan (DIOVAN) 320 mg, oral, Daily    zinc sulfate 50 mg zinc (220 mg) tablet Take by mouth.      Laboratory:  The pertinent laboratory results were reviewed and discussed with the patient.    Lab Results   Component Value Date    WBC 1.9 (L) 12/02/2024    HCT 27.6 (L) 12/02/2024    HGB 8.7  (L) 12/02/2024    PLT 40 (LL) 12/02/2024    K 4.6 12/02/2024    CALCIUM 9.1 12/02/2024     12/02/2024    MG 1.76 12/02/2024    BILITOT 1.1 12/02/2024    ALT 17 12/02/2024    AST 18 12/02/2024    BUN 40 (H) 12/02/2024    CREATININE 1.61 (H) 12/02/2024    PHOS 3.0 09/24/2024      Note: for a comprehensive list of the patient's lab results, access the Results Review activity.    RTC:  Q month MD/KENDRICK follow up    Shae Lunsford, APRN-CNP

## 2024-12-02 NOTE — PROGRESS NOTES
Patient presents to infusion appointment in stable condition. Reports spontaneous nosebleed at 2 AM that stopped within 15 min, denies any other episodes of bleeding, (platelets 40). Additionally reports fatigue and continuous itching of right side upper quadrant abdomen, no rash, discoloration, or dryness noted. RAFAELA Lunsford CNP notified. Per provider patient instructed to use nasal saline spray and hydrocortisone cream as needed. Creatinine 1.61, OK to treat per Dr. Rashid & RAFAELA Lunsford CNP. C2D22 study [id number] azacitidine injections tolerated without incident. ANC 1.08, filgrastim held per orders. Lab results and schedule reviewed. Discharged in stable condition.

## 2024-12-02 NOTE — ASSESSMENT & PLAN NOTE
12/9/2024: Patient reports that he tolerated his last round of treatment well.  The only issue was some cosntipation which he and his wife are managing with Senna and lactulose.     Diagnostics:  -Bmbx at week 12 (12/19/24)  Treatment:  - Alternating azacitidine 50 mg/m2 with decitabine 5 mg/m² per [id number]  - C3D1 today  Disease/Toxicity Monitoring:  - Given his hemoglobin and age  will check CBC  with each visit  Supportive Care:  -Blood products as indicated  Antimicrobial Prophylaxis:   -None indicated at this time, ANC is in the normal range  IV access:  - for now, continue with PIV; may benefit from port placement or other central line access

## 2024-12-04 ENCOUNTER — HOSPITAL ENCOUNTER (OUTPATIENT)
Dept: CARDIOLOGY | Facility: HOSPITAL | Age: 84
Discharge: HOME | End: 2024-12-04
Payer: MEDICARE

## 2024-12-04 DIAGNOSIS — Z95.0 PACEMAKER: ICD-10-CM

## 2024-12-04 PROCEDURE — 93296 REM INTERROG EVL PM/IDS: CPT

## 2024-12-04 RX ORDER — PROCHLORPERAZINE EDISYLATE 5 MG/ML
10 INJECTION INTRAMUSCULAR; INTRAVENOUS EVERY 6 HOURS PRN
OUTPATIENT
Start: 2024-12-30

## 2024-12-04 RX ORDER — ALBUTEROL SULFATE 0.83 MG/ML
3 SOLUTION RESPIRATORY (INHALATION) AS NEEDED
OUTPATIENT
Start: 2024-12-19

## 2024-12-04 RX ORDER — EPINEPHRINE 0.3 MG/.3ML
0.3 INJECTION SUBCUTANEOUS EVERY 5 MIN PRN
OUTPATIENT
Start: 2024-12-30

## 2024-12-04 RX ORDER — FAMOTIDINE 10 MG/ML
20 INJECTION INTRAVENOUS ONCE AS NEEDED
OUTPATIENT
Start: 2024-12-09

## 2024-12-04 RX ORDER — ONDANSETRON HYDROCHLORIDE 8 MG/1
8 TABLET, FILM COATED ORAL ONCE
OUTPATIENT
Start: 2025-01-02

## 2024-12-04 RX ORDER — DIPHENHYDRAMINE HYDROCHLORIDE 50 MG/ML
50 INJECTION INTRAMUSCULAR; INTRAVENOUS AS NEEDED
OUTPATIENT
Start: 2024-12-16

## 2024-12-04 RX ORDER — PROCHLORPERAZINE EDISYLATE 5 MG/ML
10 INJECTION INTRAMUSCULAR; INTRAVENOUS EVERY 6 HOURS PRN
OUTPATIENT
Start: 2024-12-09

## 2024-12-04 RX ORDER — PROCHLORPERAZINE EDISYLATE 5 MG/ML
10 INJECTION INTRAMUSCULAR; INTRAVENOUS EVERY 6 HOURS PRN
OUTPATIENT
Start: 2024-12-12

## 2024-12-04 RX ORDER — ALBUTEROL SULFATE 0.83 MG/ML
3 SOLUTION RESPIRATORY (INHALATION) AS NEEDED
OUTPATIENT
Start: 2024-12-09

## 2024-12-04 RX ORDER — PROCHLORPERAZINE MALEATE 10 MG
10 TABLET ORAL EVERY 6 HOURS PRN
OUTPATIENT
Start: 2024-12-09

## 2024-12-04 RX ORDER — PROCHLORPERAZINE MALEATE 10 MG
10 TABLET ORAL EVERY 6 HOURS PRN
OUTPATIENT
Start: 2024-12-19

## 2024-12-04 RX ORDER — PROCHLORPERAZINE MALEATE 10 MG
10 TABLET ORAL EVERY 6 HOURS PRN
OUTPATIENT
Start: 2024-12-26

## 2024-12-04 RX ORDER — ALBUTEROL SULFATE 0.83 MG/ML
3 SOLUTION RESPIRATORY (INHALATION) AS NEEDED
OUTPATIENT
Start: 2024-12-12

## 2024-12-04 RX ORDER — ONDANSETRON HYDROCHLORIDE 8 MG/1
8 TABLET, FILM COATED ORAL ONCE
OUTPATIENT
Start: 2024-12-23

## 2024-12-04 RX ORDER — DIPHENHYDRAMINE HYDROCHLORIDE 50 MG/ML
50 INJECTION INTRAMUSCULAR; INTRAVENOUS AS NEEDED
OUTPATIENT
Start: 2024-12-23

## 2024-12-04 RX ORDER — DIPHENHYDRAMINE HYDROCHLORIDE 50 MG/ML
50 INJECTION INTRAMUSCULAR; INTRAVENOUS AS NEEDED
OUTPATIENT
Start: 2024-12-09

## 2024-12-04 RX ORDER — FAMOTIDINE 10 MG/ML
20 INJECTION INTRAVENOUS ONCE AS NEEDED
OUTPATIENT
Start: 2025-01-02

## 2024-12-04 RX ORDER — PROCHLORPERAZINE MALEATE 10 MG
10 TABLET ORAL EVERY 6 HOURS PRN
OUTPATIENT
Start: 2024-12-12

## 2024-12-04 RX ORDER — EPINEPHRINE 0.3 MG/.3ML
0.3 INJECTION SUBCUTANEOUS EVERY 5 MIN PRN
OUTPATIENT
Start: 2024-12-12

## 2024-12-04 RX ORDER — PROCHLORPERAZINE EDISYLATE 5 MG/ML
10 INJECTION INTRAMUSCULAR; INTRAVENOUS EVERY 6 HOURS PRN
OUTPATIENT
Start: 2024-12-23

## 2024-12-04 RX ORDER — EPINEPHRINE 0.3 MG/.3ML
0.3 INJECTION SUBCUTANEOUS EVERY 5 MIN PRN
OUTPATIENT
Start: 2024-12-09

## 2024-12-04 RX ORDER — PROCHLORPERAZINE MALEATE 10 MG
10 TABLET ORAL EVERY 6 HOURS PRN
OUTPATIENT
Start: 2024-12-16

## 2024-12-04 RX ORDER — ONDANSETRON HYDROCHLORIDE 8 MG/1
8 TABLET, FILM COATED ORAL ONCE
OUTPATIENT
Start: 2024-12-12

## 2024-12-04 RX ORDER — DIPHENHYDRAMINE HYDROCHLORIDE 50 MG/ML
50 INJECTION INTRAMUSCULAR; INTRAVENOUS AS NEEDED
OUTPATIENT
Start: 2024-12-26

## 2024-12-04 RX ORDER — EPINEPHRINE 0.3 MG/.3ML
0.3 INJECTION SUBCUTANEOUS EVERY 5 MIN PRN
OUTPATIENT
Start: 2024-12-23

## 2024-12-04 RX ORDER — FAMOTIDINE 10 MG/ML
20 INJECTION INTRAVENOUS ONCE AS NEEDED
OUTPATIENT
Start: 2024-12-26

## 2024-12-04 RX ORDER — PROCHLORPERAZINE EDISYLATE 5 MG/ML
10 INJECTION INTRAMUSCULAR; INTRAVENOUS EVERY 6 HOURS PRN
OUTPATIENT
Start: 2024-12-19

## 2024-12-04 RX ORDER — EPINEPHRINE 0.3 MG/.3ML
0.3 INJECTION SUBCUTANEOUS EVERY 5 MIN PRN
OUTPATIENT
Start: 2024-12-19

## 2024-12-04 RX ORDER — PROCHLORPERAZINE EDISYLATE 5 MG/ML
10 INJECTION INTRAMUSCULAR; INTRAVENOUS EVERY 6 HOURS PRN
OUTPATIENT
Start: 2024-12-16

## 2024-12-04 RX ORDER — ALBUTEROL SULFATE 0.83 MG/ML
3 SOLUTION RESPIRATORY (INHALATION) AS NEEDED
OUTPATIENT
Start: 2024-12-23

## 2024-12-04 RX ORDER — EPINEPHRINE 0.3 MG/.3ML
0.3 INJECTION SUBCUTANEOUS EVERY 5 MIN PRN
OUTPATIENT
Start: 2025-01-02

## 2024-12-04 RX ORDER — ONDANSETRON HYDROCHLORIDE 8 MG/1
8 TABLET, FILM COATED ORAL ONCE
OUTPATIENT
Start: 2024-12-19

## 2024-12-04 RX ORDER — ALBUTEROL SULFATE 0.83 MG/ML
3 SOLUTION RESPIRATORY (INHALATION) AS NEEDED
OUTPATIENT
Start: 2024-12-30

## 2024-12-04 RX ORDER — ONDANSETRON HYDROCHLORIDE 8 MG/1
8 TABLET, FILM COATED ORAL ONCE
OUTPATIENT
Start: 2024-12-09

## 2024-12-04 RX ORDER — EPINEPHRINE 0.3 MG/.3ML
0.3 INJECTION SUBCUTANEOUS EVERY 5 MIN PRN
OUTPATIENT
Start: 2024-12-26

## 2024-12-04 RX ORDER — ALBUTEROL SULFATE 0.83 MG/ML
3 SOLUTION RESPIRATORY (INHALATION) AS NEEDED
OUTPATIENT
Start: 2025-01-02

## 2024-12-04 RX ORDER — DIPHENHYDRAMINE HYDROCHLORIDE 50 MG/ML
50 INJECTION INTRAMUSCULAR; INTRAVENOUS AS NEEDED
OUTPATIENT
Start: 2024-12-19

## 2024-12-04 RX ORDER — DIPHENHYDRAMINE HYDROCHLORIDE 50 MG/ML
50 INJECTION INTRAMUSCULAR; INTRAVENOUS AS NEEDED
OUTPATIENT
Start: 2024-12-30

## 2024-12-04 RX ORDER — ONDANSETRON HYDROCHLORIDE 8 MG/1
8 TABLET, FILM COATED ORAL ONCE
OUTPATIENT
Start: 2024-12-16

## 2024-12-04 RX ORDER — FAMOTIDINE 10 MG/ML
20 INJECTION INTRAVENOUS ONCE AS NEEDED
OUTPATIENT
Start: 2024-12-12

## 2024-12-04 RX ORDER — PROCHLORPERAZINE EDISYLATE 5 MG/ML
10 INJECTION INTRAMUSCULAR; INTRAVENOUS EVERY 6 HOURS PRN
OUTPATIENT
Start: 2025-01-02

## 2024-12-04 RX ORDER — DIPHENHYDRAMINE HYDROCHLORIDE 50 MG/ML
50 INJECTION INTRAMUSCULAR; INTRAVENOUS AS NEEDED
OUTPATIENT
Start: 2025-01-02

## 2024-12-04 RX ORDER — FAMOTIDINE 10 MG/ML
20 INJECTION INTRAVENOUS ONCE AS NEEDED
OUTPATIENT
Start: 2024-12-16

## 2024-12-04 RX ORDER — PROCHLORPERAZINE MALEATE 10 MG
10 TABLET ORAL EVERY 6 HOURS PRN
OUTPATIENT
Start: 2024-12-23

## 2024-12-04 RX ORDER — ALBUTEROL SULFATE 0.83 MG/ML
3 SOLUTION RESPIRATORY (INHALATION) AS NEEDED
OUTPATIENT
Start: 2024-12-16

## 2024-12-04 RX ORDER — ONDANSETRON HYDROCHLORIDE 8 MG/1
8 TABLET, FILM COATED ORAL ONCE
OUTPATIENT
Start: 2024-12-30

## 2024-12-04 RX ORDER — FAMOTIDINE 10 MG/ML
20 INJECTION INTRAVENOUS ONCE AS NEEDED
OUTPATIENT
Start: 2024-12-19

## 2024-12-04 RX ORDER — FAMOTIDINE 10 MG/ML
20 INJECTION INTRAVENOUS ONCE AS NEEDED
OUTPATIENT
Start: 2024-12-30

## 2024-12-04 RX ORDER — EPINEPHRINE 0.3 MG/.3ML
0.3 INJECTION SUBCUTANEOUS EVERY 5 MIN PRN
OUTPATIENT
Start: 2024-12-16

## 2024-12-04 RX ORDER — FAMOTIDINE 10 MG/ML
20 INJECTION INTRAVENOUS ONCE AS NEEDED
OUTPATIENT
Start: 2024-12-23

## 2024-12-04 RX ORDER — DIPHENHYDRAMINE HYDROCHLORIDE 50 MG/ML
50 INJECTION INTRAMUSCULAR; INTRAVENOUS AS NEEDED
OUTPATIENT
Start: 2024-12-12

## 2024-12-04 RX ORDER — PROCHLORPERAZINE MALEATE 10 MG
10 TABLET ORAL EVERY 6 HOURS PRN
OUTPATIENT
Start: 2024-12-30

## 2024-12-04 RX ORDER — ONDANSETRON HYDROCHLORIDE 8 MG/1
8 TABLET, FILM COATED ORAL ONCE
OUTPATIENT
Start: 2024-12-26

## 2024-12-04 RX ORDER — ALBUTEROL SULFATE 0.83 MG/ML
3 SOLUTION RESPIRATORY (INHALATION) AS NEEDED
OUTPATIENT
Start: 2024-12-26

## 2024-12-04 RX ORDER — PROCHLORPERAZINE EDISYLATE 5 MG/ML
10 INJECTION INTRAMUSCULAR; INTRAVENOUS EVERY 6 HOURS PRN
OUTPATIENT
Start: 2024-12-26

## 2024-12-04 RX ORDER — PROCHLORPERAZINE MALEATE 10 MG
10 TABLET ORAL EVERY 6 HOURS PRN
OUTPATIENT
Start: 2025-01-02

## 2024-12-05 ENCOUNTER — INFUSION (OUTPATIENT)
Dept: HEMATOLOGY/ONCOLOGY | Facility: HOSPITAL | Age: 84
End: 2024-12-05
Payer: MEDICARE

## 2024-12-05 ENCOUNTER — LAB (OUTPATIENT)
Dept: LAB | Facility: HOSPITAL | Age: 84
End: 2024-12-05
Payer: MEDICARE

## 2024-12-05 VITALS
RESPIRATION RATE: 18 BRPM | HEART RATE: 78 BPM | TEMPERATURE: 97.2 F | SYSTOLIC BLOOD PRESSURE: 117 MMHG | WEIGHT: 276.7 LBS | DIASTOLIC BLOOD PRESSURE: 58 MMHG | OXYGEN SATURATION: 100 % | BODY MASS INDEX: 37.81 KG/M2

## 2024-12-05 DIAGNOSIS — D46.9 ANEMIA DUE TO CHEMOTHERAPY FOR MYELODYSPLASTIC SYNDROME TREATED WITH ERYTHROPOIETIN (MULTI): ICD-10-CM

## 2024-12-05 DIAGNOSIS — D64.81 ANEMIA DUE TO CHEMOTHERAPY FOR MYELODYSPLASTIC SYNDROME TREATED WITH ERYTHROPOIETIN (MULTI): ICD-10-CM

## 2024-12-05 DIAGNOSIS — D46.9 MYELODYSPLASTIC SYNDROME (MULTI): Primary | ICD-10-CM

## 2024-12-05 DIAGNOSIS — D46.9 MYELODYSPLASTIC SYNDROME (MULTI): ICD-10-CM

## 2024-12-05 DIAGNOSIS — T45.1X5A ANEMIA DUE TO CHEMOTHERAPY FOR MYELODYSPLASTIC SYNDROME TREATED WITH ERYTHROPOIETIN (MULTI): ICD-10-CM

## 2024-12-05 LAB
ALBUMIN SERPL BCP-MCNC: 4.2 G/DL (ref 3.4–5)
ALP SERPL-CCNC: 82 U/L (ref 33–136)
ALT SERPL W P-5'-P-CCNC: 18 U/L (ref 10–52)
ANION GAP SERPL CALC-SCNC: 13 MMOL/L (ref 10–20)
AST SERPL W P-5'-P-CCNC: 19 U/L (ref 9–39)
BASOPHILS # BLD AUTO: 0.01 X10*3/UL (ref 0–0.1)
BASOPHILS NFR BLD AUTO: 0.5 %
BILIRUB SERPL-MCNC: 1.1 MG/DL (ref 0–1.2)
BUN SERPL-MCNC: 39 MG/DL (ref 6–23)
CALCIUM SERPL-MCNC: 9.2 MG/DL (ref 8.6–10.3)
CHLORIDE SERPL-SCNC: 107 MMOL/L (ref 98–107)
CO2 SERPL-SCNC: 23 MMOL/L (ref 21–32)
CREAT SERPL-MCNC: 1.56 MG/DL (ref 0.5–1.3)
DACRYOCYTES BLD QL SMEAR: NORMAL
EGFRCR SERPLBLD CKD-EPI 2021: 44 ML/MIN/1.73M*2
EOSINOPHIL # BLD AUTO: 0.08 X10*3/UL (ref 0–0.4)
EOSINOPHIL NFR BLD AUTO: 3.9 %
ERYTHROCYTE [DISTWIDTH] IN BLOOD BY AUTOMATED COUNT: 22.4 % (ref 11.5–14.5)
GLUCOSE SERPL-MCNC: 88 MG/DL (ref 74–99)
HCT VFR BLD AUTO: 28.4 % (ref 41–52)
HGB BLD-MCNC: 9 G/DL (ref 13.5–17.5)
IMM GRANULOCYTES # BLD AUTO: 0.02 X10*3/UL (ref 0–0.5)
IMM GRANULOCYTES NFR BLD AUTO: 1 % (ref 0–0.9)
LYMPHOCYTES # BLD AUTO: 0.71 X10*3/UL (ref 0.8–3)
LYMPHOCYTES NFR BLD AUTO: 34.8 %
MCH RBC QN AUTO: 30.8 PG (ref 26–34)
MCHC RBC AUTO-ENTMCNC: 31.7 G/DL (ref 32–36)
MCV RBC AUTO: 97 FL (ref 80–100)
MONOCYTES # BLD AUTO: 0.09 X10*3/UL (ref 0.05–0.8)
MONOCYTES NFR BLD AUTO: 4.4 %
NEUTROPHILS # BLD AUTO: 1.13 X10*3/UL (ref 1.6–5.5)
NEUTROPHILS NFR BLD AUTO: 55.4 %
NRBC BLD-RTO: 0 /100 WBCS (ref 0–0)
OVALOCYTES BLD QL SMEAR: NORMAL
PLATELET # BLD AUTO: 48 X10*3/UL (ref 150–450)
POLYCHROMASIA BLD QL SMEAR: NORMAL
POTASSIUM SERPL-SCNC: 5.1 MMOL/L (ref 3.5–5.3)
PROT SERPL-MCNC: 6.4 G/DL (ref 6.4–8.2)
RBC # BLD AUTO: 2.92 X10*6/UL (ref 4.5–5.9)
RBC MORPH BLD: NORMAL
SCHISTOCYTES BLD QL SMEAR: NORMAL
SODIUM SERPL-SCNC: 138 MMOL/L (ref 136–145)
WBC # BLD AUTO: 2 X10*3/UL (ref 4.4–11.3)

## 2024-12-05 PROCEDURE — 80053 COMPREHEN METABOLIC PANEL: CPT

## 2024-12-05 PROCEDURE — 2500000005 HC RX 250 GENERAL PHARMACY W/O HCPCS: Performed by: INTERNAL MEDICINE

## 2024-12-05 PROCEDURE — 2560000001 HC RX 256 EXPERIMENTAL DRUGS: Performed by: INTERNAL MEDICINE

## 2024-12-05 PROCEDURE — 36415 COLL VENOUS BLD VENIPUNCTURE: CPT

## 2024-12-05 PROCEDURE — 85025 COMPLETE CBC W/AUTO DIFF WBC: CPT

## 2024-12-05 PROCEDURE — 96401 CHEMO ANTI-NEOPL SQ/IM: CPT

## 2024-12-05 RX ORDER — PROCHLORPERAZINE MALEATE 10 MG
10 TABLET ORAL EVERY 6 HOURS PRN
Status: DISCONTINUED | OUTPATIENT
Start: 2024-12-05 | End: 2024-12-05 | Stop reason: HOSPADM

## 2024-12-05 RX ORDER — PROCHLORPERAZINE EDISYLATE 5 MG/ML
10 INJECTION INTRAMUSCULAR; INTRAVENOUS EVERY 6 HOURS PRN
Status: DISCONTINUED | OUTPATIENT
Start: 2024-12-05 | End: 2024-12-05 | Stop reason: HOSPADM

## 2024-12-05 RX ORDER — ONDANSETRON HYDROCHLORIDE 8 MG/1
8 TABLET, FILM COATED ORAL ONCE
Status: COMPLETED | OUTPATIENT
Start: 2024-12-05 | End: 2024-12-05

## 2024-12-05 ASSESSMENT — PAIN SCALES - GENERAL: PAINLEVEL_OUTOF10: 0-NO PAIN

## 2024-12-05 NOTE — PROGRESS NOTES
GENARO Burgess is a 84 y.o. male who presents for C2D25 Study CASE 4919- decitabine injection.    He is on the following chemotherapy regimen:     Treatment Plans       Name Type Plan Dates Plan Provider         Active    ([facility]) [id number] - AzaCITIDine / Decitabine, 42 Day Cycle Followed by 28 Day Cycles Oncology Treatment 9/29/2024 - Present Sebastien Rashid MD                  .    Since his last visit, he has been doing well.  Overall, he states his energy level is stable.  His appetite has been good.  He reports constipation and neuropathies - BUE & BLE .  Constipation was relived today with dose of lactulose. He has no other concerns.    Cr 1.56, Shae KAUFMAN. NP notified and she ordered ok to treat despite creatine being outside parameters.    Pt tolerated decitabine injection well to RLQ avoiding red areas. Pt discharged in stable condition accompanied by wife.

## 2024-12-06 DIAGNOSIS — D46.9 MYELODYSPLASTIC SYNDROME (MULTI): ICD-10-CM

## 2024-12-09 ENCOUNTER — LAB (OUTPATIENT)
Dept: LAB | Facility: HOSPITAL | Age: 84
End: 2024-12-09
Payer: MEDICARE

## 2024-12-09 ENCOUNTER — DOCUMENTATION (OUTPATIENT)
Dept: HEMATOLOGY/ONCOLOGY | Facility: HOSPITAL | Age: 84
End: 2024-12-09

## 2024-12-09 ENCOUNTER — SOCIAL WORK (OUTPATIENT)
Dept: HEMATOLOGY/ONCOLOGY | Facility: HOSPITAL | Age: 84
End: 2024-12-09
Payer: MEDICARE

## 2024-12-09 ENCOUNTER — OFFICE VISIT (OUTPATIENT)
Dept: HEMATOLOGY/ONCOLOGY | Facility: HOSPITAL | Age: 84
End: 2024-12-09
Payer: MEDICARE

## 2024-12-09 ENCOUNTER — INFUSION (OUTPATIENT)
Dept: HEMATOLOGY/ONCOLOGY | Facility: HOSPITAL | Age: 84
End: 2024-12-09
Payer: MEDICARE

## 2024-12-09 VITALS
DIASTOLIC BLOOD PRESSURE: 56 MMHG | TEMPERATURE: 97.7 F | WEIGHT: 277.3 LBS | OXYGEN SATURATION: 100 % | HEART RATE: 75 BPM | RESPIRATION RATE: 18 BRPM | SYSTOLIC BLOOD PRESSURE: 129 MMHG | BODY MASS INDEX: 37.89 KG/M2

## 2024-12-09 DIAGNOSIS — D46.9 ANEMIA DUE TO CHEMOTHERAPY FOR MYELODYSPLASTIC SYNDROME TREATED WITH ERYTHROPOIETIN (MULTI): ICD-10-CM

## 2024-12-09 DIAGNOSIS — T45.1X5A ANEMIA DUE TO CHEMOTHERAPY FOR MYELODYSPLASTIC SYNDROME TREATED WITH ERYTHROPOIETIN (MULTI): Primary | ICD-10-CM

## 2024-12-09 DIAGNOSIS — D46.9 ANEMIA DUE TO CHEMOTHERAPY FOR MYELODYSPLASTIC SYNDROME TREATED WITH ERYTHROPOIETIN (MULTI): Primary | ICD-10-CM

## 2024-12-09 DIAGNOSIS — D46.9 MYELODYSPLASTIC SYNDROME (MULTI): Primary | ICD-10-CM

## 2024-12-09 DIAGNOSIS — T45.1X5A ANEMIA DUE TO CHEMOTHERAPY FOR MYELODYSPLASTIC SYNDROME TREATED WITH ERYTHROPOIETIN (MULTI): ICD-10-CM

## 2024-12-09 DIAGNOSIS — D64.81 ANEMIA DUE TO CHEMOTHERAPY FOR MYELODYSPLASTIC SYNDROME TREATED WITH ERYTHROPOIETIN (MULTI): Primary | ICD-10-CM

## 2024-12-09 DIAGNOSIS — D46.9 MYELODYSPLASTIC SYNDROME (MULTI): ICD-10-CM

## 2024-12-09 DIAGNOSIS — D64.81 ANEMIA DUE TO CHEMOTHERAPY FOR MYELODYSPLASTIC SYNDROME TREATED WITH ERYTHROPOIETIN (MULTI): ICD-10-CM

## 2024-12-09 LAB
ALBUMIN SERPL BCP-MCNC: 4.2 G/DL (ref 3.4–5)
ALP SERPL-CCNC: 82 U/L (ref 33–136)
ALT SERPL W P-5'-P-CCNC: 17 U/L (ref 10–52)
ANION GAP SERPL CALC-SCNC: 13 MMOL/L (ref 10–20)
AST SERPL W P-5'-P-CCNC: 18 U/L (ref 9–39)
BASOPHILS # BLD AUTO: 0.01 X10*3/UL (ref 0–0.1)
BASOPHILS NFR BLD AUTO: 0.5 %
BILIRUB SERPL-MCNC: 0.9 MG/DL (ref 0–1.2)
BUN SERPL-MCNC: 41 MG/DL (ref 6–23)
CALCIUM SERPL-MCNC: 9.2 MG/DL (ref 8.6–10.3)
CHLORIDE SERPL-SCNC: 108 MMOL/L (ref 98–107)
CO2 SERPL-SCNC: 24 MMOL/L (ref 21–32)
CREAT SERPL-MCNC: 1.55 MG/DL (ref 0.5–1.3)
DACRYOCYTES BLD QL SMEAR: NORMAL
EGFRCR SERPLBLD CKD-EPI 2021: 44 ML/MIN/1.73M*2
EOSINOPHIL # BLD AUTO: 0.09 X10*3/UL (ref 0–0.4)
EOSINOPHIL NFR BLD AUTO: 4.3 %
ERYTHROCYTE [DISTWIDTH] IN BLOOD BY AUTOMATED COUNT: 22.5 % (ref 11.5–14.5)
GLUCOSE SERPL-MCNC: 90 MG/DL (ref 74–99)
HCT VFR BLD AUTO: 28.3 % (ref 41–52)
HGB BLD-MCNC: 9 G/DL (ref 13.5–17.5)
HGB RETIC QN: 31 PG (ref 28–38)
IMM GRANULOCYTES # BLD AUTO: 0.01 X10*3/UL (ref 0–0.5)
IMM GRANULOCYTES NFR BLD AUTO: 0.5 % (ref 0–0.9)
IMMATURE RETIC FRACTION: 25.4 %
LDH SERPL L TO P-CCNC: 235 U/L (ref 84–246)
LYMPHOCYTES # BLD AUTO: 0.58 X10*3/UL (ref 0.8–3)
LYMPHOCYTES NFR BLD AUTO: 27.5 %
MAGNESIUM SERPL-MCNC: 2.01 MG/DL (ref 1.6–2.4)
MCH RBC QN AUTO: 31.1 PG (ref 26–34)
MCHC RBC AUTO-ENTMCNC: 31.8 G/DL (ref 32–36)
MCV RBC AUTO: 98 FL (ref 80–100)
MONOCYTES # BLD AUTO: 0.12 X10*3/UL (ref 0.05–0.8)
MONOCYTES NFR BLD AUTO: 5.7 %
NEUTROPHILS # BLD AUTO: 1.3 X10*3/UL (ref 1.6–5.5)
NEUTROPHILS NFR BLD AUTO: 61.5 %
NRBC BLD-RTO: 0 /100 WBCS (ref 0–0)
OVALOCYTES BLD QL SMEAR: NORMAL
PLATELET # BLD AUTO: 47 X10*3/UL (ref 150–450)
POLYCHROMASIA BLD QL SMEAR: NORMAL
POTASSIUM SERPL-SCNC: 5 MMOL/L (ref 3.5–5.3)
PROT SERPL-MCNC: 6.4 G/DL (ref 6.4–8.2)
RBC # BLD AUTO: 2.89 X10*6/UL (ref 4.5–5.9)
RBC MORPH BLD: NORMAL
RETICS #: 0.07 X10*6/UL (ref 0.02–0.11)
RETICS/RBC NFR AUTO: 2.5 % (ref 0.5–2)
SCHISTOCYTES BLD QL SMEAR: NORMAL
SODIUM SERPL-SCNC: 140 MMOL/L (ref 136–145)
URATE SERPL-MCNC: 5.6 MG/DL (ref 4–7.5)
WBC # BLD AUTO: 2.1 X10*3/UL (ref 4.4–11.3)

## 2024-12-09 PROCEDURE — 2560000001 HC RX 256 EXPERIMENTAL DRUGS: Performed by: INTERNAL MEDICINE

## 2024-12-09 PROCEDURE — 84550 ASSAY OF BLOOD/URIC ACID: CPT

## 2024-12-09 PROCEDURE — 85045 AUTOMATED RETICULOCYTE COUNT: CPT

## 2024-12-09 PROCEDURE — 96401 CHEMO ANTI-NEOPL SQ/IM: CPT

## 2024-12-09 PROCEDURE — 99215 OFFICE O/P EST HI 40 MIN: CPT | Mod: 25 | Performed by: NURSE PRACTITIONER

## 2024-12-09 PROCEDURE — 83735 ASSAY OF MAGNESIUM: CPT

## 2024-12-09 PROCEDURE — 80053 COMPREHEN METABOLIC PANEL: CPT

## 2024-12-09 PROCEDURE — 36415 COLL VENOUS BLD VENIPUNCTURE: CPT

## 2024-12-09 PROCEDURE — 83615 LACTATE (LD) (LDH) ENZYME: CPT

## 2024-12-09 PROCEDURE — 85025 COMPLETE CBC W/AUTO DIFF WBC: CPT

## 2024-12-09 PROCEDURE — 2500000005 HC RX 250 GENERAL PHARMACY W/O HCPCS: Performed by: INTERNAL MEDICINE

## 2024-12-09 RX ORDER — PROCHLORPERAZINE MALEATE 10 MG
10 TABLET ORAL EVERY 6 HOURS PRN
Status: DISCONTINUED | OUTPATIENT
Start: 2024-12-09 | End: 2024-12-09 | Stop reason: HOSPADM

## 2024-12-09 RX ORDER — ALBUTEROL SULFATE 0.83 MG/ML
3 SOLUTION RESPIRATORY (INHALATION) AS NEEDED
Status: DISCONTINUED | OUTPATIENT
Start: 2024-12-09 | End: 2024-12-09 | Stop reason: HOSPADM

## 2024-12-09 RX ORDER — FAMOTIDINE 10 MG/ML
20 INJECTION INTRAVENOUS ONCE AS NEEDED
Status: CANCELLED | OUTPATIENT
Start: 2024-12-12

## 2024-12-09 RX ORDER — ALBUTEROL SULFATE 0.83 MG/ML
3 SOLUTION RESPIRATORY (INHALATION) AS NEEDED
Status: CANCELLED | OUTPATIENT
Start: 2024-12-12

## 2024-12-09 RX ORDER — DIPHENHYDRAMINE HYDROCHLORIDE 50 MG/ML
50 INJECTION INTRAMUSCULAR; INTRAVENOUS AS NEEDED
Status: CANCELLED | OUTPATIENT
Start: 2024-12-12

## 2024-12-09 RX ORDER — DIPHENHYDRAMINE HYDROCHLORIDE 50 MG/ML
50 INJECTION INTRAMUSCULAR; INTRAVENOUS AS NEEDED
Status: DISCONTINUED | OUTPATIENT
Start: 2024-12-09 | End: 2024-12-09 | Stop reason: HOSPADM

## 2024-12-09 RX ORDER — EPINEPHRINE 0.3 MG/.3ML
0.3 INJECTION SUBCUTANEOUS EVERY 5 MIN PRN
Status: CANCELLED | OUTPATIENT
Start: 2024-12-12

## 2024-12-09 RX ORDER — PROCHLORPERAZINE EDISYLATE 5 MG/ML
10 INJECTION INTRAMUSCULAR; INTRAVENOUS EVERY 6 HOURS PRN
Status: DISCONTINUED | OUTPATIENT
Start: 2024-12-09 | End: 2024-12-09 | Stop reason: HOSPADM

## 2024-12-09 RX ORDER — FAMOTIDINE 10 MG/ML
20 INJECTION INTRAVENOUS ONCE AS NEEDED
Status: DISCONTINUED | OUTPATIENT
Start: 2024-12-09 | End: 2024-12-09 | Stop reason: HOSPADM

## 2024-12-09 RX ORDER — ONDANSETRON HYDROCHLORIDE 8 MG/1
8 TABLET, FILM COATED ORAL ONCE
Status: COMPLETED | OUTPATIENT
Start: 2024-12-09 | End: 2024-12-09

## 2024-12-09 RX ORDER — EPINEPHRINE 0.3 MG/.3ML
0.3 INJECTION SUBCUTANEOUS EVERY 5 MIN PRN
Status: DISCONTINUED | OUTPATIENT
Start: 2024-12-09 | End: 2024-12-09 | Stop reason: HOSPADM

## 2024-12-09 NOTE — PROGRESS NOTES
Pt arrived to [facility] infusion for scheduled injection in left lower abdomen. Shae KAUFMAN NP at chairside. Pt received injections without incident and aware of future appointments. Pt dc home with wife in safe condition.

## 2024-12-09 NOTE — RESEARCH NOTES
Research Note Treatment Day    Holden Burgess is here today for treatment on [id number]. Today is  long term C2 D1. Procedures completed per protocol. AE's and con-meds reviewed with patient. Patient is aware of treatment plan.    [x]   Received treatment as planned   OR  []    Treatment delayed; patient calendar updated as required   Treatment delayed because:    []   AE    []   Physician Discretion    []   Clinical Deterioration or Progression     []   Other    Education Documentation  Constipation, taught by Keren Khan RN at 12/9/2024  2:34 PM.  Learner: Significant Other, Family, Patient  Readiness: Eager  Method: Explanation  Response: Verbalizes Understanding    Fatigue, taught by Keren Khan RN at 12/9/2024  2:34 PM.  Learner: Significant Other, Family, Patient  Readiness: Eager  Method: Explanation  Response: Verbalizes Understanding    Treatment Plan and Schedule, taught by Keren Khan RN at 12/9/2024  2:34 PM.  Learner: Significant Other, Family, Patient  Readiness: Eager  Method: Explanation  Response: Verbalizes Understanding    Education Comments  No comments found.

## 2024-12-09 NOTE — PROGRESS NOTES
SW reconsulted by NP, BALJIT Lunsford, to follow up with patient while infusion on this day re: chair lift inquiry.  SW met with patient while in infusion on this day to refer him to [facility], as this is a piece of equipment not typically covered by insurance. Provided phone number to [facility] and recommended that he contact them to be connected to agencies who could provide this service. Patient was also provided with this writer's direct contact information.  Patient voiced understanding and agreement. Appreciative of visit.

## 2024-12-12 ENCOUNTER — INFUSION (OUTPATIENT)
Dept: HEMATOLOGY/ONCOLOGY | Facility: HOSPITAL | Age: 84
End: 2024-12-12
Payer: MEDICARE

## 2024-12-12 ENCOUNTER — LAB (OUTPATIENT)
Dept: LAB | Facility: HOSPITAL | Age: 84
End: 2024-12-12
Payer: MEDICARE

## 2024-12-12 ENCOUNTER — TELEPHONE (OUTPATIENT)
Dept: ADMISSION | Facility: HOSPITAL | Age: 84
End: 2024-12-12
Payer: MEDICARE

## 2024-12-12 VITALS
OXYGEN SATURATION: 100 % | DIASTOLIC BLOOD PRESSURE: 69 MMHG | SYSTOLIC BLOOD PRESSURE: 130 MMHG | HEART RATE: 87 BPM | TEMPERATURE: 96.8 F | BODY MASS INDEX: 37.82 KG/M2 | WEIGHT: 276.8 LBS | RESPIRATION RATE: 16 BRPM

## 2024-12-12 DIAGNOSIS — D46.9 MYELODYSPLASTIC SYNDROME (MULTI): ICD-10-CM

## 2024-12-12 DIAGNOSIS — T45.1X5A ANEMIA DUE TO CHEMOTHERAPY FOR MYELODYSPLASTIC SYNDROME TREATED WITH ERYTHROPOIETIN (MULTI): ICD-10-CM

## 2024-12-12 DIAGNOSIS — D46.9 ANEMIA DUE TO CHEMOTHERAPY FOR MYELODYSPLASTIC SYNDROME TREATED WITH ERYTHROPOIETIN (MULTI): ICD-10-CM

## 2024-12-12 DIAGNOSIS — D64.81 ANEMIA DUE TO CHEMOTHERAPY FOR MYELODYSPLASTIC SYNDROME TREATED WITH ERYTHROPOIETIN (MULTI): ICD-10-CM

## 2024-12-12 LAB
ALBUMIN SERPL BCP-MCNC: 4.4 G/DL (ref 3.4–5)
ALP SERPL-CCNC: 90 U/L (ref 33–136)
ALT SERPL W P-5'-P-CCNC: 21 U/L (ref 10–52)
ANION GAP SERPL CALC-SCNC: 11 MMOL/L (ref 10–20)
AST SERPL W P-5'-P-CCNC: 21 U/L (ref 9–39)
BASOPHILS # BLD AUTO: 0.01 X10*3/UL (ref 0–0.1)
BASOPHILS NFR BLD AUTO: 0.5 %
BILIRUB SERPL-MCNC: 1.1 MG/DL (ref 0–1.2)
BUN SERPL-MCNC: 34 MG/DL (ref 6–23)
CALCIUM SERPL-MCNC: 9.5 MG/DL (ref 8.6–10.3)
CHLORIDE SERPL-SCNC: 110 MMOL/L (ref 98–107)
CO2 SERPL-SCNC: 25 MMOL/L (ref 21–32)
CREAT SERPL-MCNC: 1.5 MG/DL (ref 0.5–1.3)
DACRYOCYTES BLD QL SMEAR: NORMAL
EGFRCR SERPLBLD CKD-EPI 2021: 46 ML/MIN/1.73M*2
EOSINOPHIL # BLD AUTO: 0.06 X10*3/UL (ref 0–0.4)
EOSINOPHIL NFR BLD AUTO: 3 %
ERYTHROCYTE [DISTWIDTH] IN BLOOD BY AUTOMATED COUNT: 22.2 % (ref 11.5–14.5)
FERRITIN SERPL-MCNC: 527 NG/ML (ref 20–300)
GLUCOSE SERPL-MCNC: 95 MG/DL (ref 74–99)
HCT VFR BLD AUTO: 29.5 % (ref 41–52)
HGB BLD-MCNC: 9.1 G/DL (ref 13.5–17.5)
HYPOCHROMIA BLD QL SMEAR: NORMAL
IMM GRANULOCYTES # BLD AUTO: 0.02 X10*3/UL (ref 0–0.5)
IMM GRANULOCYTES NFR BLD AUTO: 1 % (ref 0–0.9)
IRON SATN MFR SERPL: 26 % (ref 25–45)
IRON SERPL-MCNC: 68 UG/DL (ref 35–150)
LYMPHOCYTES # BLD AUTO: 0.62 X10*3/UL (ref 0.8–3)
LYMPHOCYTES NFR BLD AUTO: 31 %
MCH RBC QN AUTO: 30 PG (ref 26–34)
MCHC RBC AUTO-ENTMCNC: 30.8 G/DL (ref 32–36)
MCV RBC AUTO: 97 FL (ref 80–100)
MONOCYTES # BLD AUTO: 0.13 X10*3/UL (ref 0.05–0.8)
MONOCYTES NFR BLD AUTO: 6.5 %
NEUTROPHILS # BLD AUTO: 1.16 X10*3/UL (ref 1.6–5.5)
NEUTROPHILS NFR BLD AUTO: 58 %
NRBC BLD-RTO: 0 /100 WBCS (ref 0–0)
OVALOCYTES BLD QL SMEAR: NORMAL
PHOSPHATE SERPL-MCNC: 3.1 MG/DL (ref 2.5–4.9)
PLATELET # BLD AUTO: 47 X10*3/UL (ref 150–450)
POLYCHROMASIA BLD QL SMEAR: NORMAL
POTASSIUM SERPL-SCNC: 5.4 MMOL/L (ref 3.5–5.3)
PROT SERPL-MCNC: 6.6 G/DL (ref 6.4–8.2)
RBC # BLD AUTO: 3.03 X10*6/UL (ref 4.5–5.9)
RBC MORPH BLD: NORMAL
SCHISTOCYTES BLD QL SMEAR: NORMAL
SODIUM SERPL-SCNC: 141 MMOL/L (ref 136–145)
TIBC SERPL-MCNC: 266 UG/DL (ref 240–445)
UIBC SERPL-MCNC: 198 UG/DL (ref 110–370)
WBC # BLD AUTO: 2 X10*3/UL (ref 4.4–11.3)

## 2024-12-12 PROCEDURE — 96401 CHEMO ANTI-NEOPL SQ/IM: CPT

## 2024-12-12 PROCEDURE — 2500000004 HC RX 250 GENERAL PHARMACY W/ HCPCS (ALT 636 FOR OP/ED): Mod: JZ,JG | Performed by: NURSE PRACTITIONER

## 2024-12-12 PROCEDURE — 36415 COLL VENOUS BLD VENIPUNCTURE: CPT

## 2024-12-12 PROCEDURE — 82668 ASSAY OF ERYTHROPOIETIN: CPT

## 2024-12-12 PROCEDURE — 84100 ASSAY OF PHOSPHORUS: CPT

## 2024-12-12 PROCEDURE — 2560000001 HC RX 256 EXPERIMENTAL DRUGS: Performed by: INTERNAL MEDICINE

## 2024-12-12 PROCEDURE — 82947 ASSAY GLUCOSE BLOOD QUANT: CPT

## 2024-12-12 PROCEDURE — 82728 ASSAY OF FERRITIN: CPT

## 2024-12-12 PROCEDURE — 96372 THER/PROPH/DIAG INJ SC/IM: CPT

## 2024-12-12 PROCEDURE — 83540 ASSAY OF IRON: CPT

## 2024-12-12 PROCEDURE — 85025 COMPLETE CBC W/AUTO DIFF WBC: CPT

## 2024-12-12 PROCEDURE — 2500000005 HC RX 250 GENERAL PHARMACY W/O HCPCS: Performed by: INTERNAL MEDICINE

## 2024-12-12 RX ORDER — DIPHENHYDRAMINE HYDROCHLORIDE 50 MG/ML
50 INJECTION INTRAMUSCULAR; INTRAVENOUS AS NEEDED
OUTPATIENT
Start: 2024-12-26

## 2024-12-12 RX ORDER — ONDANSETRON HYDROCHLORIDE 8 MG/1
8 TABLET, FILM COATED ORAL ONCE
Status: COMPLETED | OUTPATIENT
Start: 2024-12-12 | End: 2024-12-12

## 2024-12-12 RX ORDER — FAMOTIDINE 10 MG/ML
20 INJECTION INTRAVENOUS ONCE AS NEEDED
OUTPATIENT
Start: 2024-12-26

## 2024-12-12 RX ORDER — PROCHLORPERAZINE EDISYLATE 5 MG/ML
10 INJECTION INTRAMUSCULAR; INTRAVENOUS EVERY 6 HOURS PRN
Status: DISCONTINUED | OUTPATIENT
Start: 2024-12-12 | End: 2024-12-12 | Stop reason: HOSPADM

## 2024-12-12 RX ORDER — PROCHLORPERAZINE MALEATE 10 MG
10 TABLET ORAL EVERY 6 HOURS PRN
Status: DISCONTINUED | OUTPATIENT
Start: 2024-12-12 | End: 2024-12-12 | Stop reason: HOSPADM

## 2024-12-12 RX ORDER — EPINEPHRINE 0.3 MG/.3ML
0.3 INJECTION SUBCUTANEOUS EVERY 5 MIN PRN
OUTPATIENT
Start: 2024-12-26

## 2024-12-12 RX ORDER — ALBUTEROL SULFATE 0.83 MG/ML
3 SOLUTION RESPIRATORY (INHALATION) AS NEEDED
OUTPATIENT
Start: 2024-12-26

## 2024-12-12 ASSESSMENT — PAIN SCALES - GENERAL: PAINLEVEL_OUTOF10: 0-NO PAIN

## 2024-12-12 NOTE — TELEPHONE ENCOUNTER
Per infusion charge RN, they can still see them today & they can come at anytime. Pt's wife notified & said they will be here around 1pm.

## 2024-12-12 NOTE — TELEPHONE ENCOUNTER
Pt's wife called- she said they have a lot of ice where they live so won't be able to safely make it to his 10am infusion appt. She wants to know if they can either come in later today or tomorrow. Message sent to the infusion charge RN & the team.

## 2024-12-12 NOTE — PROGRESS NOTES
GENARO Burgess is a 84 y.o. male who presents decitabine injection and darbepoetin Hong injection.    He is on the following chemotherapy regimen:     Treatment Plans       Name Type Plan Dates Plan Provider         Active    ([facility]) [id number] - AzaCITIDine / Decitabine, 42 Day Cycle Followed by 28 Day Cycles Oncology Treatment 9/29/2024 - Present Sebastien Rashid MD                  .    Patient assessment unchanged from yesterday.    K+5.4 -Shae K. notified, no new orders.    Patient received decitabine to RLQ without incident. Pt received darbepoetin hong to back of right arm without incident. Pt discharged home instable condition accompanied by wife.

## 2024-12-14 LAB — EPO SERPL-ACNC: 78 MU/ML (ref 4–27)

## 2024-12-16 ENCOUNTER — APPOINTMENT (OUTPATIENT)
Dept: CARDIOLOGY | Facility: CLINIC | Age: 84
End: 2024-12-16
Payer: MEDICARE

## 2024-12-16 ENCOUNTER — INFUSION (OUTPATIENT)
Dept: HEMATOLOGY/ONCOLOGY | Facility: HOSPITAL | Age: 84
End: 2024-12-16
Payer: MEDICARE

## 2024-12-16 ENCOUNTER — APPOINTMENT (OUTPATIENT)
Dept: HEMATOLOGY/ONCOLOGY | Facility: HOSPITAL | Age: 84
End: 2024-12-16
Payer: MEDICARE

## 2024-12-16 ENCOUNTER — LAB (OUTPATIENT)
Dept: LAB | Facility: HOSPITAL | Age: 84
End: 2024-12-16
Payer: MEDICARE

## 2024-12-16 VITALS
HEART RATE: 77 BPM | TEMPERATURE: 97.2 F | BODY MASS INDEX: 37.77 KG/M2 | RESPIRATION RATE: 18 BRPM | OXYGEN SATURATION: 100 % | DIASTOLIC BLOOD PRESSURE: 47 MMHG | SYSTOLIC BLOOD PRESSURE: 120 MMHG | WEIGHT: 276.46 LBS

## 2024-12-16 VITALS
SYSTOLIC BLOOD PRESSURE: 110 MMHG | BODY MASS INDEX: 39.09 KG/M2 | HEART RATE: 64 BPM | HEIGHT: 71 IN | WEIGHT: 279.2 LBS | DIASTOLIC BLOOD PRESSURE: 60 MMHG

## 2024-12-16 DIAGNOSIS — I48.92 ATRIAL FLUTTER, UNSPECIFIED TYPE (MULTI): ICD-10-CM

## 2024-12-16 DIAGNOSIS — D46.9 MYELODYSPLASTIC SYNDROME (MULTI): ICD-10-CM

## 2024-12-16 DIAGNOSIS — I71.21 ANEURYSM OF ASCENDING AORTA WITHOUT RUPTURE (CMS-HCC): ICD-10-CM

## 2024-12-16 DIAGNOSIS — E78.2 HYPERLIPEMIA, MIXED: ICD-10-CM

## 2024-12-16 DIAGNOSIS — Z98.61 CAD S/P PERCUTANEOUS CORONARY ANGIOPLASTY: ICD-10-CM

## 2024-12-16 DIAGNOSIS — I25.10 CAD S/P PERCUTANEOUS CORONARY ANGIOPLASTY: ICD-10-CM

## 2024-12-16 DIAGNOSIS — I50.9 ACUTE CONGESTIVE HEART FAILURE, UNSPECIFIED HEART FAILURE TYPE: ICD-10-CM

## 2024-12-16 DIAGNOSIS — R06.09 DYSPNEA ON EXERTION: ICD-10-CM

## 2024-12-16 LAB
ALBUMIN SERPL BCP-MCNC: 4.1 G/DL (ref 3.4–5)
ALP SERPL-CCNC: 83 U/L (ref 33–136)
ALT SERPL W P-5'-P-CCNC: 20 U/L (ref 10–52)
ANION GAP SERPL CALC-SCNC: 12 MMOL/L (ref 10–20)
AST SERPL W P-5'-P-CCNC: 19 U/L (ref 9–39)
BASOPHILS # BLD AUTO: 0.01 X10*3/UL (ref 0–0.1)
BASOPHILS NFR BLD AUTO: 0.5 %
BILIRUB SERPL-MCNC: 1 MG/DL (ref 0–1.2)
BUN SERPL-MCNC: 31 MG/DL (ref 6–23)
CALCIUM SERPL-MCNC: 9.1 MG/DL (ref 8.6–10.3)
CHLORIDE SERPL-SCNC: 110 MMOL/L (ref 98–107)
CO2 SERPL-SCNC: 22 MMOL/L (ref 21–32)
CREAT SERPL-MCNC: 1.51 MG/DL (ref 0.5–1.3)
DACRYOCYTES BLD QL SMEAR: NORMAL
EGFRCR SERPLBLD CKD-EPI 2021: 45 ML/MIN/1.73M*2
EOSINOPHIL # BLD AUTO: 0.08 X10*3/UL (ref 0–0.4)
EOSINOPHIL NFR BLD AUTO: 4 %
ERYTHROCYTE [DISTWIDTH] IN BLOOD BY AUTOMATED COUNT: 22 % (ref 11.5–14.5)
GLUCOSE SERPL-MCNC: 110 MG/DL (ref 74–99)
HCT VFR BLD AUTO: 29 % (ref 41–52)
HGB BLD-MCNC: 8.8 G/DL (ref 13.5–17.5)
HYPOCHROMIA BLD QL SMEAR: NORMAL
IMM GRANULOCYTES # BLD AUTO: 0.02 X10*3/UL (ref 0–0.5)
IMM GRANULOCYTES NFR BLD AUTO: 1 % (ref 0–0.9)
LYMPHOCYTES # BLD AUTO: 0.54 X10*3/UL (ref 0.8–3)
LYMPHOCYTES NFR BLD AUTO: 26.9 %
MCH RBC QN AUTO: 29.7 PG (ref 26–34)
MCHC RBC AUTO-ENTMCNC: 30.3 G/DL (ref 32–36)
MCV RBC AUTO: 98 FL (ref 80–100)
MONOCYTES # BLD AUTO: 0.1 X10*3/UL (ref 0.05–0.8)
MONOCYTES NFR BLD AUTO: 5 %
NEUTROPHILS # BLD AUTO: 1.26 X10*3/UL (ref 1.6–5.5)
NEUTROPHILS NFR BLD AUTO: 62.6 %
NRBC BLD-RTO: 1 /100 WBCS (ref 0–0)
OVALOCYTES BLD QL SMEAR: NORMAL
PLATELET # BLD AUTO: 49 X10*3/UL (ref 150–450)
POLYCHROMASIA BLD QL SMEAR: NORMAL
POTASSIUM SERPL-SCNC: 4.7 MMOL/L (ref 3.5–5.3)
PROT SERPL-MCNC: 6.2 G/DL (ref 6.4–8.2)
RBC # BLD AUTO: 2.96 X10*6/UL (ref 4.5–5.9)
RBC MORPH BLD: NORMAL
SCHISTOCYTES BLD QL SMEAR: NORMAL
SODIUM SERPL-SCNC: 139 MMOL/L (ref 136–145)
WBC # BLD AUTO: 2 X10*3/UL (ref 4.4–11.3)

## 2024-12-16 PROCEDURE — 96401 CHEMO ANTI-NEOPL SQ/IM: CPT

## 2024-12-16 PROCEDURE — 2500000005 HC RX 250 GENERAL PHARMACY W/O HCPCS: Performed by: INTERNAL MEDICINE

## 2024-12-16 PROCEDURE — 36415 COLL VENOUS BLD VENIPUNCTURE: CPT

## 2024-12-16 PROCEDURE — 2560000001 HC RX 256 EXPERIMENTAL DRUGS: Performed by: INTERNAL MEDICINE

## 2024-12-16 PROCEDURE — 85025 COMPLETE CBC W/AUTO DIFF WBC: CPT

## 2024-12-16 PROCEDURE — 84075 ASSAY ALKALINE PHOSPHATASE: CPT

## 2024-12-16 RX ORDER — ONDANSETRON HYDROCHLORIDE 8 MG/1
8 TABLET, FILM COATED ORAL ONCE
Status: COMPLETED | OUTPATIENT
Start: 2024-12-16 | End: 2024-12-16

## 2024-12-16 RX ORDER — DIPHENHYDRAMINE HYDROCHLORIDE 50 MG/ML
50 INJECTION INTRAMUSCULAR; INTRAVENOUS AS NEEDED
Status: DISCONTINUED | OUTPATIENT
Start: 2024-12-16 | End: 2024-12-16 | Stop reason: HOSPADM

## 2024-12-16 RX ORDER — FAMOTIDINE 10 MG/ML
20 INJECTION INTRAVENOUS ONCE AS NEEDED
Status: DISCONTINUED | OUTPATIENT
Start: 2024-12-16 | End: 2024-12-16 | Stop reason: HOSPADM

## 2024-12-16 RX ORDER — EPINEPHRINE 0.3 MG/.3ML
0.3 INJECTION SUBCUTANEOUS EVERY 5 MIN PRN
Status: DISCONTINUED | OUTPATIENT
Start: 2024-12-16 | End: 2024-12-16 | Stop reason: HOSPADM

## 2024-12-16 RX ORDER — ALBUTEROL SULFATE 0.83 MG/ML
3 SOLUTION RESPIRATORY (INHALATION) AS NEEDED
Status: DISCONTINUED | OUTPATIENT
Start: 2024-12-16 | End: 2024-12-16 | Stop reason: HOSPADM

## 2024-12-16 RX ORDER — PROCHLORPERAZINE MALEATE 10 MG
10 TABLET ORAL EVERY 6 HOURS PRN
Status: DISCONTINUED | OUTPATIENT
Start: 2024-12-16 | End: 2024-12-16 | Stop reason: HOSPADM

## 2024-12-16 RX ORDER — PROCHLORPERAZINE EDISYLATE 5 MG/ML
10 INJECTION INTRAMUSCULAR; INTRAVENOUS EVERY 6 HOURS PRN
Status: DISCONTINUED | OUTPATIENT
Start: 2024-12-16 | End: 2024-12-16 | Stop reason: HOSPADM

## 2024-12-16 ASSESSMENT — PAIN SCALES - GENERAL: PAINLEVEL_OUTOF10: 0-NO PAIN

## 2024-12-16 NOTE — PROGRESS NOTES
Patient presents to infusion appointment in stable condition. Reports worsening neuropathy in bilateral hands and generalized itching without rash, Dr. Rashid notified. No new orders. C3D8 [id number] azacitidine injections tolerated without incident. ANC 1.26, filgrastim held per orders. Lab results and schedule reviewed. Discharged in stable condition.

## 2024-12-16 NOTE — PROGRESS NOTES
Patient:  Holden Burgess  YOB: 1940  MRN: [id number]       Chief Complaint/Active Symptoms:       Holden Burgess is a 84 y.o. male who returns today for cardiac follow-up.  Patient is doing reasonably well.  He is under the care of Dr. Rashid at [facility] for myelodysplastic syndrome.  He is receiving chemotherapy.  He is having no cardiac symptoms per se at this time.  History of mild to moderate size ascending aortic aneurysm which would be considered inoperable under the circumstances of his other medical conditions.  He also has sinus node dysfunction and has had previous ablation.  He does have a pacemaker.  His coronary disease is stable without any incident.  His arrhythmia status is stable.  His other medical conditions are followed by his other physicians.      Objective:     Vitals:    12/16/24 1500   BP: 110/60   Pulse: 64       Vitals:    12/16/24 1500   Weight: 127 kg (279 lb 3.2 oz)       Allergies:     Allergies   Allergen Reactions    Crestor [Rosuvastatin] Unknown    Ezetimibe Unknown    Statins-Hmg-Coa Reductase Inhibitors Unknown     leg cramping          Medications:     Current Outpatient Medications   Medication Instructions    acetaminophen (TYLENOL) 1,000 mg, oral, Every 6 hours PRN    allopurinol (ZYLOPRIM) 100 mg, oral, 2 times daily    Aranesp (in polysorbate) 200 mcg, [facility] ONC Every 2 Weeks for 4 Weeks in a 28 Day Cycle    ascorbic acid (Vitamin C) 1,000 mg tablet 1 tablet, Daily    atorvastatin (Lipitor) 10 mg tablet 1 tablet, Nightly    calcitriol (ROCALTROL) 0.25 mcg, Every other day    cholecalciferol (Vitamin D-3) 125 MCG (5000 UT) capsule 1 capsule, Daily    cyanocobalamin (Vitamin B-12) 1,000 mcg tablet 1 tablet, Daily    ezetimibe (ZETIA) 10 mg, oral, Daily    famotidine (PEPCID) 20 mg, Daily    ferrous sulfate 325 (65 Fe) MG tablet 1 tablet, Daily    folic acid (FOLVITE) 1 mg, oral, Daily    furosemide (LASIX) 40 mg, oral, Daily    lactulose 10 g, oral, 2  times daily, As needed for constipation    latanoprost (Xalatan) 0.005 % ophthalmic solution 1 drop, Nightly    MAGNESIUM ORAL 550 mg, Nightly    rOPINIRole (REQUIP) 0.25 mg, oral, Nightly    temazepam (RESTORIL) 15 mg, oral, Nightly PRN    traMADol (ULTRAM) 50 mg, oral, Every 6 hours PRN    TURMERIC ORAL 1 capsule, Daily    valsartan (DIOVAN) 320 mg, oral, Daily    zinc sulfate 50 mg zinc (220 mg) tablet Take by mouth.       Physical Examination:   Constitutional:       Appearance: Healthy appearance. Not in distress.   Neck:      Vascular: No JVR. JVD normal.   Pulmonary:      Effort: Pulmonary effort is normal.      Breath sounds: Normal breath sounds. No wheezing. No rhonchi. No rales.   Chest:      Chest wall: Not tender to palpatation.   Cardiovascular:      PMI at left midclavicular line. Normal rate. Regular rhythm. Normal S1. Normal S2.       Murmurs: There is no murmur.      No gallop.  No click. No rub.   Pulses:     Intact distal pulses.   Edema:     Peripheral edema absent.   Abdominal:      General: Bowel sounds are normal.      Palpations: Abdomen is soft.      Tenderness: There is no abdominal tenderness.   Musculoskeletal: Normal range of motion.         General: No tenderness. Skin:     General: Skin is warm and dry.   Neurological:      General: No focal deficit present.      Mental Status: Alert and oriented to person, place and time.            Lab:     CBC:   Lab Results   Component Value Date    WBC 2.0 (L) 12/16/2024    RBC 2.96 (L) 12/16/2024    HGB 8.8 (L) 12/16/2024    HGB 10 (A) 04/18/2024    HCT 29.0 (L) 12/16/2024    PLT 49 (L) 12/16/2024        CMP:    Lab Results   Component Value Date     12/16/2024    K 4.7 12/16/2024     (H) 12/16/2024    CO2 22 12/16/2024    BUN 31 (H) 12/16/2024    CREATININE 1.51 (H) 12/16/2024    GLUCOSE 110 (H) 12/16/2024    CALCIUM 9.1 12/16/2024       Magnesium:    Lab Results   Component Value Date    MG 2.01 12/09/2024       Lipid Profile:     Lab Results   Component Value Date    TRIG 131 02/01/2023    HDL 28.8 (A) 02/01/2023       TSH:    Lab Results   Component Value Date    TSH 2.65 07/27/2022       BNP:   Lab Results   Component Value Date    BNP 67 09/30/2024        PT/INR:    Lab Results   Component Value Date    PROTIME 14.1 (H) 08/29/2024    INR 1.3 (H) 08/29/2024       HgBA1c:    Lab Results   Component Value Date    HGBA1C 5.2 04/18/2024       BMP:  Lab Results   Component Value Date     12/16/2024     12/12/2024     12/09/2024    K 4.7 12/16/2024    K 5.4 (H) 12/12/2024    K 5.0 12/09/2024     (H) 12/16/2024     (H) 12/12/2024     (H) 12/09/2024    CO2 22 12/16/2024    CO2 25 12/12/2024    CO2 24 12/09/2024    BUN 31 (H) 12/16/2024    BUN 34 (H) 12/12/2024    BUN 41 (H) 12/09/2024    CREATININE 1.51 (H) 12/16/2024    CREATININE 1.50 (H) 12/12/2024    CREATININE 1.55 (H) 12/09/2024       CBC:  Lab Results   Component Value Date    WBC 2.0 (L) 12/16/2024    WBC 2.0 (L) 12/12/2024    WBC 2.1 (L) 12/09/2024    RBC 2.96 (L) 12/16/2024    RBC 3.03 (L) 12/12/2024    RBC 2.89 (L) 12/09/2024    HGB 8.8 (L) 12/16/2024    HGB 9.1 (L) 12/12/2024    HGB 9.0 (L) 12/09/2024    HGB 10 (A) 04/18/2024    HGB 11.1 (A) 10/31/2023    HGB 11.8 (A) 09/26/2023    HCT 29.0 (L) 12/16/2024    HCT 29.5 (L) 12/12/2024    HCT 28.3 (L) 12/09/2024    MCV 98 12/16/2024    MCV 97 12/12/2024    MCV 98 12/09/2024    MCH 29.7 12/16/2024    MCH 30.0 12/12/2024    MCH 31.1 12/09/2024    MCHC 30.3 (L) 12/16/2024    MCHC 30.8 (L) 12/12/2024    MCHC 31.8 (L) 12/09/2024    RDW 22.0 (H) 12/16/2024    RDW 22.2 (H) 12/12/2024    RDW 22.5 (H) 12/09/2024    PLT 49 (L) 12/16/2024    PLT 47 (L) 12/12/2024    PLT 47 (L) 12/09/2024    MPV 11.3 04/03/2024       Cardiac Enzymes:    Lab Results   Component Value Date    TROPHS 7 08/31/2024    TROPHS 5 04/04/2024    TROPHS 5 04/04/2024       Hepatic Function Panel:    Lab Results   Component Value Date     "ALKPHOS 83 12/16/2024    ALT 20 12/16/2024    AST 19 12/16/2024    PROT 6.2 (L) 12/16/2024    BILITOT 1.0 12/16/2024    BILIDIR 0.2 04/04/2024         Diagnostic Studies:     No results found.    EKG:   No results found for: \"EKG\"      Radiology:     No orders to display       Assessment/Plan:         Patient Active Problem List   Diagnosis    Atrial flutter (Multi)    Bilateral sensorineural hearing loss    Cervical spondylosis with myelopathy    CAD S/P percutaneous coronary angioplasty    Chronic kidney disease (CKD), stage III (moderate) (Multi)    Depression, recurrent (CMS-[facility])    Folic acid deficiency    Gait abnormality    Hip pain, right    Pain in both lower extremities    Hyperlipemia, mixed    Hyperuricemia    Ischemic foot pain at rest    Hypertension    PVD (peripheral vascular disease) (CMS-[facility])    Restless leg syndrome    Rib pain on right side    Sleep apnea    Lumbar radiculopathy    Spondylosis of lumbar spine    Stiffness of right shoulder joint    Vitamin B12 deficiency    Thoracic ascending aortic aneurysm (CMS-[facility])    Increased urinary frequency    Incontinence    Sinus node dysfunction (Multi)    Abdominal pain, chronic, right upper quadrant    Calculus of gallbladder without cholecystitis without obstruction    Chronic cholecystitis    Urge incontinence of urine    Weak urinary stream    Urinary incontinence    Edema    Anxiety    Chronic back pain    Dermatochalasis of both eyelids    Dextroscoliosis    Estevez catheter problem (CMS-[facility])    Gastroesophageal reflux disease without esophagitis    Glaucoma    Incomplete emptying of bladder    Localized osteoarthrosis    Lumbosacral spondylosis without myelopathy    MGD (meibomian gland dysfunction)    Osteoarthritis of right knee    Osteoarthritis of spine with radiculopathy, lumbar region    Pain in right ankle and joints of right foot    Pain in joint involving pelvic region and thigh    Primary open-angle glaucoma, bilateral, mild stage    " RPE mottling of macula    ELIZABETH (stress urinary incontinence), male    Lumbar stenosis    Pseudoaneurysm    PFD (pelvic floor dysfunction)    Gout    History of radiofrequency ablation (RFA) procedure for cardiac arrhythmia    Presence of cardiac pacemaker    Myelodysplastic syndrome (Multi)    Dyspnea on exertion    Acute congestive heart failure    Abdominal wall hematoma    Hyperglycemia    Chronic renal disease, stage IV (Multi)    Anemia due to chemotherapy for myelodysplastic syndrome treated with erythropoietin (Multi)         ASSESSMENT     84-year-old gentleman here for routine cardiovascular follow-up.    Meds, vitals, examination as noted.    Chart review details cussed the patient and his wife.    Impression:  ASHD class I-II  Remote PCI and stenting  Sinus node dysfunction  Permanent pacemaker  Prior ablation therapy  Morbid obesity  Mild to moderate ascending aortic aneurysm  Chronic dyspnea  Normal LV function ejection fraction 65%    PLAN   Recommendation:  Maintain good blood pressure control  Continue exercise and activities as tolerated  Follow-up with hematology  See me back in about 4 months  Will repeat his CT chest in the spring  Call if any issue problems otherwise develop

## 2024-12-16 NOTE — PATIENT INSTRUCTIONS
Continue same medications/treatment.  Patient educated on proper medication use.  Patient educated on risk factor modification.  Please bring any lab results from other providers/physicians to your next appointment.    Please bring all medicines, vitamins, and herbal supplements with you when you come to the office.    Prescriptions will not be filled unless you are compliant with your follow up appointments or have a follow up appointment scheduled as per instruction of your physician. Refills should be requested at the time of your visit.    Follow up in 6 months  CT of Chest for AAA    I, LISANDRO MCKEON RN, AM SCRIBING FOR AND IN THE PRESENCE OF DR. ROBERT SALCIDO, DO, FACC

## 2024-12-18 DIAGNOSIS — D46.9 MYELODYSPLASTIC SYNDROME (MULTI): ICD-10-CM

## 2024-12-18 NOTE — PROGRESS NOTES
Late Entry - adding missed physical exam:    Physical Exam  HENT:      Mouth/Throat:      Mouth: Mucous membranes are moist.   Eyes:      Conjunctiva/sclera: Conjunctivae normal.      Pupils: Pupils are equal, round, and reactive to light.   Cardiovascular:      Rate and Rhythm: Normal rate and regular rhythm.      Heart sounds: Normal heart sounds.   Pulmonary:      Effort: Pulmonary effort is normal.      Breath sounds: Normal breath sounds.   Abdominal:      General: Abdomen is flat.      Palpations: Abdomen is soft.   Musculoskeletal:         General: Normal range of motion.      Right lower leg: Edema present.      Left lower leg: Edema present.   Skin:     General: Skin is warm and dry.   Neurological:      General: No focal deficit present.      Mental Status: He is oriented to person, place, and time.   Psychiatric:         Mood and Affect: Mood normal.         Behavior: Behavior normal.

## 2024-12-19 ENCOUNTER — LAB (OUTPATIENT)
Dept: LAB | Facility: HOSPITAL | Age: 84
End: 2024-12-19
Payer: MEDICARE

## 2024-12-19 ENCOUNTER — PROCEDURE VISIT (OUTPATIENT)
Dept: HEMATOLOGY/ONCOLOGY | Facility: HOSPITAL | Age: 84
End: 2024-12-19
Payer: MEDICARE

## 2024-12-19 ENCOUNTER — INFUSION (OUTPATIENT)
Dept: HEMATOLOGY/ONCOLOGY | Facility: HOSPITAL | Age: 84
End: 2024-12-19
Payer: MEDICARE

## 2024-12-19 ENCOUNTER — APPOINTMENT (OUTPATIENT)
Dept: HEMATOLOGY/ONCOLOGY | Facility: HOSPITAL | Age: 84
End: 2024-12-19
Payer: MEDICARE

## 2024-12-19 VITALS
HEART RATE: 95 BPM | SYSTOLIC BLOOD PRESSURE: 148 MMHG | BODY MASS INDEX: 38.3 KG/M2 | OXYGEN SATURATION: 100 % | DIASTOLIC BLOOD PRESSURE: 49 MMHG | WEIGHT: 274.6 LBS | RESPIRATION RATE: 18 BRPM | TEMPERATURE: 97.3 F

## 2024-12-19 DIAGNOSIS — D46.9 MYELODYSPLASTIC SYNDROME (MULTI): Primary | ICD-10-CM

## 2024-12-19 DIAGNOSIS — D46.9 MYELODYSPLASTIC SYNDROME (MULTI): ICD-10-CM

## 2024-12-19 LAB
ALBUMIN SERPL BCP-MCNC: 4.1 G/DL (ref 3.4–5)
ALP SERPL-CCNC: 82 U/L (ref 33–136)
ALT SERPL W P-5'-P-CCNC: 18 U/L (ref 10–52)
ANION GAP SERPL CALC-SCNC: 11 MMOL/L (ref 10–20)
AST SERPL W P-5'-P-CCNC: 19 U/L (ref 9–39)
BASOPHILS # BLD AUTO: 0.01 X10*3/UL (ref 0–0.1)
BASOPHILS NFR BLD AUTO: 0.5 %
BILIRUB SERPL-MCNC: 1 MG/DL (ref 0–1.2)
BUN SERPL-MCNC: 30 MG/DL (ref 6–23)
CALCIUM SERPL-MCNC: 9 MG/DL (ref 8.6–10.3)
CHLORIDE SERPL-SCNC: 108 MMOL/L (ref 98–107)
CO2 SERPL-SCNC: 23 MMOL/L (ref 21–32)
CREAT SERPL-MCNC: 1.41 MG/DL (ref 0.5–1.3)
DACRYOCYTES BLD QL SMEAR: NORMAL
EGFRCR SERPLBLD CKD-EPI 2021: 49 ML/MIN/1.73M*2
EOSINOPHIL # BLD AUTO: 0.06 X10*3/UL (ref 0–0.4)
EOSINOPHIL NFR BLD AUTO: 3.2 %
ERYTHROCYTE [DISTWIDTH] IN BLOOD BY AUTOMATED COUNT: 21.9 % (ref 11.5–14.5)
GIANT PLATELETS BLD QL SMEAR: NORMAL
GLUCOSE SERPL-MCNC: 98 MG/DL (ref 74–99)
HCT VFR BLD AUTO: 27.4 % (ref 41–52)
HGB BLD-MCNC: 8.5 G/DL (ref 13.5–17.5)
HOLD SPECIMEN: NORMAL
HOLD SPECIMEN: NORMAL
HYPOCHROMIA BLD QL SMEAR: NORMAL
IMM GRANULOCYTES # BLD AUTO: 0.01 X10*3/UL (ref 0–0.5)
IMM GRANULOCYTES NFR BLD AUTO: 0.5 % (ref 0–0.9)
LYMPHOCYTES # BLD AUTO: 0.55 X10*3/UL (ref 0.8–3)
LYMPHOCYTES NFR BLD AUTO: 29.4 %
MCH RBC QN AUTO: 30.1 PG (ref 26–34)
MCHC RBC AUTO-ENTMCNC: 31 G/DL (ref 32–36)
MCV RBC AUTO: 97 FL (ref 80–100)
MONOCYTES # BLD AUTO: 0.11 X10*3/UL (ref 0.05–0.8)
MONOCYTES NFR BLD AUTO: 5.9 %
NEUTROPHILS # BLD AUTO: 1.13 X10*3/UL (ref 1.6–5.5)
NEUTROPHILS NFR BLD AUTO: 60.5 %
NRBC BLD-RTO: 0 /100 WBCS (ref 0–0)
OVALOCYTES BLD QL SMEAR: NORMAL
PLATELET # BLD AUTO: 48 X10*3/UL (ref 150–450)
POLYCHROMASIA BLD QL SMEAR: NORMAL
POTASSIUM SERPL-SCNC: 4.2 MMOL/L (ref 3.5–5.3)
PROT SERPL-MCNC: 6 G/DL (ref 6.4–8.2)
RBC # BLD AUTO: 2.82 X10*6/UL (ref 4.5–5.9)
RBC MORPH BLD: NORMAL
SCHISTOCYTES BLD QL SMEAR: NORMAL
SODIUM SERPL-SCNC: 138 MMOL/L (ref 136–145)
WBC # BLD AUTO: 1.9 X10*3/UL (ref 4.4–11.3)

## 2024-12-19 PROCEDURE — 36415 COLL VENOUS BLD VENIPUNCTURE: CPT

## 2024-12-19 PROCEDURE — 2560000001 HC RX 256 EXPERIMENTAL DRUGS: Performed by: INTERNAL MEDICINE

## 2024-12-19 PROCEDURE — 2500000004 HC RX 250 GENERAL PHARMACY W/ HCPCS (ALT 636 FOR OP/ED): Performed by: PHYSICIAN ASSISTANT

## 2024-12-19 PROCEDURE — 2500000004 HC RX 250 GENERAL PHARMACY W/ HCPCS (ALT 636 FOR OP/ED): Performed by: INTERNAL MEDICINE

## 2024-12-19 PROCEDURE — 38222 DX BONE MARROW BX & ASPIR: CPT | Mod: LT | Performed by: PHYSICIAN ASSISTANT

## 2024-12-19 PROCEDURE — 85025 COMPLETE CBC W/AUTO DIFF WBC: CPT

## 2024-12-19 PROCEDURE — 84075 ASSAY ALKALINE PHOSPHATASE: CPT

## 2024-12-19 PROCEDURE — 85097 BONE MARROW INTERPRETATION: CPT | Performed by: PHYSICIAN ASSISTANT

## 2024-12-19 PROCEDURE — 96401 CHEMO ANTI-NEOPL SQ/IM: CPT

## 2024-12-19 RX ORDER — DIPHENHYDRAMINE HYDROCHLORIDE 50 MG/ML
50 INJECTION INTRAMUSCULAR; INTRAVENOUS AS NEEDED
Status: DISCONTINUED | OUTPATIENT
Start: 2024-12-19 | End: 2024-12-19 | Stop reason: HOSPADM

## 2024-12-19 RX ORDER — PROCHLORPERAZINE MALEATE 10 MG
10 TABLET ORAL EVERY 6 HOURS PRN
Status: DISCONTINUED | OUTPATIENT
Start: 2024-12-19 | End: 2024-12-19 | Stop reason: HOSPADM

## 2024-12-19 RX ORDER — LIDOCAINE HYDROCHLORIDE 20 MG/ML
10 INJECTION, SOLUTION INFILTRATION; PERINEURAL ONCE
Status: COMPLETED | OUTPATIENT
Start: 2024-12-19 | End: 2024-12-19

## 2024-12-19 RX ORDER — ALBUTEROL SULFATE 0.83 MG/ML
3 SOLUTION RESPIRATORY (INHALATION) AS NEEDED
Status: DISCONTINUED | OUTPATIENT
Start: 2024-12-19 | End: 2024-12-19 | Stop reason: HOSPADM

## 2024-12-19 RX ORDER — ONDANSETRON HYDROCHLORIDE 8 MG/1
8 TABLET, FILM COATED ORAL ONCE
Status: COMPLETED | OUTPATIENT
Start: 2024-12-19 | End: 2024-12-19

## 2024-12-19 RX ORDER — EPINEPHRINE 0.3 MG/.3ML
0.3 INJECTION SUBCUTANEOUS EVERY 5 MIN PRN
Status: DISCONTINUED | OUTPATIENT
Start: 2024-12-19 | End: 2024-12-19 | Stop reason: HOSPADM

## 2024-12-19 RX ORDER — FAMOTIDINE 10 MG/ML
20 INJECTION INTRAVENOUS ONCE AS NEEDED
Status: DISCONTINUED | OUTPATIENT
Start: 2024-12-19 | End: 2024-12-19 | Stop reason: HOSPADM

## 2024-12-19 RX ORDER — PROCHLORPERAZINE EDISYLATE 5 MG/ML
10 INJECTION INTRAMUSCULAR; INTRAVENOUS EVERY 6 HOURS PRN
Status: DISCONTINUED | OUTPATIENT
Start: 2024-12-19 | End: 2024-12-19 | Stop reason: HOSPADM

## 2024-12-19 ASSESSMENT — PAIN SCALES - GENERAL: PAINLEVEL_OUTOF10: 0-NO PAIN

## 2024-12-19 NOTE — PROGRESS NOTES
Informed consent was obtained and potential risks including bleeding, infection and pain were reviewed with the patient.       The patient was placed in the prone position, and the Left posterior iliac crest was prepped with chlorhexidine.     The skin, subcutaneous tissues, and periosteum were anesthetized with 5mL of 1% lidocaine and 10mL of 2% lidocaine.     A small incision was made with a #15 scalpel, and the Jamshidi needle was advanced through the periosteum into the intramedullary space.    29 mL bone marrow was aspirated; the needle was then advanced further and a 1.5 cm core biopsy obtained. The specimen was sent for morphology, flow cytometry, cytogenetics, FISH/molecular per Hematopathology, and and per clinical trial protocol.    The needle was removed and hemostasis achieved.     The procedure was tolerated well and there were no complications.    Procedure supervised by: Waylon Mancia PA-C

## 2024-12-19 NOTE — PROGRESS NOTES
Patient arrives for [id number] Azacitidine/Decitabine injection.  Had a bmbx today and pressure bandage on his left side is secure with no bleeding noted.  Tolerated injection well and discharged via wheelchair with wife at his side.

## 2024-12-23 ENCOUNTER — INFUSION (OUTPATIENT)
Dept: HEMATOLOGY/ONCOLOGY | Facility: HOSPITAL | Age: 84
End: 2024-12-23
Payer: MEDICARE

## 2024-12-23 ENCOUNTER — LAB (OUTPATIENT)
Dept: LAB | Facility: HOSPITAL | Age: 84
End: 2024-12-23
Payer: MEDICARE

## 2024-12-23 VITALS
TEMPERATURE: 97.7 F | OXYGEN SATURATION: 100 % | HEIGHT: 72 IN | DIASTOLIC BLOOD PRESSURE: 51 MMHG | BODY MASS INDEX: 37.62 KG/M2 | SYSTOLIC BLOOD PRESSURE: 137 MMHG | WEIGHT: 277.78 LBS | HEART RATE: 81 BPM | RESPIRATION RATE: 18 BRPM

## 2024-12-23 DIAGNOSIS — D46.9 MYELODYSPLASTIC SYNDROME (MULTI): ICD-10-CM

## 2024-12-23 LAB
ALBUMIN SERPL BCP-MCNC: 4.1 G/DL (ref 3.4–5)
ALP SERPL-CCNC: 83 U/L (ref 33–136)
ALT SERPL W P-5'-P-CCNC: 20 U/L (ref 10–52)
ANION GAP SERPL CALC-SCNC: 13 MMOL/L (ref 10–20)
AST SERPL W P-5'-P-CCNC: 18 U/L (ref 9–39)
BASOPHILS # BLD AUTO: 0.01 X10*3/UL (ref 0–0.1)
BASOPHILS NFR BLD AUTO: 0.5 %
BILIRUB SERPL-MCNC: 1 MG/DL (ref 0–1.2)
BUN SERPL-MCNC: 30 MG/DL (ref 6–23)
CALCIUM SERPL-MCNC: 9 MG/DL (ref 8.6–10.3)
CHLORIDE SERPL-SCNC: 109 MMOL/L (ref 98–107)
CO2 SERPL-SCNC: 22 MMOL/L (ref 21–32)
CREAT SERPL-MCNC: 1.52 MG/DL (ref 0.5–1.3)
DACRYOCYTES BLD QL SMEAR: NORMAL
EGFRCR SERPLBLD CKD-EPI 2021: 45 ML/MIN/1.73M*2
EOSINOPHIL # BLD AUTO: 0.06 X10*3/UL (ref 0–0.4)
EOSINOPHIL NFR BLD AUTO: 3.1 %
ERYTHROCYTE [DISTWIDTH] IN BLOOD BY AUTOMATED COUNT: 21.8 % (ref 11.5–14.5)
GLUCOSE SERPL-MCNC: 111 MG/DL (ref 74–99)
HCT VFR BLD AUTO: 27.9 % (ref 41–52)
HGB BLD-MCNC: 8.7 G/DL (ref 13.5–17.5)
HYPOCHROMIA BLD QL SMEAR: NORMAL
IMM GRANULOCYTES # BLD AUTO: 0.01 X10*3/UL (ref 0–0.5)
IMM GRANULOCYTES NFR BLD AUTO: 0.5 % (ref 0–0.9)
LYMPHOCYTES # BLD AUTO: 0.66 X10*3/UL (ref 0.8–3)
LYMPHOCYTES NFR BLD AUTO: 34.2 %
MCH RBC QN AUTO: 30.7 PG (ref 26–34)
MCHC RBC AUTO-ENTMCNC: 31.2 G/DL (ref 32–36)
MCV RBC AUTO: 99 FL (ref 80–100)
MONOCYTES # BLD AUTO: 0.1 X10*3/UL (ref 0.05–0.8)
MONOCYTES NFR BLD AUTO: 5.2 %
NEUTROPHILS # BLD AUTO: 1.09 X10*3/UL (ref 1.6–5.5)
NEUTROPHILS NFR BLD AUTO: 56.5 %
NRBC BLD-RTO: 0 /100 WBCS (ref 0–0)
OVALOCYTES BLD QL SMEAR: NORMAL
PLATELET # BLD AUTO: 48 X10*3/UL (ref 150–450)
POLYCHROMASIA BLD QL SMEAR: NORMAL
POTASSIUM SERPL-SCNC: 4.7 MMOL/L (ref 3.5–5.3)
PROT SERPL-MCNC: 6.1 G/DL (ref 6.4–8.2)
RBC # BLD AUTO: 2.83 X10*6/UL (ref 4.5–5.9)
RBC MORPH BLD: NORMAL
SCHISTOCYTES BLD QL SMEAR: NORMAL
SODIUM SERPL-SCNC: 139 MMOL/L (ref 136–145)
WBC # BLD AUTO: 1.9 X10*3/UL (ref 4.4–11.3)

## 2024-12-23 PROCEDURE — 2500000004 HC RX 250 GENERAL PHARMACY W/ HCPCS (ALT 636 FOR OP/ED): Performed by: INTERNAL MEDICINE

## 2024-12-23 PROCEDURE — 36415 COLL VENOUS BLD VENIPUNCTURE: CPT

## 2024-12-23 PROCEDURE — 96401 CHEMO ANTI-NEOPL SQ/IM: CPT

## 2024-12-23 PROCEDURE — 85025 COMPLETE CBC W/AUTO DIFF WBC: CPT

## 2024-12-23 PROCEDURE — 80053 COMPREHEN METABOLIC PANEL: CPT

## 2024-12-23 PROCEDURE — 2560000001 HC RX 256 EXPERIMENTAL DRUGS: Performed by: INTERNAL MEDICINE

## 2024-12-23 RX ORDER — PROCHLORPERAZINE EDISYLATE 5 MG/ML
10 INJECTION INTRAMUSCULAR; INTRAVENOUS EVERY 6 HOURS PRN
Status: DISCONTINUED | OUTPATIENT
Start: 2024-12-23 | End: 2024-12-23 | Stop reason: HOSPADM

## 2024-12-23 RX ORDER — DIPHENHYDRAMINE HYDROCHLORIDE 50 MG/ML
50 INJECTION INTRAMUSCULAR; INTRAVENOUS AS NEEDED
Status: DISCONTINUED | OUTPATIENT
Start: 2024-12-23 | End: 2024-12-23 | Stop reason: HOSPADM

## 2024-12-23 RX ORDER — ALBUTEROL SULFATE 0.83 MG/ML
3 SOLUTION RESPIRATORY (INHALATION) AS NEEDED
Status: DISCONTINUED | OUTPATIENT
Start: 2024-12-23 | End: 2024-12-23 | Stop reason: HOSPADM

## 2024-12-23 RX ORDER — FAMOTIDINE 10 MG/ML
20 INJECTION INTRAVENOUS ONCE AS NEEDED
Status: DISCONTINUED | OUTPATIENT
Start: 2024-12-23 | End: 2024-12-23 | Stop reason: HOSPADM

## 2024-12-23 RX ORDER — EPINEPHRINE 0.3 MG/.3ML
0.3 INJECTION SUBCUTANEOUS EVERY 5 MIN PRN
Status: DISCONTINUED | OUTPATIENT
Start: 2024-12-23 | End: 2024-12-23 | Stop reason: HOSPADM

## 2024-12-23 RX ORDER — ONDANSETRON HYDROCHLORIDE 8 MG/1
8 TABLET, FILM COATED ORAL ONCE
Status: COMPLETED | OUTPATIENT
Start: 2024-12-23 | End: 2024-12-23

## 2024-12-23 RX ORDER — PROCHLORPERAZINE MALEATE 10 MG
10 TABLET ORAL EVERY 6 HOURS PRN
Status: DISCONTINUED | OUTPATIENT
Start: 2024-12-23 | End: 2024-12-23 | Stop reason: HOSPADM

## 2024-12-23 NOTE — PROGRESS NOTES
Patient presents to infusion appointment in stable condition. C3D15 [id number] azacitidine injections tolerated without incident. ANC 1.09, filgrastim held per orders. Lab results and schedule reviewed. Discharged in stable condition.

## 2024-12-24 LAB
CELL COUNT (BLOOD): 1.71 X10*3/UL
CELL POPULATIONS: NORMAL
DIAGNOSIS: NORMAL
FLOW DIFFERENTIAL: NORMAL
FLOW TEST ORDERED: NORMAL
LAB TEST METHOD: NORMAL
NUMBER OF CELLS COLLECTED: NORMAL
PATH REPORT.ADDENDUM SPEC: NORMAL
PATH REPORT.COMMENTS IMP SPEC: NORMAL
PATH REPORT.FINAL DX SPEC: NORMAL
PATH REPORT.GROSS SPEC: NORMAL
PATH REPORT.MICROSCOPIC SPEC OTHER STN: NORMAL
PATH REPORT.RELEVANT HX SPEC: NORMAL
PATH REPORT.TOTAL CANCER: NORMAL
PATH REPORT.TOTAL CANCER: NORMAL
SIGNATURE COMMENT: NORMAL
SPECIMEN VIABILITY: NORMAL

## 2024-12-26 ENCOUNTER — INFUSION (OUTPATIENT)
Dept: HEMATOLOGY/ONCOLOGY | Facility: HOSPITAL | Age: 84
End: 2024-12-26
Payer: MEDICARE

## 2024-12-26 ENCOUNTER — LAB (OUTPATIENT)
Dept: LAB | Facility: HOSPITAL | Age: 84
End: 2024-12-26
Payer: MEDICARE

## 2024-12-26 VITALS
SYSTOLIC BLOOD PRESSURE: 156 MMHG | TEMPERATURE: 97.3 F | DIASTOLIC BLOOD PRESSURE: 63 MMHG | OXYGEN SATURATION: 100 % | RESPIRATION RATE: 18 BRPM | HEART RATE: 73 BPM | WEIGHT: 277.78 LBS | HEIGHT: 72 IN | BODY MASS INDEX: 37.62 KG/M2

## 2024-12-26 DIAGNOSIS — D64.81 ANEMIA DUE TO CHEMOTHERAPY FOR MYELODYSPLASTIC SYNDROME TREATED WITH ERYTHROPOIETIN (MULTI): ICD-10-CM

## 2024-12-26 DIAGNOSIS — D46.9 MYELODYSPLASTIC SYNDROME (MULTI): ICD-10-CM

## 2024-12-26 DIAGNOSIS — T45.1X5A ANEMIA DUE TO CHEMOTHERAPY FOR MYELODYSPLASTIC SYNDROME TREATED WITH ERYTHROPOIETIN (MULTI): ICD-10-CM

## 2024-12-26 DIAGNOSIS — D46.9 ANEMIA DUE TO CHEMOTHERAPY FOR MYELODYSPLASTIC SYNDROME TREATED WITH ERYTHROPOIETIN (MULTI): ICD-10-CM

## 2024-12-26 LAB
ALBUMIN SERPL BCP-MCNC: 4 G/DL (ref 3.4–5)
ALP SERPL-CCNC: 87 U/L (ref 33–136)
ALT SERPL W P-5'-P-CCNC: 18 U/L (ref 10–52)
ANION GAP SERPL CALC-SCNC: 13 MMOL/L (ref 10–20)
AST SERPL W P-5'-P-CCNC: 18 U/L (ref 9–39)
BASOPHILS # BLD AUTO: 0.01 X10*3/UL (ref 0–0.1)
BASOPHILS NFR BLD AUTO: 0.5 %
BILIRUB SERPL-MCNC: 0.9 MG/DL (ref 0–1.2)
BUN SERPL-MCNC: 36 MG/DL (ref 6–23)
CALCIUM SERPL-MCNC: 9.2 MG/DL (ref 8.6–10.3)
CHLORIDE SERPL-SCNC: 109 MMOL/L (ref 98–107)
CHROM ANALY OVERALL INTERP-IMP: NORMAL
CO2 SERPL-SCNC: 22 MMOL/L (ref 21–32)
CREAT SERPL-MCNC: 1.41 MG/DL (ref 0.5–1.3)
DACRYOCYTES BLD QL SMEAR: NORMAL
EGFRCR SERPLBLD CKD-EPI 2021: 49 ML/MIN/1.73M*2
ELECTRONICALLY COSIGNED BY CYTOGENETICS: NORMAL
ELECTRONICALLY SIGNED BY CYTOGENETICS: NORMAL
EOSINOPHIL # BLD AUTO: 0.08 X10*3/UL (ref 0–0.4)
EOSINOPHIL NFR BLD AUTO: 4 %
ERYTHROCYTE [DISTWIDTH] IN BLOOD BY AUTOMATED COUNT: 21.7 % (ref 11.5–14.5)
FISH ISCN RESULTS: NORMAL
GLUCOSE SERPL-MCNC: 94 MG/DL (ref 74–99)
HCT VFR BLD AUTO: 28 % (ref 41–52)
HGB BLD-MCNC: 8.7 G/DL (ref 13.5–17.5)
HYPOCHROMIA BLD QL SMEAR: NORMAL
IMM GRANULOCYTES # BLD AUTO: 0 X10*3/UL (ref 0–0.5)
IMM GRANULOCYTES NFR BLD AUTO: 0 % (ref 0–0.9)
LYMPHOCYTES # BLD AUTO: 0.63 X10*3/UL (ref 0.8–3)
LYMPHOCYTES NFR BLD AUTO: 31.8 %
MCH RBC QN AUTO: 30.2 PG (ref 26–34)
MCHC RBC AUTO-ENTMCNC: 31.1 G/DL (ref 32–36)
MCV RBC AUTO: 97 FL (ref 80–100)
MONOCYTES # BLD AUTO: 0.1 X10*3/UL (ref 0.05–0.8)
MONOCYTES NFR BLD AUTO: 5.1 %
NEUTROPHILS # BLD AUTO: 1.16 X10*3/UL (ref 1.6–5.5)
NEUTROPHILS NFR BLD AUTO: 58.6 %
NRBC BLD-RTO: 0 /100 WBCS (ref 0–0)
OVALOCYTES BLD QL SMEAR: NORMAL
PLATELET # BLD AUTO: 47 X10*3/UL (ref 150–450)
POLYCHROMASIA BLD QL SMEAR: NORMAL
POTASSIUM SERPL-SCNC: 5.1 MMOL/L (ref 3.5–5.3)
PROT SERPL-MCNC: 6 G/DL (ref 6.4–8.2)
RBC # BLD AUTO: 2.88 X10*6/UL (ref 4.5–5.9)
RBC MORPH BLD: NORMAL
SCHISTOCYTES BLD QL SMEAR: NORMAL
SODIUM SERPL-SCNC: 139 MMOL/L (ref 136–145)
WBC # BLD AUTO: 2 X10*3/UL (ref 4.4–11.3)

## 2024-12-26 PROCEDURE — 96401 CHEMO ANTI-NEOPL SQ/IM: CPT

## 2024-12-26 PROCEDURE — 2560000001 HC RX 256 EXPERIMENTAL DRUGS: Performed by: INTERNAL MEDICINE

## 2024-12-26 PROCEDURE — 2500000004 HC RX 250 GENERAL PHARMACY W/ HCPCS (ALT 636 FOR OP/ED): Performed by: INTERNAL MEDICINE

## 2024-12-26 PROCEDURE — 85025 COMPLETE CBC W/AUTO DIFF WBC: CPT

## 2024-12-26 PROCEDURE — 2500000004 HC RX 250 GENERAL PHARMACY W/ HCPCS (ALT 636 FOR OP/ED): Mod: JZ,JG | Performed by: NURSE PRACTITIONER

## 2024-12-26 PROCEDURE — 84075 ASSAY ALKALINE PHOSPHATASE: CPT

## 2024-12-26 PROCEDURE — 96372 THER/PROPH/DIAG INJ SC/IM: CPT

## 2024-12-26 PROCEDURE — 36415 COLL VENOUS BLD VENIPUNCTURE: CPT

## 2024-12-26 RX ORDER — PROCHLORPERAZINE MALEATE 10 MG
10 TABLET ORAL EVERY 6 HOURS PRN
Status: DISCONTINUED | OUTPATIENT
Start: 2024-12-26 | End: 2024-12-26 | Stop reason: HOSPADM

## 2024-12-26 RX ORDER — ONDANSETRON HYDROCHLORIDE 8 MG/1
8 TABLET, FILM COATED ORAL ONCE
Status: COMPLETED | OUTPATIENT
Start: 2024-12-26 | End: 2024-12-26

## 2024-12-26 RX ORDER — ALBUTEROL SULFATE 0.83 MG/ML
3 SOLUTION RESPIRATORY (INHALATION) AS NEEDED
OUTPATIENT
Start: 2025-01-09

## 2024-12-26 RX ORDER — DIPHENHYDRAMINE HYDROCHLORIDE 50 MG/ML
50 INJECTION INTRAMUSCULAR; INTRAVENOUS AS NEEDED
OUTPATIENT
Start: 2025-01-09

## 2024-12-26 RX ORDER — FAMOTIDINE 10 MG/ML
20 INJECTION INTRAVENOUS ONCE AS NEEDED
OUTPATIENT
Start: 2025-01-09

## 2024-12-26 RX ORDER — PROCHLORPERAZINE EDISYLATE 5 MG/ML
10 INJECTION INTRAMUSCULAR; INTRAVENOUS EVERY 6 HOURS PRN
Status: DISCONTINUED | OUTPATIENT
Start: 2024-12-26 | End: 2024-12-26 | Stop reason: HOSPADM

## 2024-12-26 RX ORDER — EPINEPHRINE 0.3 MG/.3ML
0.3 INJECTION SUBCUTANEOUS EVERY 5 MIN PRN
OUTPATIENT
Start: 2025-01-09

## 2024-12-26 NOTE — PROGRESS NOTES
Pt arrived to infusion for injection of study case [id number] with today's injection being decitabine.  Pt's only complaint was an increase in fatigue d/t treatment and the holidays.  Pt also received darbepoetin injection.  Pt tolerated both injections, is aware of next appt. And was discharged home in stable condition.

## 2024-12-26 NOTE — ASSESSMENT & PLAN NOTE
12/9/2024: Patient reports that he tolerated his last round of treatment well.  The only issue was some cosntipation which he and his wife are managing with Senna and lactulose.     Diagnostics:  -Bmbx at week 12 (12/19/24)  Treatment:  - Alternating azacitidine 50 mg/m2 with decitabine 5 mg/m² per [id number]  - C4D1 today  Disease/Toxicity Monitoring:  - Given his hemoglobin and age  will check CBC  with each visit  Supportive Care:  -Blood products as indicated  Antimicrobial Prophylaxis:   -None indicated at this time, ANC is in the normal range  IV access:  - for now, continue with PIV; may benefit from port placement or other central line access

## 2024-12-26 NOTE — PROGRESS NOTES
"Patient ID: Holden Burgess \"GENARO\" is a 84 y.o. male.  Referring Physician: No referring provider defined for this encounter.  Primary Care Provider: Giacomo Joseph DO    Date of Service:  1/6/2025    SUBJECTIVE:  HPI    Oncology History   Myelodysplastic syndrome (Multi)   1/23/2024 Initial Diagnosis    Myelodysplastic syndrome:   Diagnosis:   Originally referred to Dr. Murphy in 2023 for longstanding thrombocytopenia.  At the time had mild leukopenia, no anemia.  Was refractory to a trial of prednisone, and so Bone marrow biopsy was completed completed on 1/23/2024 and demonstrated:  BONE MARROW CLOT, CORE BIOPSY, ASPIRATE, LEFT ILIAC CREST:   -- MILDLY HYPERCELLULAR BONE MARROW (80%) WITH MATURING TRILINEAGE HEMATOPOIESIS AND MODERATE INCREASE IN MEGAKARYOCYTES, SEE NOTE.  -- SMALL LYMPHOID AGGREGATES, FAVOR BENIGN.  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 Given the presence of persistent cytopenias, hypercellularity with increase megakaryocytes with abnormal clustering, and no increase in blasts, the overall findings are most consistent with:  -- MDS with low blasts (WHO-HAEM5 Classification) /   -- MDS-NOS with single lineage dysplasia (MDS-NOS-SLD) (ICC 2022 Classification).  NGS: SRSF2  Chromosome Analysis: 47,XY,+8[15]/46,XY[5]  IPSS-M: -0.99 - low risks disease     4/4/2024 - 8/29/2024 Supportive Treatment    Treatment:   I. Eltrombopag -on 4/4/2024 patient continued with persistent thrombocytopenia but also had some progressive anemia with hemoglobin down to 10.  Patient was offered [id number], preferred supportive management with oral medication opted for eltrombopag in the setting of thrombocytopenia starting at 50 and ramping up to 150 mg  From 4/20/2024 through 8/20/2024 patient had progressive anemia and progressive thrombocytopenia and spite of treatment.    II. Darbepoietin -in the setting of worsening anemia darbepoetin was added " 7/12/2024 at 100 mcg every 2 weeks       9/30/2024 -  Research Study Participant    ([facility]) [id number] - AzaCITIDine / Decitabine, 42 Day Cycle Followed by 28 Day Cycles  Plan Provider: Sebastien Rashid MD  Treatment goal: Palliative  Line of treatment: First Line  Associated studies: 5-Azacitidine and Decitabine Epigenetic Therapy for Myeloid Malignancies          OBJECTIVE:  KPS: {Karnofsky Score:11848:::1}   VS:  There were no vitals taken for this visit.  BSA: There is no height or weight on file to calculate BSA.    Physical Exam    Assessment & Plan  Myelodysplastic syndrome (Multi)  12/9/2024: Patient reports that he tolerated his last round of treatment well.  The only issue was some cosntipation which he and his wife are managing with Senna and lactulose.     Diagnostics:  -Bmbx at week 12 (12/19/24)  Treatment:  - Alternating azacitidine 50 mg/m2 with decitabine 5 mg/m² per [id number]  - C4D1 today  Disease/Toxicity Monitoring:  - Given his hemoglobin and age  will check CBC  with each visit  Supportive Care:  -Blood products as indicated  Antimicrobial Prophylaxis:   -None indicated at this time, ANC is in the normal range  IV access:  - for now, continue with PIV; may benefit from port placement or other central line access    Current Outpatient Medications   Medication Instructions    acetaminophen (TYLENOL) 1,000 mg, oral, Every 6 hours PRN    allopurinol (ZYLOPRIM) 100 mg, oral, 2 times daily    Aranesp (in polysorbate) 200 mcg,  ONC Every 2 Weeks for 4 Weeks in a 28 Day Cycle    ascorbic acid (Vitamin C) 1,000 mg tablet 1 tablet, Daily    atorvastatin (Lipitor) 10 mg tablet 1 tablet, Nightly    calcitriol (ROCALTROL) 0.25 mcg, Every other day    cholecalciferol (Vitamin D-3) 125 MCG (5000 UT) capsule 1 capsule, Daily    cyanocobalamin (Vitamin B-12) 1,000 mcg tablet 1 tablet, Daily    ezetimibe (ZETIA) 10 mg, oral, Daily    famotidine (PEPCID) 20 mg, Daily    ferrous sulfate 325 (65 Fe) MG tablet 1  tablet, Daily    folic acid (FOLVITE) 1 mg, oral, Daily    furosemide (LASIX) 40 mg, oral, Daily    lactulose 10 g, oral, 2 times daily, As needed for constipation    latanoprost (Xalatan) 0.005 % ophthalmic solution 1 drop, Nightly    MAGNESIUM ORAL 550 mg, Nightly    rOPINIRole (REQUIP) 0.25 mg, oral, Nightly    temazepam (RESTORIL) 15 mg, oral, Nightly PRN    traMADol (ULTRAM) 50 mg, oral, Every 6 hours PRN    TURMERIC ORAL 1 capsule, Daily    valsartan (DIOVAN) 320 mg, oral, Daily    zinc sulfate 50 mg zinc (220 mg) tablet Take by mouth.        Laboratory:  The pertinent laboratory results were reviewed and discussed with the patient.    Lab Results   Component Value Date    WBC 2.0 (L) 12/26/2024    HCT 28.0 (L) 12/26/2024    HGB 8.7 (L) 12/26/2024    PLT 47 (L) 12/26/2024    K 5.1 12/26/2024    CALCIUM 9.2 12/26/2024     12/26/2024    MG 2.01 12/09/2024    BILITOT 0.9 12/26/2024    ALT 18 12/26/2024    AST 18 12/26/2024    BUN 36 (H) 12/26/2024    CREATININE 1.41 (H) 12/26/2024    PHOS 3.1 12/12/2024      Note: for a comprehensive list of the patient's lab results, access the Results Review activity.    RTC:  Q month MD/KENDRICK follow up    Shae Lunsford, MICHELE-CNP

## 2024-12-27 DIAGNOSIS — D46.9 MYELODYSPLASTIC SYNDROME (MULTI): ICD-10-CM

## 2024-12-27 LAB
ELECTRONICALLY SIGNED BY: NORMAL
MYELOID NGS RESULTS: NORMAL

## 2024-12-30 ENCOUNTER — INFUSION (OUTPATIENT)
Dept: HEMATOLOGY/ONCOLOGY | Facility: HOSPITAL | Age: 84
End: 2024-12-30
Payer: MEDICARE

## 2024-12-30 ENCOUNTER — LAB (OUTPATIENT)
Dept: LAB | Facility: HOSPITAL | Age: 84
End: 2024-12-30
Payer: MEDICARE

## 2024-12-30 VITALS
BODY MASS INDEX: 37.58 KG/M2 | WEIGHT: 275 LBS | TEMPERATURE: 97 F | OXYGEN SATURATION: 99 % | HEART RATE: 76 BPM | SYSTOLIC BLOOD PRESSURE: 138 MMHG | DIASTOLIC BLOOD PRESSURE: 55 MMHG | RESPIRATION RATE: 18 BRPM

## 2024-12-30 DIAGNOSIS — D46.9 MYELODYSPLASTIC SYNDROME (MULTI): ICD-10-CM

## 2024-12-30 LAB
ALBUMIN SERPL BCP-MCNC: 4 G/DL (ref 3.4–5)
ALP SERPL-CCNC: 81 U/L (ref 33–136)
ALT SERPL W P-5'-P-CCNC: 14 U/L (ref 10–52)
ANION GAP SERPL CALC-SCNC: 12 MMOL/L (ref 10–20)
AST SERPL W P-5'-P-CCNC: 14 U/L (ref 9–39)
BASO STIPL BLD QL SMEAR: PRESENT
BASOPHILS # BLD AUTO: 0.01 X10*3/UL (ref 0–0.1)
BASOPHILS NFR BLD AUTO: 0.4 %
BILIRUB SERPL-MCNC: 1.1 MG/DL (ref 0–1.2)
BUN SERPL-MCNC: 31 MG/DL (ref 6–23)
CALCIUM SERPL-MCNC: 8.3 MG/DL (ref 8.6–10.3)
CHLORIDE SERPL-SCNC: 106 MMOL/L (ref 98–107)
CO2 SERPL-SCNC: 23 MMOL/L (ref 21–32)
CREAT SERPL-MCNC: 1.38 MG/DL (ref 0.5–1.3)
DACRYOCYTES BLD QL SMEAR: NORMAL
EGFRCR SERPLBLD CKD-EPI 2021: 50 ML/MIN/1.73M*2
EOSINOPHIL # BLD AUTO: 0.02 X10*3/UL (ref 0–0.4)
EOSINOPHIL NFR BLD AUTO: 0.9 %
ERYTHROCYTE [DISTWIDTH] IN BLOOD BY AUTOMATED COUNT: 21.3 % (ref 11.5–14.5)
GLUCOSE SERPL-MCNC: 107 MG/DL (ref 74–99)
HCT VFR BLD AUTO: 26.8 % (ref 41–52)
HGB BLD-MCNC: 8.5 G/DL (ref 13.5–17.5)
HYPOCHROMIA BLD QL SMEAR: NORMAL
IMM GRANULOCYTES # BLD AUTO: 0.02 X10*3/UL (ref 0–0.5)
IMM GRANULOCYTES NFR BLD AUTO: 0.9 % (ref 0–0.9)
LYMPHOCYTES # BLD AUTO: 0.62 X10*3/UL (ref 0.8–3)
LYMPHOCYTES NFR BLD AUTO: 26.5 %
MCH RBC QN AUTO: 30.5 PG (ref 26–34)
MCHC RBC AUTO-ENTMCNC: 31.7 G/DL (ref 32–36)
MCV RBC AUTO: 96 FL (ref 80–100)
MONOCYTES # BLD AUTO: 0.15 X10*3/UL (ref 0.05–0.8)
MONOCYTES NFR BLD AUTO: 6.4 %
NEUTROPHILS # BLD AUTO: 1.52 X10*3/UL (ref 1.6–5.5)
NEUTROPHILS NFR BLD AUTO: 64.9 %
NRBC BLD-RTO: 0 /100 WBCS (ref 0–0)
OVALOCYTES BLD QL SMEAR: NORMAL
PLATELET # BLD AUTO: 51 X10*3/UL (ref 150–450)
POLYCHROMASIA BLD QL SMEAR: NORMAL
POTASSIUM SERPL-SCNC: 4.3 MMOL/L (ref 3.5–5.3)
PROT SERPL-MCNC: 6 G/DL (ref 6.4–8.2)
RBC # BLD AUTO: 2.79 X10*6/UL (ref 4.5–5.9)
RBC MORPH BLD: NORMAL
SCHISTOCYTES BLD QL SMEAR: NORMAL
SODIUM SERPL-SCNC: 137 MMOL/L (ref 136–145)
WBC # BLD AUTO: 2.3 X10*3/UL (ref 4.4–11.3)

## 2024-12-30 PROCEDURE — 36415 COLL VENOUS BLD VENIPUNCTURE: CPT

## 2024-12-30 PROCEDURE — 84075 ASSAY ALKALINE PHOSPHATASE: CPT

## 2024-12-30 PROCEDURE — 2500000004 HC RX 250 GENERAL PHARMACY W/ HCPCS (ALT 636 FOR OP/ED): Performed by: INTERNAL MEDICINE

## 2024-12-30 PROCEDURE — 96401 CHEMO ANTI-NEOPL SQ/IM: CPT

## 2024-12-30 PROCEDURE — 2560000001 HC RX 256 EXPERIMENTAL DRUGS: Performed by: INTERNAL MEDICINE

## 2024-12-30 PROCEDURE — 85025 COMPLETE CBC W/AUTO DIFF WBC: CPT

## 2024-12-30 RX ORDER — EPINEPHRINE 0.3 MG/.3ML
0.3 INJECTION SUBCUTANEOUS EVERY 5 MIN PRN
Status: DISCONTINUED | OUTPATIENT
Start: 2024-12-30 | End: 2024-12-30 | Stop reason: HOSPADM

## 2024-12-30 RX ORDER — FAMOTIDINE 10 MG/ML
20 INJECTION INTRAVENOUS ONCE AS NEEDED
Status: DISCONTINUED | OUTPATIENT
Start: 2024-12-30 | End: 2024-12-30 | Stop reason: HOSPADM

## 2024-12-30 RX ORDER — PROCHLORPERAZINE MALEATE 10 MG
10 TABLET ORAL EVERY 6 HOURS PRN
Status: DISCONTINUED | OUTPATIENT
Start: 2024-12-30 | End: 2024-12-30 | Stop reason: HOSPADM

## 2024-12-30 RX ORDER — ALBUTEROL SULFATE 0.83 MG/ML
3 SOLUTION RESPIRATORY (INHALATION) AS NEEDED
Status: DISCONTINUED | OUTPATIENT
Start: 2024-12-30 | End: 2024-12-30 | Stop reason: HOSPADM

## 2024-12-30 RX ORDER — ONDANSETRON HYDROCHLORIDE 8 MG/1
8 TABLET, FILM COATED ORAL ONCE
Status: COMPLETED | OUTPATIENT
Start: 2024-12-30 | End: 2024-12-30

## 2024-12-30 RX ORDER — DIPHENHYDRAMINE HYDROCHLORIDE 50 MG/ML
50 INJECTION INTRAMUSCULAR; INTRAVENOUS AS NEEDED
Status: DISCONTINUED | OUTPATIENT
Start: 2024-12-30 | End: 2024-12-30 | Stop reason: HOSPADM

## 2024-12-30 RX ORDER — PROCHLORPERAZINE EDISYLATE 5 MG/ML
10 INJECTION INTRAMUSCULAR; INTRAVENOUS EVERY 6 HOURS PRN
Status: DISCONTINUED | OUTPATIENT
Start: 2024-12-30 | End: 2024-12-30 | Stop reason: HOSPADM

## 2024-12-30 RX ADMIN — Medication 64.5 MG: at 11:41

## 2024-12-30 RX ADMIN — Medication 64.5 MG: at 11:43

## 2024-12-30 RX ADMIN — ONDANSETRON HYDROCHLORIDE 8 MG: 8 TABLET, FILM COATED ORAL at 11:17

## 2024-12-30 ASSESSMENT — PAIN SCALES - GENERAL: PAINLEVEL_OUTOF10: 0-NO PAIN

## 2024-12-30 NOTE — PROGRESS NOTES
Patient presents to infusion appointment in stable condition. C3D22 [id number] azacitidine injections tolerated without incident. Lab results reviewed. Discharged in stable condition.

## 2025-01-02 ENCOUNTER — LAB (OUTPATIENT)
Dept: LAB | Facility: HOSPITAL | Age: 85
End: 2025-01-02
Payer: MEDICARE

## 2025-01-02 ENCOUNTER — INFUSION (OUTPATIENT)
Dept: HEMATOLOGY/ONCOLOGY | Facility: HOSPITAL | Age: 85
End: 2025-01-02
Payer: MEDICARE

## 2025-01-02 VITALS
BODY MASS INDEX: 37.3 KG/M2 | SYSTOLIC BLOOD PRESSURE: 125 MMHG | WEIGHT: 273 LBS | OXYGEN SATURATION: 99 % | TEMPERATURE: 97.9 F | DIASTOLIC BLOOD PRESSURE: 59 MMHG | HEART RATE: 83 BPM | RESPIRATION RATE: 18 BRPM

## 2025-01-02 DIAGNOSIS — D46.9 MYELODYSPLASTIC SYNDROME (MULTI): ICD-10-CM

## 2025-01-02 DIAGNOSIS — D46.9 MYELODYSPLASTIC SYNDROME (MULTI): Primary | ICD-10-CM

## 2025-01-02 LAB
ALBUMIN SERPL BCP-MCNC: 4.1 G/DL (ref 3.4–5)
ALP SERPL-CCNC: 94 U/L (ref 33–136)
ALT SERPL W P-5'-P-CCNC: 17 U/L (ref 10–52)
ANION GAP SERPL CALC-SCNC: 13 MMOL/L (ref 10–20)
AST SERPL W P-5'-P-CCNC: 17 U/L (ref 9–39)
BASOPHILS # BLD AUTO: 0.01 X10*3/UL (ref 0–0.1)
BASOPHILS NFR BLD AUTO: 0.5 %
BILIRUB SERPL-MCNC: 0.9 MG/DL (ref 0–1.2)
BUN SERPL-MCNC: 33 MG/DL (ref 6–23)
CALCIUM SERPL-MCNC: 9.2 MG/DL (ref 8.6–10.3)
CHLORIDE SERPL-SCNC: 108 MMOL/L (ref 98–107)
CO2 SERPL-SCNC: 23 MMOL/L (ref 21–32)
CREAT SERPL-MCNC: 1.41 MG/DL (ref 0.5–1.3)
DACRYOCYTES BLD QL SMEAR: NORMAL
EGFRCR SERPLBLD CKD-EPI 2021: 49 ML/MIN/1.73M*2
EOSINOPHIL # BLD AUTO: 0.04 X10*3/UL (ref 0–0.4)
EOSINOPHIL NFR BLD AUTO: 2.1 %
ERYTHROCYTE [DISTWIDTH] IN BLOOD BY AUTOMATED COUNT: 21.6 % (ref 11.5–14.5)
GLUCOSE SERPL-MCNC: 88 MG/DL (ref 74–99)
HCT VFR BLD AUTO: 27.7 % (ref 41–52)
HGB BLD-MCNC: 8.9 G/DL (ref 13.5–17.5)
HYPOCHROMIA BLD QL SMEAR: NORMAL
IMM GRANULOCYTES # BLD AUTO: 0.01 X10*3/UL (ref 0–0.5)
IMM GRANULOCYTES NFR BLD AUTO: 0.5 % (ref 0–0.9)
LYMPHOCYTES # BLD AUTO: 0.63 X10*3/UL (ref 0.8–3)
LYMPHOCYTES NFR BLD AUTO: 32.8 %
MCH RBC QN AUTO: 30.6 PG (ref 26–34)
MCHC RBC AUTO-ENTMCNC: 32.1 G/DL (ref 32–36)
MCV RBC AUTO: 95 FL (ref 80–100)
MONOCYTES # BLD AUTO: 0.08 X10*3/UL (ref 0.05–0.8)
MONOCYTES NFR BLD AUTO: 4.2 %
NEUTROPHILS # BLD AUTO: 1.15 X10*3/UL (ref 1.6–5.5)
NEUTROPHILS NFR BLD AUTO: 59.9 %
NRBC BLD-RTO: 0 /100 WBCS (ref 0–0)
OVALOCYTES BLD QL SMEAR: NORMAL
PLATELET # BLD AUTO: 66 X10*3/UL (ref 150–450)
POLYCHROMASIA BLD QL SMEAR: NORMAL
POTASSIUM SERPL-SCNC: 4.5 MMOL/L (ref 3.5–5.3)
PROT SERPL-MCNC: 6 G/DL (ref 6.4–8.2)
RBC # BLD AUTO: 2.91 X10*6/UL (ref 4.5–5.9)
RBC MORPH BLD: NORMAL
SCHISTOCYTES BLD QL SMEAR: NORMAL
SODIUM SERPL-SCNC: 139 MMOL/L (ref 136–145)
WBC # BLD AUTO: 1.9 X10*3/UL (ref 4.4–11.3)

## 2025-01-02 PROCEDURE — 2560000001 HC RX 256 EXPERIMENTAL DRUGS: Performed by: INTERNAL MEDICINE

## 2025-01-02 PROCEDURE — 85025 COMPLETE CBC W/AUTO DIFF WBC: CPT | Performed by: NURSE PRACTITIONER

## 2025-01-02 PROCEDURE — 84075 ASSAY ALKALINE PHOSPHATASE: CPT

## 2025-01-02 PROCEDURE — 36415 COLL VENOUS BLD VENIPUNCTURE: CPT

## 2025-01-02 PROCEDURE — 96401 CHEMO ANTI-NEOPL SQ/IM: CPT

## 2025-01-02 PROCEDURE — 2500000004 HC RX 250 GENERAL PHARMACY W/ HCPCS (ALT 636 FOR OP/ED): Performed by: INTERNAL MEDICINE

## 2025-01-02 RX ORDER — FAMOTIDINE 10 MG/ML
20 INJECTION INTRAVENOUS ONCE AS NEEDED
OUTPATIENT
Start: 2025-01-09

## 2025-01-02 RX ORDER — DIPHENHYDRAMINE HYDROCHLORIDE 50 MG/ML
50 INJECTION INTRAMUSCULAR; INTRAVENOUS AS NEEDED
OUTPATIENT
Start: 2025-01-16

## 2025-01-02 RX ORDER — DIPHENHYDRAMINE HYDROCHLORIDE 50 MG/ML
50 INJECTION INTRAMUSCULAR; INTRAVENOUS AS NEEDED
OUTPATIENT
Start: 2025-01-23

## 2025-01-02 RX ORDER — PROCHLORPERAZINE EDISYLATE 5 MG/ML
10 INJECTION INTRAMUSCULAR; INTRAVENOUS EVERY 6 HOURS PRN
OUTPATIENT
Start: 2025-01-27

## 2025-01-02 RX ORDER — EPINEPHRINE 0.3 MG/.3ML
0.3 INJECTION SUBCUTANEOUS EVERY 5 MIN PRN
Status: DISCONTINUED | OUTPATIENT
Start: 2025-01-02 | End: 2025-01-02 | Stop reason: HOSPADM

## 2025-01-02 RX ORDER — PROCHLORPERAZINE EDISYLATE 5 MG/ML
10 INJECTION INTRAMUSCULAR; INTRAVENOUS EVERY 6 HOURS PRN
OUTPATIENT
Start: 2025-01-20

## 2025-01-02 RX ORDER — PROCHLORPERAZINE EDISYLATE 5 MG/ML
10 INJECTION INTRAMUSCULAR; INTRAVENOUS EVERY 6 HOURS PRN
OUTPATIENT
Start: 2025-01-09

## 2025-01-02 RX ORDER — DIPHENHYDRAMINE HYDROCHLORIDE 50 MG/ML
50 INJECTION INTRAMUSCULAR; INTRAVENOUS AS NEEDED
OUTPATIENT
Start: 2025-01-13

## 2025-01-02 RX ORDER — ONDANSETRON HYDROCHLORIDE 8 MG/1
8 TABLET, FILM COATED ORAL ONCE
OUTPATIENT
Start: 2025-01-09

## 2025-01-02 RX ORDER — ALBUTEROL SULFATE 0.83 MG/ML
3 SOLUTION RESPIRATORY (INHALATION) AS NEEDED
OUTPATIENT
Start: 2025-01-20

## 2025-01-02 RX ORDER — DIPHENHYDRAMINE HYDROCHLORIDE 50 MG/ML
50 INJECTION INTRAMUSCULAR; INTRAVENOUS AS NEEDED
OUTPATIENT
Start: 2025-01-06

## 2025-01-02 RX ORDER — ALBUTEROL SULFATE 0.83 MG/ML
3 SOLUTION RESPIRATORY (INHALATION) AS NEEDED
OUTPATIENT
Start: 2025-01-09

## 2025-01-02 RX ORDER — ONDANSETRON HYDROCHLORIDE 8 MG/1
8 TABLET, FILM COATED ORAL ONCE
OUTPATIENT
Start: 2025-01-06

## 2025-01-02 RX ORDER — ALBUTEROL SULFATE 0.83 MG/ML
3 SOLUTION RESPIRATORY (INHALATION) AS NEEDED
OUTPATIENT
Start: 2025-01-27

## 2025-01-02 RX ORDER — ALBUTEROL SULFATE 0.83 MG/ML
3 SOLUTION RESPIRATORY (INHALATION) AS NEEDED
Status: DISCONTINUED | OUTPATIENT
Start: 2025-01-02 | End: 2025-01-02 | Stop reason: HOSPADM

## 2025-01-02 RX ORDER — FAMOTIDINE 10 MG/ML
20 INJECTION INTRAVENOUS ONCE AS NEEDED
OUTPATIENT
Start: 2025-01-20

## 2025-01-02 RX ORDER — PROCHLORPERAZINE EDISYLATE 5 MG/ML
10 INJECTION INTRAMUSCULAR; INTRAVENOUS EVERY 6 HOURS PRN
Status: DISCONTINUED | OUTPATIENT
Start: 2025-01-02 | End: 2025-01-02 | Stop reason: HOSPADM

## 2025-01-02 RX ORDER — ONDANSETRON HYDROCHLORIDE 8 MG/1
8 TABLET, FILM COATED ORAL ONCE
Status: COMPLETED | OUTPATIENT
Start: 2025-01-02 | End: 2025-01-02

## 2025-01-02 RX ORDER — FAMOTIDINE 10 MG/ML
20 INJECTION INTRAVENOUS ONCE AS NEEDED
OUTPATIENT
Start: 2025-01-06

## 2025-01-02 RX ORDER — PROCHLORPERAZINE EDISYLATE 5 MG/ML
10 INJECTION INTRAMUSCULAR; INTRAVENOUS EVERY 6 HOURS PRN
OUTPATIENT
Start: 2025-01-23

## 2025-01-02 RX ORDER — PROCHLORPERAZINE EDISYLATE 5 MG/ML
10 INJECTION INTRAMUSCULAR; INTRAVENOUS EVERY 6 HOURS PRN
OUTPATIENT
Start: 2025-01-30

## 2025-01-02 RX ORDER — PROCHLORPERAZINE MALEATE 10 MG
10 TABLET ORAL EVERY 6 HOURS PRN
Status: DISCONTINUED | OUTPATIENT
Start: 2025-01-02 | End: 2025-01-02 | Stop reason: HOSPADM

## 2025-01-02 RX ORDER — PROCHLORPERAZINE MALEATE 10 MG
10 TABLET ORAL EVERY 6 HOURS PRN
OUTPATIENT
Start: 2025-01-13

## 2025-01-02 RX ORDER — FAMOTIDINE 10 MG/ML
20 INJECTION INTRAVENOUS ONCE AS NEEDED
OUTPATIENT
Start: 2025-01-13

## 2025-01-02 RX ORDER — FAMOTIDINE 10 MG/ML
20 INJECTION INTRAVENOUS ONCE AS NEEDED
OUTPATIENT
Start: 2025-01-27

## 2025-01-02 RX ORDER — FAMOTIDINE 10 MG/ML
20 INJECTION INTRAVENOUS ONCE AS NEEDED
OUTPATIENT
Start: 2025-01-23

## 2025-01-02 RX ORDER — PROCHLORPERAZINE EDISYLATE 5 MG/ML
10 INJECTION INTRAMUSCULAR; INTRAVENOUS EVERY 6 HOURS PRN
OUTPATIENT
Start: 2025-01-13

## 2025-01-02 RX ORDER — EPINEPHRINE 0.3 MG/.3ML
0.3 INJECTION SUBCUTANEOUS EVERY 5 MIN PRN
OUTPATIENT
Start: 2025-01-23

## 2025-01-02 RX ORDER — ONDANSETRON HYDROCHLORIDE 8 MG/1
8 TABLET, FILM COATED ORAL ONCE
OUTPATIENT
Start: 2025-01-13

## 2025-01-02 RX ORDER — DIPHENHYDRAMINE HYDROCHLORIDE 50 MG/ML
50 INJECTION INTRAMUSCULAR; INTRAVENOUS AS NEEDED
OUTPATIENT
Start: 2025-01-09

## 2025-01-02 RX ORDER — EPINEPHRINE 0.3 MG/.3ML
0.3 INJECTION SUBCUTANEOUS EVERY 5 MIN PRN
OUTPATIENT
Start: 2025-01-27

## 2025-01-02 RX ORDER — ONDANSETRON HYDROCHLORIDE 8 MG/1
8 TABLET, FILM COATED ORAL ONCE
OUTPATIENT
Start: 2025-01-27

## 2025-01-02 RX ORDER — DIPHENHYDRAMINE HYDROCHLORIDE 50 MG/ML
50 INJECTION INTRAMUSCULAR; INTRAVENOUS AS NEEDED
OUTPATIENT
Start: 2025-01-27

## 2025-01-02 RX ORDER — PROCHLORPERAZINE MALEATE 10 MG
10 TABLET ORAL EVERY 6 HOURS PRN
OUTPATIENT
Start: 2025-01-23

## 2025-01-02 RX ORDER — ONDANSETRON HYDROCHLORIDE 8 MG/1
8 TABLET, FILM COATED ORAL ONCE
OUTPATIENT
Start: 2025-01-30

## 2025-01-02 RX ORDER — FAMOTIDINE 10 MG/ML
20 INJECTION INTRAVENOUS ONCE AS NEEDED
Status: DISCONTINUED | OUTPATIENT
Start: 2025-01-02 | End: 2025-01-02 | Stop reason: HOSPADM

## 2025-01-02 RX ORDER — PROCHLORPERAZINE EDISYLATE 5 MG/ML
10 INJECTION INTRAMUSCULAR; INTRAVENOUS EVERY 6 HOURS PRN
OUTPATIENT
Start: 2025-01-06

## 2025-01-02 RX ORDER — ALBUTEROL SULFATE 0.83 MG/ML
3 SOLUTION RESPIRATORY (INHALATION) AS NEEDED
OUTPATIENT
Start: 2025-01-23

## 2025-01-02 RX ORDER — PROCHLORPERAZINE MALEATE 10 MG
10 TABLET ORAL EVERY 6 HOURS PRN
OUTPATIENT
Start: 2025-01-27

## 2025-01-02 RX ORDER — EPINEPHRINE 0.3 MG/.3ML
0.3 INJECTION SUBCUTANEOUS EVERY 5 MIN PRN
OUTPATIENT
Start: 2025-01-06

## 2025-01-02 RX ORDER — FAMOTIDINE 10 MG/ML
20 INJECTION INTRAVENOUS ONCE AS NEEDED
OUTPATIENT
Start: 2025-01-16

## 2025-01-02 RX ORDER — PROCHLORPERAZINE MALEATE 10 MG
10 TABLET ORAL EVERY 6 HOURS PRN
OUTPATIENT
Start: 2025-01-20

## 2025-01-02 RX ORDER — ONDANSETRON HYDROCHLORIDE 8 MG/1
8 TABLET, FILM COATED ORAL ONCE
OUTPATIENT
Start: 2025-01-20

## 2025-01-02 RX ORDER — DIPHENHYDRAMINE HYDROCHLORIDE 50 MG/ML
50 INJECTION INTRAMUSCULAR; INTRAVENOUS AS NEEDED
OUTPATIENT
Start: 2025-01-20

## 2025-01-02 RX ORDER — DIPHENHYDRAMINE HYDROCHLORIDE 50 MG/ML
50 INJECTION INTRAMUSCULAR; INTRAVENOUS AS NEEDED
Status: DISCONTINUED | OUTPATIENT
Start: 2025-01-02 | End: 2025-01-02 | Stop reason: HOSPADM

## 2025-01-02 RX ORDER — PROCHLORPERAZINE EDISYLATE 5 MG/ML
10 INJECTION INTRAMUSCULAR; INTRAVENOUS EVERY 6 HOURS PRN
OUTPATIENT
Start: 2025-01-16

## 2025-01-02 RX ORDER — PROCHLORPERAZINE MALEATE 10 MG
10 TABLET ORAL EVERY 6 HOURS PRN
OUTPATIENT
Start: 2025-01-09

## 2025-01-02 RX ORDER — ONDANSETRON HYDROCHLORIDE 8 MG/1
8 TABLET, FILM COATED ORAL ONCE
OUTPATIENT
Start: 2025-01-23

## 2025-01-02 RX ORDER — PROCHLORPERAZINE MALEATE 10 MG
10 TABLET ORAL EVERY 6 HOURS PRN
OUTPATIENT
Start: 2025-01-30

## 2025-01-02 RX ORDER — DIPHENHYDRAMINE HYDROCHLORIDE 50 MG/ML
50 INJECTION INTRAMUSCULAR; INTRAVENOUS AS NEEDED
OUTPATIENT
Start: 2025-01-30

## 2025-01-02 RX ORDER — ALBUTEROL SULFATE 0.83 MG/ML
3 SOLUTION RESPIRATORY (INHALATION) AS NEEDED
OUTPATIENT
Start: 2025-01-13

## 2025-01-02 RX ORDER — PROCHLORPERAZINE MALEATE 10 MG
10 TABLET ORAL EVERY 6 HOURS PRN
OUTPATIENT
Start: 2025-01-06

## 2025-01-02 RX ORDER — ALBUTEROL SULFATE 0.83 MG/ML
3 SOLUTION RESPIRATORY (INHALATION) AS NEEDED
OUTPATIENT
Start: 2025-01-16

## 2025-01-02 RX ORDER — ALBUTEROL SULFATE 0.83 MG/ML
3 SOLUTION RESPIRATORY (INHALATION) AS NEEDED
OUTPATIENT
Start: 2025-01-06

## 2025-01-02 RX ORDER — EPINEPHRINE 0.3 MG/.3ML
0.3 INJECTION SUBCUTANEOUS EVERY 5 MIN PRN
OUTPATIENT
Start: 2025-01-13

## 2025-01-02 RX ORDER — EPINEPHRINE 0.3 MG/.3ML
0.3 INJECTION SUBCUTANEOUS EVERY 5 MIN PRN
OUTPATIENT
Start: 2025-01-09

## 2025-01-02 RX ORDER — EPINEPHRINE 0.3 MG/.3ML
0.3 INJECTION SUBCUTANEOUS EVERY 5 MIN PRN
OUTPATIENT
Start: 2025-01-16

## 2025-01-02 RX ORDER — ONDANSETRON HYDROCHLORIDE 8 MG/1
8 TABLET, FILM COATED ORAL ONCE
OUTPATIENT
Start: 2025-01-16

## 2025-01-02 RX ORDER — ALBUTEROL SULFATE 0.83 MG/ML
3 SOLUTION RESPIRATORY (INHALATION) AS NEEDED
OUTPATIENT
Start: 2025-01-30

## 2025-01-02 RX ORDER — PROCHLORPERAZINE MALEATE 10 MG
10 TABLET ORAL EVERY 6 HOURS PRN
OUTPATIENT
Start: 2025-01-16

## 2025-01-02 RX ORDER — FAMOTIDINE 10 MG/ML
20 INJECTION INTRAVENOUS ONCE AS NEEDED
OUTPATIENT
Start: 2025-01-30

## 2025-01-02 RX ORDER — EPINEPHRINE 0.3 MG/.3ML
0.3 INJECTION SUBCUTANEOUS EVERY 5 MIN PRN
OUTPATIENT
Start: 2025-01-30

## 2025-01-02 RX ORDER — EPINEPHRINE 0.3 MG/.3ML
0.3 INJECTION SUBCUTANEOUS EVERY 5 MIN PRN
OUTPATIENT
Start: 2025-01-20

## 2025-01-02 RX ADMIN — Medication 12.9 MG: at 11:51

## 2025-01-02 RX ADMIN — ONDANSETRON HYDROCHLORIDE 8 MG: 8 TABLET, FILM COATED ORAL at 11:37

## 2025-01-02 ASSESSMENT — PAIN SCALES - GENERAL: PAINLEVEL_OUTOF10: 0-NO PAIN

## 2025-01-02 NOTE — PROGRESS NOTES
Patient arrives for Day 25 Cycle 3 Research [id number] Azacitidine.  Tolerated injection well, AVS printed and discharged in stable condition.

## 2025-01-06 ENCOUNTER — APPOINTMENT (OUTPATIENT)
Dept: HEMATOLOGY/ONCOLOGY | Facility: HOSPITAL | Age: 85
End: 2025-01-06
Payer: MEDICARE

## 2025-01-06 ENCOUNTER — TELEPHONE (OUTPATIENT)
Dept: ADMISSION | Facility: HOSPITAL | Age: 85
End: 2025-01-06
Payer: MEDICARE

## 2025-01-06 DIAGNOSIS — D46.9 MYELODYSPLASTIC SYNDROME (MULTI): Primary | ICD-10-CM

## 2025-01-06 RX ORDER — PROCHLORPERAZINE EDISYLATE 5 MG/ML
10 INJECTION INTRAMUSCULAR; INTRAVENOUS EVERY 6 HOURS PRN
OUTPATIENT
Start: 2025-02-03

## 2025-01-06 RX ORDER — ONDANSETRON HYDROCHLORIDE 8 MG/1
8 TABLET, FILM COATED ORAL ONCE
OUTPATIENT
Start: 2025-02-13

## 2025-01-06 RX ORDER — PROCHLORPERAZINE MALEATE 10 MG
10 TABLET ORAL EVERY 6 HOURS PRN
OUTPATIENT
Start: 2025-02-03

## 2025-01-06 RX ORDER — FAMOTIDINE 10 MG/ML
20 INJECTION INTRAVENOUS ONCE AS NEEDED
OUTPATIENT
Start: 2025-02-24

## 2025-01-06 RX ORDER — ONDANSETRON HYDROCHLORIDE 8 MG/1
8 TABLET, FILM COATED ORAL ONCE
OUTPATIENT
Start: 2025-02-27

## 2025-01-06 RX ORDER — FAMOTIDINE 10 MG/ML
20 INJECTION INTRAVENOUS ONCE AS NEEDED
OUTPATIENT
Start: 2025-02-13

## 2025-01-06 RX ORDER — EPINEPHRINE 0.3 MG/.3ML
0.3 INJECTION SUBCUTANEOUS EVERY 5 MIN PRN
OUTPATIENT
Start: 2025-02-24

## 2025-01-06 RX ORDER — PROCHLORPERAZINE EDISYLATE 5 MG/ML
10 INJECTION INTRAMUSCULAR; INTRAVENOUS EVERY 6 HOURS PRN
OUTPATIENT
Start: 2025-02-10

## 2025-01-06 RX ORDER — DIPHENHYDRAMINE HYDROCHLORIDE 50 MG/ML
50 INJECTION INTRAMUSCULAR; INTRAVENOUS AS NEEDED
OUTPATIENT
Start: 2025-02-17

## 2025-01-06 RX ORDER — ONDANSETRON HYDROCHLORIDE 8 MG/1
8 TABLET, FILM COATED ORAL ONCE
OUTPATIENT
Start: 2025-02-03

## 2025-01-06 RX ORDER — FAMOTIDINE 10 MG/ML
20 INJECTION INTRAVENOUS ONCE AS NEEDED
OUTPATIENT
Start: 2025-02-20

## 2025-01-06 RX ORDER — EPINEPHRINE 0.3 MG/.3ML
0.3 INJECTION SUBCUTANEOUS EVERY 5 MIN PRN
OUTPATIENT
Start: 2025-02-10

## 2025-01-06 RX ORDER — FAMOTIDINE 10 MG/ML
20 INJECTION INTRAVENOUS ONCE AS NEEDED
OUTPATIENT
Start: 2025-02-03

## 2025-01-06 RX ORDER — FAMOTIDINE 10 MG/ML
20 INJECTION INTRAVENOUS ONCE AS NEEDED
OUTPATIENT
Start: 2025-02-27

## 2025-01-06 RX ORDER — ALBUTEROL SULFATE 0.83 MG/ML
3 SOLUTION RESPIRATORY (INHALATION) AS NEEDED
OUTPATIENT
Start: 2025-02-24

## 2025-01-06 RX ORDER — EPINEPHRINE 0.3 MG/.3ML
0.3 INJECTION SUBCUTANEOUS EVERY 5 MIN PRN
OUTPATIENT
Start: 2025-02-03

## 2025-01-06 RX ORDER — EPINEPHRINE 0.3 MG/.3ML
0.3 INJECTION SUBCUTANEOUS EVERY 5 MIN PRN
OUTPATIENT
Start: 2025-02-13

## 2025-01-06 RX ORDER — FAMOTIDINE 10 MG/ML
20 INJECTION INTRAVENOUS ONCE AS NEEDED
OUTPATIENT
Start: 2025-02-06

## 2025-01-06 RX ORDER — DIPHENHYDRAMINE HYDROCHLORIDE 50 MG/ML
50 INJECTION INTRAMUSCULAR; INTRAVENOUS AS NEEDED
OUTPATIENT
Start: 2025-02-27

## 2025-01-06 RX ORDER — ONDANSETRON HYDROCHLORIDE 8 MG/1
8 TABLET, FILM COATED ORAL ONCE
OUTPATIENT
Start: 2025-02-10

## 2025-01-06 RX ORDER — EPINEPHRINE 0.3 MG/.3ML
0.3 INJECTION SUBCUTANEOUS EVERY 5 MIN PRN
OUTPATIENT
Start: 2025-02-17

## 2025-01-06 RX ORDER — PROCHLORPERAZINE EDISYLATE 5 MG/ML
10 INJECTION INTRAMUSCULAR; INTRAVENOUS EVERY 6 HOURS PRN
OUTPATIENT
Start: 2025-02-13

## 2025-01-06 RX ORDER — DIPHENHYDRAMINE HYDROCHLORIDE 50 MG/ML
50 INJECTION INTRAMUSCULAR; INTRAVENOUS AS NEEDED
OUTPATIENT
Start: 2025-02-06

## 2025-01-06 RX ORDER — PROCHLORPERAZINE EDISYLATE 5 MG/ML
10 INJECTION INTRAMUSCULAR; INTRAVENOUS EVERY 6 HOURS PRN
OUTPATIENT
Start: 2025-02-24

## 2025-01-06 RX ORDER — ALBUTEROL SULFATE 0.83 MG/ML
3 SOLUTION RESPIRATORY (INHALATION) AS NEEDED
OUTPATIENT
Start: 2025-02-13

## 2025-01-06 RX ORDER — PROCHLORPERAZINE EDISYLATE 5 MG/ML
10 INJECTION INTRAMUSCULAR; INTRAVENOUS EVERY 6 HOURS PRN
OUTPATIENT
Start: 2025-02-27

## 2025-01-06 RX ORDER — DIPHENHYDRAMINE HYDROCHLORIDE 50 MG/ML
50 INJECTION INTRAMUSCULAR; INTRAVENOUS AS NEEDED
OUTPATIENT
Start: 2025-02-10

## 2025-01-06 RX ORDER — ALBUTEROL SULFATE 0.83 MG/ML
3 SOLUTION RESPIRATORY (INHALATION) AS NEEDED
OUTPATIENT
Start: 2025-02-06

## 2025-01-06 RX ORDER — PROCHLORPERAZINE MALEATE 10 MG
10 TABLET ORAL EVERY 6 HOURS PRN
OUTPATIENT
Start: 2025-02-13

## 2025-01-06 RX ORDER — PROCHLORPERAZINE MALEATE 10 MG
10 TABLET ORAL EVERY 6 HOURS PRN
OUTPATIENT
Start: 2025-02-10

## 2025-01-06 RX ORDER — ONDANSETRON HYDROCHLORIDE 8 MG/1
8 TABLET, FILM COATED ORAL ONCE
OUTPATIENT
Start: 2025-02-24

## 2025-01-06 RX ORDER — FAMOTIDINE 10 MG/ML
20 INJECTION INTRAVENOUS ONCE AS NEEDED
OUTPATIENT
Start: 2025-02-10

## 2025-01-06 RX ORDER — PROCHLORPERAZINE MALEATE 10 MG
10 TABLET ORAL EVERY 6 HOURS PRN
OUTPATIENT
Start: 2025-02-27

## 2025-01-06 RX ORDER — ALBUTEROL SULFATE 0.83 MG/ML
3 SOLUTION RESPIRATORY (INHALATION) AS NEEDED
OUTPATIENT
Start: 2025-02-03

## 2025-01-06 RX ORDER — PROCHLORPERAZINE EDISYLATE 5 MG/ML
10 INJECTION INTRAMUSCULAR; INTRAVENOUS EVERY 6 HOURS PRN
OUTPATIENT
Start: 2025-02-06

## 2025-01-06 RX ORDER — DIPHENHYDRAMINE HYDROCHLORIDE 50 MG/ML
50 INJECTION INTRAMUSCULAR; INTRAVENOUS AS NEEDED
OUTPATIENT
Start: 2025-02-03

## 2025-01-06 RX ORDER — DIPHENHYDRAMINE HYDROCHLORIDE 50 MG/ML
50 INJECTION INTRAMUSCULAR; INTRAVENOUS AS NEEDED
OUTPATIENT
Start: 2025-02-24

## 2025-01-06 RX ORDER — ALBUTEROL SULFATE 0.83 MG/ML
3 SOLUTION RESPIRATORY (INHALATION) AS NEEDED
OUTPATIENT
Start: 2025-02-27

## 2025-01-06 RX ORDER — PROCHLORPERAZINE MALEATE 10 MG
10 TABLET ORAL EVERY 6 HOURS PRN
OUTPATIENT
Start: 2025-02-20

## 2025-01-06 RX ORDER — FAMOTIDINE 10 MG/ML
20 INJECTION INTRAVENOUS ONCE AS NEEDED
OUTPATIENT
Start: 2025-02-17

## 2025-01-06 RX ORDER — PROCHLORPERAZINE MALEATE 10 MG
10 TABLET ORAL EVERY 6 HOURS PRN
OUTPATIENT
Start: 2025-02-17

## 2025-01-06 RX ORDER — ONDANSETRON HYDROCHLORIDE 8 MG/1
8 TABLET, FILM COATED ORAL ONCE
OUTPATIENT
Start: 2025-02-17

## 2025-01-06 RX ORDER — EPINEPHRINE 0.3 MG/.3ML
0.3 INJECTION SUBCUTANEOUS EVERY 5 MIN PRN
OUTPATIENT
Start: 2025-02-06

## 2025-01-06 RX ORDER — ONDANSETRON HYDROCHLORIDE 8 MG/1
8 TABLET, FILM COATED ORAL ONCE
OUTPATIENT
Start: 2025-02-06

## 2025-01-06 RX ORDER — DIPHENHYDRAMINE HYDROCHLORIDE 50 MG/ML
50 INJECTION INTRAMUSCULAR; INTRAVENOUS AS NEEDED
OUTPATIENT
Start: 2025-02-20

## 2025-01-06 RX ORDER — DIPHENHYDRAMINE HYDROCHLORIDE 50 MG/ML
50 INJECTION INTRAMUSCULAR; INTRAVENOUS AS NEEDED
OUTPATIENT
Start: 2025-02-13

## 2025-01-06 RX ORDER — ALBUTEROL SULFATE 0.83 MG/ML
3 SOLUTION RESPIRATORY (INHALATION) AS NEEDED
OUTPATIENT
Start: 2025-02-10

## 2025-01-06 RX ORDER — EPINEPHRINE 0.3 MG/.3ML
0.3 INJECTION SUBCUTANEOUS EVERY 5 MIN PRN
OUTPATIENT
Start: 2025-02-27

## 2025-01-06 RX ORDER — EPINEPHRINE 0.3 MG/.3ML
0.3 INJECTION SUBCUTANEOUS EVERY 5 MIN PRN
OUTPATIENT
Start: 2025-02-20

## 2025-01-06 RX ORDER — ALBUTEROL SULFATE 0.83 MG/ML
3 SOLUTION RESPIRATORY (INHALATION) AS NEEDED
OUTPATIENT
Start: 2025-02-20

## 2025-01-06 RX ORDER — PROCHLORPERAZINE MALEATE 10 MG
10 TABLET ORAL EVERY 6 HOURS PRN
OUTPATIENT
Start: 2025-02-24

## 2025-01-06 RX ORDER — ONDANSETRON HYDROCHLORIDE 8 MG/1
8 TABLET, FILM COATED ORAL ONCE
OUTPATIENT
Start: 2025-02-20

## 2025-01-06 RX ORDER — PROCHLORPERAZINE EDISYLATE 5 MG/ML
10 INJECTION INTRAMUSCULAR; INTRAVENOUS EVERY 6 HOURS PRN
OUTPATIENT
Start: 2025-02-17

## 2025-01-06 RX ORDER — PROCHLORPERAZINE MALEATE 10 MG
10 TABLET ORAL EVERY 6 HOURS PRN
OUTPATIENT
Start: 2025-02-06

## 2025-01-06 RX ORDER — ALBUTEROL SULFATE 0.83 MG/ML
3 SOLUTION RESPIRATORY (INHALATION) AS NEEDED
OUTPATIENT
Start: 2025-02-17

## 2025-01-06 RX ORDER — PROCHLORPERAZINE EDISYLATE 5 MG/ML
10 INJECTION INTRAMUSCULAR; INTRAVENOUS EVERY 6 HOURS PRN
OUTPATIENT
Start: 2025-02-20

## 2025-01-06 NOTE — TELEPHONE ENCOUNTER
Wife Luanne calling she is cancelling appt for today for infusion and FUV with Shae Lunsford, due to the weather they cannot get in, drive way is not plowed and snowed in.  Forwarding to team to instruct and assist on when patient needs to be rescheduled. Next appt scheduled is Thursday 1/9

## 2025-01-07 ENCOUNTER — LAB (OUTPATIENT)
Dept: LAB | Facility: HOSPITAL | Age: 85
End: 2025-01-07
Payer: MEDICARE

## 2025-01-07 ENCOUNTER — INFUSION (OUTPATIENT)
Dept: HEMATOLOGY/ONCOLOGY | Facility: HOSPITAL | Age: 85
End: 2025-01-07
Payer: MEDICARE

## 2025-01-07 ENCOUNTER — OFFICE VISIT (OUTPATIENT)
Dept: HEMATOLOGY/ONCOLOGY | Facility: HOSPITAL | Age: 85
End: 2025-01-07
Payer: MEDICARE

## 2025-01-07 ENCOUNTER — APPOINTMENT (OUTPATIENT)
Dept: PRIMARY CARE | Facility: CLINIC | Age: 85
End: 2025-01-07
Payer: MEDICARE

## 2025-01-07 ENCOUNTER — DOCUMENTATION (OUTPATIENT)
Dept: HEMATOLOGY/ONCOLOGY | Facility: HOSPITAL | Age: 85
End: 2025-01-07

## 2025-01-07 VITALS
SYSTOLIC BLOOD PRESSURE: 143 MMHG | TEMPERATURE: 97.5 F | DIASTOLIC BLOOD PRESSURE: 63 MMHG | BODY MASS INDEX: 37.17 KG/M2 | OXYGEN SATURATION: 100 % | WEIGHT: 272.05 LBS | HEART RATE: 88 BPM | RESPIRATION RATE: 17 BRPM

## 2025-01-07 DIAGNOSIS — D46.9 MYELODYSPLASTIC SYNDROME (MULTI): ICD-10-CM

## 2025-01-07 LAB
ALBUMIN SERPL BCP-MCNC: 4.1 G/DL (ref 3.4–5)
ALP SERPL-CCNC: 94 U/L (ref 33–136)
ALT SERPL W P-5'-P-CCNC: 23 U/L (ref 10–52)
ANION GAP SERPL CALC-SCNC: 13 MMOL/L (ref 10–20)
AST SERPL W P-5'-P-CCNC: 20 U/L (ref 9–39)
BASOPHILS # BLD AUTO: 0.02 X10*3/UL (ref 0–0.1)
BASOPHILS NFR BLD AUTO: 1 %
BILIRUB SERPL-MCNC: 0.9 MG/DL (ref 0–1.2)
BUN SERPL-MCNC: 35 MG/DL (ref 6–23)
CALCIUM SERPL-MCNC: 9.4 MG/DL (ref 8.6–10.3)
CHLORIDE SERPL-SCNC: 108 MMOL/L (ref 98–107)
CO2 SERPL-SCNC: 23 MMOL/L (ref 21–32)
CREAT SERPL-MCNC: 1.45 MG/DL (ref 0.5–1.3)
DACRYOCYTES BLD QL SMEAR: NORMAL
EGFRCR SERPLBLD CKD-EPI 2021: 48 ML/MIN/1.73M*2
EOSINOPHIL # BLD AUTO: 0.05 X10*3/UL (ref 0–0.4)
EOSINOPHIL NFR BLD AUTO: 2.5 %
ERYTHROCYTE [DISTWIDTH] IN BLOOD BY AUTOMATED COUNT: 21.5 % (ref 11.5–14.5)
GLUCOSE SERPL-MCNC: 107 MG/DL (ref 74–99)
HCT VFR BLD AUTO: 28 % (ref 41–52)
HGB BLD-MCNC: 8.9 G/DL (ref 13.5–17.5)
HGB RETIC QN: 30 PG (ref 28–38)
HYPOCHROMIA BLD QL SMEAR: NORMAL
IMM GRANULOCYTES # BLD AUTO: 0.01 X10*3/UL (ref 0–0.5)
IMM GRANULOCYTES NFR BLD AUTO: 0.5 % (ref 0–0.9)
IMMATURE RETIC FRACTION: 18.5 %
LDH SERPL L TO P-CCNC: 219 U/L (ref 84–246)
LYMPHOCYTES # BLD AUTO: 0.55 X10*3/UL (ref 0.8–3)
LYMPHOCYTES NFR BLD AUTO: 27.6 %
MAGNESIUM SERPL-MCNC: 1.88 MG/DL (ref 1.6–2.4)
MCH RBC QN AUTO: 30.4 PG (ref 26–34)
MCHC RBC AUTO-ENTMCNC: 31.8 G/DL (ref 32–36)
MCV RBC AUTO: 96 FL (ref 80–100)
MONOCYTES # BLD AUTO: 0.11 X10*3/UL (ref 0.05–0.8)
MONOCYTES NFR BLD AUTO: 5.5 %
NEUTROPHILS # BLD AUTO: 1.25 X10*3/UL (ref 1.6–5.5)
NEUTROPHILS NFR BLD AUTO: 62.9 %
NRBC BLD-RTO: 0 /100 WBCS (ref 0–0)
OVALOCYTES BLD QL SMEAR: NORMAL
PLATELET # BLD AUTO: 69 X10*3/UL (ref 150–450)
POLYCHROMASIA BLD QL SMEAR: NORMAL
POTASSIUM SERPL-SCNC: 5.1 MMOL/L (ref 3.5–5.3)
PROT SERPL-MCNC: 6.2 G/DL (ref 6.4–8.2)
RBC # BLD AUTO: 2.93 X10*6/UL (ref 4.5–5.9)
RBC MORPH BLD: NORMAL
RETICS #: 0.05 X10*6/UL (ref 0.02–0.11)
RETICS/RBC NFR AUTO: 1.8 % (ref 0.5–2)
SCHISTOCYTES BLD QL SMEAR: NORMAL
SODIUM SERPL-SCNC: 139 MMOL/L (ref 136–145)
URATE SERPL-MCNC: 5.6 MG/DL (ref 4–7.5)
WBC # BLD AUTO: 2 X10*3/UL (ref 4.4–11.3)

## 2025-01-07 PROCEDURE — 2500000004 HC RX 250 GENERAL PHARMACY W/ HCPCS (ALT 636 FOR OP/ED): Performed by: INTERNAL MEDICINE

## 2025-01-07 PROCEDURE — 96401 CHEMO ANTI-NEOPL SQ/IM: CPT

## 2025-01-07 PROCEDURE — 83615 LACTATE (LD) (LDH) ENZYME: CPT

## 2025-01-07 PROCEDURE — 84550 ASSAY OF BLOOD/URIC ACID: CPT

## 2025-01-07 PROCEDURE — 84075 ASSAY ALKALINE PHOSPHATASE: CPT

## 2025-01-07 PROCEDURE — 2560000001 HC RX 256 EXPERIMENTAL DRUGS: Performed by: INTERNAL MEDICINE

## 2025-01-07 PROCEDURE — 83735 ASSAY OF MAGNESIUM: CPT

## 2025-01-07 PROCEDURE — 85045 AUTOMATED RETICULOCYTE COUNT: CPT

## 2025-01-07 PROCEDURE — 99215 OFFICE O/P EST HI 40 MIN: CPT | Performed by: NURSE PRACTITIONER

## 2025-01-07 PROCEDURE — 36415 COLL VENOUS BLD VENIPUNCTURE: CPT

## 2025-01-07 PROCEDURE — 85025 COMPLETE CBC W/AUTO DIFF WBC: CPT

## 2025-01-07 RX ORDER — PROCHLORPERAZINE MALEATE 10 MG
10 TABLET ORAL EVERY 6 HOURS PRN
Status: DISCONTINUED | OUTPATIENT
Start: 2025-01-07 | End: 2025-01-07 | Stop reason: HOSPADM

## 2025-01-07 RX ORDER — ONDANSETRON HYDROCHLORIDE 8 MG/1
8 TABLET, FILM COATED ORAL ONCE
Status: COMPLETED | OUTPATIENT
Start: 2025-01-07 | End: 2025-01-07

## 2025-01-07 RX ORDER — ALBUTEROL SULFATE 0.83 MG/ML
3 SOLUTION RESPIRATORY (INHALATION) AS NEEDED
Status: DISCONTINUED | OUTPATIENT
Start: 2025-01-07 | End: 2025-01-07 | Stop reason: HOSPADM

## 2025-01-07 RX ORDER — PROCHLORPERAZINE EDISYLATE 5 MG/ML
10 INJECTION INTRAMUSCULAR; INTRAVENOUS EVERY 6 HOURS PRN
Status: DISCONTINUED | OUTPATIENT
Start: 2025-01-07 | End: 2025-01-07 | Stop reason: HOSPADM

## 2025-01-07 RX ORDER — DIPHENHYDRAMINE HYDROCHLORIDE 50 MG/ML
50 INJECTION INTRAMUSCULAR; INTRAVENOUS AS NEEDED
Status: DISCONTINUED | OUTPATIENT
Start: 2025-01-07 | End: 2025-01-07 | Stop reason: HOSPADM

## 2025-01-07 RX ORDER — FAMOTIDINE 10 MG/ML
20 INJECTION INTRAVENOUS ONCE AS NEEDED
Status: DISCONTINUED | OUTPATIENT
Start: 2025-01-07 | End: 2025-01-07 | Stop reason: HOSPADM

## 2025-01-07 RX ORDER — EPINEPHRINE 0.3 MG/.3ML
0.3 INJECTION SUBCUTANEOUS EVERY 5 MIN PRN
Status: DISCONTINUED | OUTPATIENT
Start: 2025-01-07 | End: 2025-01-07 | Stop reason: HOSPADM

## 2025-01-07 RX ADMIN — ONDANSETRON HYDROCHLORIDE 8 MG: 8 TABLET, FILM COATED ORAL at 09:49

## 2025-01-07 RX ADMIN — Medication 129 MG: at 09:49

## 2025-01-07 ASSESSMENT — PAIN SCALES - GENERAL: PAINLEVEL_OUTOF10: 0-NO PAIN

## 2025-01-07 NOTE — PROGRESS NOTES
Research Note Treatment Day    Holden Burgess is here today for treatment on [id number]. Today is C4D1. Procedures completed per protocol. AE's and con-meds reviewed with patient. Patient is aware of treatment plan.    [x]   Received treatment as planned   OR  []    Treatment delayed; patient calendar updated as required   Treatment delayed because:    []   AE    []   Physician Discretion    []   Clinical Deterioration or Progression     []   Other    Education Documentation  Complete Blood Count with Differential (CBC w/ Diff), taught by Keren Khan RN at 1/7/2025  3:36 PM.  Learner: Significant Other, Patient  Readiness: Eager  Method: Explanation  Response: Verbalizes Understanding    Constipation, taught by Keren Khan RN at 1/7/2025  3:36 PM.  Learner: Significant Other, Patient  Readiness: Eager  Method: Explanation  Response: Verbalizes Understanding    Fatigue, taught by Keren Khan RN at 1/7/2025  3:36 PM.  Learner: Significant Other, Patient  Readiness: Eager  Method: Explanation  Response: Verbalizes Understanding    Treatment Plan and Schedule, taught by Keren Khan RN at 1/7/2025  3:36 PM.  Learner: Significant Other, Patient  Readiness: Eager  Method: Explanation  Response: Verbalizes Understanding    Education Comments  No comments found.

## 2025-01-07 NOTE — PROGRESS NOTES
[facility] Infusion Nursing Note  01/07/25    Holden Burgess is a 84 y.o. year old male patient presenting to outpatient infusion for cycle 4 day 1 vidaza.     Vidaza given subcutaneous in LLQ Abdomen. Pt tolerated injection without incident.     Administrations This Visit       ondansetron (Zofran) tablet 8 mg       Admin Date  01/07/2025 Action  Given Dose  8 mg Route  oral Documented By  Paz Forman RN              Study [id number] azaCITIDine 50 mg/m2 in sterile water 5.2 mL Syringe 129 mg       Admin Date  01/07/2025 Action  New Bag Dose  129 mg Route  subcutaneous Documented By  Paz Forman RN                    Follow-up Plan: 1/10    Paz Forman RN

## 2025-01-08 NOTE — PROGRESS NOTES
"Patient ID: Holden Burgess \"PAT\" is a 84 y.o. male.  Referring Physician: Sebastien Rashid MD  [id number] [phone]  Primary Care Provider: Giacomo Joseph DO    Date of Service:  1/7/2025    SUBJECTIVE:  [facility]. Burg[facility] for C4D1 on CASE [id number]. He was unable to come in yesterday due to the weather. Feeling well overall.  He enjoyed spending time with family over the holidays. Main issue is fatigue. He is interested in doing outpatient PT to regain strength.        Oncology History   Myelodysplastic syndrome (Multi)   1/23/2024 Initial Diagnosis    Myelodysplastic syndrome:   Diagnosis:   Originally referred to Dr. Murphy in 2023 for longstanding thrombocytopenia.  At the time had mild leukopenia, no anemia.  Was refractory to a trial of prednisone, and so Bone marrow biopsy was completed completed on 1/23/2024 and demonstrated:  BONE MARROW CLOT, CORE BIOPSY, ASPIRATE, LEFT ILIAC CREST:   -- MILDLY HYPERCELLULAR BONE MARROW (80%) WITH MATURING TRILINEAGE HEMATOPOIESIS AND MODERATE INCREASE IN MEGAKARYOCYTES, SEE NOTE.  -- SMALL LYMPHOID AGGREGATES, FAVOR BENIGN.  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 Given the presence of persistent cytopenias, hypercellularity with increase megakaryocytes with abnormal clustering, and no increase in blasts, the overall findings are most consistent with:  -- MDS with low blasts (WHO-HAEM5 Classification) /   -- MDS-NOS with single lineage dysplasia (MDS-NOS-SLD) (ICC 2022 Classification).  NGS: SRSF2  Chromosome Analysis: 47,XY,+8[15]/46,XY[5]  IPSS-M: -0.99 - low risks disease     4/4/2024 - 8/29/2024 Supportive Treatment    Treatment:   I. Eltrombopag -on 4/4/2024 patient continued with persistent thrombocytopenia but also had some progressive anemia with hemoglobin down to 10.  Patient was offered [id number], preferred supportive management with oral " medication opted for eltrombopag in the setting of thrombocytopenia starting at 50 and ramping up to 150 mg  From 4/20/2024 through 8/20/2024 patient had progressive anemia and progressive thrombocytopenia and spite of treatment.    II. Darbepoietin -in the setting of worsening anemia darbepoetin was added 7/12/2024 at 100 mcg every 2 weeks       9/30/2024 -  Research Study Participant    ([facility]) [id number] - AzaCITIDine / Decitabine, 42 Day Cycle Followed by 28 Day Cycles  Plan Provider: Sebastien Rashid MD  Treatment goal: Palliative  Line of treatment: First Line  Associated studies: 5-Azacitidine and Decitabine Epigenetic Therapy for Myeloid Malignancies          OBJECTIVE:  KPS: Karnofsky Score: 80 - Normal activity with effort; some signs or symptoms of disease   VS:  There were no vitals taken for this visit.  BSA: There is no height or weight on file to calculate BSA.    Physical Exam  Constitutional:       Appearance: Normal appearance.   HENT:      Head: Normocephalic.      Nose: Nose normal.      Mouth/Throat:      Mouth: Mucous membranes are moist.      Pharynx: Oropharynx is clear.   Eyes:      Extraocular Movements: Extraocular movements intact.      Conjunctiva/sclera: Conjunctivae normal.      Pupils: Pupils are equal, round, and reactive to light.   Cardiovascular:      Rate and Rhythm: Normal rate and regular rhythm.      Pulses: Normal pulses.      Heart sounds: Normal heart sounds.   Pulmonary:      Effort: Pulmonary effort is normal.      Breath sounds: Normal breath sounds.   Abdominal:      General: Abdomen is flat. Bowel sounds are normal.      Palpations: Abdomen is soft.   Musculoskeletal:         General: Normal range of motion.      Cervical back: Normal range of motion.   Skin:     General: Skin is warm and dry.      Capillary Refill: Capillary refill takes less than 2 seconds.   Neurological:      General: No focal deficit present.      Mental Status: He is alert and oriented to  person, place, and time. Mental status is at baseline.   Psychiatric:         Mood and Affect: Mood normal.         Assessment & Plan  Myelodysplastic syndrome (Multi)  1/7/2024: Patient reports that he tolerated his last round of treatment well.  The only issue was some cosntipation which he and his wife are managing with Senna and lactulose. He continues to have fatigue. Would like to try outpatient PT.     Diagnostics:  -Bmbx at week 12 (12/19/24) c/w persistent disease. Reviewed with him on 1/7. Next Bmbx in 12 weeks.  Treatment:  - Alternating azacitidine 50 mg/m2 with decitabine 5 mg/m² per [id number]  - C4D1 today  Disease/Toxicity Monitoring:  - Given his hemoglobin and age  will check CBC  with each visit  Supportive Care:  -Blood products as indicated  - Ambulatory Referral for PT placed 1/7.  Antimicrobial Prophylaxis:   -None indicated at this time, ANC is in the normal range  IV access:  - for now, continue with PIV; may benefit from port placement or other central line access    Current Outpatient Medications   Medication Instructions    acetaminophen (TYLENOL) 1,000 mg, oral, Every 6 hours PRN    allopurinol (ZYLOPRIM) 100 mg, oral, 2 times daily    Aranesp (in polysorbate) 200 mcg, [facility] ONC Every 2 Weeks for 4 Weeks in a 28 Day Cycle    ascorbic acid (Vitamin C) 1,000 mg tablet 1 tablet, Daily    atorvastatin (Lipitor) 10 mg tablet 1 tablet, Nightly    calcitriol (ROCALTROL) 0.25 mcg, Every other day    cholecalciferol (Vitamin D-3) 125 MCG (5000 UT) capsule 1 capsule, Daily    cyanocobalamin (Vitamin B-12) 1,000 mcg tablet 1 tablet, Daily    ezetimibe (ZETIA) 10 mg, oral, Daily    famotidine (PEPCID) 20 mg, Daily    ferrous sulfate 325 (65 Fe) MG tablet 1 tablet, Daily    folic acid (FOLVITE) 1 mg, oral, Daily    furosemide (LASIX) 40 mg, oral, Daily    lactulose 10 g, oral, 2 times daily, As needed for constipation    latanoprost (Xalatan) 0.005 % ophthalmic solution 1 drop, Nightly    MAGNESIUM ORAL  550 mg, Nightly    rOPINIRole (REQUIP) 0.25 mg, oral, Nightly    temazepam (RESTORIL) 15 mg, oral, Nightly PRN    traMADol (ULTRAM) 50 mg, oral, Every 6 hours PRN    TURMERIC ORAL 1 capsule, Daily    valsartan (DIOVAN) 320 mg, oral, Daily    zinc sulfate 50 mg zinc (220 mg) tablet Take by mouth.      Laboratory:  The pertinent laboratory results were reviewed and discussed with the patient.    Lab Results   Component Value Date    WBC 2.0 (L) 01/07/2025    HCT 28.0 (L) 01/07/2025    HGB 8.9 (L) 01/07/2025    PLT 69 (L) 01/07/2025    K 5.1 01/07/2025    CALCIUM 9.4 01/07/2025     01/07/2025    MG 1.88 01/07/2025    BILITOT 0.9 01/07/2025    ALT 23 01/07/2025    AST 20 01/07/2025    BUN 35 (H) 01/07/2025    CREATININE 1.45 (H) 01/07/2025    PHOS 3.1 12/12/2024      Note: for a comprehensive list of the patient's lab results, access the Results Review activity.    RTC:  Q month MD/KENDRICK follow up    MICHELE Lizarraga-CNP  [facility]

## 2025-01-09 ENCOUNTER — APPOINTMENT (OUTPATIENT)
Dept: HEMATOLOGY/ONCOLOGY | Facility: HOSPITAL | Age: 85
End: 2025-01-09
Payer: MEDICARE

## 2025-01-10 ENCOUNTER — INFUSION (OUTPATIENT)
Dept: HEMATOLOGY/ONCOLOGY | Facility: HOSPITAL | Age: 85
End: 2025-01-10
Payer: MEDICARE

## 2025-01-10 ENCOUNTER — LAB (OUTPATIENT)
Dept: LAB | Facility: HOSPITAL | Age: 85
End: 2025-01-10
Payer: MEDICARE

## 2025-01-10 VITALS
TEMPERATURE: 97.3 F | OXYGEN SATURATION: 100 % | HEART RATE: 86 BPM | WEIGHT: 268.96 LBS | RESPIRATION RATE: 19 BRPM | SYSTOLIC BLOOD PRESSURE: 141 MMHG | DIASTOLIC BLOOD PRESSURE: 52 MMHG | BODY MASS INDEX: 36.75 KG/M2

## 2025-01-10 DIAGNOSIS — D46.9 MYELODYSPLASTIC SYNDROME (MULTI): ICD-10-CM

## 2025-01-10 LAB
ALBUMIN SERPL BCP-MCNC: 3.9 G/DL (ref 3.4–5)
ALP SERPL-CCNC: 99 U/L (ref 33–136)
ALT SERPL W P-5'-P-CCNC: 22 U/L (ref 10–52)
ANION GAP SERPL CALC-SCNC: 13 MMOL/L (ref 10–20)
AST SERPL W P-5'-P-CCNC: 18 U/L (ref 9–39)
BASOPHILS # BLD AUTO: 0.01 X10*3/UL (ref 0–0.1)
BASOPHILS NFR BLD AUTO: 0.5 %
BILIRUB SERPL-MCNC: 0.7 MG/DL (ref 0–1.2)
BUN SERPL-MCNC: 37 MG/DL (ref 6–23)
CALCIUM SERPL-MCNC: 9.3 MG/DL (ref 8.6–10.3)
CHLORIDE SERPL-SCNC: 107 MMOL/L (ref 98–107)
CO2 SERPL-SCNC: 24 MMOL/L (ref 21–32)
CREAT SERPL-MCNC: 1.39 MG/DL (ref 0.5–1.3)
DACRYOCYTES BLD QL SMEAR: NORMAL
EGFRCR SERPLBLD CKD-EPI 2021: 50 ML/MIN/1.73M*2
EOSINOPHIL # BLD AUTO: 0.04 X10*3/UL (ref 0–0.4)
EOSINOPHIL NFR BLD AUTO: 1.9 %
ERYTHROCYTE [DISTWIDTH] IN BLOOD BY AUTOMATED COUNT: 21.2 % (ref 11.5–14.5)
GLUCOSE SERPL-MCNC: 81 MG/DL (ref 74–99)
HCT VFR BLD AUTO: 27.5 % (ref 41–52)
HGB BLD-MCNC: 8.6 G/DL (ref 13.5–17.5)
HYPOCHROMIA BLD QL SMEAR: NORMAL
IMM GRANULOCYTES # BLD AUTO: 0.01 X10*3/UL (ref 0–0.5)
IMM GRANULOCYTES NFR BLD AUTO: 0.5 % (ref 0–0.9)
LYMPHOCYTES # BLD AUTO: 0.7 X10*3/UL (ref 0.8–3)
LYMPHOCYTES NFR BLD AUTO: 32.4 %
MCH RBC QN AUTO: 30.1 PG (ref 26–34)
MCHC RBC AUTO-ENTMCNC: 31.3 G/DL (ref 32–36)
MCV RBC AUTO: 96 FL (ref 80–100)
MONOCYTES # BLD AUTO: 0.12 X10*3/UL (ref 0.05–0.8)
MONOCYTES NFR BLD AUTO: 5.6 %
NEUTROPHILS # BLD AUTO: 1.28 X10*3/UL (ref 1.6–5.5)
NEUTROPHILS NFR BLD AUTO: 59.1 %
NRBC BLD-RTO: 0 /100 WBCS (ref 0–0)
OVALOCYTES BLD QL SMEAR: NORMAL
PLATELET # BLD AUTO: 60 X10*3/UL (ref 150–450)
POLYCHROMASIA BLD QL SMEAR: NORMAL
POTASSIUM SERPL-SCNC: 5 MMOL/L (ref 3.5–5.3)
PROT SERPL-MCNC: 6.1 G/DL (ref 6.4–8.2)
RBC # BLD AUTO: 2.86 X10*6/UL (ref 4.5–5.9)
RBC MORPH BLD: NORMAL
SCHISTOCYTES BLD QL SMEAR: NORMAL
SODIUM SERPL-SCNC: 139 MMOL/L (ref 136–145)
WBC # BLD AUTO: 2.2 X10*3/UL (ref 4.4–11.3)

## 2025-01-10 PROCEDURE — 96402 CHEMO HORMON ANTINEOPL SQ/IM: CPT

## 2025-01-10 PROCEDURE — 2560000001 HC RX 256 EXPERIMENTAL DRUGS: Performed by: INTERNAL MEDICINE

## 2025-01-10 PROCEDURE — 84075 ASSAY ALKALINE PHOSPHATASE: CPT

## 2025-01-10 PROCEDURE — 2500000004 HC RX 250 GENERAL PHARMACY W/ HCPCS (ALT 636 FOR OP/ED): Performed by: INTERNAL MEDICINE

## 2025-01-10 PROCEDURE — 85025 COMPLETE CBC W/AUTO DIFF WBC: CPT

## 2025-01-10 PROCEDURE — 36415 COLL VENOUS BLD VENIPUNCTURE: CPT

## 2025-01-10 RX ORDER — PROCHLORPERAZINE EDISYLATE 5 MG/ML
10 INJECTION INTRAMUSCULAR; INTRAVENOUS EVERY 6 HOURS PRN
Status: DISCONTINUED | OUTPATIENT
Start: 2025-01-10 | End: 2025-01-10 | Stop reason: HOSPADM

## 2025-01-10 RX ORDER — PROCHLORPERAZINE MALEATE 10 MG
10 TABLET ORAL EVERY 6 HOURS PRN
Status: DISCONTINUED | OUTPATIENT
Start: 2025-01-10 | End: 2025-01-10 | Stop reason: HOSPADM

## 2025-01-10 RX ORDER — ONDANSETRON HYDROCHLORIDE 8 MG/1
8 TABLET, FILM COATED ORAL ONCE
Status: COMPLETED | OUTPATIENT
Start: 2025-01-10 | End: 2025-01-10

## 2025-01-10 RX ADMIN — ONDANSETRON HYDROCHLORIDE 8 MG: 8 TABLET, FILM COATED ORAL at 13:11

## 2025-01-10 RX ADMIN — Medication 12.9 MG: at 13:47

## 2025-01-10 ASSESSMENT — PAIN SCALES - GENERAL: PAINLEVEL_OUTOF10: 0-NO PAIN

## 2025-01-10 NOTE — PROGRESS NOTES
Pt here for decitabine. Injection given in right abdomen. Pt tolerated well. He left infusion ambulatory in [facility].

## 2025-01-13 ENCOUNTER — INFUSION (OUTPATIENT)
Dept: HEMATOLOGY/ONCOLOGY | Facility: HOSPITAL | Age: 85
End: 2025-01-13
Payer: MEDICARE

## 2025-01-13 ENCOUNTER — LAB (OUTPATIENT)
Dept: LAB | Facility: HOSPITAL | Age: 85
End: 2025-01-13
Payer: MEDICARE

## 2025-01-13 VITALS
OXYGEN SATURATION: 98 % | HEART RATE: 88 BPM | WEIGHT: 271.3 LBS | RESPIRATION RATE: 18 BRPM | SYSTOLIC BLOOD PRESSURE: 119 MMHG | DIASTOLIC BLOOD PRESSURE: 52 MMHG | BODY MASS INDEX: 37.07 KG/M2 | TEMPERATURE: 96.8 F

## 2025-01-13 DIAGNOSIS — D46.9 MYELODYSPLASTIC SYNDROME (MULTI): ICD-10-CM

## 2025-01-13 DIAGNOSIS — T45.1X5A ANEMIA DUE TO CHEMOTHERAPY FOR MYELODYSPLASTIC SYNDROME TREATED WITH ERYTHROPOIETIN (MULTI): ICD-10-CM

## 2025-01-13 DIAGNOSIS — D64.81 ANEMIA DUE TO CHEMOTHERAPY FOR MYELODYSPLASTIC SYNDROME TREATED WITH ERYTHROPOIETIN (MULTI): ICD-10-CM

## 2025-01-13 DIAGNOSIS — D46.9 ANEMIA DUE TO CHEMOTHERAPY FOR MYELODYSPLASTIC SYNDROME TREATED WITH ERYTHROPOIETIN (MULTI): ICD-10-CM

## 2025-01-13 LAB
ALBUMIN SERPL BCP-MCNC: 3.9 G/DL (ref 3.4–5)
ALP SERPL-CCNC: 91 U/L (ref 33–136)
ALT SERPL W P-5'-P-CCNC: 18 U/L (ref 10–52)
ANION GAP SERPL CALC-SCNC: 14 MMOL/L (ref 10–20)
AST SERPL W P-5'-P-CCNC: 16 U/L (ref 9–39)
BASOPHILS # BLD AUTO: 0.02 X10*3/UL (ref 0–0.1)
BASOPHILS NFR BLD AUTO: 0.8 %
BILIRUB SERPL-MCNC: 0.9 MG/DL (ref 0–1.2)
BUN SERPL-MCNC: 36 MG/DL (ref 6–23)
CALCIUM SERPL-MCNC: 9.2 MG/DL (ref 8.6–10.3)
CHLORIDE SERPL-SCNC: 108 MMOL/L (ref 98–107)
CO2 SERPL-SCNC: 23 MMOL/L (ref 21–32)
CREAT SERPL-MCNC: 1.53 MG/DL (ref 0.5–1.3)
DACRYOCYTES BLD QL SMEAR: NORMAL
EGFRCR SERPLBLD CKD-EPI 2021: 45 ML/MIN/1.73M*2
EOSINOPHIL # BLD AUTO: 0.04 X10*3/UL (ref 0–0.4)
EOSINOPHIL NFR BLD AUTO: 1.5 %
ERYTHROCYTE [DISTWIDTH] IN BLOOD BY AUTOMATED COUNT: 21 % (ref 11.5–14.5)
GLUCOSE SERPL-MCNC: 85 MG/DL (ref 74–99)
HCT VFR BLD AUTO: 28.1 % (ref 41–52)
HGB BLD-MCNC: 8.7 G/DL (ref 13.5–17.5)
HYPOCHROMIA BLD QL SMEAR: NORMAL
IMM GRANULOCYTES # BLD AUTO: 0.01 X10*3/UL (ref 0–0.5)
IMM GRANULOCYTES NFR BLD AUTO: 0.4 % (ref 0–0.9)
LYMPHOCYTES # BLD AUTO: 0.76 X10*3/UL (ref 0.8–3)
LYMPHOCYTES NFR BLD AUTO: 29.1 %
MCH RBC QN AUTO: 30.1 PG (ref 26–34)
MCHC RBC AUTO-ENTMCNC: 31 G/DL (ref 32–36)
MCV RBC AUTO: 97 FL (ref 80–100)
MONOCYTES # BLD AUTO: 0.13 X10*3/UL (ref 0.05–0.8)
MONOCYTES NFR BLD AUTO: 5 %
NEUTROPHILS # BLD AUTO: 1.65 X10*3/UL (ref 1.6–5.5)
NEUTROPHILS NFR BLD AUTO: 63.2 %
NRBC BLD-RTO: 0 /100 WBCS (ref 0–0)
OVALOCYTES BLD QL SMEAR: NORMAL
PLATELET # BLD AUTO: 63 X10*3/UL (ref 150–450)
POLYCHROMASIA BLD QL SMEAR: NORMAL
POTASSIUM SERPL-SCNC: 4.7 MMOL/L (ref 3.5–5.3)
PROT SERPL-MCNC: 6 G/DL (ref 6.4–8.2)
RBC # BLD AUTO: 2.89 X10*6/UL (ref 4.5–5.9)
RBC MORPH BLD: NORMAL
SCHISTOCYTES BLD QL SMEAR: NORMAL
SODIUM SERPL-SCNC: 140 MMOL/L (ref 136–145)
WBC # BLD AUTO: 2.6 X10*3/UL (ref 4.4–11.3)

## 2025-01-13 PROCEDURE — 2560000001 HC RX 256 EXPERIMENTAL DRUGS: Performed by: INTERNAL MEDICINE

## 2025-01-13 PROCEDURE — 2500000004 HC RX 250 GENERAL PHARMACY W/ HCPCS (ALT 636 FOR OP/ED): Performed by: INTERNAL MEDICINE

## 2025-01-13 PROCEDURE — 36415 COLL VENOUS BLD VENIPUNCTURE: CPT

## 2025-01-13 PROCEDURE — 96372 THER/PROPH/DIAG INJ SC/IM: CPT

## 2025-01-13 PROCEDURE — 80053 COMPREHEN METABOLIC PANEL: CPT

## 2025-01-13 PROCEDURE — 96401 CHEMO ANTI-NEOPL SQ/IM: CPT

## 2025-01-13 PROCEDURE — 85025 COMPLETE CBC W/AUTO DIFF WBC: CPT

## 2025-01-13 PROCEDURE — 2500000004 HC RX 250 GENERAL PHARMACY W/ HCPCS (ALT 636 FOR OP/ED): Mod: JZ,JG,TB | Performed by: NURSE PRACTITIONER

## 2025-01-13 RX ORDER — EPINEPHRINE 0.3 MG/.3ML
0.3 INJECTION SUBCUTANEOUS EVERY 5 MIN PRN
Status: DISCONTINUED | OUTPATIENT
Start: 2025-01-13 | End: 2025-01-13 | Stop reason: HOSPADM

## 2025-01-13 RX ORDER — FAMOTIDINE 10 MG/ML
20 INJECTION INTRAVENOUS ONCE AS NEEDED
Status: DISCONTINUED | OUTPATIENT
Start: 2025-01-13 | End: 2025-01-13 | Stop reason: HOSPADM

## 2025-01-13 RX ORDER — PROCHLORPERAZINE EDISYLATE 5 MG/ML
10 INJECTION INTRAMUSCULAR; INTRAVENOUS EVERY 6 HOURS PRN
Status: DISCONTINUED | OUTPATIENT
Start: 2025-01-13 | End: 2025-01-13 | Stop reason: HOSPADM

## 2025-01-13 RX ORDER — EPINEPHRINE 0.3 MG/.3ML
0.3 INJECTION SUBCUTANEOUS EVERY 5 MIN PRN
OUTPATIENT
Start: 2025-01-23

## 2025-01-13 RX ORDER — DIPHENHYDRAMINE HYDROCHLORIDE 50 MG/ML
50 INJECTION INTRAMUSCULAR; INTRAVENOUS AS NEEDED
Status: DISCONTINUED | OUTPATIENT
Start: 2025-01-13 | End: 2025-01-13 | Stop reason: HOSPADM

## 2025-01-13 RX ORDER — ALBUTEROL SULFATE 0.83 MG/ML
3 SOLUTION RESPIRATORY (INHALATION) AS NEEDED
Status: DISCONTINUED | OUTPATIENT
Start: 2025-01-13 | End: 2025-01-13 | Stop reason: HOSPADM

## 2025-01-13 RX ORDER — PROCHLORPERAZINE MALEATE 10 MG
10 TABLET ORAL EVERY 6 HOURS PRN
Status: DISCONTINUED | OUTPATIENT
Start: 2025-01-13 | End: 2025-01-13 | Stop reason: HOSPADM

## 2025-01-13 RX ORDER — DIPHENHYDRAMINE HYDROCHLORIDE 50 MG/ML
50 INJECTION INTRAMUSCULAR; INTRAVENOUS AS NEEDED
OUTPATIENT
Start: 2025-01-23

## 2025-01-13 RX ORDER — ALBUTEROL SULFATE 0.83 MG/ML
3 SOLUTION RESPIRATORY (INHALATION) AS NEEDED
OUTPATIENT
Start: 2025-01-23

## 2025-01-13 RX ORDER — ONDANSETRON HYDROCHLORIDE 8 MG/1
8 TABLET, FILM COATED ORAL ONCE
Status: COMPLETED | OUTPATIENT
Start: 2025-01-13 | End: 2025-01-13

## 2025-01-13 RX ORDER — FAMOTIDINE 10 MG/ML
20 INJECTION INTRAVENOUS ONCE AS NEEDED
OUTPATIENT
Start: 2025-01-23

## 2025-01-13 RX ADMIN — DARBEPOETIN ALFA 200 MCG: 200 INJECTION, SOLUTION INTRAVENOUS; SUBCUTANEOUS at 11:28

## 2025-01-13 RX ADMIN — Medication 129 MG: at 11:29

## 2025-01-13 RX ADMIN — ONDANSETRON HYDROCHLORIDE 8 MG: 8 TABLET, FILM COATED ORAL at 11:15

## 2025-01-13 NOTE — PROGRESS NOTES
Patient presents to infusion appointment in stable condition. C4D8 [id number] azacitidine & darbepoetin hong injections tolerated without incident. ANC 1.65 filgrastim held per order. Lab results and schedule reviewed. Discharged in stable condition.

## 2025-01-16 ENCOUNTER — APPOINTMENT (OUTPATIENT)
Dept: HEMATOLOGY/ONCOLOGY | Facility: HOSPITAL | Age: 85
End: 2025-01-16
Payer: MEDICARE

## 2025-01-16 ENCOUNTER — TELEPHONE (OUTPATIENT)
Dept: ADMISSION | Facility: HOSPITAL | Age: 85
End: 2025-01-16
Payer: MEDICARE

## 2025-01-16 DIAGNOSIS — D46.9 MYELODYSPLASTIC SYNDROME (MULTI): Primary | ICD-10-CM

## 2025-01-16 NOTE — TELEPHONE ENCOUNTER
Pt's wife cancelling infusion appt today d/t weather. Asking if pt can be rescheduled for tomorrow 1/17.   Message sent to infusion and research team via secure chat

## 2025-01-17 ENCOUNTER — INFUSION (OUTPATIENT)
Dept: HEMATOLOGY/ONCOLOGY | Facility: HOSPITAL | Age: 85
End: 2025-01-17
Payer: MEDICARE

## 2025-01-17 ENCOUNTER — LAB (OUTPATIENT)
Dept: LAB | Facility: HOSPITAL | Age: 85
End: 2025-01-17
Payer: MEDICARE

## 2025-01-17 VITALS
RESPIRATION RATE: 18 BRPM | DIASTOLIC BLOOD PRESSURE: 47 MMHG | HEART RATE: 74 BPM | SYSTOLIC BLOOD PRESSURE: 101 MMHG | OXYGEN SATURATION: 99 % | WEIGHT: 271.17 LBS | TEMPERATURE: 96.8 F | BODY MASS INDEX: 37.05 KG/M2

## 2025-01-17 DIAGNOSIS — D46.9 MYELODYSPLASTIC SYNDROME (MULTI): ICD-10-CM

## 2025-01-17 LAB
ALBUMIN SERPL BCP-MCNC: 3.9 G/DL (ref 3.4–5)
ALP SERPL-CCNC: 95 U/L (ref 33–136)
ALT SERPL W P-5'-P-CCNC: 18 U/L (ref 10–52)
ANION GAP SERPL CALC-SCNC: 12 MMOL/L (ref 10–20)
AST SERPL W P-5'-P-CCNC: 16 U/L (ref 9–39)
BASOPHILS # BLD AUTO: 0.02 X10*3/UL (ref 0–0.1)
BASOPHILS NFR BLD AUTO: 0.9 %
BILIRUB SERPL-MCNC: 0.8 MG/DL (ref 0–1.2)
BUN SERPL-MCNC: 43 MG/DL (ref 6–23)
CALCIUM SERPL-MCNC: 9.3 MG/DL (ref 8.6–10.3)
CHLORIDE SERPL-SCNC: 110 MMOL/L (ref 98–107)
CO2 SERPL-SCNC: 22 MMOL/L (ref 21–32)
CREAT SERPL-MCNC: 1.46 MG/DL (ref 0.5–1.3)
DACRYOCYTES BLD QL SMEAR: NORMAL
EGFRCR SERPLBLD CKD-EPI 2021: 47 ML/MIN/1.73M*2
EOSINOPHIL # BLD AUTO: 0.09 X10*3/UL (ref 0–0.4)
EOSINOPHIL NFR BLD AUTO: 3.8 %
ERYTHROCYTE [DISTWIDTH] IN BLOOD BY AUTOMATED COUNT: 21.1 % (ref 11.5–14.5)
GLUCOSE SERPL-MCNC: 101 MG/DL (ref 74–99)
HCT VFR BLD AUTO: 27.9 % (ref 41–52)
HGB BLD-MCNC: 8.9 G/DL (ref 13.5–17.5)
HYPOCHROMIA BLD QL SMEAR: NORMAL
IMM GRANULOCYTES # BLD AUTO: 0.01 X10*3/UL (ref 0–0.5)
IMM GRANULOCYTES NFR BLD AUTO: 0.4 % (ref 0–0.9)
LYMPHOCYTES # BLD AUTO: 0.73 X10*3/UL (ref 0.8–3)
LYMPHOCYTES NFR BLD AUTO: 31.1 %
MCH RBC QN AUTO: 30.7 PG (ref 26–34)
MCHC RBC AUTO-ENTMCNC: 31.9 G/DL (ref 32–36)
MCV RBC AUTO: 96 FL (ref 80–100)
MONOCYTES # BLD AUTO: 0.15 X10*3/UL (ref 0.05–0.8)
MONOCYTES NFR BLD AUTO: 6.4 %
NEUTROPHILS # BLD AUTO: 1.35 X10*3/UL (ref 1.6–5.5)
NEUTROPHILS NFR BLD AUTO: 57.4 %
NRBC BLD-RTO: 0 /100 WBCS (ref 0–0)
OVALOCYTES BLD QL SMEAR: NORMAL
PLATELET # BLD AUTO: 67 X10*3/UL (ref 150–450)
POLYCHROMASIA BLD QL SMEAR: NORMAL
POTASSIUM SERPL-SCNC: 4.9 MMOL/L (ref 3.5–5.3)
PROT SERPL-MCNC: 6.2 G/DL (ref 6.4–8.2)
RBC # BLD AUTO: 2.9 X10*6/UL (ref 4.5–5.9)
RBC MORPH BLD: NORMAL
SCHISTOCYTES BLD QL SMEAR: NORMAL
SODIUM SERPL-SCNC: 139 MMOL/L (ref 136–145)
WBC # BLD AUTO: 2.4 X10*3/UL (ref 4.4–11.3)

## 2025-01-17 PROCEDURE — 80053 COMPREHEN METABOLIC PANEL: CPT

## 2025-01-17 PROCEDURE — 2560000001 HC RX 256 EXPERIMENTAL DRUGS: Performed by: INTERNAL MEDICINE

## 2025-01-17 PROCEDURE — 2500000004 HC RX 250 GENERAL PHARMACY W/ HCPCS (ALT 636 FOR OP/ED): Performed by: INTERNAL MEDICINE

## 2025-01-17 PROCEDURE — 96401 CHEMO ANTI-NEOPL SQ/IM: CPT

## 2025-01-17 PROCEDURE — 85025 COMPLETE CBC W/AUTO DIFF WBC: CPT

## 2025-01-17 PROCEDURE — 36415 COLL VENOUS BLD VENIPUNCTURE: CPT

## 2025-01-17 RX ORDER — FAMOTIDINE 10 MG/ML
20 INJECTION INTRAVENOUS ONCE AS NEEDED
Status: DISCONTINUED | OUTPATIENT
Start: 2025-01-17 | End: 2025-01-17 | Stop reason: HOSPADM

## 2025-01-17 RX ORDER — ONDANSETRON HYDROCHLORIDE 8 MG/1
8 TABLET, FILM COATED ORAL ONCE
Status: COMPLETED | OUTPATIENT
Start: 2025-01-17 | End: 2025-01-17

## 2025-01-17 RX ORDER — PROCHLORPERAZINE EDISYLATE 5 MG/ML
10 INJECTION INTRAMUSCULAR; INTRAVENOUS EVERY 6 HOURS PRN
Status: DISCONTINUED | OUTPATIENT
Start: 2025-01-17 | End: 2025-01-17 | Stop reason: HOSPADM

## 2025-01-17 RX ORDER — DIPHENHYDRAMINE HYDROCHLORIDE 50 MG/ML
50 INJECTION INTRAMUSCULAR; INTRAVENOUS AS NEEDED
Status: DISCONTINUED | OUTPATIENT
Start: 2025-01-17 | End: 2025-01-17 | Stop reason: HOSPADM

## 2025-01-17 RX ORDER — ALBUTEROL SULFATE 0.83 MG/ML
3 SOLUTION RESPIRATORY (INHALATION) AS NEEDED
Status: DISCONTINUED | OUTPATIENT
Start: 2025-01-17 | End: 2025-01-17 | Stop reason: HOSPADM

## 2025-01-17 RX ORDER — EPINEPHRINE 0.3 MG/.3ML
0.3 INJECTION SUBCUTANEOUS EVERY 5 MIN PRN
Status: DISCONTINUED | OUTPATIENT
Start: 2025-01-17 | End: 2025-01-17 | Stop reason: HOSPADM

## 2025-01-17 RX ORDER — PROCHLORPERAZINE MALEATE 10 MG
10 TABLET ORAL EVERY 6 HOURS PRN
Status: DISCONTINUED | OUTPATIENT
Start: 2025-01-17 | End: 2025-01-17 | Stop reason: HOSPADM

## 2025-01-17 RX ADMIN — Medication 12.9 MG: at 09:04

## 2025-01-17 RX ADMIN — ONDANSETRON HYDROCHLORIDE 8 MG: 8 TABLET, FILM COATED ORAL at 08:48

## 2025-01-17 NOTE — PROGRESS NOTES
Patient presents to infusion appointment in stable condition, reports increased fatigue and difficulty sleeping last night. C4D11 dectiabine injection tolerated without incident. ANC 1.35, filgrastim held per order. Lab results and schedule reviewed. Discharged in stable condition.

## 2025-01-20 ENCOUNTER — LAB (OUTPATIENT)
Dept: LAB | Facility: HOSPITAL | Age: 85
End: 2025-01-20
Payer: MEDICARE

## 2025-01-20 ENCOUNTER — INFUSION (OUTPATIENT)
Dept: HEMATOLOGY/ONCOLOGY | Facility: HOSPITAL | Age: 85
End: 2025-01-20
Payer: MEDICARE

## 2025-01-20 VITALS
BODY MASS INDEX: 36.73 KG/M2 | TEMPERATURE: 97.5 F | HEART RATE: 84 BPM | HEIGHT: 72 IN | OXYGEN SATURATION: 100 % | SYSTOLIC BLOOD PRESSURE: 129 MMHG | RESPIRATION RATE: 17 BRPM | DIASTOLIC BLOOD PRESSURE: 55 MMHG | WEIGHT: 271.17 LBS

## 2025-01-20 DIAGNOSIS — D46.9 MYELODYSPLASTIC SYNDROME (MULTI): ICD-10-CM

## 2025-01-20 LAB
ALBUMIN SERPL BCP-MCNC: 4 G/DL (ref 3.4–5)
ALP SERPL-CCNC: 93 U/L (ref 33–136)
ALT SERPL W P-5'-P-CCNC: 23 U/L (ref 10–52)
ANION GAP SERPL CALC-SCNC: 13 MMOL/L (ref 10–20)
AST SERPL W P-5'-P-CCNC: 20 U/L (ref 9–39)
BASOPHILS # BLD AUTO: 0.01 X10*3/UL (ref 0–0.1)
BASOPHILS NFR BLD AUTO: 0.4 %
BILIRUB SERPL-MCNC: 0.9 MG/DL (ref 0–1.2)
BUN SERPL-MCNC: 36 MG/DL (ref 6–23)
CALCIUM SERPL-MCNC: 9 MG/DL (ref 8.6–10.3)
CHLORIDE SERPL-SCNC: 107 MMOL/L (ref 98–107)
CHROM ANALY OVERALL INTERP-IMP: NORMAL
CHROMOSOME ANALYSIS CELLS ANALYZED: 0 CELLS
CHROMOSOME ANALYSIS CELLS IMAGED: 0 CELLS
CO2 SERPL-SCNC: 23 MMOL/L (ref 21–32)
CREAT SERPL-MCNC: 1.44 MG/DL (ref 0.5–1.3)
DACRYOCYTES BLD QL SMEAR: NORMAL
EGFRCR SERPLBLD CKD-EPI 2021: 48 ML/MIN/1.73M*2
ELECTRONICALLY SIGNED BY CYTOGENETICS: NORMAL
EOSINOPHIL # BLD AUTO: 0.04 X10*3/UL (ref 0–0.4)
EOSINOPHIL NFR BLD AUTO: 1.7 %
ERYTHROCYTE [DISTWIDTH] IN BLOOD BY AUTOMATED COUNT: 21.6 % (ref 11.5–14.5)
GLUCOSE SERPL-MCNC: 87 MG/DL (ref 74–99)
HCT VFR BLD AUTO: 28.5 % (ref 41–52)
HGB BLD-MCNC: 8.9 G/DL (ref 13.5–17.5)
HYPOCHROMIA BLD QL SMEAR: NORMAL
IMM GRANULOCYTES # BLD AUTO: 0.02 X10*3/UL (ref 0–0.5)
IMM GRANULOCYTES NFR BLD AUTO: 0.9 % (ref 0–0.9)
KARYOTYP MAR: 0 CELLS
LYMPHOCYTES # BLD AUTO: 0.75 X10*3/UL (ref 0.8–3)
LYMPHOCYTES NFR BLD AUTO: 32.6 %
MCH RBC QN AUTO: 30.6 PG (ref 26–34)
MCHC RBC AUTO-ENTMCNC: 31.2 G/DL (ref 32–36)
MCV RBC AUTO: 98 FL (ref 80–100)
MONOCYTES # BLD AUTO: 0.14 X10*3/UL (ref 0.05–0.8)
MONOCYTES NFR BLD AUTO: 6.1 %
NEUTROPHILS # BLD AUTO: 1.34 X10*3/UL (ref 1.6–5.5)
NEUTROPHILS NFR BLD AUTO: 58.3 %
NRBC BLD-RTO: 0 /100 WBCS (ref 0–0)
OVALOCYTES BLD QL SMEAR: NORMAL
PLATELET # BLD AUTO: 71 X10*3/UL (ref 150–450)
POLYCHROMASIA BLD QL SMEAR: NORMAL
POTASSIUM SERPL-SCNC: 4.6 MMOL/L (ref 3.5–5.3)
PROT SERPL-MCNC: 6.1 G/DL (ref 6.4–8.2)
RBC # BLD AUTO: 2.91 X10*6/UL (ref 4.5–5.9)
RBC MORPH BLD: NORMAL
SCHISTOCYTES BLD QL SMEAR: NORMAL
SODIUM SERPL-SCNC: 138 MMOL/L (ref 136–145)
TOTAL CELLS COUNTED MAR: 0 CELLS
WBC # BLD AUTO: 2.3 X10*3/UL (ref 4.4–11.3)

## 2025-01-20 PROCEDURE — 2560000001 HC RX 256 EXPERIMENTAL DRUGS: Performed by: INTERNAL MEDICINE

## 2025-01-20 PROCEDURE — 2500000004 HC RX 250 GENERAL PHARMACY W/ HCPCS (ALT 636 FOR OP/ED): Performed by: INTERNAL MEDICINE

## 2025-01-20 PROCEDURE — 85025 COMPLETE CBC W/AUTO DIFF WBC: CPT

## 2025-01-20 PROCEDURE — 36415 COLL VENOUS BLD VENIPUNCTURE: CPT

## 2025-01-20 PROCEDURE — 84075 ASSAY ALKALINE PHOSPHATASE: CPT

## 2025-01-20 PROCEDURE — 96401 CHEMO ANTI-NEOPL SQ/IM: CPT

## 2025-01-20 RX ORDER — FAMOTIDINE 10 MG/ML
20 INJECTION INTRAVENOUS ONCE AS NEEDED
Status: DISCONTINUED | OUTPATIENT
Start: 2025-01-20 | End: 2025-01-20 | Stop reason: HOSPADM

## 2025-01-20 RX ORDER — PROCHLORPERAZINE MALEATE 10 MG
10 TABLET ORAL EVERY 6 HOURS PRN
Status: DISCONTINUED | OUTPATIENT
Start: 2025-01-20 | End: 2025-01-20 | Stop reason: HOSPADM

## 2025-01-20 RX ORDER — PROCHLORPERAZINE EDISYLATE 5 MG/ML
10 INJECTION INTRAMUSCULAR; INTRAVENOUS EVERY 6 HOURS PRN
Status: DISCONTINUED | OUTPATIENT
Start: 2025-01-20 | End: 2025-01-20 | Stop reason: HOSPADM

## 2025-01-20 RX ORDER — ONDANSETRON HYDROCHLORIDE 8 MG/1
8 TABLET, FILM COATED ORAL ONCE
Status: COMPLETED | OUTPATIENT
Start: 2025-01-20 | End: 2025-01-20

## 2025-01-20 RX ORDER — ALBUTEROL SULFATE 0.83 MG/ML
3 SOLUTION RESPIRATORY (INHALATION) AS NEEDED
Status: DISCONTINUED | OUTPATIENT
Start: 2025-01-20 | End: 2025-01-20 | Stop reason: HOSPADM

## 2025-01-20 RX ORDER — DIPHENHYDRAMINE HYDROCHLORIDE 50 MG/ML
50 INJECTION INTRAMUSCULAR; INTRAVENOUS AS NEEDED
Status: DISCONTINUED | OUTPATIENT
Start: 2025-01-20 | End: 2025-01-20 | Stop reason: HOSPADM

## 2025-01-20 RX ORDER — EPINEPHRINE 0.3 MG/.3ML
0.3 INJECTION SUBCUTANEOUS EVERY 5 MIN PRN
Status: DISCONTINUED | OUTPATIENT
Start: 2025-01-20 | End: 2025-01-20 | Stop reason: HOSPADM

## 2025-01-20 RX ADMIN — Medication 129 MG: at 13:29

## 2025-01-20 RX ADMIN — ONDANSETRON HYDROCHLORIDE 8 MG: 8 TABLET, FILM COATED ORAL at 13:28

## 2025-01-20 ASSESSMENT — PAIN SCALES - GENERAL: PAINLEVEL_OUTOF10: 0-NO PAIN

## 2025-01-20 NOTE — PROGRESS NOTES
Patient presents to infusion appointment in stable condition. OK to treat pending results per Dr. Rashid. C4D15 [id number] azacitidine injections tolerated without incident. ANC 1.34, filgrastim held per orders. Lab results reviewed. Discharged in stable condition.

## 2025-01-23 ENCOUNTER — LAB (OUTPATIENT)
Dept: LAB | Facility: HOSPITAL | Age: 85
End: 2025-01-23
Payer: MEDICARE

## 2025-01-23 ENCOUNTER — INFUSION (OUTPATIENT)
Dept: HEMATOLOGY/ONCOLOGY | Facility: HOSPITAL | Age: 85
End: 2025-01-23
Payer: MEDICARE

## 2025-01-23 VITALS
BODY MASS INDEX: 36.61 KG/M2 | RESPIRATION RATE: 18 BRPM | SYSTOLIC BLOOD PRESSURE: 137 MMHG | OXYGEN SATURATION: 100 % | HEART RATE: 69 BPM | TEMPERATURE: 97 F | DIASTOLIC BLOOD PRESSURE: 56 MMHG | WEIGHT: 268.5 LBS

## 2025-01-23 DIAGNOSIS — D46.9 MYELODYSPLASTIC SYNDROME (MULTI): ICD-10-CM

## 2025-01-23 LAB
ALBUMIN SERPL BCP-MCNC: 4 G/DL (ref 3.4–5)
ALP SERPL-CCNC: 95 U/L (ref 33–136)
ALT SERPL W P-5'-P-CCNC: 21 U/L (ref 10–52)
ANION GAP SERPL CALC-SCNC: 12 MMOL/L (ref 10–20)
AST SERPL W P-5'-P-CCNC: 17 U/L (ref 9–39)
BASOPHILS # BLD AUTO: 0.02 X10*3/UL (ref 0–0.1)
BASOPHILS NFR BLD AUTO: 0.7 %
BILIRUB SERPL-MCNC: 0.8 MG/DL (ref 0–1.2)
BUN SERPL-MCNC: 31 MG/DL (ref 6–23)
CALCIUM SERPL-MCNC: 9.2 MG/DL (ref 8.6–10.3)
CHLORIDE SERPL-SCNC: 107 MMOL/L (ref 98–107)
CO2 SERPL-SCNC: 24 MMOL/L (ref 21–32)
CREAT SERPL-MCNC: 1.41 MG/DL (ref 0.5–1.3)
DACRYOCYTES BLD QL SMEAR: NORMAL
EGFRCR SERPLBLD CKD-EPI 2021: 49 ML/MIN/1.73M*2
EOSINOPHIL # BLD AUTO: 0.08 X10*3/UL (ref 0–0.4)
EOSINOPHIL NFR BLD AUTO: 2.8 %
ERYTHROCYTE [DISTWIDTH] IN BLOOD BY AUTOMATED COUNT: 21.5 % (ref 11.5–14.5)
GLUCOSE SERPL-MCNC: 101 MG/DL (ref 74–99)
HCT VFR BLD AUTO: 29.4 % (ref 41–52)
HGB BLD-MCNC: 9.2 G/DL (ref 13.5–17.5)
HYPOCHROMIA BLD QL SMEAR: NORMAL
IMM GRANULOCYTES # BLD AUTO: 0.03 X10*3/UL (ref 0–0.5)
IMM GRANULOCYTES NFR BLD AUTO: 1.1 % (ref 0–0.9)
LYMPHOCYTES # BLD AUTO: 0.85 X10*3/UL (ref 0.8–3)
LYMPHOCYTES NFR BLD AUTO: 30.2 %
MCH RBC QN AUTO: 30.4 PG (ref 26–34)
MCHC RBC AUTO-ENTMCNC: 31.3 G/DL (ref 32–36)
MCV RBC AUTO: 97 FL (ref 80–100)
MONOCYTES # BLD AUTO: 0.14 X10*3/UL (ref 0.05–0.8)
MONOCYTES NFR BLD AUTO: 5 %
NEUTROPHILS # BLD AUTO: 1.69 X10*3/UL (ref 1.6–5.5)
NEUTROPHILS NFR BLD AUTO: 60.2 %
NRBC BLD-RTO: 0 /100 WBCS (ref 0–0)
OVALOCYTES BLD QL SMEAR: NORMAL
PLATELET # BLD AUTO: 65 X10*3/UL (ref 150–450)
POTASSIUM SERPL-SCNC: 4.8 MMOL/L (ref 3.5–5.3)
PROT SERPL-MCNC: 6.2 G/DL (ref 6.4–8.2)
RBC # BLD AUTO: 3.03 X10*6/UL (ref 4.5–5.9)
RBC MORPH BLD: NORMAL
SCHISTOCYTES BLD QL SMEAR: NORMAL
SODIUM SERPL-SCNC: 138 MMOL/L (ref 136–145)
WBC # BLD AUTO: 2.8 X10*3/UL (ref 4.4–11.3)

## 2025-01-23 PROCEDURE — 36415 COLL VENOUS BLD VENIPUNCTURE: CPT

## 2025-01-23 PROCEDURE — 96401 CHEMO ANTI-NEOPL SQ/IM: CPT

## 2025-01-23 PROCEDURE — 2500000004 HC RX 250 GENERAL PHARMACY W/ HCPCS (ALT 636 FOR OP/ED): Performed by: INTERNAL MEDICINE

## 2025-01-23 PROCEDURE — 80053 COMPREHEN METABOLIC PANEL: CPT

## 2025-01-23 PROCEDURE — 85025 COMPLETE CBC W/AUTO DIFF WBC: CPT

## 2025-01-23 PROCEDURE — 2560000001 HC RX 256 EXPERIMENTAL DRUGS: Performed by: INTERNAL MEDICINE

## 2025-01-23 RX ORDER — ONDANSETRON HYDROCHLORIDE 8 MG/1
8 TABLET, FILM COATED ORAL ONCE
Status: COMPLETED | OUTPATIENT
Start: 2025-01-23 | End: 2025-01-23

## 2025-01-23 RX ORDER — ALBUTEROL SULFATE 0.83 MG/ML
3 SOLUTION RESPIRATORY (INHALATION) AS NEEDED
Status: DISCONTINUED | OUTPATIENT
Start: 2025-01-23 | End: 2025-01-23 | Stop reason: HOSPADM

## 2025-01-23 RX ORDER — FAMOTIDINE 10 MG/ML
20 INJECTION INTRAVENOUS ONCE AS NEEDED
Status: DISCONTINUED | OUTPATIENT
Start: 2025-01-23 | End: 2025-01-23 | Stop reason: HOSPADM

## 2025-01-23 RX ORDER — PROCHLORPERAZINE EDISYLATE 5 MG/ML
10 INJECTION INTRAMUSCULAR; INTRAVENOUS EVERY 6 HOURS PRN
Status: DISCONTINUED | OUTPATIENT
Start: 2025-01-23 | End: 2025-01-23 | Stop reason: HOSPADM

## 2025-01-23 RX ORDER — EPINEPHRINE 0.3 MG/.3ML
0.3 INJECTION SUBCUTANEOUS EVERY 5 MIN PRN
Status: DISCONTINUED | OUTPATIENT
Start: 2025-01-23 | End: 2025-01-23 | Stop reason: HOSPADM

## 2025-01-23 RX ORDER — DIPHENHYDRAMINE HYDROCHLORIDE 50 MG/ML
50 INJECTION INTRAMUSCULAR; INTRAVENOUS AS NEEDED
Status: DISCONTINUED | OUTPATIENT
Start: 2025-01-23 | End: 2025-01-23 | Stop reason: HOSPADM

## 2025-01-23 RX ORDER — PROCHLORPERAZINE MALEATE 10 MG
10 TABLET ORAL EVERY 6 HOURS PRN
Status: DISCONTINUED | OUTPATIENT
Start: 2025-01-23 | End: 2025-01-23 | Stop reason: HOSPADM

## 2025-01-23 RX ADMIN — ONDANSETRON HYDROCHLORIDE 8 MG: 8 TABLET, FILM COATED ORAL at 10:51

## 2025-01-23 RX ADMIN — Medication 12.9 MG: at 11:27

## 2025-01-23 NOTE — PROGRESS NOTES
Patient arrives for scheduled [id number] Azacitidine.  Tolerated injection well and discharged in stable condition.

## 2025-01-27 ENCOUNTER — LAB (OUTPATIENT)
Dept: LAB | Facility: HOSPITAL | Age: 85
End: 2025-01-27
Payer: MEDICARE

## 2025-01-27 ENCOUNTER — INFUSION (OUTPATIENT)
Dept: HEMATOLOGY/ONCOLOGY | Facility: HOSPITAL | Age: 85
End: 2025-01-27
Payer: MEDICARE

## 2025-01-27 VITALS
HEART RATE: 81 BPM | OXYGEN SATURATION: 99 % | DIASTOLIC BLOOD PRESSURE: 63 MMHG | TEMPERATURE: 97.7 F | BODY MASS INDEX: 36.68 KG/M2 | RESPIRATION RATE: 18 BRPM | WEIGHT: 269 LBS | SYSTOLIC BLOOD PRESSURE: 143 MMHG

## 2025-01-27 DIAGNOSIS — D64.81 ANEMIA DUE TO CHEMOTHERAPY FOR MYELODYSPLASTIC SYNDROME TREATED WITH ERYTHROPOIETIN (MULTI): ICD-10-CM

## 2025-01-27 DIAGNOSIS — D46.9 MYELODYSPLASTIC SYNDROME (MULTI): ICD-10-CM

## 2025-01-27 DIAGNOSIS — T45.1X5A ANEMIA DUE TO CHEMOTHERAPY FOR MYELODYSPLASTIC SYNDROME TREATED WITH ERYTHROPOIETIN (MULTI): ICD-10-CM

## 2025-01-27 DIAGNOSIS — D46.9 ANEMIA DUE TO CHEMOTHERAPY FOR MYELODYSPLASTIC SYNDROME TREATED WITH ERYTHROPOIETIN (MULTI): ICD-10-CM

## 2025-01-27 LAB
ALBUMIN SERPL BCP-MCNC: 3.9 G/DL (ref 3.4–5)
ALP SERPL-CCNC: 95 U/L (ref 33–136)
ALT SERPL W P-5'-P-CCNC: 20 U/L (ref 10–52)
ANION GAP SERPL CALC-SCNC: 13 MMOL/L (ref 10–20)
AST SERPL W P-5'-P-CCNC: 19 U/L (ref 9–39)
BASOPHILS # BLD AUTO: 0.02 X10*3/UL (ref 0–0.1)
BASOPHILS NFR BLD AUTO: 0.8 %
BILIRUB SERPL-MCNC: 0.8 MG/DL (ref 0–1.2)
BUN SERPL-MCNC: 32 MG/DL (ref 6–23)
CALCIUM SERPL-MCNC: 9.1 MG/DL (ref 8.6–10.3)
CHLORIDE SERPL-SCNC: 109 MMOL/L (ref 98–107)
CO2 SERPL-SCNC: 23 MMOL/L (ref 21–32)
CREAT SERPL-MCNC: 1.41 MG/DL (ref 0.5–1.3)
DACRYOCYTES BLD QL SMEAR: NORMAL
EGFRCR SERPLBLD CKD-EPI 2021: 49 ML/MIN/1.73M*2
EOSINOPHIL # BLD AUTO: 0.07 X10*3/UL (ref 0–0.4)
EOSINOPHIL NFR BLD AUTO: 2.9 %
ERYTHROCYTE [DISTWIDTH] IN BLOOD BY AUTOMATED COUNT: 21.5 % (ref 11.5–14.5)
GLUCOSE SERPL-MCNC: 85 MG/DL (ref 74–99)
HCT VFR BLD AUTO: 28.7 % (ref 41–52)
HGB BLD-MCNC: 8.9 G/DL (ref 13.5–17.5)
HYPOCHROMIA BLD QL SMEAR: NORMAL
IMM GRANULOCYTES # BLD AUTO: 0.01 X10*3/UL (ref 0–0.5)
IMM GRANULOCYTES NFR BLD AUTO: 0.4 % (ref 0–0.9)
LYMPHOCYTES # BLD AUTO: 0.79 X10*3/UL (ref 0.8–3)
LYMPHOCYTES NFR BLD AUTO: 33.2 %
MCH RBC QN AUTO: 30.4 PG (ref 26–34)
MCHC RBC AUTO-ENTMCNC: 31 G/DL (ref 32–36)
MCV RBC AUTO: 98 FL (ref 80–100)
MONOCYTES # BLD AUTO: 0.11 X10*3/UL (ref 0.05–0.8)
MONOCYTES NFR BLD AUTO: 4.6 %
NEUTROPHILS # BLD AUTO: 1.38 X10*3/UL (ref 1.6–5.5)
NEUTROPHILS NFR BLD AUTO: 58.1 %
NRBC BLD-RTO: 0 /100 WBCS (ref 0–0)
OVALOCYTES BLD QL SMEAR: NORMAL
PLATELET # BLD AUTO: 62 X10*3/UL (ref 150–450)
POLYCHROMASIA BLD QL SMEAR: NORMAL
POTASSIUM SERPL-SCNC: 4.9 MMOL/L (ref 3.5–5.3)
PROT SERPL-MCNC: 6.1 G/DL (ref 6.4–8.2)
RBC # BLD AUTO: 2.93 X10*6/UL (ref 4.5–5.9)
RBC MORPH BLD: NORMAL
SCHISTOCYTES BLD QL SMEAR: NORMAL
SODIUM SERPL-SCNC: 140 MMOL/L (ref 136–145)
WBC # BLD AUTO: 2.4 X10*3/UL (ref 4.4–11.3)

## 2025-01-27 PROCEDURE — 85025 COMPLETE CBC W/AUTO DIFF WBC: CPT

## 2025-01-27 PROCEDURE — 2500000004 HC RX 250 GENERAL PHARMACY W/ HCPCS (ALT 636 FOR OP/ED): Performed by: INTERNAL MEDICINE

## 2025-01-27 PROCEDURE — 36415 COLL VENOUS BLD VENIPUNCTURE: CPT

## 2025-01-27 PROCEDURE — 96401 CHEMO ANTI-NEOPL SQ/IM: CPT

## 2025-01-27 PROCEDURE — 84075 ASSAY ALKALINE PHOSPHATASE: CPT

## 2025-01-27 PROCEDURE — 2560000001 HC RX 256 EXPERIMENTAL DRUGS: Performed by: INTERNAL MEDICINE

## 2025-01-27 PROCEDURE — 96372 THER/PROPH/DIAG INJ SC/IM: CPT | Mod: 59

## 2025-01-27 RX ORDER — PROCHLORPERAZINE MALEATE 10 MG
10 TABLET ORAL EVERY 6 HOURS PRN
Status: DISCONTINUED | OUTPATIENT
Start: 2025-01-27 | End: 2025-01-27 | Stop reason: HOSPADM

## 2025-01-27 RX ORDER — EPINEPHRINE 0.3 MG/.3ML
0.3 INJECTION SUBCUTANEOUS EVERY 5 MIN PRN
OUTPATIENT
Start: 2025-02-10

## 2025-01-27 RX ORDER — EPINEPHRINE 0.3 MG/.3ML
0.3 INJECTION SUBCUTANEOUS EVERY 5 MIN PRN
Status: DISCONTINUED | OUTPATIENT
Start: 2025-01-27 | End: 2025-01-27 | Stop reason: HOSPADM

## 2025-01-27 RX ORDER — FAMOTIDINE 10 MG/ML
20 INJECTION INTRAVENOUS ONCE AS NEEDED
Status: DISCONTINUED | OUTPATIENT
Start: 2025-01-27 | End: 2025-01-27 | Stop reason: HOSPADM

## 2025-01-27 RX ORDER — DIPHENHYDRAMINE HYDROCHLORIDE 50 MG/ML
50 INJECTION INTRAMUSCULAR; INTRAVENOUS AS NEEDED
OUTPATIENT
Start: 2025-02-10

## 2025-01-27 RX ORDER — ONDANSETRON HYDROCHLORIDE 8 MG/1
8 TABLET, FILM COATED ORAL ONCE
Status: COMPLETED | OUTPATIENT
Start: 2025-01-27 | End: 2025-01-27

## 2025-01-27 RX ORDER — DIPHENHYDRAMINE HYDROCHLORIDE 50 MG/ML
50 INJECTION INTRAMUSCULAR; INTRAVENOUS AS NEEDED
Status: DISCONTINUED | OUTPATIENT
Start: 2025-01-27 | End: 2025-01-27 | Stop reason: HOSPADM

## 2025-01-27 RX ORDER — PROCHLORPERAZINE EDISYLATE 5 MG/ML
10 INJECTION INTRAMUSCULAR; INTRAVENOUS EVERY 6 HOURS PRN
Status: DISCONTINUED | OUTPATIENT
Start: 2025-01-27 | End: 2025-01-27 | Stop reason: HOSPADM

## 2025-01-27 RX ORDER — ALBUTEROL SULFATE 0.83 MG/ML
3 SOLUTION RESPIRATORY (INHALATION) AS NEEDED
OUTPATIENT
Start: 2025-02-10

## 2025-01-27 RX ORDER — FAMOTIDINE 10 MG/ML
20 INJECTION INTRAVENOUS ONCE AS NEEDED
OUTPATIENT
Start: 2025-02-10

## 2025-01-27 RX ORDER — ALBUTEROL SULFATE 0.83 MG/ML
3 SOLUTION RESPIRATORY (INHALATION) AS NEEDED
Status: DISCONTINUED | OUTPATIENT
Start: 2025-01-27 | End: 2025-01-27 | Stop reason: HOSPADM

## 2025-01-27 RX ADMIN — ONDANSETRON HYDROCHLORIDE 8 MG: 8 TABLET, FILM COATED ORAL at 10:58

## 2025-01-27 RX ADMIN — Medication 129 MG: at 11:44

## 2025-01-27 ASSESSMENT — PAIN SCALES - GENERAL: PAINLEVEL_OUTOF10: 0-NO PAIN

## 2025-01-27 NOTE — PROGRESS NOTES
Patient presents to infusion appointment in stable condition. C4D22 azacitidine injections & darbepoetin injection tolerated without incident. ANC 1.38, filgrastim held per order. Lab results and schedule reviewed. Discharged in stable condition.

## 2025-01-30 ENCOUNTER — INFUSION (OUTPATIENT)
Dept: HEMATOLOGY/ONCOLOGY | Facility: HOSPITAL | Age: 85
End: 2025-01-30
Payer: MEDICARE

## 2025-01-30 ENCOUNTER — LAB (OUTPATIENT)
Dept: LAB | Facility: HOSPITAL | Age: 85
End: 2025-01-30
Payer: MEDICARE

## 2025-01-30 VITALS
WEIGHT: 268 LBS | TEMPERATURE: 97.5 F | DIASTOLIC BLOOD PRESSURE: 38 MMHG | BODY MASS INDEX: 36.54 KG/M2 | OXYGEN SATURATION: 100 % | HEART RATE: 83 BPM | SYSTOLIC BLOOD PRESSURE: 124 MMHG | RESPIRATION RATE: 19 BRPM

## 2025-01-30 DIAGNOSIS — D46.9 MYELODYSPLASTIC SYNDROME (MULTI): ICD-10-CM

## 2025-01-30 LAB
ALBUMIN SERPL BCP-MCNC: 4 G/DL (ref 3.4–5)
ALP SERPL-CCNC: 94 U/L (ref 33–136)
ALT SERPL W P-5'-P-CCNC: 20 U/L (ref 10–52)
ANION GAP SERPL CALC-SCNC: 11 MMOL/L (ref 10–20)
AST SERPL W P-5'-P-CCNC: 18 U/L (ref 9–39)
BASOPHILS # BLD AUTO: 0.01 X10*3/UL (ref 0–0.1)
BASOPHILS NFR BLD AUTO: 0.4 %
BILIRUB SERPL-MCNC: 0.9 MG/DL (ref 0–1.2)
BUN SERPL-MCNC: 37 MG/DL (ref 6–23)
CALCIUM SERPL-MCNC: 9.4 MG/DL (ref 8.6–10.6)
CHLORIDE SERPL-SCNC: 108 MMOL/L (ref 98–107)
CO2 SERPL-SCNC: 24 MMOL/L (ref 21–32)
CREAT SERPL-MCNC: 1.53 MG/DL (ref 0.5–1.3)
EGFRCR SERPLBLD CKD-EPI 2021: 45 ML/MIN/1.73M*2
EOSINOPHIL # BLD AUTO: 0.05 X10*3/UL (ref 0–0.4)
EOSINOPHIL NFR BLD AUTO: 2 %
ERYTHROCYTE [DISTWIDTH] IN BLOOD BY AUTOMATED COUNT: 21.3 % (ref 11.5–14.5)
GLUCOSE SERPL-MCNC: 99 MG/DL (ref 74–99)
HCT VFR BLD AUTO: 29.8 % (ref 41–52)
HGB BLD-MCNC: 9.4 G/DL (ref 13.5–17.5)
IMM GRANULOCYTES # BLD AUTO: 0.02 X10*3/UL (ref 0–0.5)
IMM GRANULOCYTES NFR BLD AUTO: 0.8 % (ref 0–0.9)
LYMPHOCYTES # BLD AUTO: 0.76 X10*3/UL (ref 0.8–3)
LYMPHOCYTES NFR BLD AUTO: 30.8 %
MCH RBC QN AUTO: 30.4 PG (ref 26–34)
MCHC RBC AUTO-ENTMCNC: 31.5 G/DL (ref 32–36)
MCV RBC AUTO: 96 FL (ref 80–100)
MONOCYTES # BLD AUTO: 0.14 X10*3/UL (ref 0.05–0.8)
MONOCYTES NFR BLD AUTO: 5.7 %
NEUTROPHILS # BLD AUTO: 1.49 X10*3/UL (ref 1.6–5.5)
NEUTROPHILS NFR BLD AUTO: 60.3 %
NRBC BLD-RTO: 0 /100 WBCS (ref 0–0)
PLATELET # BLD AUTO: 57 X10*3/UL (ref 150–450)
POTASSIUM SERPL-SCNC: 4.8 MMOL/L (ref 3.5–5.3)
PROT SERPL-MCNC: 6.4 G/DL (ref 6.4–8.2)
RBC # BLD AUTO: 3.09 X10*6/UL (ref 4.5–5.9)
SODIUM SERPL-SCNC: 138 MMOL/L (ref 136–145)
WBC # BLD AUTO: 2.5 X10*3/UL (ref 4.4–11.3)

## 2025-01-30 PROCEDURE — 36415 COLL VENOUS BLD VENIPUNCTURE: CPT

## 2025-01-30 PROCEDURE — 2500000004 HC RX 250 GENERAL PHARMACY W/ HCPCS (ALT 636 FOR OP/ED): Performed by: INTERNAL MEDICINE

## 2025-01-30 PROCEDURE — 80053 COMPREHEN METABOLIC PANEL: CPT

## 2025-01-30 PROCEDURE — 2560000001 HC RX 256 EXPERIMENTAL DRUGS: Performed by: INTERNAL MEDICINE

## 2025-01-30 PROCEDURE — 96401 CHEMO ANTI-NEOPL SQ/IM: CPT

## 2025-01-30 PROCEDURE — 85025 COMPLETE CBC W/AUTO DIFF WBC: CPT

## 2025-01-30 RX ORDER — ONDANSETRON HYDROCHLORIDE 8 MG/1
8 TABLET, FILM COATED ORAL ONCE
Status: COMPLETED | OUTPATIENT
Start: 2025-01-30 | End: 2025-01-30

## 2025-01-30 RX ORDER — EPINEPHRINE 0.3 MG/.3ML
0.3 INJECTION SUBCUTANEOUS EVERY 5 MIN PRN
Status: DISCONTINUED | OUTPATIENT
Start: 2025-01-30 | End: 2025-01-30 | Stop reason: HOSPADM

## 2025-01-30 RX ORDER — DIPHENHYDRAMINE HYDROCHLORIDE 50 MG/ML
50 INJECTION INTRAMUSCULAR; INTRAVENOUS AS NEEDED
Status: DISCONTINUED | OUTPATIENT
Start: 2025-01-30 | End: 2025-01-30 | Stop reason: HOSPADM

## 2025-01-30 RX ORDER — FAMOTIDINE 10 MG/ML
20 INJECTION INTRAVENOUS ONCE AS NEEDED
Status: DISCONTINUED | OUTPATIENT
Start: 2025-01-30 | End: 2025-01-30 | Stop reason: HOSPADM

## 2025-01-30 RX ORDER — PROCHLORPERAZINE EDISYLATE 5 MG/ML
10 INJECTION INTRAMUSCULAR; INTRAVENOUS EVERY 6 HOURS PRN
Status: DISCONTINUED | OUTPATIENT
Start: 2025-01-30 | End: 2025-01-30 | Stop reason: HOSPADM

## 2025-01-30 RX ORDER — ALBUTEROL SULFATE 0.83 MG/ML
3 SOLUTION RESPIRATORY (INHALATION) AS NEEDED
Status: DISCONTINUED | OUTPATIENT
Start: 2025-01-30 | End: 2025-01-30 | Stop reason: HOSPADM

## 2025-01-30 RX ORDER — PROCHLORPERAZINE MALEATE 10 MG
10 TABLET ORAL EVERY 6 HOURS PRN
Status: DISCONTINUED | OUTPATIENT
Start: 2025-01-30 | End: 2025-01-30 | Stop reason: HOSPADM

## 2025-01-30 RX ADMIN — ONDANSETRON HYDROCHLORIDE 8 MG: 8 TABLET, FILM COATED ORAL at 11:35

## 2025-01-30 RX ADMIN — Medication 12.9 MG: at 12:11

## 2025-01-30 ASSESSMENT — PAIN SCALES - GENERAL: PAINLEVEL_OUTOF10: 0-NO PAIN

## 2025-01-30 NOTE — PROGRESS NOTES
Patient arrives for scheduled injection [id number].  Tolerated injection well, AVS and today's lab results printed, discharged in stable condition with wife by his side.

## 2025-02-03 ENCOUNTER — OFFICE VISIT (OUTPATIENT)
Dept: HEMATOLOGY/ONCOLOGY | Facility: HOSPITAL | Age: 85
End: 2025-02-03
Payer: MEDICARE

## 2025-02-03 ENCOUNTER — DOCUMENTATION (OUTPATIENT)
Dept: HEMATOLOGY/ONCOLOGY | Facility: HOSPITAL | Age: 85
End: 2025-02-03

## 2025-02-03 ENCOUNTER — LAB (OUTPATIENT)
Dept: LAB | Facility: HOSPITAL | Age: 85
End: 2025-02-03
Payer: MEDICARE

## 2025-02-03 ENCOUNTER — INFUSION (OUTPATIENT)
Dept: HEMATOLOGY/ONCOLOGY | Facility: HOSPITAL | Age: 85
End: 2025-02-03
Payer: MEDICARE

## 2025-02-03 ENCOUNTER — APPOINTMENT (OUTPATIENT)
Dept: PRIMARY CARE | Facility: CLINIC | Age: 85
End: 2025-02-03
Payer: MEDICARE

## 2025-02-03 VITALS
TEMPERATURE: 97.9 F | HEART RATE: 84 BPM | OXYGEN SATURATION: 100 % | SYSTOLIC BLOOD PRESSURE: 125 MMHG | DIASTOLIC BLOOD PRESSURE: 91 MMHG | WEIGHT: 268.96 LBS | BODY MASS INDEX: 36.67 KG/M2

## 2025-02-03 VITALS
DIASTOLIC BLOOD PRESSURE: 50 MMHG | TEMPERATURE: 98.2 F | WEIGHT: 270 LBS | BODY MASS INDEX: 36.57 KG/M2 | OXYGEN SATURATION: 98 % | RESPIRATION RATE: 19 BRPM | SYSTOLIC BLOOD PRESSURE: 102 MMHG | HEIGHT: 72 IN | HEART RATE: 63 BPM

## 2025-02-03 DIAGNOSIS — D46.9 MYELODYSPLASTIC SYNDROME (MULTI): Primary | ICD-10-CM

## 2025-02-03 DIAGNOSIS — N18.32 STAGE 3B CHRONIC KIDNEY DISEASE (MULTI): ICD-10-CM

## 2025-02-03 DIAGNOSIS — D61.818 OTHER PANCYTOPENIA (MULTI): ICD-10-CM

## 2025-02-03 DIAGNOSIS — D46.9 MYELODYSPLASTIC SYNDROME (MULTI): ICD-10-CM

## 2025-02-03 DIAGNOSIS — N39.3 SUI (STRESS URINARY INCONTINENCE), MALE: ICD-10-CM

## 2025-02-03 DIAGNOSIS — I49.5 SINUS NODE DYSFUNCTION (MULTI): ICD-10-CM

## 2025-02-03 DIAGNOSIS — I10 PRIMARY HYPERTENSION: Primary | ICD-10-CM

## 2025-02-03 DIAGNOSIS — F51.01 PRIMARY INSOMNIA: ICD-10-CM

## 2025-02-03 DIAGNOSIS — M25.551 RIGHT HIP PAIN: ICD-10-CM

## 2025-02-03 DIAGNOSIS — C61 MALIGNANT NEOPLASM OF PROSTATE (MULTI): ICD-10-CM

## 2025-02-03 DIAGNOSIS — E78.2 HYPERLIPEMIA, MIXED: ICD-10-CM

## 2025-02-03 LAB
ALBUMIN SERPL BCP-MCNC: 3.9 G/DL (ref 3.4–5)
ALP SERPL-CCNC: 96 U/L (ref 33–136)
ALT SERPL W P-5'-P-CCNC: 18 U/L (ref 10–52)
ANION GAP SERPL CALC-SCNC: 15 MMOL/L (ref 10–20)
AST SERPL W P-5'-P-CCNC: 17 U/L (ref 9–39)
BASOPHILS # BLD AUTO: 0.01 X10*3/UL (ref 0–0.1)
BASOPHILS NFR BLD AUTO: 0.4 %
BILIRUB SERPL-MCNC: 0.7 MG/DL (ref 0–1.2)
BUN SERPL-MCNC: 34 MG/DL (ref 6–23)
CALCIUM SERPL-MCNC: 9 MG/DL (ref 8.6–10.3)
CHLORIDE SERPL-SCNC: 110 MMOL/L (ref 98–107)
CO2 SERPL-SCNC: 22 MMOL/L (ref 21–32)
CREAT SERPL-MCNC: 1.51 MG/DL (ref 0.5–1.3)
DACRYOCYTES BLD QL SMEAR: NORMAL
EGFRCR SERPLBLD CKD-EPI 2021: 45 ML/MIN/1.73M*2
EOSINOPHIL # BLD AUTO: 0.06 X10*3/UL (ref 0–0.4)
EOSINOPHIL NFR BLD AUTO: 2.1 %
ERYTHROCYTE [DISTWIDTH] IN BLOOD BY AUTOMATED COUNT: 21.7 % (ref 11.5–14.5)
GLUCOSE SERPL-MCNC: 86 MG/DL (ref 74–99)
HCT VFR BLD AUTO: 29.3 % (ref 41–52)
HGB BLD-MCNC: 9.2 G/DL (ref 13.5–17.5)
HGB RETIC QN: 29 PG (ref 28–38)
HYPOCHROMIA BLD QL SMEAR: NORMAL
IMM GRANULOCYTES # BLD AUTO: 0.01 X10*3/UL (ref 0–0.5)
IMM GRANULOCYTES NFR BLD AUTO: 0.4 % (ref 0–0.9)
IMMATURE RETIC FRACTION: 18.8 %
LDH SERPL L TO P-CCNC: 190 U/L (ref 84–246)
LYMPHOCYTES # BLD AUTO: 0.78 X10*3/UL (ref 0.8–3)
LYMPHOCYTES NFR BLD AUTO: 27.8 %
MAGNESIUM SERPL-MCNC: 1.93 MG/DL (ref 1.6–2.4)
MCH RBC QN AUTO: 31.1 PG (ref 26–34)
MCHC RBC AUTO-ENTMCNC: 31.4 G/DL (ref 32–36)
MCV RBC AUTO: 99 FL (ref 80–100)
MONOCYTES # BLD AUTO: 0.14 X10*3/UL (ref 0.05–0.8)
MONOCYTES NFR BLD AUTO: 5 %
NEUTROPHILS # BLD AUTO: 1.81 X10*3/UL (ref 1.6–5.5)
NEUTROPHILS NFR BLD AUTO: 64.3 %
NRBC BLD-RTO: 0 /100 WBCS (ref 0–0)
OVALOCYTES BLD QL SMEAR: NORMAL
PLATELET # BLD AUTO: 73 X10*3/UL (ref 150–450)
POLYCHROMASIA BLD QL SMEAR: NORMAL
POTASSIUM SERPL-SCNC: 4.9 MMOL/L (ref 3.5–5.3)
PROT SERPL-MCNC: 6.2 G/DL (ref 6.4–8.2)
RBC # BLD AUTO: 2.96 X10*6/UL (ref 4.5–5.9)
RBC MORPH BLD: NORMAL
RETICS #: 0.06 X10*6/UL (ref 0.02–0.11)
RETICS/RBC NFR AUTO: 2 % (ref 0.5–2)
SCHISTOCYTES BLD QL SMEAR: NORMAL
SODIUM SERPL-SCNC: 142 MMOL/L (ref 136–145)
URATE SERPL-MCNC: 5.3 MG/DL (ref 4–7.5)
WBC # BLD AUTO: 2.8 X10*3/UL (ref 4.4–11.3)

## 2025-02-03 PROCEDURE — 96401 CHEMO ANTI-NEOPL SQ/IM: CPT

## 2025-02-03 PROCEDURE — 2500000004 HC RX 250 GENERAL PHARMACY W/ HCPCS (ALT 636 FOR OP/ED): Performed by: INTERNAL MEDICINE

## 2025-02-03 PROCEDURE — 99213 OFFICE O/P EST LOW 20 MIN: CPT | Performed by: INTERNAL MEDICINE

## 2025-02-03 PROCEDURE — 85045 AUTOMATED RETICULOCYTE COUNT: CPT

## 2025-02-03 PROCEDURE — 84550 ASSAY OF BLOOD/URIC ACID: CPT

## 2025-02-03 PROCEDURE — 2560000001 HC RX 256 EXPERIMENTAL DRUGS: Performed by: INTERNAL MEDICINE

## 2025-02-03 PROCEDURE — 83735 ASSAY OF MAGNESIUM: CPT

## 2025-02-03 PROCEDURE — 36415 COLL VENOUS BLD VENIPUNCTURE: CPT

## 2025-02-03 PROCEDURE — 83615 LACTATE (LD) (LDH) ENZYME: CPT

## 2025-02-03 PROCEDURE — 85025 COMPLETE CBC W/AUTO DIFF WBC: CPT

## 2025-02-03 PROCEDURE — 80053 COMPREHEN METABOLIC PANEL: CPT

## 2025-02-03 RX ORDER — DIPHENHYDRAMINE HYDROCHLORIDE 50 MG/ML
50 INJECTION INTRAMUSCULAR; INTRAVENOUS AS NEEDED
Status: DISCONTINUED | OUTPATIENT
Start: 2025-02-03 | End: 2025-02-03 | Stop reason: HOSPADM

## 2025-02-03 RX ORDER — ALBUTEROL SULFATE 0.83 MG/ML
3 SOLUTION RESPIRATORY (INHALATION) AS NEEDED
Status: DISCONTINUED | OUTPATIENT
Start: 2025-02-03 | End: 2025-02-03 | Stop reason: HOSPADM

## 2025-02-03 RX ORDER — ONDANSETRON HYDROCHLORIDE 8 MG/1
8 TABLET, FILM COATED ORAL ONCE
Status: COMPLETED | OUTPATIENT
Start: 2025-02-03 | End: 2025-02-03

## 2025-02-03 RX ORDER — FAMOTIDINE 10 MG/ML
20 INJECTION INTRAVENOUS ONCE AS NEEDED
Status: DISCONTINUED | OUTPATIENT
Start: 2025-02-03 | End: 2025-02-03 | Stop reason: HOSPADM

## 2025-02-03 RX ORDER — PROCHLORPERAZINE EDISYLATE 5 MG/ML
10 INJECTION INTRAMUSCULAR; INTRAVENOUS EVERY 6 HOURS PRN
Status: DISCONTINUED | OUTPATIENT
Start: 2025-02-03 | End: 2025-02-03 | Stop reason: HOSPADM

## 2025-02-03 RX ORDER — TEMAZEPAM 15 MG/1
15 CAPSULE ORAL NIGHTLY PRN
Qty: 90 CAPSULE | Refills: 0 | Status: SHIPPED | OUTPATIENT
Start: 2025-02-03

## 2025-02-03 RX ORDER — EPINEPHRINE 0.3 MG/.3ML
0.3 INJECTION SUBCUTANEOUS EVERY 5 MIN PRN
Status: DISCONTINUED | OUTPATIENT
Start: 2025-02-03 | End: 2025-02-03 | Stop reason: HOSPADM

## 2025-02-03 RX ORDER — PROCHLORPERAZINE MALEATE 10 MG
10 TABLET ORAL EVERY 6 HOURS PRN
Status: DISCONTINUED | OUTPATIENT
Start: 2025-02-03 | End: 2025-02-03 | Stop reason: HOSPADM

## 2025-02-03 RX ADMIN — ONDANSETRON HYDROCHLORIDE 8 MG: 8 TABLET, FILM COATED ORAL at 11:22

## 2025-02-03 RX ADMIN — Medication 129 MG: at 11:55

## 2025-02-03 ASSESSMENT — ENCOUNTER SYMPTOMS
NECK STIFFNESS: 0
ADENOPATHY: 0
DIFFICULTY URINATING: 0
DIARRHEA: 0
DYSPHORIC MOOD: 0
BRUISES/BLEEDS EASILY: 0
DIZZINESS: 0
BLOOD IN STOOL: 0
LOSS OF SENSATION IN FEET: 0
DIAPHORESIS: 0
NUMBNESS: 0
HIP PAIN: 1
NERVOUS/ANXIOUS: 0
UNEXPECTED WEIGHT CHANGE: 0
RECTAL PAIN: 0
FEVER: 0
HYPERTENSION: 1
ABDOMINAL PAIN: 0
APPETITE CHANGE: 0
POLYDIPSIA: 0
PHOTOPHOBIA: 0
CONFUSION: 0
VOMITING: 0
POLYPHAGIA: 0
JOINT SWELLING: 0
ABDOMINAL PAIN: 0
FATIGUE: 0
CHEST TIGHTNESS: 0
CONSTIPATION: 0
CHILLS: 0
FLANK PAIN: 0
ABDOMINAL DISTENTION: 0
ACTIVITY CHANGE: 0
LIGHT-HEADEDNESS: 0
CONSTIPATION: 0
DYSURIA: 0
ACTIVITY CHANGE: 0
CONSTITUTIONAL NEGATIVE: 1
TINGLING: 0
SINUS PRESSURE: 0
NUMBNESS: 0
HEADACHES: 0
EYE PAIN: 0
RHINORRHEA: 0
RHINORRHEA: 0
COLOR CHANGE: 0
SORE THROAT: 0
HEADACHES: 0
HEMATURIA: 0
NERVOUS/ANXIOUS: 0
MYALGIAS: 0
PALPITATIONS: 0
SINUS PAIN: 0
SLEEP DISTURBANCE: 0
TROUBLE SWALLOWING: 0
SHORTNESS OF BREATH: 0
APPETITE CHANGE: 0
CONFUSION: 0
SHORTNESS OF BREATH: 0
OCCASIONAL FEELINGS OF UNSTEADINESS: 0
FLANK PAIN: 0
SINUS PAIN: 0
COUGH: 0
WEAKNESS: 0
FATIGUE: 0
COUGH: 0
BACK PAIN: 0
DEPRESSION: 0
SPEECH DIFFICULTY: 0
FEVER: 0
DIARRHEA: 0
NAUSEA: 0
SEIZURES: 0
ARTHRALGIAS: 1
DYSPHORIC MOOD: 0
ADENOPATHY: 0
SORE THROAT: 0
STRIDOR: 0
AGITATION: 0
SINUS PRESSURE: 0
DECREASED CONCENTRATION: 0

## 2025-02-03 ASSESSMENT — PAIN SCALES - GENERAL: PAINLEVEL_OUTOF10: 0-NO PAIN

## 2025-02-03 ASSESSMENT — PATIENT HEALTH QUESTIONNAIRE - PHQ9
2. FEELING DOWN, DEPRESSED OR HOPELESS: NOT AT ALL
SUM OF ALL RESPONSES TO PHQ9 QUESTIONS 1 AND 2: 0
1. LITTLE INTEREST OR PLEASURE IN DOING THINGS: NOT AT ALL

## 2025-02-03 NOTE — PROGRESS NOTES
Patient presents to infusion appointment from clinic in stable condition. C5D1 [id number] azacitidine injections tolerated without incident. ANC 1.81, filgrastim held per order. Lab results and schedule reviewed. Discharged in stable condition.

## 2025-02-03 NOTE — LETTER
"    February 3, 2025      TO: DO Giacomo Ruffin DO  [address]  [place]  [place] [address]       Regarding:   Patient:  :  Visit Date: Holden Burgess  1940  2/3/2025       Dear Dr. Giacomo Joseph DO     I saw our mutual patient Holden Burgess for an interval visit in the malignant hematology clinic at [facility].  Please see below for my notes from this encounter. Please do not hesitate to call me if you have any questions. I look forward to continuing to follow your patient along with you.      Sincerely,    Sebastien Rashid MD         CC: Giacomo Joseph DO  [address]  [place]  [place] [address]  Via In Basket    Giacomo Joseph DO  [address] [facility]  [place]  [place] [address]       Please see my documentation below:   Patient ID: Holden Burgess \"PAT\" is a 84 y.o. male.  Referring Physician: Sebastien Rashid MD  [address] [place] [address]  Primary Care Provider: Giacomo Joseph DO    Date of Service:  2/3/2025    Oncology History   Myelodysplastic syndrome (Multi)   2024 Initial Diagnosis    Myelodysplastic syndrome:   Diagnosis:   Originally referred to Dr. Murphy in  for longstanding thrombocytopenia.  At the time had mild leukopenia, no anemia.  Was refractory to a trial of prednisone, and so Bone marrow biopsy was completed completed on 2024 and demonstrated:  BONE MARROW CLOT, CORE BIOPSY, ASPIRATE, LEFT ILIAC CREST:   -- MILDLY HYPERCELLULAR BONE MARROW (80%) WITH MATURING TRILINEAGE HEMATOPOIESIS AND MODERATE INCREASE IN MEGAKARYOCYTES, SEE NOTE.  -- SMALL LYMPHOID AGGREGATES, FAVOR BENIGN.  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 Given the presence of persistent cytopenias, hypercellularity with increase megakaryocytes with abnormal clustering, and no increase in blasts, the overall findings are most consistent with:  -- MDS with low blasts (WHO-HAEM5 " Classification) /   -- MDS-NOS with single lineage dysplasia (MDS-NOS-SLD) (ICC 2022 Classification).  NGS: SRSF2  Chromosome Analysis: 47,XY,+8[15]/46,XY[5]  IPSS-M: -0.99 - low risks disease     4/4/2024 - 8/29/2024 Supportive Treatment    Treatment:   I. Eltrombopag -on 4/4/2024 patient continued with persistent thrombocytopenia but also had some progressive anemia with hemoglobin down to 10.  Patient was offered [id number], preferred supportive management with oral medication opted for eltrombopag in the setting of thrombocytopenia starting at 50 and ramping up to 150 mg  From 4/20/2024 through 8/20/2024 patient had progressive anemia and progressive thrombocytopenia and spite of treatment.    II. Darbepoietin -in the setting of worsening anemia darbepoetin was added 7/12/2024 at 100 mcg every 2 weeks       9/30/2024 -  Research Study Participant    ([facility]) [id number] - AzaCITIDine / Decitabine, 42 Day Cycle Followed by 28 Day Cycles  Plan Provider: Sebastien Rashid MD  Treatment goal: Palliative  Line of treatment: First Line  Associated studies: 5-Azacitidine and Decitabine Epigenetic Therapy for Myeloid Malignancies          Mr. ROHITH Bañuelos returns for interval follow-up visit on his treatment for [id number].  Overall he reports feeling well, primary side effect continues to seem to be constipation, usually worse the day after he receives the injection, and some fatigue which last for 1 to 2 days, is mild, and seems to be worse after the azacitidine and decitabine.  Otherwise he is feeling stable to better overall.  Quite pleased with seeing his platelets, no bleeding or bruising, no fevers no chills, no other major medical events        Assessment & Plan  Myelodysplastic syndrome (Multi)  2/3/2024: He is approaching the six month justin.  I reviewed overall blood count trends with him.  His hemoglobin does not meet criteria for hematologic improvement - plts however, may meet that criteria (increase of 30 since  "pretreamtent - with plts started in the low 20s and now running mid-50s to 70s.  ANC does not appear to be negatively impacted on treatment. I reviewed with him that the marrow findings of \"persistent\" disease are expected - the hope is that we see PB counts continued to improve and we continued to see low blasts on the bone marrow      Diagnostics:  -Next bone marrow biopy due mid-March (week 24) of therapy  Treatment:  - Continue Alternating azacitidine 50 mg/m2 with decitabine 5 mg/m² per [id number]  - C5D1 today  Disease/Toxicity Monitoring:  - Given his hemoglobin and age  will check CBC  with each visit  Supportive Care:  -Blood products as indicated  - Ambulatory Referral for PT placed 1/7.  Antimicrobial Prophylaxis:   -None indicated at this time, ANC is in the normal range  IV access:  - for now, continue with PIV; may benefit from port placement or other central line access             Subjective    Reviewed and Past Medical History, Past Surgical History, Family History, and Social History:    Review of Systems   Constitutional:  Negative for activity change, appetite change, chills, diaphoresis, fatigue, fever and unexpected weight change.   HENT:  Negative for congestion, mouth sores, nosebleeds, rhinorrhea, sinus pressure, sinus pain and sore throat.    Eyes:  Negative for visual disturbance.   Respiratory:  Negative for cough and shortness of breath.    Cardiovascular:  Negative for chest pain and leg swelling.   Gastrointestinal:  Negative for abdominal pain, constipation, diarrhea, nausea and vomiting.   Genitourinary:  Negative for difficulty urinating and flank pain.   Musculoskeletal:  Negative for back pain and joint swelling.   Skin:  Negative for rash.   Neurological:  Negative for weakness, light-headedness, numbness and headaches.   Hematological:  Negative for adenopathy. Does not bruise/bleed easily.   Psychiatric/Behavioral:  Negative for confusion and dysphoric mood. The patient is not " nervous/anxious.        Home Medications and Adherence Reviewed with Patient.       Objective     VS:  BP (!) 125/91 (BP Location: Left arm, Patient Position: Sitting, BP Cuff Size: Large adult)   Pulse 84   Temp 36.6 °C (97.9 °F) (Temporal)   Wt 122 kg (268 lb 15.4 oz)   SpO2 100%   BMI 36.67 kg/m²   BSA: 2.49 meters squared  KPS: 70    Physical Exam  Constitutional:       Appearance: Normal appearance.   HENT:      Mouth/Throat:      Mouth: Mucous membranes are moist.   Eyes:      Conjunctiva/sclera: Conjunctivae normal.      Pupils: Pupils are equal, round, and reactive to light.   Cardiovascular:      Rate and Rhythm: Normal rate.   Pulmonary:      Effort: Pulmonary effort is normal.   Abdominal:      General: Abdomen is flat.      Palpations: Abdomen is soft.   Musculoskeletal:         General: Normal range of motion.   Skin:     General: Skin is warm and dry.   Neurological:      General: No focal deficit present.      Mental Status: He is oriented to person, place, and time.   Psychiatric:         Mood and Affect: Mood normal.         Behavior: Behavior normal.         Laboratory:  Pertinent laboratory results were reviewed and discussed with patient, notably:   Today hemoglobin was 9.2, ANC was 1.8, and platelets were 73    Chemistry findings largely stable, creatinine 1.5, consistent with recent trends of 1.5, 1.4, 1.4 for the last 3 values.  Liver function tests are within normal limits.    Pathology:  The pertinent pathology results were reviewed and discussed with the patient.  Notably, no interval pathology.        Sebastien Rashid MD

## 2025-02-03 NOTE — RESEARCH NOTES
Research Note Treatment Day    Holden Burgess is here today for treatment on [id number]. Today is C4D1. Procedures completed per protocol. AE's and con-meds reviewed with patient. Patient is aware of treatment plan.    [x]   Received treatment as planned   OR  []    Treatment delayed; patient calendar updated as required   Treatment delayed because:    []   AE    []   Physician Discretion    []   Clinical Deterioration or Progression     []   Other    Education Documentation  Complete Blood Count with Differential (CBC w/ Diff), taught by Keren Khan RN at 2/3/2025  1:52 PM.  Learner: Significant Other, Family, Patient  Readiness: Eager  Method: Explanation  Response: Verbalizes Understanding    Constipation, taught by Keren Khan RN at 2/3/2025  1:52 PM.  Learner: Significant Other, Family, Patient  Readiness: Eager  Method: Explanation  Response: Verbalizes Understanding    Treatment Plan and Schedule, taught by Keren Khan RN at 2/3/2025  1:52 PM.  Learner: Significant Other, Family, Patient  Readiness: Eager  Method: Explanation  Response: Verbalizes Understanding    Education Comments  No comments found.

## 2025-02-03 NOTE — ASSESSMENT & PLAN NOTE
"2/3/2024: He is approaching the six month justin.  I reviewed overall blood count trends with him.  His hemoglobin does not meet criteria for hematologic improvement - plts however, may meet that criteria (increase of 30 since pretreamtent - with plts started in the low 20s and now running mid-50s to 70s.  ANC does not appear to be negatively impacted on treatment. I reviewed with him that the marrow findings of \"persistent\" disease are expected - the hope is that we see PB counts continued to improve and we continued to see low blasts on the bone marrow      Diagnostics:  -Next bone marrow biopy due mid-March (week 24) of therapy  Treatment:  - Continue Alternating azacitidine 50 mg/m2 with decitabine 5 mg/m² per [id number]  - C5D1 today  Disease/Toxicity Monitoring:  - Given his hemoglobin and age  will check CBC  with each visit  Supportive Care:  -Blood products as indicated  - Ambulatory Referral for PT placed 1/7.  Antimicrobial Prophylaxis:   -None indicated at this time, ANC is in the normal range  IV access:  - for now, continue with PIV; may benefit from port placement or other central line access  "

## 2025-02-03 NOTE — PATIENT INSTRUCTIONS
Follow up in 3.5 months    Continue current medications and therapy for chronic medical conditions.    Patient was advised importance of proper diet/nutrition in addition adequate hydration. Patient was encouraged moderate exercise program to include 30 minutes daily for 5 days of the week or 150 minutes weekly. Patient will follow-up with us as scheduled.    I personally reviewed the OARRS report for this patient. I have considered the risks of abuse, dependence, addiction, and diversion.    UDS/CSA: 08/23/2024    XR right hip ordered    Continue temazepam 15 mg nightly

## 2025-02-03 NOTE — PROGRESS NOTES
"Patient ID: Holden Burgess \"GENARO\" is a 84 y.o. male.  Referring Physician: Sebastien Rashid MD  [address]  [place] [id number]  Primary Care Provider: Giacomo Joseph DO    Date of Service:  2/3/2025    Oncology History   Myelodysplastic syndrome (Multi)   1/23/2024 Initial Diagnosis    Myelodysplastic syndrome:   Diagnosis:   Originally referred to Dr. Murphy in 2023 for longstanding thrombocytopenia.  At the time had mild leukopenia, no anemia.  Was refractory to a trial of prednisone, and so Bone marrow biopsy was completed completed on 1/23/2024 and demonstrated:  BONE MARROW CLOT, CORE BIOPSY, ASPIRATE, LEFT ILIAC CREST:   -- MILDLY HYPERCELLULAR BONE MARROW (80%) WITH MATURING TRILINEAGE HEMATOPOIESIS AND MODERATE INCREASE IN MEGAKARYOCYTES, SEE NOTE.  -- SMALL LYMPHOID AGGREGATES, FAVOR BENIGN.  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 Given the presence of persistent cytopenias, hypercellularity with increase megakaryocytes with abnormal clustering, and no increase in blasts, the overall findings are most consistent with:  -- MDS with low blasts (WHO-HAEM5 Classification) /   -- MDS-NOS with single lineage dysplasia (MDS-NOS-SLD) (ICC 2022 Classification).  NGS: SRSF2  Chromosome Analysis: 47,XY,+8[15]/46,XY[5]  IPSS-M: -0.99 - low risks disease     4/4/2024 - 8/29/2024 Supportive Treatment    Treatment:   I. Eltrombopag -on 4/4/2024 patient continued with persistent thrombocytopenia but also had some progressive anemia with hemoglobin down to 10.  Patient was offered [id number], preferred supportive management with oral medication opted for eltrombopag in the setting of thrombocytopenia starting at 50 and ramping up to 150 mg  From 4/20/2024 through 8/20/2024 patient had progressive anemia and progressive thrombocytopenia and spite of treatment.    II. Darbepoietin -in the setting of worsening anemia darbepoetin was added 7/12/2024 " "at 100 mcg every 2 weeks       9/30/2024 -  Research Study Participant    ([facility]) [id number] - AzaCITIDine / Decitabine, 42 Day Cycle Followed by 28 Day Cycles  Plan Provider: Sebastien Rashid MD  Treatment goal: Palliative  Line of treatment: First Line  Associated studies: 5-Azacitidine and Decitabine Epigenetic Therapy for Myeloid Malignancies          Mr. ROHITH Bañuelos returns for interval follow-up visit on his treatment for [id number].  Overall he reports feeling well, primary side effect continues to seem to be constipation, usually worse the day after he receives the injection, and some fatigue which last for 1 to 2 days, is mild, and seems to be worse after the azacitidine and decitabine.  Otherwise he is feeling stable to better overall.  Quite pleased with seeing his platelets, no bleeding or bruising, no fevers no chills, no other major medical events        Assessment & Plan  Myelodysplastic syndrome (Multi)  2/3/2024: He is approaching the six month justin.  I reviewed overall blood count trends with him.  His hemoglobin does not meet criteria for hematologic improvement - plts however, may meet that criteria (increase of 30 since pretreamtent - with plts started in the low 20s and now running mid-50s to 70s.  ANC does not appear to be negatively impacted on treatment. I reviewed with him that the marrow findings of \"persistent\" disease are expected - the hope is that we see PB counts continued to improve and we continued to see low blasts on the bone marrow      Diagnostics:  -Next bone marrow biopy due mid-March (week 24) of therapy  Treatment:  - Continue Alternating azacitidine 50 mg/m2 with decitabine 5 mg/m² per [id number]  - C5D1 today  Disease/Toxicity Monitoring:  - Given his hemoglobin and age  will check CBC  with each visit  Supportive Care:  -Blood products as indicated  - Ambulatory Referral for PT placed 1/7.  Antimicrobial Prophylaxis:   -None indicated at this time, ANC is in the normal " range  IV access:  - for now, continue with PIV; may benefit from port placement or other central line access             Subjective     Reviewed and Past Medical History, Past Surgical History, Family History, and Social History:    Review of Systems   Constitutional:  Negative for activity change, appetite change, chills, diaphoresis, fatigue, fever and unexpected weight change.   HENT:  Negative for congestion, mouth sores, nosebleeds, rhinorrhea, sinus pressure, sinus pain and sore throat.    Eyes:  Negative for visual disturbance.   Respiratory:  Negative for cough and shortness of breath.    Cardiovascular:  Negative for chest pain and leg swelling.   Gastrointestinal:  Negative for abdominal pain, constipation, diarrhea, nausea and vomiting.   Genitourinary:  Negative for difficulty urinating and flank pain.   Musculoskeletal:  Negative for back pain and joint swelling.   Skin:  Negative for rash.   Neurological:  Negative for weakness, light-headedness, numbness and headaches.   Hematological:  Negative for adenopathy. Does not bruise/bleed easily.   Psychiatric/Behavioral:  Negative for confusion and dysphoric mood. The patient is not nervous/anxious.        Home Medications and Adherence Reviewed with Patient.       Objective      VS:  BP (!) 125/91 (BP Location: Left arm, Patient Position: Sitting, BP Cuff Size: Large adult)   Pulse 84   Temp 36.6 °C (97.9 °F) (Temporal)   Wt 122 kg (268 lb 15.4 oz)   SpO2 100%   BMI 36.67 kg/m²   BSA: 2.49 meters squared  KPS: 70    Physical Exam  Constitutional:       Appearance: Normal appearance.   HENT:      Mouth/Throat:      Mouth: Mucous membranes are moist.   Eyes:      Conjunctiva/sclera: Conjunctivae normal.      Pupils: Pupils are equal, round, and reactive to light.   Cardiovascular:      Rate and Rhythm: Normal rate.   Pulmonary:      Effort: Pulmonary effort is normal.   Abdominal:      General: Abdomen is flat.      Palpations: Abdomen is soft.    Musculoskeletal:         General: Normal range of motion.   Skin:     General: Skin is warm and dry.   Neurological:      General: No focal deficit present.      Mental Status: He is oriented to person, place, and time.   Psychiatric:         Mood and Affect: Mood normal.         Behavior: Behavior normal.         Laboratory:  Pertinent laboratory results were reviewed and discussed with patient, notably:   Today hemoglobin was 9.2, ANC was 1.8, and platelets were 73    Chemistry findings largely stable, creatinine 1.5, consistent with recent trends of 1.5, 1.4, 1.4 for the last 3 values.  Liver function tests are within normal limits.    Pathology:  The pertinent pathology results were reviewed and discussed with the patient.  Notably, no interval pathology.        Sebastien Rashid MD

## 2025-02-03 NOTE — PROGRESS NOTES
Subjective   Patient ID: GENARO Burgess is a 84 y.o. male who presents for Hypertension.    Patient would like to have both ears checked. Patient denies any pain.    Patient denies any other symptoms or concerns today.    Hip Pain   The incident occurred more than 1 week ago. There was no injury mechanism. The pain is present in the right hip. The quality of the pain is described as aching (dull). The pain is at a severity of 7/10. The pain is severe. The pain has been Intermittent since onset. Pertinent negatives include no numbness or tingling. He reports no foreign bodies present. The symptoms are aggravated by movement. He has tried NSAIDs for the symptoms. The treatment provided no relief.   Hypertension  This is a recurrent problem. The current episode started more than 1 year ago. The problem is unchanged. Pertinent negatives include no chest pain, headaches, palpitations or shortness of breath. There are no associated agents to hypertension. There are no known risk factors for coronary artery disease.        Review of Systems   Constitutional: Negative.  Negative for activity change, appetite change, fatigue and fever.   HENT:  Negative for congestion, dental problem, ear discharge, ear pain, mouth sores, rhinorrhea, sinus pressure, sinus pain, sore throat, tinnitus and trouble swallowing.    Eyes:  Negative for photophobia, pain and visual disturbance.   Respiratory:  Negative for cough, chest tightness, shortness of breath and stridor.    Cardiovascular:  Negative for chest pain and palpitations.   Gastrointestinal:  Negative for abdominal distention, abdominal pain, blood in stool, constipation, diarrhea and rectal pain.   Endocrine: Negative for cold intolerance, heat intolerance, polydipsia, polyphagia and polyuria.   Genitourinary:  Negative for dysuria, flank pain, hematuria and urgency.   Musculoskeletal:  Positive for arthralgias, back pain and gait problem. Negative for myalgias and neck stiffness.  "  Skin:  Negative for color change and rash.   Allergic/Immunologic: Negative for environmental allergies and food allergies.   Neurological:  Negative for dizziness, tingling, seizures, syncope, speech difficulty, numbness and headaches.   Hematological:  Negative for adenopathy.   Psychiatric/Behavioral:  Negative for agitation, confusion, decreased concentration, dysphoric mood and sleep disturbance. The patient is not nervous/anxious.        Objective   /50 (BP Location: Right arm, Patient Position: Sitting, BP Cuff Size: Adult)   Pulse 63   Temp 36.8 °C (98.2 °F) (Skin)   Resp 19   Ht 1.824 m (5' 11.8\")   Wt 122 kg (270 lb)   SpO2 98%   BMI 36.82 kg/m²     Physical Exam  Vitals reviewed.   Constitutional:       General: He is not in acute distress.     Appearance: He is obese. He is not ill-appearing or diaphoretic.   HENT:      Head: Normocephalic.      Right Ear: Tympanic membrane and external ear normal.      Left Ear: Tympanic membrane and external ear normal.      Nose: Nose normal. No congestion.      Mouth/Throat:      Pharynx: No posterior oropharyngeal erythema.   Eyes:      General:         Right eye: No discharge.         Left eye: No discharge.      Extraocular Movements: Extraocular movements intact.      Conjunctiva/sclera: Conjunctivae normal.      Pupils: Pupils are equal, round, and reactive to light.   Cardiovascular:      Rate and Rhythm: Normal rate and regular rhythm.      Pulses: Normal pulses.      Heart sounds: Normal heart sounds. No murmur heard.  Pulmonary:      Effort: Pulmonary effort is normal. No respiratory distress.      Breath sounds: Normal breath sounds. No wheezing or rales.   Chest:      Chest wall: No tenderness.   Abdominal:      General: Bowel sounds are normal. There is distension.      Palpations: There is no mass.      Tenderness: There is no abdominal tenderness. There is no guarding.   Musculoskeletal:         General: No tenderness. Normal range of " motion.      Cervical back: Normal range of motion and neck supple. No tenderness.      Right lower leg: No edema.      Left lower leg: No edema.   Skin:     General: Skin is dry.      Coloration: Skin is not jaundiced.      Findings: No bruising, erythema or rash.   Neurological:      General: No focal deficit present.      Mental Status: He is alert and oriented to person, place, and time. Mental status is at baseline.      Cranial Nerves: No cranial nerve deficit.      Sensory: No sensory deficit.      Coordination: Coordination abnormal.      Gait: Gait abnormal.   Psychiatric:         Mood and Affect: Mood normal.         Thought Content: Thought content normal.         Judgment: Judgment normal.         Assessment/Plan   Problem List Items Addressed This Visit             ICD-10-CM    Other pancytopenia (Multi) D61.818     Monitored/improved         Stage 3b chronic kidney disease (Multi) N18.32     Monitored         Hyperlipemia, mixed E78.2    Malignant neoplasm of prostate (Multi) C61     Monitored/controlled         Hypertension - Primary I10    Sinus node dysfunction (Multi) I49.5     Monitored/stable         ELIZABETH (stress urinary incontinence), male N39.3    Myelodysplastic syndrome (Multi) D46.9     Other Visit Diagnoses         Codes    Primary insomnia     F51.01    Relevant Medications    temazepam (Restoril) 15 mg capsule    Right hip pain     M25.551    Relevant Orders    XR hip right with pelvis when performed 2 or 3 views          Scribe Attestation  By signing my name below, I, Wendie Breaux MAibmurray   attest that this documentation has been prepared under the direction and in the presence of Giacomo Joseph DO.    Provider Attestation - Scribe documentation    All medical record entries made by the Scribe were at my direction and personally dictated by me. I have reviewed the chart and agree that the record accurately reflects my personal performance of the history, physical exam, discussion  and plan.

## 2025-02-06 ENCOUNTER — LAB (OUTPATIENT)
Dept: LAB | Facility: HOSPITAL | Age: 85
End: 2025-02-06
Payer: MEDICARE

## 2025-02-06 ENCOUNTER — INFUSION (OUTPATIENT)
Dept: HEMATOLOGY/ONCOLOGY | Facility: HOSPITAL | Age: 85
End: 2025-02-06
Payer: MEDICARE

## 2025-02-06 VITALS
HEART RATE: 86 BPM | RESPIRATION RATE: 18 BRPM | BODY MASS INDEX: 36.68 KG/M2 | WEIGHT: 268.96 LBS | OXYGEN SATURATION: 99 % | TEMPERATURE: 97.7 F | SYSTOLIC BLOOD PRESSURE: 119 MMHG | DIASTOLIC BLOOD PRESSURE: 57 MMHG

## 2025-02-06 DIAGNOSIS — D46.9 MYELODYSPLASTIC SYNDROME (MULTI): ICD-10-CM

## 2025-02-06 LAB
ALBUMIN SERPL BCP-MCNC: 3.9 G/DL (ref 3.4–5)
ALP SERPL-CCNC: 97 U/L (ref 33–136)
ALT SERPL W P-5'-P-CCNC: 21 U/L (ref 10–52)
ANION GAP SERPL CALC-SCNC: 12 MMOL/L (ref 10–20)
AST SERPL W P-5'-P-CCNC: 19 U/L (ref 9–39)
BASOPHILS # BLD AUTO: 0.02 X10*3/UL (ref 0–0.1)
BASOPHILS NFR BLD AUTO: 0.9 %
BILIRUB SERPL-MCNC: 0.9 MG/DL (ref 0–1.2)
BUN SERPL-MCNC: 32 MG/DL (ref 6–23)
CALCIUM SERPL-MCNC: 9.1 MG/DL (ref 8.6–10.3)
CHLORIDE SERPL-SCNC: 109 MMOL/L (ref 98–107)
CO2 SERPL-SCNC: 23 MMOL/L (ref 21–32)
CREAT SERPL-MCNC: 1.32 MG/DL (ref 0.5–1.3)
DACRYOCYTES BLD QL SMEAR: NORMAL
EGFRCR SERPLBLD CKD-EPI 2021: 53 ML/MIN/1.73M*2
EOSINOPHIL # BLD AUTO: 0.08 X10*3/UL (ref 0–0.4)
EOSINOPHIL NFR BLD AUTO: 3.6 %
ERYTHROCYTE [DISTWIDTH] IN BLOOD BY AUTOMATED COUNT: 21.5 % (ref 11.5–14.5)
GLUCOSE SERPL-MCNC: 105 MG/DL (ref 74–99)
HCT VFR BLD AUTO: 30 % (ref 41–52)
HGB BLD-MCNC: 9.4 G/DL (ref 13.5–17.5)
HYPOCHROMIA BLD QL SMEAR: NORMAL
IMM GRANULOCYTES # BLD AUTO: 0.01 X10*3/UL (ref 0–0.5)
IMM GRANULOCYTES NFR BLD AUTO: 0.5 % (ref 0–0.9)
LYMPHOCYTES # BLD AUTO: 0.64 X10*3/UL (ref 0.8–3)
LYMPHOCYTES NFR BLD AUTO: 28.8 %
MCH RBC QN AUTO: 30.7 PG (ref 26–34)
MCHC RBC AUTO-ENTMCNC: 31.3 G/DL (ref 32–36)
MCV RBC AUTO: 98 FL (ref 80–100)
MONOCYTES # BLD AUTO: 0.1 X10*3/UL (ref 0.05–0.8)
MONOCYTES NFR BLD AUTO: 4.5 %
NEUTROPHILS # BLD AUTO: 1.37 X10*3/UL (ref 1.6–5.5)
NEUTROPHILS NFR BLD AUTO: 61.7 %
NRBC BLD-RTO: 0 /100 WBCS (ref 0–0)
OVALOCYTES BLD QL SMEAR: NORMAL
PLATELET # BLD AUTO: 66 X10*3/UL (ref 150–450)
POLYCHROMASIA BLD QL SMEAR: NORMAL
POTASSIUM SERPL-SCNC: 4.9 MMOL/L (ref 3.5–5.3)
PROT SERPL-MCNC: 6.2 G/DL (ref 6.4–8.2)
RBC # BLD AUTO: 3.06 X10*6/UL (ref 4.5–5.9)
RBC MORPH BLD: NORMAL
SCHISTOCYTES BLD QL SMEAR: NORMAL
SODIUM SERPL-SCNC: 139 MMOL/L (ref 136–145)
WBC # BLD AUTO: 2.2 X10*3/UL (ref 4.4–11.3)

## 2025-02-06 PROCEDURE — 96401 CHEMO ANTI-NEOPL SQ/IM: CPT

## 2025-02-06 PROCEDURE — 2560000001 HC RX 256 EXPERIMENTAL DRUGS: Performed by: INTERNAL MEDICINE

## 2025-02-06 PROCEDURE — 36415 COLL VENOUS BLD VENIPUNCTURE: CPT

## 2025-02-06 PROCEDURE — 80053 COMPREHEN METABOLIC PANEL: CPT

## 2025-02-06 PROCEDURE — 2500000004 HC RX 250 GENERAL PHARMACY W/ HCPCS (ALT 636 FOR OP/ED): Performed by: INTERNAL MEDICINE

## 2025-02-06 PROCEDURE — 85025 COMPLETE CBC W/AUTO DIFF WBC: CPT

## 2025-02-06 RX ORDER — ONDANSETRON HYDROCHLORIDE 8 MG/1
8 TABLET, FILM COATED ORAL ONCE
Status: COMPLETED | OUTPATIENT
Start: 2025-02-06 | End: 2025-02-06

## 2025-02-06 RX ADMIN — ONDANSETRON HYDROCHLORIDE 8 MG: 8 TABLET, FILM COATED ORAL at 11:04

## 2025-02-06 RX ADMIN — Medication 12.9 MG: at 11:49

## 2025-02-06 ASSESSMENT — PAIN SCALES - GENERAL: PAINLEVEL_OUTOF10: 0-NO PAIN

## 2025-02-06 NOTE — PROGRESS NOTES
Patient presents today for [id number] injection. Received ordered injection to RLQ without incidence and tolerated well. ANC 1.37, Zarxio not indicated and darbo due 2/10. Patient ambulate off the clinic via the use of a cane accompanied by his wife in stable condition and is aware of upcoming scheduled appts.

## 2025-02-07 DIAGNOSIS — R10.11 ABDOMINAL PAIN, CHRONIC, RIGHT UPPER QUADRANT: ICD-10-CM

## 2025-02-07 DIAGNOSIS — G89.29 ABDOMINAL PAIN, CHRONIC, RIGHT UPPER QUADRANT: ICD-10-CM

## 2025-02-07 LAB
ELECTRONICALLY SIGNED BY CYTOGENETICS: NORMAL
MICROARRAY PLATFORM: NORMAL

## 2025-02-07 NOTE — TELEPHONE ENCOUNTER
Recent Visits  Date Type Provider Dept   02/03/25 Office Visit Giacomo Joseph, DO [facility]   10/08/24 Clinical Support Zarina Desouza MA [facility]   09/16/24 Office Visit Giacomo Joseph, DO [facility]   08/23/24 Office Visit Giacomo Joseph, DO [facility]   Showing recent visits within past 180 days and meeting all other requirements  Future Appointments  No visits were found meeting these conditions.  Showing future appointments within next 90 days and meeting all other requirements

## 2025-02-08 RX ORDER — TRAMADOL HYDROCHLORIDE 50 MG/1
50 TABLET ORAL EVERY 6 HOURS PRN
Qty: 30 TABLET | Refills: 0 | Status: SHIPPED | OUTPATIENT
Start: 2025-02-08

## 2025-02-09 PROBLEM — D61.818 OTHER PANCYTOPENIA (MULTI): Status: ACTIVE | Noted: 2023-02-19

## 2025-02-09 PROBLEM — N18.32 STAGE 3B CHRONIC KIDNEY DISEASE (MULTI): Status: ACTIVE | Noted: 2023-02-19

## 2025-02-09 PROBLEM — I50.9 ACUTE CONGESTIVE HEART FAILURE: Status: RESOLVED | Noted: 2024-04-05 | Resolved: 2025-02-09

## 2025-02-09 ASSESSMENT — ENCOUNTER SYMPTOMS: BACK PAIN: 1

## 2025-02-10 ENCOUNTER — LAB (OUTPATIENT)
Dept: LAB | Facility: HOSPITAL | Age: 85
End: 2025-02-10
Payer: MEDICARE

## 2025-02-10 ENCOUNTER — INFUSION (OUTPATIENT)
Dept: HEMATOLOGY/ONCOLOGY | Facility: HOSPITAL | Age: 85
End: 2025-02-10
Payer: MEDICARE

## 2025-02-10 VITALS
HEART RATE: 69 BPM | RESPIRATION RATE: 19 BRPM | SYSTOLIC BLOOD PRESSURE: 139 MMHG | BODY MASS INDEX: 36.47 KG/M2 | DIASTOLIC BLOOD PRESSURE: 53 MMHG | WEIGHT: 267.4 LBS | OXYGEN SATURATION: 100 % | TEMPERATURE: 98.2 F

## 2025-02-10 DIAGNOSIS — D64.81 ANEMIA DUE TO CHEMOTHERAPY FOR MYELODYSPLASTIC SYNDROME TREATED WITH ERYTHROPOIETIN (MULTI): ICD-10-CM

## 2025-02-10 DIAGNOSIS — D46.9 ANEMIA DUE TO CHEMOTHERAPY FOR MYELODYSPLASTIC SYNDROME TREATED WITH ERYTHROPOIETIN (MULTI): ICD-10-CM

## 2025-02-10 DIAGNOSIS — T45.1X5A ANEMIA DUE TO CHEMOTHERAPY FOR MYELODYSPLASTIC SYNDROME TREATED WITH ERYTHROPOIETIN (MULTI): ICD-10-CM

## 2025-02-10 DIAGNOSIS — D46.9 MYELODYSPLASTIC SYNDROME (MULTI): ICD-10-CM

## 2025-02-10 LAB
ALBUMIN SERPL BCP-MCNC: 3.9 G/DL (ref 3.4–5)
ALP SERPL-CCNC: 93 U/L (ref 33–136)
ALT SERPL W P-5'-P-CCNC: 21 U/L (ref 10–52)
ANION GAP SERPL CALC-SCNC: 12 MMOL/L (ref 10–20)
AST SERPL W P-5'-P-CCNC: 19 U/L (ref 9–39)
BASOPHILS # BLD AUTO: 0.02 X10*3/UL (ref 0–0.1)
BASOPHILS NFR BLD AUTO: 0.8 %
BILIRUB SERPL-MCNC: 0.8 MG/DL (ref 0–1.2)
BUN SERPL-MCNC: 37 MG/DL (ref 6–23)
CALCIUM SERPL-MCNC: 9 MG/DL (ref 8.6–10.3)
CHLORIDE SERPL-SCNC: 108 MMOL/L (ref 98–107)
CO2 SERPL-SCNC: 24 MMOL/L (ref 21–32)
CREAT SERPL-MCNC: 1.5 MG/DL (ref 0.5–1.3)
DACRYOCYTES BLD QL SMEAR: NORMAL
EGFRCR SERPLBLD CKD-EPI 2021: 46 ML/MIN/1.73M*2
EOSINOPHIL # BLD AUTO: 0.05 X10*3/UL (ref 0–0.4)
EOSINOPHIL NFR BLD AUTO: 1.9 %
ERYTHROCYTE [DISTWIDTH] IN BLOOD BY AUTOMATED COUNT: 21.4 % (ref 11.5–14.5)
GLUCOSE SERPL-MCNC: 93 MG/DL (ref 74–99)
HCT VFR BLD AUTO: 30.2 % (ref 41–52)
HGB BLD-MCNC: 9.3 G/DL (ref 13.5–17.5)
IMM GRANULOCYTES # BLD AUTO: 0.01 X10*3/UL (ref 0–0.5)
IMM GRANULOCYTES NFR BLD AUTO: 0.4 % (ref 0–0.9)
LYMPHOCYTES # BLD AUTO: 0.78 X10*3/UL (ref 0.8–3)
LYMPHOCYTES NFR BLD AUTO: 30 %
MCH RBC QN AUTO: 30.4 PG (ref 26–34)
MCHC RBC AUTO-ENTMCNC: 30.8 G/DL (ref 32–36)
MCV RBC AUTO: 99 FL (ref 80–100)
MONOCYTES # BLD AUTO: 0.15 X10*3/UL (ref 0.05–0.8)
MONOCYTES NFR BLD AUTO: 5.8 %
NEUTROPHILS # BLD AUTO: 1.59 X10*3/UL (ref 1.6–5.5)
NEUTROPHILS NFR BLD AUTO: 61.1 %
NRBC BLD-RTO: 0 /100 WBCS (ref 0–0)
OVALOCYTES BLD QL SMEAR: NORMAL
PLATELET # BLD AUTO: 73 X10*3/UL (ref 150–450)
POTASSIUM SERPL-SCNC: 4.9 MMOL/L (ref 3.5–5.3)
PROT SERPL-MCNC: 6.2 G/DL (ref 6.4–8.2)
RBC # BLD AUTO: 3.06 X10*6/UL (ref 4.5–5.9)
RBC MORPH BLD: NORMAL
SCHISTOCYTES BLD QL SMEAR: NORMAL
SODIUM SERPL-SCNC: 139 MMOL/L (ref 136–145)
WBC # BLD AUTO: 2.6 X10*3/UL (ref 4.4–11.3)

## 2025-02-10 PROCEDURE — 2560000001 HC RX 256 EXPERIMENTAL DRUGS: Performed by: INTERNAL MEDICINE

## 2025-02-10 PROCEDURE — 85025 COMPLETE CBC W/AUTO DIFF WBC: CPT

## 2025-02-10 PROCEDURE — 36415 COLL VENOUS BLD VENIPUNCTURE: CPT

## 2025-02-10 PROCEDURE — 96372 THER/PROPH/DIAG INJ SC/IM: CPT | Mod: 59

## 2025-02-10 PROCEDURE — 96401 CHEMO ANTI-NEOPL SQ/IM: CPT

## 2025-02-10 PROCEDURE — 2500000004 HC RX 250 GENERAL PHARMACY W/ HCPCS (ALT 636 FOR OP/ED): Mod: JZ,TB | Performed by: NURSE PRACTITIONER

## 2025-02-10 PROCEDURE — 84075 ASSAY ALKALINE PHOSPHATASE: CPT

## 2025-02-10 PROCEDURE — 2500000004 HC RX 250 GENERAL PHARMACY W/ HCPCS (ALT 636 FOR OP/ED): Performed by: INTERNAL MEDICINE

## 2025-02-10 RX ORDER — ALBUTEROL SULFATE 0.83 MG/ML
3 SOLUTION RESPIRATORY (INHALATION) AS NEEDED
Status: DISCONTINUED | OUTPATIENT
Start: 2025-02-10 | End: 2025-02-10 | Stop reason: HOSPADM

## 2025-02-10 RX ORDER — ALBUTEROL SULFATE 0.83 MG/ML
3 SOLUTION RESPIRATORY (INHALATION) AS NEEDED
OUTPATIENT
Start: 2025-02-24

## 2025-02-10 RX ORDER — DIPHENHYDRAMINE HYDROCHLORIDE 50 MG/ML
50 INJECTION INTRAMUSCULAR; INTRAVENOUS AS NEEDED
OUTPATIENT
Start: 2025-02-24

## 2025-02-10 RX ORDER — ONDANSETRON HYDROCHLORIDE 8 MG/1
8 TABLET, FILM COATED ORAL ONCE
Status: COMPLETED | OUTPATIENT
Start: 2025-02-10 | End: 2025-02-10

## 2025-02-10 RX ORDER — EPINEPHRINE 0.3 MG/.3ML
0.3 INJECTION SUBCUTANEOUS EVERY 5 MIN PRN
OUTPATIENT
Start: 2025-02-24

## 2025-02-10 RX ORDER — FAMOTIDINE 10 MG/ML
20 INJECTION INTRAVENOUS ONCE AS NEEDED
OUTPATIENT
Start: 2025-02-24

## 2025-02-10 RX ORDER — EPINEPHRINE 0.3 MG/.3ML
0.3 INJECTION SUBCUTANEOUS EVERY 5 MIN PRN
Status: DISCONTINUED | OUTPATIENT
Start: 2025-02-10 | End: 2025-02-10 | Stop reason: HOSPADM

## 2025-02-10 RX ORDER — DIPHENHYDRAMINE HYDROCHLORIDE 50 MG/ML
50 INJECTION INTRAMUSCULAR; INTRAVENOUS AS NEEDED
Status: DISCONTINUED | OUTPATIENT
Start: 2025-02-10 | End: 2025-02-10 | Stop reason: HOSPADM

## 2025-02-10 RX ORDER — PROCHLORPERAZINE EDISYLATE 5 MG/ML
10 INJECTION INTRAMUSCULAR; INTRAVENOUS EVERY 6 HOURS PRN
Status: DISCONTINUED | OUTPATIENT
Start: 2025-02-10 | End: 2025-02-10 | Stop reason: HOSPADM

## 2025-02-10 RX ORDER — PROCHLORPERAZINE MALEATE 10 MG
10 TABLET ORAL EVERY 6 HOURS PRN
Status: DISCONTINUED | OUTPATIENT
Start: 2025-02-10 | End: 2025-02-10 | Stop reason: HOSPADM

## 2025-02-10 RX ORDER — FAMOTIDINE 10 MG/ML
20 INJECTION INTRAVENOUS ONCE AS NEEDED
Status: DISCONTINUED | OUTPATIENT
Start: 2025-02-10 | End: 2025-02-10 | Stop reason: HOSPADM

## 2025-02-10 RX ADMIN — Medication 129 MG: at 11:37

## 2025-02-10 RX ADMIN — DARBEPOETIN ALFA 200 MCG: 200 INJECTION, SOLUTION INTRAVENOUS; SUBCUTANEOUS at 11:37

## 2025-02-10 RX ADMIN — ONDANSETRON HYDROCHLORIDE 8 MG: 8 TABLET, FILM COATED ORAL at 11:23

## 2025-02-10 ASSESSMENT — PAIN SCALES - GENERAL: PAINLEVEL_OUTOF10: 0-NO PAIN

## 2025-02-10 NOTE — PROGRESS NOTES
Patient presents to infusion appointment from clinic in stable condition. C5D8 [id number] azacitidine and darbepoetin injections tolerated without incident. ANC 1.59, filgrastim held per orders. Lab results and schedule reviewed. Discharged in stable condition.

## 2025-02-13 ENCOUNTER — LAB (OUTPATIENT)
Dept: LAB | Facility: HOSPITAL | Age: 85
End: 2025-02-13
Payer: MEDICARE

## 2025-02-13 ENCOUNTER — INFUSION (OUTPATIENT)
Dept: HEMATOLOGY/ONCOLOGY | Facility: HOSPITAL | Age: 85
End: 2025-02-13
Payer: MEDICARE

## 2025-02-13 VITALS
HEART RATE: 66 BPM | WEIGHT: 260 LBS | TEMPERATURE: 96.8 F | OXYGEN SATURATION: 100 % | DIASTOLIC BLOOD PRESSURE: 52 MMHG | SYSTOLIC BLOOD PRESSURE: 100 MMHG | BODY MASS INDEX: 35.46 KG/M2 | RESPIRATION RATE: 18 BRPM

## 2025-02-13 DIAGNOSIS — D46.9 MYELODYSPLASTIC SYNDROME (MULTI): ICD-10-CM

## 2025-02-13 LAB
ALBUMIN SERPL BCP-MCNC: 4 G/DL (ref 3.4–5)
ALP SERPL-CCNC: 94 U/L (ref 33–136)
ALT SERPL W P-5'-P-CCNC: 23 U/L (ref 10–52)
ANION GAP SERPL CALC-SCNC: 12 MMOL/L (ref 10–20)
AST SERPL W P-5'-P-CCNC: 20 U/L (ref 9–39)
BASOPHILS # BLD AUTO: 0.01 X10*3/UL (ref 0–0.1)
BASOPHILS NFR BLD AUTO: 0.4 %
BILIRUB SERPL-MCNC: 0.8 MG/DL (ref 0–1.2)
BUN SERPL-MCNC: 35 MG/DL (ref 6–23)
BURR CELLS BLD QL SMEAR: NORMAL
CALCIUM SERPL-MCNC: 9.2 MG/DL (ref 8.6–10.3)
CHLORIDE SERPL-SCNC: 108 MMOL/L (ref 98–107)
CO2 SERPL-SCNC: 24 MMOL/L (ref 21–32)
CREAT SERPL-MCNC: 1.4 MG/DL (ref 0.5–1.3)
DACRYOCYTES BLD QL SMEAR: NORMAL
EGFRCR SERPLBLD CKD-EPI 2021: 50 ML/MIN/1.73M*2
EOSINOPHIL # BLD AUTO: 0.06 X10*3/UL (ref 0–0.4)
EOSINOPHIL NFR BLD AUTO: 2.6 %
ERYTHROCYTE [DISTWIDTH] IN BLOOD BY AUTOMATED COUNT: 21.5 % (ref 11.5–14.5)
GLUCOSE SERPL-MCNC: 93 MG/DL (ref 74–99)
HCT VFR BLD AUTO: 30.6 % (ref 41–52)
HGB BLD-MCNC: 9.5 G/DL (ref 13.5–17.5)
HYPOCHROMIA BLD QL SMEAR: NORMAL
IMM GRANULOCYTES # BLD AUTO: 0.01 X10*3/UL (ref 0–0.5)
IMM GRANULOCYTES NFR BLD AUTO: 0.4 % (ref 0–0.9)
LYMPHOCYTES # BLD AUTO: 0.78 X10*3/UL (ref 0.8–3)
LYMPHOCYTES NFR BLD AUTO: 33.3 %
MCH RBC QN AUTO: 30.9 PG (ref 26–34)
MCHC RBC AUTO-ENTMCNC: 31 G/DL (ref 32–36)
MCV RBC AUTO: 100 FL (ref 80–100)
MONOCYTES # BLD AUTO: 0.12 X10*3/UL (ref 0.05–0.8)
MONOCYTES NFR BLD AUTO: 5.1 %
NEUTROPHILS # BLD AUTO: 1.36 X10*3/UL (ref 1.6–5.5)
NEUTROPHILS NFR BLD AUTO: 58.2 %
NRBC BLD-RTO: 0 /100 WBCS (ref 0–0)
OVALOCYTES BLD QL SMEAR: NORMAL
PLATELET # BLD AUTO: 69 X10*3/UL (ref 150–450)
POTASSIUM SERPL-SCNC: 5 MMOL/L (ref 3.5–5.3)
PROT SERPL-MCNC: 6.2 G/DL (ref 6.4–8.2)
RBC # BLD AUTO: 3.07 X10*6/UL (ref 4.5–5.9)
RBC MORPH BLD: NORMAL
SCHISTOCYTES BLD QL SMEAR: NORMAL
SODIUM SERPL-SCNC: 139 MMOL/L (ref 136–145)
WBC # BLD AUTO: 2.3 X10*3/UL (ref 4.4–11.3)

## 2025-02-13 PROCEDURE — 85025 COMPLETE CBC W/AUTO DIFF WBC: CPT

## 2025-02-13 PROCEDURE — 36415 COLL VENOUS BLD VENIPUNCTURE: CPT

## 2025-02-13 PROCEDURE — 2560000001 HC RX 256 EXPERIMENTAL DRUGS: Performed by: INTERNAL MEDICINE

## 2025-02-13 PROCEDURE — 84075 ASSAY ALKALINE PHOSPHATASE: CPT

## 2025-02-13 PROCEDURE — 2500000004 HC RX 250 GENERAL PHARMACY W/ HCPCS (ALT 636 FOR OP/ED): Performed by: INTERNAL MEDICINE

## 2025-02-13 PROCEDURE — 96401 CHEMO ANTI-NEOPL SQ/IM: CPT

## 2025-02-13 RX ORDER — EPINEPHRINE 0.3 MG/.3ML
0.3 INJECTION SUBCUTANEOUS EVERY 5 MIN PRN
Status: DISCONTINUED | OUTPATIENT
Start: 2025-02-13 | End: 2025-02-13 | Stop reason: HOSPADM

## 2025-02-13 RX ORDER — PROCHLORPERAZINE MALEATE 10 MG
10 TABLET ORAL EVERY 6 HOURS PRN
Status: DISCONTINUED | OUTPATIENT
Start: 2025-02-13 | End: 2025-02-13 | Stop reason: HOSPADM

## 2025-02-13 RX ORDER — ALBUTEROL SULFATE 0.83 MG/ML
3 SOLUTION RESPIRATORY (INHALATION) AS NEEDED
Status: DISCONTINUED | OUTPATIENT
Start: 2025-02-13 | End: 2025-02-13 | Stop reason: HOSPADM

## 2025-02-13 RX ORDER — ONDANSETRON HYDROCHLORIDE 8 MG/1
8 TABLET, FILM COATED ORAL ONCE
Status: COMPLETED | OUTPATIENT
Start: 2025-02-13 | End: 2025-02-13

## 2025-02-13 RX ORDER — FAMOTIDINE 10 MG/ML
20 INJECTION INTRAVENOUS ONCE AS NEEDED
Status: DISCONTINUED | OUTPATIENT
Start: 2025-02-13 | End: 2025-02-13 | Stop reason: HOSPADM

## 2025-02-13 RX ORDER — PROCHLORPERAZINE EDISYLATE 5 MG/ML
10 INJECTION INTRAMUSCULAR; INTRAVENOUS EVERY 6 HOURS PRN
Status: DISCONTINUED | OUTPATIENT
Start: 2025-02-13 | End: 2025-02-13 | Stop reason: HOSPADM

## 2025-02-13 RX ORDER — DIPHENHYDRAMINE HYDROCHLORIDE 50 MG/ML
50 INJECTION INTRAMUSCULAR; INTRAVENOUS AS NEEDED
Status: DISCONTINUED | OUTPATIENT
Start: 2025-02-13 | End: 2025-02-13 | Stop reason: HOSPADM

## 2025-02-13 RX ADMIN — Medication 12.9 MG: at 11:57

## 2025-02-13 RX ADMIN — ONDANSETRON HYDROCHLORIDE 8 MG: 8 TABLET, FILM COATED ORAL at 11:08

## 2025-02-13 ASSESSMENT — PAIN SCALES - GENERAL: PAINLEVEL_OUTOF10: 0-NO PAIN

## 2025-02-13 NOTE — PROGRESS NOTES
Patient arrives for scheduled Research [id number].  Tolerated injection well, today's lab results provided as well as AVS and discharged in stable condition.

## 2025-02-17 ENCOUNTER — LAB (OUTPATIENT)
Dept: LAB | Facility: HOSPITAL | Age: 85
End: 2025-02-17
Payer: MEDICARE

## 2025-02-17 ENCOUNTER — INFUSION (OUTPATIENT)
Dept: HEMATOLOGY/ONCOLOGY | Facility: HOSPITAL | Age: 85
End: 2025-02-17
Payer: MEDICARE

## 2025-02-17 VITALS
DIASTOLIC BLOOD PRESSURE: 59 MMHG | WEIGHT: 265.2 LBS | TEMPERATURE: 96.3 F | OXYGEN SATURATION: 98 % | SYSTOLIC BLOOD PRESSURE: 113 MMHG | RESPIRATION RATE: 18 BRPM | BODY MASS INDEX: 36.17 KG/M2 | HEART RATE: 85 BPM

## 2025-02-17 DIAGNOSIS — D46.9 MYELODYSPLASTIC SYNDROME (MULTI): ICD-10-CM

## 2025-02-17 LAB
ALBUMIN SERPL BCP-MCNC: 4 G/DL (ref 3.4–5)
ALP SERPL-CCNC: 93 U/L (ref 33–136)
ALT SERPL W P-5'-P-CCNC: 22 U/L (ref 10–52)
ANION GAP SERPL CALC-SCNC: 13 MMOL/L (ref 10–20)
AST SERPL W P-5'-P-CCNC: 18 U/L (ref 9–39)
BASOPHILS # BLD AUTO: 0.01 X10*3/UL (ref 0–0.1)
BASOPHILS NFR BLD AUTO: 0.4 %
BILIRUB SERPL-MCNC: 0.8 MG/DL (ref 0–1.2)
BUN SERPL-MCNC: 33 MG/DL (ref 6–23)
CALCIUM SERPL-MCNC: 9.2 MG/DL (ref 8.6–10.3)
CHLORIDE SERPL-SCNC: 108 MMOL/L (ref 98–107)
CO2 SERPL-SCNC: 24 MMOL/L (ref 21–32)
CREAT SERPL-MCNC: 1.43 MG/DL (ref 0.5–1.3)
DACRYOCYTES BLD QL SMEAR: NORMAL
EGFRCR SERPLBLD CKD-EPI 2021: 48 ML/MIN/1.73M*2
EOSINOPHIL # BLD AUTO: 0.04 X10*3/UL (ref 0–0.4)
EOSINOPHIL NFR BLD AUTO: 1.7 %
ERYTHROCYTE [DISTWIDTH] IN BLOOD BY AUTOMATED COUNT: 21.3 % (ref 11.5–14.5)
GIANT PLATELETS BLD QL SMEAR: NORMAL
GLUCOSE SERPL-MCNC: 82 MG/DL (ref 74–99)
HCT VFR BLD AUTO: 31.3 % (ref 41–52)
HGB BLD-MCNC: 9.8 G/DL (ref 13.5–17.5)
HYPOCHROMIA BLD QL SMEAR: NORMAL
IMM GRANULOCYTES # BLD AUTO: 0.01 X10*3/UL (ref 0–0.5)
IMM GRANULOCYTES NFR BLD AUTO: 0.4 % (ref 0–0.9)
LYMPHOCYTES # BLD AUTO: 0.74 X10*3/UL (ref 0.8–3)
LYMPHOCYTES NFR BLD AUTO: 31.6 %
MCH RBC QN AUTO: 30.5 PG (ref 26–34)
MCHC RBC AUTO-ENTMCNC: 31.3 G/DL (ref 32–36)
MCV RBC AUTO: 98 FL (ref 80–100)
MONOCYTES # BLD AUTO: 0.12 X10*3/UL (ref 0.05–0.8)
MONOCYTES NFR BLD AUTO: 5.1 %
NEUTROPHILS # BLD AUTO: 1.42 X10*3/UL (ref 1.6–5.5)
NEUTROPHILS NFR BLD AUTO: 60.8 %
NRBC BLD-RTO: 0 /100 WBCS (ref 0–0)
OVALOCYTES BLD QL SMEAR: NORMAL
PLATELET # BLD AUTO: 78 X10*3/UL (ref 150–450)
POTASSIUM SERPL-SCNC: 4.3 MMOL/L (ref 3.5–5.3)
PROT SERPL-MCNC: 6.1 G/DL (ref 6.4–8.2)
RBC # BLD AUTO: 3.21 X10*6/UL (ref 4.5–5.9)
RBC MORPH BLD: NORMAL
SCHISTOCYTES BLD QL SMEAR: NORMAL
SODIUM SERPL-SCNC: 141 MMOL/L (ref 136–145)
WBC # BLD AUTO: 2.3 X10*3/UL (ref 4.4–11.3)

## 2025-02-17 PROCEDURE — 2500000004 HC RX 250 GENERAL PHARMACY W/ HCPCS (ALT 636 FOR OP/ED): Performed by: INTERNAL MEDICINE

## 2025-02-17 PROCEDURE — 36415 COLL VENOUS BLD VENIPUNCTURE: CPT

## 2025-02-17 PROCEDURE — 96401 CHEMO ANTI-NEOPL SQ/IM: CPT

## 2025-02-17 PROCEDURE — 80053 COMPREHEN METABOLIC PANEL: CPT

## 2025-02-17 PROCEDURE — 2560000001 HC RX 256 EXPERIMENTAL DRUGS: Performed by: INTERNAL MEDICINE

## 2025-02-17 PROCEDURE — 85025 COMPLETE CBC W/AUTO DIFF WBC: CPT

## 2025-02-17 RX ORDER — ONDANSETRON HYDROCHLORIDE 8 MG/1
8 TABLET, FILM COATED ORAL ONCE
Status: COMPLETED | OUTPATIENT
Start: 2025-02-17 | End: 2025-02-17

## 2025-02-17 RX ADMIN — Medication 129 MG: at 12:13

## 2025-02-17 RX ADMIN — ONDANSETRON HYDROCHLORIDE 8 MG: 8 TABLET, FILM COATED ORAL at 12:13

## 2025-02-17 NOTE — PROGRESS NOTES
Patient arrived to infusion for azacitidine. Patient tolerated injection well without incident. Schedule reviewed with patient and family. Patient off unit independently.

## 2025-02-20 ENCOUNTER — TELEPHONE (OUTPATIENT)
Dept: ADMISSION | Facility: HOSPITAL | Age: 85
End: 2025-02-20
Payer: MEDICARE

## 2025-02-20 ENCOUNTER — LAB (OUTPATIENT)
Dept: LAB | Facility: HOSPITAL | Age: 85
End: 2025-02-20
Payer: MEDICARE

## 2025-02-20 ENCOUNTER — INFUSION (OUTPATIENT)
Dept: HEMATOLOGY/ONCOLOGY | Facility: HOSPITAL | Age: 85
End: 2025-02-20
Payer: MEDICARE

## 2025-02-20 VITALS
BODY MASS INDEX: 36.2 KG/M2 | HEART RATE: 91 BPM | WEIGHT: 265.43 LBS | OXYGEN SATURATION: 100 % | TEMPERATURE: 97.9 F | SYSTOLIC BLOOD PRESSURE: 152 MMHG | RESPIRATION RATE: 17 BRPM | DIASTOLIC BLOOD PRESSURE: 73 MMHG

## 2025-02-20 DIAGNOSIS — D46.9 MYELODYSPLASTIC SYNDROME (MULTI): ICD-10-CM

## 2025-02-20 LAB
ALBUMIN SERPL BCP-MCNC: 4.1 G/DL (ref 3.4–5)
ALP SERPL-CCNC: 95 U/L (ref 33–136)
ALT SERPL W P-5'-P-CCNC: 26 U/L (ref 10–52)
ANION GAP SERPL CALC-SCNC: 12 MMOL/L (ref 10–20)
AST SERPL W P-5'-P-CCNC: 21 U/L (ref 9–39)
BASOPHILS # BLD AUTO: 0.01 X10*3/UL (ref 0–0.1)
BASOPHILS NFR BLD AUTO: 0.4 %
BILIRUB SERPL-MCNC: 0.8 MG/DL (ref 0–1.2)
BUN SERPL-MCNC: 35 MG/DL (ref 6–23)
CALCIUM SERPL-MCNC: 9.2 MG/DL (ref 8.6–10.3)
CHLORIDE SERPL-SCNC: 106 MMOL/L (ref 98–107)
CO2 SERPL-SCNC: 25 MMOL/L (ref 21–32)
CREAT SERPL-MCNC: 1.3 MG/DL (ref 0.5–1.3)
DACRYOCYTES BLD QL SMEAR: NORMAL
EGFRCR SERPLBLD CKD-EPI 2021: 54 ML/MIN/1.73M*2
EOSINOPHIL # BLD AUTO: 0.07 X10*3/UL (ref 0–0.4)
EOSINOPHIL NFR BLD AUTO: 2.5 %
ERYTHROCYTE [DISTWIDTH] IN BLOOD BY AUTOMATED COUNT: 21.5 % (ref 11.5–14.5)
GLUCOSE SERPL-MCNC: 89 MG/DL (ref 74–99)
HCT VFR BLD AUTO: 31.9 % (ref 41–52)
HGB BLD-MCNC: 9.9 G/DL (ref 13.5–17.5)
HYPOCHROMIA BLD QL SMEAR: NORMAL
IMM GRANULOCYTES # BLD AUTO: 0.01 X10*3/UL (ref 0–0.5)
IMM GRANULOCYTES NFR BLD AUTO: 0.4 % (ref 0–0.9)
LYMPHOCYTES # BLD AUTO: 0.95 X10*3/UL (ref 0.8–3)
LYMPHOCYTES NFR BLD AUTO: 34.3 %
MCH RBC QN AUTO: 30.4 PG (ref 26–34)
MCHC RBC AUTO-ENTMCNC: 31 G/DL (ref 32–36)
MCV RBC AUTO: 98 FL (ref 80–100)
MONOCYTES # BLD AUTO: 0.18 X10*3/UL (ref 0.05–0.8)
MONOCYTES NFR BLD AUTO: 6.5 %
NEUTROPHILS # BLD AUTO: 1.55 X10*3/UL (ref 1.6–5.5)
NEUTROPHILS NFR BLD AUTO: 55.9 %
NRBC BLD-RTO: 0 /100 WBCS (ref 0–0)
OVALOCYTES BLD QL SMEAR: NORMAL
PLATELET # BLD AUTO: 75 X10*3/UL (ref 150–450)
POTASSIUM SERPL-SCNC: 4.8 MMOL/L (ref 3.5–5.3)
PROT SERPL-MCNC: 6.2 G/DL (ref 6.4–8.2)
RBC # BLD AUTO: 3.26 X10*6/UL (ref 4.5–5.9)
RBC MORPH BLD: NORMAL
SCHISTOCYTES BLD QL SMEAR: NORMAL
SODIUM SERPL-SCNC: 138 MMOL/L (ref 136–145)
WBC # BLD AUTO: 2.8 X10*3/UL (ref 4.4–11.3)

## 2025-02-20 PROCEDURE — 2500000004 HC RX 250 GENERAL PHARMACY W/ HCPCS (ALT 636 FOR OP/ED): Performed by: INTERNAL MEDICINE

## 2025-02-20 PROCEDURE — 96401 CHEMO ANTI-NEOPL SQ/IM: CPT

## 2025-02-20 PROCEDURE — 2560000001 HC RX 256 EXPERIMENTAL DRUGS: Performed by: INTERNAL MEDICINE

## 2025-02-20 PROCEDURE — 84075 ASSAY ALKALINE PHOSPHATASE: CPT

## 2025-02-20 PROCEDURE — 36415 COLL VENOUS BLD VENIPUNCTURE: CPT

## 2025-02-20 PROCEDURE — 85025 COMPLETE CBC W/AUTO DIFF WBC: CPT

## 2025-02-20 RX ORDER — ALBUTEROL SULFATE 0.83 MG/ML
3 SOLUTION RESPIRATORY (INHALATION) AS NEEDED
Status: DISCONTINUED | OUTPATIENT
Start: 2025-02-20 | End: 2025-02-20 | Stop reason: HOSPADM

## 2025-02-20 RX ORDER — FAMOTIDINE 10 MG/ML
20 INJECTION, SOLUTION INTRAVENOUS ONCE AS NEEDED
Status: DISCONTINUED | OUTPATIENT
Start: 2025-02-20 | End: 2025-02-20 | Stop reason: HOSPADM

## 2025-02-20 RX ORDER — PROCHLORPERAZINE MALEATE 10 MG
10 TABLET ORAL EVERY 6 HOURS PRN
Status: DISCONTINUED | OUTPATIENT
Start: 2025-02-20 | End: 2025-02-20 | Stop reason: HOSPADM

## 2025-02-20 RX ORDER — DIPHENHYDRAMINE HYDROCHLORIDE 50 MG/ML
50 INJECTION INTRAMUSCULAR; INTRAVENOUS AS NEEDED
Status: DISCONTINUED | OUTPATIENT
Start: 2025-02-20 | End: 2025-02-20 | Stop reason: HOSPADM

## 2025-02-20 RX ORDER — EPINEPHRINE 0.3 MG/.3ML
0.3 INJECTION SUBCUTANEOUS EVERY 5 MIN PRN
Status: DISCONTINUED | OUTPATIENT
Start: 2025-02-20 | End: 2025-02-20 | Stop reason: HOSPADM

## 2025-02-20 RX ORDER — PROCHLORPERAZINE EDISYLATE 5 MG/ML
10 INJECTION INTRAMUSCULAR; INTRAVENOUS EVERY 6 HOURS PRN
Status: DISCONTINUED | OUTPATIENT
Start: 2025-02-20 | End: 2025-02-20 | Stop reason: HOSPADM

## 2025-02-20 RX ORDER — ONDANSETRON HYDROCHLORIDE 8 MG/1
8 TABLET, FILM COATED ORAL ONCE
Status: COMPLETED | OUTPATIENT
Start: 2025-02-20 | End: 2025-02-20

## 2025-02-20 RX ADMIN — Medication 12.9 MG: at 13:50

## 2025-02-20 RX ADMIN — ONDANSETRON HYDROCHLORIDE 8 MG: 8 TABLET, FILM COATED ORAL at 13:24

## 2025-02-20 ASSESSMENT — PAIN SCALES - GENERAL: PAINLEVEL_OUTOF10: 0-NO PAIN

## 2025-02-20 NOTE — PROGRESS NOTES
Patient arrives for scheduled [id number] Azacitidine/Decitabine.  Tolerated injections well, AVS provided and discharged in stable condition.
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
## 2025-02-21 ENCOUNTER — EVALUATION (OUTPATIENT)
Dept: PHYSICAL THERAPY | Facility: CLINIC | Age: 85
End: 2025-02-21
Payer: MEDICARE

## 2025-02-21 DIAGNOSIS — D46.9 MYELODYSPLASTIC SYNDROME (MULTI): ICD-10-CM

## 2025-02-21 PROCEDURE — 97162 PT EVAL MOD COMPLEX 30 MIN: CPT | Mod: GP | Performed by: GENERAL ACUTE CARE HOSPITAL

## 2025-02-21 PROCEDURE — 97110 THERAPEUTIC EXERCISES: CPT | Mod: GP | Performed by: GENERAL ACUTE CARE HOSPITAL

## 2025-02-21 NOTE — PROGRESS NOTES
"Patient ID: Holden Burgess \"GENARO\" is a 84 y.o. male.  Referring Physician: No referring provider defined for this encounter.  Primary Care Provider: Giacomo Joseph DO    Date of Service:  3/3/2025    SUBJECTIVE:  Patient presents today, accompanied by his wife, for follow up.  Reports feeling well.  He has been losing weight and is not sure why.  His appetite has not really changed.  His wife does report he doesn't snack much anymore, but she is trying to give him protein shakes and other high protein snacks.  No N/V/D.  He does struggle with constipation.  Taking Senna daily and using lactulose as needed.      Oncology History   Myelodysplastic syndrome (Multi)   1/23/2024 Initial Diagnosis    Myelodysplastic syndrome:   Diagnosis:   Originally referred to Dr. Murphy in 2023 for longstanding thrombocytopenia.  At the time had mild leukopenia, no anemia.  Was refractory to a trial of prednisone, and so Bone marrow biopsy was completed completed on 1/23/2024 and demonstrated:  BONE MARROW CLOT, CORE BIOPSY, ASPIRATE, LEFT ILIAC CREST:   -- MILDLY HYPERCELLULAR BONE MARROW (80%) WITH MATURING TRILINEAGE HEMATOPOIESIS AND MODERATE INCREASE IN MEGAKARYOCYTES, SEE NOTE.  -- SMALL LYMPHOID AGGREGATES, FAVOR BENIGN.  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 Given the presence of persistent cytopenias, hypercellularity with increase megakaryocytes with abnormal clustering, and no increase in blasts, the overall findings are most consistent with:  -- MDS with low blasts (WHO-HAEM5 Classification) /   -- MDS-NOS with single lineage dysplasia (MDS-NOS-SLD) (ICC 2022 Classification).  NGS: SRSF2  Chromosome Analysis: 47,XY,+8[15]/46,XY[5]  IPSS-M: -0.99 - low risks disease     4/4/2024 - 8/29/2024 Supportive Treatment    Treatment:   I. Eltrombopag -on 4/4/2024 patient continued with persistent thrombocytopenia but also had some progressive anemia with hemoglobin " down to 10.  Patient was offered [id number], preferred supportive management with oral medication opted for eltrombopag in the setting of thrombocytopenia starting at 50 and ramping up to 150 mg  From 4/20/2024 through 8/20/2024 patient had progressive anemia and progressive thrombocytopenia and spite of treatment.    II. Darbepoietin -in the setting of worsening anemia darbepoetin was added 7/12/2024 at 100 mcg every 2 weeks       9/30/2024 -  Research Study Participant    ([facility]) [id number] - AzaCITIDine / Decitabine, 42 Day Cycle Followed by 28 Day Cycles  Plan Provider: Sebastien Rashid MD  Treatment goal: Palliative  Line of treatment: First Line  Associated studies: 5-Azacitidine and Decitabine Epigenetic Therapy for Myeloid Malignancies        OBJECTIVE:  KPS: Karnofsky Score: 80 - Normal activity with effort; some signs or symptoms of disease     Physical Exam  Constitutional:       Appearance: Normal appearance.   HENT:      Head: Normocephalic.   Eyes:      Pupils: Pupils are equal, round, and reactive to light.   Cardiovascular:      Rate and Rhythm: Normal rate and regular rhythm.   Pulmonary:      Effort: Pulmonary effort is normal.      Breath sounds: Normal breath sounds.   Abdominal:      General: Bowel sounds are normal.      Palpations: Abdomen is soft.   Musculoskeletal:         General: Normal range of motion.      Cervical back: Normal range of motion and neck supple.   Lymphadenopathy:      Comments: No lymphadenopathy   Skin:     General: Skin is warm and dry.      Findings: No lesion or rash.   Neurological:      General: No focal deficit present.      Mental Status: He is alert and oriented to person, place, and time. Mental status is at baseline.      Comments: No numbness or tingling   Psychiatric:         Mood and Affect: Mood normal.       Assessment & Plan  Myelodysplastic syndrome (Multi)  3/3/2025: Shaina treatment well.  His Hgb is now consistently above 10.  Continue [id number]      Diagnostics:  -Next bone marrow biopy due mid-March (week 24) 3/13/25 of therapy  Treatment:  - Continue Alternating azacitidine 50 mg/m2 with decitabine 5 mg/m² per [id number]  - C6D1 today  Disease/Toxicity Monitoring:  - Given his hemoglobin and age  will check CBC  with each visit  Supportive Care:  -Blood products as indicated  - Working with PT  Antimicrobial Prophylaxis:   -None indicated at this time, ANC is in the normal range  IV access:  - for now, continue with PIV; may benefit from port placement or other central line access    Current Outpatient Medications   Medication Instructions    acetaminophen (TYLENOL) 1,000 mg, oral, Every 6 hours PRN    allopurinol (ZYLOPRIM) 100 mg, oral, 2 times daily    Aranesp (in polysorbate) 200 mcg, [facility] ONC Every 2 Weeks for 4 Weeks in a 28 Day Cycle    ascorbic acid (Vitamin C) 1,000 mg tablet 1 tablet, Daily    atorvastatin (Lipitor) 10 mg tablet 1 tablet, Nightly    calcitriol (ROCALTROL) 0.25 mcg, Every other day    camphor-menthoL (Sarna) lotion Topical, As needed    cholecalciferol (Vitamin D-3) 125 MCG (5000 UT) capsule 1 capsule, Daily    cyanocobalamin (Vitamin B-12) 1,000 mcg tablet 1 tablet, Daily    ezetimibe (ZETIA) 10 mg, oral, Daily    famotidine (PEPCID) 20 mg, Daily    ferrous sulfate 325 (65 Fe) MG tablet 1 tablet, Daily    folic acid (FOLVITE) 1 mg, oral, Daily    furosemide (LASIX) 40 mg, oral, Daily    lactulose 10 g, oral, 2 times daily, As needed for constipation    latanoprost (Xalatan) 0.005 % ophthalmic solution 1 drop, Nightly    MAGNESIUM ORAL 550 mg, Nightly    rOPINIRole (REQUIP) 0.25 mg, oral, Nightly    temazepam (RESTORIL) 15 mg, oral, Nightly PRN    traMADol (ULTRAM) 50 mg, oral, Every 6 hours PRN    TURMERIC ORAL 1 capsule, Daily    valsartan (DIOVAN) 320 mg, oral, Daily    zinc sulfate 50 mg zinc (220 mg) tablet Take by mouth.      Laboratory:  The pertinent laboratory results were reviewed and discussed with the patient.    Lab  Results   Component Value Date    WBC 2.8 (L) 02/20/2025    HCT 31.9 (L) 02/20/2025    HGB 9.9 (L) 02/20/2025    PLT 75 (L) 02/20/2025    K 4.8 02/20/2025    CALCIUM 9.2 02/20/2025     02/20/2025    MG 1.93 02/03/2025    BILITOT 0.8 02/20/2025    ALT 26 02/20/2025    AST 21 02/20/2025    BUN 35 (H) 02/20/2025    CREATININE 1.30 02/20/2025    PHOS 3.1 12/12/2024      Note: for a comprehensive list of the patient's lab results, access the Results Review activity.    RTC:  Q month MD/KENDRICK follow up    Shae Lunsford, APRN-CNP

## 2025-02-21 NOTE — LETTER
"February 21, 2025    Juan Jose Bill, PATTI  [facility], [address] [address]    Patient: GENARO Burgess   YOB: 1940   Date of Visit: 2/21/2025       Dear ALYSE Lizarraga  [address] [place] [address]    The attached plan of care is being sent to you because your patient’s medical reimbursement requires that you certify the plan of care. Your signature is required to allow uninterrupted insurance coverage.      You may indicate your approval by signing below and faxing this form back to us at Dept Fax: [phone].    Please call Dept: [phone] with any questions or concerns.    Thank you for this referral,        Juan Jose Bill, PATTI  [address]  [address] [facility] [place] [address]    Payer: Payor: MEDICARE / Plan: MEDICARE PART A AND B / Product Type: *No Product type* /                                                                         Date:     Dear Juan Jose Bill, PT,     Re: Mr. Holden Burgess, MRN:[id number]    I certify that I have reviewed the attached plan of care and it is medically necessary for Mr. Holedn Burgess (1940) who is under my care.          ______________________________________                    _________________  Provider name and credentials                                           Date and time                                                                                           Plan of Care 2/21/25   Effective from: 2/21/2025  Effective to: 5/22/2025    Plan ID: [id number]            Participants as of Finalize on 2/21/2025    Name Type Comments Contact Info    ALYSE Lizarraga Referring Provider  [phone]    Juan Jose Bill PT Physical Therapist  [phone]       Last Plan Note     Author: Juan Jose Bill PT Status: Incomplete Last edited: 2/21/2025  8:30 AM       Patient Name: Holden Burgess \"PAT\"  MRN: [id number]  Today's Date: " "2/21/2025     Current Problem:  1. Myelodysplastic syndrome (Multi)  Referral to Physical Therapy          Visit 1/20    Primary Complaint/ Functional Limitation: weakness   Prior level of function: Independent, no lifting > 10 lbs, wife and daughter wont let me do anything   Date of onset: low platelets   Cause: being treated for MDS, beginning late 2024- improving platelet count   Recent pacemaker 11/23   Pain Location: low back and right buttock   Current Pain: 0/10   Worst Pain: 4/10  Description of pain: constant   Aggravating Activities: sitting too long in chair > 15-20 minutes   Relieving Activities: quit sitting in chair watching tv   Work Requirements/ Hobbies: retired   Prior Treatment and results of Prior treatment: Noting for this incidence   Constitutional Symptoms: Patient Denies   Fever Chills Nausea Vomiting Loss of appetite Abdominal pain Change in bowel function Weight loss or gain Headache Unexplained fatigue Malaise Lethargy Weakness Arthralgia  Myalgia  Difficulty in sleeping Breathing trouble  Barriers to treatment/ learning: none   \"Not to do anything above waist \"    Gait:with cane forward trunk decreased stride  Posture:forward trunk and head     Strength:     Transverse Abdominis: Poor     Multifidus:   Poor     Pelvic Floor: Poor     Bridge:  1 /3     Hamstring Dominant       Hip Flexion (R/L):  4/5  4 /5     Hip Abduction:   4-/5  4- /5     Hip External Rot (Clamshell):   3+/5   3+/5          Knee Ext (R/L):   39#, 38#     Knee Flex (R/L):   31#, 21#      Ankle DF (R/L): 4  /5, 4 /5    Deep Tendon Reflex: (R/L)     Patella DTR 2+/2+     Achilles DTR  2+/2+    Myotomes: (R/L)            All WNL                                             L2 Hip Flexion     L3 Knee Extension     L4 Ankle DF/ Eversion     L5 G. Toe Extension/Abd     S1 Ankle PF/ Eversion    Access Code: [email or web address]  URL: [email or web address]  Date: 02/21/2025  Prepared by: Juan Jose" Ashofteh    Exercises  - Mountain Pose with Feet Together   - 1 x weekly - 30 hold  - Standing Cervical Retraction  - 1 x weekly - 10 reps  - Standing Cervical Rotation AROM  - 1 x weekly - 10 reps  - Standing Reach to Opposite Side with Weight Shift  - 1 x weekly - 20 reps  - Seated Long Arc Quad  - 1 x weekly - 20 reps  - Standing Hip Abduction with Counter Support  - 1 x weekly - 20 reps  - Standing Knee Flexion AROM with Chair Support  - 1 x weekly - 20 reps  - Heel Raises with Counter Support  - 1 x weekly - 20 reps  - Heel Toe Raises with Counter Support  - 1 x weekly - 20 reps  - Mini Squat with Counter Support  - 1 x weekly - 2 sets - 10 reps  - Sit to Stand  - 1 x weekly - 2 sets - 10 reps    Evaluation, Tests and measures, Education including HEP instruction and feedback. Patient appears to be compliant and motivated to perform HEP as instructed and participate in active physical therapy as indicated. The patient was educated on: the importance of positioning, proper posture, and body mechanics, joint mechanics and pathology, general tissue healing time, the appropriate use of heat and cold to control pain and inflammation, the importance of general therapeutic exercise, especially to stay within pain-free ROM, specific anatomy, function, & regional interdependence of involved areas, & likely cause of impairments & POC.  Patient's questions were answered to their satisfaction, & patient verbalized understanding & agreement with POC.  The patient presents to Physical Therapy with signs and symptoms consistent with the medical diagnosis of generalized weakness due to MDS. Key impairments include:  weakness poor balance and difficulty with functional activities such as walking. Skilled PT is required to address these key impairments and to provide and progress with an appropriate home exercise program. This evaluation is of  mod complexity due to the nature of the patient's presentation as well as the  comorbidities and medical factors included in this evaluation.  Treatment may include: Therapeutic Exercise (PROM, AA/AROM, Flexibility, Strengthening, Stabilization, HEP instruction). Therapeutic Activities (Transfers, Body Mechanics, Work Related Activities, Closed Chain, Agility and Power).   Manual Techniques (Soft tissue Mobilization, Joint mobilization/Distraction, Muscle Energy Techniques, Lymphatic Drainage, Dry Needling).  Neuromuscular Reeducation (Postural Training, Balance/Proprioception, Relaxation Techniques). Biofeedback. Aquatic Exercise. Modalities: Ultrasound, Moist Heat, Cryotherapy, Vasopneumatic with or without Cryotherapy. Electrical Stimulation (TENS/IFC/ Pre modulated for pain relief, NMES for Muscle reeducation). Gait Training. Orthotic Fit and Training. Strapping, Kinesio taping.     Patient will report resting pain on Visual Analog Scale < or = 0 .   Pain at worst will be < or =0  .   Pain with (patients primary complaint) will be < or = 0 .  Oswestry Disability Index (KOSTA) score will improve >/= 13 points to demonstrate overall functional improvement as it relates to patients back complaints.    Lumbar AROM will improve to: flexion >/=   90  , extension >/=  20    ,  side bend >/=  25   to improve joint mechanics with functional activities.    Upper abdominal strength will be  >/= 5  , lower abdominal strength will be >/=  45 , internal/external oblique strength will be >/=  5   ,to improve lumbar stabilization during activities.    Patient will demonstrate activation of transverse abdominis, multifidus, and pelvic floor during stabilization program and with upright posture to demonstrate improved core stabilization and reduce risk of reinjury.    Patient will improve hamstring length >/= 10 degrees to demonstrate improved joint mechanics during functional activities.      Strength in Hip Abduction >/=  5/5  , Hip Extension >/=  5/5  , Hip External Rotation >/=5/5    , to improve joint  stability with functional activities including ambulation.      Patient will correctly perform home exercise program independently, demonstrated through patient teach back upon discharge.         Current Participants as of 2/21/2025    Name Type Comments Contact Info    MICHELE Lizarraga-CNP Referring Provider  [phone]    Signature pending    Juan Jose Bill, PT Physical Therapist  [phone]    Electronically signed by Juan Jose Bill PT at 2/21/2025 1312 EST

## 2025-02-21 NOTE — PROGRESS NOTES
"Patient Name: Holden Burgess \"PAT\"  MRN: [id number]  Today's Date: 2/21/2025  Time Calculation  Start Time: 0830  Stop Time: 0915  Time Calculation (min): 45 min  Current Problem:  1. Myelodysplastic syndrome (Multi)  Referral to Physical Therapy    Follow Up In Physical Therapy          Visit 1/20    Primary Complaint/ Functional Limitation: weakness   Prior level of function: Independent, no lifting > 10 lbs, wife and daughter wont let me do anything   Date of onset: low platelets   Cause: being treated for MDS, beginning late 2024- improving platelet count   Recent pacemaker 11/23   Pain Location: low back and right buttock   Current Pain: 0/10   Worst Pain: 4/10  Description of pain: constant   Aggravating Activities: sitting too long in chair > 15-20 minutes   Relieving Activities: quit sitting in chair watching tv   Work Requirements/ Hobbies: retired   Prior Treatment and results of Prior treatment: Noting for this incidence   Constitutional Symptoms: Patient Denies   Fever Chills Nausea Vomiting Loss of appetite Abdominal pain Change in bowel function Weight loss or gain Headache Unexplained fatigue Malaise Lethargy Weakness Arthralgia  Myalgia  Difficulty in sleeping Breathing trouble  Barriers to treatment/ learning: none   \"Not to do anything above waist \"    Gait:with cane forward trunk decreased stride  Posture:forward trunk and head     Strength:     Transverse Abdominis: Poor     Multifidus:   Poor     Pelvic Floor: Poor     Bridge:  1 /3     Hamstring Dominant       Hip Flexion (R/L):  4/5  4 /5     Hip Abduction:   4-/5  4- /5     Hip External Rot (Clamshell):   3+/5   3+/5          Knee Ext (R/L):   39#, 38#     Knee Flex (R/L):   31#, 21#      Ankle DF (R/L): 4  /5, 4 /5    Deep Tendon Reflex: (R/L)     Patella DTR 2+/2+     Achilles DTR  2+/2+    Myotomes: (R/L)            All WNL                                             L2 Hip Flexion     L3 Knee Extension     L4 Ankle DF/ Eversion     L5 G. " Toe Extension/Abd     S1 Ankle PF/ Eversion    Access Code: [email or web address]  URL: [email or web address]  Date: 02/21/2025  Prepared by: Juan Jose Bill    Exercises  - Mountain Pose with Feet Together   - 1 x weekly - 30 hold  - Standing Cervical Retraction  - 1 x weekly - 10 reps  - Standing Cervical Rotation AROM  - 1 x weekly - 10 reps  - Standing Reach to Opposite Side with Weight Shift  - 1 x weekly - 20 reps  - Seated Long Arc Quad  - 1 x weekly - 20 reps  - Standing Hip Abduction with Counter Support  - 1 x weekly - 20 reps  - Standing Knee Flexion AROM with Chair Support  - 1 x weekly - 20 reps  - Heel Raises with Counter Support  - 1 x weekly - 20 reps  - Heel Toe Raises with Counter Support  - 1 x weekly - 20 reps  - Mini Squat with Counter Support  - 1 x weekly - 2 sets - 10 reps  - Sit to Stand  - 1 x weekly - 2 sets - 10 reps    Evaluation, Tests and measures, Education including HEP instruction and feedback. Patient appears to be compliant and motivated to perform HEP as instructed and participate in active physical therapy as indicated. The patient was educated on: the importance of positioning, proper posture, and body mechanics, joint mechanics and pathology, general tissue healing time, the appropriate use of heat and cold to control pain and inflammation, the importance of general therapeutic exercise, especially to stay within pain-free ROM, specific anatomy, function, & regional interdependence of involved areas, & likely cause of impairments & POC.  Patient's questions were answered to their satisfaction, & patient verbalized understanding & agreement with POC.  The patient presents to Physical Therapy with signs and symptoms consistent with the medical diagnosis of generalized weakness due to MDS. Key impairments include:  weakness poor balance and difficulty with functional activities such as walking. Skilled PT is required to address these key impairments and to provide and  progress with an appropriate home exercise program. This evaluation is of  mod complexity due to the nature of the patient's presentation as well as the comorbidities and medical factors included in this evaluation.  Treatment may include: Therapeutic Exercise (PROM, AA/AROM, Flexibility, Strengthening, Stabilization, HEP instruction). Therapeutic Activities (Transfers, Body Mechanics, Work Related Activities, Closed Chain, Agility and Power).   Manual Techniques (Soft tissue Mobilization, Joint mobilization/Distraction, Muscle Energy Techniques, Lymphatic Drainage, Dry Needling).  Neuromuscular Reeducation (Postural Training, Balance/Proprioception, Relaxation Techniques). Biofeedback. Aquatic Exercise. Modalities: Ultrasound, Moist Heat, Cryotherapy, Vasopneumatic with or without Cryotherapy. Electrical Stimulation (TENS/IFC/ Pre modulated for pain relief, NMES for Muscle reeducation). Gait Training. Orthotic Fit and Training. Strapping, Kinesio taping.     Patient will report resting pain on Visual Analog Scale < or = 0 .   Pain at worst will be < or =0  .   Pain with (patients primary complaint) will be < or = 0 .  Oswestry Disability Index (KOSTA) score will improve >/= 13 points to demonstrate overall functional improvement as it relates to patients back complaints.    Lumbar AROM will improve to: flexion >/=   90  , extension >/=  20    ,  side bend >/=  25   to improve joint mechanics with functional activities.    Upper abdominal strength will be  >/= 5  , lower abdominal strength will be >/=  45 , internal/external oblique strength will be >/=  5   ,to improve lumbar stabilization during activities.    Patient will demonstrate activation of transverse abdominis, multifidus, and pelvic floor during stabilization program and with upright posture to demonstrate improved core stabilization and reduce risk of reinjury.    Patient will improve hamstring length >/= 10 degrees to demonstrate improved joint mechanics  during functional activities.      Strength in Hip Abduction >/=  5/5  , Hip Extension >/=  5/5  , Hip External Rotation >/=5/5    , to improve joint stability with functional activities including ambulation.      Patient will correctly perform home exercise program independently, demonstrated through patient teach back upon discharge.

## 2025-02-24 ENCOUNTER — INFUSION (OUTPATIENT)
Dept: HEMATOLOGY/ONCOLOGY | Facility: HOSPITAL | Age: 85
End: 2025-02-24
Payer: MEDICARE

## 2025-02-24 ENCOUNTER — LAB (OUTPATIENT)
Dept: LAB | Facility: HOSPITAL | Age: 85
End: 2025-02-24
Payer: MEDICARE

## 2025-02-24 VITALS
OXYGEN SATURATION: 99 % | TEMPERATURE: 96.8 F | DIASTOLIC BLOOD PRESSURE: 60 MMHG | SYSTOLIC BLOOD PRESSURE: 119 MMHG | RESPIRATION RATE: 18 BRPM | WEIGHT: 265.7 LBS | HEART RATE: 87 BPM | BODY MASS INDEX: 36.24 KG/M2

## 2025-02-24 DIAGNOSIS — D46.9 MYELODYSPLASTIC SYNDROME (MULTI): ICD-10-CM

## 2025-02-24 DIAGNOSIS — D64.81 ANEMIA DUE TO CHEMOTHERAPY FOR MYELODYSPLASTIC SYNDROME TREATED WITH ERYTHROPOIETIN (MULTI): ICD-10-CM

## 2025-02-24 DIAGNOSIS — D46.9 ANEMIA DUE TO CHEMOTHERAPY FOR MYELODYSPLASTIC SYNDROME TREATED WITH ERYTHROPOIETIN (MULTI): ICD-10-CM

## 2025-02-24 DIAGNOSIS — T45.1X5A ANEMIA DUE TO CHEMOTHERAPY FOR MYELODYSPLASTIC SYNDROME TREATED WITH ERYTHROPOIETIN (MULTI): ICD-10-CM

## 2025-02-24 LAB
ALBUMIN SERPL BCP-MCNC: 4 G/DL (ref 3.4–5)
ALP SERPL-CCNC: 89 U/L (ref 33–136)
ALT SERPL W P-5'-P-CCNC: 24 U/L (ref 10–52)
ANION GAP SERPL CALC-SCNC: 12 MMOL/L (ref 10–20)
AST SERPL W P-5'-P-CCNC: 19 U/L (ref 9–39)
BASOPHILS # BLD AUTO: 0.01 X10*3/UL (ref 0–0.1)
BASOPHILS NFR BLD AUTO: 0.4 %
BILIRUB SERPL-MCNC: 0.8 MG/DL (ref 0–1.2)
BUN SERPL-MCNC: 37 MG/DL (ref 6–23)
CALCIUM SERPL-MCNC: 9 MG/DL (ref 8.6–10.3)
CHLORIDE SERPL-SCNC: 108 MMOL/L (ref 98–107)
CO2 SERPL-SCNC: 25 MMOL/L (ref 21–32)
CREAT SERPL-MCNC: 1.59 MG/DL (ref 0.5–1.3)
DACRYOCYTES BLD QL SMEAR: NORMAL
EGFRCR SERPLBLD CKD-EPI 2021: 43 ML/MIN/1.73M*2
EOSINOPHIL # BLD AUTO: 0.05 X10*3/UL (ref 0–0.4)
EOSINOPHIL NFR BLD AUTO: 2.1 %
ERYTHROCYTE [DISTWIDTH] IN BLOOD BY AUTOMATED COUNT: 21.2 % (ref 11.5–14.5)
GLUCOSE SERPL-MCNC: 92 MG/DL (ref 74–99)
HCT VFR BLD AUTO: 31.5 % (ref 41–52)
HGB BLD-MCNC: 10 G/DL (ref 13.5–17.5)
HYPOCHROMIA BLD QL SMEAR: NORMAL
IMM GRANULOCYTES # BLD AUTO: 0.01 X10*3/UL (ref 0–0.5)
IMM GRANULOCYTES NFR BLD AUTO: 0.4 % (ref 0–0.9)
LYMPHOCYTES # BLD AUTO: 0.76 X10*3/UL (ref 0.8–3)
LYMPHOCYTES NFR BLD AUTO: 31.7 %
MCH RBC QN AUTO: 31 PG (ref 26–34)
MCHC RBC AUTO-ENTMCNC: 31.7 G/DL (ref 32–36)
MCV RBC AUTO: 98 FL (ref 80–100)
MONOCYTES # BLD AUTO: 0.12 X10*3/UL (ref 0.05–0.8)
MONOCYTES NFR BLD AUTO: 5 %
NEUTROPHILS # BLD AUTO: 1.45 X10*3/UL (ref 1.6–5.5)
NEUTROPHILS NFR BLD AUTO: 60.4 %
NRBC BLD-RTO: 0 /100 WBCS (ref 0–0)
OVALOCYTES BLD QL SMEAR: NORMAL
PLATELET # BLD AUTO: 67 X10*3/UL (ref 150–450)
POTASSIUM SERPL-SCNC: 4.7 MMOL/L (ref 3.5–5.3)
PROT SERPL-MCNC: 6.2 G/DL (ref 6.4–8.2)
RBC # BLD AUTO: 3.23 X10*6/UL (ref 4.5–5.9)
RBC MORPH BLD: NORMAL
SCHISTOCYTES BLD QL SMEAR: NORMAL
SODIUM SERPL-SCNC: 140 MMOL/L (ref 136–145)
WBC # BLD AUTO: 2.4 X10*3/UL (ref 4.4–11.3)

## 2025-02-24 PROCEDURE — 82565 ASSAY OF CREATININE: CPT

## 2025-02-24 PROCEDURE — 2500000004 HC RX 250 GENERAL PHARMACY W/ HCPCS (ALT 636 FOR OP/ED): Performed by: INTERNAL MEDICINE

## 2025-02-24 PROCEDURE — 36415 COLL VENOUS BLD VENIPUNCTURE: CPT

## 2025-02-24 PROCEDURE — 96372 THER/PROPH/DIAG INJ SC/IM: CPT | Mod: 59

## 2025-02-24 PROCEDURE — 85025 COMPLETE CBC W/AUTO DIFF WBC: CPT

## 2025-02-24 PROCEDURE — 96401 CHEMO ANTI-NEOPL SQ/IM: CPT

## 2025-02-24 PROCEDURE — 2560000001 HC RX 256 EXPERIMENTAL DRUGS: Performed by: INTERNAL MEDICINE

## 2025-02-24 PROCEDURE — 2500000004 HC RX 250 GENERAL PHARMACY W/ HCPCS (ALT 636 FOR OP/ED): Mod: JZ,TB | Performed by: NURSE PRACTITIONER

## 2025-02-24 RX ORDER — ALBUTEROL SULFATE 0.83 MG/ML
3 SOLUTION RESPIRATORY (INHALATION) AS NEEDED
OUTPATIENT
Start: 2025-03-10

## 2025-02-24 RX ORDER — ALBUTEROL SULFATE 0.83 MG/ML
3 SOLUTION RESPIRATORY (INHALATION) AS NEEDED
Status: DISCONTINUED | OUTPATIENT
Start: 2025-02-24 | End: 2025-02-24 | Stop reason: HOSPADM

## 2025-02-24 RX ORDER — PROCHLORPERAZINE EDISYLATE 5 MG/ML
10 INJECTION INTRAMUSCULAR; INTRAVENOUS EVERY 6 HOURS PRN
Status: DISCONTINUED | OUTPATIENT
Start: 2025-02-24 | End: 2025-02-24 | Stop reason: HOSPADM

## 2025-02-24 RX ORDER — EPINEPHRINE 0.3 MG/.3ML
0.3 INJECTION SUBCUTANEOUS EVERY 5 MIN PRN
Status: DISCONTINUED | OUTPATIENT
Start: 2025-02-24 | End: 2025-02-24 | Stop reason: HOSPADM

## 2025-02-24 RX ORDER — DIPHENHYDRAMINE HYDROCHLORIDE 50 MG/ML
50 INJECTION INTRAMUSCULAR; INTRAVENOUS AS NEEDED
OUTPATIENT
Start: 2025-03-10

## 2025-02-24 RX ORDER — DIPHENHYDRAMINE HYDROCHLORIDE 50 MG/ML
50 INJECTION INTRAMUSCULAR; INTRAVENOUS AS NEEDED
Status: DISCONTINUED | OUTPATIENT
Start: 2025-02-24 | End: 2025-02-24 | Stop reason: HOSPADM

## 2025-02-24 RX ORDER — EPINEPHRINE 0.3 MG/.3ML
0.3 INJECTION SUBCUTANEOUS EVERY 5 MIN PRN
OUTPATIENT
Start: 2025-03-10

## 2025-02-24 RX ORDER — FAMOTIDINE 10 MG/ML
20 INJECTION, SOLUTION INTRAVENOUS ONCE AS NEEDED
Status: DISCONTINUED | OUTPATIENT
Start: 2025-02-24 | End: 2025-02-24 | Stop reason: HOSPADM

## 2025-02-24 RX ORDER — FAMOTIDINE 10 MG/ML
20 INJECTION, SOLUTION INTRAVENOUS ONCE AS NEEDED
OUTPATIENT
Start: 2025-03-10

## 2025-02-24 RX ORDER — ONDANSETRON HYDROCHLORIDE 8 MG/1
8 TABLET, FILM COATED ORAL ONCE
Status: COMPLETED | OUTPATIENT
Start: 2025-02-24 | End: 2025-02-24

## 2025-02-24 RX ORDER — PROCHLORPERAZINE MALEATE 10 MG
10 TABLET ORAL EVERY 6 HOURS PRN
Status: DISCONTINUED | OUTPATIENT
Start: 2025-02-24 | End: 2025-02-24 | Stop reason: HOSPADM

## 2025-02-24 RX ADMIN — DARBEPOETIN ALFA 200 MCG: 200 INJECTION, SOLUTION INTRAVENOUS; SUBCUTANEOUS at 12:10

## 2025-02-24 RX ADMIN — Medication 64.5 MG: at 12:13

## 2025-02-24 RX ADMIN — ONDANSETRON HYDROCHLORIDE 8 MG: 8 TABLET, FILM COATED ORAL at 11:05

## 2025-02-24 RX ADMIN — Medication 64.5 MG: at 12:11

## 2025-02-24 NOTE — PROGRESS NOTES
Patient presents to infusion appointment in stable condition. C5D22 azacitidine injections & darbepoetin injection tolerated without incident. ANC 1.45, filgrastim held per order. Lab results and schedule reviewed. Discharged in stable condition.

## 2025-02-26 DIAGNOSIS — D46.9 MYELODYSPLASTIC SYNDROME (MULTI): Primary | ICD-10-CM

## 2025-02-26 RX ORDER — EPINEPHRINE 0.3 MG/.3ML
0.3 INJECTION SUBCUTANEOUS EVERY 5 MIN PRN
OUTPATIENT
Start: 2025-03-10

## 2025-02-26 RX ORDER — FAMOTIDINE 10 MG/ML
20 INJECTION, SOLUTION INTRAVENOUS ONCE AS NEEDED
OUTPATIENT
Start: 2025-03-20

## 2025-02-26 RX ORDER — DIPHENHYDRAMINE HYDROCHLORIDE 50 MG/ML
50 INJECTION INTRAMUSCULAR; INTRAVENOUS AS NEEDED
OUTPATIENT
Start: 2025-03-03

## 2025-02-26 RX ORDER — DIPHENHYDRAMINE HYDROCHLORIDE 50 MG/ML
50 INJECTION INTRAMUSCULAR; INTRAVENOUS AS NEEDED
OUTPATIENT
Start: 2025-03-24

## 2025-02-26 RX ORDER — ONDANSETRON HYDROCHLORIDE 8 MG/1
8 TABLET, FILM COATED ORAL ONCE
OUTPATIENT
Start: 2025-03-06

## 2025-02-26 RX ORDER — ONDANSETRON HYDROCHLORIDE 8 MG/1
8 TABLET, FILM COATED ORAL ONCE
OUTPATIENT
Start: 2025-03-27

## 2025-02-26 RX ORDER — ONDANSETRON HYDROCHLORIDE 8 MG/1
8 TABLET, FILM COATED ORAL ONCE
OUTPATIENT
Start: 2025-03-10

## 2025-02-26 RX ORDER — ONDANSETRON HYDROCHLORIDE 8 MG/1
8 TABLET, FILM COATED ORAL ONCE
OUTPATIENT
Start: 2025-03-03

## 2025-02-26 RX ORDER — ONDANSETRON HYDROCHLORIDE 8 MG/1
8 TABLET, FILM COATED ORAL ONCE
OUTPATIENT
Start: 2025-03-13

## 2025-02-26 RX ORDER — ALBUTEROL SULFATE 0.83 MG/ML
3 SOLUTION RESPIRATORY (INHALATION) AS NEEDED
OUTPATIENT
Start: 2025-03-03

## 2025-02-26 RX ORDER — ALBUTEROL SULFATE 0.83 MG/ML
3 SOLUTION RESPIRATORY (INHALATION) AS NEEDED
OUTPATIENT
Start: 2025-03-24

## 2025-02-26 RX ORDER — FAMOTIDINE 10 MG/ML
20 INJECTION, SOLUTION INTRAVENOUS ONCE AS NEEDED
OUTPATIENT
Start: 2025-03-17

## 2025-02-26 RX ORDER — ALBUTEROL SULFATE 0.83 MG/ML
3 SOLUTION RESPIRATORY (INHALATION) AS NEEDED
OUTPATIENT
Start: 2025-03-17

## 2025-02-26 RX ORDER — ONDANSETRON HYDROCHLORIDE 8 MG/1
8 TABLET, FILM COATED ORAL ONCE
OUTPATIENT
Start: 2025-03-20

## 2025-02-26 RX ORDER — FAMOTIDINE 10 MG/ML
20 INJECTION, SOLUTION INTRAVENOUS ONCE AS NEEDED
OUTPATIENT
Start: 2025-03-24

## 2025-02-26 RX ORDER — DIPHENHYDRAMINE HYDROCHLORIDE 50 MG/ML
50 INJECTION INTRAMUSCULAR; INTRAVENOUS AS NEEDED
OUTPATIENT
Start: 2025-03-10

## 2025-02-26 RX ORDER — DIPHENHYDRAMINE HYDROCHLORIDE 50 MG/ML
50 INJECTION INTRAMUSCULAR; INTRAVENOUS AS NEEDED
OUTPATIENT
Start: 2025-03-20

## 2025-02-26 RX ORDER — ALBUTEROL SULFATE 0.83 MG/ML
3 SOLUTION RESPIRATORY (INHALATION) AS NEEDED
OUTPATIENT
Start: 2025-03-13

## 2025-02-26 RX ORDER — DIPHENHYDRAMINE HYDROCHLORIDE 50 MG/ML
50 INJECTION INTRAMUSCULAR; INTRAVENOUS AS NEEDED
OUTPATIENT
Start: 2025-03-27

## 2025-02-26 RX ORDER — EPINEPHRINE 0.3 MG/.3ML
0.3 INJECTION SUBCUTANEOUS EVERY 5 MIN PRN
OUTPATIENT
Start: 2025-03-06

## 2025-02-26 RX ORDER — EPINEPHRINE 0.3 MG/.3ML
0.3 INJECTION SUBCUTANEOUS EVERY 5 MIN PRN
OUTPATIENT
Start: 2025-03-13

## 2025-02-26 RX ORDER — EPINEPHRINE 0.3 MG/.3ML
0.3 INJECTION SUBCUTANEOUS EVERY 5 MIN PRN
OUTPATIENT
Start: 2025-03-27

## 2025-02-26 RX ORDER — FAMOTIDINE 10 MG/ML
20 INJECTION, SOLUTION INTRAVENOUS ONCE AS NEEDED
OUTPATIENT
Start: 2025-03-06

## 2025-02-26 RX ORDER — FAMOTIDINE 10 MG/ML
20 INJECTION, SOLUTION INTRAVENOUS ONCE AS NEEDED
OUTPATIENT
Start: 2025-03-03

## 2025-02-26 RX ORDER — ONDANSETRON HYDROCHLORIDE 8 MG/1
8 TABLET, FILM COATED ORAL ONCE
OUTPATIENT
Start: 2025-03-24

## 2025-02-26 RX ORDER — FAMOTIDINE 10 MG/ML
20 INJECTION, SOLUTION INTRAVENOUS ONCE AS NEEDED
OUTPATIENT
Start: 2025-03-27

## 2025-02-26 RX ORDER — ALBUTEROL SULFATE 0.83 MG/ML
3 SOLUTION RESPIRATORY (INHALATION) AS NEEDED
OUTPATIENT
Start: 2025-03-20

## 2025-02-26 RX ORDER — ALBUTEROL SULFATE 0.83 MG/ML
3 SOLUTION RESPIRATORY (INHALATION) AS NEEDED
OUTPATIENT
Start: 2025-03-10

## 2025-02-26 RX ORDER — EPINEPHRINE 0.3 MG/.3ML
0.3 INJECTION SUBCUTANEOUS EVERY 5 MIN PRN
OUTPATIENT
Start: 2025-03-24

## 2025-02-26 RX ORDER — DIPHENHYDRAMINE HYDROCHLORIDE 50 MG/ML
50 INJECTION INTRAMUSCULAR; INTRAVENOUS AS NEEDED
OUTPATIENT
Start: 2025-03-13

## 2025-02-26 RX ORDER — ALBUTEROL SULFATE 0.83 MG/ML
3 SOLUTION RESPIRATORY (INHALATION) AS NEEDED
OUTPATIENT
Start: 2025-03-27

## 2025-02-26 RX ORDER — DIPHENHYDRAMINE HYDROCHLORIDE 50 MG/ML
50 INJECTION INTRAMUSCULAR; INTRAVENOUS AS NEEDED
OUTPATIENT
Start: 2025-03-17

## 2025-02-26 RX ORDER — EPINEPHRINE 0.3 MG/.3ML
0.3 INJECTION SUBCUTANEOUS EVERY 5 MIN PRN
OUTPATIENT
Start: 2025-03-17

## 2025-02-26 RX ORDER — ALBUTEROL SULFATE 0.83 MG/ML
3 SOLUTION RESPIRATORY (INHALATION) AS NEEDED
OUTPATIENT
Start: 2025-03-06

## 2025-02-26 RX ORDER — DIPHENHYDRAMINE HYDROCHLORIDE 50 MG/ML
50 INJECTION INTRAMUSCULAR; INTRAVENOUS AS NEEDED
OUTPATIENT
Start: 2025-03-06

## 2025-02-26 RX ORDER — EPINEPHRINE 0.3 MG/.3ML
0.3 INJECTION SUBCUTANEOUS EVERY 5 MIN PRN
OUTPATIENT
Start: 2025-03-20

## 2025-02-26 RX ORDER — ONDANSETRON HYDROCHLORIDE 8 MG/1
8 TABLET, FILM COATED ORAL ONCE
OUTPATIENT
Start: 2025-03-17

## 2025-02-26 RX ORDER — EPINEPHRINE 0.3 MG/.3ML
0.3 INJECTION SUBCUTANEOUS EVERY 5 MIN PRN
OUTPATIENT
Start: 2025-03-03

## 2025-02-26 RX ORDER — FAMOTIDINE 10 MG/ML
20 INJECTION, SOLUTION INTRAVENOUS ONCE AS NEEDED
OUTPATIENT
Start: 2025-03-13

## 2025-02-26 RX ORDER — FAMOTIDINE 10 MG/ML
20 INJECTION, SOLUTION INTRAVENOUS ONCE AS NEEDED
OUTPATIENT
Start: 2025-03-10

## 2025-02-27 ENCOUNTER — LAB (OUTPATIENT)
Dept: LAB | Facility: HOSPITAL | Age: 85
End: 2025-02-27
Payer: MEDICARE

## 2025-02-27 ENCOUNTER — INFUSION (OUTPATIENT)
Dept: HEMATOLOGY/ONCOLOGY | Facility: HOSPITAL | Age: 85
End: 2025-02-27
Payer: MEDICARE

## 2025-02-27 VITALS
SYSTOLIC BLOOD PRESSURE: 110 MMHG | RESPIRATION RATE: 18 BRPM | WEIGHT: 277 LBS | BODY MASS INDEX: 37.78 KG/M2 | HEART RATE: 83 BPM | TEMPERATURE: 96.8 F | OXYGEN SATURATION: 99 % | DIASTOLIC BLOOD PRESSURE: 61 MMHG

## 2025-02-27 DIAGNOSIS — D46.9 MYELODYSPLASTIC SYNDROME (MULTI): ICD-10-CM

## 2025-02-27 LAB
ALBUMIN SERPL BCP-MCNC: 4.1 G/DL (ref 3.4–5)
ALP SERPL-CCNC: 94 U/L (ref 33–136)
ALT SERPL W P-5'-P-CCNC: 22 U/L (ref 10–52)
ANION GAP SERPL CALC-SCNC: 13 MMOL/L (ref 10–20)
AST SERPL W P-5'-P-CCNC: 17 U/L (ref 9–39)
BASOPHILS # BLD AUTO: 0.01 X10*3/UL (ref 0–0.1)
BASOPHILS NFR BLD AUTO: 0.3 %
BILIRUB SERPL-MCNC: 0.8 MG/DL (ref 0–1.2)
BUN SERPL-MCNC: 34 MG/DL (ref 6–23)
CALCIUM SERPL-MCNC: 9.4 MG/DL (ref 8.6–10.3)
CHLORIDE SERPL-SCNC: 108 MMOL/L (ref 98–107)
CO2 SERPL-SCNC: 24 MMOL/L (ref 21–32)
CREAT SERPL-MCNC: 1.43 MG/DL (ref 0.5–1.3)
DACRYOCYTES BLD QL SMEAR: NORMAL
EGFRCR SERPLBLD CKD-EPI 2021: 48 ML/MIN/1.73M*2
EOSINOPHIL # BLD AUTO: 0.05 X10*3/UL (ref 0–0.4)
EOSINOPHIL NFR BLD AUTO: 1.7 %
ERYTHROCYTE [DISTWIDTH] IN BLOOD BY AUTOMATED COUNT: 21.1 % (ref 11.5–14.5)
GLUCOSE SERPL-MCNC: 89 MG/DL (ref 74–99)
HCT VFR BLD AUTO: 32.7 % (ref 41–52)
HGB BLD-MCNC: 10.2 G/DL (ref 13.5–17.5)
HYPOCHROMIA BLD QL SMEAR: NORMAL
IMM GRANULOCYTES # BLD AUTO: 0.01 X10*3/UL (ref 0–0.5)
IMM GRANULOCYTES NFR BLD AUTO: 0.3 % (ref 0–0.9)
LYMPHOCYTES # BLD AUTO: 0.91 X10*3/UL (ref 0.8–3)
LYMPHOCYTES NFR BLD AUTO: 31.8 %
MCH RBC QN AUTO: 30.6 PG (ref 26–34)
MCHC RBC AUTO-ENTMCNC: 31.2 G/DL (ref 32–36)
MCV RBC AUTO: 98 FL (ref 80–100)
MONOCYTES # BLD AUTO: 0.16 X10*3/UL (ref 0.05–0.8)
MONOCYTES NFR BLD AUTO: 5.6 %
NEUTROPHILS # BLD AUTO: 1.72 X10*3/UL (ref 1.6–5.5)
NEUTROPHILS NFR BLD AUTO: 60.3 %
NRBC BLD-RTO: 0 /100 WBCS (ref 0–0)
OVALOCYTES BLD QL SMEAR: NORMAL
PLATELET # BLD AUTO: 69 X10*3/UL (ref 150–450)
POTASSIUM SERPL-SCNC: 4.8 MMOL/L (ref 3.5–5.3)
PROT SERPL-MCNC: 6.4 G/DL (ref 6.4–8.2)
RBC # BLD AUTO: 3.33 X10*6/UL (ref 4.5–5.9)
RBC MORPH BLD: NORMAL
SCHISTOCYTES BLD QL SMEAR: NORMAL
SODIUM SERPL-SCNC: 140 MMOL/L (ref 136–145)
WBC # BLD AUTO: 2.9 X10*3/UL (ref 4.4–11.3)

## 2025-02-27 PROCEDURE — 2560000001 HC RX 256 EXPERIMENTAL DRUGS: Performed by: INTERNAL MEDICINE

## 2025-02-27 PROCEDURE — 2500000004 HC RX 250 GENERAL PHARMACY W/ HCPCS (ALT 636 FOR OP/ED): Performed by: INTERNAL MEDICINE

## 2025-02-27 PROCEDURE — 36415 COLL VENOUS BLD VENIPUNCTURE: CPT

## 2025-02-27 PROCEDURE — 85025 COMPLETE CBC W/AUTO DIFF WBC: CPT

## 2025-02-27 PROCEDURE — 96401 CHEMO ANTI-NEOPL SQ/IM: CPT

## 2025-02-27 PROCEDURE — 84075 ASSAY ALKALINE PHOSPHATASE: CPT

## 2025-02-27 RX ORDER — ONDANSETRON HYDROCHLORIDE 8 MG/1
8 TABLET, FILM COATED ORAL ONCE
Status: COMPLETED | OUTPATIENT
Start: 2025-02-27 | End: 2025-02-27

## 2025-02-27 RX ORDER — DIPHENHYDRAMINE HYDROCHLORIDE 50 MG/ML
50 INJECTION INTRAMUSCULAR; INTRAVENOUS AS NEEDED
Status: DISCONTINUED | OUTPATIENT
Start: 2025-02-27 | End: 2025-02-27 | Stop reason: HOSPADM

## 2025-02-27 RX ORDER — EPINEPHRINE 0.3 MG/.3ML
0.3 INJECTION SUBCUTANEOUS EVERY 5 MIN PRN
Status: DISCONTINUED | OUTPATIENT
Start: 2025-02-27 | End: 2025-02-27 | Stop reason: HOSPADM

## 2025-02-27 RX ORDER — ALBUTEROL SULFATE 0.83 MG/ML
3 SOLUTION RESPIRATORY (INHALATION) AS NEEDED
Status: DISCONTINUED | OUTPATIENT
Start: 2025-02-27 | End: 2025-02-27 | Stop reason: HOSPADM

## 2025-02-27 RX ORDER — FAMOTIDINE 10 MG/ML
20 INJECTION, SOLUTION INTRAVENOUS ONCE AS NEEDED
Status: DISCONTINUED | OUTPATIENT
Start: 2025-02-27 | End: 2025-02-27 | Stop reason: HOSPADM

## 2025-02-27 RX ADMIN — ONDANSETRON HYDROCHLORIDE 8 MG: 8 TABLET, FILM COATED ORAL at 11:10

## 2025-02-27 RX ADMIN — Medication 12.9 MG: at 11:37

## 2025-02-27 ASSESSMENT — PAIN SCALES - GENERAL: PAINLEVEL_OUTOF10: 0-NO PAIN

## 2025-02-27 NOTE — PROGRESS NOTES
[facility] Infusion Nursing Note  02/27/25    Holden Burgess is a 84 y.o. year old male patient presenting to outpatient infusion for cycle 5 day 1 of the following regimen:    Treatment Plans       Name Type Plan Dates Plan Provider         Active    ([facility]) [id number] - AzaCITIDine / Decitabine, 42 Day Cycle Followed by 28 Day Cycles Oncology Treatment 9/29/2024 - Present Sebastien Rashid MD                    Administrations This Visit       ondansetron (Zofran) tablet 8 mg       Admin Date  02/27/2025 Action  Given Dose  8 mg Route  oral Documented By  Sandy Farias RN              Study [id number] decitabine 12.9 mg in sterile water 1.3 mL Syringe       Admin Date  02/27/2025 Action  New Bag Dose  12.9 mg Route  subcutaneous Documented By  Sandy Farias RN                  Hypersensitivity reaction noted: No.    decitabine given subcutaneous in RLQ Abdomen. Pt tolerated injection without incident. Pt discharged in stable condition and ambulated off unit accompanied by wife.    Follow-up Plan: 3/3    SANDY FARIAS RN

## 2025-03-03 ENCOUNTER — APPOINTMENT (OUTPATIENT)
Dept: UROLOGY | Facility: CLINIC | Age: 85
End: 2025-03-03
Payer: MEDICARE

## 2025-03-03 ENCOUNTER — OFFICE VISIT (OUTPATIENT)
Dept: HEMATOLOGY/ONCOLOGY | Facility: HOSPITAL | Age: 85
End: 2025-03-03
Payer: MEDICARE

## 2025-03-03 ENCOUNTER — INFUSION (OUTPATIENT)
Dept: HEMATOLOGY/ONCOLOGY | Facility: HOSPITAL | Age: 85
End: 2025-03-03
Payer: MEDICARE

## 2025-03-03 ENCOUNTER — LAB (OUTPATIENT)
Dept: LAB | Facility: HOSPITAL | Age: 85
End: 2025-03-03
Payer: MEDICARE

## 2025-03-03 VITALS
WEIGHT: 263.6 LBS | BODY MASS INDEX: 35.7 KG/M2 | RESPIRATION RATE: 18 BRPM | OXYGEN SATURATION: 100 % | TEMPERATURE: 97.3 F | HEART RATE: 81 BPM | DIASTOLIC BLOOD PRESSURE: 48 MMHG | HEIGHT: 72 IN | SYSTOLIC BLOOD PRESSURE: 143 MMHG

## 2025-03-03 VITALS
DIASTOLIC BLOOD PRESSURE: 76 MMHG | BODY MASS INDEX: 34.91 KG/M2 | SYSTOLIC BLOOD PRESSURE: 125 MMHG | HEART RATE: 72 BPM | HEIGHT: 73 IN | WEIGHT: 263.4 LBS

## 2025-03-03 DIAGNOSIS — N39.41 URGE INCONTINENCE OF URINE: ICD-10-CM

## 2025-03-03 DIAGNOSIS — N39.3 SUI (STRESS URINARY INCONTINENCE), MALE: Primary | ICD-10-CM

## 2025-03-03 DIAGNOSIS — N39.3 SUI (STRESS URINARY INCONTINENCE), MALE: ICD-10-CM

## 2025-03-03 DIAGNOSIS — D46.9 MYELODYSPLASTIC SYNDROME (MULTI): ICD-10-CM

## 2025-03-03 DIAGNOSIS — D46.9 MYELODYSPLASTIC SYNDROME (MULTI): Primary | ICD-10-CM

## 2025-03-03 LAB
ALBUMIN SERPL BCP-MCNC: 4.1 G/DL (ref 3.4–5)
ALP SERPL-CCNC: 88 U/L (ref 33–136)
ALT SERPL W P-5'-P-CCNC: 18 U/L (ref 10–52)
ANION GAP SERPL CALC-SCNC: 14 MMOL/L (ref 10–20)
AST SERPL W P-5'-P-CCNC: 17 U/L (ref 9–39)
BASOPHILS # BLD AUTO: 0.01 X10*3/UL (ref 0–0.1)
BASOPHILS NFR BLD AUTO: 0.4 %
BILIRUB SERPL-MCNC: 0.8 MG/DL (ref 0–1.2)
BUN SERPL-MCNC: 36 MG/DL (ref 6–23)
CALCIUM SERPL-MCNC: 9.2 MG/DL (ref 8.6–10.6)
CHLORIDE SERPL-SCNC: 108 MMOL/L (ref 98–107)
CO2 SERPL-SCNC: 22 MMOL/L (ref 21–32)
CREAT SERPL-MCNC: 1.3 MG/DL (ref 0.5–1.3)
DACRYOCYTES BLD QL SMEAR: NORMAL
EGFRCR SERPLBLD CKD-EPI 2021: 54 ML/MIN/1.73M*2
EOSINOPHIL # BLD AUTO: 0.05 X10*3/UL (ref 0–0.4)
EOSINOPHIL NFR BLD AUTO: 2 %
ERYTHROCYTE [DISTWIDTH] IN BLOOD BY AUTOMATED COUNT: 21.1 % (ref 11.5–14.5)
GLUCOSE SERPL-MCNC: 101 MG/DL (ref 74–99)
HCT VFR BLD AUTO: 32.2 % (ref 41–52)
HGB BLD-MCNC: 10.1 G/DL (ref 13.5–17.5)
HGB RETIC QN: 31 PG (ref 28–38)
IMM GRANULOCYTES # BLD AUTO: 0.01 X10*3/UL (ref 0–0.5)
IMM GRANULOCYTES NFR BLD AUTO: 0.4 % (ref 0–0.9)
IMMATURE RETIC FRACTION: 16.6 %
LDH SERPL L TO P-CCNC: 185 U/L (ref 84–246)
LYMPHOCYTES # BLD AUTO: 0.77 X10*3/UL (ref 0.8–3)
LYMPHOCYTES NFR BLD AUTO: 30.7 %
MAGNESIUM SERPL-MCNC: 1.92 MG/DL (ref 1.6–2.4)
MCH RBC QN AUTO: 30.6 PG (ref 26–34)
MCHC RBC AUTO-ENTMCNC: 31.4 G/DL (ref 32–36)
MCV RBC AUTO: 98 FL (ref 80–100)
MONOCYTES # BLD AUTO: 0.12 X10*3/UL (ref 0.05–0.8)
MONOCYTES NFR BLD AUTO: 4.8 %
NEUTROPHILS # BLD AUTO: 1.55 X10*3/UL (ref 1.6–5.5)
NEUTROPHILS NFR BLD AUTO: 61.7 %
NRBC BLD-RTO: 0 /100 WBCS (ref 0–0)
OVALOCYTES BLD QL SMEAR: NORMAL
PLATELET # BLD AUTO: 76 X10*3/UL (ref 150–450)
POLYCHROMASIA BLD QL SMEAR: NORMAL
POTASSIUM SERPL-SCNC: 4.7 MMOL/L (ref 3.5–5.3)
PROT SERPL-MCNC: 6.3 G/DL (ref 6.4–8.2)
RBC # BLD AUTO: 3.3 X10*6/UL (ref 4.5–5.9)
RBC MORPH BLD: NORMAL
RETICS #: 0.07 X10*6/UL (ref 0.02–0.11)
RETICS/RBC NFR AUTO: 2 % (ref 0.5–2)
SCHISTOCYTES BLD QL SMEAR: NORMAL
SODIUM SERPL-SCNC: 139 MMOL/L (ref 136–145)
URATE SERPL-MCNC: 7.2 MG/DL (ref 4–7.5)
WBC # BLD AUTO: 2.5 X10*3/UL (ref 4.4–11.3)

## 2025-03-03 PROCEDURE — 99215 OFFICE O/P EST HI 40 MIN: CPT | Mod: 25 | Performed by: NURSE PRACTITIONER

## 2025-03-03 PROCEDURE — 85025 COMPLETE CBC W/AUTO DIFF WBC: CPT

## 2025-03-03 PROCEDURE — 2560000001 HC RX 256 EXPERIMENTAL DRUGS: Performed by: STUDENT IN AN ORGANIZED HEALTH CARE EDUCATION/TRAINING PROGRAM

## 2025-03-03 PROCEDURE — 2500000004 HC RX 250 GENERAL PHARMACY W/ HCPCS (ALT 636 FOR OP/ED): Performed by: STUDENT IN AN ORGANIZED HEALTH CARE EDUCATION/TRAINING PROGRAM

## 2025-03-03 PROCEDURE — 84550 ASSAY OF BLOOD/URIC ACID: CPT

## 2025-03-03 PROCEDURE — 96401 CHEMO ANTI-NEOPL SQ/IM: CPT

## 2025-03-03 PROCEDURE — 85045 AUTOMATED RETICULOCYTE COUNT: CPT

## 2025-03-03 PROCEDURE — 83615 LACTATE (LD) (LDH) ENZYME: CPT

## 2025-03-03 PROCEDURE — 83735 ASSAY OF MAGNESIUM: CPT

## 2025-03-03 PROCEDURE — 36415 COLL VENOUS BLD VENIPUNCTURE: CPT

## 2025-03-03 PROCEDURE — 80053 COMPREHEN METABOLIC PANEL: CPT

## 2025-03-03 RX ORDER — FAMOTIDINE 10 MG/ML
20 INJECTION, SOLUTION INTRAVENOUS ONCE AS NEEDED
Status: DISCONTINUED | OUTPATIENT
Start: 2025-03-03 | End: 2025-03-03 | Stop reason: HOSPADM

## 2025-03-03 RX ORDER — ALBUTEROL SULFATE 0.83 MG/ML
3 SOLUTION RESPIRATORY (INHALATION) AS NEEDED
Status: DISCONTINUED | OUTPATIENT
Start: 2025-03-03 | End: 2025-03-03 | Stop reason: HOSPADM

## 2025-03-03 RX ORDER — MIRABEGRON 50 MG/1
50 TABLET, FILM COATED, EXTENDED RELEASE ORAL DAILY
Qty: 30 TABLET | Refills: 2 | Status: SHIPPED | OUTPATIENT
Start: 2025-03-03 | End: 2025-03-03 | Stop reason: SDUPTHER

## 2025-03-03 RX ORDER — ONDANSETRON HYDROCHLORIDE 8 MG/1
8 TABLET, FILM COATED ORAL ONCE
Status: COMPLETED | OUTPATIENT
Start: 2025-03-03 | End: 2025-03-03

## 2025-03-03 RX ORDER — EPINEPHRINE 0.3 MG/.3ML
0.3 INJECTION SUBCUTANEOUS EVERY 5 MIN PRN
Status: DISCONTINUED | OUTPATIENT
Start: 2025-03-03 | End: 2025-03-03 | Stop reason: HOSPADM

## 2025-03-03 RX ORDER — DIPHENHYDRAMINE HYDROCHLORIDE 50 MG/ML
50 INJECTION INTRAMUSCULAR; INTRAVENOUS AS NEEDED
Status: DISCONTINUED | OUTPATIENT
Start: 2025-03-03 | End: 2025-03-03 | Stop reason: HOSPADM

## 2025-03-03 RX ORDER — MIRABEGRON 50 MG/1
50 TABLET, FILM COATED, EXTENDED RELEASE ORAL DAILY
Qty: 30 TABLET | Refills: 2 | Status: SHIPPED | OUTPATIENT
Start: 2025-03-03 | End: 2025-03-11 | Stop reason: WASHOUT

## 2025-03-03 RX ADMIN — Medication 129 MG: at 11:31

## 2025-03-03 RX ADMIN — ONDANSETRON HYDROCHLORIDE 8 MG: 8 TABLET, FILM COATED ORAL at 11:06

## 2025-03-03 NOTE — PROGRESS NOTES
Patient presents to infusion appointment in stable condition. Shae Lunsford CNP at chairside to assess patient. ANC 1.55, filgrastim held per orders. C6D1 [id number] azacitidine injections tolerated without incident. Schedule reviewed. Discharged in stable condition.

## 2025-03-03 NOTE — PROGRESS NOTES
CHIEF COMPLAINT:  Damion presents to the office today to discuss:  1. Overactive bladder symptoms  2. Artificial urinary sphincter (AUS) follow-up    PAST UROLOGICAL HISTORY:  84-year-old prostate cancer survivor treated with radiation, BPH status post transurethral resection of the prostate (TURP) with stress-induced incontinence. AUS placement in March 2024, complicated by prolonged catheterisation secondary to thrombocytopenia. Cystoscopy in June 2024 showed no evidence of erosion, and the AUS was activated. Last visit in August 2024, the AUS was working well.    HPI TODAY 03/03/2025:  - Reports frequent urination, at least every hour, with a strong and sometimes painful urge to urinate upon standing  - Drinks a lot of fluids due to a research project for multiple myeloma treatment with cytarabine and decitabine under Dr. Posey at the main [place]  - No current medications for bladder relaxation  - No known allergies  - Blood pressure has been running low, in the 120s/30s, well-controlled with heart medications and weight loss  - No blood in urine  - Uses the AUS by squeezing the pump for about 10 seconds, then releasing to void    PHYSICAL EXAMINATION:   exam: AUS palpated in the right hemiscrotum, easy to palpate, feels like two fingers in one area. No leakage with coughing.    ASSESSMENT AND PLAN:  84-year-old man with a history of prostate cancer and stress urinary incontinence, status post AUS placement, presenting with overactive bladder symptoms.    1. Overactive bladder (N32.81)  - Assessment: Very overactive bladder, likely exacerbated by stress-induced overactivity and increased fluid intake due to multiple myeloma treatment.  - Plan: Start Mirabegron (Myrbetriq) to relax the bladder. Follow up in 6-8 weeks to assess symptom improvement.  - Counseling: Discussed the classic presentation of stress-induced overactivity and the combination of medications or bladder injections as treatment options.    2.  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  - Plan: Plan for cystoscopy at the next follow-up visit in 6-8 weeks, with the option to cancel if symptoms improve by 50% or more with Mirabegron.    ORDERS:  - Mirabegron (Myrbetriq)    FOLLOW UP:  Follow-up in 6-8 weeks with planned cystoscopy, which may be cancelled if symptoms improve significantly with Mirabegron.    SHORT SUMMARY:  84-year-old man with history of prostate cancer and AUS placement presents with overactive bladder symptoms. Started on Mirabegron and scheduled for follow-up and possible cystoscopy in 6-8 weeks.

## 2025-03-04 ENCOUNTER — TREATMENT (OUTPATIENT)
Dept: PHYSICAL THERAPY | Facility: CLINIC | Age: 85
End: 2025-03-04
Payer: MEDICARE

## 2025-03-04 DIAGNOSIS — D46.9 MYELODYSPLASTIC SYNDROME (MULTI): ICD-10-CM

## 2025-03-04 PROCEDURE — 97110 THERAPEUTIC EXERCISES: CPT | Mod: GP | Performed by: GENERAL ACUTE CARE HOSPITAL

## 2025-03-04 RX ORDER — TROSPIUM CHLORIDE ER 60 MG/1
60 CAPSULE ORAL
Qty: 30 CAPSULE | Refills: 5 | Status: SHIPPED | OUTPATIENT
Start: 2025-03-04 | End: 2025-08-31

## 2025-03-04 NOTE — PROGRESS NOTES
"Patient Name: Holden Burgess \"GENARO\"  MRN: [id number]  Today's Date: 3/4/2025  Time Calculation  Start Time: 0830  Stop Time: 0915  Time Calculation (min): 45 min  Current Problem:  1. Myelodysplastic syndrome (Multi)  Follow Up In Physical Therapy          Visit 2/20    Subjective:  Change in pain/ pain level: 0/10    Patient comments: exercises are fatiguing     Objective: track ambulation with WW 1/5 mile    Treatment:  Access Code: [email or web address]  URL: [email or web address]  Date: 02/21/2025  Prepared by: Juan Jose Bill  Therapeutic exercise (69622):    Nustep 8 mini  Stretches  Heel raise  Tiltboard  Foam stance eyes open/closed  Tiltboard  Tandem stance  Track amb    Assessment:  General fatigue noted  Patient notes improved pain levels resultant from activities in therapy session. Improved tissue quality noted as well as body mechanics demonstrated after cueing and corrections. Patient continues to require active therapy to progress functional capabilities with decreasing pain levels and improving mechanics with functional activities including progression of Home exercise program. Tolerance to today's treatment was good. Muscle fatigue noted.  Patient appears motivated and compliant this date, demonstrating a working understanding of principals instructed and home program.    Plan:  Progress with functional strength as tolerated with respect to tissue healing. Manual therapy PRN to improve/maintain ROM, prevent adhesion, reduce pain.  "

## 2025-03-06 ENCOUNTER — INFUSION (OUTPATIENT)
Dept: HEMATOLOGY/ONCOLOGY | Facility: HOSPITAL | Age: 85
End: 2025-03-06
Payer: MEDICARE

## 2025-03-06 ENCOUNTER — DOCUMENTATION (OUTPATIENT)
Dept: HEMATOLOGY/ONCOLOGY | Facility: HOSPITAL | Age: 85
End: 2025-03-06

## 2025-03-06 ENCOUNTER — LAB (OUTPATIENT)
Dept: LAB | Facility: HOSPITAL | Age: 85
End: 2025-03-06
Payer: MEDICARE

## 2025-03-06 VITALS
HEART RATE: 72 BPM | RESPIRATION RATE: 18 BRPM | DIASTOLIC BLOOD PRESSURE: 55 MMHG | OXYGEN SATURATION: 100 % | BODY MASS INDEX: 34.98 KG/M2 | TEMPERATURE: 97.3 F | SYSTOLIC BLOOD PRESSURE: 128 MMHG | WEIGHT: 265.1 LBS

## 2025-03-06 DIAGNOSIS — D46.9 MYELODYSPLASTIC SYNDROME (MULTI): ICD-10-CM

## 2025-03-06 LAB
ALBUMIN SERPL BCP-MCNC: 3.9 G/DL (ref 3.4–5)
ALP SERPL-CCNC: 95 U/L (ref 33–136)
ALT SERPL W P-5'-P-CCNC: 20 U/L (ref 10–52)
ANION GAP SERPL CALC-SCNC: 10 MMOL/L (ref 10–20)
AST SERPL W P-5'-P-CCNC: 17 U/L (ref 9–39)
BASOPHILS # BLD AUTO: 0.01 X10*3/UL (ref 0–0.1)
BASOPHILS NFR BLD AUTO: 0.3 %
BILIRUB SERPL-MCNC: 0.8 MG/DL (ref 0–1.2)
BUN SERPL-MCNC: 34 MG/DL (ref 6–23)
CALCIUM SERPL-MCNC: 9.3 MG/DL (ref 8.6–10.3)
CHLORIDE SERPL-SCNC: 109 MMOL/L (ref 98–107)
CO2 SERPL-SCNC: 26 MMOL/L (ref 21–32)
CREAT SERPL-MCNC: 1.29 MG/DL (ref 0.5–1.3)
DACRYOCYTES BLD QL SMEAR: NORMAL
EGFRCR SERPLBLD CKD-EPI 2021: 55 ML/MIN/1.73M*2
EOSINOPHIL # BLD AUTO: 0.05 X10*3/UL (ref 0–0.4)
EOSINOPHIL NFR BLD AUTO: 1.7 %
ERYTHROCYTE [DISTWIDTH] IN BLOOD BY AUTOMATED COUNT: 20.7 % (ref 11.5–14.5)
GLUCOSE SERPL-MCNC: 82 MG/DL (ref 74–99)
HCT VFR BLD AUTO: 31.3 % (ref 41–52)
HGB BLD-MCNC: 10.2 G/DL (ref 13.5–17.5)
IMM GRANULOCYTES # BLD AUTO: 0.01 X10*3/UL (ref 0–0.5)
IMM GRANULOCYTES NFR BLD AUTO: 0.3 % (ref 0–0.9)
LYMPHOCYTES # BLD AUTO: 0.82 X10*3/UL (ref 0.8–3)
LYMPHOCYTES NFR BLD AUTO: 28.7 %
MCH RBC QN AUTO: 31.3 PG (ref 26–34)
MCHC RBC AUTO-ENTMCNC: 32.6 G/DL (ref 32–36)
MCV RBC AUTO: 96 FL (ref 80–100)
MONOCYTES # BLD AUTO: 0.17 X10*3/UL (ref 0.05–0.8)
MONOCYTES NFR BLD AUTO: 5.9 %
NEUTROPHILS # BLD AUTO: 1.8 X10*3/UL (ref 1.6–5.5)
NEUTROPHILS NFR BLD AUTO: 63.1 %
NRBC BLD-RTO: 0 /100 WBCS (ref 0–0)
OVALOCYTES BLD QL SMEAR: NORMAL
PLATELET # BLD AUTO: 72 X10*3/UL (ref 150–450)
POLYCHROMASIA BLD QL SMEAR: NORMAL
POTASSIUM SERPL-SCNC: 4.6 MMOL/L (ref 3.5–5.3)
PROT SERPL-MCNC: 6.1 G/DL (ref 6.4–8.2)
RBC # BLD AUTO: 3.26 X10*6/UL (ref 4.5–5.9)
RBC MORPH BLD: NORMAL
SCHISTOCYTES BLD QL SMEAR: NORMAL
SODIUM SERPL-SCNC: 140 MMOL/L (ref 136–145)
WBC # BLD AUTO: 2.9 X10*3/UL (ref 4.4–11.3)

## 2025-03-06 PROCEDURE — 2500000004 HC RX 250 GENERAL PHARMACY W/ HCPCS (ALT 636 FOR OP/ED): Performed by: STUDENT IN AN ORGANIZED HEALTH CARE EDUCATION/TRAINING PROGRAM

## 2025-03-06 PROCEDURE — 36415 COLL VENOUS BLD VENIPUNCTURE: CPT

## 2025-03-06 PROCEDURE — 84075 ASSAY ALKALINE PHOSPHATASE: CPT

## 2025-03-06 PROCEDURE — 96401 CHEMO ANTI-NEOPL SQ/IM: CPT

## 2025-03-06 PROCEDURE — 2560000001 HC RX 256 EXPERIMENTAL DRUGS: Performed by: STUDENT IN AN ORGANIZED HEALTH CARE EDUCATION/TRAINING PROGRAM

## 2025-03-06 PROCEDURE — 85025 COMPLETE CBC W/AUTO DIFF WBC: CPT

## 2025-03-06 RX ORDER — ONDANSETRON HYDROCHLORIDE 8 MG/1
8 TABLET, FILM COATED ORAL ONCE
Status: COMPLETED | OUTPATIENT
Start: 2025-03-06 | End: 2025-03-06

## 2025-03-06 RX ORDER — DIPHENHYDRAMINE HYDROCHLORIDE 50 MG/ML
50 INJECTION INTRAMUSCULAR; INTRAVENOUS AS NEEDED
Status: DISCONTINUED | OUTPATIENT
Start: 2025-03-06 | End: 2025-03-06 | Stop reason: HOSPADM

## 2025-03-06 RX ORDER — FAMOTIDINE 10 MG/ML
20 INJECTION, SOLUTION INTRAVENOUS ONCE AS NEEDED
Status: DISCONTINUED | OUTPATIENT
Start: 2025-03-06 | End: 2025-03-06 | Stop reason: HOSPADM

## 2025-03-06 RX ORDER — ALBUTEROL SULFATE 0.83 MG/ML
3 SOLUTION RESPIRATORY (INHALATION) AS NEEDED
Status: DISCONTINUED | OUTPATIENT
Start: 2025-03-06 | End: 2025-03-06 | Stop reason: HOSPADM

## 2025-03-06 RX ORDER — EPINEPHRINE 0.3 MG/.3ML
0.3 INJECTION SUBCUTANEOUS EVERY 5 MIN PRN
Status: DISCONTINUED | OUTPATIENT
Start: 2025-03-06 | End: 2025-03-06 | Stop reason: HOSPADM

## 2025-03-06 RX ADMIN — ONDANSETRON HYDROCHLORIDE 8 MG: 8 TABLET, FILM COATED ORAL at 14:04

## 2025-03-06 RX ADMIN — Medication 12.9 MG: at 14:39

## 2025-03-06 NOTE — RESEARCH NOTES
Research Note Treatment Day    Holden Burgess is here today for treatment on [id number]. Today is C5D4. Procedures completed per protocol. AE's and con-meds reviewed with patient. Patient is aware of treatment plan.    [x]   Received treatment as planned   OR  []    Treatment delayed; patient calendar updated as required   Treatment delayed because:    []   AE    []   Physician Discretion    []   Clinical Deterioration or Progression     []   Other    Education Documentation  Complete Blood Count with Differential (CBC w/ Diff), taught by Keren Khan RN at 3/6/2025  4:12 PM.  Learner: Significant Other, Patient  Readiness: Eager  Method: Explanation  Response: Verbalizes Understanding    Skin and Nail Changes, taught by Keren Khan RN at 3/6/2025  4:12 PM.  Learner: Significant Other, Patient  Readiness: Eager  Method: Explanation  Response: Verbalizes Understanding    Constipation, taught by Keren Khan RN at 3/6/2025  4:12 PM.  Learner: Significant Other, Patient  Readiness: Eager  Method: Explanation  Response: Verbalizes Understanding    Treatment Plan and Schedule, taught by Keren Khan RN at 3/6/2025  4:12 PM.  Learner: Significant Other, Patient  Readiness: Eager  Method: Explanation  Response: Verbalizes Understanding    Education Comments  No comments found.

## 2025-03-10 ENCOUNTER — LAB (OUTPATIENT)
Dept: LAB | Facility: HOSPITAL | Age: 85
End: 2025-03-10
Payer: MEDICARE

## 2025-03-10 ENCOUNTER — INFUSION (OUTPATIENT)
Dept: HEMATOLOGY/ONCOLOGY | Facility: HOSPITAL | Age: 85
End: 2025-03-10
Payer: MEDICARE

## 2025-03-10 VITALS
DIASTOLIC BLOOD PRESSURE: 66 MMHG | TEMPERATURE: 96.8 F | RESPIRATION RATE: 18 BRPM | HEART RATE: 88 BPM | OXYGEN SATURATION: 99 % | WEIGHT: 263 LBS | BODY MASS INDEX: 34.7 KG/M2 | SYSTOLIC BLOOD PRESSURE: 127 MMHG

## 2025-03-10 DIAGNOSIS — D46.9 MYELODYSPLASTIC SYNDROME (MULTI): ICD-10-CM

## 2025-03-10 DIAGNOSIS — D64.81 ANEMIA DUE TO CHEMOTHERAPY FOR MYELODYSPLASTIC SYNDROME TREATED WITH ERYTHROPOIETIN (MULTI): ICD-10-CM

## 2025-03-10 DIAGNOSIS — T45.1X5A ANEMIA DUE TO CHEMOTHERAPY FOR MYELODYSPLASTIC SYNDROME TREATED WITH ERYTHROPOIETIN (MULTI): ICD-10-CM

## 2025-03-10 DIAGNOSIS — D46.9 ANEMIA DUE TO CHEMOTHERAPY FOR MYELODYSPLASTIC SYNDROME TREATED WITH ERYTHROPOIETIN (MULTI): ICD-10-CM

## 2025-03-10 LAB
ALBUMIN SERPL BCP-MCNC: 4 G/DL (ref 3.4–5)
ALP SERPL-CCNC: 87 U/L (ref 33–136)
ALT SERPL W P-5'-P-CCNC: 18 U/L (ref 10–52)
ANION GAP SERPL CALC-SCNC: 12 MMOL/L (ref 10–20)
AST SERPL W P-5'-P-CCNC: 16 U/L (ref 9–39)
BASOPHILS # BLD AUTO: 0.01 X10*3/UL (ref 0–0.1)
BASOPHILS NFR BLD AUTO: 0.3 %
BILIRUB SERPL-MCNC: 1 MG/DL (ref 0–1.2)
BUN SERPL-MCNC: 37 MG/DL (ref 6–23)
CALCIUM SERPL-MCNC: 9.3 MG/DL (ref 8.6–10.3)
CHLORIDE SERPL-SCNC: 107 MMOL/L (ref 98–107)
CO2 SERPL-SCNC: 24 MMOL/L (ref 21–32)
CREAT SERPL-MCNC: 1.39 MG/DL (ref 0.5–1.3)
DACRYOCYTES BLD QL SMEAR: NORMAL
EGFRCR SERPLBLD CKD-EPI 2021: 50 ML/MIN/1.73M*2
EOSINOPHIL # BLD AUTO: 0.07 X10*3/UL (ref 0–0.4)
EOSINOPHIL NFR BLD AUTO: 2.1 %
ERYTHROCYTE [DISTWIDTH] IN BLOOD BY AUTOMATED COUNT: 20.2 % (ref 11.5–14.5)
GLUCOSE SERPL-MCNC: 97 MG/DL (ref 74–99)
HCT VFR BLD AUTO: 31.2 % (ref 41–52)
HGB BLD-MCNC: 10.1 G/DL (ref 13.5–17.5)
HYPOCHROMIA BLD QL SMEAR: NORMAL
IMM GRANULOCYTES # BLD AUTO: 0.02 X10*3/UL (ref 0–0.5)
IMM GRANULOCYTES NFR BLD AUTO: 0.6 % (ref 0–0.9)
LYMPHOCYTES # BLD AUTO: 0.76 X10*3/UL (ref 0.8–3)
LYMPHOCYTES NFR BLD AUTO: 22.7 %
MCH RBC QN AUTO: 31.1 PG (ref 26–34)
MCHC RBC AUTO-ENTMCNC: 32.4 G/DL (ref 32–36)
MCV RBC AUTO: 96 FL (ref 80–100)
MONOCYTES # BLD AUTO: 0.18 X10*3/UL (ref 0.05–0.8)
MONOCYTES NFR BLD AUTO: 5.4 %
NEUTROPHILS # BLD AUTO: 2.31 X10*3/UL (ref 1.6–5.5)
NEUTROPHILS NFR BLD AUTO: 68.9 %
NRBC BLD-RTO: 0 /100 WBCS (ref 0–0)
OVALOCYTES BLD QL SMEAR: NORMAL
PLATELET # BLD AUTO: 67 X10*3/UL (ref 150–450)
POLYCHROMASIA BLD QL SMEAR: NORMAL
POTASSIUM SERPL-SCNC: 4.8 MMOL/L (ref 3.5–5.3)
PROT SERPL-MCNC: 6.1 G/DL (ref 6.4–8.2)
RBC # BLD AUTO: 3.25 X10*6/UL (ref 4.5–5.9)
RBC MORPH BLD: NORMAL
SCHISTOCYTES BLD QL SMEAR: NORMAL
SODIUM SERPL-SCNC: 138 MMOL/L (ref 136–145)
WBC # BLD AUTO: 3.4 X10*3/UL (ref 4.4–11.3)

## 2025-03-10 PROCEDURE — 2500000004 HC RX 250 GENERAL PHARMACY W/ HCPCS (ALT 636 FOR OP/ED): Mod: JZ,TB | Performed by: NURSE PRACTITIONER

## 2025-03-10 PROCEDURE — 36415 COLL VENOUS BLD VENIPUNCTURE: CPT

## 2025-03-10 PROCEDURE — 80053 COMPREHEN METABOLIC PANEL: CPT

## 2025-03-10 PROCEDURE — 85025 COMPLETE CBC W/AUTO DIFF WBC: CPT | Performed by: INTERNAL MEDICINE

## 2025-03-10 PROCEDURE — 96372 THER/PROPH/DIAG INJ SC/IM: CPT | Mod: 59

## 2025-03-10 PROCEDURE — 96401 CHEMO ANTI-NEOPL SQ/IM: CPT

## 2025-03-10 PROCEDURE — 2560000001 HC RX 256 EXPERIMENTAL DRUGS: Performed by: STUDENT IN AN ORGANIZED HEALTH CARE EDUCATION/TRAINING PROGRAM

## 2025-03-10 PROCEDURE — 2500000004 HC RX 250 GENERAL PHARMACY W/ HCPCS (ALT 636 FOR OP/ED): Performed by: STUDENT IN AN ORGANIZED HEALTH CARE EDUCATION/TRAINING PROGRAM

## 2025-03-10 RX ORDER — FAMOTIDINE 10 MG/ML
20 INJECTION, SOLUTION INTRAVENOUS ONCE AS NEEDED
OUTPATIENT
Start: 2025-03-24

## 2025-03-10 RX ORDER — FAMOTIDINE 10 MG/ML
20 INJECTION, SOLUTION INTRAVENOUS ONCE AS NEEDED
Status: DISCONTINUED | OUTPATIENT
Start: 2025-03-10 | End: 2025-03-10 | Stop reason: HOSPADM

## 2025-03-10 RX ORDER — DIPHENHYDRAMINE HYDROCHLORIDE 50 MG/ML
50 INJECTION INTRAMUSCULAR; INTRAVENOUS AS NEEDED
OUTPATIENT
Start: 2025-03-24

## 2025-03-10 RX ORDER — DIPHENHYDRAMINE HYDROCHLORIDE 50 MG/ML
50 INJECTION INTRAMUSCULAR; INTRAVENOUS AS NEEDED
Status: DISCONTINUED | OUTPATIENT
Start: 2025-03-10 | End: 2025-03-10 | Stop reason: HOSPADM

## 2025-03-10 RX ORDER — ONDANSETRON HYDROCHLORIDE 8 MG/1
8 TABLET, FILM COATED ORAL ONCE
Status: COMPLETED | OUTPATIENT
Start: 2025-03-10 | End: 2025-03-10

## 2025-03-10 RX ORDER — EPINEPHRINE 0.3 MG/.3ML
0.3 INJECTION SUBCUTANEOUS EVERY 5 MIN PRN
Status: DISCONTINUED | OUTPATIENT
Start: 2025-03-10 | End: 2025-03-10 | Stop reason: HOSPADM

## 2025-03-10 RX ORDER — ALBUTEROL SULFATE 0.83 MG/ML
3 SOLUTION RESPIRATORY (INHALATION) AS NEEDED
OUTPATIENT
Start: 2025-03-24

## 2025-03-10 RX ORDER — ALBUTEROL SULFATE 0.83 MG/ML
3 SOLUTION RESPIRATORY (INHALATION) AS NEEDED
Status: DISCONTINUED | OUTPATIENT
Start: 2025-03-10 | End: 2025-03-10 | Stop reason: HOSPADM

## 2025-03-10 RX ORDER — EPINEPHRINE 0.3 MG/.3ML
0.3 INJECTION SUBCUTANEOUS EVERY 5 MIN PRN
OUTPATIENT
Start: 2025-03-24

## 2025-03-10 RX ADMIN — Medication 129 MG: at 11:05

## 2025-03-10 RX ADMIN — DARBEPOETIN ALFA 200 MCG: 200 INJECTION, SOLUTION INTRAVENOUS; SUBCUTANEOUS at 11:11

## 2025-03-10 RX ADMIN — ONDANSETRON HYDROCHLORIDE 8 MG: 8 TABLET, FILM COATED ORAL at 10:43

## 2025-03-10 ASSESSMENT — PAIN SCALES - GENERAL: PAINLEVEL_OUTOF10: 0-NO PAIN

## 2025-03-10 NOTE — PROGRESS NOTES
Pt tolerated azacitidine and aranesp injections without incident.  Discharged home in stable condition

## 2025-03-11 ENCOUNTER — TREATMENT (OUTPATIENT)
Dept: PHYSICAL THERAPY | Facility: CLINIC | Age: 85
End: 2025-03-11
Payer: MEDICARE

## 2025-03-11 ENCOUNTER — APPOINTMENT (OUTPATIENT)
Dept: NEPHROLOGY | Facility: CLINIC | Age: 85
End: 2025-03-11
Payer: MEDICARE

## 2025-03-11 VITALS
DIASTOLIC BLOOD PRESSURE: 62 MMHG | BODY MASS INDEX: 35.09 KG/M2 | SYSTOLIC BLOOD PRESSURE: 97 MMHG | HEART RATE: 76 BPM | WEIGHT: 266 LBS

## 2025-03-11 DIAGNOSIS — I10 ESSENTIAL HYPERTENSION: ICD-10-CM

## 2025-03-11 DIAGNOSIS — N18.32 STAGE 3B CHRONIC KIDNEY DISEASE (MULTI): Primary | ICD-10-CM

## 2025-03-11 DIAGNOSIS — D46.9 MYELODYSPLASTIC SYNDROME (MULTI): ICD-10-CM

## 2025-03-11 PROCEDURE — 97110 THERAPEUTIC EXERCISES: CPT | Mod: GP | Performed by: GENERAL ACUTE CARE HOSPITAL

## 2025-03-11 NOTE — PROGRESS NOTES
Holden Burgess   84 y.o.    @@  [facility]/Room: [id number]/Room/bed info not found    Subjective:   The patient is being seen for a routine clinic follow-up of chronic kidney disease. Recently, the disease has been stable. Disease complications:  No hyperkalemia, no hypocalcemia, no hyperphosphatemia, no metabolic acidosis, no coagulopathy, no uremic encephalopathy, no neuropathy and no renal osteodystrophy. The patient is currently asymptomatic. No associated symptoms are reported.       Meds:   Current Outpatient Medications   Medication Sig Dispense Refill    acetaminophen (Tylenol) 500 mg tablet Take 2 tablets (1,000 mg) by mouth every 6 hours if needed for mild pain (1 - 3). 30 tablet 0    allopurinol (Zyloprim) 100 mg tablet Take 1 tablet (100 mg) by mouth 2 times a day. 180 tablet 1    ascorbic acid (Vitamin C) 1,000 mg tablet Take 1 tablet (1,000 mg) by mouth once daily.      atorvastatin (Lipitor) 10 mg tablet Take 1 tablet (10 mg) by mouth once daily at bedtime.      calcitriol (Rocaltrol) 0.25 mcg capsule Take 1 capsule (0.25 mcg) by mouth every other day.      camphor-menthoL (Sarna) lotion Apply topically if needed for itching. 222 mL 0    cholecalciferol (Vitamin D-3) 125 MCG (5000 UT) capsule Take 1 capsule (125 mcg) by mouth once daily.      cyanocobalamin (Vitamin B-12) 1,000 mcg tablet Take 1 tablet (1,000 mcg) by mouth once daily.      darbepoetin hong (Aranesp, in polysorbate,) 100 mcg/mL injection Inject 2 mL (200 mcg) under the skin once every 2 weeks.      ezetimibe (Zetia) 10 mg tablet Take 1 tablet (10 mg) by mouth once daily. 90 tablet 0    famotidine (Pepcid) 20 mg tablet Take 1 tablet (20 mg) by mouth once daily. prn      ferrous sulfate 325 (65 Fe) MG tablet Take 1 tablet (325 mg) by mouth once daily.      folic acid (Folvite) 1 mg tablet Take 1 tablet (1 mg) by mouth once daily. 90 tablet 0    furosemide (Lasix) 40 mg tablet Take 1 tablet (40 mg) by mouth once daily. (Patient taking  differently: Take 1 tablet (40 mg) by mouth once daily. PRN) 30 tablet 11    lactulose 20 gram/30 mL oral solution Take 15 mL (10 g) by mouth 2 times a day. As needed for constipation 960 mL 5    latanoprost (Xalatan) 0.005 % ophthalmic solution Administer 1 drop into affected eye(s) once daily at bedtime.      MAGNESIUM ORAL Take 550 mg by mouth once daily at bedtime. Take 1 tablet 550 mg at night.      rOPINIRole (Requip) 0.25 mg tablet Take 1 tablet (0.25 mg) by mouth once daily at bedtime. 90 tablet 1    temazepam (Restoril) 15 mg capsule Take 1 capsule (15 mg) by mouth as needed at bedtime for sleep. 90 capsule 0    traMADol (Ultram) 50 mg tablet Take 1 tablet (50 mg) by mouth every 6 hours if needed for severe pain (7 - 10). 30 tablet 0    trospium (Sanctura XR) 60 mg 24 hour capsule Take 1 capsule (60 mg) by mouth once daily in the morning. Take before meals. (Patient taking differently: Take 20 mg by mouth once daily at bedtime.) 30 capsule 5    TURMERIC ORAL Take 1 capsule by mouth once daily.      zinc sulfate 50 mg zinc (220 mg) tablet Take by mouth.      mirabegron (Myrbetriq) 50 mg tablet extended release 24 hr 24 hr tablet Take 1 tablet (50 mg) by mouth once daily. (Patient not taking: Reported on 3/11/2025) 30 tablet 2    valsartan (Diovan) 320 mg tablet Take 1 tablet (320 mg) by mouth once daily. 90 tablet 3     No current facility-administered medications for this visit.          ROS:  The patient is awake and oriented. No dizziness or lightheadedness. No chills and no fever. No headaches. No nausea and no vomiting. No shortness of breath. No cough. No chest pain. No abdominal pain. No diarrhea. No hematemesis or hemoptysis. No hematuria. No rectal bleeding. No melena. No epistaxis. No urinary symptoms. No flank pain. No leg edema. No itching. Overall, the rest of the review of systems is also negative.  12 point review of systems otherwise negative as stated in HPI.        Physical Examination:         Vitals:    03/11/25 1501   BP: 97/62   Pulse: 76     General: The patient is awake, oriented, and is not in any distress.  Head and Neck: Normocephalic. No periorbital edema.  Eyes: non-icteric  Respiratory: Symmetric chest expansion. No respiratory distress.  Skin: No maculopapular rash.  Musculoskeletal: No peripheral edema.  Neuro Exam: Speech is fluent. Moves extremities.    Imaging:  === 03/08/23 ===    US RIGHT UPPER QUADRANT    - Impression -  Cholelithiasis, however no specific findings to suggest acute  cholecystitis.    Overall, no acute right upper quadrant pathology is demonstrated.    Hepatomegaly and steatosis are suspected.       Blood Labs:  No results found. However, due to the size of the patient record, not all encounters were searched. Please check Results Review for a complete set of results.   Lab Results   Component Value Date    PTH 50.9 10/03/2024    PROTUR NEGATIVE 09/16/2024    PHOS 3.1 12/12/2024      Lab Results   Component Value Date    GLUCOSE 97 03/10/2025    CALCIUM 9.3 03/10/2025     03/10/2025    K 4.8 03/10/2025    CO2 24 03/10/2025     03/10/2025    BUN 37 (H) 03/10/2025    CREATININE 1.39 (H) 03/10/2025         Assessment and Plan:  84-year-old male with myelodysplastic syndrome.  He is being treated with azacitidine and decitabine.    #1 chronic kidney disease stage III. Creatinine level is 1.39.  Normal potassium and bicarb level.  Good volume status.     #2 hypertension. Blood pressure is on the low side.  He is not symptomatic.  He says always his systolic blood pressure is on 120s.  If blood pressure stays low I will adjust his antihypertensive medications.    #3 dyslipidemia. He is on a statin.     4.  Secondary hyperparathyroidism.  PTH level will be checked before the next appointment.        I will see him in about 4 months for follow-up.          Nicola Brooks MD  Senior Attending Physician  Director of Onco-Nephrology Program  Division of Nephrology &  Hypertension  [facility]

## 2025-03-11 NOTE — PROGRESS NOTES
"Patient Name: Holden Burgess \"GENARO\"  MRN: [id number]  Today's Date: 3/11/2025  Time Calculation  Start Time: 0915  Stop Time: 1000  Time Calculation (min): 45 min  Current Problem:  1. Myelodysplastic syndrome (Multi)  Follow Up In Physical Therapy          Visit 3/20    Subjective:  Change in pain/ pain level: 0/10    Patient comments: difficulty doing the exercises toward the end     Objective: amb on track 2 laps with WW     Treatment:    Therapeutic exercise (06464):    Nustep 8 min level 5   Heel raise x20   Stretches  Heel raise  Tiltboard  Foam stance eyes open/closed  Tiltboard  Tandem stance  Track amb 2 laps    Assessment:  Fatigues with exercise.  Patient continues to require active therapy to progress functional capabilities with decreasing pain levels and improving mechanics with functional activities including progression of Home exercise program. Tolerance to today's treatment was good. Muscle fatigue noted.  Patient appears motivated and compliant this date, demonstrating a working understanding of principals instructed and home program.    Plan:  Progress with functional strength as tolerated with respect to tissue healing. Manual therapy PRN to improve/maintain ROM, prevent adhesion, reduce pain.  "

## 2025-03-12 ENCOUNTER — HOSPITAL ENCOUNTER (OUTPATIENT)
Dept: CARDIOLOGY | Facility: HOSPITAL | Age: 85
Discharge: HOME | End: 2025-03-12
Payer: MEDICARE

## 2025-03-12 ENCOUNTER — APPOINTMENT (OUTPATIENT)
Dept: CARDIOLOGY | Facility: CLINIC | Age: 85
End: 2025-03-12
Payer: MEDICARE

## 2025-03-12 VITALS
HEIGHT: 73 IN | HEART RATE: 72 BPM | SYSTOLIC BLOOD PRESSURE: 110 MMHG | BODY MASS INDEX: 35.09 KG/M2 | DIASTOLIC BLOOD PRESSURE: 62 MMHG

## 2025-03-12 DIAGNOSIS — I50.9 ACUTE CONGESTIVE HEART FAILURE, UNSPECIFIED HEART FAILURE TYPE: ICD-10-CM

## 2025-03-12 DIAGNOSIS — I48.3 TYPICAL ATRIAL FLUTTER (MULTI): ICD-10-CM

## 2025-03-12 DIAGNOSIS — I25.10 CAD S/P PERCUTANEOUS CORONARY ANGIOPLASTY: ICD-10-CM

## 2025-03-12 DIAGNOSIS — Z95.0 PACEMAKER: ICD-10-CM

## 2025-03-12 DIAGNOSIS — R00.1 BRADYCARDIA: ICD-10-CM

## 2025-03-12 DIAGNOSIS — Z98.61 CAD S/P PERCUTANEOUS CORONARY ANGIOPLASTY: ICD-10-CM

## 2025-03-12 DIAGNOSIS — Z87.891 FORMER SMOKER: ICD-10-CM

## 2025-03-12 DIAGNOSIS — Z95.0 PACEMAKER: Primary | ICD-10-CM

## 2025-03-12 DIAGNOSIS — I49.5 SINUS NODE DYSFUNCTION (MULTI): ICD-10-CM

## 2025-03-12 PROCEDURE — 93280 PM DEVICE PROGR EVAL DUAL: CPT

## 2025-03-12 ASSESSMENT — ENCOUNTER SYMPTOMS
PALPITATIONS: 0
DYSPNEA ON EXERTION: 0

## 2025-03-12 NOTE — PATIENT INSTRUCTIONS
Continue same medications/treatment.  Patient educated on proper medication use.  Patient educated on risk factor modification.  Please bring any lab results from other providers/physicians to your next appointment.    Please bring all medicines, vitamins, and herbal supplements with you when you come to the office.    Prescriptions will not be filled unless you are compliant with your follow up appointments or have a follow up appointment scheduled as per instruction of your physician. Refills should be requested at the time of your visit.    Follow up with our physician assistant, Radha, in 6 months with device check  Continue remote checks at 3 and 9 months    Ashley DANIELLE RN, AM SCRIBING FOR, AND IN THE PRESENCE OF DR. ANA PAULA DENT MD

## 2025-03-12 NOTE — PROGRESS NOTES
CARDIOLOGY OFFICE VISIT      CHIEF COMPLAINT  Chief Complaint   Patient presents with    Follow-up     Pt is here today following up after 6 months with device check, pacemaker        HISTORY OF PRESENT ILLNESS  HPI  84-year-old  male who is followed for paroxysmal atrial flutter status post radiofrequency catheter ablation on March 22, 2018 with no clinical recurrence. He developed a right femoral pseudoaneurysm and underwent repair with endovascular stent graft and evacuation of a right thigh hematoma on April 8, 2018. He has underlying sinus node dysfunction, minimal coronary disease per left heart catheterization, and normal left ventricular function. He presents to the office today for follow-up evaluation of sinus node dysfunction.     Patient had an echocardiogram in October 2022 that shows left ventricular contraction 60% with mild to moderate mitral regurgitation moderate tricuspid regurgitation and moderate dilatation of the aortic root and ascending aorta. Patient underwent a chest MRI that shows aortic root 44 mm, ascending aortic area 40 mm unchanged from prior MRI.    Patient states that he is under treatment for bladder issues with hyperbaric chamber.  One of the days that he was on his way for his treatment, he had an episode of dizziness and lightheadedness.  He was found to have a heart rate in the 40s.  Procedure was canceled.     He underwent a dual-chamber pacemaker in November 2023 with no complications.     During the pacemaker evaluation, patient had evidence of thrombocytopenia.  He was referred to hematology service.  Apparently they are discussing the option of myelodysplastic syndrome.  Bone marrow biopsy was performed     Patient was admitted to [facility] in April 2024. Patient has been having progressive shortness of breath which worsened. Also gained weight, elevated D-dimer CT angiogram could not be obtained due to CKD, troponin 2 times negative, Echocardiogram  from 2022 showed EF of 60%, lower extremity DVT study was negative, was admitted for dyspnea, VQ scan showed low probability for PE, echocardiogram showed normal ejection fraction.  Patient was treated with IV diuretics for diastolic heart failure, switch to p.o. diuretics.     Admitted to [facility] 09/2024. He presented to the emergency department with confusion. He is found to have a urinary tract infection for which she was started on IV Rocephin. His mentation significantly improved with treatment and he is essentially back to his baseline this morning. He remains hemodynamically stable. His lab work while here did show a significant anemia requiring transfusion x 2. This is likely related to his underlying myelodysplastic syndrome. He has stable thrombocytopenia without evidence of ongoing bleeding. He has close follow-up with his hematologist . Otherwise he remained stable for discharge home    Patient states that so far he has been doing well.  He is still having follow-up with hematology service for his myelodysplastic syndrome.  He is on chemotherapy every week.    Patient denies any worsening shortness of breath or chest pain or palpitations.    EKG performed today shows atrial paced rhythm with rate of 72 bpm QRS duration 166 ms  ms.  Rhythm strip shows the same pattern.    Patient had a device interrogation today at the device clinic and he does have a dual-chamber pacemaker Medtronic with battery longevity 12 years.  No evidence of atrial fibrillation or ventricular arrhythmias.            Past Medical History  Past Medical History:   Diagnosis Date    Abdominal pain, chronic, right upper quadrant     ACP (advance care planning)     Anemia     Aneurysm (CMS-HCC)     Body mass index (BMI) 39.0-39.9, adult 12/06/2021    BMI 39.0-39.9,adult    Body mass index (BMI) 39.0-39.9, adult 07/11/2022    BMI 39.0-39.9,adult    Body mass index (BMI) 39.0-39.9, adult 04/24/2022    Body mass index (BMI) of 39.0 to  39.9 in adult    Body mass index (BMI) 39.0-39.9, adult 2022    Body mass index (BMI) of 39.0 to 39.9 in adult    Body mass index (BMI) 39.0-39.9, adult 2022    Body mass index (BMI) of 39.0 to 39.9 in adult    Cramp and spasm 2022    Leg cramps    Difficulty breathing     Encounter for general adult medical examination without abnormal findings 2020    Encounter for Medicare annual wellness exam    Encounter for screening for malignant neoplasm of prostate 2021    Encounter for prostate cancer screening    Encounter for screening for malignant neoplasm of prostate 2020    Encounter for prostate cancer screening    Forearm injury     Hx of atrial flutter     Hyperlipidemia     Hypertension     Hyperuricemia     Incontinence     Obesity, unspecified 2022    Class 2 obesity with body mass index (BMI) of 39.0 to 39.9 in adult    Other symptoms and signs involving the musculoskeletal system 10/12/2022    Shoulder weakness    Personal history of other diseases of the circulatory system 2021    History of hypotension    Personal history of other diseases of the circulatory system 10/26/2021    History of hypertension    Personal history of other endocrine, nutritional and metabolic disease 2021    History of morbid obesity    Personal history of other specified conditions 2021    History of dizziness    Rotator cuff injury     Sinus node dysfunction (Multi)     Valvular heart disease        Social History  Social History     Tobacco Use    Smoking status: Former     Current packs/day: 0.00     Average packs/day: 3.0 packs/day for 10.0 years (30.0 ttl pk-yrs)     Types: Cigarettes     Start date: 1958     Quit date: 1968     Years since quittin.5     Passive exposure: Never    Smokeless tobacco: Never   Vaping Use    Vaping status: Never Used   Substance Use Topics    Alcohol use: Not Currently     Comment: quit 2000    Drug use: Never        Family History     Family History   Problem Relation Name Age of Onset    Other (polio) Mother      Heart disease Father      Heart disease Brother      Polymyalgia rheumatica Brother      Other (mitral valve disorder) Brother      Other (Coronary artery bypass graft) Brother      Other (Arterisclerotic cardiovascular disease) Brother          Allergies:  Allergies   Allergen Reactions    Crestor [Rosuvastatin] Unknown    Ezetimibe Unknown    Statins-Hmg-Coa Reductase Inhibitors Unknown     leg cramping        Outpatient Medications:  Current Outpatient Medications   Medication Instructions    acetaminophen (TYLENOL) 1,000 mg, oral, Every 6 hours PRN    allopurinol (ZYLOPRIM) 100 mg, oral, 2 times daily    Aranesp (in polysorbate) 200 mcg, [facility] ONC Every 2 Weeks for 4 Weeks in a 28 Day Cycle    ascorbic acid (Vitamin C) 1,000 mg tablet 1 tablet, Daily    atorvastatin (Lipitor) 10 mg tablet 1 tablet, Nightly    calcitriol (ROCALTROL) 0.25 mcg, Every other day    camphor-menthoL (Sarna) lotion Topical, As needed    cholecalciferol (Vitamin D-3) 125 MCG (5000 UT) capsule 1 capsule, Daily    cyanocobalamin (Vitamin B-12) 1,000 mcg tablet 1 tablet, Daily    ezetimibe (ZETIA) 10 mg, oral, Daily    famotidine (PEPCID) 20 mg, Daily    ferrous sulfate 325 (65 Fe) MG tablet 1 tablet, Daily    folic acid (FOLVITE) 1 mg, oral, Daily    furosemide (LASIX) 40 mg, oral, Daily    lactulose 10 g, oral, 2 times daily, As needed for constipation    latanoprost (Xalatan) 0.005 % ophthalmic solution 1 drop, Nightly    MAGNESIUM ORAL 550 mg, Nightly    rOPINIRole (REQUIP) 0.25 mg, oral, Nightly    temazepam (RESTORIL) 15 mg, oral, Nightly PRN    traMADol (ULTRAM) 50 mg, oral, Every 6 hours PRN    trospium (SANCTURA XR) 60 mg, oral, Daily before breakfast    TURMERIC ORAL 1 capsule, Daily    valsartan (DIOVAN) 320 mg, oral, Daily    zinc sulfate 50 mg zinc (220 mg) tablet Take by mouth.          REVIEW OF SYSTEMS  Review of Systems    Cardiovascular:  Negative for chest pain, dyspnea on exertion and palpitations.   All other systems reviewed and are negative.        VITALS  Vitals:    03/12/25 1052   BP: 110/62   Pulse: 72       PHYSICAL EXAM  Constitutional:       General: Awake.      Appearance: Normal and healthy appearance. Well-developed and not in distress. Obese.   Neck:      Vascular: No JVR. JVD normal.   Pulmonary:      Effort: Pulmonary effort is normal.      Breath sounds: Normal breath sounds. No wheezing. No rhonchi. No rales.   Chest:      Chest wall: Not tender to palpatation.      Comments: Left sided device pocket- healed and well approximated. No swelling or hematoma      Cardiovascular:      PMI at left midclavicular line. Normal rate. Regular rhythm. Normal S1. Normal S2.       Murmurs: There is no murmur.      No gallop.  No click. No rub.   Pulses:     Intact distal pulses.   Edema:     Peripheral edema absent.   Abdominal:      Tenderness: There is no abdominal tenderness.   Musculoskeletal: Normal range of motion.         General: No tenderness. Skin:     General: Skin is warm and dry.   Neurological:      General: No focal deficit present.      Mental Status: Alert and oriented to person, place and time.           ASSESSMENT AND PLAN    Clinical impressions:  1. Atrial flutter status post radiofrequency catheter ablation on March 22, 2018 with no clinical recurrence.  2. Repair of a leaking right femoral pseudoaneurysm with endovascular stent graft and evacuation of a right thigh hematoma on April 3, 2018.  3. Normal left ventricular function per 2-D echo dated August 28, 2019.  Echocardiogram in 2022 shows left ventricular atrial fraction of 60%  4. Underlying sinus node dysfunction with EP study dated March 22, 2018 revealing mild sinus node dysfunction.  5. Minimal coronary disease per remote left heart catheterization.  6. Hypertension, controlled   7. Dyslipidemia on statin.  8. Obstructive sleep apnea on  CPAP.  9. Morbid obesity with a BMI of 41.91.  10. 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  11.  Status post dual-chamber pacemaker implanted in 2023 no complications.  Medtronic dual-chamber pacemaker Bisi XT DR DUFFY  12.  Evidence of atrial fibrillation by device interrogation.    Plan recommendation    From the electrophysiologist on point she is doing well.  Will continue with current medical therapy and follow-up in office every 6 months or sooner needed    If he has recurrence of atrial fibrillation or ventricular arrhythmias beta-blocker will be indicated.    Follow my office in 6 months or as needed    Follow-up with the device clinic as scheduled    Risk factor modification and lifestyle modification discussed with patient. Diet , exercise and hydration discussed with patient.    I have personally review with patient during this office visit, laboratory data, echocardiogram results, stress test results, Holter-event monitor results prior and after the last electrophysiology visit. All questions has been answered.    Please excuse any errors in grammar or translation related to this dictation.  Voice recognition software was utilized to prepare this document.      I, Dr. Solis, personally performed the services described in the documentation as scribed by the nurse in my presence, and confirm it is both accurate and complete.

## 2025-03-13 ENCOUNTER — PROCEDURE VISIT (OUTPATIENT)
Dept: HEMATOLOGY/ONCOLOGY | Facility: HOSPITAL | Age: 85
End: 2025-03-13
Payer: MEDICARE

## 2025-03-13 ENCOUNTER — INFUSION (OUTPATIENT)
Dept: HEMATOLOGY/ONCOLOGY | Facility: HOSPITAL | Age: 85
End: 2025-03-13
Payer: MEDICARE

## 2025-03-13 ENCOUNTER — LAB (OUTPATIENT)
Dept: LAB | Facility: HOSPITAL | Age: 85
End: 2025-03-13
Payer: MEDICARE

## 2025-03-13 VITALS
HEIGHT: 72 IN | WEIGHT: 265.43 LBS | DIASTOLIC BLOOD PRESSURE: 55 MMHG | BODY MASS INDEX: 35.95 KG/M2 | RESPIRATION RATE: 18 BRPM | HEART RATE: 66 BPM | OXYGEN SATURATION: 100 % | SYSTOLIC BLOOD PRESSURE: 136 MMHG | TEMPERATURE: 97.5 F

## 2025-03-13 DIAGNOSIS — D46.9 MYELODYSPLASTIC SYNDROME (MULTI): Primary | ICD-10-CM

## 2025-03-13 DIAGNOSIS — D46.9 MYELODYSPLASTIC SYNDROME (MULTI): ICD-10-CM

## 2025-03-13 LAB
ALBUMIN SERPL BCP-MCNC: 3.9 G/DL (ref 3.4–5)
ALP SERPL-CCNC: 98 U/L (ref 33–136)
ALT SERPL W P-5'-P-CCNC: 24 U/L (ref 10–52)
ANION GAP SERPL CALC-SCNC: 11 MMOL/L (ref 10–20)
AST SERPL W P-5'-P-CCNC: 20 U/L (ref 9–39)
BASOPHILS # BLD AUTO: 0.01 X10*3/UL (ref 0–0.1)
BASOPHILS NFR BLD AUTO: 0.3 %
BILIRUB SERPL-MCNC: 0.7 MG/DL (ref 0–1.2)
BUN SERPL-MCNC: 41 MG/DL (ref 6–23)
CALCIUM SERPL-MCNC: 9.1 MG/DL (ref 8.6–10.3)
CHLORIDE SERPL-SCNC: 109 MMOL/L (ref 98–107)
CO2 SERPL-SCNC: 23 MMOL/L (ref 21–32)
CREAT SERPL-MCNC: 1.46 MG/DL (ref 0.5–1.3)
EGFRCR SERPLBLD CKD-EPI 2021: 47 ML/MIN/1.73M*2
EOSINOPHIL # BLD AUTO: 0.07 X10*3/UL (ref 0–0.4)
EOSINOPHIL NFR BLD AUTO: 2.3 %
ERYTHROCYTE [DISTWIDTH] IN BLOOD BY AUTOMATED COUNT: 19.7 % (ref 11.5–14.5)
GLUCOSE SERPL-MCNC: 101 MG/DL (ref 74–99)
HCT VFR BLD AUTO: 31.8 % (ref 41–52)
HGB BLD-MCNC: 10 G/DL (ref 13.5–17.5)
IMM GRANULOCYTES # BLD AUTO: 0.01 X10*3/UL (ref 0–0.5)
IMM GRANULOCYTES NFR BLD AUTO: 0.3 % (ref 0–0.9)
LYMPHOCYTES # BLD AUTO: 0.74 X10*3/UL (ref 0.8–3)
LYMPHOCYTES NFR BLD AUTO: 24.8 %
MCH RBC QN AUTO: 30.5 PG (ref 26–34)
MCHC RBC AUTO-ENTMCNC: 31.4 G/DL (ref 32–36)
MCV RBC AUTO: 97 FL (ref 80–100)
MONOCYTES # BLD AUTO: 0.17 X10*3/UL (ref 0.05–0.8)
MONOCYTES NFR BLD AUTO: 5.7 %
NEUTROPHILS # BLD AUTO: 1.98 X10*3/UL (ref 1.6–5.5)
NEUTROPHILS NFR BLD AUTO: 66.6 %
NRBC BLD-RTO: 0 /100 WBCS (ref 0–0)
PLATELET # BLD AUTO: 70 X10*3/UL (ref 150–450)
POTASSIUM SERPL-SCNC: 4.3 MMOL/L (ref 3.5–5.3)
PROT SERPL-MCNC: 6.2 G/DL (ref 6.4–8.2)
PTH-INTACT SERPL-MCNC: 53.7 PG/ML (ref 18.5–88)
RBC # BLD AUTO: 3.28 X10*6/UL (ref 4.5–5.9)
SODIUM SERPL-SCNC: 139 MMOL/L (ref 136–145)
WBC # BLD AUTO: 3 X10*3/UL (ref 4.4–11.3)

## 2025-03-13 PROCEDURE — 2500000004 HC RX 250 GENERAL PHARMACY W/ HCPCS (ALT 636 FOR OP/ED): Performed by: STUDENT IN AN ORGANIZED HEALTH CARE EDUCATION/TRAINING PROGRAM

## 2025-03-13 PROCEDURE — 88271 CYTOGENETICS DNA PROBE: CPT | Performed by: INTERNAL MEDICINE

## 2025-03-13 PROCEDURE — 83970 ASSAY OF PARATHORMONE: CPT | Performed by: INTERNAL MEDICINE

## 2025-03-13 PROCEDURE — 96401 CHEMO ANTI-NEOPL SQ/IM: CPT

## 2025-03-13 PROCEDURE — 36415 COLL VENOUS BLD VENIPUNCTURE: CPT

## 2025-03-13 PROCEDURE — 2560000001 HC RX 256 EXPERIMENTAL DRUGS: Performed by: STUDENT IN AN ORGANIZED HEALTH CARE EDUCATION/TRAINING PROGRAM

## 2025-03-13 PROCEDURE — 85025 COMPLETE CBC W/AUTO DIFF WBC: CPT

## 2025-03-13 PROCEDURE — 85097 BONE MARROW INTERPRETATION: CPT | Performed by: INTERNAL MEDICINE

## 2025-03-13 PROCEDURE — 84075 ASSAY ALKALINE PHOSPHATASE: CPT

## 2025-03-13 RX ORDER — ONDANSETRON HYDROCHLORIDE 8 MG/1
8 TABLET, FILM COATED ORAL ONCE
Status: COMPLETED | OUTPATIENT
Start: 2025-03-13 | End: 2025-03-13

## 2025-03-13 RX ADMIN — Medication 12.9 MG: at 10:51

## 2025-03-13 RX ADMIN — ONDANSETRON HYDROCHLORIDE 8 MG: 8 TABLET, FILM COATED ORAL at 10:51

## 2025-03-13 NOTE — PROGRESS NOTES
GENARO Burgess is a 84 y.o. male who presents for Study [id number] Decitabine only today and Bmbx.    He is on the following chemotherapy regimen:   Treatment Plans       Name Type Plan Dates Plan Provider         Active    ([facility]) [id number] - AzaCITIDine / Decitabine, 42 Day Cycle Followed by [facility] Day [facility] Oncology Treatment 9/29/2024 - Present Sebastien Rashid MD                  Since his last visit, he has been doing well.  Overall, he states his energy level is stable.  His appetite has been good.  He reports constipation-takes daily softners and laxatives and PRN lactulose, BLE edema, intermittent SOB with minimal exertion.  He has no other concerns.    ANC 1.98, filgrastim held per standing orders.    Patient tolerated decitabine injection to RUQ without incident-erythremia and bumps on right side ABD. Bmbx preformed by Thomas KENDRICK to left hip without incident. Dressing clean dry and intact at time of discharge. Patient discharged independently off unit in stable condition accompanied by wife.

## 2025-03-13 NOTE — PROGRESS NOTES
"Patient ID: Holden Burgess \"GENARO\" is a 84 y.o. male.    Biopsy bone marrow    Date/Time: 3/13/2025 10:30 AM    Performed by: Waylon Mancia PA-C  Authorized by: Waylon Mancia PA-C    Consent:     Consent obtained:  Verbal    Consent given by:  Patient    Risks, benefits, and alternatives were discussed: yes      Risks discussed:  Bleeding, infection and pain    Alternatives discussed:  Observation  Universal protocol:     Procedure explained and questions answered to patient or proxy's satisfaction: yes      Relevant documents present and verified: yes      Test results available: yes      Site/side marked: yes      Immediately prior to procedure, a time out was called: yes      Patient identity confirmed:  Verbally with patient  Indications:     Indications:  MDS, Clinical Trial  Pre-procedure details:     Skin preparation:  Chlorhexidine    Preparation: Patient was prepped and draped in the usual sterile fashion    Sedation:     Sedation type:  None  Anesthesia:     Anesthesia method:  Local infiltration    Local anesthetic:  Lidocaine 1% w/o epi and lidocaine 2% w/o epi  Procedure specific details:      The patient was placed in the prone position, and the Left posterior iliac crest was prepped with chlorhexidine.     The skin, subcutaneous tissues, and periosteum were anesthetized with 5mL of 1% lidocaine and 10mL of 2% lidocaine.     A small incision was made with a #15 scalpel, and the BD Trek powered bone marrow needle was advanced through the periosteum into the intramedullary space.    27 mL bone marrow was aspirated; the needle was then advanced quite a bit further, but, nevertheless, only a 1 cm core biopsy obtained. The specimen was sent for morphology, flow cytometry, cytogenetics, FISH/molecular per Hematopathology, and and per clinical trial protocol.    The needle was removed and hemostasis achieved.     The procedure was tolerated well and there were no " complications.    Procedure completed by: Waylon Mancia PA-C    Post-procedure details:     Procedure completion:  Tolerated

## 2025-03-14 LAB
CELL COUNT (BLOOD): 1.95 X10*3/UL
CELL POPULATIONS: NORMAL
DIAGNOSIS: NORMAL
FLOW DIFFERENTIAL: NORMAL
FLOW TEST ORDERED: NORMAL
LAB TEST METHOD: NORMAL
NUMBER OF CELLS COLLECTED: NORMAL PER TUBE
PATH REPORT.TOTAL CANCER: NORMAL
SIGNATURE COMMENT: NORMAL
SPECIMEN VIABILITY: NORMAL

## 2025-03-14 PROCEDURE — 88189 FLOWCYTOMETRY/READ 16 & >: CPT | Performed by: INTERNAL MEDICINE

## 2025-03-14 PROCEDURE — 88185 FLOWCYTOMETRY/TC ADD-ON: CPT | Mod: TC | Performed by: INTERNAL MEDICINE

## 2025-03-17 ENCOUNTER — INFUSION (OUTPATIENT)
Dept: HEMATOLOGY/ONCOLOGY | Facility: HOSPITAL | Age: 85
End: 2025-03-17
Payer: MEDICARE

## 2025-03-17 ENCOUNTER — LAB (OUTPATIENT)
Dept: LAB | Facility: HOSPITAL | Age: 85
End: 2025-03-17
Payer: MEDICARE

## 2025-03-17 VITALS
SYSTOLIC BLOOD PRESSURE: 145 MMHG | OXYGEN SATURATION: 98 % | RESPIRATION RATE: 18 BRPM | TEMPERATURE: 97.9 F | WEIGHT: 264.6 LBS | HEIGHT: 72 IN | DIASTOLIC BLOOD PRESSURE: 62 MMHG | BODY MASS INDEX: 35.84 KG/M2 | HEART RATE: 90 BPM

## 2025-03-17 DIAGNOSIS — D46.9 MYELODYSPLASTIC SYNDROME (MULTI): ICD-10-CM

## 2025-03-17 LAB
ALBUMIN SERPL BCP-MCNC: 4 G/DL (ref 3.4–5)
ALP SERPL-CCNC: 101 U/L (ref 33–136)
ALT SERPL W P-5'-P-CCNC: 23 U/L (ref 10–52)
ANION GAP SERPL CALC-SCNC: 12 MMOL/L (ref 10–20)
AST SERPL W P-5'-P-CCNC: 17 U/L (ref 9–39)
BASOPHILS # BLD AUTO: 0.02 X10*3/UL (ref 0–0.1)
BASOPHILS NFR BLD AUTO: 0.6 %
BILIRUB SERPL-MCNC: 0.7 MG/DL (ref 0–1.2)
BUN SERPL-MCNC: 32 MG/DL (ref 6–23)
CALCIUM SERPL-MCNC: 9.1 MG/DL (ref 8.6–10.3)
CHLORIDE SERPL-SCNC: 105 MMOL/L (ref 98–107)
CO2 SERPL-SCNC: 26 MMOL/L (ref 21–32)
CREAT SERPL-MCNC: 1.4 MG/DL (ref 0.5–1.3)
EGFRCR SERPLBLD CKD-EPI 2021: 50 ML/MIN/1.73M*2
EOSINOPHIL # BLD AUTO: 0.06 X10*3/UL (ref 0–0.4)
EOSINOPHIL NFR BLD AUTO: 1.9 %
ERYTHROCYTE [DISTWIDTH] IN BLOOD BY AUTOMATED COUNT: 19.8 % (ref 11.5–14.5)
GLUCOSE SERPL-MCNC: 83 MG/DL (ref 74–99)
HCT VFR BLD AUTO: 32.4 % (ref 41–52)
HGB BLD-MCNC: 10.2 G/DL (ref 13.5–17.5)
IMM GRANULOCYTES # BLD AUTO: 0.02 X10*3/UL (ref 0–0.5)
IMM GRANULOCYTES NFR BLD AUTO: 0.6 % (ref 0–0.9)
LYMPHOCYTES # BLD AUTO: 0.78 X10*3/UL (ref 0.8–3)
LYMPHOCYTES NFR BLD AUTO: 25.1 %
MCH RBC QN AUTO: 31 PG (ref 26–34)
MCHC RBC AUTO-ENTMCNC: 31.5 G/DL (ref 32–36)
MCV RBC AUTO: 99 FL (ref 80–100)
MONOCYTES # BLD AUTO: 0.14 X10*3/UL (ref 0.05–0.8)
MONOCYTES NFR BLD AUTO: 4.5 %
NEUTROPHILS # BLD AUTO: 2.09 X10*3/UL (ref 1.6–5.5)
NEUTROPHILS NFR BLD AUTO: 67.3 %
NRBC BLD-RTO: 0 /100 WBCS (ref 0–0)
PLATELET # BLD AUTO: 75 X10*3/UL (ref 150–450)
POTASSIUM SERPL-SCNC: 4.6 MMOL/L (ref 3.5–5.3)
PROT SERPL-MCNC: 6.2 G/DL (ref 6.4–8.2)
RBC # BLD AUTO: 3.29 X10*6/UL (ref 4.5–5.9)
SODIUM SERPL-SCNC: 138 MMOL/L (ref 136–145)
WBC # BLD AUTO: 3.1 X10*3/UL (ref 4.4–11.3)

## 2025-03-17 PROCEDURE — 2500000004 HC RX 250 GENERAL PHARMACY W/ HCPCS (ALT 636 FOR OP/ED): Performed by: STUDENT IN AN ORGANIZED HEALTH CARE EDUCATION/TRAINING PROGRAM

## 2025-03-17 PROCEDURE — 96372 THER/PROPH/DIAG INJ SC/IM: CPT

## 2025-03-17 PROCEDURE — 36415 COLL VENOUS BLD VENIPUNCTURE: CPT

## 2025-03-17 PROCEDURE — 85025 COMPLETE CBC W/AUTO DIFF WBC: CPT

## 2025-03-17 PROCEDURE — 80053 COMPREHEN METABOLIC PANEL: CPT

## 2025-03-17 PROCEDURE — 2560000001 HC RX 256 EXPERIMENTAL DRUGS: Performed by: STUDENT IN AN ORGANIZED HEALTH CARE EDUCATION/TRAINING PROGRAM

## 2025-03-17 RX ORDER — EPINEPHRINE 0.3 MG/.3ML
0.3 INJECTION SUBCUTANEOUS EVERY 5 MIN PRN
Status: DISCONTINUED | OUTPATIENT
Start: 2025-03-17 | End: 2025-03-17 | Stop reason: HOSPADM

## 2025-03-17 RX ORDER — DIPHENHYDRAMINE HYDROCHLORIDE 50 MG/ML
50 INJECTION, SOLUTION INTRAMUSCULAR; INTRAVENOUS AS NEEDED
Status: DISCONTINUED | OUTPATIENT
Start: 2025-03-17 | End: 2025-03-17 | Stop reason: HOSPADM

## 2025-03-17 RX ORDER — ALBUTEROL SULFATE 0.83 MG/ML
3 SOLUTION RESPIRATORY (INHALATION) AS NEEDED
Status: DISCONTINUED | OUTPATIENT
Start: 2025-03-17 | End: 2025-03-17 | Stop reason: HOSPADM

## 2025-03-17 RX ORDER — ONDANSETRON HYDROCHLORIDE 8 MG/1
8 TABLET, FILM COATED ORAL ONCE
Status: COMPLETED | OUTPATIENT
Start: 2025-03-17 | End: 2025-03-17

## 2025-03-17 RX ORDER — FAMOTIDINE 10 MG/ML
20 INJECTION, SOLUTION INTRAVENOUS ONCE AS NEEDED
Status: DISCONTINUED | OUTPATIENT
Start: 2025-03-17 | End: 2025-03-17 | Stop reason: HOSPADM

## 2025-03-17 RX ADMIN — ONDANSETRON HYDROCHLORIDE 8 MG: 8 TABLET, FILM COATED ORAL at 10:32

## 2025-03-17 RX ADMIN — Medication 129 MG: at 10:46

## 2025-03-17 ASSESSMENT — PAIN SCALES - GENERAL: PAINLEVEL_OUTOF10: 0-NO PAIN

## 2025-03-17 NOTE — PROGRESS NOTES
GENARO Burgess is a 84 y.o. male who presents in stable condition for C6D15 infusion     He is on the following chemotherapy regimen:     Treatment Plans       Name Type Plan Dates Plan Provider         Active    ([facility]) [id number] - AzaCITIDine / Decitabine, 42 Day Cycle Followed by 28 Day Cycles Oncology Treatment 9/29/2024 - Present Sebastien Rashid MD             Since his last visit, he has been doing well.  Overall, he states his energy level is stable.  His appetite & hydration status has been good.  He reports no complaints.      No Zarxio injection indicated for today-- ANC 2.09 Patient tolerated  LUQ subcutaneous injection well and has been educated with the overall therapy plan. He has no other questions or concerns at this time. AVS, lab results & future appointment provided. Patient discharged in stable condition with wife.     C6D18 due & scheduled to be given 3/20/2025    Reviewed and approved by PRINCESS STEVENSON on 3/17/25 at 10:58 AM.

## 2025-03-18 ENCOUNTER — TREATMENT (OUTPATIENT)
Dept: PHYSICAL THERAPY | Facility: CLINIC | Age: 85
End: 2025-03-18
Payer: MEDICARE

## 2025-03-18 DIAGNOSIS — D46.9 MYELODYSPLASTIC SYNDROME (MULTI): ICD-10-CM

## 2025-03-18 LAB
CHROM ANALY OVERALL INTERP-IMP: NORMAL
ELECTRONICALLY COSIGNED BY CYTOGENETICS: NORMAL
ELECTRONICALLY SIGNED BY CYTOGENETICS: NORMAL
FISH ISCN RESULTS: NORMAL

## 2025-03-18 PROCEDURE — 97110 THERAPEUTIC EXERCISES: CPT | Mod: GP | Performed by: GENERAL ACUTE CARE HOSPITAL

## 2025-03-18 NOTE — PROGRESS NOTES
"Patient Name: Holden Burgess \"GENARO\"  MRN: [id number]  Today's Date: 3/18/2025  Time Calculation  Start Time: 0914  Stop Time: 0958  Time Calculation (min): 44 min  Current Problem:  1. Myelodysplastic syndrome (Multi)  Follow Up In Physical Therapy          Visit 4/20    Subjective:  Change in pain/ pain level: 0/10  Patient comments: getting over a cold. Improving walking tolerance     Objective: track ambulation 2 laps, 1 with cane 1 with walker     Treatment:    Therapeutic exercise (97028):  nustep level 5 8 min no arms  Track amb   Titl board  Leg Press 80# 2x20  HSC 45# 2x15    Assessment:  Fatigue with track amb. Patient continues to require active therapy to progress functional capabilities with decreasing pain levels and improving mechanics with functional activities including progression of Home exercise program. Tolerance to today's treatment was good. Muscle fatigue noted.  Patient appears motivated and compliant this date, demonstrating a working understanding of principals instructed and home program.    Plan:  Progress with functional strength as tolerated with respect to tissue healing. Manual therapy PRN to improve/maintain ROM, prevent adhesion, reduce pain.  "

## 2025-03-19 PROCEDURE — 88311 DECALCIFY TISSUE: CPT | Performed by: PATHOLOGY

## 2025-03-19 PROCEDURE — 88341 IMHCHEM/IMCYTCHM EA ADD ANTB: CPT | Performed by: PATHOLOGY

## 2025-03-19 PROCEDURE — 88313 SPECIAL STAINS GROUP 2: CPT | Mod: TC,SUR | Performed by: INTERNAL MEDICINE

## 2025-03-19 PROCEDURE — 88305 TISSUE EXAM BY PATHOLOGIST: CPT | Performed by: PATHOLOGY

## 2025-03-19 PROCEDURE — 88342 IMHCHEM/IMCYTCHM 1ST ANTB: CPT | Performed by: PATHOLOGY

## 2025-03-19 PROCEDURE — 88313 SPECIAL STAINS GROUP 2: CPT | Performed by: PATHOLOGY

## 2025-03-20 ENCOUNTER — LAB (OUTPATIENT)
Dept: LAB | Facility: HOSPITAL | Age: 85
End: 2025-03-20
Payer: MEDICARE

## 2025-03-20 ENCOUNTER — INFUSION (OUTPATIENT)
Dept: HEMATOLOGY/ONCOLOGY | Facility: HOSPITAL | Age: 85
End: 2025-03-20
Payer: MEDICARE

## 2025-03-20 VITALS
RESPIRATION RATE: 18 BRPM | WEIGHT: 265 LBS | BODY MASS INDEX: 36.13 KG/M2 | SYSTOLIC BLOOD PRESSURE: 148 MMHG | HEART RATE: 86 BPM | TEMPERATURE: 97.5 F | DIASTOLIC BLOOD PRESSURE: 60 MMHG

## 2025-03-20 DIAGNOSIS — D46.9 MYELODYSPLASTIC SYNDROME (MULTI): ICD-10-CM

## 2025-03-20 LAB
ALBUMIN SERPL BCP-MCNC: 4.1 G/DL (ref 3.4–5)
ALP SERPL-CCNC: 102 U/L (ref 33–136)
ALT SERPL W P-5'-P-CCNC: 23 U/L (ref 10–52)
ANION GAP SERPL CALC-SCNC: 11 MMOL/L (ref 10–20)
AST SERPL W P-5'-P-CCNC: 19 U/L (ref 9–39)
BASOPHILS # BLD AUTO: 0.01 X10*3/UL (ref 0–0.1)
BASOPHILS NFR BLD AUTO: 0.3 %
BILIRUB SERPL-MCNC: 0.7 MG/DL (ref 0–1.2)
BUN SERPL-MCNC: 36 MG/DL (ref 6–23)
CALCIUM SERPL-MCNC: 9.4 MG/DL (ref 8.6–10.3)
CHLORIDE SERPL-SCNC: 107 MMOL/L (ref 98–107)
CO2 SERPL-SCNC: 25 MMOL/L (ref 21–32)
CREAT SERPL-MCNC: 1.53 MG/DL (ref 0.5–1.3)
EGFRCR SERPLBLD CKD-EPI 2021: 45 ML/MIN/1.73M*2
EOSINOPHIL # BLD AUTO: 0.06 X10*3/UL (ref 0–0.4)
EOSINOPHIL NFR BLD AUTO: 2 %
ERYTHROCYTE [DISTWIDTH] IN BLOOD BY AUTOMATED COUNT: 19.7 % (ref 11.5–14.5)
GLUCOSE SERPL-MCNC: 91 MG/DL (ref 74–99)
HCT VFR BLD AUTO: 32.2 % (ref 41–52)
HGB BLD-MCNC: 10 G/DL (ref 13.5–17.5)
IMM GRANULOCYTES # BLD AUTO: 0.02 X10*3/UL (ref 0–0.5)
IMM GRANULOCYTES NFR BLD AUTO: 0.7 % (ref 0–0.9)
LYMPHOCYTES # BLD AUTO: 0.9 X10*3/UL (ref 0.8–3)
LYMPHOCYTES NFR BLD AUTO: 29.6 %
MCH RBC QN AUTO: 30.7 PG (ref 26–34)
MCHC RBC AUTO-ENTMCNC: 31.1 G/DL (ref 32–36)
MCV RBC AUTO: 99 FL (ref 80–100)
MONOCYTES # BLD AUTO: 0.14 X10*3/UL (ref 0.05–0.8)
MONOCYTES NFR BLD AUTO: 4.6 %
NEUTROPHILS # BLD AUTO: 1.91 X10*3/UL (ref 1.6–5.5)
NEUTROPHILS NFR BLD AUTO: 62.8 %
NRBC BLD-RTO: 0 /100 WBCS (ref 0–0)
PLATELET # BLD AUTO: 88 X10*3/UL (ref 150–450)
POTASSIUM SERPL-SCNC: 4.7 MMOL/L (ref 3.5–5.3)
PROT SERPL-MCNC: 6.3 G/DL (ref 6.4–8.2)
RBC # BLD AUTO: 3.26 X10*6/UL (ref 4.5–5.9)
SODIUM SERPL-SCNC: 138 MMOL/L (ref 136–145)
WBC # BLD AUTO: 3 X10*3/UL (ref 4.4–11.3)

## 2025-03-20 PROCEDURE — 85025 COMPLETE CBC W/AUTO DIFF WBC: CPT

## 2025-03-20 PROCEDURE — 2500000004 HC RX 250 GENERAL PHARMACY W/ HCPCS (ALT 636 FOR OP/ED): Performed by: STUDENT IN AN ORGANIZED HEALTH CARE EDUCATION/TRAINING PROGRAM

## 2025-03-20 PROCEDURE — 80053 COMPREHEN METABOLIC PANEL: CPT

## 2025-03-20 PROCEDURE — 36415 COLL VENOUS BLD VENIPUNCTURE: CPT

## 2025-03-20 PROCEDURE — 2560000001 HC RX 256 EXPERIMENTAL DRUGS: Performed by: STUDENT IN AN ORGANIZED HEALTH CARE EDUCATION/TRAINING PROGRAM

## 2025-03-20 PROCEDURE — 96401 CHEMO ANTI-NEOPL SQ/IM: CPT

## 2025-03-20 RX ORDER — ONDANSETRON HYDROCHLORIDE 8 MG/1
8 TABLET, FILM COATED ORAL ONCE
Status: COMPLETED | OUTPATIENT
Start: 2025-03-20 | End: 2025-03-20

## 2025-03-20 RX ORDER — DIPHENHYDRAMINE HYDROCHLORIDE 50 MG/ML
50 INJECTION, SOLUTION INTRAMUSCULAR; INTRAVENOUS AS NEEDED
Status: DISCONTINUED | OUTPATIENT
Start: 2025-03-20 | End: 2025-03-20 | Stop reason: HOSPADM

## 2025-03-20 RX ORDER — EPINEPHRINE 0.3 MG/.3ML
0.3 INJECTION SUBCUTANEOUS EVERY 5 MIN PRN
Status: DISCONTINUED | OUTPATIENT
Start: 2025-03-20 | End: 2025-03-20 | Stop reason: HOSPADM

## 2025-03-20 RX ORDER — ALBUTEROL SULFATE 0.83 MG/ML
3 SOLUTION RESPIRATORY (INHALATION) AS NEEDED
Status: DISCONTINUED | OUTPATIENT
Start: 2025-03-20 | End: 2025-03-20 | Stop reason: HOSPADM

## 2025-03-20 RX ORDER — FAMOTIDINE 10 MG/ML
20 INJECTION, SOLUTION INTRAVENOUS ONCE AS NEEDED
Status: DISCONTINUED | OUTPATIENT
Start: 2025-03-20 | End: 2025-03-20 | Stop reason: HOSPADM

## 2025-03-20 RX ADMIN — Medication 12.9 MG: at 10:49

## 2025-03-20 RX ADMIN — ONDANSETRON HYDROCHLORIDE 8 MG: 8 TABLET, FILM COATED ORAL at 10:23

## 2025-03-20 NOTE — PROGRESS NOTES
GENARO Burgess is a 84 y.o. male who presents in stable condition for C6D18 infusion      He is on the following chemotherapy regimen:      Treatment Plans         Name Type Plan Dates Plan Provider                Active     ([facility]) [id number] - AzaCITIDine / Decitabine, 42 Day Cycle Followed by 28 Day [facility] Oncology Treatment 9/29/2024 - Present Sebastien Rashid MD      No Zarxio injection indicated for today-- ANC 1.91. Patient tolerated RLQ subcutaneous injection well. Labs and AVS were printed for pt. Pt was discharged in stable condition.

## 2025-03-24 ENCOUNTER — INFUSION (OUTPATIENT)
Dept: HEMATOLOGY/ONCOLOGY | Facility: HOSPITAL | Age: 85
End: 2025-03-24
Payer: MEDICARE

## 2025-03-24 ENCOUNTER — LAB (OUTPATIENT)
Dept: LAB | Facility: HOSPITAL | Age: 85
End: 2025-03-24
Payer: MEDICARE

## 2025-03-24 VITALS
OXYGEN SATURATION: 99 % | TEMPERATURE: 96.8 F | HEART RATE: 87 BPM | RESPIRATION RATE: 18 BRPM | BODY MASS INDEX: 35.86 KG/M2 | DIASTOLIC BLOOD PRESSURE: 65 MMHG | SYSTOLIC BLOOD PRESSURE: 140 MMHG | WEIGHT: 263 LBS

## 2025-03-24 DIAGNOSIS — D64.81 ANEMIA DUE TO CHEMOTHERAPY FOR MYELODYSPLASTIC SYNDROME TREATED WITH ERYTHROPOIETIN: ICD-10-CM

## 2025-03-24 DIAGNOSIS — D46.9 ANEMIA DUE TO CHEMOTHERAPY FOR MYELODYSPLASTIC SYNDROME TREATED WITH ERYTHROPOIETIN: ICD-10-CM

## 2025-03-24 DIAGNOSIS — T45.1X5A ANEMIA DUE TO CHEMOTHERAPY FOR MYELODYSPLASTIC SYNDROME TREATED WITH ERYTHROPOIETIN: ICD-10-CM

## 2025-03-24 DIAGNOSIS — D46.9 MYELODYSPLASTIC SYNDROME (MULTI): ICD-10-CM

## 2025-03-24 LAB
ALBUMIN SERPL BCP-MCNC: 4.1 G/DL (ref 3.4–5)
ALP SERPL-CCNC: 100 U/L (ref 33–136)
ALT SERPL W P-5'-P-CCNC: 23 U/L (ref 10–52)
ANION GAP SERPL CALC-SCNC: 12 MMOL/L (ref 10–20)
AST SERPL W P-5'-P-CCNC: 19 U/L (ref 9–39)
BASOPHILS # BLD AUTO: 0.01 X10*3/UL (ref 0–0.1)
BASOPHILS NFR BLD AUTO: 0.3 %
BILIRUB SERPL-MCNC: 0.7 MG/DL (ref 0–1.2)
BUN SERPL-MCNC: 35 MG/DL (ref 6–23)
CALCIUM SERPL-MCNC: 9.2 MG/DL (ref 8.6–10.3)
CHLORIDE SERPL-SCNC: 106 MMOL/L (ref 98–107)
CO2 SERPL-SCNC: 26 MMOL/L (ref 21–32)
CREAT SERPL-MCNC: 1.42 MG/DL (ref 0.5–1.3)
EGFRCR SERPLBLD CKD-EPI 2021: 49 ML/MIN/1.73M*2
EOSINOPHIL # BLD AUTO: 0.07 X10*3/UL (ref 0–0.4)
EOSINOPHIL NFR BLD AUTO: 2.3 %
ERYTHROCYTE [DISTWIDTH] IN BLOOD BY AUTOMATED COUNT: 19.7 % (ref 11.5–14.5)
GLUCOSE SERPL-MCNC: 83 MG/DL (ref 74–99)
HCT VFR BLD AUTO: 34.1 % (ref 41–52)
HGB BLD-MCNC: 10.6 G/DL (ref 13.5–17.5)
IMM GRANULOCYTES # BLD AUTO: 0.04 X10*3/UL (ref 0–0.5)
IMM GRANULOCYTES NFR BLD AUTO: 1.3 % (ref 0–0.9)
LYMPHOCYTES # BLD AUTO: 0.82 X10*3/UL (ref 0.8–3)
LYMPHOCYTES NFR BLD AUTO: 26.5 %
MCH RBC QN AUTO: 30.5 PG (ref 26–34)
MCHC RBC AUTO-ENTMCNC: 31.1 G/DL (ref 32–36)
MCV RBC AUTO: 98 FL (ref 80–100)
MONOCYTES # BLD AUTO: 0.16 X10*3/UL (ref 0.05–0.8)
MONOCYTES NFR BLD AUTO: 5.2 %
NEUTROPHILS # BLD AUTO: 1.99 X10*3/UL (ref 1.6–5.5)
NEUTROPHILS NFR BLD AUTO: 64.4 %
NRBC BLD-RTO: 0 /100 WBCS (ref 0–0)
PLATELET # BLD AUTO: 75 X10*3/UL (ref 150–450)
POTASSIUM SERPL-SCNC: 4.6 MMOL/L (ref 3.5–5.3)
PROT SERPL-MCNC: 6.3 G/DL (ref 6.4–8.2)
RBC # BLD AUTO: 3.47 X10*6/UL (ref 4.5–5.9)
SODIUM SERPL-SCNC: 139 MMOL/L (ref 136–145)
WBC # BLD AUTO: 3.1 X10*3/UL (ref 4.4–11.3)

## 2025-03-24 PROCEDURE — 2500000004 HC RX 250 GENERAL PHARMACY W/ HCPCS (ALT 636 FOR OP/ED): Mod: JZ,TB | Performed by: NURSE PRACTITIONER

## 2025-03-24 PROCEDURE — 2500000004 HC RX 250 GENERAL PHARMACY W/ HCPCS (ALT 636 FOR OP/ED): Performed by: STUDENT IN AN ORGANIZED HEALTH CARE EDUCATION/TRAINING PROGRAM

## 2025-03-24 PROCEDURE — 36415 COLL VENOUS BLD VENIPUNCTURE: CPT

## 2025-03-24 PROCEDURE — 96401 CHEMO ANTI-NEOPL SQ/IM: CPT

## 2025-03-24 PROCEDURE — 85025 COMPLETE CBC W/AUTO DIFF WBC: CPT

## 2025-03-24 PROCEDURE — 2560000001 HC RX 256 EXPERIMENTAL DRUGS: Performed by: STUDENT IN AN ORGANIZED HEALTH CARE EDUCATION/TRAINING PROGRAM

## 2025-03-24 PROCEDURE — 80053 COMPREHEN METABOLIC PANEL: CPT

## 2025-03-24 PROCEDURE — 96372 THER/PROPH/DIAG INJ SC/IM: CPT | Mod: 59

## 2025-03-24 RX ORDER — DIPHENHYDRAMINE HYDROCHLORIDE 50 MG/ML
50 INJECTION, SOLUTION INTRAMUSCULAR; INTRAVENOUS AS NEEDED
Status: DISCONTINUED | OUTPATIENT
Start: 2025-03-24 | End: 2025-03-24 | Stop reason: HOSPADM

## 2025-03-24 RX ORDER — ONDANSETRON HYDROCHLORIDE 8 MG/1
8 TABLET, FILM COATED ORAL ONCE
Status: COMPLETED | OUTPATIENT
Start: 2025-03-24 | End: 2025-03-24

## 2025-03-24 RX ORDER — FAMOTIDINE 10 MG/ML
20 INJECTION, SOLUTION INTRAVENOUS ONCE AS NEEDED
OUTPATIENT
Start: 2025-04-07

## 2025-03-24 RX ORDER — ALBUTEROL SULFATE 0.83 MG/ML
3 SOLUTION RESPIRATORY (INHALATION) AS NEEDED
Status: DISCONTINUED | OUTPATIENT
Start: 2025-03-24 | End: 2025-03-24 | Stop reason: HOSPADM

## 2025-03-24 RX ORDER — EPINEPHRINE 0.3 MG/.3ML
0.3 INJECTION SUBCUTANEOUS EVERY 5 MIN PRN
Status: DISCONTINUED | OUTPATIENT
Start: 2025-03-24 | End: 2025-03-24 | Stop reason: HOSPADM

## 2025-03-24 RX ORDER — EPINEPHRINE 0.3 MG/.3ML
0.3 INJECTION SUBCUTANEOUS EVERY 5 MIN PRN
OUTPATIENT
Start: 2025-04-07

## 2025-03-24 RX ORDER — FAMOTIDINE 10 MG/ML
20 INJECTION, SOLUTION INTRAVENOUS ONCE AS NEEDED
Status: DISCONTINUED | OUTPATIENT
Start: 2025-03-24 | End: 2025-03-24 | Stop reason: HOSPADM

## 2025-03-24 RX ORDER — ALBUTEROL SULFATE 0.83 MG/ML
3 SOLUTION RESPIRATORY (INHALATION) AS NEEDED
OUTPATIENT
Start: 2025-04-07

## 2025-03-24 RX ORDER — DIPHENHYDRAMINE HYDROCHLORIDE 50 MG/ML
50 INJECTION, SOLUTION INTRAMUSCULAR; INTRAVENOUS AS NEEDED
OUTPATIENT
Start: 2025-04-07

## 2025-03-24 RX ADMIN — ONDANSETRON HYDROCHLORIDE 8 MG: 8 TABLET, FILM COATED ORAL at 10:57

## 2025-03-24 RX ADMIN — Medication 129 MG: at 11:22

## 2025-03-24 RX ADMIN — DARBEPOETIN ALFA 200 MCG: 200 INJECTION, SOLUTION INTRAVENOUS; SUBCUTANEOUS at 11:22

## 2025-03-24 ASSESSMENT — PAIN SCALES - GENERAL: PAINLEVEL_OUTOF10: 0-NO PAIN

## 2025-03-24 NOTE — PROGRESS NOTES
Patient presents to infusion appointment in stable condition. C6D22 azacitidine injections & Aranesp injection tolerated without incident. Lab results and schedule reviewed. Discharged in stable condition.

## 2025-03-25 ENCOUNTER — TREATMENT (OUTPATIENT)
Dept: PHYSICAL THERAPY | Facility: CLINIC | Age: 85
End: 2025-03-25
Payer: MEDICARE

## 2025-03-25 DIAGNOSIS — D46.9 MYELODYSPLASTIC SYNDROME (MULTI): ICD-10-CM

## 2025-03-25 PROCEDURE — 97110 THERAPEUTIC EXERCISES: CPT | Mod: GP | Performed by: GENERAL ACUTE CARE HOSPITAL

## 2025-03-25 NOTE — PROGRESS NOTES
"Patient Name: Holden Burgess \"GENARO\"  MRN: [id number]  Today's Date: 3/25/2025  Time Calculation  Start Time: 0915  Stop Time: 1000  Time Calculation (min): 45 min  Current Problem:  1. Myelodysplastic syndrome (Multi)  Follow Up In Physical Therapy          Visit 5/20    Subjective:  Change in pain/ pain level: 0/10  Change in function: constipated  Patient comments: finally got over my cold     Objective: track amb 3/10 mile , 1 with cane 2 with walker     Treatment:    Therapeutic exercise (18463):    nustep level 5 8 min no arms  Track amb 3  Titl board L2   Leg Press 90# 2x20  HSC 50# 2x15  Step up 6 in f/L x10 ea     Assessment:  General fatigue noted, limited mobility SBA for upright activity. Patient continues to require active therapy to progress functional capabilities with decreasing pain levels and improving mechanics with functional activities including progression of Home exercise program. Tolerance to today's treatment was good. Muscle fatigue noted.  Patient appears motivated and compliant this date, demonstrating a working understanding of principals instructed and home program.    Plan:  Progress with functional strength as tolerated with respect to tissue healing. Manual therapy PRN to improve/maintain ROM, prevent adhesion, reduce pain.  "

## 2025-03-26 PROBLEM — S30.1XXA ABDOMINAL WALL HEMATOMA: Status: RESOLVED | Noted: 2024-04-05 | Resolved: 2025-03-26

## 2025-03-26 PROBLEM — R06.09 DYSPNEA ON EXERTION: Status: RESOLVED | Noted: 2024-04-04 | Resolved: 2025-03-26

## 2025-03-26 PROBLEM — R73.9 HYPERGLYCEMIA: Status: RESOLVED | Noted: 2024-04-18 | Resolved: 2025-03-26

## 2025-03-26 PROBLEM — D61.818 OTHER PANCYTOPENIA (MULTI): Status: RESOLVED | Noted: 2023-02-19 | Resolved: 2025-03-26

## 2025-03-26 NOTE — ASSESSMENT & PLAN NOTE
3/31/2025: Shaina treatment well.  His Hgb is 11 today and PLT 97.  Continue [id number]     Diagnostics:  -Bone marrow biopsy (week 24) 3/13/25 of therapy shows persistent disease with < 1% blast  Treatment:  - Continue Alternating azacitidine 50 mg/m2 with decitabine 5 mg/m² per [id number]  - C7D1 today  Disease/Toxicity Monitoring:  - Given his hemoglobin and age will check CBC with each visit  Supportive Care:  - Blood products as indicated  - Working with PT  Antimicrobial Prophylaxis:   -None indicated at this time, ANC is in the normal range  IV access:  - for now, continue with PIV; may benefit from port placement or other central line access

## 2025-03-26 NOTE — PROGRESS NOTES
"Patient ID: Holden Burgess \"GENARO\" is a 84 y.o. male.  Referring Physician: No referring provider defined for this encounter.  Primary Care Provider: Giacomo Joseph DO    Date of Service:  3/31/2025    SUBJECTIVE:  Patient presents today, accompanied by his wife, for follow up.  Reports that he has been feeling well.  He is struggling with intermittent constipation.  Reports that he will become constipated about every 3 days.  Currently taking 2 Senna at night and using [id number] and lactulose as needed.  He has occasional leg swelling for which he uses furosemide PRN and compression stockings. He has some clear sinus drainage and an intermittent cough.  States that it feels like he has to clear his throat. No other concerns or complaints.      Oncology History   Myelodysplastic syndrome (Multi)   1/23/2024 Initial Diagnosis    Myelodysplastic syndrome:   Diagnosis:   Originally referred to Dr. Murpyh in 2023 for longstanding thrombocytopenia.  At the time had mild leukopenia, no anemia.  Was refractory to a trial of prednisone, and so Bone marrow biopsy was completed completed on 1/23/2024 and demonstrated:  BONE MARROW CLOT, CORE BIOPSY, ASPIRATE, LEFT ILIAC CREST:   -- MILDLY HYPERCELLULAR BONE MARROW (80%) WITH MATURING TRILINEAGE HEMATOPOIESIS AND MODERATE INCREASE IN MEGAKARYOCYTES, SEE NOTE.  -- SMALL LYMPHOID AGGREGATES, FAVOR BENIGN.  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 Given the presence of persistent cytopenias, hypercellularity with increase megakaryocytes with abnormal clustering, and no increase in blasts, the overall findings are most consistent with:  -- MDS with low blasts (WHO-HAEM5 Classification) /   -- MDS-NOS with single lineage dysplasia (MDS-NOS-SLD) (ICC 2022 Classification).  NGS: SRSF2  Chromosome Analysis: 47,XY,+8[15]/46,XY[5]  IPSS-M: -0.99 - low risks disease     4/4/2024 - 8/29/2024 Supportive Treatment    Treatment: "   I. Eltrombopag -on 4/4/2024 patient continued with persistent thrombocytopenia but also had some progressive anemia with hemoglobin down to 10.  Patient was offered [id number], preferred supportive management with oral medication opted for eltrombopag in the setting of thrombocytopenia starting at 50 and ramping up to 150 mg  From 4/20/2024 through 8/20/2024 patient had progressive anemia and progressive thrombocytopenia and spite of treatment.    II. Darbepoietin -in the setting of worsening anemia darbepoetin was added 7/12/2024 at 100 mcg every 2 weeks       9/30/2024 -  Research Study Participant    ([facility]) [id number] - AzaCITIDine / Decitabine, 42 Day Cycle Followed by 28 Day Cycles  Plan Provider: Sebastien Rashid MD  Treatment goal: Palliative  Line of treatment: First Line  Associated studies: 5-Azacitidine and Decitabine Epigenetic Therapy for Myeloid Malignancies        OBJECTIVE:  KPS: Karnofsky Score: 90 - Able to carry on normal activity; minor signs or symptoms of disease      Physical Exam  Constitutional:       Appearance: Normal appearance.   HENT:      Head: Normocephalic.      Mouth/Throat:      Mouth: Mucous membranes are moist.      Pharynx: Oropharynx is clear. No oropharyngeal exudate.   Eyes:      Pupils: Pupils are equal, round, and reactive to light.   Cardiovascular:      Rate and Rhythm: Normal rate and regular rhythm.      Pulses: Normal pulses.      Heart sounds: Normal heart sounds.   Pulmonary:      Effort: Pulmonary effort is normal.      Breath sounds: Normal breath sounds.   Abdominal:      General: Bowel sounds are normal.      Palpations: Abdomen is soft.   Musculoskeletal:         General: Normal range of motion.      Cervical back: Normal range of motion and neck supple.      Right lower leg: No edema.      Left lower leg: No edema.   Lymphadenopathy:      Comments: No lymphadenopathy   Skin:     General: Skin is warm and dry.      Findings: No lesion or rash.   Neurological:       General: No focal deficit present.      Mental Status: He is alert and oriented to person, place, and time. Mental status is at baseline.      Comments: No numbness or tingling   Psychiatric:         Mood and Affect: Mood normal.       Assessment & Plan  Myelodysplastic syndrome (Multi)  3/31/2025: Shaina treatment well.  His Hgb is 11 today and PLT 97.  Continue [id number]     Diagnostics:  -Bone marrow biopsy (week 24) 3/13/25 of therapy shows persistent disease with < 1% blast  Treatment:  - Continue Alternating azacitidine 50 mg/m2 with decitabine 5 mg/m² per [id number]  - C7D1 today  Disease/Toxicity Monitoring:  - Given his hemoglobin and age will check CBC with each visit  Supportive Care:  - Blood products as indicated  - Working with PT  Antimicrobial Prophylaxis:   -None indicated at this time, ANC is in the normal range  IV access:  - for now, continue with PIV; may benefit from port placement or other central line access  Other constipation  Increase senna to 2 tabs BID  Continue as needed lactulose     RTC:  Q month MD/KENDRICK follow up     MICHELE Starr-CNP

## 2025-03-27 ENCOUNTER — TELEPHONE (OUTPATIENT)
Dept: NEPHROLOGY | Facility: CLINIC | Age: 85
End: 2025-03-27

## 2025-03-27 ENCOUNTER — INFUSION (OUTPATIENT)
Dept: HEMATOLOGY/ONCOLOGY | Facility: HOSPITAL | Age: 85
End: 2025-03-27
Payer: MEDICARE

## 2025-03-27 ENCOUNTER — LAB (OUTPATIENT)
Dept: LAB | Facility: HOSPITAL | Age: 85
End: 2025-03-27
Payer: MEDICARE

## 2025-03-27 VITALS
SYSTOLIC BLOOD PRESSURE: 143 MMHG | WEIGHT: 263.4 LBS | OXYGEN SATURATION: 96 % | HEART RATE: 89 BPM | BODY MASS INDEX: 35.91 KG/M2 | RESPIRATION RATE: 18 BRPM | TEMPERATURE: 97.5 F | DIASTOLIC BLOOD PRESSURE: 62 MMHG

## 2025-03-27 DIAGNOSIS — D46.9 MYELODYSPLASTIC SYNDROME (MULTI): ICD-10-CM

## 2025-03-27 LAB
ALBUMIN SERPL BCP-MCNC: 4 G/DL (ref 3.4–5)
ALP SERPL-CCNC: 101 U/L (ref 33–136)
ALT SERPL W P-5'-P-CCNC: 22 U/L (ref 10–52)
ANION GAP SERPL CALC-SCNC: 11 MMOL/L (ref 10–20)
AST SERPL W P-5'-P-CCNC: 17 U/L (ref 9–39)
BASOPHILS # BLD AUTO: 0.01 X10*3/UL (ref 0–0.1)
BASOPHILS NFR BLD AUTO: 0.3 %
BILIRUB SERPL-MCNC: 0.7 MG/DL (ref 0–1.2)
BUN SERPL-MCNC: 36 MG/DL (ref 6–23)
CALCIUM SERPL-MCNC: 9.3 MG/DL (ref 8.6–10.3)
CHLORIDE SERPL-SCNC: 106 MMOL/L (ref 98–107)
CO2 SERPL-SCNC: 26 MMOL/L (ref 21–32)
CREAT SERPL-MCNC: 1.47 MG/DL (ref 0.5–1.3)
EGFRCR SERPLBLD CKD-EPI 2021: 47 ML/MIN/1.73M*2
EOSINOPHIL # BLD AUTO: 0.07 X10*3/UL (ref 0–0.4)
EOSINOPHIL NFR BLD AUTO: 2.2 %
ERYTHROCYTE [DISTWIDTH] IN BLOOD BY AUTOMATED COUNT: 19.5 % (ref 11.5–14.5)
GLUCOSE SERPL-MCNC: 95 MG/DL (ref 74–99)
HCT VFR BLD AUTO: 33 % (ref 41–52)
HGB BLD-MCNC: 10.3 G/DL (ref 13.5–17.5)
IMM GRANULOCYTES # BLD AUTO: 0.01 X10*3/UL (ref 0–0.5)
IMM GRANULOCYTES NFR BLD AUTO: 0.3 % (ref 0–0.9)
LYMPHOCYTES # BLD AUTO: 0.85 X10*3/UL (ref 0.8–3)
LYMPHOCYTES NFR BLD AUTO: 27.2 %
MCH RBC QN AUTO: 30.9 PG (ref 26–34)
MCHC RBC AUTO-ENTMCNC: 31.2 G/DL (ref 32–36)
MCV RBC AUTO: 99 FL (ref 80–100)
MONOCYTES # BLD AUTO: 0.15 X10*3/UL (ref 0.05–0.8)
MONOCYTES NFR BLD AUTO: 4.8 %
NEUTROPHILS # BLD AUTO: 2.04 X10*3/UL (ref 1.6–5.5)
NEUTROPHILS NFR BLD AUTO: 65.2 %
NRBC BLD-RTO: 0 /100 WBCS (ref 0–0)
PLATELET # BLD AUTO: 82 X10*3/UL (ref 150–450)
POTASSIUM SERPL-SCNC: 5 MMOL/L (ref 3.5–5.3)
PROT SERPL-MCNC: 6.4 G/DL (ref 6.4–8.2)
RBC # BLD AUTO: 3.33 X10*6/UL (ref 4.5–5.9)
SODIUM SERPL-SCNC: 138 MMOL/L (ref 136–145)
WBC # BLD AUTO: 3.1 X10*3/UL (ref 4.4–11.3)

## 2025-03-27 PROCEDURE — 84075 ASSAY ALKALINE PHOSPHATASE: CPT

## 2025-03-27 PROCEDURE — 36415 COLL VENOUS BLD VENIPUNCTURE: CPT

## 2025-03-27 PROCEDURE — 96401 CHEMO ANTI-NEOPL SQ/IM: CPT

## 2025-03-27 PROCEDURE — 85025 COMPLETE CBC W/AUTO DIFF WBC: CPT

## 2025-03-27 PROCEDURE — 2500000004 HC RX 250 GENERAL PHARMACY W/ HCPCS (ALT 636 FOR OP/ED): Performed by: STUDENT IN AN ORGANIZED HEALTH CARE EDUCATION/TRAINING PROGRAM

## 2025-03-27 PROCEDURE — 2560000001 HC RX 256 EXPERIMENTAL DRUGS: Performed by: STUDENT IN AN ORGANIZED HEALTH CARE EDUCATION/TRAINING PROGRAM

## 2025-03-27 RX ORDER — DIPHENHYDRAMINE HYDROCHLORIDE 50 MG/ML
50 INJECTION, SOLUTION INTRAMUSCULAR; INTRAVENOUS AS NEEDED
Status: DISCONTINUED | OUTPATIENT
Start: 2025-03-27 | End: 2025-03-27 | Stop reason: HOSPADM

## 2025-03-27 RX ORDER — FAMOTIDINE 10 MG/ML
20 INJECTION, SOLUTION INTRAVENOUS ONCE AS NEEDED
Status: DISCONTINUED | OUTPATIENT
Start: 2025-03-27 | End: 2025-03-27 | Stop reason: HOSPADM

## 2025-03-27 RX ORDER — EPINEPHRINE 0.3 MG/.3ML
0.3 INJECTION SUBCUTANEOUS EVERY 5 MIN PRN
Status: DISCONTINUED | OUTPATIENT
Start: 2025-03-27 | End: 2025-03-27 | Stop reason: HOSPADM

## 2025-03-27 RX ORDER — ALBUTEROL SULFATE 0.83 MG/ML
3 SOLUTION RESPIRATORY (INHALATION) AS NEEDED
Status: DISCONTINUED | OUTPATIENT
Start: 2025-03-27 | End: 2025-03-27 | Stop reason: HOSPADM

## 2025-03-27 RX ORDER — ONDANSETRON HYDROCHLORIDE 8 MG/1
8 TABLET, FILM COATED ORAL ONCE
Status: COMPLETED | OUTPATIENT
Start: 2025-03-27 | End: 2025-03-27

## 2025-03-27 RX ADMIN — Medication 12.9 MG: at 11:04

## 2025-03-27 RX ADMIN — ONDANSETRON HYDROCHLORIDE 8 MG: 8 TABLET, FILM COATED ORAL at 10:44

## 2025-03-27 ASSESSMENT — PAIN SCALES - GENERAL: PAINLEVEL_OUTOF10: 0-NO PAIN

## 2025-03-27 NOTE — TELEPHONE ENCOUNTER
----- Message from Nicola Brooks sent at 3/27/2025  1:20 PM EDT -----  No major change in kidney function.

## 2025-03-27 NOTE — PROGRESS NOTES
After Visit Summary   7/18/2018    Sidney Preciado    MRN: [id number]           Patient Information     Date Of Birth          1952        Visit Information        Provider Department      7/18/2018 4:00 PM Rolly Colorado MD [facility] Urology and [facility]        Today's Diagnoses     BPH (benign prostatic hyperplasia)    -  1      Care Instructions    Schedule 6 month cystoscopy with Dr. Colorado      It was a pleasure meeting with you today.  Thank you for allowing me and my team the privilege of caring for you today.  YOU are the reason we are here, and I truly hope we provided you with the excellent service you deserve.  Please let us know if there is anything else we can do for you so that we can be sure you are leaving completely satisfied with your care experience.            Nina Wetzel MA           Follow-ups after your visit        Who to contact     Please call your clinic at [phone] to:    Ask questions about your health    Make or cancel appointments    Discuss your medicines    Learn about your test results    Speak to your doctor            Additional Information About Your Visit        [employer] Information     [employer] gives you secure access to your electronic health record. If you see a primary care provider, you can also send messages to your care team and make appointments. If you have questions, please call your primary care clinic.  If you do not have a primary care provider, please call [phone] and they will assist you.      [employer] is an electronic gateway that provides easy, online access to your medical records. With [employer], you can request a clinic appointment, read your test results, renew a prescription or communicate with your care team.     To access your existing account, please contact your [facility] or call [phone] for assistance.        Care EveryWhere ID     This is your Care  GENARO Burgess is a 84 y.o. male who presents for Day 25 Cycle 6.    He is on the following chemotherapy regimen:     Treatment Plans       Name Type Plan Dates Plan Provider         Active    ([facility]) [id number] - AzaCITIDine / Decitabine, 42 Day Cycle Followed by 28 Day Cycles Oncology Treatment 9/29/2024 - Present Sebastien Rashid MD                      Since his last visit, he has been doing well.  Overall, he states his energy level is stable.  His appetite has been unchanged.  He reports no complaints.  He has no other concerns.    He tolerated injection well, AVS along with today's lab results provided and discharged in stable condition.    "EveryWhere ID. This could be used by other organizations to access your [place] medical records  [id number]        Your Vitals Were     Pulse Height BMI (Body Mass Index)             67 1.905 m (6' 3\") 22.54 kg/m2          Blood Pressure from Last 3 Encounters:   07/18/18 103/72   04/26/17 111/74   03/22/17 112/70    Weight from Last 3 Encounters:   07/18/18 81.8 kg (180 lb 4.8 oz)   04/26/17 83.5 kg (184 lb)   03/22/17 83.5 kg (184 lb)              We Performed the Following     COMPLEX UROFLOWMETRY        Primary Care Provider Office Phone # Fax #    Josselyn [facility] [phone]       [address]  [facility] [address]        Equal Access to Services     CUCO MARES : Hadii aad ku hadasho Soomaali, waaxda luqadaha, qaybta kaalmada adeegyada, rahul canales . So [facility] [phone].    ATENCIÓN: Si habla español, tiene a garcía disposición servicios gratuitos de asistencia lingüística. Diandra al [phone].    We comply with applicable federal civil rights laws and [place] laws. We do not discriminate on the basis of race, color, national origin, age, disability, sex, sexual orientation, or gender identity.            Thank you!     Thank you for choosing [facility] UROLOGY AND [facility] FOR PROSTATE AND UROLOGIC CANCERS  for your care. Our goal is always to provide you with excellent care. Hearing back from our patients is one way we can continue to improve our services. Please take a few minutes to complete the written survey that you may receive in the mail after your visit with us. Thank you!             Your Updated Medication List - Protect others around you: Learn how to safely use, store and throw away your medicines at www.disposemymeds.org.          This list is accurate as of 7/18/18  4:36 PM.  Always use your most recent med list.                   Brand Name Dispense Instructions for use Diagnosis    MORGAN SERNA      Reported on 4/26/2017        ciprofloxacin 500 " MG tablet    CIPRO    6 tablet    Take one tablet twice daily for 3 days.    BPH (benign prostatic hyperplasia)       diazepam 10 MG tablet    VALIUM    1 tablet    Take 1 tablet (10 mg) by mouth every 6 hours as needed for anxiety or sleep Take 30-60 minutes before procedure.  Do not operate a vehicle after taking this medication.    Elevated prostate specific antigen (PSA)       multivitamin, therapeutic Tabs tablet      Take 1 tablet by mouth daily Reported on 4/26/2017        omeprazole 40 MG capsule    priLOSEC     Take 40 mg by mouth Reported on 4/26/2017        * trospium 60 MG Cp24 24 hr capsule    SANCTURA XR    90 each    Take 1 capsule (60 mg) by mouth every morning    Elevated prostate specific antigen (PSA), BPH (benign prostatic hyperplasia)       * trospium 60 MG Cp24 24 hr capsule    SANCTURA XR    30 each    Take 1 capsule (60 mg) by mouth every morning    Benign non-nodular prostatic hyperplasia with lower urinary tract symptoms, Urgency of urination       * trospium 60 MG Cp24 24 hr capsule    SANCTURA XR     Take 60 mg by mouth Reported on 4/26/2017        * Notice:  This list has 3 medication(s) that are the same as other medications prescribed for you. Read the directions carefully, and ask your doctor or other care provider to review them with you.

## 2025-03-31 ENCOUNTER — INFUSION (OUTPATIENT)
Dept: HEMATOLOGY/ONCOLOGY | Facility: HOSPITAL | Age: 85
End: 2025-03-31
Payer: MEDICARE

## 2025-03-31 ENCOUNTER — OFFICE VISIT (OUTPATIENT)
Dept: HEMATOLOGY/ONCOLOGY | Facility: HOSPITAL | Age: 85
End: 2025-03-31
Payer: MEDICARE

## 2025-03-31 ENCOUNTER — DOCUMENTATION (OUTPATIENT)
Dept: HEMATOLOGY/ONCOLOGY | Facility: HOSPITAL | Age: 85
End: 2025-03-31

## 2025-03-31 ENCOUNTER — LAB (OUTPATIENT)
Dept: LAB | Facility: HOSPITAL | Age: 85
End: 2025-03-31
Payer: MEDICARE

## 2025-03-31 VITALS
OXYGEN SATURATION: 100 % | DIASTOLIC BLOOD PRESSURE: 71 MMHG | SYSTOLIC BLOOD PRESSURE: 120 MMHG | HEART RATE: 80 BPM | TEMPERATURE: 97.7 F | RESPIRATION RATE: 18 BRPM | BODY MASS INDEX: 35.78 KG/M2 | WEIGHT: 262.4 LBS

## 2025-03-31 DIAGNOSIS — D46.9 MYELODYSPLASTIC SYNDROME (MULTI): Primary | ICD-10-CM

## 2025-03-31 DIAGNOSIS — D46.9 MYELODYSPLASTIC SYNDROME (MULTI): ICD-10-CM

## 2025-03-31 DIAGNOSIS — K59.09 OTHER CONSTIPATION: ICD-10-CM

## 2025-03-31 PROBLEM — K59.00 CONSTIPATION: Status: ACTIVE | Noted: 2025-03-31

## 2025-03-31 LAB
ALBUMIN SERPL BCP-MCNC: 4.2 G/DL (ref 3.4–5)
ALP SERPL-CCNC: 99 U/L (ref 33–136)
ALT SERPL W P-5'-P-CCNC: 24 U/L (ref 10–52)
ANION GAP SERPL CALC-SCNC: 14 MMOL/L (ref 10–20)
AST SERPL W P-5'-P-CCNC: 20 U/L (ref 9–39)
BASOPHILS # BLD AUTO: 0.02 X10*3/UL (ref 0–0.1)
BASOPHILS NFR BLD AUTO: 0.6 %
BILIRUB SERPL-MCNC: 0.8 MG/DL (ref 0–1.2)
BUN SERPL-MCNC: 34 MG/DL (ref 6–23)
CALCIUM SERPL-MCNC: 9.6 MG/DL (ref 8.6–10.3)
CHLORIDE SERPL-SCNC: 105 MMOL/L (ref 98–107)
CO2 SERPL-SCNC: 25 MMOL/L (ref 21–32)
CREAT SERPL-MCNC: 1.42 MG/DL (ref 0.5–1.3)
EGFRCR SERPLBLD CKD-EPI 2021: 49 ML/MIN/1.73M*2
EOSINOPHIL # BLD AUTO: 0.05 X10*3/UL (ref 0–0.4)
EOSINOPHIL NFR BLD AUTO: 1.5 %
ERYTHROCYTE [DISTWIDTH] IN BLOOD BY AUTOMATED COUNT: 19.6 % (ref 11.5–14.5)
GLUCOSE SERPL-MCNC: 101 MG/DL (ref 74–99)
HCT VFR BLD AUTO: 35.2 % (ref 41–52)
HGB BLD-MCNC: 11 G/DL (ref 13.5–17.5)
HGB RETIC QN: 31 PG (ref 28–38)
IMM GRANULOCYTES # BLD AUTO: 0.01 X10*3/UL (ref 0–0.5)
IMM GRANULOCYTES NFR BLD AUTO: 0.3 % (ref 0–0.9)
IMMATURE RETIC FRACTION: 19.8 %
LDH SERPL L TO P-CCNC: 182 U/L (ref 84–246)
LYMPHOCYTES # BLD AUTO: 1.01 X10*3/UL (ref 0.8–3)
LYMPHOCYTES NFR BLD AUTO: 29.5 %
MAGNESIUM SERPL-MCNC: 2.01 MG/DL (ref 1.6–2.4)
MCH RBC QN AUTO: 30.7 PG (ref 26–34)
MCHC RBC AUTO-ENTMCNC: 31.3 G/DL (ref 32–36)
MCV RBC AUTO: 98 FL (ref 80–100)
MONOCYTES # BLD AUTO: 0.16 X10*3/UL (ref 0.05–0.8)
MONOCYTES NFR BLD AUTO: 4.7 %
NEUTROPHILS # BLD AUTO: 2.17 X10*3/UL (ref 1.6–5.5)
NEUTROPHILS NFR BLD AUTO: 63.4 %
NRBC BLD-RTO: 0 /100 WBCS (ref 0–0)
PLATELET # BLD AUTO: 97 X10*3/UL (ref 150–450)
POTASSIUM SERPL-SCNC: 4.6 MMOL/L (ref 3.5–5.3)
PROT SERPL-MCNC: 6.5 G/DL (ref 6.4–8.2)
RBC # BLD AUTO: 3.58 X10*6/UL (ref 4.5–5.9)
RETICS #: 0.08 X10*6/UL (ref 0.02–0.11)
RETICS/RBC NFR AUTO: 2.2 % (ref 0.5–2)
SODIUM SERPL-SCNC: 139 MMOL/L (ref 136–145)
URATE SERPL-MCNC: 5.1 MG/DL (ref 4–7.5)
WBC # BLD AUTO: 3.4 X10*3/UL (ref 4.4–11.3)

## 2025-03-31 PROCEDURE — 36415 COLL VENOUS BLD VENIPUNCTURE: CPT

## 2025-03-31 PROCEDURE — 1123F ACP DISCUSS/DSCN MKR DOCD: CPT | Performed by: NURSE PRACTITIONER

## 2025-03-31 PROCEDURE — 83735 ASSAY OF MAGNESIUM: CPT

## 2025-03-31 PROCEDURE — 2560000001 HC RX 256 EXPERIMENTAL DRUGS: Performed by: INTERNAL MEDICINE

## 2025-03-31 PROCEDURE — 1160F RVW MEDS BY RX/DR IN RCRD: CPT | Performed by: NURSE PRACTITIONER

## 2025-03-31 PROCEDURE — 99215 OFFICE O/P EST HI 40 MIN: CPT | Performed by: NURSE PRACTITIONER

## 2025-03-31 PROCEDURE — 84550 ASSAY OF BLOOD/URIC ACID: CPT

## 2025-03-31 PROCEDURE — 1159F MED LIST DOCD IN RCRD: CPT | Performed by: NURSE PRACTITIONER

## 2025-03-31 PROCEDURE — 85045 AUTOMATED RETICULOCYTE COUNT: CPT

## 2025-03-31 PROCEDURE — 2500000004 HC RX 250 GENERAL PHARMACY W/ HCPCS (ALT 636 FOR OP/ED): Performed by: INTERNAL MEDICINE

## 2025-03-31 PROCEDURE — G2211 COMPLEX E/M VISIT ADD ON: HCPCS | Performed by: NURSE PRACTITIONER

## 2025-03-31 PROCEDURE — 83615 LACTATE (LD) (LDH) ENZYME: CPT

## 2025-03-31 PROCEDURE — 85025 COMPLETE CBC W/AUTO DIFF WBC: CPT

## 2025-03-31 PROCEDURE — 80053 COMPREHEN METABOLIC PANEL: CPT

## 2025-03-31 PROCEDURE — 96372 THER/PROPH/DIAG INJ SC/IM: CPT

## 2025-03-31 RX ORDER — EPINEPHRINE 0.3 MG/.3ML
0.3 INJECTION SUBCUTANEOUS EVERY 5 MIN PRN
OUTPATIENT
Start: 2025-04-14

## 2025-03-31 RX ORDER — ONDANSETRON HYDROCHLORIDE 8 MG/1
8 TABLET, FILM COATED ORAL ONCE
Status: CANCELLED | OUTPATIENT
Start: 2025-04-10

## 2025-03-31 RX ORDER — DIPHENHYDRAMINE HYDROCHLORIDE 50 MG/ML
50 INJECTION, SOLUTION INTRAMUSCULAR; INTRAVENOUS AS NEEDED
OUTPATIENT
Start: 2025-04-14

## 2025-03-31 RX ORDER — ALBUTEROL SULFATE 0.83 MG/ML
3 SOLUTION RESPIRATORY (INHALATION) AS NEEDED
Status: CANCELLED | OUTPATIENT
Start: 2025-04-03

## 2025-03-31 RX ORDER — ALBUTEROL SULFATE 0.83 MG/ML
3 SOLUTION RESPIRATORY (INHALATION) AS NEEDED
Status: CANCELLED | OUTPATIENT
Start: 2025-03-31

## 2025-03-31 RX ORDER — FAMOTIDINE 10 MG/ML
20 INJECTION, SOLUTION INTRAVENOUS ONCE AS NEEDED
OUTPATIENT
Start: 2025-04-21

## 2025-03-31 RX ORDER — EPINEPHRINE 0.3 MG/.3ML
0.3 INJECTION SUBCUTANEOUS EVERY 5 MIN PRN
OUTPATIENT
Start: 2025-04-17

## 2025-03-31 RX ORDER — FAMOTIDINE 10 MG/ML
20 INJECTION, SOLUTION INTRAVENOUS ONCE AS NEEDED
OUTPATIENT
Start: 2025-04-14

## 2025-03-31 RX ORDER — ONDANSETRON HYDROCHLORIDE 8 MG/1
8 TABLET, FILM COATED ORAL ONCE
OUTPATIENT
Start: 2025-04-14

## 2025-03-31 RX ORDER — ALBUTEROL SULFATE 0.83 MG/ML
3 SOLUTION RESPIRATORY (INHALATION) AS NEEDED
Status: CANCELLED | OUTPATIENT
Start: 2025-04-07

## 2025-03-31 RX ORDER — ONDANSETRON HYDROCHLORIDE 8 MG/1
8 TABLET, FILM COATED ORAL ONCE
OUTPATIENT
Start: 2025-04-24

## 2025-03-31 RX ORDER — ALBUTEROL SULFATE 0.83 MG/ML
3 SOLUTION RESPIRATORY (INHALATION) AS NEEDED
OUTPATIENT
Start: 2025-04-14

## 2025-03-31 RX ORDER — EPINEPHRINE 0.3 MG/.3ML
0.3 INJECTION SUBCUTANEOUS EVERY 5 MIN PRN
Status: CANCELLED | OUTPATIENT
Start: 2025-04-07

## 2025-03-31 RX ORDER — FAMOTIDINE 10 MG/ML
20 INJECTION, SOLUTION INTRAVENOUS ONCE AS NEEDED
OUTPATIENT
Start: 2025-04-24

## 2025-03-31 RX ORDER — ONDANSETRON HYDROCHLORIDE 8 MG/1
8 TABLET, FILM COATED ORAL ONCE
OUTPATIENT
Start: 2025-04-21

## 2025-03-31 RX ORDER — FAMOTIDINE 10 MG/ML
20 INJECTION, SOLUTION INTRAVENOUS ONCE AS NEEDED
Status: CANCELLED | OUTPATIENT
Start: 2025-04-03

## 2025-03-31 RX ORDER — EPINEPHRINE 0.3 MG/.3ML
0.3 INJECTION SUBCUTANEOUS EVERY 5 MIN PRN
OUTPATIENT
Start: 2025-04-21

## 2025-03-31 RX ORDER — FAMOTIDINE 10 MG/ML
20 INJECTION, SOLUTION INTRAVENOUS ONCE AS NEEDED
Status: DISCONTINUED | OUTPATIENT
Start: 2025-03-31 | End: 2025-03-31 | Stop reason: HOSPADM

## 2025-03-31 RX ORDER — EPINEPHRINE 0.3 MG/.3ML
0.3 INJECTION SUBCUTANEOUS EVERY 5 MIN PRN
Status: CANCELLED | OUTPATIENT
Start: 2025-04-10

## 2025-03-31 RX ORDER — ONDANSETRON HYDROCHLORIDE 8 MG/1
8 TABLET, FILM COATED ORAL ONCE
Status: COMPLETED | OUTPATIENT
Start: 2025-03-31 | End: 2025-03-31

## 2025-03-31 RX ORDER — DIPHENHYDRAMINE HYDROCHLORIDE 50 MG/ML
50 INJECTION, SOLUTION INTRAMUSCULAR; INTRAVENOUS AS NEEDED
OUTPATIENT
Start: 2025-04-17

## 2025-03-31 RX ORDER — DIPHENHYDRAMINE HYDROCHLORIDE 50 MG/ML
50 INJECTION, SOLUTION INTRAMUSCULAR; INTRAVENOUS AS NEEDED
Status: CANCELLED | OUTPATIENT
Start: 2025-03-31

## 2025-03-31 RX ORDER — EPINEPHRINE 0.3 MG/.3ML
0.3 INJECTION SUBCUTANEOUS EVERY 5 MIN PRN
OUTPATIENT
Start: 2025-04-24

## 2025-03-31 RX ORDER — ALBUTEROL SULFATE 0.83 MG/ML
3 SOLUTION RESPIRATORY (INHALATION) AS NEEDED
OUTPATIENT
Start: 2025-04-17

## 2025-03-31 RX ORDER — FAMOTIDINE 10 MG/ML
20 INJECTION, SOLUTION INTRAVENOUS ONCE AS NEEDED
OUTPATIENT
Start: 2025-04-17

## 2025-03-31 RX ORDER — ALBUTEROL SULFATE 0.83 MG/ML
3 SOLUTION RESPIRATORY (INHALATION) AS NEEDED
OUTPATIENT
Start: 2025-04-24

## 2025-03-31 RX ORDER — ONDANSETRON HYDROCHLORIDE 8 MG/1
8 TABLET, FILM COATED ORAL ONCE
Status: CANCELLED | OUTPATIENT
Start: 2025-03-31

## 2025-03-31 RX ORDER — ONDANSETRON HYDROCHLORIDE 8 MG/1
8 TABLET, FILM COATED ORAL ONCE
Status: CANCELLED | OUTPATIENT
Start: 2025-04-03

## 2025-03-31 RX ORDER — ALBUTEROL SULFATE 0.83 MG/ML
3 SOLUTION RESPIRATORY (INHALATION) AS NEEDED
Status: CANCELLED | OUTPATIENT
Start: 2025-04-10

## 2025-03-31 RX ORDER — EPINEPHRINE 0.3 MG/.3ML
0.3 INJECTION SUBCUTANEOUS EVERY 5 MIN PRN
Status: CANCELLED | OUTPATIENT
Start: 2025-04-03

## 2025-03-31 RX ORDER — ALBUTEROL SULFATE 0.83 MG/ML
3 SOLUTION RESPIRATORY (INHALATION) AS NEEDED
OUTPATIENT
Start: 2025-04-21

## 2025-03-31 RX ORDER — DIPHENHYDRAMINE HYDROCHLORIDE 50 MG/ML
50 INJECTION, SOLUTION INTRAMUSCULAR; INTRAVENOUS AS NEEDED
Status: DISCONTINUED | OUTPATIENT
Start: 2025-03-31 | End: 2025-03-31 | Stop reason: HOSPADM

## 2025-03-31 RX ORDER — ONDANSETRON HYDROCHLORIDE 8 MG/1
8 TABLET, FILM COATED ORAL ONCE
OUTPATIENT
Start: 2025-04-17

## 2025-03-31 RX ORDER — DIPHENHYDRAMINE HYDROCHLORIDE 50 MG/ML
50 INJECTION, SOLUTION INTRAMUSCULAR; INTRAVENOUS AS NEEDED
OUTPATIENT
Start: 2025-04-24

## 2025-03-31 RX ORDER — EPINEPHRINE 0.3 MG/.3ML
0.3 INJECTION SUBCUTANEOUS EVERY 5 MIN PRN
Status: CANCELLED | OUTPATIENT
Start: 2025-03-31

## 2025-03-31 RX ORDER — DIPHENHYDRAMINE HYDROCHLORIDE 50 MG/ML
50 INJECTION, SOLUTION INTRAMUSCULAR; INTRAVENOUS AS NEEDED
Status: CANCELLED | OUTPATIENT
Start: 2025-04-07

## 2025-03-31 RX ORDER — FAMOTIDINE 10 MG/ML
20 INJECTION, SOLUTION INTRAVENOUS ONCE AS NEEDED
Status: CANCELLED | OUTPATIENT
Start: 2025-03-31

## 2025-03-31 RX ORDER — DIPHENHYDRAMINE HYDROCHLORIDE 50 MG/ML
50 INJECTION, SOLUTION INTRAMUSCULAR; INTRAVENOUS AS NEEDED
OUTPATIENT
Start: 2025-04-21

## 2025-03-31 RX ORDER — EPINEPHRINE 0.3 MG/.3ML
0.3 INJECTION SUBCUTANEOUS EVERY 5 MIN PRN
Status: DISCONTINUED | OUTPATIENT
Start: 2025-03-31 | End: 2025-03-31 | Stop reason: HOSPADM

## 2025-03-31 RX ORDER — ONDANSETRON HYDROCHLORIDE 8 MG/1
8 TABLET, FILM COATED ORAL ONCE
Status: CANCELLED | OUTPATIENT
Start: 2025-04-07

## 2025-03-31 RX ORDER — ALBUTEROL SULFATE 0.83 MG/ML
3 SOLUTION RESPIRATORY (INHALATION) AS NEEDED
Status: DISCONTINUED | OUTPATIENT
Start: 2025-03-31 | End: 2025-03-31 | Stop reason: HOSPADM

## 2025-03-31 RX ORDER — FAMOTIDINE 10 MG/ML
20 INJECTION, SOLUTION INTRAVENOUS ONCE AS NEEDED
Status: CANCELLED | OUTPATIENT
Start: 2025-04-10

## 2025-03-31 RX ORDER — DIPHENHYDRAMINE HYDROCHLORIDE 50 MG/ML
50 INJECTION, SOLUTION INTRAMUSCULAR; INTRAVENOUS AS NEEDED
Status: CANCELLED | OUTPATIENT
Start: 2025-04-03

## 2025-03-31 RX ORDER — DIPHENHYDRAMINE HYDROCHLORIDE 50 MG/ML
50 INJECTION, SOLUTION INTRAMUSCULAR; INTRAVENOUS AS NEEDED
Status: CANCELLED | OUTPATIENT
Start: 2025-04-10

## 2025-03-31 RX ORDER — FAMOTIDINE 10 MG/ML
20 INJECTION, SOLUTION INTRAVENOUS ONCE AS NEEDED
Status: CANCELLED | OUTPATIENT
Start: 2025-04-07

## 2025-03-31 RX ORDER — SENNOSIDES 8.6 MG/1
2 TABLET ORAL 2 TIMES DAILY
COMMUNITY

## 2025-03-31 RX ADMIN — Medication 129 MG: at 11:26

## 2025-03-31 RX ADMIN — ONDANSETRON HYDROCHLORIDE 8 MG: 8 TABLET, FILM COATED ORAL at 10:52

## 2025-03-31 NOTE — RESEARCH NOTES
Research Note Treatment Day    Holden Burgess is here today for treatment on [id number]. Today is Long Term C6 D1. Procedures completed per protocol. AE's and con-meds reviewed with patient. Patient is aware of treatment plan.    [x]   Received treatment as planned   OR  []    Treatment delayed; patient calendar updated as required   Treatment delayed because:    []   AE    []   Physician Discretion    []   Clinical Deterioration or Progression     []   Other    Education Documentation  Complete Blood Count with Differential (CBC w/ Diff), taught by Keren Khan RN at 3/31/2025 12:41 PM.  Learner: Significant Other, Patient  Readiness: Eager  Method: Explanation  Response: Verbalizes Understanding    Constipation, taught by Keren Khan RN at 3/31/2025 12:41 PM.  Learner: Significant Other, Patient  Readiness: Eager  Method: Explanation  Response: Verbalizes Understanding    Treatment Plan and Schedule, taught by Keren Khan RN at 3/31/2025 12:41 PM.  Learner: Significant Other, Patient  Readiness: Eager  Method: Explanation  Response: Verbalizes Understanding    Education Comments  No comments found.

## 2025-03-31 NOTE — PROGRESS NOTES
[facility] Infusion Nursing Note  03/31/25    Holden Burgess is a 84 y.o. year old male patient presenting to outpatient infusion for cycle 7 day 1 of the following regimen:    Treatment Plans       Name Type Plan Dates Plan Provider         Active    ([facility]) [id number] - AzaCITIDine / Decitabine, 42 Day Cycle Followed by 28 Day Cycles Oncology Treatment 9/29/2024 - Present Sebastien Rashid MD               Administrations This Visit       ondansetron (Zofran) tablet 8 mg       Admin Date  03/31/2025 Action  Given Dose  8 mg Route  oral Documented By  Sandy Farias RN              Study [id number] azaCITIDine 50 mg/m2 = 129 mg in sterile water 5.2 mL Syringe       Admin Date  03/31/2025 Action  New Bag Dose  129 mg Route  subcutaneous Documented By  Sandy Farias RN                  Hypersensitivity reaction noted: No.    Patient tolerated treatment well. Pt discharged in stable condition and ambulated off unit accompanied by wife.    Follow-up Plan: 4/3    SANDY FARIAS RN

## 2025-04-01 ENCOUNTER — TREATMENT (OUTPATIENT)
Dept: PHYSICAL THERAPY | Facility: CLINIC | Age: 85
End: 2025-04-01
Payer: MEDICARE

## 2025-04-01 DIAGNOSIS — D46.9 MYELODYSPLASTIC SYNDROME (MULTI): ICD-10-CM

## 2025-04-01 PROCEDURE — 97110 THERAPEUTIC EXERCISES: CPT | Mod: GP | Performed by: GENERAL ACUTE CARE HOSPITAL

## 2025-04-01 NOTE — PROGRESS NOTES
"Patient Name: Holden Burgess \"GENARO\"  MRN: [id number]  Today's Date: 4/1/2025  Time Calculation  Start Time: 0911  Stop Time: 1004  Time Calculation (min): 53 min  Current Problem:  1. Myelodysplastic syndrome (Multi)  Follow Up In Physical Therapy          Visit 6/20    Subjective:  Change in pain/ pain level: 0/10  Change in function: got new cane because it was too short   Patient comments: platelets are up     Objective: track amb 3/10 mile with WW     Treatment:    Therapeutic exercise (48663):  nustep level 5 8 min no arms  Track amb 3 laps with walker   Stretches   Titl board L2   Leg Press 100# 2x20  HSC 50# 2x15  Step up 6 in f/L x12 ea     Patient continues to require active therapy to progress functional capabilities with decreasing pain levels and improving mechanics with functional activities including progression of Home exercise program. Tolerance to today's treatment was good. Muscle fatigue noted.  Patient appears motivated and compliant this date, demonstrating a working understanding of principals instructed and home program.    Plan:  Progress with functional strength gait and endurance   "

## 2025-04-03 ENCOUNTER — LAB (OUTPATIENT)
Dept: LAB | Facility: HOSPITAL | Age: 85
End: 2025-04-03
Payer: MEDICARE

## 2025-04-03 ENCOUNTER — INFUSION (OUTPATIENT)
Dept: HEMATOLOGY/ONCOLOGY | Facility: HOSPITAL | Age: 85
End: 2025-04-03
Payer: MEDICARE

## 2025-04-03 VITALS
BODY MASS INDEX: 35.74 KG/M2 | RESPIRATION RATE: 18 BRPM | HEART RATE: 88 BPM | SYSTOLIC BLOOD PRESSURE: 121 MMHG | WEIGHT: 262.13 LBS | OXYGEN SATURATION: 100 % | TEMPERATURE: 97.7 F | DIASTOLIC BLOOD PRESSURE: 53 MMHG

## 2025-04-03 DIAGNOSIS — R39.9 URINARY TRACT INFECTION SYMPTOMS: ICD-10-CM

## 2025-04-03 DIAGNOSIS — R31.21 ASYMPTOMATIC MICROSCOPIC HEMATURIA: Primary | ICD-10-CM

## 2025-04-03 DIAGNOSIS — D84.9 IMMUNOCOMPROMISED: ICD-10-CM

## 2025-04-03 DIAGNOSIS — D46.9 MYELODYSPLASTIC SYNDROME (MULTI): ICD-10-CM

## 2025-04-03 LAB
ALBUMIN SERPL BCP-MCNC: 4.1 G/DL (ref 3.4–5)
ALP SERPL-CCNC: 106 U/L (ref 33–136)
ALT SERPL W P-5'-P-CCNC: 27 U/L (ref 10–52)
ANION GAP SERPL CALC-SCNC: 11 MMOL/L (ref 10–20)
ANION GAP SERPL CALC-SCNC: 13 MMOL/L (ref 10–20)
APPEARANCE UR: ABNORMAL
AST SERPL W P-5'-P-CCNC: 21 U/L (ref 9–39)
BASOPHILS # BLD AUTO: 0.01 X10*3/UL (ref 0–0.1)
BASOPHILS NFR BLD AUTO: 0.4 %
BILIRUB SERPL-MCNC: 0.6 MG/DL (ref 0–1.2)
BILIRUB UR STRIP.AUTO-MCNC: NEGATIVE MG/DL
BUN SERPL-MCNC: 36 MG/DL (ref 6–23)
BUN SERPL-MCNC: 37 MG/DL (ref 6–23)
CALCIUM SERPL-MCNC: 9.3 MG/DL (ref 8.6–10.3)
CALCIUM SERPL-MCNC: 9.5 MG/DL (ref 8.6–10.3)
CHLORIDE SERPL-SCNC: 104 MMOL/L (ref 98–107)
CHLORIDE SERPL-SCNC: 106 MMOL/L (ref 98–107)
CO2 SERPL-SCNC: 25 MMOL/L (ref 21–32)
CO2 SERPL-SCNC: 26 MMOL/L (ref 21–32)
COLOR UR: COLORLESS
CREAT SERPL-MCNC: 1.37 MG/DL (ref 0.5–1.3)
CREAT SERPL-MCNC: 1.37 MG/DL (ref 0.5–1.3)
EGFRCR SERPLBLD CKD-EPI 2021: 51 ML/MIN/1.73M*2
EGFRCR SERPLBLD CKD-EPI 2021: 51 ML/MIN/1.73M*2
EOSINOPHIL # BLD AUTO: 0.06 X10*3/UL (ref 0–0.4)
EOSINOPHIL NFR BLD AUTO: 2.1 %
ERYTHROCYTE [DISTWIDTH] IN BLOOD BY AUTOMATED COUNT: 19.6 % (ref 11.5–14.5)
GLUCOSE SERPL-MCNC: 89 MG/DL (ref 74–99)
GLUCOSE SERPL-MCNC: 99 MG/DL (ref 74–99)
GLUCOSE UR STRIP.AUTO-MCNC: NORMAL MG/DL
HCT VFR BLD AUTO: 34.2 % (ref 41–52)
HGB BLD-MCNC: 10.9 G/DL (ref 13.5–17.5)
IMM GRANULOCYTES # BLD AUTO: 0.07 X10*3/UL (ref 0–0.5)
IMM GRANULOCYTES NFR BLD AUTO: 2.5 % (ref 0–0.9)
KETONES UR STRIP.AUTO-MCNC: NEGATIVE MG/DL
LEUKOCYTE ESTERASE UR QL STRIP.AUTO: ABNORMAL
LYMPHOCYTES # BLD AUTO: 0.93 X10*3/UL (ref 0.8–3)
LYMPHOCYTES NFR BLD AUTO: 33.1 %
MCH RBC QN AUTO: 31.6 PG (ref 26–34)
MCHC RBC AUTO-ENTMCNC: 31.9 G/DL (ref 32–36)
MCV RBC AUTO: 99 FL (ref 80–100)
MONOCYTES # BLD AUTO: 0.18 X10*3/UL (ref 0.05–0.8)
MONOCYTES NFR BLD AUTO: 6.4 %
MUCOUS THREADS #/AREA URNS AUTO: ABNORMAL /LPF
NEUTROPHILS # BLD AUTO: 1.56 X10*3/UL (ref 1.6–5.5)
NEUTROPHILS NFR BLD AUTO: 55.5 %
NITRITE UR QL STRIP.AUTO: NEGATIVE
NRBC BLD-RTO: 0 /100 WBCS (ref 0–0)
PH UR STRIP.AUTO: 5.5 [PH]
PLATELET # BLD AUTO: 87 X10*3/UL (ref 150–450)
POTASSIUM SERPL-SCNC: 5.4 MMOL/L (ref 3.5–5.3)
POTASSIUM SERPL-SCNC: 5.7 MMOL/L (ref 3.5–5.3)
PROT SERPL-MCNC: 6.3 G/DL (ref 6.4–8.2)
PROT UR STRIP.AUTO-MCNC: NEGATIVE MG/DL
RBC # BLD AUTO: 3.45 X10*6/UL (ref 4.5–5.9)
RBC # UR STRIP.AUTO: ABNORMAL MG/DL
RBC #/AREA URNS AUTO: >20 /HPF
SODIUM SERPL-SCNC: 136 MMOL/L (ref 136–145)
SODIUM SERPL-SCNC: 138 MMOL/L (ref 136–145)
SP GR UR STRIP.AUTO: 1.01
SQUAMOUS #/AREA URNS AUTO: ABNORMAL /HPF
UROBILINOGEN UR STRIP.AUTO-MCNC: NORMAL MG/DL
WBC # BLD AUTO: 2.8 X10*3/UL (ref 4.4–11.3)
WBC #/AREA URNS AUTO: >50 /HPF
WBC CLUMPS #/AREA URNS AUTO: ABNORMAL /HPF

## 2025-04-03 PROCEDURE — 80048 BASIC METABOLIC PNL TOTAL CA: CPT | Mod: CCI

## 2025-04-03 PROCEDURE — 87086 URINE CULTURE/COLONY COUNT: CPT

## 2025-04-03 PROCEDURE — 85025 COMPLETE CBC W/AUTO DIFF WBC: CPT

## 2025-04-03 PROCEDURE — 81001 URINALYSIS AUTO W/SCOPE: CPT

## 2025-04-03 PROCEDURE — 2560000001 HC RX 256 EXPERIMENTAL DRUGS: Performed by: INTERNAL MEDICINE

## 2025-04-03 PROCEDURE — 80053 COMPREHEN METABOLIC PANEL: CPT

## 2025-04-03 PROCEDURE — 36415 COLL VENOUS BLD VENIPUNCTURE: CPT

## 2025-04-03 PROCEDURE — 2500000004 HC RX 250 GENERAL PHARMACY W/ HCPCS (ALT 636 FOR OP/ED): Performed by: INTERNAL MEDICINE

## 2025-04-03 PROCEDURE — 96401 CHEMO ANTI-NEOPL SQ/IM: CPT

## 2025-04-03 RX ORDER — FAMOTIDINE 10 MG/ML
20 INJECTION, SOLUTION INTRAVENOUS ONCE AS NEEDED
Status: DISCONTINUED | OUTPATIENT
Start: 2025-04-03 | End: 2025-04-03 | Stop reason: HOSPADM

## 2025-04-03 RX ORDER — EPINEPHRINE 0.3 MG/.3ML
0.3 INJECTION SUBCUTANEOUS EVERY 5 MIN PRN
Status: DISCONTINUED | OUTPATIENT
Start: 2025-04-03 | End: 2025-04-03 | Stop reason: HOSPADM

## 2025-04-03 RX ORDER — ALBUTEROL SULFATE 0.83 MG/ML
3 SOLUTION RESPIRATORY (INHALATION) AS NEEDED
Status: DISCONTINUED | OUTPATIENT
Start: 2025-04-03 | End: 2025-04-03 | Stop reason: HOSPADM

## 2025-04-03 RX ORDER — ONDANSETRON HYDROCHLORIDE 8 MG/1
8 TABLET, FILM COATED ORAL ONCE
Status: COMPLETED | OUTPATIENT
Start: 2025-04-03 | End: 2025-04-03

## 2025-04-03 RX ORDER — DIPHENHYDRAMINE HYDROCHLORIDE 50 MG/ML
50 INJECTION, SOLUTION INTRAMUSCULAR; INTRAVENOUS AS NEEDED
Status: DISCONTINUED | OUTPATIENT
Start: 2025-04-03 | End: 2025-04-03 | Stop reason: HOSPADM

## 2025-04-03 RX ADMIN — ONDANSETRON HYDROCHLORIDE 8 MG: 8 TABLET, FILM COATED ORAL at 11:42

## 2025-04-03 RX ADMIN — Medication 12.9 MG: at 11:56

## 2025-04-03 ASSESSMENT — PAIN SCALES - GENERAL: PAINLEVEL_OUTOF10: 0-NO PAIN

## 2025-04-03 NOTE — PROGRESS NOTES
Pt arrived to infusion with wife for decitabine. Per pt nothing new to report since Monday other than some blood in his urine on Tuesday. Has not had any other occurences since. Discussed with Rosenda Baird NP,  ordered for pt. Potassium resulted high at 5.7, per Rosenda will retest as this is out of pts norm. Pt sent to lab at conclusion of tx for redraw, Rosenda to follow up with pt once it results.

## 2025-04-04 ENCOUNTER — TELEPHONE (OUTPATIENT)
Dept: UROLOGY | Facility: CLINIC | Age: 85
End: 2025-04-04
Payer: MEDICARE

## 2025-04-04 ENCOUNTER — DOCUMENTATION (OUTPATIENT)
Dept: HEMATOLOGY/ONCOLOGY | Facility: HOSPITAL | Age: 85
End: 2025-04-04
Payer: MEDICARE

## 2025-04-04 DIAGNOSIS — D46.9 MYELODYSPLASTIC SYNDROME (MULTI): Primary | ICD-10-CM

## 2025-04-04 LAB
BACTERIA UR CULT: NORMAL
HOLD SPECIMEN: NORMAL

## 2025-04-04 NOTE — TELEPHONE ENCOUNTER
Patients spouse called in, patient had blood in urine, oncology ran a urine a culture, platelets were 87, hemo 10.8, urine showed no infection, potassium 5.7, scheduled 4/14 for cysto, oncology would like to move forward with the cysto, patient had three episodes of bleeding during urination and no bleeding since then. Oncology wanted Dr. Phan to be aware prior to cysto as well. Patient also has an artificial urinary sphincter and spastic bladder.

## 2025-04-04 NOTE — PROGRESS NOTES
I was notified that Mr. Burgess's Potassium was elevated at 5.7 yesterday. I repeated labs and potassium was 5.4. I called him yesterday evening to notify him of results. We reviewed his medications and he is not taking any potassium supplements or medication which may be contributing. I asked him to avoid potassium rich foods & to stay hydrated over the next couple of days.     He also had one episode of hematuria this past week. His platelet count is stable. UA and Urine Culture obtained to rule out infection. If bleeding returns he will notify us this weekend. He is following with Urology and has Cystoscopy planned in 2 weeks.    We will repeat labs on Monday 4/7 and he will be seen in the [facility] for follow up.     Rosenda Baird CNP  [facility]

## 2025-04-07 ENCOUNTER — OFFICE VISIT (OUTPATIENT)
Dept: HEMATOLOGY/ONCOLOGY | Facility: HOSPITAL | Age: 85
End: 2025-04-07
Payer: MEDICARE

## 2025-04-07 ENCOUNTER — LAB (OUTPATIENT)
Dept: LAB | Facility: HOSPITAL | Age: 85
End: 2025-04-07
Payer: MEDICARE

## 2025-04-07 ENCOUNTER — INFUSION (OUTPATIENT)
Dept: HEMATOLOGY/ONCOLOGY | Facility: HOSPITAL | Age: 85
End: 2025-04-07
Payer: MEDICARE

## 2025-04-07 VITALS
RESPIRATION RATE: 18 BRPM | TEMPERATURE: 96.8 F | HEART RATE: 91 BPM | BODY MASS INDEX: 35.86 KG/M2 | WEIGHT: 263 LBS | SYSTOLIC BLOOD PRESSURE: 121 MMHG | DIASTOLIC BLOOD PRESSURE: 47 MMHG | OXYGEN SATURATION: 100 %

## 2025-04-07 DIAGNOSIS — D64.81 ANEMIA DUE TO CHEMOTHERAPY FOR MYELODYSPLASTIC SYNDROME TREATED WITH ERYTHROPOIETIN: ICD-10-CM

## 2025-04-07 DIAGNOSIS — D46.9 MYELODYSPLASTIC SYNDROME (MULTI): ICD-10-CM

## 2025-04-07 DIAGNOSIS — T45.1X5A ANEMIA DUE TO CHEMOTHERAPY FOR MYELODYSPLASTIC SYNDROME TREATED WITH ERYTHROPOIETIN: ICD-10-CM

## 2025-04-07 DIAGNOSIS — D46.9 ANEMIA DUE TO CHEMOTHERAPY FOR MYELODYSPLASTIC SYNDROME TREATED WITH ERYTHROPOIETIN: ICD-10-CM

## 2025-04-07 LAB
ALBUMIN SERPL BCP-MCNC: 4.2 G/DL (ref 3.4–5)
ALP SERPL-CCNC: 97 U/L (ref 33–136)
ALT SERPL W P-5'-P-CCNC: 32 U/L (ref 10–52)
ANION GAP SERPL CALC-SCNC: 12 MMOL/L (ref 10–20)
AST SERPL W P-5'-P-CCNC: 23 U/L (ref 9–39)
BASOPHILS # BLD AUTO: 0.02 X10*3/UL (ref 0–0.1)
BASOPHILS NFR BLD AUTO: 0.5 %
BILIRUB SERPL-MCNC: 0.8 MG/DL (ref 0–1.2)
BUN SERPL-MCNC: 34 MG/DL (ref 6–23)
CALCIUM SERPL-MCNC: 9.3 MG/DL (ref 8.6–10.3)
CHLORIDE SERPL-SCNC: 107 MMOL/L (ref 98–107)
CO2 SERPL-SCNC: 24 MMOL/L (ref 21–32)
CREAT SERPL-MCNC: 1.36 MG/DL (ref 0.5–1.3)
EGFRCR SERPLBLD CKD-EPI 2021: 51 ML/MIN/1.73M*2
EOSINOPHIL # BLD AUTO: 0.06 X10*3/UL (ref 0–0.4)
EOSINOPHIL NFR BLD AUTO: 1.6 %
ERYTHROCYTE [DISTWIDTH] IN BLOOD BY AUTOMATED COUNT: 19.2 % (ref 11.5–14.5)
GLUCOSE SERPL-MCNC: 97 MG/DL (ref 74–99)
HCT VFR BLD AUTO: 34.8 % (ref 41–52)
HGB BLD-MCNC: 10.7 G/DL (ref 13.5–17.5)
IMM GRANULOCYTES # BLD AUTO: 0.03 X10*3/UL (ref 0–0.5)
IMM GRANULOCYTES NFR BLD AUTO: 0.8 % (ref 0–0.9)
LYMPHOCYTES # BLD AUTO: 0.95 X10*3/UL (ref 0.8–3)
LYMPHOCYTES NFR BLD AUTO: 25.4 %
MCH RBC QN AUTO: 30.6 PG (ref 26–34)
MCHC RBC AUTO-ENTMCNC: 30.7 G/DL (ref 32–36)
MCV RBC AUTO: 99 FL (ref 80–100)
MONOCYTES # BLD AUTO: 0.2 X10*3/UL (ref 0.05–0.8)
MONOCYTES NFR BLD AUTO: 5.3 %
NEUTROPHILS # BLD AUTO: 2.48 X10*3/UL (ref 1.6–5.5)
NEUTROPHILS NFR BLD AUTO: 66.4 %
NRBC BLD-RTO: 0 /100 WBCS (ref 0–0)
PLATELET # BLD AUTO: 81 X10*3/UL (ref 150–450)
POTASSIUM SERPL-SCNC: 4.8 MMOL/L (ref 3.5–5.3)
PROT SERPL-MCNC: 6.3 G/DL (ref 6.4–8.2)
RBC # BLD AUTO: 3.5 X10*6/UL (ref 4.5–5.9)
SODIUM SERPL-SCNC: 138 MMOL/L (ref 136–145)
WBC # BLD AUTO: 3.7 X10*3/UL (ref 4.4–11.3)

## 2025-04-07 PROCEDURE — 80053 COMPREHEN METABOLIC PANEL: CPT

## 2025-04-07 PROCEDURE — 99214 OFFICE O/P EST MOD 30 MIN: CPT | Performed by: STUDENT IN AN ORGANIZED HEALTH CARE EDUCATION/TRAINING PROGRAM

## 2025-04-07 PROCEDURE — 96401 CHEMO ANTI-NEOPL SQ/IM: CPT

## 2025-04-07 PROCEDURE — 96372 THER/PROPH/DIAG INJ SC/IM: CPT

## 2025-04-07 PROCEDURE — 2500000004 HC RX 250 GENERAL PHARMACY W/ HCPCS (ALT 636 FOR OP/ED): Performed by: INTERNAL MEDICINE

## 2025-04-07 PROCEDURE — 2560000001 HC RX 256 EXPERIMENTAL DRUGS: Performed by: INTERNAL MEDICINE

## 2025-04-07 PROCEDURE — 85025 COMPLETE CBC W/AUTO DIFF WBC: CPT

## 2025-04-07 PROCEDURE — 2500000004 HC RX 250 GENERAL PHARMACY W/ HCPCS (ALT 636 FOR OP/ED): Mod: JZ,TB | Performed by: NURSE PRACTITIONER

## 2025-04-07 PROCEDURE — 36415 COLL VENOUS BLD VENIPUNCTURE: CPT

## 2025-04-07 RX ORDER — ONDANSETRON HYDROCHLORIDE 8 MG/1
8 TABLET, FILM COATED ORAL ONCE
Status: COMPLETED | OUTPATIENT
Start: 2025-04-07 | End: 2025-04-07

## 2025-04-07 RX ORDER — EPINEPHRINE 0.3 MG/.3ML
0.3 INJECTION SUBCUTANEOUS EVERY 5 MIN PRN
OUTPATIENT
Start: 2025-04-21

## 2025-04-07 RX ORDER — DIPHENHYDRAMINE HYDROCHLORIDE 50 MG/ML
50 INJECTION, SOLUTION INTRAMUSCULAR; INTRAVENOUS AS NEEDED
Status: DISCONTINUED | OUTPATIENT
Start: 2025-04-07 | End: 2025-04-07 | Stop reason: HOSPADM

## 2025-04-07 RX ORDER — DIPHENHYDRAMINE HYDROCHLORIDE 50 MG/ML
50 INJECTION, SOLUTION INTRAMUSCULAR; INTRAVENOUS AS NEEDED
OUTPATIENT
Start: 2025-04-21

## 2025-04-07 RX ORDER — ALBUTEROL SULFATE 0.83 MG/ML
3 SOLUTION RESPIRATORY (INHALATION) AS NEEDED
OUTPATIENT
Start: 2025-04-21

## 2025-04-07 RX ORDER — ALBUTEROL SULFATE 0.83 MG/ML
3 SOLUTION RESPIRATORY (INHALATION) AS NEEDED
Status: DISCONTINUED | OUTPATIENT
Start: 2025-04-07 | End: 2025-04-07 | Stop reason: HOSPADM

## 2025-04-07 RX ORDER — FAMOTIDINE 10 MG/ML
20 INJECTION, SOLUTION INTRAVENOUS ONCE AS NEEDED
OUTPATIENT
Start: 2025-04-21

## 2025-04-07 RX ORDER — EPINEPHRINE 0.3 MG/.3ML
0.3 INJECTION SUBCUTANEOUS EVERY 5 MIN PRN
Status: DISCONTINUED | OUTPATIENT
Start: 2025-04-07 | End: 2025-04-07 | Stop reason: HOSPADM

## 2025-04-07 RX ORDER — FAMOTIDINE 10 MG/ML
20 INJECTION, SOLUTION INTRAVENOUS ONCE AS NEEDED
Status: DISCONTINUED | OUTPATIENT
Start: 2025-04-07 | End: 2025-04-07 | Stop reason: HOSPADM

## 2025-04-07 RX ADMIN — ONDANSETRON HYDROCHLORIDE 8 MG: 8 TABLET, FILM COATED ORAL at 11:20

## 2025-04-07 RX ADMIN — Medication 129 MG: at 11:45

## 2025-04-07 RX ADMIN — DARBEPOETIN ALFA 200 MCG: 200 INJECTION, SOLUTION INTRAVENOUS; SUBCUTANEOUS at 11:45

## 2025-04-07 NOTE — ASSESSMENT & PLAN NOTE
4/7/2025: Shaina treatment well.  His Hgb is 10.7 today and PLT 81k.  Continue [id number]     Diagnostics:  -Bone marrow biopsy (week 24) 3/13/25 of therapy shows persistent disease with < 1% blast  Treatment:  - Continue Alternating azacitidine 50 mg/m2 with decitabine 5 mg/m² per [id number]  - C7D8 today  Disease/Toxicity Monitoring:  - Given his hemoglobin and age will check CBC with each visit  Supportive Care:  - Blood products as indicated  - Working with PT  Antimicrobial Prophylaxis:   -None indicated at this time, ANC is in the normal range  IV access:  - for now, continue with PIV; may benefit from port placement or other central line access

## 2025-04-07 NOTE — PROGRESS NOTES
"    Patient ID: Holden Burgess \"GENARO\" is a 84 y.o. male.       ASSESSMENT & PLAN     Oncology History   Myelodysplastic syndrome (Multi)   1/23/2024 Initial Diagnosis    Myelodysplastic syndrome:   Diagnosis:   Originally referred to Dr. Murphy in 2023 for longstanding thrombocytopenia.  At the time had mild leukopenia, no anemia.  Was refractory to a trial of prednisone, and so Bone marrow biopsy was completed completed on 1/23/2024 and demonstrated:  BONE MARROW CLOT, CORE BIOPSY, ASPIRATE, LEFT ILIAC CREST:   -- MILDLY HYPERCELLULAR BONE MARROW (80%) WITH MATURING TRILINEAGE HEMATOPOIESIS AND MODERATE INCREASE IN MEGAKARYOCYTES, SEE NOTE.  -- SMALL LYMPHOID AGGREGATES, FAVOR BENIGN.  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 Given the presence of persistent cytopenias, hypercellularity with increase megakaryocytes with abnormal clustering, and no increase in blasts, the overall findings are most consistent with:  -- MDS with low blasts (WHO-HAEM5 Classification) /   -- MDS-NOS with single lineage dysplasia (MDS-NOS-SLD) (ICC 2022 Classification).  NGS: SRSF2  Chromosome Analysis: 47,XY,+8[15]/46,XY[5]  IPSS-M: -0.99 - low risks disease     4/4/2024 - 8/29/2024 Supportive Treatment    Treatment:   I. Eltrombopag -on 4/4/2024 patient continued with persistent thrombocytopenia but also had some progressive anemia with hemoglobin down to 10.  Patient was offered [id number], preferred supportive management with oral medication opted for eltrombopag in the setting of thrombocytopenia starting at 50 and ramping up to 150 mg  From 4/20/2024 through 8/20/2024 patient had progressive anemia and progressive thrombocytopenia and spite of treatment.    II. Darbepoietin -in the setting of worsening anemia darbepoetin was added 7/12/2024 at 100 mcg every 2 weeks       9/30/2024 -  Research Study Participant    (RS) [id number] - AzaCITIDine / Decitabine, 42 Day Cycle " "Followed by 28 Day Cycles  Plan Provider: Sebastien Rashid MD  Treatment goal: Palliative  Line of treatment: First Line  Associated studies: 5-Azacitidine and Decitabine Epigenetic Therapy for Myeloid Malignancies         04/07/25 C7D8 of [id number]. Presents for count check and follow up of recent hematuria and hyperkalemia. His hyperkalemia has improved with K 4.8 today. Encouraged increasing oral hydration. His hematuria has since resolved spontaneously. Follow up next week with urology for possible cystoscopy. Urine culture negative.     Assessment & Plan  Myelodysplastic syndrome (Multi)  4/7/2025: Shaina treatment well.  His Hgb is 10.7 today and PLT 81k.  Continue [id number]     Diagnostics:  -Bone marrow biopsy (week 24) 3/13/25 of therapy shows persistent disease with < 1% blast  Treatment:  - Continue Alternating azacitidine 50 mg/m2 with decitabine 5 mg/m² per [id number]  - C7D8 today  Disease/Toxicity Monitoring:  - Given his hemoglobin and age will check CBC with each visit  Supportive Care:  - Blood products as indicated  - Working with PT  Antimicrobial Prophylaxis:   -None indicated at this time, ANC is in the normal range  IV access:  - for now, continue with PIV; may benefit from port placement or other central line access      SUBJECTIVE     HPI    Holden Burgess \"PAT\" presents today for follow up, accompanied by wife and daughter. Doing well. Feeling better in regards to his hematuria. Late last week, had noticed blood in his urine a total of 3 times. Wife noted that it was different from typical bloody urine, felt it was \"sludge-like.\" Changes to his urine self resolved shortly after and he has not had any recurrent or new symptoms since then.     Otherwise feeling well. No fevers, chills, nausea, vomiting, diarrhea, rashes, bruising or bleeding.     Review of Systems - Oncology    Past Medical History:   Diagnosis Date    Abdominal pain, chronic, right upper quadrant     ACP (advance care " planning)     Anemia     Aneurysm     Body mass index (BMI) 39.0-39.9, adult 12/06/2021    BMI 39.0-39.9,adult    Body mass index (BMI) 39.0-39.9, adult 07/11/2022    BMI 39.0-39.9,adult    Body mass index (BMI) 39.0-39.9, adult 04/24/2022    Body mass index (BMI) of 39.0 to 39.9 in adult    Body mass index (BMI) 39.0-39.9, adult 04/24/2022    Body mass index (BMI) of 39.0 to 39.9 in adult    Body mass index (BMI) 39.0-39.9, adult 04/24/2022    Body mass index (BMI) of 39.0 to 39.9 in adult    Cramp and spasm 07/19/2022    Leg cramps    Difficulty breathing     Encounter for general adult medical examination without abnormal findings 09/25/2020    Encounter for Medicare annual wellness exam    Encounter for screening for malignant neoplasm of prostate 04/14/2021    Encounter for prostate cancer screening    Encounter for screening for malignant neoplasm of prostate 09/25/2020    Encounter for prostate cancer screening    Forearm injury     Hx of atrial flutter     Hyperlipidemia     Hypertension     Hyperuricemia     Incontinence     Obesity, unspecified 04/26/2022    Class 2 obesity with body mass index (BMI) of 39.0 to 39.9 in adult    Other symptoms and signs involving the musculoskeletal system 10/12/2022    Shoulder weakness    Personal history of other diseases of the circulatory system 09/29/2021    History of hypotension    Personal history of other diseases of the circulatory system 10/26/2021    History of hypertension    Personal history of other endocrine, nutritional and metabolic disease 12/06/2021    History of morbid obesity    Personal history of other specified conditions 09/29/2021    History of dizziness    Rotator cuff injury     Sinus node dysfunction (Multi)     Valvular heart disease      Social History     Tobacco Use    Smoking status: Former     Current packs/day: 0.00     Average packs/day: 3.0 packs/day for 10.0 years (30.0 ttl pk-yrs)     Types: Cigarettes     Start date: 8/30/1958      Quit date: 1968     Years since quittin.6     Passive exposure: Never    Smokeless tobacco: Never   Vaping Use    Vaping status: Never Used   Substance Use Topics    Alcohol use: Not Currently     Comment: quit 2000    Drug use: Never      Past Surgical History:   Procedure Laterality Date    BONE MARROW BIOPSY      BONE MARROW BIOPSY W/ ASPIRATION  2024    CARDIAC ELECTROPHYSIOLOGY PROCEDURE Left 2023    Procedure: PPM IMPLANT DC;  Surgeon: Jun Solis MD;  Location: [facility];  Service: Electrophysiology;  Laterality: Left;    CYSTOSCOPY  2024    MR CHEST ANGIO W AND WO IV CONTRAST  2019    MR CHEST ANGIO W AND WO IV CONTRAST 2019 [facility]    MR CHEST ANGIO W AND WO IV CONTRAST  2023    MR CHEST ANGIO W AND WO IV CONTRAST 20[facility]    MR CHEST ANGIO W AND WO IV CONTRAST  2023    MR CHEST ANGIO W AND WO IV CONTRAST    OTHER SURGICAL HISTORY  2020    Knee replacement    OTHER SURGICAL HISTORY  2020    Catheter ablation    OTHER SURGICAL HISTORY  2020    Prostate surgery    OTHER SURGICAL HISTORY  2020    Lower back surgery    OTHER SURGICAL HISTORY  2020    Hand surgery    OTHER SURGICAL HISTORY  2021    Surgery    OTHER SURGICAL HISTORY  2021    Cataract surgery    OTHER SURGICAL HISTORY  2021    Colonoscopy    OTHER SURGICAL HISTORY  2021    Vertebral fusion    URINARY SPHINCTER IMPLANT  2024         OBJECTIVE     BSA: There is no height or weight on file to calculate BSA.  There were no vitals taken for this visit.       Physical Exam  Constitutional:       Appearance: Normal appearance.   Eyes:      Conjunctiva/sclera: Conjunctivae normal.      Pupils: Pupils are equal, round, and reactive to light.   Pulmonary:      Effort: Pulmonary effort is normal.   Musculoskeletal:         General: Normal range of motion.   Skin:     Findings: No rash.    Neurological:      Mental Status: He is alert. Mental status is at baseline.   Psychiatric:         Mood and Affect: Mood normal.         Performance Status:  Karnofsky Score: 80 - Normal activity with effort; some signs or symptoms of disease        Chary Waldrop PA-C

## 2025-04-07 NOTE — PROGRESS NOTES
Patient presents to infusion appointment in stable condition. C7D8 azacitidine injections & Aranesp injection tolerated without incident. Lab results reviewed. Discharged in stable condition.

## 2025-04-10 ENCOUNTER — INFUSION (OUTPATIENT)
Dept: HEMATOLOGY/ONCOLOGY | Facility: HOSPITAL | Age: 85
End: 2025-04-10
Payer: MEDICARE

## 2025-04-10 ENCOUNTER — LAB (OUTPATIENT)
Dept: LAB | Facility: HOSPITAL | Age: 85
End: 2025-04-10
Payer: MEDICARE

## 2025-04-10 VITALS
OXYGEN SATURATION: 100 % | BODY MASS INDEX: 35.56 KG/M2 | RESPIRATION RATE: 18 BRPM | SYSTOLIC BLOOD PRESSURE: 115 MMHG | HEIGHT: 72 IN | DIASTOLIC BLOOD PRESSURE: 68 MMHG | HEART RATE: 68 BPM | TEMPERATURE: 97.3 F | WEIGHT: 262.57 LBS

## 2025-04-10 DIAGNOSIS — D46.9 MYELODYSPLASTIC SYNDROME (MULTI): Primary | ICD-10-CM

## 2025-04-10 DIAGNOSIS — D46.9 MYELODYSPLASTIC SYNDROME (MULTI): ICD-10-CM

## 2025-04-10 LAB
ALBUMIN SERPL BCP-MCNC: 4.1 G/DL (ref 3.4–5)
ALP SERPL-CCNC: 105 U/L (ref 33–136)
ALT SERPL W P-5'-P-CCNC: 26 U/L (ref 10–52)
ANION GAP SERPL CALC-SCNC: 15 MMOL/L (ref 10–20)
AST SERPL W P-5'-P-CCNC: 19 U/L (ref 9–39)
BASOPHILS # BLD AUTO: 0.01 X10*3/UL (ref 0–0.1)
BASOPHILS NFR BLD AUTO: 0.4 %
BILIRUB SERPL-MCNC: 0.8 MG/DL (ref 0–1.2)
BUN SERPL-MCNC: 31 MG/DL (ref 6–23)
CALCIUM SERPL-MCNC: 9.8 MG/DL (ref 8.6–10.6)
CHLORIDE SERPL-SCNC: 105 MMOL/L (ref 98–107)
CO2 SERPL-SCNC: 24 MMOL/L (ref 21–32)
CREAT SERPL-MCNC: 1.35 MG/DL (ref 0.5–1.3)
EGFRCR SERPLBLD CKD-EPI 2021: 52 ML/MIN/1.73M*2
EOSINOPHIL # BLD AUTO: 0.05 X10*3/UL (ref 0–0.4)
EOSINOPHIL NFR BLD AUTO: 1.8 %
ERYTHROCYTE [DISTWIDTH] IN BLOOD BY AUTOMATED COUNT: 19.1 % (ref 11.5–14.5)
GLUCOSE SERPL-MCNC: 97 MG/DL (ref 74–99)
HCT VFR BLD AUTO: 35.2 % (ref 41–52)
HGB BLD-MCNC: 11.1 G/DL (ref 13.5–17.5)
IMM GRANULOCYTES # BLD AUTO: 0.01 X10*3/UL (ref 0–0.5)
IMM GRANULOCYTES NFR BLD AUTO: 0.4 % (ref 0–0.9)
LYMPHOCYTES # BLD AUTO: 0.89 X10*3/UL (ref 0.8–3)
LYMPHOCYTES NFR BLD AUTO: 31.4 %
MCH RBC QN AUTO: 31 PG (ref 26–34)
MCHC RBC AUTO-ENTMCNC: 31.5 G/DL (ref 32–36)
MCV RBC AUTO: 98 FL (ref 80–100)
MONOCYTES # BLD AUTO: 0.15 X10*3/UL (ref 0.05–0.8)
MONOCYTES NFR BLD AUTO: 5.3 %
NEUTROPHILS # BLD AUTO: 1.72 X10*3/UL (ref 1.6–5.5)
NEUTROPHILS NFR BLD AUTO: 60.7 %
NRBC BLD-RTO: 0 /100 WBCS (ref 0–0)
PLATELET # BLD AUTO: 76 X10*3/UL (ref 150–450)
POTASSIUM SERPL-SCNC: 4.6 MMOL/L (ref 3.5–5.3)
PROT SERPL-MCNC: 6.5 G/DL (ref 6.4–8.2)
RBC # BLD AUTO: 3.58 X10*6/UL (ref 4.5–5.9)
SODIUM SERPL-SCNC: 139 MMOL/L (ref 136–145)
WBC # BLD AUTO: 2.8 X10*3/UL (ref 4.4–11.3)

## 2025-04-10 PROCEDURE — 85025 COMPLETE CBC W/AUTO DIFF WBC: CPT

## 2025-04-10 PROCEDURE — 36415 COLL VENOUS BLD VENIPUNCTURE: CPT

## 2025-04-10 PROCEDURE — 2500000004 HC RX 250 GENERAL PHARMACY W/ HCPCS (ALT 636 FOR OP/ED): Performed by: INTERNAL MEDICINE

## 2025-04-10 PROCEDURE — 80053 COMPREHEN METABOLIC PANEL: CPT

## 2025-04-10 PROCEDURE — 2560000001 HC RX 256 EXPERIMENTAL DRUGS: Performed by: INTERNAL MEDICINE

## 2025-04-10 PROCEDURE — 96401 CHEMO ANTI-NEOPL SQ/IM: CPT

## 2025-04-10 RX ORDER — EPINEPHRINE 0.3 MG/.3ML
0.3 INJECTION SUBCUTANEOUS EVERY 5 MIN PRN
Status: DISCONTINUED | OUTPATIENT
Start: 2025-04-10 | End: 2025-04-10 | Stop reason: HOSPADM

## 2025-04-10 RX ORDER — DIPHENHYDRAMINE HYDROCHLORIDE 50 MG/ML
50 INJECTION, SOLUTION INTRAMUSCULAR; INTRAVENOUS AS NEEDED
Status: DISCONTINUED | OUTPATIENT
Start: 2025-04-10 | End: 2025-04-10 | Stop reason: HOSPADM

## 2025-04-10 RX ORDER — ONDANSETRON HYDROCHLORIDE 8 MG/1
8 TABLET, FILM COATED ORAL ONCE
Status: COMPLETED | OUTPATIENT
Start: 2025-04-10 | End: 2025-04-10

## 2025-04-10 RX ORDER — FAMOTIDINE 10 MG/ML
20 INJECTION, SOLUTION INTRAVENOUS ONCE AS NEEDED
Status: DISCONTINUED | OUTPATIENT
Start: 2025-04-10 | End: 2025-04-10 | Stop reason: HOSPADM

## 2025-04-10 RX ORDER — ALBUTEROL SULFATE 0.83 MG/ML
3 SOLUTION RESPIRATORY (INHALATION) AS NEEDED
Status: DISCONTINUED | OUTPATIENT
Start: 2025-04-10 | End: 2025-04-10 | Stop reason: HOSPADM

## 2025-04-10 RX ADMIN — Medication 12.9 MG: at 12:15

## 2025-04-10 RX ADMIN — ONDANSETRON HYDROCHLORIDE 8 MG: 8 TABLET, FILM COATED ORAL at 11:37

## 2025-04-10 NOTE — PROGRESS NOTES
GENARO Burgess is a 84 y.o. male who presents for Day 11 Cycle 7.    He is on the following chemotherapy regimen:     Treatment Plans       Name Type Plan Dates Plan Provider         Active    ([facility]) [id number] - AzaCITIDine / Decitabine, 42 Day Cycle Followed by 28 Day Cycles Oncology Treatment 9/29/2024 - Present Sebastien Rashid MD                    Since his last visit, he has been doing well.  Overall, he states his energy level is stable.  His appetite has been unchanged.  He reports no complaints.  He has no other concerns.    Tolerated injection well, Labs and AVS provided and discharged in stable condition with his wife at his side.

## 2025-04-14 ENCOUNTER — LAB (OUTPATIENT)
Dept: LAB | Facility: HOSPITAL | Age: 85
End: 2025-04-14
Payer: MEDICARE

## 2025-04-14 ENCOUNTER — INFUSION (OUTPATIENT)
Dept: HEMATOLOGY/ONCOLOGY | Facility: HOSPITAL | Age: 85
End: 2025-04-14
Payer: MEDICARE

## 2025-04-14 ENCOUNTER — APPOINTMENT (OUTPATIENT)
Age: 85
End: 2025-04-14
Payer: MEDICARE

## 2025-04-14 VITALS
OXYGEN SATURATION: 100 % | WEIGHT: 263.8 LBS | BODY MASS INDEX: 35.97 KG/M2 | DIASTOLIC BLOOD PRESSURE: 63 MMHG | TEMPERATURE: 96.4 F | RESPIRATION RATE: 18 BRPM | HEART RATE: 78 BPM | SYSTOLIC BLOOD PRESSURE: 142 MMHG

## 2025-04-14 VITALS — HEART RATE: 89 BPM | SYSTOLIC BLOOD PRESSURE: 111 MMHG | DIASTOLIC BLOOD PRESSURE: 74 MMHG

## 2025-04-14 DIAGNOSIS — D46.9 MYELODYSPLASTIC SYNDROME (MULTI): ICD-10-CM

## 2025-04-14 DIAGNOSIS — N39.3 SUI (STRESS URINARY INCONTINENCE), MALE: ICD-10-CM

## 2025-04-14 DIAGNOSIS — N32.81 OAB (OVERACTIVE BLADDER): Primary | ICD-10-CM

## 2025-04-14 LAB
ALBUMIN SERPL BCP-MCNC: 4.1 G/DL (ref 3.4–5)
ALP SERPL-CCNC: 103 U/L (ref 33–136)
ALT SERPL W P-5'-P-CCNC: 24 U/L (ref 10–52)
ANION GAP SERPL CALC-SCNC: 14 MMOL/L (ref 10–20)
AST SERPL W P-5'-P-CCNC: 18 U/L (ref 9–39)
BASOPHILS # BLD AUTO: 0.01 X10*3/UL (ref 0–0.1)
BASOPHILS NFR BLD AUTO: 0.3 %
BILIRUB SERPL-MCNC: 0.8 MG/DL (ref 0–1.2)
BUN SERPL-MCNC: 31 MG/DL (ref 6–23)
CALCIUM SERPL-MCNC: 9.3 MG/DL (ref 8.6–10.3)
CHLORIDE SERPL-SCNC: 105 MMOL/L (ref 98–107)
CO2 SERPL-SCNC: 25 MMOL/L (ref 21–32)
CREAT SERPL-MCNC: 1.51 MG/DL (ref 0.5–1.3)
EGFRCR SERPLBLD CKD-EPI 2021: 45 ML/MIN/1.73M*2
EOSINOPHIL # BLD AUTO: 0.05 X10*3/UL (ref 0–0.4)
EOSINOPHIL NFR BLD AUTO: 1.6 %
ERYTHROCYTE [DISTWIDTH] IN BLOOD BY AUTOMATED COUNT: 18.9 % (ref 11.5–14.5)
GLUCOSE SERPL-MCNC: 119 MG/DL (ref 74–99)
HCT VFR BLD AUTO: 35.8 % (ref 41–52)
HGB BLD-MCNC: 11 G/DL (ref 13.5–17.5)
IMM GRANULOCYTES # BLD AUTO: 0.02 X10*3/UL (ref 0–0.5)
IMM GRANULOCYTES NFR BLD AUTO: 0.6 % (ref 0–0.9)
LYMPHOCYTES # BLD AUTO: 0.88 X10*3/UL (ref 0.8–3)
LYMPHOCYTES NFR BLD AUTO: 28.1 %
MCH RBC QN AUTO: 30.8 PG (ref 26–34)
MCHC RBC AUTO-ENTMCNC: 30.7 G/DL (ref 32–36)
MCV RBC AUTO: 100 FL (ref 80–100)
MONOCYTES # BLD AUTO: 0.17 X10*3/UL (ref 0.05–0.8)
MONOCYTES NFR BLD AUTO: 5.4 %
NEUTROPHILS # BLD AUTO: 2 X10*3/UL (ref 1.6–5.5)
NEUTROPHILS NFR BLD AUTO: 64 %
NRBC BLD-RTO: 0 /100 WBCS (ref 0–0)
PLATELET # BLD AUTO: 93 X10*3/UL (ref 150–450)
POC APPEARANCE, URINE: ABNORMAL
POC BILIRUBIN, URINE: NEGATIVE
POC BLOOD, URINE: ABNORMAL
POC COLOR, URINE: YELLOW
POC GLUCOSE, URINE: NEGATIVE MG/DL
POC KETONES, URINE: NEGATIVE MG/DL
POC LEUKOCYTES, URINE: ABNORMAL
POC NITRITE,URINE: NEGATIVE
POC PH, URINE: 6 PH
POC PROTEIN, URINE: ABNORMAL MG/DL
POC SPECIFIC GRAVITY, URINE: 1.02
POC UROBILINOGEN, URINE: 0.2 EU/DL
POTASSIUM SERPL-SCNC: 4.6 MMOL/L (ref 3.5–5.3)
PROT SERPL-MCNC: 6.4 G/DL (ref 6.4–8.2)
RBC # BLD AUTO: 3.57 X10*6/UL (ref 4.5–5.9)
SODIUM SERPL-SCNC: 139 MMOL/L (ref 136–145)
WBC # BLD AUTO: 3.1 X10*3/UL (ref 4.4–11.3)

## 2025-04-14 PROCEDURE — 96401 CHEMO ANTI-NEOPL SQ/IM: CPT

## 2025-04-14 PROCEDURE — 2500000004 HC RX 250 GENERAL PHARMACY W/ HCPCS (ALT 636 FOR OP/ED): Performed by: INTERNAL MEDICINE

## 2025-04-14 PROCEDURE — 84075 ASSAY ALKALINE PHOSPHATASE: CPT

## 2025-04-14 PROCEDURE — 2560000001 HC RX 256 EXPERIMENTAL DRUGS: Performed by: INTERNAL MEDICINE

## 2025-04-14 PROCEDURE — 85025 COMPLETE CBC W/AUTO DIFF WBC: CPT

## 2025-04-14 PROCEDURE — 36415 COLL VENOUS BLD VENIPUNCTURE: CPT

## 2025-04-14 RX ORDER — ONDANSETRON HYDROCHLORIDE 8 MG/1
8 TABLET, FILM COATED ORAL ONCE
Status: COMPLETED | OUTPATIENT
Start: 2025-04-14 | End: 2025-04-14

## 2025-04-14 RX ORDER — FAMOTIDINE 10 MG/ML
20 INJECTION, SOLUTION INTRAVENOUS ONCE AS NEEDED
Status: DISCONTINUED | OUTPATIENT
Start: 2025-04-14 | End: 2025-04-14 | Stop reason: HOSPADM

## 2025-04-14 RX ORDER — DIPHENHYDRAMINE HYDROCHLORIDE 50 MG/ML
50 INJECTION, SOLUTION INTRAMUSCULAR; INTRAVENOUS AS NEEDED
Status: DISCONTINUED | OUTPATIENT
Start: 2025-04-14 | End: 2025-04-14 | Stop reason: HOSPADM

## 2025-04-14 RX ORDER — ALBUTEROL SULFATE 0.83 MG/ML
3 SOLUTION RESPIRATORY (INHALATION) AS NEEDED
Status: DISCONTINUED | OUTPATIENT
Start: 2025-04-14 | End: 2025-04-14 | Stop reason: HOSPADM

## 2025-04-14 RX ORDER — EPINEPHRINE 0.3 MG/.3ML
0.3 INJECTION SUBCUTANEOUS EVERY 5 MIN PRN
Status: DISCONTINUED | OUTPATIENT
Start: 2025-04-14 | End: 2025-04-14 | Stop reason: HOSPADM

## 2025-04-14 RX ADMIN — ONDANSETRON HYDROCHLORIDE 8 MG: 8 TABLET, FILM COATED ORAL at 10:53

## 2025-04-14 RX ADMIN — Medication 129 MG: at 11:09

## 2025-04-14 ASSESSMENT — PAIN SCALES - GENERAL: PAINLEVEL_OUTOF10: 0-NO PAIN

## 2025-04-14 NOTE — PROGRESS NOTES
Patient presents to infusion appointment in stable condition. [id number] azacitidine injections tolerated without incident. Discharged in stable condition.

## 2025-04-17 ENCOUNTER — TELEPHONE (OUTPATIENT)
Dept: ADMISSION | Facility: HOSPITAL | Age: 85
End: 2025-04-17
Payer: MEDICARE

## 2025-04-17 ENCOUNTER — INFUSION (OUTPATIENT)
Dept: HEMATOLOGY/ONCOLOGY | Facility: HOSPITAL | Age: 85
End: 2025-04-17
Payer: MEDICARE

## 2025-04-17 ENCOUNTER — LAB (OUTPATIENT)
Dept: LAB | Facility: HOSPITAL | Age: 85
End: 2025-04-17
Payer: MEDICARE

## 2025-04-17 VITALS
HEIGHT: 72 IN | HEART RATE: 69 BPM | TEMPERATURE: 97.5 F | SYSTOLIC BLOOD PRESSURE: 112 MMHG | WEIGHT: 262.35 LBS | BODY MASS INDEX: 35.53 KG/M2 | OXYGEN SATURATION: 99 % | RESPIRATION RATE: 18 BRPM | DIASTOLIC BLOOD PRESSURE: 59 MMHG

## 2025-04-17 DIAGNOSIS — D46.9 MYELODYSPLASTIC SYNDROME (MULTI): ICD-10-CM

## 2025-04-17 PROBLEM — N32.81 OAB (OVERACTIVE BLADDER): Status: ACTIVE | Noted: 2025-04-17

## 2025-04-17 LAB
ALBUMIN SERPL BCP-MCNC: 4.2 G/DL (ref 3.4–5)
ALP SERPL-CCNC: 105 U/L (ref 33–136)
ALT SERPL W P-5'-P-CCNC: 25 U/L (ref 10–52)
ANION GAP SERPL CALC-SCNC: 11 MMOL/L (ref 10–20)
AST SERPL W P-5'-P-CCNC: 22 U/L (ref 9–39)
BASOPHILS # BLD AUTO: 0.03 X10*3/UL (ref 0–0.1)
BASOPHILS NFR BLD AUTO: 1 %
BILIRUB SERPL-MCNC: 0.7 MG/DL (ref 0–1.2)
BUN SERPL-MCNC: 34 MG/DL (ref 6–23)
CALCIUM SERPL-MCNC: 9.6 MG/DL (ref 8.6–10.3)
CHLORIDE SERPL-SCNC: 107 MMOL/L (ref 98–107)
CO2 SERPL-SCNC: 26 MMOL/L (ref 21–32)
CREAT SERPL-MCNC: 1.39 MG/DL (ref 0.5–1.3)
EGFRCR SERPLBLD CKD-EPI 2021: 50 ML/MIN/1.73M*2
EOSINOPHIL # BLD AUTO: 0.04 X10*3/UL (ref 0–0.4)
EOSINOPHIL NFR BLD AUTO: 1.4 %
ERYTHROCYTE [DISTWIDTH] IN BLOOD BY AUTOMATED COUNT: 18.8 % (ref 11.5–14.5)
GLUCOSE SERPL-MCNC: 82 MG/DL (ref 74–99)
HCT VFR BLD AUTO: 36.6 % (ref 41–52)
HGB BLD-MCNC: 11.3 G/DL (ref 13.5–17.5)
IMM GRANULOCYTES # BLD AUTO: 0.01 X10*3/UL (ref 0–0.5)
IMM GRANULOCYTES NFR BLD AUTO: 0.3 % (ref 0–0.9)
LYMPHOCYTES # BLD AUTO: 0.97 X10*3/UL (ref 0.8–3)
LYMPHOCYTES NFR BLD AUTO: 33.6 %
MCH RBC QN AUTO: 30.5 PG (ref 26–34)
MCHC RBC AUTO-ENTMCNC: 30.9 G/DL (ref 32–36)
MCV RBC AUTO: 99 FL (ref 80–100)
MONOCYTES # BLD AUTO: 0.17 X10*3/UL (ref 0.05–0.8)
MONOCYTES NFR BLD AUTO: 5.9 %
NEUTROPHILS # BLD AUTO: 1.67 X10*3/UL (ref 1.6–5.5)
NEUTROPHILS NFR BLD AUTO: 57.8 %
NRBC BLD-RTO: 0 /100 WBCS (ref 0–0)
PLATELET # BLD AUTO: 114 X10*3/UL (ref 150–450)
POTASSIUM SERPL-SCNC: 5.1 MMOL/L (ref 3.5–5.3)
PROT SERPL-MCNC: 6.4 G/DL (ref 6.4–8.2)
RBC # BLD AUTO: 3.71 X10*6/UL (ref 4.5–5.9)
SODIUM SERPL-SCNC: 139 MMOL/L (ref 136–145)
WBC # BLD AUTO: 2.9 X10*3/UL (ref 4.4–11.3)

## 2025-04-17 PROCEDURE — 36415 COLL VENOUS BLD VENIPUNCTURE: CPT

## 2025-04-17 PROCEDURE — 80053 COMPREHEN METABOLIC PANEL: CPT

## 2025-04-17 PROCEDURE — 85025 COMPLETE CBC W/AUTO DIFF WBC: CPT

## 2025-04-17 PROCEDURE — 2500000004 HC RX 250 GENERAL PHARMACY W/ HCPCS (ALT 636 FOR OP/ED): Performed by: INTERNAL MEDICINE

## 2025-04-17 PROCEDURE — 96401 CHEMO ANTI-NEOPL SQ/IM: CPT

## 2025-04-17 PROCEDURE — 2560000001 HC RX 256 EXPERIMENTAL DRUGS: Performed by: INTERNAL MEDICINE

## 2025-04-17 RX ORDER — ONDANSETRON HYDROCHLORIDE 8 MG/1
8 TABLET, FILM COATED ORAL ONCE
Status: COMPLETED | OUTPATIENT
Start: 2025-04-17 | End: 2025-04-17

## 2025-04-17 RX ADMIN — ONDANSETRON HYDROCHLORIDE 8 MG: 8 TABLET, FILM COATED ORAL at 12:19

## 2025-04-17 RX ADMIN — Medication 12.9 MG: at 12:52

## 2025-04-17 NOTE — PROGRESS NOTES
"Subjective:  - Attending for review of artificial urinary sphincter.  - Reports medicine helping \"marginally\".  - Able to cycle device adequately.  - Reports constipation as a side effect of cancer medication.  - Finds it easier to urinate after a bowel movement.    Past Medical History:  - Overactive bladder with poor contractility.  - Artificial urinary sphincter in situ.  - Cancer (on medication).    Objective:  - Sphincter in good position and functioning well.  - Demonstrated correct technique for squeezing device (2 squeezes).  - Stands to urinate.  - Bladder not emptying completely due to poor contractility.  - Likely urinary retention.    Cystoscopy:  Informed consent was obtained.  He was prepped and draped in standard fashion.  Flexible cystoscope was advanced per urethra.  Pendulous urethra was normal.  Artificial sphincter cuff impression was noted in the appropriate mid bulbar bulbar location.  The cuff cycled well.  Proximal to that there was some urethral Hydro dilatation.  The bladder was entered and distended there were no intravesical lesions foreign body stones or tumors.  However visualization was difficult.  The scope was removed, he tolerated the procedure well.      Assessment &amp; Plan:  1. Artificial Urinary Sphincter  - Functioning well, in good position.  - Continue current medications.  - Advised to strain when urinating to improve bladder emptying.  - Instructed on technique: squeeze device twice, then strain; allow 90 seconds for complete emptying.  - Review in 6 months.    2. Overactive Bladder  - Poor contractility noted.  - Advised on techniques to improve emptying (straining).      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  This is a tough situation.  I recommended that he continue to use his Valsalva augmented voiding when he deactivate sphincter.  Additionally, he needs bladder laxation medicines for some symptomatic relief.  I do not think we are at " the point of trying to having him do CIC.  Another thing to consider down the road would be some kind of neuromodulation.

## 2025-04-17 NOTE — PROGRESS NOTES
GENARO Burgess is a 84 y.o. male who presents for Day 18 Cycle 7.    He is on the following chemotherapy regimen:     Treatment Plans       Name Type Plan Dates Plan Provider         Active    ([facility]) [id number] - AzaCITIDine / Decitabine, 42 Day Cycle Followed by 28 Day Cycles Oncology Treatment 9/29/2024 - Present Sebastien Rashid MD                    Since his last visit, he has been doing well.  Overall, he states his energy level is stable.  His appetite has been unchanged.  He reports no complaints.  He has no other concerns.    Tolerated injection well, AVS and lab results provided and discharged in stable condition with his wife at his side.

## 2025-04-17 NOTE — TELEPHONE ENCOUNTER
Reason for Conversation  Appointment    Background   [facility] called and states they are running late for infusion appointment today.  Car battery is currently dead and waiting for assistance.  Requesting to come in as soon as car is started.    Secure chat sent to infusion RN.     Per IKER Vick  - okay to come in when available. RN called [facility] and advised. [facility] appreciative of call back and will arrive when able.

## 2025-04-21 ENCOUNTER — LAB (OUTPATIENT)
Dept: LAB | Facility: HOSPITAL | Age: 85
End: 2025-04-21
Payer: MEDICARE

## 2025-04-21 ENCOUNTER — INFUSION (OUTPATIENT)
Dept: HEMATOLOGY/ONCOLOGY | Facility: HOSPITAL | Age: 85
End: 2025-04-21
Payer: MEDICARE

## 2025-04-21 VITALS
WEIGHT: 263 LBS | HEART RATE: 87 BPM | DIASTOLIC BLOOD PRESSURE: 68 MMHG | TEMPERATURE: 96.8 F | SYSTOLIC BLOOD PRESSURE: 132 MMHG | BODY MASS INDEX: 35.86 KG/M2 | RESPIRATION RATE: 18 BRPM | OXYGEN SATURATION: 98 %

## 2025-04-21 DIAGNOSIS — D46.9 MYELODYSPLASTIC SYNDROME (MULTI): ICD-10-CM

## 2025-04-21 LAB
ALBUMIN SERPL BCP-MCNC: 4.3 G/DL (ref 3.4–5)
ALP SERPL-CCNC: 105 U/L (ref 33–136)
ALT SERPL W P-5'-P-CCNC: 44 U/L (ref 10–52)
ANION GAP SERPL CALC-SCNC: 11 MMOL/L (ref 10–20)
AST SERPL W P-5'-P-CCNC: 29 U/L (ref 9–39)
BASOPHILS # BLD AUTO: 0.02 X10*3/UL (ref 0–0.1)
BASOPHILS NFR BLD AUTO: 0.5 %
BILIRUB SERPL-MCNC: 0.8 MG/DL (ref 0–1.2)
BUN SERPL-MCNC: 34 MG/DL (ref 6–23)
CALCIUM SERPL-MCNC: 9.5 MG/DL (ref 8.6–10.3)
CHLORIDE SERPL-SCNC: 105 MMOL/L (ref 98–107)
CO2 SERPL-SCNC: 27 MMOL/L (ref 21–32)
CREAT SERPL-MCNC: 1.3 MG/DL (ref 0.5–1.3)
EGFRCR SERPLBLD CKD-EPI 2021: 54 ML/MIN/1.73M*2
EOSINOPHIL # BLD AUTO: 0.08 X10*3/UL (ref 0–0.4)
EOSINOPHIL NFR BLD AUTO: 2.2 %
ERYTHROCYTE [DISTWIDTH] IN BLOOD BY AUTOMATED COUNT: 18.6 % (ref 11.5–14.5)
GLUCOSE SERPL-MCNC: 83 MG/DL (ref 74–99)
HCT VFR BLD AUTO: 38.2 % (ref 41–52)
HGB BLD-MCNC: 11.9 G/DL (ref 13.5–17.5)
HGB RETIC QN: 33 PG (ref 28–38)
IMM GRANULOCYTES # BLD AUTO: 0.02 X10*3/UL (ref 0–0.5)
IMM GRANULOCYTES NFR BLD AUTO: 0.5 % (ref 0–0.9)
IMMATURE RETIC FRACTION: 13.4 %
LDH SERPL L TO P-CCNC: 183 U/L (ref 84–246)
LYMPHOCYTES # BLD AUTO: 1.29 X10*3/UL (ref 0.8–3)
LYMPHOCYTES NFR BLD AUTO: 34.9 %
MAGNESIUM SERPL-MCNC: 1.94 MG/DL (ref 1.6–2.4)
MCH RBC QN AUTO: 31 PG (ref 26–34)
MCHC RBC AUTO-ENTMCNC: 31.2 G/DL (ref 32–36)
MCV RBC AUTO: 100 FL (ref 80–100)
MONOCYTES # BLD AUTO: 0.13 X10*3/UL (ref 0.05–0.8)
MONOCYTES NFR BLD AUTO: 3.5 %
NEUTROPHILS # BLD AUTO: 2.16 X10*3/UL (ref 1.6–5.5)
NEUTROPHILS NFR BLD AUTO: 58.4 %
NRBC BLD-RTO: 0 /100 WBCS (ref 0–0)
PLATELET # BLD AUTO: 98 X10*3/UL (ref 150–450)
POTASSIUM SERPL-SCNC: 4.2 MMOL/L (ref 3.5–5.3)
PROT SERPL-MCNC: 6.6 G/DL (ref 6.4–8.2)
RBC # BLD AUTO: 3.84 X10*6/UL (ref 4.5–5.9)
RETICS #: 0.07 X10*6/UL (ref 0.02–0.11)
RETICS/RBC NFR AUTO: 1.8 % (ref 0.5–2)
SODIUM SERPL-SCNC: 139 MMOL/L (ref 136–145)
URATE SERPL-MCNC: 4.8 MG/DL (ref 4–7.5)
WBC # BLD AUTO: 3.7 X10*3/UL (ref 4.4–11.3)

## 2025-04-21 PROCEDURE — 96401 CHEMO ANTI-NEOPL SQ/IM: CPT

## 2025-04-21 PROCEDURE — 36415 COLL VENOUS BLD VENIPUNCTURE: CPT

## 2025-04-21 PROCEDURE — 85045 AUTOMATED RETICULOCYTE COUNT: CPT

## 2025-04-21 PROCEDURE — 2560000001 HC RX 256 EXPERIMENTAL DRUGS: Performed by: INTERNAL MEDICINE

## 2025-04-21 PROCEDURE — 85025 COMPLETE CBC W/AUTO DIFF WBC: CPT

## 2025-04-21 PROCEDURE — 2500000004 HC RX 250 GENERAL PHARMACY W/ HCPCS (ALT 636 FOR OP/ED): Performed by: INTERNAL MEDICINE

## 2025-04-21 PROCEDURE — 80053 COMPREHEN METABOLIC PANEL: CPT

## 2025-04-21 PROCEDURE — 84550 ASSAY OF BLOOD/URIC ACID: CPT

## 2025-04-21 PROCEDURE — 83615 LACTATE (LD) (LDH) ENZYME: CPT

## 2025-04-21 PROCEDURE — 83735 ASSAY OF MAGNESIUM: CPT

## 2025-04-21 RX ORDER — ONDANSETRON HYDROCHLORIDE 8 MG/1
8 TABLET, FILM COATED ORAL ONCE
Status: COMPLETED | OUTPATIENT
Start: 2025-04-21 | End: 2025-04-21

## 2025-04-21 RX ORDER — FAMOTIDINE 10 MG/ML
20 INJECTION, SOLUTION INTRAVENOUS ONCE AS NEEDED
Status: DISCONTINUED | OUTPATIENT
Start: 2025-04-21 | End: 2025-04-21 | Stop reason: HOSPADM

## 2025-04-21 RX ORDER — DIPHENHYDRAMINE HYDROCHLORIDE 50 MG/ML
50 INJECTION, SOLUTION INTRAMUSCULAR; INTRAVENOUS AS NEEDED
Status: DISCONTINUED | OUTPATIENT
Start: 2025-04-21 | End: 2025-04-21 | Stop reason: HOSPADM

## 2025-04-21 RX ORDER — EPINEPHRINE 0.3 MG/.3ML
0.3 INJECTION SUBCUTANEOUS EVERY 5 MIN PRN
Status: DISCONTINUED | OUTPATIENT
Start: 2025-04-21 | End: 2025-04-21 | Stop reason: HOSPADM

## 2025-04-21 RX ORDER — ALBUTEROL SULFATE 0.83 MG/ML
3 SOLUTION RESPIRATORY (INHALATION) AS NEEDED
Status: DISCONTINUED | OUTPATIENT
Start: 2025-04-21 | End: 2025-04-21 | Stop reason: HOSPADM

## 2025-04-21 RX ADMIN — ONDANSETRON HYDROCHLORIDE 8 MG: 8 TABLET, FILM COATED ORAL at 10:44

## 2025-04-21 RX ADMIN — Medication 129 MG: at 11:42

## 2025-04-21 NOTE — PROGRESS NOTES
Patient presents to infusion appointment in stable condition. Azacitidine injections tolerated well. Hgb 11.9, aranesp injection held per orders. Discharged in stable condition.

## 2025-04-23 ENCOUNTER — TREATMENT (OUTPATIENT)
Dept: PHYSICAL THERAPY | Facility: CLINIC | Age: 85
End: 2025-04-23
Payer: MEDICARE

## 2025-04-23 DIAGNOSIS — D46.9 MYELODYSPLASTIC SYNDROME (MULTI): Primary | ICD-10-CM

## 2025-04-23 PROCEDURE — 97110 THERAPEUTIC EXERCISES: CPT | Mod: GP | Performed by: GENERAL ACUTE CARE HOSPITAL

## 2025-04-23 NOTE — PROGRESS NOTES
"Patient Name: Holden Burgess \"GENARO\"  MRN: [id number]  Today's Date: 4/23/2025  Time Calculation  Start Time: 1050  Stop Time: 1135  Time Calculation (min): 45 min  Current Problem:  1. Myelodysplastic syndrome (Multi)            Visit 7/20    Subjective:  Change in pain/ pain level: 0/10  Patient comments: left foot stiff in the am, can not dorsiflex    Objective: track amb 3/10 mile wit    Treatment:    Therapeutic exercise (50286):    nustep level 6 8 min no arms  Track amb 3 laps with walker   Stretches   Titl board L2   Leg Press 110# 2x20  HSC 50# 2x15  Step up 6 in f/L x12 ea     Assessment:  Fatigue noted with ambulation. Patient continues to require active therapy to progress functional capabilities with decreasing pain levels and improving mechanics with functional activities including progression of Home exercise program. Tolerance to today's treatment was good. Muscle fatigue noted.  Patient appears motivated and compliant this date, demonstrating a working understanding of principals instructed and home program.    Plan:  Progress with functional strength as tolerated with respect to tissue healing. Manual therapy PRN to improve/maintain ROM, prevent adhesion, reduce pain.  "

## 2025-04-24 ENCOUNTER — INFUSION (OUTPATIENT)
Dept: HEMATOLOGY/ONCOLOGY | Facility: HOSPITAL | Age: 85
End: 2025-04-24
Payer: MEDICARE

## 2025-04-24 ENCOUNTER — LAB (OUTPATIENT)
Dept: LAB | Facility: HOSPITAL | Age: 85
End: 2025-04-24
Payer: MEDICARE

## 2025-04-24 VITALS
WEIGHT: 264.33 LBS | SYSTOLIC BLOOD PRESSURE: 139 MMHG | TEMPERATURE: 97 F | BODY MASS INDEX: 35.8 KG/M2 | HEART RATE: 82 BPM | DIASTOLIC BLOOD PRESSURE: 64 MMHG | RESPIRATION RATE: 18 BRPM | HEIGHT: 72 IN | OXYGEN SATURATION: 98 %

## 2025-04-24 DIAGNOSIS — D46.9 MYELODYSPLASTIC SYNDROME (MULTI): ICD-10-CM

## 2025-04-24 LAB
ALBUMIN SERPL BCP-MCNC: 4 G/DL (ref 3.4–5)
ALP SERPL-CCNC: 99 U/L (ref 33–136)
ALT SERPL W P-5'-P-CCNC: 32 U/L (ref 10–52)
ANION GAP SERPL CALC-SCNC: 11 MMOL/L (ref 10–20)
AST SERPL W P-5'-P-CCNC: 22 U/L (ref 9–39)
BASOPHILS # BLD AUTO: 0.02 X10*3/UL (ref 0–0.1)
BASOPHILS NFR BLD AUTO: 0.6 %
BILIRUB SERPL-MCNC: 0.7 MG/DL (ref 0–1.2)
BUN SERPL-MCNC: 34 MG/DL (ref 6–23)
CALCIUM SERPL-MCNC: 9.4 MG/DL (ref 8.6–10.3)
CHLORIDE SERPL-SCNC: 108 MMOL/L (ref 98–107)
CO2 SERPL-SCNC: 25 MMOL/L (ref 21–32)
CREAT SERPL-MCNC: 1.43 MG/DL (ref 0.5–1.3)
EGFRCR SERPLBLD CKD-EPI 2021: 48 ML/MIN/1.73M*2
EOSINOPHIL # BLD AUTO: 0.07 X10*3/UL (ref 0–0.4)
EOSINOPHIL NFR BLD AUTO: 2.2 %
ERYTHROCYTE [DISTWIDTH] IN BLOOD BY AUTOMATED COUNT: 18.7 % (ref 11.5–14.5)
GLUCOSE SERPL-MCNC: 99 MG/DL (ref 74–99)
HCT VFR BLD AUTO: 35.9 % (ref 41–52)
HGB BLD-MCNC: 11.2 G/DL (ref 13.5–17.5)
IMM GRANULOCYTES # BLD AUTO: 0.02 X10*3/UL (ref 0–0.5)
IMM GRANULOCYTES NFR BLD AUTO: 0.6 % (ref 0–0.9)
LYMPHOCYTES # BLD AUTO: 0.97 X10*3/UL (ref 0.8–3)
LYMPHOCYTES NFR BLD AUTO: 30.2 %
MCH RBC QN AUTO: 30.7 PG (ref 26–34)
MCHC RBC AUTO-ENTMCNC: 31.2 G/DL (ref 32–36)
MCV RBC AUTO: 98 FL (ref 80–100)
MONOCYTES # BLD AUTO: 0.2 X10*3/UL (ref 0.05–0.8)
MONOCYTES NFR BLD AUTO: 6.2 %
NEUTROPHILS # BLD AUTO: 1.93 X10*3/UL (ref 1.6–5.5)
NEUTROPHILS NFR BLD AUTO: 60.2 %
NRBC BLD-RTO: 0 /100 WBCS (ref 0–0)
PLATELET # BLD AUTO: 79 X10*3/UL (ref 150–450)
POTASSIUM SERPL-SCNC: 4.7 MMOL/L (ref 3.5–5.3)
PROT SERPL-MCNC: 6.1 G/DL (ref 6.4–8.2)
RBC # BLD AUTO: 3.65 X10*6/UL (ref 4.5–5.9)
SODIUM SERPL-SCNC: 139 MMOL/L (ref 136–145)
WBC # BLD AUTO: 3.2 X10*3/UL (ref 4.4–11.3)

## 2025-04-24 PROCEDURE — 96401 CHEMO ANTI-NEOPL SQ/IM: CPT

## 2025-04-24 PROCEDURE — 36415 COLL VENOUS BLD VENIPUNCTURE: CPT

## 2025-04-24 PROCEDURE — 80053 COMPREHEN METABOLIC PANEL: CPT

## 2025-04-24 PROCEDURE — 2500000004 HC RX 250 GENERAL PHARMACY W/ HCPCS (ALT 636 FOR OP/ED): Performed by: INTERNAL MEDICINE

## 2025-04-24 PROCEDURE — 85025 COMPLETE CBC W/AUTO DIFF WBC: CPT

## 2025-04-24 PROCEDURE — 2560000001 HC RX 256 EXPERIMENTAL DRUGS: Performed by: INTERNAL MEDICINE

## 2025-04-24 RX ORDER — ONDANSETRON HYDROCHLORIDE 8 MG/1
8 TABLET, FILM COATED ORAL ONCE
Status: COMPLETED | OUTPATIENT
Start: 2025-04-24 | End: 2025-04-24

## 2025-04-24 RX ORDER — FAMOTIDINE 10 MG/ML
20 INJECTION, SOLUTION INTRAVENOUS ONCE AS NEEDED
Status: DISCONTINUED | OUTPATIENT
Start: 2025-04-24 | End: 2025-04-24 | Stop reason: HOSPADM

## 2025-04-24 RX ORDER — DIPHENHYDRAMINE HYDROCHLORIDE 50 MG/ML
50 INJECTION, SOLUTION INTRAMUSCULAR; INTRAVENOUS AS NEEDED
Status: DISCONTINUED | OUTPATIENT
Start: 2025-04-24 | End: 2025-04-24 | Stop reason: HOSPADM

## 2025-04-24 RX ORDER — EPINEPHRINE 0.3 MG/.3ML
0.3 INJECTION SUBCUTANEOUS EVERY 5 MIN PRN
Status: DISCONTINUED | OUTPATIENT
Start: 2025-04-24 | End: 2025-04-24 | Stop reason: HOSPADM

## 2025-04-24 RX ORDER — ALBUTEROL SULFATE 0.83 MG/ML
3 SOLUTION RESPIRATORY (INHALATION) AS NEEDED
Status: DISCONTINUED | OUTPATIENT
Start: 2025-04-24 | End: 2025-04-24 | Stop reason: HOSPADM

## 2025-04-24 RX ADMIN — Medication 12.9 MG: at 11:46

## 2025-04-24 RX ADMIN — ONDANSETRON HYDROCHLORIDE 8 MG: 8 TABLET, FILM COATED ORAL at 11:05

## 2025-04-24 NOTE — PROGRESS NOTES
GENARO Burgess is a 84 y.o. male who presents for Day 25 Cycle 7.    He is on the following chemotherapy regimen:     Treatment Plans       Name Type Plan Dates Plan Provider         Active    ([facility]) [id number] - AzaCITIDine / Decitabine, 42 Day Cycle Followed by 28 Day Cycles Oncology Treatment 9/29/2024 - Present Sebastien Rashid MD                    Since his last visit, he has been doing well.  Overall, he states his energy level is stable.  His appetite has been unchanged.  He reports no complaints.  He has no other concerns.    Tolerated injection well, AVS along with lab results provided and discharged in stable condition with his wife at this side.

## 2025-04-28 ENCOUNTER — OFFICE VISIT (OUTPATIENT)
Dept: HEMATOLOGY/ONCOLOGY | Facility: HOSPITAL | Age: 85
End: 2025-04-28
Payer: MEDICARE

## 2025-04-28 ENCOUNTER — INFUSION (OUTPATIENT)
Dept: HEMATOLOGY/ONCOLOGY | Facility: HOSPITAL | Age: 85
End: 2025-04-28
Payer: MEDICARE

## 2025-04-28 ENCOUNTER — LAB (OUTPATIENT)
Dept: LAB | Facility: HOSPITAL | Age: 85
End: 2025-04-28
Payer: MEDICARE

## 2025-04-28 VITALS
OXYGEN SATURATION: 97 % | TEMPERATURE: 97.2 F | BODY MASS INDEX: 36.04 KG/M2 | DIASTOLIC BLOOD PRESSURE: 58 MMHG | SYSTOLIC BLOOD PRESSURE: 101 MMHG | HEART RATE: 63 BPM | RESPIRATION RATE: 17 BRPM | WEIGHT: 264.33 LBS

## 2025-04-28 DIAGNOSIS — D46.9 MYELODYSPLASTIC SYNDROME (MULTI): ICD-10-CM

## 2025-04-28 LAB
ALBUMIN SERPL BCP-MCNC: 4.1 G/DL (ref 3.4–5)
ALP SERPL-CCNC: 104 U/L (ref 33–136)
ALT SERPL W P-5'-P-CCNC: 29 U/L (ref 10–52)
ANION GAP SERPL CALC-SCNC: 13 MMOL/L (ref 10–20)
AST SERPL W P-5'-P-CCNC: 24 U/L (ref 9–39)
BASOPHILS # BLD AUTO: 0.02 X10*3/UL (ref 0–0.1)
BASOPHILS NFR BLD AUTO: 0.6 %
BILIRUB SERPL-MCNC: 0.8 MG/DL (ref 0–1.2)
BUN SERPL-MCNC: 42 MG/DL (ref 6–23)
CALCIUM SERPL-MCNC: 9.4 MG/DL (ref 8.6–10.3)
CHLORIDE SERPL-SCNC: 107 MMOL/L (ref 98–107)
CO2 SERPL-SCNC: 25 MMOL/L (ref 21–32)
CREAT SERPL-MCNC: 1.38 MG/DL (ref 0.5–1.3)
EGFRCR SERPLBLD CKD-EPI 2021: 50 ML/MIN/1.73M*2
EOSINOPHIL # BLD AUTO: 0.08 X10*3/UL (ref 0–0.4)
EOSINOPHIL NFR BLD AUTO: 2.3 %
ERYTHROCYTE [DISTWIDTH] IN BLOOD BY AUTOMATED COUNT: 18.5 % (ref 11.5–14.5)
GLUCOSE SERPL-MCNC: 86 MG/DL (ref 74–99)
HCT VFR BLD AUTO: 36.4 % (ref 41–52)
HGB BLD-MCNC: 11.3 G/DL (ref 13.5–17.5)
IMM GRANULOCYTES # BLD AUTO: 0.02 X10*3/UL (ref 0–0.5)
IMM GRANULOCYTES NFR BLD AUTO: 0.6 % (ref 0–0.9)
LYMPHOCYTES # BLD AUTO: 1.08 X10*3/UL (ref 0.8–3)
LYMPHOCYTES NFR BLD AUTO: 31.6 %
MCH RBC QN AUTO: 31 PG (ref 26–34)
MCHC RBC AUTO-ENTMCNC: 31 G/DL (ref 32–36)
MCV RBC AUTO: 100 FL (ref 80–100)
MONOCYTES # BLD AUTO: 0.2 X10*3/UL (ref 0.05–0.8)
MONOCYTES NFR BLD AUTO: 5.8 %
NEUTROPHILS # BLD AUTO: 2.02 X10*3/UL (ref 1.6–5.5)
NEUTROPHILS NFR BLD AUTO: 59.1 %
NRBC BLD-RTO: 0 /100 WBCS (ref 0–0)
PLATELET # BLD AUTO: 87 X10*3/UL (ref 150–450)
POTASSIUM SERPL-SCNC: 5.1 MMOL/L (ref 3.5–5.3)
PROT SERPL-MCNC: 6.3 G/DL (ref 6.4–8.2)
RBC # BLD AUTO: 3.65 X10*6/UL (ref 4.5–5.9)
SODIUM SERPL-SCNC: 140 MMOL/L (ref 136–145)
WBC # BLD AUTO: 3.4 X10*3/UL (ref 4.4–11.3)

## 2025-04-28 PROCEDURE — 85025 COMPLETE CBC W/AUTO DIFF WBC: CPT

## 2025-04-28 PROCEDURE — 2560000001 HC RX 256 EXPERIMENTAL DRUGS: Performed by: INTERNAL MEDICINE

## 2025-04-28 PROCEDURE — 84075 ASSAY ALKALINE PHOSPHATASE: CPT

## 2025-04-28 PROCEDURE — 3074F SYST BP LT 130 MM HG: CPT | Performed by: INTERNAL MEDICINE

## 2025-04-28 PROCEDURE — 3078F DIAST BP <80 MM HG: CPT | Performed by: INTERNAL MEDICINE

## 2025-04-28 PROCEDURE — G2211 COMPLEX E/M VISIT ADD ON: HCPCS | Performed by: INTERNAL MEDICINE

## 2025-04-28 PROCEDURE — 99215 OFFICE O/P EST HI 40 MIN: CPT | Performed by: INTERNAL MEDICINE

## 2025-04-28 PROCEDURE — 1159F MED LIST DOCD IN RCRD: CPT | Performed by: INTERNAL MEDICINE

## 2025-04-28 PROCEDURE — 36415 COLL VENOUS BLD VENIPUNCTURE: CPT

## 2025-04-28 PROCEDURE — 1123F ACP DISCUSS/DSCN MKR DOCD: CPT | Performed by: INTERNAL MEDICINE

## 2025-04-28 PROCEDURE — 96401 CHEMO ANTI-NEOPL SQ/IM: CPT

## 2025-04-28 PROCEDURE — 1126F AMNT PAIN NOTED NONE PRSNT: CPT | Performed by: INTERNAL MEDICINE

## 2025-04-28 PROCEDURE — 2500000004 HC RX 250 GENERAL PHARMACY W/ HCPCS (ALT 636 FOR OP/ED): Performed by: INTERNAL MEDICINE

## 2025-04-28 RX ORDER — ALBUTEROL SULFATE 0.83 MG/ML
3 SOLUTION RESPIRATORY (INHALATION) AS NEEDED
OUTPATIENT
Start: 2025-05-12

## 2025-04-28 RX ORDER — ONDANSETRON HYDROCHLORIDE 8 MG/1
8 TABLET, FILM COATED ORAL ONCE
OUTPATIENT
Start: 2025-05-12

## 2025-04-28 RX ORDER — FAMOTIDINE 10 MG/ML
20 INJECTION, SOLUTION INTRAVENOUS ONCE AS NEEDED
Status: CANCELLED | OUTPATIENT
Start: 2025-05-01

## 2025-04-28 RX ORDER — DIPHENHYDRAMINE HYDROCHLORIDE 50 MG/ML
50 INJECTION, SOLUTION INTRAMUSCULAR; INTRAVENOUS AS NEEDED
OUTPATIENT
Start: 2025-05-15

## 2025-04-28 RX ORDER — FAMOTIDINE 10 MG/ML
20 INJECTION, SOLUTION INTRAVENOUS ONCE AS NEEDED
OUTPATIENT
Start: 2025-05-22

## 2025-04-28 RX ORDER — ALBUTEROL SULFATE 0.83 MG/ML
3 SOLUTION RESPIRATORY (INHALATION) AS NEEDED
OUTPATIENT
Start: 2025-05-05

## 2025-04-28 RX ORDER — ONDANSETRON HYDROCHLORIDE 8 MG/1
8 TABLET, FILM COATED ORAL ONCE
OUTPATIENT
Start: 2025-05-19

## 2025-04-28 RX ORDER — ONDANSETRON HYDROCHLORIDE 8 MG/1
8 TABLET, FILM COATED ORAL ONCE
OUTPATIENT
Start: 2025-05-08

## 2025-04-28 RX ORDER — DIPHENHYDRAMINE HYDROCHLORIDE 50 MG/ML
50 INJECTION, SOLUTION INTRAMUSCULAR; INTRAVENOUS AS NEEDED
Status: DISCONTINUED | OUTPATIENT
Start: 2025-04-28 | End: 2025-04-28 | Stop reason: HOSPADM

## 2025-04-28 RX ORDER — FAMOTIDINE 10 MG/ML
20 INJECTION, SOLUTION INTRAVENOUS ONCE AS NEEDED
Status: CANCELLED | OUTPATIENT
Start: 2025-04-28

## 2025-04-28 RX ORDER — EPINEPHRINE 0.3 MG/.3ML
0.3 INJECTION SUBCUTANEOUS EVERY 5 MIN PRN
OUTPATIENT
Start: 2025-05-22

## 2025-04-28 RX ORDER — ONDANSETRON HYDROCHLORIDE 8 MG/1
8 TABLET, FILM COATED ORAL ONCE
OUTPATIENT
Start: 2025-05-05

## 2025-04-28 RX ORDER — ONDANSETRON HYDROCHLORIDE 8 MG/1
8 TABLET, FILM COATED ORAL ONCE
OUTPATIENT
Start: 2025-05-15

## 2025-04-28 RX ORDER — DIPHENHYDRAMINE HYDROCHLORIDE 50 MG/ML
50 INJECTION, SOLUTION INTRAMUSCULAR; INTRAVENOUS AS NEEDED
OUTPATIENT
Start: 2025-05-19

## 2025-04-28 RX ORDER — ALBUTEROL SULFATE 0.83 MG/ML
3 SOLUTION RESPIRATORY (INHALATION) AS NEEDED
Status: DISCONTINUED | OUTPATIENT
Start: 2025-04-28 | End: 2025-04-28 | Stop reason: HOSPADM

## 2025-04-28 RX ORDER — ALBUTEROL SULFATE 0.83 MG/ML
3 SOLUTION RESPIRATORY (INHALATION) AS NEEDED
Status: CANCELLED | OUTPATIENT
Start: 2025-04-28

## 2025-04-28 RX ORDER — EPINEPHRINE 0.3 MG/.3ML
0.3 INJECTION SUBCUTANEOUS EVERY 5 MIN PRN
Status: CANCELLED | OUTPATIENT
Start: 2025-05-01

## 2025-04-28 RX ORDER — EPINEPHRINE 0.3 MG/.3ML
0.3 INJECTION SUBCUTANEOUS EVERY 5 MIN PRN
Status: DISCONTINUED | OUTPATIENT
Start: 2025-04-28 | End: 2025-04-28 | Stop reason: HOSPADM

## 2025-04-28 RX ORDER — ONDANSETRON HYDROCHLORIDE 8 MG/1
8 TABLET, FILM COATED ORAL ONCE
Status: COMPLETED | OUTPATIENT
Start: 2025-04-28 | End: 2025-04-28

## 2025-04-28 RX ORDER — EPINEPHRINE 0.3 MG/.3ML
0.3 INJECTION SUBCUTANEOUS EVERY 5 MIN PRN
OUTPATIENT
Start: 2025-05-05

## 2025-04-28 RX ORDER — DIPHENHYDRAMINE HYDROCHLORIDE 50 MG/ML
50 INJECTION, SOLUTION INTRAMUSCULAR; INTRAVENOUS AS NEEDED
OUTPATIENT
Start: 2025-05-12

## 2025-04-28 RX ORDER — ALBUTEROL SULFATE 0.83 MG/ML
3 SOLUTION RESPIRATORY (INHALATION) AS NEEDED
OUTPATIENT
Start: 2025-05-08

## 2025-04-28 RX ORDER — ALBUTEROL SULFATE 0.83 MG/ML
3 SOLUTION RESPIRATORY (INHALATION) AS NEEDED
OUTPATIENT
Start: 2025-05-22

## 2025-04-28 RX ORDER — DIPHENHYDRAMINE HYDROCHLORIDE 50 MG/ML
50 INJECTION, SOLUTION INTRAMUSCULAR; INTRAVENOUS AS NEEDED
OUTPATIENT
Start: 2025-05-08

## 2025-04-28 RX ORDER — ALBUTEROL SULFATE 0.83 MG/ML
3 SOLUTION RESPIRATORY (INHALATION) AS NEEDED
OUTPATIENT
Start: 2025-05-15

## 2025-04-28 RX ORDER — DIPHENHYDRAMINE HYDROCHLORIDE 50 MG/ML
50 INJECTION, SOLUTION INTRAMUSCULAR; INTRAVENOUS AS NEEDED
Status: CANCELLED | OUTPATIENT
Start: 2025-04-28

## 2025-04-28 RX ORDER — FAMOTIDINE 10 MG/ML
20 INJECTION, SOLUTION INTRAVENOUS ONCE AS NEEDED
OUTPATIENT
Start: 2025-05-05

## 2025-04-28 RX ORDER — DIPHENHYDRAMINE HYDROCHLORIDE 50 MG/ML
50 INJECTION, SOLUTION INTRAMUSCULAR; INTRAVENOUS AS NEEDED
OUTPATIENT
Start: 2025-05-22

## 2025-04-28 RX ORDER — DIPHENHYDRAMINE HYDROCHLORIDE 50 MG/ML
50 INJECTION, SOLUTION INTRAMUSCULAR; INTRAVENOUS AS NEEDED
Status: CANCELLED | OUTPATIENT
Start: 2025-05-01

## 2025-04-28 RX ORDER — ALBUTEROL SULFATE 0.83 MG/ML
3 SOLUTION RESPIRATORY (INHALATION) AS NEEDED
OUTPATIENT
Start: 2025-05-19

## 2025-04-28 RX ORDER — FAMOTIDINE 10 MG/ML
20 INJECTION, SOLUTION INTRAVENOUS ONCE AS NEEDED
OUTPATIENT
Start: 2025-05-15

## 2025-04-28 RX ORDER — ONDANSETRON HYDROCHLORIDE 8 MG/1
8 TABLET, FILM COATED ORAL ONCE
Status: CANCELLED | OUTPATIENT
Start: 2025-04-28

## 2025-04-28 RX ORDER — ONDANSETRON HYDROCHLORIDE 8 MG/1
8 TABLET, FILM COATED ORAL ONCE
Status: CANCELLED | OUTPATIENT
Start: 2025-05-01

## 2025-04-28 RX ORDER — FAMOTIDINE 10 MG/ML
20 INJECTION, SOLUTION INTRAVENOUS ONCE AS NEEDED
OUTPATIENT
Start: 2025-05-08

## 2025-04-28 RX ORDER — EPINEPHRINE 0.3 MG/.3ML
0.3 INJECTION SUBCUTANEOUS EVERY 5 MIN PRN
OUTPATIENT
Start: 2025-05-15

## 2025-04-28 RX ORDER — ONDANSETRON HYDROCHLORIDE 8 MG/1
8 TABLET, FILM COATED ORAL ONCE
OUTPATIENT
Start: 2025-05-22

## 2025-04-28 RX ORDER — EPINEPHRINE 0.3 MG/.3ML
0.3 INJECTION SUBCUTANEOUS EVERY 5 MIN PRN
OUTPATIENT
Start: 2025-05-08

## 2025-04-28 RX ORDER — DIPHENHYDRAMINE HYDROCHLORIDE 50 MG/ML
50 INJECTION, SOLUTION INTRAMUSCULAR; INTRAVENOUS AS NEEDED
OUTPATIENT
Start: 2025-05-05

## 2025-04-28 RX ORDER — FAMOTIDINE 10 MG/ML
20 INJECTION, SOLUTION INTRAVENOUS ONCE AS NEEDED
Status: DISCONTINUED | OUTPATIENT
Start: 2025-04-28 | End: 2025-04-28 | Stop reason: HOSPADM

## 2025-04-28 RX ORDER — EPINEPHRINE 0.3 MG/.3ML
0.3 INJECTION SUBCUTANEOUS EVERY 5 MIN PRN
Status: CANCELLED | OUTPATIENT
Start: 2025-04-28

## 2025-04-28 RX ORDER — ALBUTEROL SULFATE 0.83 MG/ML
3 SOLUTION RESPIRATORY (INHALATION) AS NEEDED
Status: CANCELLED | OUTPATIENT
Start: 2025-05-01

## 2025-04-28 RX ORDER — EPINEPHRINE 0.3 MG/.3ML
0.3 INJECTION SUBCUTANEOUS EVERY 5 MIN PRN
OUTPATIENT
Start: 2025-05-12

## 2025-04-28 RX ORDER — FAMOTIDINE 10 MG/ML
20 INJECTION, SOLUTION INTRAVENOUS ONCE AS NEEDED
OUTPATIENT
Start: 2025-05-12

## 2025-04-28 RX ORDER — EPINEPHRINE 0.3 MG/.3ML
0.3 INJECTION SUBCUTANEOUS EVERY 5 MIN PRN
OUTPATIENT
Start: 2025-05-19

## 2025-04-28 RX ORDER — FAMOTIDINE 10 MG/ML
20 INJECTION, SOLUTION INTRAVENOUS ONCE AS NEEDED
OUTPATIENT
Start: 2025-05-19

## 2025-04-28 RX ADMIN — ONDANSETRON HYDROCHLORIDE 8 MG: 8 TABLET, FILM COATED ORAL at 12:20

## 2025-04-28 RX ADMIN — Medication 129 MG: at 12:44

## 2025-04-28 ASSESSMENT — PAIN SCALES - GENERAL: PAINLEVEL_OUTOF10: 0-NO PAIN

## 2025-04-28 NOTE — PROGRESS NOTES
"Patient ID: Holden Burgess \"GENARO\" is a 84 y.o. male.  Referring Physician: Sebastien Rashid MD  [address]  [place] [id number]  Primary Care Provider: Giacomo Joseph DO    Date of Service:  4/28/2025    Oncology History   Myelodysplastic syndrome (Multi)   1/23/2024 Initial Diagnosis    Myelodysplastic syndrome:   Diagnosis:   Originally referred to Dr. Murphy in 2023 for longstanding thrombocytopenia.  At the time had mild leukopenia, no anemia.  Was refractory to a trial of prednisone, and so Bone marrow biopsy was completed completed on 1/23/2024 and demonstrated:  BONE MARROW CLOT, CORE BIOPSY, ASPIRATE, LEFT ILIAC CREST:   -- MILDLY HYPERCELLULAR BONE MARROW (80%) WITH MATURING TRILINEAGE HEMATOPOIESIS AND MODERATE INCREASE IN MEGAKARYOCYTES, SEE NOTE.  -- SMALL LYMPHOID AGGREGATES, FAVOR BENIGN.  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 Given the presence of persistent cytopenias, hypercellularity with increase megakaryocytes with abnormal clustering, and no increase in blasts, the overall findings are most consistent with:  -- MDS with low blasts (WHO-HAEM5 Classification) /   -- MDS-NOS with single lineage dysplasia (MDS-NOS-SLD) (ICC 2022 Classification).  NGS: SRSF2  Chromosome Analysis: 47,XY,+8[15]/46,XY[5]  IPSS-M: -0.99 - low risks disease     4/4/2024 - 8/29/2024 Supportive Treatment    Treatment:   I. Eltrombopag -on 4/4/2024 patient continued with persistent thrombocytopenia but also had some progressive anemia with hemoglobin down to 10.  Patient was offered [id number], preferred supportive management with oral medication opted for eltrombopag in the setting of thrombocytopenia starting at 50 and ramping up to 150 mg  From 4/20/2024 through 8/20/2024 patient had progressive anemia and progressive thrombocytopenia and spite of treatment.    II. Darbepoietin -in the setting of worsening anemia darbepoetin was added 7/12/2024 " at 100 mcg every 2 weeks       9/30/2024 -  Research Study Participant    ([facility]) [id number] - AzaCITIDine / Decitabine, 42 Day Cycle Followed by 28 Day Cycles  Plan Provider: Sebastien Rashid MD  Treatment goal: Palliative  Line of treatment: First Line  Associated studies: 5-Azacitidine and Decitabine Epigenetic Therapy for Myeloid Malignancies          HPI    Assessment & Plan  Myelodysplastic syndrome (Multi)               Subjective     Reviewed and Past Medical History, Past Surgical History, Family History, and Social History:    Review of Systems    Home Medications and Adherence Reviewed with Patient. ***      Objective      VS:  /58   Pulse 63   Temp 36.2 °C (97.2 °F)   Resp 17   Wt 120 kg (264 lb 5.3 oz)   SpO2 97%   BMI 36.04 kg/m²   BSA: 2.47 meters squared  KPS: ***    Physical Exam    Laboratory:  Pertinent laboratory results were reviewed and discussed with patient, notably:   ***      Pathology:  The pertinent pathology results were reviewed and discussed with the patient.  Notably, ***.    Imaging:  The pertinent imaging results were reviewed and discussed with the patient.  Notably, ***        Sebastien Rashid MD     and reactive to light.   Cardiovascular:      Rate and Rhythm: Normal rate.      Pulses: Normal pulses.   Pulmonary:      Effort: Pulmonary effort is normal.   Abdominal:      General: Bowel sounds are normal.      Palpations: Abdomen is soft.   Musculoskeletal:         General: Normal range of motion.      Right lower leg: No edema.      Left lower leg: No edema.   Lymphadenopathy:      Comments: No lymphadenopathy   Skin:     General: Skin is warm and dry.      Findings: No lesion or rash.   Neurological:      General: No focal deficit present.      Mental Status: He is alert and oriented to person, place, and time. Mental status is at baseline.      Comments: No numbness or tingling   Psychiatric:         Mood and Affect: Mood normal.         Laboratory:  Pertinent laboratory results were reviewed and discussed with patient, notably:   WBC 2.9  Hgb 10.8  Plts 76      Pathology:  The pertinent pathology results were reviewed and discussed with the patient.  Notably,   Bone Marrow Biopsy:   FINAL DIAGNOSIS   Date Value Ref Range Status   03/13/2025   Final    A-C. BONE MARROW CLOT, CORE BIOPSY, ASPIRATE, LEFT ILIAC CREST:   -- MILDLY HYPERCELLULAR BONE MARROW (50-60%) WITH ERYTHROID HYPERPLASIA, ATYPICAL MEGAKARYOCYTES, MODERATE FIBROSIS, AND NO INCREASE IN BLASTS, SEE NOTE.    NOTE: FISH  was positive for trisomy 8 at a low level (3.6%). The overall findings are consistent with persistent myeloid neoplasm. Clinical correlation is recommended.        Bone Marrow Differential   Date Value Ref Range Status   03/13/2025   Final    Not performed (aspicular, paucicellular and hemodilute aspirate smear, and paucicellular touch prep)       Microscopic Description   Date Value Ref Range Status   03/13/2025   Final    PERIPHERAL SMEAR: Submitted              Red cells: Normocytic anemia with anisopoikilocytosis.       White cells: Lymphopenia.       Platelets: Decreased.        Comments: No blasts seen.    ASPIRATE SMEAR:  "Submitted                      Specimen: Aspicular, paucicellular and hemodilute.       Erythropoiesis: Unable to evaluable.       Granulopoiesis: Unable to evaluable.       Megakaryocytes: Not identified.    TOUCH PREP: Submitted       Specimen: Markedly paucicellular.        Comments: Similar to aspirate smear.    ASPIRATE CLOT: Submitted                           Specimen: Aspicular.    CORE BIOPSY: Submitted                            Specimen: Adequate.       Cellularity: 50-60%       Estimated M:E ratio: 0.5:1       Bony trabeculae: Normal.       Megakaryocytes: Increased. Morphology: Frequent large, hyperchromatic forms.       Granulomas: Absent.       Lymphoid aggregates: Possible minute aggregates of small lymphoid cells.       Comments: Cellular streaming suggestive of fibrosis.    SPECIAL STAINS:         Iron (clot section, part A): Noncontributory (aspicular smears and clot sections).       Reticulin (block B1): moderate increase in reticulin fibrosis (MF-2).       Trichrome (block B1): no collagenous fibrosis.    IMMUNOHISTOCHEMISTRY:   -- Block B1:   CD34: less than 1% blasts.   CD61:  highlights mild to moderate increase in megakaryocytes, some of which are small in tight clusters.    FLOW CYTOMETRY: Performed, see separate report.    Immunostains were performed in addition to flow cytometry to fully characterize the phenotype, architecture, and extent of the marrow population(s).  This was medically necessary for the best possible diagnosis.        Gross Description   Date Value Ref Range Status   03/13/2025   Final    A: Received in formalin, labeled with the patient's name and hospital number and \"C\", is an irregular fragment of blood clot measuring 0.7 x 0.3 x 0.1 cm. The specimen is submitted in toto in one cassette.  KE      B: Received in B-plus fixative, labeled with the patient's name and hospital number, is a cylindrical segment of bone measuring 0.9 x 0.2 x 0.2 cm. The specimen is submitted " in toto in one cassette following decalcification in [facility].   SAM Rashid MD

## 2025-04-28 NOTE — PROGRESS NOTES
Patient presents to infusion appointment from clinic in stable condition. [id number] azacitidine injections tolerated without incident. ANC 2.02, filgrastim held per orders. Hgb 11.3, Aranesp held per orders. Discharged in stable condition.

## 2025-05-01 ENCOUNTER — INFUSION (OUTPATIENT)
Dept: HEMATOLOGY/ONCOLOGY | Facility: HOSPITAL | Age: 85
End: 2025-05-01
Payer: MEDICARE

## 2025-05-01 ENCOUNTER — LAB (OUTPATIENT)
Dept: LAB | Facility: HOSPITAL | Age: 85
End: 2025-05-01
Payer: MEDICARE

## 2025-05-01 VITALS
DIASTOLIC BLOOD PRESSURE: 60 MMHG | HEIGHT: 72 IN | OXYGEN SATURATION: 100 % | SYSTOLIC BLOOD PRESSURE: 136 MMHG | HEART RATE: 85 BPM | RESPIRATION RATE: 20 BRPM | BODY MASS INDEX: 36.31 KG/M2 | TEMPERATURE: 98.2 F | WEIGHT: 268.08 LBS

## 2025-05-01 DIAGNOSIS — D46.9 MYELODYSPLASTIC SYNDROME (MULTI): ICD-10-CM

## 2025-05-01 LAB
ALBUMIN SERPL BCP-MCNC: 4 G/DL (ref 3.4–5)
ALP SERPL-CCNC: 92 U/L (ref 33–136)
ALT SERPL W P-5'-P-CCNC: 27 U/L (ref 10–52)
ANION GAP SERPL CALC-SCNC: 11 MMOL/L (ref 10–20)
AST SERPL W P-5'-P-CCNC: 22 U/L (ref 9–39)
BASOPHILS # BLD AUTO: 0.03 X10*3/UL (ref 0–0.1)
BASOPHILS NFR BLD AUTO: 1 %
BILIRUB SERPL-MCNC: 0.7 MG/DL (ref 0–1.2)
BUN SERPL-MCNC: 34 MG/DL (ref 6–23)
CALCIUM SERPL-MCNC: 9 MG/DL (ref 8.6–10.6)
CHLORIDE SERPL-SCNC: 110 MMOL/L (ref 98–107)
CO2 SERPL-SCNC: 24 MMOL/L (ref 21–32)
CREAT SERPL-MCNC: 1.49 MG/DL (ref 0.5–1.3)
EGFRCR SERPLBLD CKD-EPI 2021: 46 ML/MIN/1.73M*2
EOSINOPHIL # BLD AUTO: 0.06 X10*3/UL (ref 0–0.4)
EOSINOPHIL NFR BLD AUTO: 2.1 %
ERYTHROCYTE [DISTWIDTH] IN BLOOD BY AUTOMATED COUNT: 18.3 % (ref 11.5–14.5)
GLUCOSE SERPL-MCNC: 91 MG/DL (ref 74–99)
HCT VFR BLD AUTO: 34.4 % (ref 41–52)
HGB BLD-MCNC: 10.8 G/DL (ref 13.5–17.5)
IMM GRANULOCYTES # BLD AUTO: 0.01 X10*3/UL (ref 0–0.5)
IMM GRANULOCYTES NFR BLD AUTO: 0.3 % (ref 0–0.9)
LYMPHOCYTES # BLD AUTO: 0.93 X10*3/UL (ref 0.8–3)
LYMPHOCYTES NFR BLD AUTO: 32.5 %
MCH RBC QN AUTO: 31.6 PG (ref 26–34)
MCHC RBC AUTO-ENTMCNC: 31.4 G/DL (ref 32–36)
MCV RBC AUTO: 101 FL (ref 80–100)
MONOCYTES # BLD AUTO: 0.17 X10*3/UL (ref 0.05–0.8)
MONOCYTES NFR BLD AUTO: 5.9 %
NEUTROPHILS # BLD AUTO: 1.66 X10*3/UL (ref 1.6–5.5)
NEUTROPHILS NFR BLD AUTO: 58.2 %
NRBC BLD-RTO: 0 /100 WBCS (ref 0–0)
PLATELET # BLD AUTO: 76 X10*3/UL (ref 150–450)
POTASSIUM SERPL-SCNC: 4.7 MMOL/L (ref 3.5–5.3)
PROT SERPL-MCNC: 6 G/DL (ref 6.4–8.2)
RBC # BLD AUTO: 3.42 X10*6/UL (ref 4.5–5.9)
SODIUM SERPL-SCNC: 140 MMOL/L (ref 136–145)
WBC # BLD AUTO: 2.9 X10*3/UL (ref 4.4–11.3)

## 2025-05-01 PROCEDURE — 85025 COMPLETE CBC W/AUTO DIFF WBC: CPT

## 2025-05-01 PROCEDURE — 36415 COLL VENOUS BLD VENIPUNCTURE: CPT

## 2025-05-01 PROCEDURE — 96401 CHEMO ANTI-NEOPL SQ/IM: CPT

## 2025-05-01 PROCEDURE — 2500000004 HC RX 250 GENERAL PHARMACY W/ HCPCS (ALT 636 FOR OP/ED): Performed by: INTERNAL MEDICINE

## 2025-05-01 PROCEDURE — 2560000001 HC RX 256 EXPERIMENTAL DRUGS: Performed by: INTERNAL MEDICINE

## 2025-05-01 PROCEDURE — 80053 COMPREHEN METABOLIC PANEL: CPT

## 2025-05-01 RX ORDER — EPINEPHRINE 0.3 MG/.3ML
0.3 INJECTION SUBCUTANEOUS EVERY 5 MIN PRN
Status: DISCONTINUED | OUTPATIENT
Start: 2025-05-01 | End: 2025-05-01 | Stop reason: HOSPADM

## 2025-05-01 RX ORDER — ONDANSETRON HYDROCHLORIDE 8 MG/1
8 TABLET, FILM COATED ORAL ONCE
Status: COMPLETED | OUTPATIENT
Start: 2025-05-01 | End: 2025-05-01

## 2025-05-01 RX ORDER — DIPHENHYDRAMINE HYDROCHLORIDE 50 MG/ML
50 INJECTION, SOLUTION INTRAMUSCULAR; INTRAVENOUS AS NEEDED
Status: DISCONTINUED | OUTPATIENT
Start: 2025-05-01 | End: 2025-05-01 | Stop reason: HOSPADM

## 2025-05-01 RX ORDER — ALBUTEROL SULFATE 0.83 MG/ML
3 SOLUTION RESPIRATORY (INHALATION) AS NEEDED
Status: DISCONTINUED | OUTPATIENT
Start: 2025-05-01 | End: 2025-05-01 | Stop reason: HOSPADM

## 2025-05-01 RX ORDER — FAMOTIDINE 10 MG/ML
20 INJECTION, SOLUTION INTRAVENOUS ONCE AS NEEDED
Status: DISCONTINUED | OUTPATIENT
Start: 2025-05-01 | End: 2025-05-01 | Stop reason: HOSPADM

## 2025-05-01 RX ADMIN — Medication 12.9 MG: at 11:10

## 2025-05-01 RX ADMIN — ONDANSETRON HYDROCHLORIDE 8 MG: 8 TABLET, FILM COATED ORAL at 10:42

## 2025-05-01 NOTE — PROGRESS NOTES
[facility] Infusion Nursing Note  05/01/25    Holden Burgess is a 84 y.o. year old male patient presenting to outpatient infusion for cycle 8 day 4 of the following regimen:    Treatment Plans       Name Type Plan Dates Plan Provider         Active    ([facility]) [id number] - AzaCITIDine / Decitabine, 42 Day Cycle Followed by 28 Day Cycles Oncology Treatment 9/29/2024 - Present Sebastien Rashid MD      Administrations This Visit       ondansetron (Zofran) tablet 8 mg       Admin Date  05/01/2025 Action  Given Dose  8 mg Route  oral Documented By  Sandy Farias RN              Study [id number] decitabine 12.9 mg in sterile water 1.3 mL Syringe       Admin Date  05/01/2025 Action  New Bag Dose  12.9 mg Route  subcutaneous Documented By  Sandy Farias RN                  Hypersensitivity reaction noted: No.    Patient tolerated treatment well. Pt discharged in stable condition and ambulated off unit with cane accompanied by wife.    Follow-up Plan: 5/5    SANDY FARIAS RN

## 2025-05-02 ENCOUNTER — APPOINTMENT (OUTPATIENT)
Dept: PHYSICAL THERAPY | Facility: CLINIC | Age: 85
End: 2025-05-02
Payer: MEDICARE

## 2025-05-02 ENCOUNTER — DOCUMENTATION (OUTPATIENT)
Dept: HEMATOLOGY/ONCOLOGY | Facility: HOSPITAL | Age: 85
End: 2025-05-02
Payer: MEDICARE

## 2025-05-02 NOTE — RESEARCH NOTES
Research Note Treatment Day    Late entry Holden Burgess was here on 4/28/2025 for treatment on CASE [id number]. 4/28/2025 was Week 31 D1.   Procedures were completed per protocol. AE's and con-meds reviewed with patient. Patient is aware of treatment plan.    [x]   Received treatment as planned   OR  []    Treatment delayed; patient calendar updated as required   Treatment delayed because:    []   AE    []   Physician Discretion    []   Clinical Deterioration or Progression     []   Other    Education Documentation  Treatment Plan and Schedule, taught by Judi Patel RN at 5/2/2025  2:57 PM.  Learner: Patient  Readiness: Eager  Method: Explanation  Response: Verbalizes Understanding    Education Comments  No comments found.

## 2025-05-05 ENCOUNTER — LAB (OUTPATIENT)
Dept: LAB | Facility: HOSPITAL | Age: 85
End: 2025-05-05
Payer: MEDICARE

## 2025-05-05 ENCOUNTER — INFUSION (OUTPATIENT)
Dept: HEMATOLOGY/ONCOLOGY | Facility: HOSPITAL | Age: 85
End: 2025-05-05
Payer: MEDICARE

## 2025-05-05 VITALS
RESPIRATION RATE: 18 BRPM | SYSTOLIC BLOOD PRESSURE: 125 MMHG | BODY MASS INDEX: 35.99 KG/M2 | WEIGHT: 264 LBS | DIASTOLIC BLOOD PRESSURE: 62 MMHG | HEART RATE: 84 BPM | OXYGEN SATURATION: 99 % | TEMPERATURE: 96.8 F

## 2025-05-05 DIAGNOSIS — D46.9 MYELODYSPLASTIC SYNDROME (MULTI): ICD-10-CM

## 2025-05-05 LAB
ALBUMIN SERPL BCP-MCNC: 4 G/DL (ref 3.4–5)
ALP SERPL-CCNC: 95 U/L (ref 33–136)
ALT SERPL W P-5'-P-CCNC: 30 U/L (ref 10–52)
ANION GAP SERPL CALC-SCNC: 11 MMOL/L (ref 10–20)
AST SERPL W P-5'-P-CCNC: 21 U/L (ref 9–39)
BASOPHILS # BLD AUTO: 0.02 X10*3/UL (ref 0–0.1)
BASOPHILS NFR BLD AUTO: 0.6 %
BILIRUB SERPL-MCNC: 0.9 MG/DL (ref 0–1.2)
BUN SERPL-MCNC: 31 MG/DL (ref 6–23)
CALCIUM SERPL-MCNC: 9 MG/DL (ref 8.6–10.3)
CHLORIDE SERPL-SCNC: 106 MMOL/L (ref 98–107)
CO2 SERPL-SCNC: 24 MMOL/L (ref 21–32)
CREAT SERPL-MCNC: 1.34 MG/DL (ref 0.5–1.3)
EGFRCR SERPLBLD CKD-EPI 2021: 52 ML/MIN/1.73M*2
EOSINOPHIL # BLD AUTO: 0.08 X10*3/UL (ref 0–0.4)
EOSINOPHIL NFR BLD AUTO: 2.4 %
ERYTHROCYTE [DISTWIDTH] IN BLOOD BY AUTOMATED COUNT: 18.4 % (ref 11.5–14.5)
GLUCOSE SERPL-MCNC: 85 MG/DL (ref 74–99)
HCT VFR BLD AUTO: 36.3 % (ref 41–52)
HGB BLD-MCNC: 11.4 G/DL (ref 13.5–17.5)
IMM GRANULOCYTES # BLD AUTO: 0.02 X10*3/UL (ref 0–0.5)
IMM GRANULOCYTES NFR BLD AUTO: 0.6 % (ref 0–0.9)
LYMPHOCYTES # BLD AUTO: 1.08 X10*3/UL (ref 0.8–3)
LYMPHOCYTES NFR BLD AUTO: 32 %
MCH RBC QN AUTO: 31 PG (ref 26–34)
MCHC RBC AUTO-ENTMCNC: 31.4 G/DL (ref 32–36)
MCV RBC AUTO: 99 FL (ref 80–100)
MONOCYTES # BLD AUTO: 0.16 X10*3/UL (ref 0.05–0.8)
MONOCYTES NFR BLD AUTO: 4.7 %
NEUTROPHILS # BLD AUTO: 2.01 X10*3/UL (ref 1.6–5.5)
NEUTROPHILS NFR BLD AUTO: 59.7 %
NRBC BLD-RTO: 0 /100 WBCS (ref 0–0)
PLATELET # BLD AUTO: 79 X10*3/UL (ref 150–450)
POTASSIUM SERPL-SCNC: 4.2 MMOL/L (ref 3.5–5.3)
PROT SERPL-MCNC: 6.1 G/DL (ref 6.4–8.2)
RBC # BLD AUTO: 3.68 X10*6/UL (ref 4.5–5.9)
SODIUM SERPL-SCNC: 137 MMOL/L (ref 136–145)
WBC # BLD AUTO: 3.4 X10*3/UL (ref 4.4–11.3)

## 2025-05-05 PROCEDURE — 96401 CHEMO ANTI-NEOPL SQ/IM: CPT

## 2025-05-05 PROCEDURE — 85025 COMPLETE CBC W/AUTO DIFF WBC: CPT

## 2025-05-05 PROCEDURE — 2560000001 HC RX 256 EXPERIMENTAL DRUGS: Performed by: INTERNAL MEDICINE

## 2025-05-05 PROCEDURE — 2500000004 HC RX 250 GENERAL PHARMACY W/ HCPCS (ALT 636 FOR OP/ED): Performed by: INTERNAL MEDICINE

## 2025-05-05 PROCEDURE — 36415 COLL VENOUS BLD VENIPUNCTURE: CPT

## 2025-05-05 PROCEDURE — 84075 ASSAY ALKALINE PHOSPHATASE: CPT

## 2025-05-05 RX ORDER — ONDANSETRON HYDROCHLORIDE 8 MG/1
8 TABLET, FILM COATED ORAL ONCE
Status: COMPLETED | OUTPATIENT
Start: 2025-05-05 | End: 2025-05-05

## 2025-05-05 RX ORDER — FAMOTIDINE 10 MG/ML
20 INJECTION, SOLUTION INTRAVENOUS ONCE AS NEEDED
Status: DISCONTINUED | OUTPATIENT
Start: 2025-05-05 | End: 2025-05-05 | Stop reason: HOSPADM

## 2025-05-05 RX ORDER — EPINEPHRINE 0.3 MG/.3ML
0.3 INJECTION SUBCUTANEOUS EVERY 5 MIN PRN
Status: DISCONTINUED | OUTPATIENT
Start: 2025-05-05 | End: 2025-05-05 | Stop reason: HOSPADM

## 2025-05-05 RX ORDER — DIPHENHYDRAMINE HYDROCHLORIDE 50 MG/ML
50 INJECTION, SOLUTION INTRAMUSCULAR; INTRAVENOUS AS NEEDED
Status: DISCONTINUED | OUTPATIENT
Start: 2025-05-05 | End: 2025-05-05 | Stop reason: HOSPADM

## 2025-05-05 RX ORDER — ALBUTEROL SULFATE 0.83 MG/ML
3 SOLUTION RESPIRATORY (INHALATION) AS NEEDED
Status: DISCONTINUED | OUTPATIENT
Start: 2025-05-05 | End: 2025-05-05 | Stop reason: HOSPADM

## 2025-05-05 RX ADMIN — ONDANSETRON HYDROCHLORIDE 8 MG: 8 TABLET, FILM COATED ORAL at 11:15

## 2025-05-05 RX ADMIN — Medication 129 MG: at 12:08

## 2025-05-05 ASSESSMENT — ENCOUNTER SYMPTOMS
SINUS PRESSURE: 0
FATIGUE: 0
APPETITE CHANGE: 0
CONSTIPATION: 0
BACK PAIN: 0
NUMBNESS: 0
VOMITING: 0
FEVER: 0
BRUISES/BLEEDS EASILY: 0
ACTIVITY CHANGE: 0
LIGHT-HEADEDNESS: 0
SINUS PAIN: 0
DIARRHEA: 0
ADENOPATHY: 0
COUGH: 0
JOINT SWELLING: 0
SHORTNESS OF BREATH: 0
HEADACHES: 0
RHINORRHEA: 0
DIFFICULTY URINATING: 0
DIAPHORESIS: 0
CHILLS: 0
WEAKNESS: 0
NERVOUS/ANXIOUS: 0
UNEXPECTED WEIGHT CHANGE: 0
ABDOMINAL PAIN: 0
SORE THROAT: 0
DYSPHORIC MOOD: 0
NAUSEA: 0
FLANK PAIN: 0
CONFUSION: 0

## 2025-05-05 ASSESSMENT — PAIN SCALES - GENERAL: PAINLEVEL_OUTOF10: 0-NO PAIN

## 2025-05-05 NOTE — PROGRESS NOTES
Patient presents to infusion appointment in stable condition. Reports one episode of epistaxis yesterday in the shower, bleeding stopped within 15 minutes, no further bleeding noted. Hgb 11.4, platelets 79. Aranesp and filgrastim held per orders. C8D8 [id number] azacitidine injections tolerated without incident. Lab results and schedule reviewed. Discharged in stable condition.

## 2025-05-06 ENCOUNTER — TREATMENT (OUTPATIENT)
Dept: PHYSICAL THERAPY | Facility: CLINIC | Age: 85
End: 2025-05-06
Payer: MEDICARE

## 2025-05-06 DIAGNOSIS — D46.9 MYELODYSPLASTIC SYNDROME (MULTI): Primary | ICD-10-CM

## 2025-05-06 PROCEDURE — 97110 THERAPEUTIC EXERCISES: CPT | Mod: GP | Performed by: GENERAL ACUTE CARE HOSPITAL

## 2025-05-06 NOTE — PROGRESS NOTES
"Patient Name: Holden Burgess \"GENARO\"  MRN: [id number]  Today's Date: 5/6/2025  Time Calculation  Start Time: 1000  Stop Time: 1045  Time Calculation (min): 45 min  Current Problem:  No diagnosis found.    Visit 8/20    Subjective:  Change in pain/ pain level: 0/10  Change in function: took some time off   Patient comments: had an instance where had to get out of the way of a vehicle, no injury but had some abrasions     Objective: track amb 3/10 mile     Treatment:    Therapeutic exercise (93171):    nustep level 6 8 min no arms  Track amb 3 laps with walker   Stretches   Titl board L2   Leg Press 110# 2x20  HSC 50# 2x15  Step up 6 in f/L x12 ea     Assessment:   Patient continues to require active therapy to progress functional capabilities with decreasing pain levels and improving mechanics with functional activities including progression of Home exercise program. Tolerance to today's treatment was good. Muscle fatigue noted.  Patient appears motivated and compliant this date, demonstrating a working understanding of principals instructed and home program.    Plan:  Progress with functional strength as tolerated with respect to tissue healing. Manual therapy PRN to improve/maintain ROM, prevent adhesion, reduce pain.  "

## 2025-05-08 ENCOUNTER — INFUSION (OUTPATIENT)
Dept: HEMATOLOGY/ONCOLOGY | Facility: HOSPITAL | Age: 85
End: 2025-05-08
Payer: MEDICARE

## 2025-05-08 ENCOUNTER — LAB (OUTPATIENT)
Dept: LAB | Facility: HOSPITAL | Age: 85
End: 2025-05-08
Payer: MEDICARE

## 2025-05-08 VITALS
SYSTOLIC BLOOD PRESSURE: 151 MMHG | OXYGEN SATURATION: 100 % | DIASTOLIC BLOOD PRESSURE: 72 MMHG | TEMPERATURE: 97 F | HEART RATE: 87 BPM | RESPIRATION RATE: 18 BRPM | WEIGHT: 266.6 LBS | BODY MASS INDEX: 36.35 KG/M2

## 2025-05-08 DIAGNOSIS — D46.9 MYELODYSPLASTIC SYNDROME (MULTI): ICD-10-CM

## 2025-05-08 LAB
ALBUMIN SERPL BCP-MCNC: 4 G/DL (ref 3.4–5)
ALP SERPL-CCNC: 96 U/L (ref 33–136)
ALT SERPL W P-5'-P-CCNC: 25 U/L (ref 10–52)
ANION GAP SERPL CALC-SCNC: 11 MMOL/L (ref 10–20)
AST SERPL W P-5'-P-CCNC: 18 U/L (ref 9–39)
BASOPHILS # BLD AUTO: 0.02 X10*3/UL (ref 0–0.1)
BASOPHILS NFR BLD AUTO: 0.6 %
BILIRUB SERPL-MCNC: 0.8 MG/DL (ref 0–1.2)
BUN SERPL-MCNC: 35 MG/DL (ref 6–23)
CALCIUM SERPL-MCNC: 9.4 MG/DL (ref 8.6–10.3)
CHLORIDE SERPL-SCNC: 107 MMOL/L (ref 98–107)
CO2 SERPL-SCNC: 26 MMOL/L (ref 21–32)
CREAT SERPL-MCNC: 1.39 MG/DL (ref 0.5–1.3)
EGFRCR SERPLBLD CKD-EPI 2021: 50 ML/MIN/1.73M*2
EOSINOPHIL # BLD AUTO: 0.09 X10*3/UL (ref 0–0.4)
EOSINOPHIL NFR BLD AUTO: 2.6 %
ERYTHROCYTE [DISTWIDTH] IN BLOOD BY AUTOMATED COUNT: 18.1 % (ref 11.5–14.5)
GLUCOSE SERPL-MCNC: 105 MG/DL (ref 74–99)
HCT VFR BLD AUTO: 35.3 % (ref 41–52)
HGB BLD-MCNC: 11.1 G/DL (ref 13.5–17.5)
IMM GRANULOCYTES # BLD AUTO: 0.03 X10*3/UL (ref 0–0.5)
IMM GRANULOCYTES NFR BLD AUTO: 0.9 % (ref 0–0.9)
LYMPHOCYTES # BLD AUTO: 1.2 X10*3/UL (ref 0.8–3)
LYMPHOCYTES NFR BLD AUTO: 34.8 %
MCH RBC QN AUTO: 31.2 PG (ref 26–34)
MCHC RBC AUTO-ENTMCNC: 31.4 G/DL (ref 32–36)
MCV RBC AUTO: 99 FL (ref 80–100)
MONOCYTES # BLD AUTO: 0.17 X10*3/UL (ref 0.05–0.8)
MONOCYTES NFR BLD AUTO: 4.9 %
NEUTROPHILS # BLD AUTO: 1.94 X10*3/UL (ref 1.6–5.5)
NEUTROPHILS NFR BLD AUTO: 56.2 %
NRBC BLD-RTO: 0 /100 WBCS (ref 0–0)
PLATELET # BLD AUTO: 81 X10*3/UL (ref 150–450)
POTASSIUM SERPL-SCNC: 4.7 MMOL/L (ref 3.5–5.3)
PROT SERPL-MCNC: 6.3 G/DL (ref 6.4–8.2)
RBC # BLD AUTO: 3.56 X10*6/UL (ref 4.5–5.9)
SODIUM SERPL-SCNC: 139 MMOL/L (ref 136–145)
WBC # BLD AUTO: 3.5 X10*3/UL (ref 4.4–11.3)

## 2025-05-08 PROCEDURE — 2560000001 HC RX 256 EXPERIMENTAL DRUGS: Performed by: INTERNAL MEDICINE

## 2025-05-08 PROCEDURE — 84075 ASSAY ALKALINE PHOSPHATASE: CPT

## 2025-05-08 PROCEDURE — 2500000004 HC RX 250 GENERAL PHARMACY W/ HCPCS (ALT 636 FOR OP/ED): Performed by: INTERNAL MEDICINE

## 2025-05-08 PROCEDURE — 85025 COMPLETE CBC W/AUTO DIFF WBC: CPT

## 2025-05-08 PROCEDURE — 96401 CHEMO ANTI-NEOPL SQ/IM: CPT

## 2025-05-08 PROCEDURE — 36415 COLL VENOUS BLD VENIPUNCTURE: CPT

## 2025-05-08 RX ORDER — EPINEPHRINE 0.3 MG/.3ML
0.3 INJECTION SUBCUTANEOUS EVERY 5 MIN PRN
Status: DISCONTINUED | OUTPATIENT
Start: 2025-05-08 | End: 2025-05-08 | Stop reason: HOSPADM

## 2025-05-08 RX ORDER — ONDANSETRON HYDROCHLORIDE 8 MG/1
8 TABLET, FILM COATED ORAL ONCE
Status: COMPLETED | OUTPATIENT
Start: 2025-05-08 | End: 2025-05-08

## 2025-05-08 RX ORDER — DIPHENHYDRAMINE HYDROCHLORIDE 50 MG/ML
50 INJECTION, SOLUTION INTRAMUSCULAR; INTRAVENOUS AS NEEDED
Status: DISCONTINUED | OUTPATIENT
Start: 2025-05-08 | End: 2025-05-08 | Stop reason: HOSPADM

## 2025-05-08 RX ORDER — FAMOTIDINE 10 MG/ML
20 INJECTION, SOLUTION INTRAVENOUS ONCE AS NEEDED
Status: DISCONTINUED | OUTPATIENT
Start: 2025-05-08 | End: 2025-05-08 | Stop reason: HOSPADM

## 2025-05-08 RX ORDER — ALBUTEROL SULFATE 0.83 MG/ML
3 SOLUTION RESPIRATORY (INHALATION) AS NEEDED
Status: DISCONTINUED | OUTPATIENT
Start: 2025-05-08 | End: 2025-05-08 | Stop reason: HOSPADM

## 2025-05-08 RX ADMIN — Medication 12.9 MG: at 11:36

## 2025-05-08 RX ADMIN — ONDANSETRON HYDROCHLORIDE 8 MG: 8 TABLET, FILM COATED ORAL at 11:01

## 2025-05-08 ASSESSMENT — PAIN SCALES - GENERAL: PAINLEVEL_OUTOF10: 0-NO PAIN

## 2025-05-08 NOTE — PROGRESS NOTES
GENARO Burgess is a 84 y.o. male who presents for No chief complaint on file..    He is on the following chemotherapy regimen:     Treatment Plans       Name Type Plan Dates Plan Provider         Active    ([facility] [id number] - AzaCITIDine / Decitabine, 42 Day Cycle Followed by [facility] Oncology Treatment 9/29/2024 - Present Sebastien Rashid MD                    Since his last visit, he has been doing well.  Overall, he states his energy level is stable.  His appetite has been unchanged.  He reports no complaints.  He has no other concerns.    Tolerated injection well, AVS provided and discharged in stable condition.

## 2025-05-12 ENCOUNTER — LAB (OUTPATIENT)
Dept: LAB | Facility: HOSPITAL | Age: 85
End: 2025-05-12
Payer: MEDICARE

## 2025-05-12 ENCOUNTER — INFUSION (OUTPATIENT)
Dept: HEMATOLOGY/ONCOLOGY | Facility: HOSPITAL | Age: 85
End: 2025-05-12
Payer: MEDICARE

## 2025-05-12 VITALS
SYSTOLIC BLOOD PRESSURE: 134 MMHG | OXYGEN SATURATION: 98 % | BODY MASS INDEX: 36.49 KG/M2 | TEMPERATURE: 98.1 F | RESPIRATION RATE: 18 BRPM | WEIGHT: 267.64 LBS | HEART RATE: 88 BPM | DIASTOLIC BLOOD PRESSURE: 70 MMHG

## 2025-05-12 DIAGNOSIS — D46.9 MYELODYSPLASTIC SYNDROME (MULTI): ICD-10-CM

## 2025-05-12 LAB
ALBUMIN SERPL BCP-MCNC: 4 G/DL (ref 3.4–5)
ALP SERPL-CCNC: 102 U/L (ref 33–136)
ALT SERPL W P-5'-P-CCNC: 25 U/L (ref 10–52)
ANION GAP SERPL CALC-SCNC: 13 MMOL/L (ref 10–20)
AST SERPL W P-5'-P-CCNC: 20 U/L (ref 9–39)
BASOPHILS # BLD AUTO: 0.02 X10*3/UL (ref 0–0.1)
BASOPHILS NFR BLD AUTO: 0.5 %
BILIRUB SERPL-MCNC: 0.7 MG/DL (ref 0–1.2)
BUN SERPL-MCNC: 34 MG/DL (ref 6–23)
CALCIUM SERPL-MCNC: 8.8 MG/DL (ref 8.6–10.3)
CHLORIDE SERPL-SCNC: 109 MMOL/L (ref 98–107)
CO2 SERPL-SCNC: 22 MMOL/L (ref 21–32)
CREAT SERPL-MCNC: 1.35 MG/DL (ref 0.5–1.3)
EGFRCR SERPLBLD CKD-EPI 2021: 52 ML/MIN/1.73M*2
EOSINOPHIL # BLD AUTO: 0.1 X10*3/UL (ref 0–0.4)
EOSINOPHIL NFR BLD AUTO: 2.6 %
ERYTHROCYTE [DISTWIDTH] IN BLOOD BY AUTOMATED COUNT: 18.4 % (ref 11.5–14.5)
GLUCOSE SERPL-MCNC: 96 MG/DL (ref 74–99)
HCT VFR BLD AUTO: 34.9 % (ref 41–52)
HGB BLD-MCNC: 11.1 G/DL (ref 13.5–17.5)
IMM GRANULOCYTES # BLD AUTO: 0.01 X10*3/UL (ref 0–0.5)
IMM GRANULOCYTES NFR BLD AUTO: 0.3 % (ref 0–0.9)
LYMPHOCYTES # BLD AUTO: 1.14 X10*3/UL (ref 0.8–3)
LYMPHOCYTES NFR BLD AUTO: 29.5 %
MCH RBC QN AUTO: 31.4 PG (ref 26–34)
MCHC RBC AUTO-ENTMCNC: 31.8 G/DL (ref 32–36)
MCV RBC AUTO: 99 FL (ref 80–100)
MONOCYTES # BLD AUTO: 0.19 X10*3/UL (ref 0.05–0.8)
MONOCYTES NFR BLD AUTO: 4.9 %
NEUTROPHILS # BLD AUTO: 2.41 X10*3/UL (ref 1.6–5.5)
NEUTROPHILS NFR BLD AUTO: 62.2 %
NRBC BLD-RTO: 0 /100 WBCS (ref 0–0)
PLATELET # BLD AUTO: 82 X10*3/UL (ref 150–450)
POTASSIUM SERPL-SCNC: 4.3 MMOL/L (ref 3.5–5.3)
PROT SERPL-MCNC: 6.2 G/DL (ref 6.4–8.2)
RBC # BLD AUTO: 3.53 X10*6/UL (ref 4.5–5.9)
SODIUM SERPL-SCNC: 140 MMOL/L (ref 136–145)
WBC # BLD AUTO: 3.9 X10*3/UL (ref 4.4–11.3)

## 2025-05-12 PROCEDURE — 2560000001 HC RX 256 EXPERIMENTAL DRUGS: Performed by: INTERNAL MEDICINE

## 2025-05-12 PROCEDURE — 85025 COMPLETE CBC W/AUTO DIFF WBC: CPT

## 2025-05-12 PROCEDURE — 2500000004 HC RX 250 GENERAL PHARMACY W/ HCPCS (ALT 636 FOR OP/ED): Performed by: INTERNAL MEDICINE

## 2025-05-12 PROCEDURE — 36415 COLL VENOUS BLD VENIPUNCTURE: CPT

## 2025-05-12 PROCEDURE — 96401 CHEMO ANTI-NEOPL SQ/IM: CPT

## 2025-05-12 PROCEDURE — 84075 ASSAY ALKALINE PHOSPHATASE: CPT

## 2025-05-12 RX ORDER — FAMOTIDINE 10 MG/ML
20 INJECTION, SOLUTION INTRAVENOUS ONCE AS NEEDED
Status: DISCONTINUED | OUTPATIENT
Start: 2025-05-12 | End: 2025-05-12 | Stop reason: HOSPADM

## 2025-05-12 RX ORDER — ONDANSETRON HYDROCHLORIDE 8 MG/1
8 TABLET, FILM COATED ORAL ONCE
Status: COMPLETED | OUTPATIENT
Start: 2025-05-12 | End: 2025-05-12

## 2025-05-12 RX ORDER — ALBUTEROL SULFATE 0.83 MG/ML
3 SOLUTION RESPIRATORY (INHALATION) AS NEEDED
Status: DISCONTINUED | OUTPATIENT
Start: 2025-05-12 | End: 2025-05-12 | Stop reason: HOSPADM

## 2025-05-12 RX ORDER — EPINEPHRINE 0.3 MG/.3ML
0.3 INJECTION SUBCUTANEOUS EVERY 5 MIN PRN
Status: DISCONTINUED | OUTPATIENT
Start: 2025-05-12 | End: 2025-05-12 | Stop reason: HOSPADM

## 2025-05-12 RX ORDER — DIPHENHYDRAMINE HYDROCHLORIDE 50 MG/ML
50 INJECTION, SOLUTION INTRAMUSCULAR; INTRAVENOUS AS NEEDED
Status: DISCONTINUED | OUTPATIENT
Start: 2025-05-12 | End: 2025-05-12 | Stop reason: HOSPADM

## 2025-05-12 RX ADMIN — ONDANSETRON HYDROCHLORIDE 8 MG: 8 TABLET, FILM COATED ORAL at 10:47

## 2025-05-12 RX ADMIN — Medication 129 MG: at 12:03

## 2025-05-12 ASSESSMENT — PAIN SCALES - GENERAL: PAINLEVEL_OUTOF10: 0-NO PAIN

## 2025-05-12 NOTE — SIGNIFICANT EVENT

## 2025-05-12 NOTE — PROGRESS NOTES
Pt present today for C8D15.  Study [id number] regimen tolerated with no issues.  Pt discharged to home in stable condition.

## 2025-05-13 ENCOUNTER — TREATMENT (OUTPATIENT)
Dept: PHYSICAL THERAPY | Facility: CLINIC | Age: 85
End: 2025-05-13
Payer: MEDICARE

## 2025-05-13 DIAGNOSIS — D46.9 MYELODYSPLASTIC SYNDROME (MULTI): Primary | ICD-10-CM

## 2025-05-13 PROCEDURE — 97110 THERAPEUTIC EXERCISES: CPT | Mod: GP | Performed by: GENERAL ACUTE CARE HOSPITAL

## 2025-05-13 NOTE — PROGRESS NOTES
"Patient Name: Holden Burgess \"GENARO\"  MRN: [id number]  Today's Date: 5/13/2025     Current Problem:  No diagnosis found.    Visit 9/20    Subjective:  Change in pain/ pain level: 0/10  Change in function: not much change- no falls to report     Objective: track amb 3/10 mile     Treatment:    Therapeutic exercise (67966):     nustep level 6 8 min no arms  Track amb 3 laps with walker   Stretches   Titl board L2   Leg Press 110# 2x20  HSC 50# 2x15  Step up 6 in f/L x12 ea     Assessment:   Patient notes improved pain levels resultant from activities in therapy session. Improved tissue quality noted as well as body mechanics demonstrated after cueing and corrections. Patient continues to require active therapy to progress functional capabilities with decreasing pain levels and improving mechanics with functional activities including progression of Home exercise program. Tolerance to today's treatment was good. Muscle fatigue noted.  Patient appears motivated and compliant this date, demonstrating a working understanding of principals instructed and home program.    Plan:  Progress with functional strength as tolerated with respect to tissue healing. Manual therapy PRN to improve/maintain ROM, prevent adhesion, reduce pain.  "

## 2025-05-15 ENCOUNTER — INFUSION (OUTPATIENT)
Dept: HEMATOLOGY/ONCOLOGY | Facility: HOSPITAL | Age: 85
End: 2025-05-15
Payer: MEDICARE

## 2025-05-15 ENCOUNTER — LAB (OUTPATIENT)
Dept: LAB | Facility: HOSPITAL | Age: 85
End: 2025-05-15
Payer: MEDICARE

## 2025-05-15 VITALS
DIASTOLIC BLOOD PRESSURE: 71 MMHG | TEMPERATURE: 97.5 F | WEIGHT: 268.08 LBS | OXYGEN SATURATION: 99 % | BODY MASS INDEX: 36.31 KG/M2 | HEART RATE: 83 BPM | RESPIRATION RATE: 18 BRPM | HEIGHT: 72 IN | SYSTOLIC BLOOD PRESSURE: 114 MMHG

## 2025-05-15 DIAGNOSIS — D46.9 MYELODYSPLASTIC SYNDROME (MULTI): ICD-10-CM

## 2025-05-15 LAB
ALBUMIN SERPL BCP-MCNC: 3.9 G/DL (ref 3.4–5)
ALP SERPL-CCNC: 98 U/L (ref 33–136)
ALT SERPL W P-5'-P-CCNC: 24 U/L (ref 10–52)
ANION GAP SERPL CALC-SCNC: 11 MMOL/L (ref 10–20)
AST SERPL W P-5'-P-CCNC: 18 U/L (ref 9–39)
BASOPHILS # BLD AUTO: 0.02 X10*3/UL (ref 0–0.1)
BASOPHILS NFR BLD AUTO: 0.6 %
BILIRUB SERPL-MCNC: 0.7 MG/DL (ref 0–1.2)
BUN SERPL-MCNC: 36 MG/DL (ref 6–23)
CALCIUM SERPL-MCNC: 9.1 MG/DL (ref 8.6–10.3)
CHLORIDE SERPL-SCNC: 109 MMOL/L (ref 98–107)
CO2 SERPL-SCNC: 23 MMOL/L (ref 21–32)
CREAT SERPL-MCNC: 1.35 MG/DL (ref 0.5–1.3)
EGFRCR SERPLBLD CKD-EPI 2021: 52 ML/MIN/1.73M*2
EOSINOPHIL # BLD AUTO: 0.07 X10*3/UL (ref 0–0.4)
EOSINOPHIL NFR BLD AUTO: 2.2 %
ERYTHROCYTE [DISTWIDTH] IN BLOOD BY AUTOMATED COUNT: 18.2 % (ref 11.5–14.5)
GLUCOSE SERPL-MCNC: 87 MG/DL (ref 74–99)
HCT VFR BLD AUTO: 35.4 % (ref 41–52)
HGB BLD-MCNC: 11.2 G/DL (ref 13.5–17.5)
IMM GRANULOCYTES # BLD AUTO: 0.01 X10*3/UL (ref 0–0.5)
IMM GRANULOCYTES NFR BLD AUTO: 0.3 % (ref 0–0.9)
LYMPHOCYTES # BLD AUTO: 1.02 X10*3/UL (ref 0.8–3)
LYMPHOCYTES NFR BLD AUTO: 32.7 %
MCH RBC QN AUTO: 31.4 PG (ref 26–34)
MCHC RBC AUTO-ENTMCNC: 31.6 G/DL (ref 32–36)
MCV RBC AUTO: 99 FL (ref 80–100)
MONOCYTES # BLD AUTO: 0.18 X10*3/UL (ref 0.05–0.8)
MONOCYTES NFR BLD AUTO: 5.8 %
NEUTROPHILS # BLD AUTO: 1.82 X10*3/UL (ref 1.6–5.5)
NEUTROPHILS NFR BLD AUTO: 58.4 %
NRBC BLD-RTO: 0 /100 WBCS (ref 0–0)
PLATELET # BLD AUTO: 72 X10*3/UL (ref 150–450)
POTASSIUM SERPL-SCNC: 4.7 MMOL/L (ref 3.5–5.3)
PROT SERPL-MCNC: 6.1 G/DL (ref 6.4–8.2)
RBC # BLD AUTO: 3.57 X10*6/UL (ref 4.5–5.9)
SODIUM SERPL-SCNC: 138 MMOL/L (ref 136–145)
WBC # BLD AUTO: 3.1 X10*3/UL (ref 4.4–11.3)

## 2025-05-15 PROCEDURE — 2560000001 HC RX 256 EXPERIMENTAL DRUGS: Performed by: INTERNAL MEDICINE

## 2025-05-15 PROCEDURE — 96401 CHEMO ANTI-NEOPL SQ/IM: CPT

## 2025-05-15 PROCEDURE — 85025 COMPLETE CBC W/AUTO DIFF WBC: CPT

## 2025-05-15 PROCEDURE — 2500000004 HC RX 250 GENERAL PHARMACY W/ HCPCS (ALT 636 FOR OP/ED): Performed by: INTERNAL MEDICINE

## 2025-05-15 PROCEDURE — 80053 COMPREHEN METABOLIC PANEL: CPT

## 2025-05-15 PROCEDURE — 36415 COLL VENOUS BLD VENIPUNCTURE: CPT

## 2025-05-15 RX ORDER — ALBUTEROL SULFATE 0.83 MG/ML
3 SOLUTION RESPIRATORY (INHALATION) AS NEEDED
Status: DISCONTINUED | OUTPATIENT
Start: 2025-05-15 | End: 2025-05-15 | Stop reason: HOSPADM

## 2025-05-15 RX ORDER — DIPHENHYDRAMINE HYDROCHLORIDE 50 MG/ML
50 INJECTION, SOLUTION INTRAMUSCULAR; INTRAVENOUS AS NEEDED
Status: DISCONTINUED | OUTPATIENT
Start: 2025-05-15 | End: 2025-05-15 | Stop reason: HOSPADM

## 2025-05-15 RX ORDER — EPINEPHRINE 0.3 MG/.3ML
0.3 INJECTION SUBCUTANEOUS EVERY 5 MIN PRN
Status: DISCONTINUED | OUTPATIENT
Start: 2025-05-15 | End: 2025-05-15 | Stop reason: HOSPADM

## 2025-05-15 RX ORDER — FAMOTIDINE 10 MG/ML
20 INJECTION, SOLUTION INTRAVENOUS ONCE AS NEEDED
Status: DISCONTINUED | OUTPATIENT
Start: 2025-05-15 | End: 2025-05-15 | Stop reason: HOSPADM

## 2025-05-15 RX ORDER — ONDANSETRON HYDROCHLORIDE 8 MG/1
8 TABLET, FILM COATED ORAL ONCE
Status: COMPLETED | OUTPATIENT
Start: 2025-05-15 | End: 2025-05-15

## 2025-05-15 RX ADMIN — ONDANSETRON HYDROCHLORIDE 8 MG: 8 TABLET, FILM COATED ORAL at 11:03

## 2025-05-15 RX ADMIN — Medication 12.9 MG: at 11:27

## 2025-05-15 NOTE — PROGRESS NOTES
GENARO Burgess is a 84 y.o. male who presents for No chief complaint on file..    He is on the following chemotherapy regimen:     Treatment Plans       Name Type Plan Dates Plan Provider         Active    ([facility]) [id number] - AzaCITIDine / Decitabine, 42 Day Cycle Followed by [facility] Oncology Treatment 9/29/2024 - Present Sebastien Rashid MD                    Since his last visit, he has been doing well.  Overall, he states his energy level is stable.  His appetite has been unchanged.  He reports no complaints.  He has no other concerns.    Tolerated injection well, AVS and today's labs provided and discharged in stable condition.

## 2025-05-19 ENCOUNTER — INFUSION (OUTPATIENT)
Dept: HEMATOLOGY/ONCOLOGY | Facility: HOSPITAL | Age: 85
End: 2025-05-19
Payer: MEDICARE

## 2025-05-19 ENCOUNTER — LAB (OUTPATIENT)
Dept: LAB | Facility: HOSPITAL | Age: 85
End: 2025-05-19
Payer: MEDICARE

## 2025-05-19 VITALS
DIASTOLIC BLOOD PRESSURE: 73 MMHG | OXYGEN SATURATION: 99 % | SYSTOLIC BLOOD PRESSURE: 157 MMHG | TEMPERATURE: 97.7 F | WEIGHT: 268.2 LBS | HEART RATE: 88 BPM | RESPIRATION RATE: 18 BRPM | BODY MASS INDEX: 36.57 KG/M2

## 2025-05-19 DIAGNOSIS — D46.9 MYELODYSPLASTIC SYNDROME (MULTI): ICD-10-CM

## 2025-05-19 LAB
ALBUMIN SERPL BCP-MCNC: 3.9 G/DL (ref 3.4–5)
ALP SERPL-CCNC: 90 U/L (ref 33–136)
ALT SERPL W P-5'-P-CCNC: 27 U/L (ref 10–52)
ANION GAP SERPL CALC-SCNC: 14 MMOL/L (ref 10–20)
AST SERPL W P-5'-P-CCNC: 22 U/L (ref 9–39)
BASOPHILS # BLD AUTO: 0.01 X10*3/UL (ref 0–0.1)
BASOPHILS NFR BLD AUTO: 0.3 %
BILIRUB SERPL-MCNC: 1.1 MG/DL (ref 0–1.2)
BUN SERPL-MCNC: 33 MG/DL (ref 6–23)
CALCIUM SERPL-MCNC: 9.1 MG/DL (ref 8.6–10.3)
CHLORIDE SERPL-SCNC: 109 MMOL/L (ref 98–107)
CO2 SERPL-SCNC: 23 MMOL/L (ref 21–32)
CREAT SERPL-MCNC: 1.45 MG/DL (ref 0.5–1.3)
EGFRCR SERPLBLD CKD-EPI 2021: 48 ML/MIN/1.73M*2
EOSINOPHIL # BLD AUTO: 0.06 X10*3/UL (ref 0–0.4)
EOSINOPHIL NFR BLD AUTO: 2 %
ERYTHROCYTE [DISTWIDTH] IN BLOOD BY AUTOMATED COUNT: 18 % (ref 11.5–14.5)
GLUCOSE SERPL-MCNC: 84 MG/DL (ref 74–99)
HCT VFR BLD AUTO: 35.3 % (ref 41–52)
HGB BLD-MCNC: 11.1 G/DL (ref 13.5–17.5)
IMM GRANULOCYTES # BLD AUTO: 0.02 X10*3/UL (ref 0–0.5)
IMM GRANULOCYTES NFR BLD AUTO: 0.7 % (ref 0–0.9)
LYMPHOCYTES # BLD AUTO: 0.86 X10*3/UL (ref 0.8–3)
LYMPHOCYTES NFR BLD AUTO: 29 %
MCH RBC QN AUTO: 31.4 PG (ref 26–34)
MCHC RBC AUTO-ENTMCNC: 31.4 G/DL (ref 32–36)
MCV RBC AUTO: 100 FL (ref 80–100)
MONOCYTES # BLD AUTO: 0.13 X10*3/UL (ref 0.05–0.8)
MONOCYTES NFR BLD AUTO: 4.4 %
NEUTROPHILS # BLD AUTO: 1.89 X10*3/UL (ref 1.6–5.5)
NEUTROPHILS NFR BLD AUTO: 63.6 %
NRBC BLD-RTO: 0 /100 WBCS (ref 0–0)
PLATELET # BLD AUTO: 82 X10*3/UL (ref 150–450)
POTASSIUM SERPL-SCNC: 4.7 MMOL/L (ref 3.5–5.3)
PROT SERPL-MCNC: 6.1 G/DL (ref 6.4–8.2)
RBC # BLD AUTO: 3.53 X10*6/UL (ref 4.5–5.9)
SODIUM SERPL-SCNC: 141 MMOL/L (ref 136–145)
WBC # BLD AUTO: 3 X10*3/UL (ref 4.4–11.3)

## 2025-05-19 PROCEDURE — 2500000004 HC RX 250 GENERAL PHARMACY W/ HCPCS (ALT 636 FOR OP/ED): Performed by: INTERNAL MEDICINE

## 2025-05-19 PROCEDURE — 85025 COMPLETE CBC W/AUTO DIFF WBC: CPT

## 2025-05-19 PROCEDURE — 36415 COLL VENOUS BLD VENIPUNCTURE: CPT

## 2025-05-19 PROCEDURE — 80053 COMPREHEN METABOLIC PANEL: CPT

## 2025-05-19 PROCEDURE — 2560000001 HC RX 256 EXPERIMENTAL DRUGS: Performed by: INTERNAL MEDICINE

## 2025-05-19 PROCEDURE — 96401 CHEMO ANTI-NEOPL SQ/IM: CPT

## 2025-05-19 RX ORDER — FAMOTIDINE 10 MG/ML
20 INJECTION, SOLUTION INTRAVENOUS ONCE AS NEEDED
Status: DISCONTINUED | OUTPATIENT
Start: 2025-05-19 | End: 2025-05-19 | Stop reason: HOSPADM

## 2025-05-19 RX ORDER — ALBUTEROL SULFATE 0.83 MG/ML
3 SOLUTION RESPIRATORY (INHALATION) AS NEEDED
Status: DISCONTINUED | OUTPATIENT
Start: 2025-05-19 | End: 2025-05-19 | Stop reason: HOSPADM

## 2025-05-19 RX ORDER — EPINEPHRINE 0.3 MG/.3ML
0.3 INJECTION SUBCUTANEOUS EVERY 5 MIN PRN
Status: DISCONTINUED | OUTPATIENT
Start: 2025-05-19 | End: 2025-05-19 | Stop reason: HOSPADM

## 2025-05-19 RX ORDER — ONDANSETRON 8 MG/1
8 TABLET, FILM COATED ORAL ONCE
Status: COMPLETED | OUTPATIENT
Start: 2025-05-19 | End: 2025-05-19

## 2025-05-19 RX ORDER — DIPHENHYDRAMINE HYDROCHLORIDE 50 MG/ML
50 INJECTION, SOLUTION INTRAMUSCULAR; INTRAVENOUS AS NEEDED
Status: DISCONTINUED | OUTPATIENT
Start: 2025-05-19 | End: 2025-05-19 | Stop reason: HOSPADM

## 2025-05-19 RX ADMIN — Medication 129 MG: at 12:14

## 2025-05-19 RX ADMIN — ONDANSETRON HYDROCHLORIDE 8 MG: 8 TABLET, FILM COATED ORAL at 11:39

## 2025-05-19 ASSESSMENT — PAIN SCALES - GENERAL: PAINLEVEL_OUTOF10: 0-NO PAIN

## 2025-05-19 NOTE — PROGRESS NOTES
Patient presents to infusion appointment in stable condition. C8D22 [id number] azacitidine injections tolerated without incident. ANC 1.89, Hgb 11.1, filgrastim and darbepoetin held per orders. Lab results and schedule reviewed. Discharged in stable condition.

## 2025-05-20 ENCOUNTER — TREATMENT (OUTPATIENT)
Dept: PHYSICAL THERAPY | Facility: CLINIC | Age: 85
End: 2025-05-20
Payer: MEDICARE

## 2025-05-20 DIAGNOSIS — D46.9 MYELODYSPLASTIC SYNDROME (MULTI): Primary | ICD-10-CM

## 2025-05-20 PROCEDURE — 97110 THERAPEUTIC EXERCISES: CPT | Mod: GP | Performed by: GENERAL ACUTE CARE HOSPITAL

## 2025-05-22 ENCOUNTER — INFUSION (OUTPATIENT)
Dept: HEMATOLOGY/ONCOLOGY | Facility: HOSPITAL | Age: 85
End: 2025-05-22
Payer: MEDICARE

## 2025-05-22 ENCOUNTER — APPOINTMENT (OUTPATIENT)
Dept: HEMATOLOGY/ONCOLOGY | Facility: HOSPITAL | Age: 85
End: 2025-05-22
Payer: MEDICARE

## 2025-05-22 ENCOUNTER — LAB (OUTPATIENT)
Dept: LAB | Facility: HOSPITAL | Age: 85
End: 2025-05-22
Payer: MEDICARE

## 2025-05-22 VITALS
HEART RATE: 88 BPM | DIASTOLIC BLOOD PRESSURE: 64 MMHG | BODY MASS INDEX: 36.07 KG/M2 | SYSTOLIC BLOOD PRESSURE: 140 MMHG | RESPIRATION RATE: 18 BRPM | WEIGHT: 264.55 LBS | OXYGEN SATURATION: 100 % | TEMPERATURE: 97.3 F

## 2025-05-22 DIAGNOSIS — D46.9 MYELODYSPLASTIC SYNDROME (MULTI): ICD-10-CM

## 2025-05-22 LAB
ALBUMIN SERPL BCP-MCNC: 4.1 G/DL (ref 3.4–5)
ALP SERPL-CCNC: 93 U/L (ref 33–136)
ALT SERPL W P-5'-P-CCNC: 27 U/L (ref 10–52)
ANION GAP SERPL CALC-SCNC: 13 MMOL/L (ref 10–20)
AST SERPL W P-5'-P-CCNC: 21 U/L (ref 9–39)
BASOPHILS # BLD AUTO: 0.02 X10*3/UL (ref 0–0.1)
BASOPHILS NFR BLD AUTO: 0.5 %
BILIRUB SERPL-MCNC: 1.1 MG/DL (ref 0–1.2)
BUN SERPL-MCNC: 35 MG/DL (ref 6–23)
CALCIUM SERPL-MCNC: 9.4 MG/DL (ref 8.6–10.3)
CHLORIDE SERPL-SCNC: 108 MMOL/L (ref 98–107)
CO2 SERPL-SCNC: 24 MMOL/L (ref 21–32)
CREAT SERPL-MCNC: 1.44 MG/DL (ref 0.5–1.3)
EGFRCR SERPLBLD CKD-EPI 2021: 48 ML/MIN/1.73M*2
EOSINOPHIL # BLD AUTO: 0.07 X10*3/UL (ref 0–0.4)
EOSINOPHIL NFR BLD AUTO: 1.9 %
ERYTHROCYTE [DISTWIDTH] IN BLOOD BY AUTOMATED COUNT: 18.2 % (ref 11.5–14.5)
GLUCOSE SERPL-MCNC: 96 MG/DL (ref 74–99)
HCT VFR BLD AUTO: 35.6 % (ref 41–52)
HGB BLD-MCNC: 11.5 G/DL (ref 13.5–17.5)
IMM GRANULOCYTES # BLD AUTO: 0.02 X10*3/UL (ref 0–0.5)
IMM GRANULOCYTES NFR BLD AUTO: 0.5 % (ref 0–0.9)
LYMPHOCYTES # BLD AUTO: 1.17 X10*3/UL (ref 0.8–3)
LYMPHOCYTES NFR BLD AUTO: 31.2 %
MCH RBC QN AUTO: 31.6 PG (ref 26–34)
MCHC RBC AUTO-ENTMCNC: 32.3 G/DL (ref 32–36)
MCV RBC AUTO: 98 FL (ref 80–100)
MONOCYTES # BLD AUTO: 0.18 X10*3/UL (ref 0.05–0.8)
MONOCYTES NFR BLD AUTO: 4.8 %
NEUTROPHILS # BLD AUTO: 2.29 X10*3/UL (ref 1.6–5.5)
NEUTROPHILS NFR BLD AUTO: 61.1 %
NRBC BLD-RTO: 0 /100 WBCS (ref 0–0)
PLATELET # BLD AUTO: 75 X10*3/UL (ref 150–450)
POTASSIUM SERPL-SCNC: 4.7 MMOL/L (ref 3.5–5.3)
PROT SERPL-MCNC: 6.2 G/DL (ref 6.4–8.2)
RBC # BLD AUTO: 3.64 X10*6/UL (ref 4.5–5.9)
SODIUM SERPL-SCNC: 140 MMOL/L (ref 136–145)
WBC # BLD AUTO: 3.8 X10*3/UL (ref 4.4–11.3)

## 2025-05-22 PROCEDURE — 36415 COLL VENOUS BLD VENIPUNCTURE: CPT

## 2025-05-22 PROCEDURE — 85025 COMPLETE CBC W/AUTO DIFF WBC: CPT

## 2025-05-22 PROCEDURE — 96401 CHEMO ANTI-NEOPL SQ/IM: CPT

## 2025-05-22 PROCEDURE — 2560000001 HC RX 256 EXPERIMENTAL DRUGS: Performed by: INTERNAL MEDICINE

## 2025-05-22 PROCEDURE — 2500000004 HC RX 250 GENERAL PHARMACY W/ HCPCS (ALT 636 FOR OP/ED): Performed by: INTERNAL MEDICINE

## 2025-05-22 PROCEDURE — 80053 COMPREHEN METABOLIC PANEL: CPT

## 2025-05-22 RX ORDER — EPINEPHRINE 0.3 MG/.3ML
0.3 INJECTION SUBCUTANEOUS EVERY 5 MIN PRN
Status: DISCONTINUED | OUTPATIENT
Start: 2025-05-22 | End: 2025-05-22 | Stop reason: HOSPADM

## 2025-05-22 RX ORDER — ONDANSETRON 8 MG/1
8 TABLET, FILM COATED ORAL ONCE
Status: COMPLETED | OUTPATIENT
Start: 2025-05-22 | End: 2025-05-22

## 2025-05-22 RX ORDER — DIPHENHYDRAMINE HYDROCHLORIDE 50 MG/ML
50 INJECTION, SOLUTION INTRAMUSCULAR; INTRAVENOUS AS NEEDED
Status: DISCONTINUED | OUTPATIENT
Start: 2025-05-22 | End: 2025-05-22 | Stop reason: HOSPADM

## 2025-05-22 RX ORDER — FAMOTIDINE 10 MG/ML
20 INJECTION, SOLUTION INTRAVENOUS ONCE AS NEEDED
Status: DISCONTINUED | OUTPATIENT
Start: 2025-05-22 | End: 2025-05-22 | Stop reason: HOSPADM

## 2025-05-22 RX ORDER — ALBUTEROL SULFATE 0.83 MG/ML
3 SOLUTION RESPIRATORY (INHALATION) AS NEEDED
Status: DISCONTINUED | OUTPATIENT
Start: 2025-05-22 | End: 2025-05-22 | Stop reason: HOSPADM

## 2025-05-22 RX ADMIN — ONDANSETRON HYDROCHLORIDE 8 MG: 8 TABLET, FILM COATED ORAL at 10:14

## 2025-05-22 RX ADMIN — Medication 12.9 MG: at 10:39

## 2025-05-22 ASSESSMENT — PAIN SCALES - GENERAL: PAINLEVEL_OUTOF10: 0-NO PAIN

## 2025-05-22 NOTE — PROGRESS NOTES
GENARO Burgess arrived to infusion center for D25C8. Pt denies having new or worsening symptoms. Pt tolerated treatment without issues. Labs reviewed with pt. Pt discharged home in stable condition with wife.

## 2025-05-24 DIAGNOSIS — F51.01 PRIMARY INSOMNIA: ICD-10-CM

## 2025-05-27 ENCOUNTER — OFFICE VISIT (OUTPATIENT)
Dept: HEMATOLOGY/ONCOLOGY | Facility: HOSPITAL | Age: 85
End: 2025-05-27
Payer: MEDICARE

## 2025-05-27 ENCOUNTER — LAB (OUTPATIENT)
Dept: LAB | Facility: HOSPITAL | Age: 85
End: 2025-05-27
Payer: MEDICARE

## 2025-05-27 ENCOUNTER — APPOINTMENT (OUTPATIENT)
Dept: PHYSICAL THERAPY | Facility: CLINIC | Age: 85
End: 2025-05-27
Payer: MEDICARE

## 2025-05-27 ENCOUNTER — INFUSION (OUTPATIENT)
Dept: HEMATOLOGY/ONCOLOGY | Facility: HOSPITAL | Age: 85
End: 2025-05-27
Payer: MEDICARE

## 2025-05-27 VITALS
OXYGEN SATURATION: 100 % | BODY MASS INDEX: 36.58 KG/M2 | TEMPERATURE: 97.5 F | DIASTOLIC BLOOD PRESSURE: 61 MMHG | SYSTOLIC BLOOD PRESSURE: 138 MMHG | HEART RATE: 81 BPM | WEIGHT: 268.3 LBS | RESPIRATION RATE: 18 BRPM

## 2025-05-27 DIAGNOSIS — D46.9 MYELODYSPLASTIC SYNDROME (MULTI): Primary | ICD-10-CM

## 2025-05-27 DIAGNOSIS — D46.9 MYELODYSPLASTIC SYNDROME (MULTI): ICD-10-CM

## 2025-05-27 LAB
ALBUMIN SERPL BCP-MCNC: 4 G/DL (ref 3.4–5)
ALP SERPL-CCNC: 92 U/L (ref 33–136)
ALT SERPL W P-5'-P-CCNC: 22 U/L (ref 10–52)
ANION GAP SERPL CALC-SCNC: 14 MMOL/L (ref 10–20)
AST SERPL W P-5'-P-CCNC: 18 U/L (ref 9–39)
BASOPHILS # BLD AUTO: 0.02 X10*3/UL (ref 0–0.1)
BASOPHILS NFR BLD AUTO: 0.7 %
BILIRUB SERPL-MCNC: 0.9 MG/DL (ref 0–1.2)
BUN SERPL-MCNC: 33 MG/DL (ref 6–23)
CALCIUM SERPL-MCNC: 9.1 MG/DL (ref 8.6–10.3)
CHLORIDE SERPL-SCNC: 109 MMOL/L (ref 98–107)
CO2 SERPL-SCNC: 22 MMOL/L (ref 21–32)
CREAT SERPL-MCNC: 1.43 MG/DL (ref 0.5–1.3)
EGFRCR SERPLBLD CKD-EPI 2021: 48 ML/MIN/1.73M*2
EOSINOPHIL # BLD AUTO: 0.08 X10*3/UL (ref 0–0.4)
EOSINOPHIL NFR BLD AUTO: 2.6 %
ERYTHROCYTE [DISTWIDTH] IN BLOOD BY AUTOMATED COUNT: 18.1 % (ref 11.5–14.5)
GLUCOSE SERPL-MCNC: 95 MG/DL (ref 74–99)
HCT VFR BLD AUTO: 34.7 % (ref 41–52)
HGB BLD-MCNC: 11.1 G/DL (ref 13.5–17.5)
HGB RETIC QN: 33 PG (ref 28–38)
IMM GRANULOCYTES # BLD AUTO: 0.01 X10*3/UL (ref 0–0.5)
IMM GRANULOCYTES NFR BLD AUTO: 0.3 % (ref 0–0.9)
IMMATURE RETIC FRACTION: 14.4 %
LDH SERPL L TO P-CCNC: 169 U/L (ref 84–246)
LYMPHOCYTES # BLD AUTO: 1.03 X10*3/UL (ref 0.8–3)
LYMPHOCYTES NFR BLD AUTO: 33.8 %
MAGNESIUM SERPL-MCNC: 1.82 MG/DL (ref 1.6–2.4)
MCH RBC QN AUTO: 31.9 PG (ref 26–34)
MCHC RBC AUTO-ENTMCNC: 32 G/DL (ref 32–36)
MCV RBC AUTO: 100 FL (ref 80–100)
MONOCYTES # BLD AUTO: 0.15 X10*3/UL (ref 0.05–0.8)
MONOCYTES NFR BLD AUTO: 4.9 %
NEUTROPHILS # BLD AUTO: 1.76 X10*3/UL (ref 1.6–5.5)
NEUTROPHILS NFR BLD AUTO: 57.7 %
NRBC BLD-RTO: 0 /100 WBCS (ref 0–0)
PLATELET # BLD AUTO: 106 X10*3/UL (ref 150–450)
POTASSIUM SERPL-SCNC: 4.4 MMOL/L (ref 3.5–5.3)
PROT SERPL-MCNC: 6.3 G/DL (ref 6.4–8.2)
RBC # BLD AUTO: 3.48 X10*6/UL (ref 4.5–5.9)
RETICS #: 0.06 X10*6/UL (ref 0.02–0.11)
RETICS/RBC NFR AUTO: 1.7 % (ref 0.5–2)
SODIUM SERPL-SCNC: 141 MMOL/L (ref 136–145)
URATE SERPL-MCNC: 5 MG/DL (ref 4–7.5)
WBC # BLD AUTO: 3.1 X10*3/UL (ref 4.4–11.3)

## 2025-05-27 PROCEDURE — 85025 COMPLETE CBC W/AUTO DIFF WBC: CPT

## 2025-05-27 PROCEDURE — 96401 CHEMO ANTI-NEOPL SQ/IM: CPT

## 2025-05-27 PROCEDURE — 82310 ASSAY OF CALCIUM: CPT

## 2025-05-27 PROCEDURE — 83615 LACTATE (LD) (LDH) ENZYME: CPT

## 2025-05-27 PROCEDURE — 80051 ELECTROLYTE PANEL: CPT

## 2025-05-27 PROCEDURE — 84550 ASSAY OF BLOOD/URIC ACID: CPT

## 2025-05-27 PROCEDURE — 36415 COLL VENOUS BLD VENIPUNCTURE: CPT

## 2025-05-27 PROCEDURE — 2560000001 HC RX 256 EXPERIMENTAL DRUGS: Performed by: INTERNAL MEDICINE

## 2025-05-27 PROCEDURE — 99214 OFFICE O/P EST MOD 30 MIN: CPT

## 2025-05-27 PROCEDURE — 2500000004 HC RX 250 GENERAL PHARMACY W/ HCPCS (ALT 636 FOR OP/ED): Performed by: INTERNAL MEDICINE

## 2025-05-27 PROCEDURE — 83735 ASSAY OF MAGNESIUM: CPT

## 2025-05-27 PROCEDURE — 85045 AUTOMATED RETICULOCYTE COUNT: CPT

## 2025-05-27 RX ORDER — DIPHENHYDRAMINE HYDROCHLORIDE 50 MG/ML
50 INJECTION, SOLUTION INTRAMUSCULAR; INTRAVENOUS AS NEEDED
Status: CANCELLED | OUTPATIENT
Start: 2025-06-03

## 2025-05-27 RX ORDER — ALBUTEROL SULFATE 0.83 MG/ML
3 SOLUTION RESPIRATORY (INHALATION) AS NEEDED
Status: CANCELLED | OUTPATIENT
Start: 2025-06-03

## 2025-05-27 RX ORDER — EPINEPHRINE 0.3 MG/.3ML
0.3 INJECTION SUBCUTANEOUS EVERY 5 MIN PRN
Status: DISCONTINUED | OUTPATIENT
Start: 2025-05-27 | End: 2025-05-27 | Stop reason: HOSPADM

## 2025-05-27 RX ORDER — FAMOTIDINE 10 MG/ML
20 INJECTION, SOLUTION INTRAVENOUS ONCE AS NEEDED
OUTPATIENT
Start: 2025-06-10

## 2025-05-27 RX ORDER — ALBUTEROL SULFATE 0.83 MG/ML
3 SOLUTION RESPIRATORY (INHALATION) AS NEEDED
Status: CANCELLED | OUTPATIENT
Start: 2025-05-30

## 2025-05-27 RX ORDER — ALBUTEROL SULFATE 0.83 MG/ML
3 SOLUTION RESPIRATORY (INHALATION) AS NEEDED
OUTPATIENT
Start: 2025-06-13

## 2025-05-27 RX ORDER — FAMOTIDINE 10 MG/ML
20 INJECTION, SOLUTION INTRAVENOUS ONCE AS NEEDED
Status: CANCELLED | OUTPATIENT
Start: 2025-05-27

## 2025-05-27 RX ORDER — EPINEPHRINE 0.3 MG/.3ML
0.3 INJECTION SUBCUTANEOUS EVERY 5 MIN PRN
Status: CANCELLED | OUTPATIENT
Start: 2025-06-03

## 2025-05-27 RX ORDER — ONDANSETRON 8 MG/1
8 TABLET, FILM COATED ORAL ONCE
OUTPATIENT
Start: 2025-06-20

## 2025-05-27 RX ORDER — FAMOTIDINE 10 MG/ML
20 INJECTION, SOLUTION INTRAVENOUS ONCE AS NEEDED
OUTPATIENT
Start: 2025-06-20

## 2025-05-27 RX ORDER — EPINEPHRINE 0.3 MG/.3ML
0.3 INJECTION SUBCUTANEOUS EVERY 5 MIN PRN
OUTPATIENT
Start: 2025-06-13

## 2025-05-27 RX ORDER — EPINEPHRINE 0.3 MG/.3ML
0.3 INJECTION SUBCUTANEOUS EVERY 5 MIN PRN
Status: CANCELLED | OUTPATIENT
Start: 2025-05-30

## 2025-05-27 RX ORDER — FAMOTIDINE 10 MG/ML
20 INJECTION, SOLUTION INTRAVENOUS ONCE AS NEEDED
OUTPATIENT
Start: 2025-06-13

## 2025-05-27 RX ORDER — DIPHENHYDRAMINE HYDROCHLORIDE 50 MG/ML
50 INJECTION, SOLUTION INTRAMUSCULAR; INTRAVENOUS AS NEEDED
Status: CANCELLED | OUTPATIENT
Start: 2025-05-30

## 2025-05-27 RX ORDER — ALBUTEROL SULFATE 0.83 MG/ML
3 SOLUTION RESPIRATORY (INHALATION) AS NEEDED
OUTPATIENT
Start: 2025-06-20

## 2025-05-27 RX ORDER — EPINEPHRINE 0.3 MG/.3ML
0.3 INJECTION SUBCUTANEOUS EVERY 5 MIN PRN
Status: CANCELLED | OUTPATIENT
Start: 2025-05-27

## 2025-05-27 RX ORDER — ONDANSETRON 8 MG/1
8 TABLET, FILM COATED ORAL ONCE
Status: CANCELLED | OUTPATIENT
Start: 2025-06-03

## 2025-05-27 RX ORDER — ONDANSETRON 8 MG/1
8 TABLET, FILM COATED ORAL ONCE
OUTPATIENT
Start: 2025-06-10

## 2025-05-27 RX ORDER — ONDANSETRON 8 MG/1
8 TABLET, FILM COATED ORAL ONCE
Status: CANCELLED | OUTPATIENT
Start: 2025-06-06

## 2025-05-27 RX ORDER — EPINEPHRINE 0.3 MG/.3ML
0.3 INJECTION SUBCUTANEOUS EVERY 5 MIN PRN
Status: CANCELLED | OUTPATIENT
Start: 2025-06-06

## 2025-05-27 RX ORDER — FAMOTIDINE 10 MG/ML
20 INJECTION, SOLUTION INTRAVENOUS ONCE AS NEEDED
Status: CANCELLED | OUTPATIENT
Start: 2025-05-30

## 2025-05-27 RX ORDER — ALBUTEROL SULFATE 0.83 MG/ML
3 SOLUTION RESPIRATORY (INHALATION) AS NEEDED
Status: CANCELLED | OUTPATIENT
Start: 2025-06-06

## 2025-05-27 RX ORDER — ALBUTEROL SULFATE 0.83 MG/ML
3 SOLUTION RESPIRATORY (INHALATION) AS NEEDED
Status: CANCELLED | OUTPATIENT
Start: 2025-05-27

## 2025-05-27 RX ORDER — ALBUTEROL SULFATE 0.83 MG/ML
3 SOLUTION RESPIRATORY (INHALATION) AS NEEDED
OUTPATIENT
Start: 2025-06-10

## 2025-05-27 RX ORDER — ONDANSETRON 8 MG/1
8 TABLET, FILM COATED ORAL ONCE
Status: CANCELLED | OUTPATIENT
Start: 2025-05-27

## 2025-05-27 RX ORDER — EPINEPHRINE 0.3 MG/.3ML
0.3 INJECTION SUBCUTANEOUS EVERY 5 MIN PRN
OUTPATIENT
Start: 2025-06-17

## 2025-05-27 RX ORDER — DIPHENHYDRAMINE HYDROCHLORIDE 50 MG/ML
50 INJECTION, SOLUTION INTRAMUSCULAR; INTRAVENOUS AS NEEDED
OUTPATIENT
Start: 2025-06-20

## 2025-05-27 RX ORDER — FAMOTIDINE 10 MG/ML
20 INJECTION, SOLUTION INTRAVENOUS ONCE AS NEEDED
Status: CANCELLED | OUTPATIENT
Start: 2025-06-03

## 2025-05-27 RX ORDER — FAMOTIDINE 10 MG/ML
20 INJECTION, SOLUTION INTRAVENOUS ONCE AS NEEDED
OUTPATIENT
Start: 2025-06-17

## 2025-05-27 RX ORDER — DIPHENHYDRAMINE HYDROCHLORIDE 50 MG/ML
50 INJECTION, SOLUTION INTRAMUSCULAR; INTRAVENOUS AS NEEDED
OUTPATIENT
Start: 2025-06-10

## 2025-05-27 RX ORDER — DIPHENHYDRAMINE HYDROCHLORIDE 50 MG/ML
50 INJECTION, SOLUTION INTRAMUSCULAR; INTRAVENOUS AS NEEDED
Status: DISCONTINUED | OUTPATIENT
Start: 2025-05-27 | End: 2025-05-27 | Stop reason: HOSPADM

## 2025-05-27 RX ORDER — ALBUTEROL SULFATE 0.83 MG/ML
3 SOLUTION RESPIRATORY (INHALATION) AS NEEDED
Status: DISCONTINUED | OUTPATIENT
Start: 2025-05-27 | End: 2025-05-27 | Stop reason: HOSPADM

## 2025-05-27 RX ORDER — DIPHENHYDRAMINE HYDROCHLORIDE 50 MG/ML
50 INJECTION, SOLUTION INTRAMUSCULAR; INTRAVENOUS AS NEEDED
OUTPATIENT
Start: 2025-06-13

## 2025-05-27 RX ORDER — ONDANSETRON 8 MG/1
8 TABLET, FILM COATED ORAL ONCE
Status: CANCELLED | OUTPATIENT
Start: 2025-05-30

## 2025-05-27 RX ORDER — TEMAZEPAM 15 MG/1
15 CAPSULE ORAL NIGHTLY PRN
Qty: 90 CAPSULE | Refills: 0 | Status: SHIPPED | OUTPATIENT
Start: 2025-05-27

## 2025-05-27 RX ORDER — DIPHENHYDRAMINE HYDROCHLORIDE 50 MG/ML
50 INJECTION, SOLUTION INTRAMUSCULAR; INTRAVENOUS AS NEEDED
Status: CANCELLED | OUTPATIENT
Start: 2025-06-06

## 2025-05-27 RX ORDER — DIPHENHYDRAMINE HYDROCHLORIDE 50 MG/ML
50 INJECTION, SOLUTION INTRAMUSCULAR; INTRAVENOUS AS NEEDED
Status: CANCELLED | OUTPATIENT
Start: 2025-05-27

## 2025-05-27 RX ORDER — EPINEPHRINE 0.3 MG/.3ML
0.3 INJECTION SUBCUTANEOUS EVERY 5 MIN PRN
OUTPATIENT
Start: 2025-06-20

## 2025-05-27 RX ORDER — ALBUTEROL SULFATE 0.83 MG/ML
3 SOLUTION RESPIRATORY (INHALATION) AS NEEDED
OUTPATIENT
Start: 2025-06-17

## 2025-05-27 RX ORDER — FAMOTIDINE 10 MG/ML
20 INJECTION, SOLUTION INTRAVENOUS ONCE AS NEEDED
Status: DISCONTINUED | OUTPATIENT
Start: 2025-05-27 | End: 2025-05-27 | Stop reason: HOSPADM

## 2025-05-27 RX ORDER — ONDANSETRON 8 MG/1
8 TABLET, FILM COATED ORAL ONCE
Status: COMPLETED | OUTPATIENT
Start: 2025-05-27 | End: 2025-05-27

## 2025-05-27 RX ORDER — ONDANSETRON 8 MG/1
8 TABLET, FILM COATED ORAL ONCE
OUTPATIENT
Start: 2025-06-13

## 2025-05-27 RX ORDER — DIPHENHYDRAMINE HYDROCHLORIDE 50 MG/ML
50 INJECTION, SOLUTION INTRAMUSCULAR; INTRAVENOUS AS NEEDED
OUTPATIENT
Start: 2025-06-17

## 2025-05-27 RX ORDER — FAMOTIDINE 10 MG/ML
20 INJECTION, SOLUTION INTRAVENOUS ONCE AS NEEDED
Status: CANCELLED | OUTPATIENT
Start: 2025-06-06

## 2025-05-27 RX ORDER — ONDANSETRON 8 MG/1
8 TABLET, FILM COATED ORAL ONCE
OUTPATIENT
Start: 2025-06-17

## 2025-05-27 RX ORDER — EPINEPHRINE 0.3 MG/.3ML
0.3 INJECTION SUBCUTANEOUS EVERY 5 MIN PRN
OUTPATIENT
Start: 2025-06-10

## 2025-05-27 RX ADMIN — Medication 64.5 MG: at 15:20

## 2025-05-27 RX ADMIN — Medication 64.5 MG: at 15:22

## 2025-05-27 RX ADMIN — ONDANSETRON HYDROCHLORIDE 8 MG: 8 TABLET, FILM COATED ORAL at 14:45

## 2025-05-27 ASSESSMENT — ENCOUNTER SYMPTOMS
FATIGUE: 0
ABDOMINAL PAIN: 0
CONFUSION: 0
HEADACHES: 0
CHILLS: 0
LIGHT-HEADEDNESS: 0
ADENOPATHY: 0
BRUISES/BLEEDS EASILY: 1
WOUND: 0
FEVER: 0
COUGH: 0
NAUSEA: 0
WHEEZING: 0
PALPITATIONS: 0
CONSTIPATION: 0
BLOOD IN STOOL: 0
VOMITING: 0
DEPRESSION: 0
SHORTNESS OF BREATH: 0
LEG SWELLING: 0
DIZZINESS: 0
NUMBNESS: 0
APPETITE CHANGE: 0

## 2025-05-27 NOTE — PROGRESS NOTES
"    Patient ID: Holden Burgess \"GENARO\" is a 84 y.o. male.       ASSESSMENT & PLAN     Oncology History   Myelodysplastic syndrome (Multi)   1/23/2024 Initial Diagnosis    Myelodysplastic syndrome:   Diagnosis:   Originally referred to Dr. Murphy in 2023 for longstanding thrombocytopenia.  At the time had mild leukopenia, no anemia.  Was refractory to a trial of prednisone, and so Bone marrow biopsy was completed completed on 1/23/2024 and demonstrated:  BONE MARROW CLOT, CORE BIOPSY, ASPIRATE, LEFT ILIAC CREST:   -- MILDLY HYPERCELLULAR BONE MARROW (80%) WITH MATURING TRILINEAGE HEMATOPOIESIS AND MODERATE INCREASE IN MEGAKARYOCYTES, SEE NOTE.  -- SMALL LYMPHOID AGGREGATES, FAVOR BENIGN.  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 Given the presence of persistent cytopenias, hypercellularity with increase megakaryocytes with abnormal clustering, and no increase in blasts, the overall findings are most consistent with:  -- MDS with low blasts (WHO-HAEM5 Classification) /   -- MDS-NOS with single lineage dysplasia (MDS-NOS-SLD) (ICC 2022 Classification).  NGS: SRSF2  Chromosome Analysis: 47,XY,+8[15]/46,XY[5]  IPSS-M: -0.99 - low risks disease     4/4/2024 - 8/29/2024 Supportive Treatment    Treatment:   I. Eltrombopag -on 4/4/2024 patient continued with persistent thrombocytopenia but also had some progressive anemia with hemoglobin down to 10.  Patient was offered [id number], preferred supportive management with oral medication opted for eltrombopag in the setting of thrombocytopenia starting at 50 and ramping up to 150 mg  From 4/20/2024 through 8/20/2024 patient had progressive anemia and progressive thrombocytopenia and spite of treatment.    II. Darbepoietin -in the setting of worsening anemia darbepoetin was added 7/12/2024 at 100 mcg every 2 weeks       9/30/2024 -  Research Study Participant    ([facility]) [id number] - AzaCITIDine / Decitabine, 42 Day Cycle " "Followed by [facility]  Plan Provider: Sebastien Rashid MD  Treatment goal: Palliative  Line of treatment: First Line  Associated studies: 5-Azacitidine and Decitabine Epigenetic Therapy for Myeloid Malignancies         05/27/25 C9D1 of CASE 4919.    Assessment & Plan  Myelodysplastic syndrome (Multi)  5/27/2025: Shaina treatment well.  His Hgb is 11.1 today and PLT 106k.  Continue [id number]     Diagnostics:  -Bone marrow biopsy (week 24) 3/13/25 of therapy shows persistent disease with < 1% blast  Treatment:  - Continue Alternating azacitidine 50 mg/m2 with decitabine 5 mg/m² per [id number]  - C9D1 today  -Received filgrastim today    Disease/Toxicity Monitoring:  - Given his hemoglobin and age will check CBC with each visit    Supportive Care:  - Blood products as indicated  - Working with PT    Antimicrobial Prophylaxis:   -None indicated at this time, ANC is in the normal range    IV access:  - for now, continue with PIV    RTC  6/5, 6/16, 6/19, 6/23, 6/26 infusion  6/30 appt with Shae KAUFMAN CNP and infusion    SUBJECTIVE     HPI    Holden IRWIN Aditya \"PAT\" presents today for follow up, accompanied by his wife. Overall he is feeling well. Had a nice holiday weekend.    No fevers, chills, nausea, vomiting, rashes, bruising or bleeding. Did have one episode of diarrhea this afternoon. No abdominal pain, cramping, or blood in stool.     Review of Systems   Constitutional:  Negative for appetite change, chills, fatigue and fever.   HENT:   Negative for mouth sores.    Respiratory:  Negative for cough, shortness of breath and wheezing.    Cardiovascular:  Negative for chest pain, leg swelling and palpitations.   Gastrointestinal:  Negative for abdominal pain, blood in stool, constipation, nausea and vomiting.   Musculoskeletal:  Negative for gait problem.   Skin:  Negative for itching, rash and wound.   Neurological:  Negative for dizziness, gait problem, headaches, light-headedness and numbness.   Hematological:  " Negative for adenopathy. Bruises/bleeds easily.   Psychiatric/Behavioral:  Negative for confusion and depression.        Medical History[1]  Social History[2]   Surgical History[3]      OBJECTIVE     BSA: There is no height or weight on file to calculate BSA.  There were no vitals taken for this visit.       Physical Exam  Constitutional:       Appearance: Normal appearance.   HENT:      Head: Normocephalic and atraumatic.      Mouth/Throat:      Mouth: Mucous membranes are moist.      Pharynx: Oropharynx is clear. No oropharyngeal exudate.   Cardiovascular:      Rate and Rhythm: Normal rate and regular rhythm.      Pulses: Normal pulses.      Heart sounds: Normal heart sounds.   Pulmonary:      Effort: Pulmonary effort is normal. No respiratory distress.      Breath sounds: Normal breath sounds. No wheezing.   Abdominal:      General: There is no distension.      Palpations: Abdomen is soft.      Tenderness: There is no abdominal tenderness.   Musculoskeletal:         General: Normal range of motion.      Right lower leg: Edema (some swelling around ankles (chronic)) present.   Skin:     General: Skin is warm.      Coloration: Skin is not jaundiced.      Findings: Bruising (some bruising on arms) present. No erythema or lesion.   Neurological:      General: No focal deficit present.      Mental Status: He is alert and oriented to person, place, and time.   Psychiatric:         Mood and Affect: Mood normal.         Behavior: Behavior normal.         Thought Content: Thought content normal.         Judgment: Judgment normal.         Performance Status:  Karnofsky Score: 90 - Able to carry on normal activity; minor signs or symptoms of disease            Jannet Perry PA-C                  [1]   Past Medical History:  Diagnosis Date    Abdominal pain, chronic, right upper quadrant     ACP (advance care planning)     Anemia     Aneurysm     Body mass index (BMI) 39.0-39.9, adult 12/06/2021    BMI 39.0-39.9,adult     Body mass index (BMI) 39.0-39.9, adult 2022    BMI 39.0-39.9,adult    Body mass index (BMI) 39.0-39.9, adult 2022    Body mass index (BMI) of 39.0 to 39.9 in adult    Body mass index (BMI) 39.0-39.9, adult 2022    Body mass index (BMI) of 39.0 to 39.9 in adult    Body mass index (BMI) 39.0-39.9, adult 2022    Body mass index (BMI) of 39.0 to 39.9 in adult    Cramp and spasm 2022    Leg cramps    Difficulty breathing     Encounter for general adult medical examination without abnormal findings 2020    Encounter for Medicare annual wellness exam    Encounter for screening for malignant neoplasm of prostate 2021    Encounter for prostate cancer screening    Encounter for screening for malignant neoplasm of prostate 2020    Encounter for prostate cancer screening    Forearm injury     Hx of atrial flutter     Hyperlipidemia     Hypertension     Hyperuricemia     Incontinence     Obesity, unspecified 2022    Class 2 obesity with body mass index (BMI) of 39.0 to 39.9 in adult    Other symptoms and signs involving the musculoskeletal system 10/12/2022    Shoulder weakness    Personal history of other diseases of the circulatory system 2021    History of hypotension    Personal history of other diseases of the circulatory system 10/26/2021    History of hypertension    Personal history of other endocrine, nutritional and metabolic disease 2021    History of morbid obesity    Personal history of other specified conditions 2021    History of dizziness    Rotator cuff injury     Sinus node dysfunction (Multi)     Valvular heart disease    [2]   Social History  Tobacco Use    Smoking status: Former     Current packs/day: 0.00     Average packs/day: 3.0 packs/day for 10.0 years (30.0 ttl pk-yrs)     Types: Cigarettes     Start date: 1958     Quit date: 1968     Years since quittin.7     Passive exposure: Never    Smokeless tobacco: Never    Vaping Use    Vaping status: Never Used   Substance Use Topics    Alcohol use: Not Currently     Comment: quit 12/31/2000    Drug use: Never   [3]   Past Surgical History:  Procedure Laterality Date    BONE MARROW BIOPSY      BONE MARROW BIOPSY W/ ASPIRATION  01/23/2024    CARDIAC ELECTROPHYSIOLOGY PROCEDURE Left 11/21/2023    Procedure: PPM IMPLANT DC;  Surgeon: Jun Solis MD;  Location: [facility];  Service: Electrophysiology;  Laterality: Left;    CYSTOSCOPY  02/20/2024    MR CHEST ANGIO W AND WO IV CONTRAST  09/18/2019    MR CHEST ANGIO W AND WO IV CONTRAST 9/18/2019 [facility]    MR CHEST ANGIO W AND WO IV CONTRAST  01/25/2023    MR CHEST ANGIO W AND WO IV CONTRAST 1/25/2023 [facility]GAC[facility]    MR CHEST ANGIO W AND WO IV CONTRAST  01/25/2023    MR CHEST ANGIO W AND WO IV CONTRAST    OTHER SURGICAL HISTORY  05/14/2020    Knee replacement    OTHER SURGICAL HISTORY  05/14/2020    Catheter ablation    OTHER SURGICAL HISTORY  05/14/2020    Prostate surgery    OTHER SURGICAL HISTORY  05/14/2020    Lower back surgery    OTHER SURGICAL HISTORY  05/14/2020    Hand surgery    OTHER SURGICAL HISTORY  09/29/2021    Surgery    OTHER SURGICAL HISTORY  09/29/2021    Cataract surgery    OTHER SURGICAL HISTORY  09/29/2021    Colonoscopy    OTHER SURGICAL HISTORY  09/29/2021    Vertebral fusion    URINARY SPHINCTER IMPLANT  03/28/2024

## 2025-05-27 NOTE — TELEPHONE ENCOUNTER
Recent Visits  Date Type Provider Dept   02/03/25 Office Visit Giacomo Joseph, DO [facility]   Showing recent visits within past 180 days and meeting all other requirements  Future Appointments  Date Type Provider Dept   06/03/25 Appointment Giacomo Joseph, DO [facility]   Showing future appointments within next 90 days and meeting all other requirements

## 2025-05-27 NOTE — ASSESSMENT & PLAN NOTE
5/27/2025: Shaina treatment well.  His Hgb is 11.1 today and PLT 106k.  Continue [id number]     Diagnostics:  -Bone marrow biopsy (week 24) 3/13/25 of therapy shows persistent disease with < 1% blast  Treatment:  - Continue Alternating azacitidine 50 mg/m2 with decitabine 5 mg/m² per [id number]  - C9D1 today  -Received filgrastim today    Disease/Toxicity Monitoring:  - Given his hemoglobin and age will check CBC with each visit    Supportive Care:  - Blood products as indicated  - Working with PT    Antimicrobial Prophylaxis:   -None indicated at this time, ANC is in the normal range    IV access:  - for now, continue with PIV

## 2025-05-27 NOTE — PROGRESS NOTES
Patient presents to infusion appointment in stable condition. C9D1 [id number] azacitidine injections tolerated without incident. Hgb 11.1, ANC 1.76, filgrastim and Aranesp held per orders. Lab results reviewed. Discharged in stable condition.

## 2025-05-28 ENCOUNTER — TREATMENT (OUTPATIENT)
Dept: PHYSICAL THERAPY | Facility: CLINIC | Age: 85
End: 2025-05-28
Payer: MEDICARE

## 2025-05-28 ENCOUNTER — DOCUMENTATION (OUTPATIENT)
Dept: HEMATOLOGY/ONCOLOGY | Facility: HOSPITAL | Age: 85
End: 2025-05-28

## 2025-05-28 DIAGNOSIS — D46.9 MYELODYSPLASTIC SYNDROME (MULTI): Primary | ICD-10-CM

## 2025-05-28 PROCEDURE — 97110 THERAPEUTIC EXERCISES: CPT | Mod: GP | Performed by: GENERAL ACUTE CARE HOSPITAL

## 2025-05-28 NOTE — PROGRESS NOTES
"Patient Name: Holden Burgess \"GENARO\"  MRN: [id number]  Today's Date: 5/28/2025     Current Problem:  1. Myelodysplastic syndrome (Multi)            Visit 11/20    Subjective:  Change in pain/ pain level: 3/10  Change in function: did exercises this am, fatigued today    Objective: amb 2/5 mile with walker     Treatment:    Therapeutic exercise (06493):   nustep level 6 8 min no arms  Track amb 4 laps with walker   Stretches   Titl board L2   Foam balance eye open/eye closed   Leg Press 110# 2x20  HSC 50# 2x15  Step up 6 in f/L x12 ea   Foam march     Assessment:  Patient continues to require active therapy to progress functional capabilities with decreasing pain levels and improving mechanics with functional activities including progression of Home exercise program. Tolerance to today's treatment was good. Muscle fatigue noted.  Patient appears motivated and compliant this date, demonstrating a working understanding of principals instructed and home program.    Plan:  Progress with functional strength as tolerated with respect to tissue healing. Manual therapy PRN to improve/maintain ROM, prevent adhesion, reduce pain.  "

## 2025-05-28 NOTE — RESEARCH NOTES
Research Note Treatment Day    Holden Burgess is here today for treatment on CASE 4919. Today is Week 38 Day 1. Procedures completed per protocol. AE's and con-meds reviewed with patient. Patient is aware of treatment plan.      [x]   Received treatment as planned   OR  []    Treatment delayed; patient calendar updated as required   Treatment delayed because:    []   AE    []   Physician Discretion    []   Clinical Deterioration or Progression     []   Other    Education Documentation  Treatment Plan and Schedule, taught by Judi Patel RN at 5/28/2025  5:15 PM.  Learner: Patient  Readiness: Acceptance  Method: Explanation  Response: Verbalizes Understanding    Education Comments  No comments found.

## 2025-05-30 ENCOUNTER — LAB (OUTPATIENT)
Dept: LAB | Facility: HOSPITAL | Age: 85
End: 2025-05-30
Payer: MEDICARE

## 2025-05-30 ENCOUNTER — INFUSION (OUTPATIENT)
Dept: HEMATOLOGY/ONCOLOGY | Facility: HOSPITAL | Age: 85
End: 2025-05-30
Payer: MEDICARE

## 2025-05-30 ENCOUNTER — HOSPITAL ENCOUNTER (OUTPATIENT)
Dept: RADIOLOGY | Facility: HOSPITAL | Age: 85
Discharge: HOME | End: 2025-05-30
Payer: MEDICARE

## 2025-05-30 VITALS
SYSTOLIC BLOOD PRESSURE: 153 MMHG | HEART RATE: 69 BPM | BODY MASS INDEX: 36.77 KG/M2 | OXYGEN SATURATION: 100 % | RESPIRATION RATE: 18 BRPM | DIASTOLIC BLOOD PRESSURE: 68 MMHG | WEIGHT: 269.7 LBS | TEMPERATURE: 97.3 F

## 2025-05-30 DIAGNOSIS — I71.21 ANEURYSM OF ASCENDING AORTA WITHOUT RUPTURE: ICD-10-CM

## 2025-05-30 DIAGNOSIS — D46.9 MYELODYSPLASTIC SYNDROME (MULTI): ICD-10-CM

## 2025-05-30 LAB
ALBUMIN SERPL BCP-MCNC: 3.9 G/DL (ref 3.4–5)
ALP SERPL-CCNC: 101 U/L (ref 33–136)
ALT SERPL W P-5'-P-CCNC: 22 U/L (ref 10–52)
ANION GAP SERPL CALC-SCNC: 13 MMOL/L (ref 10–20)
AST SERPL W P-5'-P-CCNC: 18 U/L (ref 9–39)
BASOPHILS # BLD AUTO: 0.02 X10*3/UL (ref 0–0.1)
BASOPHILS NFR BLD AUTO: 0.6 %
BILIRUB SERPL-MCNC: 0.9 MG/DL (ref 0–1.2)
BUN SERPL-MCNC: 31 MG/DL (ref 6–23)
CALCIUM SERPL-MCNC: 9.3 MG/DL (ref 8.6–10.3)
CHLORIDE SERPL-SCNC: 108 MMOL/L (ref 98–107)
CO2 SERPL-SCNC: 25 MMOL/L (ref 21–32)
CREAT SERPL-MCNC: 1.31 MG/DL (ref 0.5–1.3)
EGFRCR SERPLBLD CKD-EPI 2021: 54 ML/MIN/1.73M*2
EOSINOPHIL # BLD AUTO: 0.08 X10*3/UL (ref 0–0.4)
EOSINOPHIL NFR BLD AUTO: 2.5 %
ERYTHROCYTE [DISTWIDTH] IN BLOOD BY AUTOMATED COUNT: 17.9 % (ref 11.5–14.5)
GLUCOSE SERPL-MCNC: 86 MG/DL (ref 74–99)
HCT VFR BLD AUTO: 35.1 % (ref 41–52)
HGB BLD-MCNC: 11.4 G/DL (ref 13.5–17.5)
IMM GRANULOCYTES # BLD AUTO: 0.01 X10*3/UL (ref 0–0.5)
IMM GRANULOCYTES NFR BLD AUTO: 0.3 % (ref 0–0.9)
LYMPHOCYTES # BLD AUTO: 1 X10*3/UL (ref 0.8–3)
LYMPHOCYTES NFR BLD AUTO: 31.8 %
MCH RBC QN AUTO: 31.8 PG (ref 26–34)
MCHC RBC AUTO-ENTMCNC: 32.5 G/DL (ref 32–36)
MCV RBC AUTO: 98 FL (ref 80–100)
MONOCYTES # BLD AUTO: 0.17 X10*3/UL (ref 0.05–0.8)
MONOCYTES NFR BLD AUTO: 5.4 %
NEUTROPHILS # BLD AUTO: 1.86 X10*3/UL (ref 1.6–5.5)
NEUTROPHILS NFR BLD AUTO: 59.4 %
NRBC BLD-RTO: 0 /100 WBCS (ref 0–0)
PLATELET # BLD AUTO: 88 X10*3/UL (ref 150–450)
POTASSIUM SERPL-SCNC: 4.6 MMOL/L (ref 3.5–5.3)
PROT SERPL-MCNC: 6.3 G/DL (ref 6.4–8.2)
RBC # BLD AUTO: 3.58 X10*6/UL (ref 4.5–5.9)
SODIUM SERPL-SCNC: 141 MMOL/L (ref 136–145)
WBC # BLD AUTO: 3.1 X10*3/UL (ref 4.4–11.3)

## 2025-05-30 PROCEDURE — 80053 COMPREHEN METABOLIC PANEL: CPT

## 2025-05-30 PROCEDURE — 85025 COMPLETE CBC W/AUTO DIFF WBC: CPT

## 2025-05-30 PROCEDURE — 2560000001 HC RX 256 EXPERIMENTAL DRUGS: Performed by: INTERNAL MEDICINE

## 2025-05-30 PROCEDURE — 2500000004 HC RX 250 GENERAL PHARMACY W/ HCPCS (ALT 636 FOR OP/ED): Performed by: INTERNAL MEDICINE

## 2025-05-30 PROCEDURE — 96401 CHEMO ANTI-NEOPL SQ/IM: CPT

## 2025-05-30 PROCEDURE — 36415 COLL VENOUS BLD VENIPUNCTURE: CPT

## 2025-05-30 PROCEDURE — 71250 CT THORAX DX C-: CPT

## 2025-05-30 RX ORDER — ONDANSETRON 8 MG/1
8 TABLET, FILM COATED ORAL ONCE
Status: COMPLETED | OUTPATIENT
Start: 2025-05-30 | End: 2025-05-30

## 2025-05-30 RX ORDER — FAMOTIDINE 10 MG/ML
20 INJECTION, SOLUTION INTRAVENOUS ONCE AS NEEDED
Status: DISCONTINUED | OUTPATIENT
Start: 2025-05-30 | End: 2025-05-30 | Stop reason: HOSPADM

## 2025-05-30 RX ORDER — ALBUTEROL SULFATE 0.83 MG/ML
3 SOLUTION RESPIRATORY (INHALATION) AS NEEDED
Status: DISCONTINUED | OUTPATIENT
Start: 2025-05-30 | End: 2025-05-30 | Stop reason: HOSPADM

## 2025-05-30 RX ORDER — EPINEPHRINE 0.3 MG/.3ML
0.3 INJECTION SUBCUTANEOUS EVERY 5 MIN PRN
Status: DISCONTINUED | OUTPATIENT
Start: 2025-05-30 | End: 2025-05-30 | Stop reason: HOSPADM

## 2025-05-30 RX ORDER — DIPHENHYDRAMINE HYDROCHLORIDE 50 MG/ML
50 INJECTION, SOLUTION INTRAMUSCULAR; INTRAVENOUS AS NEEDED
Status: DISCONTINUED | OUTPATIENT
Start: 2025-05-30 | End: 2025-05-30 | Stop reason: HOSPADM

## 2025-05-30 RX ADMIN — Medication 12.9 MG: at 14:27

## 2025-05-30 RX ADMIN — ONDANSETRON HYDROCHLORIDE 8 MG: 8 TABLET, FILM COATED ORAL at 13:58

## 2025-05-30 NOTE — PROGRESS NOTES
GENARO Burgess is a 84 y.o. male who presents for Day 4 Cycle 9.    He is on the following chemotherapy regimen:     Treatment Plans       Name Type Plan Dates Plan Provider         Active    ([facility]) [id number] - AzaCITIDine / Decitabine, 42 Day Cycle Followed by [facility] Treatment 9/29/2024 - Present Sebastien Rashid MD                    Since his last visit, he has been doing well.  Overall, he states his energy level is stable.  His appetite has been unchanged.  He reports no complaints.  He has no other concerns.    Tolerated injection well, labs/AVS provided and discharged in stable condition with this wife at his side.

## 2025-06-02 ENCOUNTER — LAB (OUTPATIENT)
Dept: LAB | Facility: HOSPITAL | Age: 85
End: 2025-06-02
Payer: MEDICARE

## 2025-06-02 ENCOUNTER — INFUSION (OUTPATIENT)
Dept: HEMATOLOGY/ONCOLOGY | Facility: HOSPITAL | Age: 85
End: 2025-06-02
Payer: MEDICARE

## 2025-06-02 VITALS
TEMPERATURE: 97 F | HEART RATE: 80 BPM | RESPIRATION RATE: 20 BRPM | WEIGHT: 266.98 LBS | SYSTOLIC BLOOD PRESSURE: 122 MMHG | BODY MASS INDEX: 36.16 KG/M2 | OXYGEN SATURATION: 98 % | HEIGHT: 72 IN | DIASTOLIC BLOOD PRESSURE: 67 MMHG

## 2025-06-02 DIAGNOSIS — D46.9 MYELODYSPLASTIC SYNDROME (MULTI): ICD-10-CM

## 2025-06-02 DIAGNOSIS — D46.9 MYELODYSPLASTIC SYNDROME (MULTI): Primary | ICD-10-CM

## 2025-06-02 LAB
ALBUMIN SERPL BCP-MCNC: 4.1 G/DL (ref 3.4–5)
ALP SERPL-CCNC: 92 U/L (ref 33–136)
ALT SERPL W P-5'-P-CCNC: 23 U/L (ref 10–52)
ANION GAP SERPL CALC-SCNC: 13 MMOL/L (ref 10–20)
AST SERPL W P-5'-P-CCNC: 20 U/L (ref 9–39)
BASOPHILS # BLD AUTO: 0.01 X10*3/UL (ref 0–0.1)
BASOPHILS NFR BLD AUTO: 0.3 %
BILIRUB SERPL-MCNC: 1 MG/DL (ref 0–1.2)
BUN SERPL-MCNC: 33 MG/DL (ref 6–23)
CALCIUM SERPL-MCNC: 9.3 MG/DL (ref 8.6–10.3)
CHLORIDE SERPL-SCNC: 105 MMOL/L (ref 98–107)
CO2 SERPL-SCNC: 25 MMOL/L (ref 21–32)
CREAT SERPL-MCNC: 1.4 MG/DL (ref 0.5–1.3)
EGFRCR SERPLBLD CKD-EPI 2021: 50 ML/MIN/1.73M*2
EOSINOPHIL # BLD AUTO: 0.05 X10*3/UL (ref 0–0.4)
EOSINOPHIL NFR BLD AUTO: 1.5 %
ERYTHROCYTE [DISTWIDTH] IN BLOOD BY AUTOMATED COUNT: 17.7 % (ref 11.5–14.5)
GLUCOSE SERPL-MCNC: 94 MG/DL (ref 74–99)
HCT VFR BLD AUTO: 35.9 % (ref 41–52)
HGB BLD-MCNC: 11.6 G/DL (ref 13.5–17.5)
IMM GRANULOCYTES # BLD AUTO: 0.01 X10*3/UL (ref 0–0.5)
IMM GRANULOCYTES NFR BLD AUTO: 0.3 % (ref 0–0.9)
LYMPHOCYTES # BLD AUTO: 1 X10*3/UL (ref 0.8–3)
LYMPHOCYTES NFR BLD AUTO: 31 %
MCH RBC QN AUTO: 31.6 PG (ref 26–34)
MCHC RBC AUTO-ENTMCNC: 32.3 G/DL (ref 32–36)
MCV RBC AUTO: 98 FL (ref 80–100)
MONOCYTES # BLD AUTO: 0.18 X10*3/UL (ref 0.05–0.8)
MONOCYTES NFR BLD AUTO: 5.6 %
NEUTROPHILS # BLD AUTO: 1.98 X10*3/UL (ref 1.6–5.5)
NEUTROPHILS NFR BLD AUTO: 61.3 %
NRBC BLD-RTO: 0 /100 WBCS (ref 0–0)
PLATELET # BLD AUTO: 99 X10*3/UL (ref 150–450)
POTASSIUM SERPL-SCNC: 5 MMOL/L (ref 3.5–5.3)
PROT SERPL-MCNC: 6.3 G/DL (ref 6.4–8.2)
RBC # BLD AUTO: 3.67 X10*6/UL (ref 4.5–5.9)
SODIUM SERPL-SCNC: 138 MMOL/L (ref 136–145)
WBC # BLD AUTO: 3.2 X10*3/UL (ref 4.4–11.3)

## 2025-06-02 PROCEDURE — 80053 COMPREHEN METABOLIC PANEL: CPT

## 2025-06-02 PROCEDURE — 2560000001 HC RX 256 EXPERIMENTAL DRUGS: Performed by: INTERNAL MEDICINE

## 2025-06-02 PROCEDURE — 36415 COLL VENOUS BLD VENIPUNCTURE: CPT

## 2025-06-02 PROCEDURE — 96401 CHEMO ANTI-NEOPL SQ/IM: CPT

## 2025-06-02 PROCEDURE — 85025 COMPLETE CBC W/AUTO DIFF WBC: CPT

## 2025-06-02 PROCEDURE — 2500000004 HC RX 250 GENERAL PHARMACY W/ HCPCS (ALT 636 FOR OP/ED): Performed by: INTERNAL MEDICINE

## 2025-06-02 RX ORDER — ALBUTEROL SULFATE 0.83 MG/ML
3 SOLUTION RESPIRATORY (INHALATION) AS NEEDED
Status: DISCONTINUED | OUTPATIENT
Start: 2025-06-02 | End: 2025-06-02 | Stop reason: HOSPADM

## 2025-06-02 RX ORDER — FAMOTIDINE 10 MG/ML
20 INJECTION, SOLUTION INTRAVENOUS ONCE AS NEEDED
Status: DISCONTINUED | OUTPATIENT
Start: 2025-06-02 | End: 2025-06-02 | Stop reason: HOSPADM

## 2025-06-02 RX ORDER — ONDANSETRON 8 MG/1
8 TABLET, FILM COATED ORAL ONCE
Status: COMPLETED | OUTPATIENT
Start: 2025-06-02 | End: 2025-06-02

## 2025-06-02 RX ORDER — DIPHENHYDRAMINE HYDROCHLORIDE 50 MG/ML
50 INJECTION, SOLUTION INTRAMUSCULAR; INTRAVENOUS AS NEEDED
Status: DISCONTINUED | OUTPATIENT
Start: 2025-06-02 | End: 2025-06-02 | Stop reason: HOSPADM

## 2025-06-02 RX ORDER — EPINEPHRINE 0.3 MG/.3ML
0.3 INJECTION SUBCUTANEOUS EVERY 5 MIN PRN
Status: DISCONTINUED | OUTPATIENT
Start: 2025-06-02 | End: 2025-06-02 | Stop reason: HOSPADM

## 2025-06-02 RX ADMIN — Medication 129 MG: at 11:13

## 2025-06-02 RX ADMIN — ONDANSETRON HYDROCHLORIDE 8 MG: 8 TABLET, FILM COATED ORAL at 10:52

## 2025-06-02 NOTE — PROGRESS NOTES
Patient presents to infusion appointment in stable condition. C9D8 [id number] azacitidine injections tolerated without incident. ANC 1.98, Hgb 11.6, filgrastim and darbepoetin held per orders. Lab results and schedule reviewed. Discharged in stable condition.

## 2025-06-03 ENCOUNTER — APPOINTMENT (OUTPATIENT)
Dept: PRIMARY CARE | Facility: CLINIC | Age: 85
End: 2025-06-03
Payer: MEDICARE

## 2025-06-03 VITALS
HEIGHT: 71 IN | WEIGHT: 267 LBS | OXYGEN SATURATION: 99 % | DIASTOLIC BLOOD PRESSURE: 60 MMHG | SYSTOLIC BLOOD PRESSURE: 118 MMHG | RESPIRATION RATE: 19 BRPM | BODY MASS INDEX: 37.38 KG/M2 | TEMPERATURE: 97.2 F | HEART RATE: 59 BPM

## 2025-06-03 DIAGNOSIS — Z00.00 MEDICARE ANNUAL WELLNESS VISIT, SUBSEQUENT: Primary | ICD-10-CM

## 2025-06-03 DIAGNOSIS — I50.43 ACUTE ON CHRONIC COMBINED SYSTOLIC (CONGESTIVE) AND DIASTOLIC (CONGESTIVE) HEART FAILURE: ICD-10-CM

## 2025-06-03 DIAGNOSIS — Z79.899 MEDICATION MANAGEMENT: ICD-10-CM

## 2025-06-03 DIAGNOSIS — F51.01 PRIMARY INSOMNIA: ICD-10-CM

## 2025-06-03 DIAGNOSIS — D46.9 MYELODYSPLASTIC SYNDROME (MULTI): ICD-10-CM

## 2025-06-03 DIAGNOSIS — I10 PRIMARY HYPERTENSION: ICD-10-CM

## 2025-06-03 DIAGNOSIS — N18.4 CHRONIC RENAL DISEASE, STAGE IV (MULTI): ICD-10-CM

## 2025-06-03 DIAGNOSIS — E66.09 EXOGENOUS OBESITY: ICD-10-CM

## 2025-06-03 PROBLEM — R35.0 INCREASED URINARY FREQUENCY: Status: RESOLVED | Noted: 2023-02-19 | Resolved: 2025-06-03

## 2025-06-03 PROBLEM — R39.12 WEAK URINARY STREAM: Status: RESOLVED | Noted: 2023-04-08 | Resolved: 2025-06-03

## 2025-06-03 LAB — HOLD SPECIMEN: NORMAL

## 2025-06-03 PROCEDURE — 99497 ADVNCD CARE PLAN 30 MIN: CPT | Performed by: FAMILY MEDICINE

## 2025-06-03 PROCEDURE — G0439 PPPS, SUBSEQ VISIT: HCPCS | Performed by: FAMILY MEDICINE

## 2025-06-03 RX ORDER — TEMAZEPAM 15 MG/1
15 CAPSULE ORAL NIGHTLY PRN
Qty: 90 CAPSULE | Refills: 0 | Status: SHIPPED | OUTPATIENT
Start: 2025-06-03

## 2025-06-03 ASSESSMENT — ACTIVITIES OF DAILY LIVING (ADL)
DOING_HOUSEWORK: INDEPENDENT
DRESSING: INDEPENDENT
BATHING: INDEPENDENT
MANAGING_FINANCES: INDEPENDENT
TAKING_MEDICATION: INDEPENDENT
GROCERY_SHOPPING: INDEPENDENT

## 2025-06-03 ASSESSMENT — ENCOUNTER SYMPTOMS
LOSS OF SENSATION IN FEET: 0
DEPRESSION: 0
OCCASIONAL FEELINGS OF UNSTEADINESS: 0
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
## 2025-06-04 ENCOUNTER — TREATMENT (OUTPATIENT)
Dept: PHYSICAL THERAPY | Facility: CLINIC | Age: 85
End: 2025-06-04
Payer: MEDICARE

## 2025-06-04 DIAGNOSIS — D46.9 MYELODYSPLASTIC SYNDROME (MULTI): ICD-10-CM

## 2025-06-04 PROCEDURE — 97110 THERAPEUTIC EXERCISES: CPT | Mod: GP | Performed by: GENERAL ACUTE CARE HOSPITAL

## 2025-06-04 NOTE — PROGRESS NOTES
"Patient Name: Holdne Burgess \"GENARO\"  MRN: [id number]  Today's Date: 6/4/2025  Time Calculation  Start Time: 0700  Stop Time: 0750  Time Calculation (min): 50 min  Current Problem:  1. Myelodysplastic syndrome (Multi)  Follow Up In Physical Therapy          Visit 12/20    Subjective:  Change in pain/ pain level: 3-4/10 lower back pain     Patient comments: fatigued this morning     Objective: amb 2/5 mile 3 with walker 1 with walking stick     Treatment:    Therapeutic exercise (50681):    nustep level 6 8 min no arms  Track amb   Stretches   Titl board L2   Foam balance eye open/eye closed   Leg Press 115# 2x20  HSC 50# 2x15  Step up 6 in f/L x10 ea   Foam march     Assessment:  Patient continues to require active therapy to progress functional capabilities with decreasing pain levels and improving mechanics with functional activities including progression of Home exercise program. Tolerance to today's treatment was good. Muscle fatigue noted.  Patient appears motivated and compliant this date, demonstrating a working understanding of principals instructed and home program.    Plan:  Progress with functional strength gait and balance   "

## 2025-06-04 NOTE — CARE PLAN
The patient's goals for the shift include Pt wants to rest.    The clinical goals for the shift include pt hgb will be >7.0 through discharge    Problem: Nutrition  Goal: Oral intake greater 75%  Outcome: Progressing  Goal: Consume prescribed supplement  Outcome: Progressing  Goal: Adequate PO fluid intake  Outcome: Progressing  Goal: Nutrition support goals are met within 48 hrs  Outcome: Progressing  Goal: Nutrition support is meeting 75% of nutrient needs  Outcome: Progressing  Goal: Lab values WNL  Outcome: Progressing  Goal: Electrolytes WNL  Outcome: Progressing  Goal: Promote healing  Outcome: Progressing  Goal: Maintain stable weight  Outcome: Progressing  Goal: Reduce weight from edema/fluid  Outcome: Progressing  Goal: Gradual weight gain  Outcome: Progressing     Problem: Pain - Adult  Goal: Verbalizes/displays adequate comfort level or baseline comfort level  Outcome: Progressing     Problem: Safety - Adult  Goal: Free from fall injury  Outcome: Progressing     Problem: Discharge Planning  Goal: Discharge to home or other facility with appropriate resources  Outcome: Progressing     Problem: Chronic Conditions and Co-morbidities  Goal: Patient's chronic conditions and co-morbidity symptoms are monitored and maintained or improved  Outcome: Progressing     Problem: Fall/Injury  Goal: Not fall by end of shift  Outcome: Progressing  Goal: Be free from injury by end of the shift  Outcome: Progressing  Goal: Verbalize understanding of personal risk factors for fall in the hospital  Outcome: Progressing  Goal: Verbalize understanding of risk factor reduction measures to prevent injury from fall in the home  Outcome: Progressing  Goal: Use assistive devices by end of the shift  Outcome: Progressing  Goal: Pace activities to prevent fatigue by end of the shift  Outcome: Progressing     Problem: Skin  Goal: Prevent/manage excess moisture  Outcome: Progressing  Flowsheets (Taken 9/3/2024 0131)  Prevent/manage  excess moisture: Monitor for/manage infection if present  Goal: Prevent/minimize sheer/friction injuries  Outcome: Progressing  Flowsheets (Taken 9/3/2024 0131)  Prevent/minimize sheer/friction injuries: Increase activity/out of bed for meals  Goal: Promote/optimize nutrition  Outcome: Progressing  Flowsheets (Taken 9/3/2024 0131)  Promote/optimize nutrition: Offer water/supplements/favorite foods  Goal: Promote skin healing  Outcome: Progressing  Flowsheets (Taken 9/3/2024 0131)  Promote skin healing: Protective dressings over bony prominences        [Well Groomed] : well groomed [General Appearance - In No Acute Distress] : no acute distress [Heart Rate And Rhythm] : heart rate and rhythm were normal [Heart Sounds] : normal S1 and S2 [Edema] : no peripheral edema present [Exaggerated Use Of Accessory Muscles For Inspiration] : no accessory muscle use [Auscultation Breath Sounds / Voice Sounds] : lungs were clear to auscultation bilaterally [Abdomen Tenderness] : non-tender [Shuffling] : shuffling [Nail Clubbing] : no clubbing of the fingernails [Cyanosis, Localized] : no localized cyanosis [Petechial Hemorrhages (___cm)] : no petechial hemorrhages [] : no rash [No Focal Deficits] : no focal deficits [Oriented To Time, Place, And Person] : oriented to person, place, and time [Impaired Insight] : insight and judgment were intact [Normal Appearance] : normal appearance [Murmurs] : no murmurs present [Gait - Sufficient For Exercise Testing] : the gait was sufficient for exercise testing [Skin Turgor] : normal skin turgor [FreeTextEntry1] : 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

## 2025-06-05 ENCOUNTER — INFUSION (OUTPATIENT)
Dept: HEMATOLOGY/ONCOLOGY | Facility: HOSPITAL | Age: 85
End: 2025-06-05
Payer: MEDICARE

## 2025-06-05 ENCOUNTER — LAB (OUTPATIENT)
Dept: LAB | Facility: HOSPITAL | Age: 85
End: 2025-06-05
Payer: MEDICARE

## 2025-06-05 ENCOUNTER — TELEPHONE (OUTPATIENT)
Dept: HEMATOLOGY/ONCOLOGY | Facility: HOSPITAL | Age: 85
End: 2025-06-05

## 2025-06-05 VITALS
DIASTOLIC BLOOD PRESSURE: 62 MMHG | SYSTOLIC BLOOD PRESSURE: 122 MMHG | BODY MASS INDEX: 37.52 KG/M2 | TEMPERATURE: 97.5 F | WEIGHT: 269 LBS | RESPIRATION RATE: 18 BRPM | HEART RATE: 75 BPM | OXYGEN SATURATION: 98 %

## 2025-06-05 DIAGNOSIS — D46.9 MYELODYSPLASTIC SYNDROME (MULTI): ICD-10-CM

## 2025-06-05 DIAGNOSIS — D46.9 MYELODYSPLASTIC SYNDROME (MULTI): Primary | ICD-10-CM

## 2025-06-05 LAB
ALBUMIN SERPL BCP-MCNC: 3.9 G/DL (ref 3.4–5)
ALP SERPL-CCNC: 89 U/L (ref 33–136)
ALT SERPL W P-5'-P-CCNC: 20 U/L (ref 10–52)
ANION GAP SERPL CALC-SCNC: 12 MMOL/L (ref 10–20)
AST SERPL W P-5'-P-CCNC: 18 U/L (ref 9–39)
BASOPHILS # BLD AUTO: 0.02 X10*3/UL (ref 0–0.1)
BASOPHILS NFR BLD AUTO: 0.6 %
BILIRUB SERPL-MCNC: 0.8 MG/DL (ref 0–1.2)
BUN SERPL-MCNC: 39 MG/DL (ref 6–23)
CALCIUM SERPL-MCNC: 9.4 MG/DL (ref 8.6–10.3)
CHLORIDE SERPL-SCNC: 106 MMOL/L (ref 98–107)
CO2 SERPL-SCNC: 25 MMOL/L (ref 21–32)
CREAT SERPL-MCNC: 1.54 MG/DL (ref 0.5–1.3)
EGFRCR SERPLBLD CKD-EPI 2021: 44 ML/MIN/1.73M*2
EOSINOPHIL # BLD AUTO: 0.06 X10*3/UL (ref 0–0.4)
EOSINOPHIL NFR BLD AUTO: 1.9 %
ERYTHROCYTE [DISTWIDTH] IN BLOOD BY AUTOMATED COUNT: 17.8 % (ref 11.5–14.5)
GLUCOSE SERPL-MCNC: 76 MG/DL (ref 74–99)
HCT VFR BLD AUTO: 34.8 % (ref 41–52)
HGB BLD-MCNC: 11.1 G/DL (ref 13.5–17.5)
IMM GRANULOCYTES # BLD AUTO: 0.02 X10*3/UL (ref 0–0.5)
IMM GRANULOCYTES NFR BLD AUTO: 0.6 % (ref 0–0.9)
LYMPHOCYTES # BLD AUTO: 1.05 X10*3/UL (ref 0.8–3)
LYMPHOCYTES NFR BLD AUTO: 33.9 %
MCH RBC QN AUTO: 31.8 PG (ref 26–34)
MCHC RBC AUTO-ENTMCNC: 31.9 G/DL (ref 32–36)
MCV RBC AUTO: 100 FL (ref 80–100)
MONOCYTES # BLD AUTO: 0.19 X10*3/UL (ref 0.05–0.8)
MONOCYTES NFR BLD AUTO: 6.1 %
NEUTROPHILS # BLD AUTO: 1.76 X10*3/UL (ref 1.6–5.5)
NEUTROPHILS NFR BLD AUTO: 56.9 %
NRBC BLD-RTO: 0 /100 WBCS (ref 0–0)
PLATELET # BLD AUTO: 79 X10*3/UL (ref 150–450)
POTASSIUM SERPL-SCNC: 5 MMOL/L (ref 3.5–5.3)
PROT SERPL-MCNC: 6.1 G/DL (ref 6.4–8.2)
RBC # BLD AUTO: 3.49 X10*6/UL (ref 4.5–5.9)
SODIUM SERPL-SCNC: 138 MMOL/L (ref 136–145)
WBC # BLD AUTO: 3.1 X10*3/UL (ref 4.4–11.3)

## 2025-06-05 PROCEDURE — 82374 ASSAY BLOOD CARBON DIOXIDE: CPT

## 2025-06-05 PROCEDURE — 36415 COLL VENOUS BLD VENIPUNCTURE: CPT

## 2025-06-05 PROCEDURE — 85025 COMPLETE CBC W/AUTO DIFF WBC: CPT

## 2025-06-05 PROCEDURE — 2560000001 HC RX 256 EXPERIMENTAL DRUGS: Performed by: INTERNAL MEDICINE

## 2025-06-05 PROCEDURE — 96401 CHEMO ANTI-NEOPL SQ/IM: CPT

## 2025-06-05 PROCEDURE — 2500000004 HC RX 250 GENERAL PHARMACY W/ HCPCS (ALT 636 FOR OP/ED): Performed by: INTERNAL MEDICINE

## 2025-06-05 PROCEDURE — 84075 ASSAY ALKALINE PHOSPHATASE: CPT

## 2025-06-05 RX ORDER — ONDANSETRON 8 MG/1
8 TABLET, FILM COATED ORAL ONCE
Status: COMPLETED | OUTPATIENT
Start: 2025-06-05 | End: 2025-06-05

## 2025-06-05 RX ORDER — FAMOTIDINE 10 MG/ML
20 INJECTION, SOLUTION INTRAVENOUS ONCE AS NEEDED
Status: DISCONTINUED | OUTPATIENT
Start: 2025-06-05 | End: 2025-06-05 | Stop reason: HOSPADM

## 2025-06-05 RX ORDER — DIPHENHYDRAMINE HYDROCHLORIDE 50 MG/ML
50 INJECTION, SOLUTION INTRAMUSCULAR; INTRAVENOUS AS NEEDED
Status: DISCONTINUED | OUTPATIENT
Start: 2025-06-05 | End: 2025-06-05 | Stop reason: HOSPADM

## 2025-06-05 RX ORDER — ALBUTEROL SULFATE 0.83 MG/ML
3 SOLUTION RESPIRATORY (INHALATION) AS NEEDED
Status: DISCONTINUED | OUTPATIENT
Start: 2025-06-05 | End: 2025-06-05 | Stop reason: HOSPADM

## 2025-06-05 RX ORDER — EPINEPHRINE 0.3 MG/.3ML
0.3 INJECTION SUBCUTANEOUS EVERY 5 MIN PRN
Status: DISCONTINUED | OUTPATIENT
Start: 2025-06-05 | End: 2025-06-05 | Stop reason: HOSPADM

## 2025-06-05 RX ADMIN — Medication 12.9 MG: at 10:58

## 2025-06-05 RX ADMIN — ONDANSETRON HYDROCHLORIDE 8 MG: 8 TABLET, FILM COATED ORAL at 10:27

## 2025-06-05 ASSESSMENT — PAIN SCALES - GENERAL: PAINLEVEL_OUTOF10: 0-NO PAIN

## 2025-06-05 NOTE — TELEPHONE ENCOUNTER
Holden calls today to request an order to continue PT at [facility].    Message sent to team.

## 2025-06-05 NOTE — PROGRESS NOTES
GENARO Burgess is a 84 y.o. male who presents for No chief complaint on file..    He is on the following chemotherapy regimen:     Treatment Plans       Name Type Plan Dates Plan Provider         Active    ([facility]) [id number] - AzaCITIDine / Decitabine, 42 Day Cycle Followed by [facility] Day Cy[facility] Oncology Treatment 9/29/2024 - Present Sebastien Rashid MD                    Since his last visit, he has been doing well.  Overall, he states his energy level is stable.  His appetite has been unchanged.  He reports no complaints.  He has no other concerns.    Tolerated injection well, AVS provided and discharged in stable condition.

## 2025-06-06 LAB
1OH-MIDAZOLAM UR-MCNC: NEGATIVE NG/ML
7AMINOCLONAZEPAM UR-MCNC: NEGATIVE NG/ML
A-OH ALPRAZ UR-MCNC: NEGATIVE NG/ML
A-OH-TRIAZOLAM UR-MCNC: NEGATIVE NG/ML
AMPHETAMINES UR QL: NEGATIVE NG/ML
BARBITURATES UR QL: NEGATIVE NG/ML
BZE UR QL: NEGATIVE NG/ML
CODEINE UR-MCNC: NEGATIVE NG/ML
CREAT UR-MCNC: 83.1 MG/DL
DRUG SCREEN COMMENT UR-IMP: ABNORMAL
EDDP UR-MCNC: NEGATIVE NG/ML
FENTANYL UR-MCNC: NEGATIVE NG/ML
HYDROCODONE UR-MCNC: NEGATIVE NG/ML
HYDROMORPHONE UR-MCNC: NEGATIVE NG/ML
LORAZEPAM UR-MCNC: NEGATIVE NG/ML
METHADONE UR-MCNC: NEGATIVE NG/ML
MORPHINE UR-MCNC: NEGATIVE NG/ML
NORDIAZEPAM UR-MCNC: NEGATIVE NG/ML
NORFENTANYL UR-MCNC: NEGATIVE NG/ML
NORHYDROCODONE UR CFM-MCNC: NEGATIVE NG/ML
NOROXYCODONE UR CFM-MCNC: NEGATIVE NG/ML
NORTRAMADOL UR-MCNC: NEGATIVE NG/ML
OH-ETHYLFLURAZ UR-MCNC: NEGATIVE NG/ML
OXAZEPAM UR-MCNC: 126 NG/ML
OXIDANTS UR QL: NEGATIVE MCG/ML
OXYCODONE UR CFM-MCNC: NEGATIVE NG/ML
OXYMORPHONE UR CFM-MCNC: NEGATIVE NG/ML
PCP UR QL: NEGATIVE NG/ML
PH UR: 5.9 [PH] (ref 4.5–9)
QUEST 6 ACETYLMORPHINE: NEGATIVE NG/ML
QUEST NOTES AND COMMENTS: ABNORMAL
QUEST ZOLPIDEM: NEGATIVE NG/ML
TEMAZEPAM UR-MCNC: >2000 NG/ML
THC UR QL: NEGATIVE NG/ML
TRAMADOL UR-MCNC: NEGATIVE NG/ML
ZOLPIDEM PHENYL-4-CARB UR CFM-MCNC: NEGATIVE NG/ML

## 2025-06-09 ENCOUNTER — DOCUMENTATION (OUTPATIENT)
Dept: HEMATOLOGY/ONCOLOGY | Facility: HOSPITAL | Age: 85
End: 2025-06-09

## 2025-06-16 ENCOUNTER — LAB (OUTPATIENT)
Dept: LAB | Facility: HOSPITAL | Age: 85
End: 2025-06-16
Payer: MEDICARE

## 2025-06-16 ENCOUNTER — INFUSION (OUTPATIENT)
Dept: HEMATOLOGY/ONCOLOGY | Facility: HOSPITAL | Age: 85
End: 2025-06-16
Payer: MEDICARE

## 2025-06-16 ENCOUNTER — APPOINTMENT (OUTPATIENT)
Dept: HEMATOLOGY/ONCOLOGY | Facility: HOSPITAL | Age: 85
End: 2025-06-16
Payer: MEDICARE

## 2025-06-16 VITALS
WEIGHT: 272.2 LBS | TEMPERATURE: 97.5 F | BODY MASS INDEX: 37.96 KG/M2 | HEART RATE: 72 BPM | OXYGEN SATURATION: 100 % | DIASTOLIC BLOOD PRESSURE: 67 MMHG | RESPIRATION RATE: 18 BRPM | SYSTOLIC BLOOD PRESSURE: 150 MMHG

## 2025-06-16 DIAGNOSIS — D46.9 MYELODYSPLASTIC SYNDROME (MULTI): ICD-10-CM

## 2025-06-16 LAB
ALBUMIN SERPL BCP-MCNC: 3.9 G/DL (ref 3.4–5)
ALP SERPL-CCNC: 78 U/L (ref 33–136)
ALT SERPL W P-5'-P-CCNC: 30 U/L (ref 10–52)
ANION GAP SERPL CALC-SCNC: 12 MMOL/L (ref 10–20)
AST SERPL W P-5'-P-CCNC: 21 U/L (ref 9–39)
BASOPHILS # BLD AUTO: 0.02 X10*3/UL (ref 0–0.1)
BASOPHILS NFR BLD AUTO: 0.7 %
BILIRUB SERPL-MCNC: 0.8 MG/DL (ref 0–1.2)
BUN SERPL-MCNC: 29 MG/DL (ref 6–23)
CALCIUM SERPL-MCNC: 9.4 MG/DL (ref 8.6–10.3)
CHLORIDE SERPL-SCNC: 108 MMOL/L (ref 98–107)
CO2 SERPL-SCNC: 25 MMOL/L (ref 21–32)
CREAT SERPL-MCNC: 1.13 MG/DL (ref 0.5–1.3)
EGFRCR SERPLBLD CKD-EPI 2021: 64 ML/MIN/1.73M*2
EOSINOPHIL # BLD AUTO: 0.06 X10*3/UL (ref 0–0.4)
EOSINOPHIL NFR BLD AUTO: 2.2 %
ERYTHROCYTE [DISTWIDTH] IN BLOOD BY AUTOMATED COUNT: 17.5 % (ref 11.5–14.5)
GLUCOSE SERPL-MCNC: 95 MG/DL (ref 74–99)
HCT VFR BLD AUTO: 35.2 % (ref 41–52)
HGB BLD-MCNC: 11.2 G/DL (ref 13.5–17.5)
IMM GRANULOCYTES # BLD AUTO: 0.01 X10*3/UL (ref 0–0.5)
IMM GRANULOCYTES NFR BLD AUTO: 0.4 % (ref 0–0.9)
LYMPHOCYTES # BLD AUTO: 0.98 X10*3/UL (ref 0.8–3)
LYMPHOCYTES NFR BLD AUTO: 36.2 %
MCH RBC QN AUTO: 31.8 PG (ref 26–34)
MCHC RBC AUTO-ENTMCNC: 31.8 G/DL (ref 32–36)
MCV RBC AUTO: 100 FL (ref 80–100)
MONOCYTES # BLD AUTO: 0.17 X10*3/UL (ref 0.05–0.8)
MONOCYTES NFR BLD AUTO: 6.3 %
NEUTROPHILS # BLD AUTO: 1.47 X10*3/UL (ref 1.6–5.5)
NEUTROPHILS NFR BLD AUTO: 54.2 %
NRBC BLD-RTO: 0 /100 WBCS (ref 0–0)
PLATELET # BLD AUTO: 82 X10*3/UL (ref 150–450)
POTASSIUM SERPL-SCNC: 4.3 MMOL/L (ref 3.5–5.3)
PROT SERPL-MCNC: 6.1 G/DL (ref 6.4–8.2)
RBC # BLD AUTO: 3.52 X10*6/UL (ref 4.5–5.9)
SODIUM SERPL-SCNC: 141 MMOL/L (ref 136–145)
WBC # BLD AUTO: 2.7 X10*3/UL (ref 4.4–11.3)

## 2025-06-16 PROCEDURE — 96372 THER/PROPH/DIAG INJ SC/IM: CPT

## 2025-06-16 PROCEDURE — 85025 COMPLETE CBC W/AUTO DIFF WBC: CPT

## 2025-06-16 PROCEDURE — 2500000004 HC RX 250 GENERAL PHARMACY W/ HCPCS (ALT 636 FOR OP/ED): Performed by: INTERNAL MEDICINE

## 2025-06-16 PROCEDURE — 84075 ASSAY ALKALINE PHOSPHATASE: CPT

## 2025-06-16 PROCEDURE — 36415 COLL VENOUS BLD VENIPUNCTURE: CPT

## 2025-06-16 PROCEDURE — 2560000001 HC RX 256 EXPERIMENTAL DRUGS: Performed by: INTERNAL MEDICINE

## 2025-06-16 RX ORDER — ALBUTEROL SULFATE 0.83 MG/ML
3 SOLUTION RESPIRATORY (INHALATION) AS NEEDED
Status: DISCONTINUED | OUTPATIENT
Start: 2025-06-16 | End: 2025-06-16 | Stop reason: HOSPADM

## 2025-06-16 RX ORDER — ONDANSETRON 8 MG/1
8 TABLET, FILM COATED ORAL ONCE
Status: COMPLETED | OUTPATIENT
Start: 2025-06-16 | End: 2025-06-16

## 2025-06-16 RX ORDER — FAMOTIDINE 10 MG/ML
20 INJECTION, SOLUTION INTRAVENOUS ONCE AS NEEDED
Status: DISCONTINUED | OUTPATIENT
Start: 2025-06-16 | End: 2025-06-16 | Stop reason: HOSPADM

## 2025-06-16 RX ORDER — EPINEPHRINE 0.3 MG/.3ML
0.3 INJECTION SUBCUTANEOUS EVERY 5 MIN PRN
Status: DISCONTINUED | OUTPATIENT
Start: 2025-06-16 | End: 2025-06-16 | Stop reason: HOSPADM

## 2025-06-16 RX ORDER — DIPHENHYDRAMINE HYDROCHLORIDE 50 MG/ML
50 INJECTION, SOLUTION INTRAMUSCULAR; INTRAVENOUS AS NEEDED
Status: DISCONTINUED | OUTPATIENT
Start: 2025-06-16 | End: 2025-06-16 | Stop reason: HOSPADM

## 2025-06-16 RX ADMIN — Medication 129 MG: at 12:14

## 2025-06-16 RX ADMIN — ONDANSETRON HYDROCHLORIDE 8 MG: 8 TABLET, FILM COATED ORAL at 11:52

## 2025-06-16 ASSESSMENT — PAIN SCALES - GENERAL: PAINLEVEL_OUTOF10: 0-NO PAIN

## 2025-06-16 NOTE — ASSESSMENT & PLAN NOTE
6/30/2025: Shaina treatment well.   Continue [id number]     Diagnostics:  -Bone marrow biopsy (week 24) 3/13/25 of therapy shows persistent disease with < 1% blast  Treatment:  - Continue Alternating azacitidine 50 mg/m2 with decitabine 5 mg/m² per [id number]  - C10D1 today  Disease/Toxicity Monitoring:  - Given his hemoglobin and age will check CBC with each visit  Supportive Care:  - Blood products as indicated  - Working with PT  Antimicrobial Prophylaxis:   -None indicated at this time, ANC is in the normal range  IV access:  - for now, continue with PIV; may benefit from port placement or other central line access

## 2025-06-16 NOTE — PROGRESS NOTES
"Patient ID: Holden Burgess \"GENARO\" is a 84 y.o. male.  Referring Physician: No referring provider defined for this encounter.  Primary Care Provider: Giacomo Joseph DO    Date of Service:  6/30/2025    SUBJECTIVE:  Patient presents today, accompanied by his wife, for follow up.  Reports that he has been feeling well.  He does have intermittent constipation.       Oncology History   Myelodysplastic syndrome (Multi)   1/23/2024 Initial Diagnosis    Myelodysplastic syndrome:   Diagnosis:   Originally referred to Dr. Murphy in 2023 for longstanding thrombocytopenia.  At the time had mild leukopenia, no anemia.  Was refractory to a trial of prednisone, and so Bone marrow biopsy was completed completed on 1/23/2024 and demonstrated:  BONE MARROW CLOT, CORE BIOPSY, ASPIRATE, LEFT ILIAC CREST:   -- MILDLY HYPERCELLULAR BONE MARROW (80%) WITH MATURING TRILINEAGE HEMATOPOIESIS AND MODERATE INCREASE IN MEGAKARYOCYTES, SEE NOTE.  -- SMALL LYMPHOID AGGREGATES, FAVOR BENIGN.  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 Given the presence of persistent cytopenias, hypercellularity with increase megakaryocytes with abnormal clustering, and no increase in blasts, the overall findings are most consistent with:  -- MDS with low blasts (WHO-HAEM5 Classification) /   -- MDS-NOS with single lineage dysplasia (MDS-NOS-SLD) (ICC 2022 Classification).  NGS: SRSF2  Chromosome Analysis: 47,XY,+8[15]/46,XY[5]  IPSS-M: -0.99 - low risks disease     4/4/2024 - 8/29/2024 Supportive Treatment    Treatment:   I. Eltrombopag -on 4/4/2024 patient continued with persistent thrombocytopenia but also had some progressive anemia with hemoglobin down to 10.  Patient was offered [id number], preferred supportive management with oral medication opted for eltrombopag in the setting of thrombocytopenia starting at 50 and ramping up to 150 mg  From 4/20/2024 through 8/20/2024 patient had progressive " anemia and progressive thrombocytopenia and spite of treatment.    II. Darbepoietin -in the setting of worsening anemia darbepoetin was added 7/12/2024 at 100 mcg every 2 weeks       9/30/2024 -  Research Study Participant    ([facility]) [id number] - AzaCITIDine / Decitabine, 42 Day Cycle Followed by 28 Day Cycles  Plan Provider: Sebastien Rashid MD  Treatment goal: Palliative  Line of treatment: First Line  Associated studies: 5-Azacitidine and Decitabine Epigenetic Therapy for Myeloid Malignancies        OBJECTIVE:  KPS: Karnofsky Score: 80 - Normal activity with effort; some signs or symptoms of disease     Physical Exam  Constitutional:       Appearance: Normal appearance.   HENT:      Mouth/Throat:      Mouth: Mucous membranes are moist.   Cardiovascular:      Rate and Rhythm: Normal rate and regular rhythm.      Heart sounds: Normal heart sounds.   Pulmonary:      Effort: Pulmonary effort is normal.      Breath sounds: Normal breath sounds.   Abdominal:      General: Bowel sounds are normal.      Palpations: Abdomen is soft.   Musculoskeletal:         General: Normal range of motion.      Cervical back: Neck supple.      Right lower leg: No edema.      Left lower leg: No edema.   Lymphadenopathy:      Comments: No lymphadenopathy   Skin:     General: Skin is warm and dry.      Findings: No lesion or rash.   Neurological:      General: No focal deficit present.      Mental Status: He is alert and oriented to person, place, and time. Mental status is at baseline.   Psychiatric:         Mood and Affect: Mood normal.       Assessment & Plan  Myelodysplastic syndrome (Multi)  6/30/2025: Shaina treatment well.   Continue [id number]     Diagnostics:  -Bone marrow biopsy (week 24) 3/13/25 of therapy shows persistent disease with < 1% blast  Treatment:  - Continue Alternating azacitidine 50 mg/m2 with decitabine 5 mg/m² per [id number]  - C10D1 today  Disease/Toxicity Monitoring:  - Given his hemoglobin and age will check CBC  with each visit  Supportive Care:  - Blood products as indicated  - Working with PT  Antimicrobial Prophylaxis:   -None indicated at this time, ANC is in the normal range  IV access:  - for now, continue with PIV; may benefit from port placement or other central line access    RTC:  Q month MD/KENDRICK follow up     MICHELE Starr-CNP

## 2025-06-16 NOTE — PROGRESS NOTES
Patient tolerated the treatment very well. All queries and concerns addressed. Instructed patient about bleeding precaution at home and when to go to ED. Patient verbalized understanding of the instruction. Discharged to home in stable condition accompanied by significant other.

## 2025-06-17 ENCOUNTER — APPOINTMENT (OUTPATIENT)
Dept: CARDIOLOGY | Facility: CLINIC | Age: 85
End: 2025-06-17
Payer: MEDICARE

## 2025-06-17 ENCOUNTER — APPOINTMENT (OUTPATIENT)
Dept: RADIOLOGY | Facility: HOSPITAL | Age: 85
End: 2025-06-17
Payer: MEDICARE

## 2025-06-17 ENCOUNTER — APPOINTMENT (OUTPATIENT)
Dept: PHYSICAL THERAPY | Facility: CLINIC | Age: 85
End: 2025-06-17
Payer: MEDICARE

## 2025-06-17 VITALS
WEIGHT: 270.8 LBS | BODY MASS INDEX: 37.77 KG/M2 | DIASTOLIC BLOOD PRESSURE: 68 MMHG | HEART RATE: 74 BPM | SYSTOLIC BLOOD PRESSURE: 118 MMHG

## 2025-06-17 DIAGNOSIS — I71.21 ANEURYSM OF ASCENDING AORTA WITHOUT RUPTURE: ICD-10-CM

## 2025-06-17 DIAGNOSIS — I10 PRIMARY HYPERTENSION: ICD-10-CM

## 2025-06-17 DIAGNOSIS — Z95.0 PRESENCE OF CARDIAC PACEMAKER: ICD-10-CM

## 2025-06-17 DIAGNOSIS — I25.10 CAD S/P PERCUTANEOUS CORONARY ANGIOPLASTY: ICD-10-CM

## 2025-06-17 DIAGNOSIS — N18.32 STAGE 3B CHRONIC KIDNEY DISEASE (MULTI): ICD-10-CM

## 2025-06-17 DIAGNOSIS — E78.2 HYPERLIPEMIA, MIXED: ICD-10-CM

## 2025-06-17 DIAGNOSIS — I50.43 ACUTE ON CHRONIC COMBINED SYSTOLIC (CONGESTIVE) AND DIASTOLIC (CONGESTIVE) HEART FAILURE: ICD-10-CM

## 2025-06-17 DIAGNOSIS — I49.5 SINUS NODE DYSFUNCTION (MULTI): ICD-10-CM

## 2025-06-17 DIAGNOSIS — Z98.61 CAD S/P PERCUTANEOUS CORONARY ANGIOPLASTY: ICD-10-CM

## 2025-06-17 DIAGNOSIS — I48.92 ATRIAL FLUTTER, UNSPECIFIED TYPE (MULTI): ICD-10-CM

## 2025-06-17 DIAGNOSIS — Z87.891 FORMER SMOKER: ICD-10-CM

## 2025-06-17 NOTE — PATIENT INSTRUCTIONS
Continue same medications and treatments.   Patient educated on proper medication use.   Patient educated on risk factor modification.   Please bring any lab results from other providers / physicians to your next appointment.     Please bring all medicines, vitamins, and herbal supplements with you when you come to the office.     Prescriptions will not be filled unless you are compliant with your follow up appointments or have a follow up appointment scheduled as per instruction of your physician. Refills should be requested at the time of your visit.    FOLLOW UP IN 6 MONTHS    INia LPN, am scribing for and in the presence of Dr. Waylon Benjamin, DO, FACC

## 2025-06-17 NOTE — PROGRESS NOTES
Patient:  Holden Burgess  YOB: 1940  MRN: [id number]     Chief complaint:   Chief Complaint   Patient presents with    Follow-up     Patient here for a 6 months follow up for management of CHF and CAD.         HPI:       Holden Burgess is a 84 y.o. male who returns today for cardiac follow-up.  Well.  Patient still being followed by Dr. Aron Rashid at the [facility] for his myelodysplastic syndrome.  Apparently his platelets and his hemoglobin have almost normalized.  He is walking with a walking stick for balance.  He does get some lightheadedness and dizziness.  We did recently do a CTA of his aorta which shows no significant change from his moderate-sized aortic aneurysm.  This is tana continue on a medical therapy approach.  Vitals are stable.  Exam is unchanged.      Objective:     Vitals:    06/17/25 1221   BP: 118/68   Pulse: 74       Wt Readings from Last 4 Encounters:   06/17/25 123 kg (270 lb 12.8 oz)   06/16/25 123 kg (272 lb 3.2 oz)   06/05/25 122 kg (269 lb)   06/03/25 121 kg (267 lb)       Allergies:     Allergies   Allergen Reactions    Crestor [Rosuvastatin] Unknown    Ezetimibe Unknown    Statins-Hmg-Coa Reductase Inhibitors Unknown     leg cramping        Medications:     Current Outpatient Medications   Medication Instructions    acetaminophen (TYLENOL) 1,000 mg, oral, Every 6 hours PRN    allopurinol (ZYLOPRIM) 100 mg, oral, 2 times daily    Aranesp (in polysorbate) 200 mcg, [facility] ONC Every 2 Weeks for 4 Weeks in a 28 Day Cycle    ascorbic acid (Vitamin C) 1,000 mg tablet 1 tablet, Daily    atorvastatin (Lipitor) 10 mg tablet 1 tablet, Nightly    calcitriol (ROCALTROL) 0.25 mcg, Every other day    camphor-menthoL (Sarna) lotion Topical, As needed    cholecalciferol (Vitamin D-3) 125 MCG (5000 UT) capsule 1 capsule, Daily    cyanocobalamin (Vitamin B-12) 1,000 mcg tablet 1 tablet, Daily    ezetimibe (ZETIA) 10 mg, oral, Daily    famotidine (PEPCID) 20 mg, Daily    ferrous  sulfate 325 (65 Fe) MG tablet 1 tablet, Daily    folic acid (FOLVITE) 1 mg, oral, Daily    furosemide (LASIX) 40 mg, oral, Daily    lactulose 10 g, oral, 2 times daily, As needed for constipation    latanoprost (Xalatan) 0.005 % ophthalmic solution 1 drop, Nightly    MAGNESIUM ORAL 550 mg, Nightly    rOPINIRole (REQUIP) 0.25 mg, oral, Nightly    sennosides (Senokot) 8.6 mg tablet 2 tablets, 2 times daily    temazepam (RESTORIL) 15 mg, oral, Nightly PRN    traMADol (ULTRAM) 50 mg, oral, Every 6 hours PRN    trospium (SANCTURA XR) 60 mg, oral, Daily before breakfast    TURMERIC ORAL 1 capsule, Daily    valsartan (DIOVAN) 320 mg, oral, Daily    zinc sulfate 50 mg zinc (220 mg) tablet Take by mouth.       Physical Examination:     Constitutional:       Appearance: Healthy appearance. Not in distress.   Neck:      Vascular: No JVR. JVD normal.   Pulmonary:      Effort: Pulmonary effort is normal.      Breath sounds: Normal breath sounds. No wheezing. No rhonchi. No rales.   Chest:      Chest wall: Not tender to palpatation.   Cardiovascular:      PMI at left midclavicular line. Normal rate. Regular rhythm. Normal S1. Normal S2.       Murmurs: There is no murmur.      No gallop.  No click. No rub.   Pulses:     Intact distal pulses.   Edema:     Peripheral edema absent.   Abdominal:      General: Bowel sounds are normal.      Palpations: Abdomen is soft.      Tenderness: There is no abdominal tenderness.   Musculoskeletal: Normal range of motion.         General: No tenderness. Skin:     General: Skin is warm and dry.   Neurological:      General: No focal deficit present.      Mental Status: Alert and oriented to person, place and time.          Lab:     CBC:   Lab Results   Component Value Date    WBC 2.7 (L) 06/16/2025    RBC 3.52 (L) 06/16/2025    HGB 11.2 (L) 06/16/2025    HGB 10 (A) 04/18/2024    HCT 35.2 (L) 06/16/2025    PLT 82 (L) 06/16/2025        CMP:    Lab Results   Component Value Date     06/16/2025     K 4.3 06/16/2025     (H) 06/16/2025    CO2 25 06/16/2025    BUN 29 (H) 06/16/2025    CREATININE 1.13 06/16/2025    GLUCOSE 95 06/16/2025    CALCIUM 9.4 06/16/2025       Magnesium:    Lab Results   Component Value Date    MG 1.82 05/27/2025       Lipid Profile:    Lab Results   Component Value Date    TRIG 131 02/01/2023    HDL 28.8 (A) 02/01/2023       TSH:    Lab Results   Component Value Date    TSH 2.65 07/27/2022       BNP:   Lab Results   Component Value Date    BNP 67 09/30/2024        PT/INR:    Lab Results   Component Value Date    PROTIME 14.1 (H) 08/29/2024    INR 1.3 (H) 08/29/2024       HgBA1c:    Lab Results   Component Value Date    HGBA1C 5.2 04/18/2024       BMP:  Lab Results   Component Value Date     06/16/2025     06/05/2025     06/02/2025    K 4.3 06/16/2025    K 5.0 06/05/2025    K 5.0 06/02/2025     (H) 06/16/2025     06/05/2025     06/02/2025    CO2 25 06/16/2025    CO2 25 06/05/2025    CO2 25 06/02/2025    BUN 29 (H) 06/16/2025    BUN 39 (H) 06/05/2025    BUN 33 (H) 06/02/2025    CREATININE 1.13 06/16/2025    CREATININE 1.54 (H) 06/05/2025    CREATININE 1.40 (H) 06/02/2025       CBC:  Lab Results   Component Value Date    WBC 2.7 (L) 06/16/2025    WBC 3.1 (L) 06/05/2025    WBC 3.2 (L) 06/02/2025    RBC 3.52 (L) 06/16/2025    RBC 3.49 (L) 06/05/2025    RBC 3.67 (L) 06/02/2025    HGB 11.2 (L) 06/16/2025    HGB 11.1 (L) 06/05/2025    HGB 11.6 (L) 06/02/2025    HGB 10 (A) 04/18/2024    HGB 11.1 (A) 10/31/2023    HGB 11.8 (A) 09/26/2023    HCT 35.2 (L) 06/16/2025    HCT 34.8 (L) 06/05/2025    HCT 35.9 (L) 06/02/2025     06/16/2025     06/05/2025    MCV 98 06/02/2025    MCH 31.8 06/16/2025    MCH 31.8 06/05/2025    MCH 31.6 06/02/2025    MCHC 31.8 (L) 06/16/2025    MCHC 31.9 (L) 06/05/2025    MCHC 32.3 06/02/2025    RDW 17.5 (H) 06/16/2025    RDW 17.8 (H) 06/05/2025    RDW 17.7 (H) 06/02/2025    PLT 82 (L) 06/16/2025    PLT 79 (L)  06/05/2025    PLT 99 (L) 06/02/2025    MPV 11.3 04/03/2024       Cardiac Enzymes:    Lab Results   Component Value Date    TROPHS 7 08/31/2024    TROPHS 5 04/04/2024    TROPHS 5 04/04/2024       Hepatic Function Panel:    Lab Results   Component Value Date    ALKPHOS 78 06/16/2025    ALT 30 06/16/2025    AST 21 06/16/2025    PROT 6.1 (L) 06/16/2025    BILITOT 0.8 06/16/2025    BILIDIR 0.2 04/04/2024       Diagnostic Studies:     CT chest wo IV contrast  Addendum Date: 5/31/2025  Interpreted By:  Velasquez Rausch, ADDENDUM: NON-CARDIOVASCULAR INCLUDED CHEST FINDINGS (THE LUNG APICES ARE NOT INCLUDED IN THE FIELD OF VIEW)   INCLUDED LUNGS/AIRSPACES/AIRWAYS: Airways: The trachea and central major airways are clear; no endobronchial filling defect. Lungs / airspaces: the lungs are clear. Other: n/a   INCLUDED PLEURA: Effusion: Both sides negative Pneumothorax: Both sides negative Other: n/a   INCLUDED NONVASCULAR MEDIASTINUM: Esophagus: Grossly normal by CT Mediastinal Mass: Negative Hiatal hernia: None Other: n/a   INCLUDED LYMPH NODES: No thoracic adenopathy   INCLUDED CHEST WALL: Soft tissues of the chest wall are unremarkable   INCLUDED SKELETON: No acute or contributory abnormality   INCLUDED UPPER ABDOMEN: Impressive gallbladder dilation and high attenuation contents remarkably going back through 28 March 2018   -------   NON-CARDIOVASCULAR IMPRESSION:   NO ACUTE OR CONTRIBUTORY UNEXPECTED FINDINGS IN THE INCLUDED CHEST OUTSIDE THE CARDIOVASCULAR SYSTEM   NOTE THIS ADDENDUM IS SOLELY FOR INTERPRETATION OF ANATOMY OUTSIDE THE CARDIOVASCULAR SYSTEM. INTERPRETATION OF AND REPORTING OF THE CARDIOVASCULAR STRUCTURES ARE THE SOLE RESPONSIBILITY OF THE CARDIOLOGIST SUBMITTING THE ORIGINAL REPORT (NOT THIS ADDENDUM)   Signed by: Velasquez Rausch 5/31/2025 1:11 PM   -------- ORIGINAL REPORT -------- Dictation workstation:   [id number]    Result Date: 5/31/2025  Interpreted By:  Waylon Mena, STUDY: CT CHEST WO IV CONTRAST;  5/30/2025 9:56 am   INDICATION: Signs/Symptoms:AAA.   COMPARISON: None.   ACCESSION NUMBER(S): [id number]   ORDERING CLINICIAN: ROBERT SALCIDO   TECHNIQUE: Using multi-detector 64-slice CT technology, axial, sequential imaging with prospective ECG gating was performed of the chest   CT Dose-Length Product (DLP): 1521.4 mGy*cm CT Dose Reduction Employed: Yes, prospective ECG triggering, iterative reconstruction   For optimization of anatomic evaluation, multiplanar images were reconstructed from the axial data on a dedicated stand-alone workstation under the direct supervision of the interpreting physician.     FINDINGS: THORACIC AORTIC DIMENSIONS: Aortic annulus: 70t96lh. Sinus of Valsalva(coronal) 44mm. Across sinuses (cusp-commissure): 43mm. Sinotubular junction: 40mm. Ascending aorta (RPA level: 48mm. Upper ascending aorta: 43mm. Arch (mid): 36mm. Arch (distal): 32mm. Descending aorta(PA level): 32mm. Descending aorta(diaphragm level): 33mm. Main pulmonary artery: 35mm. Right pulmonary artery: 29mm. Left pulmonary artery: 27mm. Unable to evaluate for dissection without the use of contrast.   Aortic Valve: Mildly calcified trileaflet aortic valve.   Pulmonary Veins: Normal pulmonary vein anatomy without evidence of anomalous pulmonary venous return. No pulmonary vein stenosis Normal left ventricular systolic function.   Device leads present in the right atrium and right ventricle.   Right dominant coronary artery anatomy. Diffuse coronary artery calcification.         1. Aortic root aneurysm 44 mm. 2. Ascending thoracic aorta aneurysm 48 mm. 3. Compared to previous MRA chest January 25, 2023, and MRA of chest September 18, 2019 there is no significant interval change in aneurysm size.       Reading Cardiologist: Dr. Robert Mena, Date: 5/31/2025 12:06 pm   Signed by: Robert Mena 5/31/2025 12:12 PM Dictation workstation:   [id number]      Radiology:     No orders to display       Problem List:     Patient  Active Problem List   Diagnosis    Atrial flutter (Multi)    Bilateral sensorineural hearing loss    Cervical spondylosis with myelopathy    CAD S/P percutaneous coronary angioplasty    Stage 3b chronic kidney disease (Multi)    Depression, recurrent    Folic acid deficiency    Gait abnormality    Hip pain, right    Pain in both lower extremities    Hyperlipemia, mixed    Hyperuricemia    Ischemic foot pain at rest    Malignant neoplasm of prostate (Multi)    Hypertension    PVD (peripheral vascular disease)    Restless leg syndrome    Rib pain on right side    Sleep apnea    Lumbar radiculopathy    Spondylosis of lumbar spine    Stiffness of right shoulder joint    Vitamin B12 deficiency    Thoracic ascending aortic aneurysm    Incontinence    Sinus node dysfunction (Multi)    Abdominal pain, chronic, right upper quadrant    Calculus of gallbladder without cholecystitis without obstruction    Chronic cholecystitis    Urge incontinence of urine    Urinary incontinence    Edema    Anxiety    Chronic back pain    Dermatochalasis of both eyelids    Dextroscoliosis    Estevez catheter problem    Gastroesophageal reflux disease without esophagitis    Glaucoma    Incomplete emptying of bladder    Localized osteoarthrosis    Lumbosacral spondylosis without myelopathy    MGD (meibomian gland dysfunction)    Osteoarthritis of right knee    Osteoarthritis of spine with radiculopathy, lumbar region    Pain in right ankle and joints of right foot    Pain in joint involving pelvic region and thigh    Primary open-angle glaucoma, bilateral, mild stage    RPE mottling of macula    ELIZABETH (stress urinary incontinence), male    Lumbar stenosis    Pseudoaneurysm    Former smoker    PFD (pelvic floor dysfunction)    Gout    BMI 37.0-37.9, adult    History of radiofrequency ablation (RFA) procedure for cardiac arrhythmia    Presence of cardiac pacemaker    Myelodysplastic syndrome (Multi)    Chronic renal disease, stage IV (Multi)    Anemia  due to chemotherapy for myelodysplastic syndrome treated with erythropoietin    Constipation    OAB (overactive bladder)    Acute on chronic combined systolic (congestive) and diastolic (congestive) heart failure       Asessment:       84-year-old gentleman here for routine cardiovascular follow-up and testing results.    Meds, vitals, examination as noted    Chart review details cussed the patient at length along with his wife who accompanies him today.    Impression:  Moderate size thoracic aortic aneurysm with no change since 2019  Sinus node dysfunction  Permanent pacemaker  Atrial flutter  Coronary artery disease  Remote PCI and stenting  Dyslipidemia  Myelodysplastic syndrome  Chronic obesity  Bilateral hearing loss  Chronic dizziness  Plan:   Recommendation:  Maintain current meds  See me back at [place] time  Follow-up with his other physicians  Usual pacemaker clinic follow-up  Call if any issues arise        I,Nia Miranda LPN am scribing for, and in the presence of Dr. Waylon Benjamin DO, MALOU.    I, Dr. Waylon Benjamin DO, MALOU, personally performed the services described in the documentation as scribed by Nia Miranda LPN in my presence, and confirm it is both accurate and complete.    Dr. Waylon Benjamin DO  Thank  you for allowing me to participate in this patients care, please contact my office with questions.

## 2025-06-18 ENCOUNTER — HOSPITAL ENCOUNTER (OUTPATIENT)
Dept: CARDIOLOGY | Facility: HOSPITAL | Age: 85
Discharge: HOME | End: 2025-06-18
Payer: MEDICARE

## 2025-06-18 DIAGNOSIS — Z95.0 PACEMAKER: ICD-10-CM

## 2025-06-18 PROCEDURE — 93296 REM INTERROG EVL PM/IDS: CPT

## 2025-06-19 ENCOUNTER — INFUSION (OUTPATIENT)
Dept: HEMATOLOGY/ONCOLOGY | Facility: HOSPITAL | Age: 85
End: 2025-06-19
Payer: MEDICARE

## 2025-06-19 ENCOUNTER — LAB (OUTPATIENT)
Dept: LAB | Facility: HOSPITAL | Age: 85
End: 2025-06-19
Payer: MEDICARE

## 2025-06-19 VITALS
DIASTOLIC BLOOD PRESSURE: 71 MMHG | BODY MASS INDEX: 37.48 KG/M2 | OXYGEN SATURATION: 100 % | HEART RATE: 83 BPM | TEMPERATURE: 97.2 F | RESPIRATION RATE: 17 BRPM | SYSTOLIC BLOOD PRESSURE: 139 MMHG | WEIGHT: 268.74 LBS

## 2025-06-19 DIAGNOSIS — D46.9 MYELODYSPLASTIC SYNDROME (MULTI): ICD-10-CM

## 2025-06-19 LAB
ALBUMIN SERPL BCP-MCNC: 4.2 G/DL (ref 3.4–5)
ALP SERPL-CCNC: 89 U/L (ref 33–136)
ALT SERPL W P-5'-P-CCNC: 24 U/L (ref 10–52)
ANION GAP SERPL CALC-SCNC: 13 MMOL/L (ref 10–20)
AST SERPL W P-5'-P-CCNC: 17 U/L (ref 9–39)
BASOPHILS # BLD AUTO: 0.02 X10*3/UL (ref 0–0.1)
BASOPHILS NFR BLD AUTO: 0.7 %
BILIRUB SERPL-MCNC: 0.9 MG/DL (ref 0–1.2)
BUN SERPL-MCNC: 35 MG/DL (ref 6–23)
CALCIUM SERPL-MCNC: 9.8 MG/DL (ref 8.6–10.3)
CHLORIDE SERPL-SCNC: 104 MMOL/L (ref 98–107)
CO2 SERPL-SCNC: 25 MMOL/L (ref 21–32)
CREAT SERPL-MCNC: 1.25 MG/DL (ref 0.5–1.3)
EGFRCR SERPLBLD CKD-EPI 2021: 57 ML/MIN/1.73M*2
EOSINOPHIL # BLD AUTO: 0.08 X10*3/UL (ref 0–0.4)
EOSINOPHIL NFR BLD AUTO: 2.6 %
ERYTHROCYTE [DISTWIDTH] IN BLOOD BY AUTOMATED COUNT: 17.2 % (ref 11.5–14.5)
GLUCOSE SERPL-MCNC: 95 MG/DL (ref 74–99)
HCT VFR BLD AUTO: 36.5 % (ref 41–52)
HGB BLD-MCNC: 11.8 G/DL (ref 13.5–17.5)
IMM GRANULOCYTES # BLD AUTO: 0.02 X10*3/UL (ref 0–0.5)
IMM GRANULOCYTES NFR BLD AUTO: 0.7 % (ref 0–0.9)
LYMPHOCYTES # BLD AUTO: 1.09 X10*3/UL (ref 0.8–3)
LYMPHOCYTES NFR BLD AUTO: 35.5 %
MCH RBC QN AUTO: 31.9 PG (ref 26–34)
MCHC RBC AUTO-ENTMCNC: 32.3 G/DL (ref 32–36)
MCV RBC AUTO: 99 FL (ref 80–100)
MONOCYTES # BLD AUTO: 0.17 X10*3/UL (ref 0.05–0.8)
MONOCYTES NFR BLD AUTO: 5.5 %
NEUTROPHILS # BLD AUTO: 1.69 X10*3/UL (ref 1.6–5.5)
NEUTROPHILS NFR BLD AUTO: 55 %
NRBC BLD-RTO: 0 /100 WBCS (ref 0–0)
PLATELET # BLD AUTO: 71 X10*3/UL (ref 150–450)
POTASSIUM SERPL-SCNC: 4.6 MMOL/L (ref 3.5–5.3)
PROT SERPL-MCNC: 6.3 G/DL (ref 6.4–8.2)
RBC # BLD AUTO: 3.7 X10*6/UL (ref 4.5–5.9)
SODIUM SERPL-SCNC: 137 MMOL/L (ref 136–145)
WBC # BLD AUTO: 3.1 X10*3/UL (ref 4.4–11.3)

## 2025-06-19 PROCEDURE — 85025 COMPLETE CBC W/AUTO DIFF WBC: CPT

## 2025-06-19 PROCEDURE — 36415 COLL VENOUS BLD VENIPUNCTURE: CPT

## 2025-06-19 PROCEDURE — 80053 COMPREHEN METABOLIC PANEL: CPT

## 2025-06-19 PROCEDURE — 96401 CHEMO ANTI-NEOPL SQ/IM: CPT

## 2025-06-19 PROCEDURE — 2560000001 HC RX 256 EXPERIMENTAL DRUGS: Performed by: INTERNAL MEDICINE

## 2025-06-19 PROCEDURE — 2500000004 HC RX 250 GENERAL PHARMACY W/ HCPCS (ALT 636 FOR OP/ED): Performed by: INTERNAL MEDICINE

## 2025-06-19 RX ORDER — FAMOTIDINE 10 MG/ML
20 INJECTION, SOLUTION INTRAVENOUS ONCE AS NEEDED
Status: DISCONTINUED | OUTPATIENT
Start: 2025-06-19 | End: 2025-06-19 | Stop reason: HOSPADM

## 2025-06-19 RX ORDER — EPINEPHRINE 0.3 MG/.3ML
0.3 INJECTION SUBCUTANEOUS EVERY 5 MIN PRN
Status: DISCONTINUED | OUTPATIENT
Start: 2025-06-19 | End: 2025-06-19 | Stop reason: HOSPADM

## 2025-06-19 RX ORDER — ALBUTEROL SULFATE 0.83 MG/ML
3 SOLUTION RESPIRATORY (INHALATION) AS NEEDED
Status: DISCONTINUED | OUTPATIENT
Start: 2025-06-19 | End: 2025-06-19 | Stop reason: HOSPADM

## 2025-06-19 RX ORDER — ONDANSETRON 8 MG/1
8 TABLET, FILM COATED ORAL ONCE
Status: COMPLETED | OUTPATIENT
Start: 2025-06-19 | End: 2025-06-19

## 2025-06-19 RX ORDER — DIPHENHYDRAMINE HYDROCHLORIDE 50 MG/ML
50 INJECTION, SOLUTION INTRAMUSCULAR; INTRAVENOUS AS NEEDED
Status: DISCONTINUED | OUTPATIENT
Start: 2025-06-19 | End: 2025-06-19 | Stop reason: HOSPADM

## 2025-06-19 RX ADMIN — Medication 12.9 MG: at 11:10

## 2025-06-19 RX ADMIN — ONDANSETRON HYDROCHLORIDE 8 MG: 8 TABLET, FILM COATED ORAL at 10:23

## 2025-06-19 ASSESSMENT — PAIN SCALES - GENERAL: PAINLEVEL_OUTOF10: 0-NO PAIN

## 2025-06-19 NOTE — PROGRESS NOTES
GENARO Burgess is a 84 y.o. male who presents for Day 18 Cycle 9.    He is on the following chemotherapy regimen:     Treatment Plans       Name Type Plan Dates Plan Provider         Active    ([facility], [place]) [id number] - AzaCITIDine / Decitabine, 42 Day Cycle Followed by [facility] 9/29/2024 - Present Sebastien Rashid MD                    Since his last visit, he has been doing well.  Overall, he states his energy level is stable.  His appetite has been unchanged.  He reports no complaints.  He has no other concerns.    Tolerated injection well, labs along with AVS provided and discharged in stable condition.

## 2025-06-20 ENCOUNTER — APPOINTMENT (OUTPATIENT)
Dept: PHYSICAL THERAPY | Facility: CLINIC | Age: 85
End: 2025-06-20
Payer: MEDICARE

## 2025-06-20 ENCOUNTER — DOCUMENTATION (OUTPATIENT)
Dept: CARDIOLOGY | Facility: CLINIC | Age: 85
End: 2025-06-20
Payer: MEDICARE

## 2025-06-23 ENCOUNTER — APPOINTMENT (OUTPATIENT)
Dept: HEMATOLOGY/ONCOLOGY | Facility: HOSPITAL | Age: 85
End: 2025-06-23
Payer: MEDICARE

## 2025-06-23 ENCOUNTER — INFUSION (OUTPATIENT)
Dept: HEMATOLOGY/ONCOLOGY | Facility: HOSPITAL | Age: 85
End: 2025-06-23
Payer: MEDICARE

## 2025-06-23 ENCOUNTER — LAB (OUTPATIENT)
Dept: LAB | Facility: HOSPITAL | Age: 85
End: 2025-06-23
Payer: MEDICARE

## 2025-06-23 VITALS
OXYGEN SATURATION: 97 % | TEMPERATURE: 97.7 F | DIASTOLIC BLOOD PRESSURE: 68 MMHG | RESPIRATION RATE: 18 BRPM | BODY MASS INDEX: 37.66 KG/M2 | WEIGHT: 270 LBS | SYSTOLIC BLOOD PRESSURE: 138 MMHG | HEART RATE: 83 BPM

## 2025-06-23 DIAGNOSIS — D46.9 MYELODYSPLASTIC SYNDROME (MULTI): ICD-10-CM

## 2025-06-23 LAB
ALBUMIN SERPL BCP-MCNC: 4 G/DL (ref 3.4–5)
ALP SERPL-CCNC: 88 U/L (ref 33–136)
ALT SERPL W P-5'-P-CCNC: 38 U/L (ref 10–52)
ANION GAP SERPL CALC-SCNC: 12 MMOL/L (ref 10–20)
AST SERPL W P-5'-P-CCNC: 25 U/L (ref 9–39)
BASOPHILS # BLD AUTO: 0.02 X10*3/UL (ref 0–0.1)
BASOPHILS NFR BLD AUTO: 0.7 %
BILIRUB SERPL-MCNC: 1 MG/DL (ref 0–1.2)
BUN SERPL-MCNC: 29 MG/DL (ref 6–23)
CALCIUM SERPL-MCNC: 9.3 MG/DL (ref 8.6–10.3)
CHLORIDE SERPL-SCNC: 107 MMOL/L (ref 98–107)
CO2 SERPL-SCNC: 26 MMOL/L (ref 21–32)
CREAT SERPL-MCNC: 1.38 MG/DL (ref 0.5–1.3)
EGFRCR SERPLBLD CKD-EPI 2021: 50 ML/MIN/1.73M*2
EOSINOPHIL # BLD AUTO: 0.07 X10*3/UL (ref 0–0.4)
EOSINOPHIL NFR BLD AUTO: 2.3 %
ERYTHROCYTE [DISTWIDTH] IN BLOOD BY AUTOMATED COUNT: 16.9 % (ref 11.5–14.5)
GLUCOSE SERPL-MCNC: 110 MG/DL (ref 74–99)
HCT VFR BLD AUTO: 35.7 % (ref 41–52)
HGB BLD-MCNC: 11.5 G/DL (ref 13.5–17.5)
IMM GRANULOCYTES # BLD AUTO: 0.02 X10*3/UL (ref 0–0.5)
IMM GRANULOCYTES NFR BLD AUTO: 0.7 % (ref 0–0.9)
LYMPHOCYTES # BLD AUTO: 1.02 X10*3/UL (ref 0.8–3)
LYMPHOCYTES NFR BLD AUTO: 34.1 %
MCH RBC QN AUTO: 32.3 PG (ref 26–34)
MCHC RBC AUTO-ENTMCNC: 32.2 G/DL (ref 32–36)
MCV RBC AUTO: 100 FL (ref 80–100)
MONOCYTES # BLD AUTO: 0.14 X10*3/UL (ref 0.05–0.8)
MONOCYTES NFR BLD AUTO: 4.7 %
NEUTROPHILS # BLD AUTO: 1.72 X10*3/UL (ref 1.6–5.5)
NEUTROPHILS NFR BLD AUTO: 57.5 %
NRBC BLD-RTO: 0 /100 WBCS (ref 0–0)
PLATELET # BLD AUTO: 89 X10*3/UL (ref 150–450)
POTASSIUM SERPL-SCNC: 4.5 MMOL/L (ref 3.5–5.3)
PROT SERPL-MCNC: 6 G/DL (ref 6.4–8.2)
RBC # BLD AUTO: 3.56 X10*6/UL (ref 4.5–5.9)
SODIUM SERPL-SCNC: 140 MMOL/L (ref 136–145)
WBC # BLD AUTO: 3 X10*3/UL (ref 4.4–11.3)

## 2025-06-23 PROCEDURE — 80053 COMPREHEN METABOLIC PANEL: CPT

## 2025-06-23 PROCEDURE — 2500000004 HC RX 250 GENERAL PHARMACY W/ HCPCS (ALT 636 FOR OP/ED): Performed by: INTERNAL MEDICINE

## 2025-06-23 PROCEDURE — 96401 CHEMO ANTI-NEOPL SQ/IM: CPT

## 2025-06-23 PROCEDURE — 36415 COLL VENOUS BLD VENIPUNCTURE: CPT

## 2025-06-23 PROCEDURE — 2560000001 HC RX 256 EXPERIMENTAL DRUGS: Performed by: INTERNAL MEDICINE

## 2025-06-23 PROCEDURE — 85025 COMPLETE CBC W/AUTO DIFF WBC: CPT

## 2025-06-23 RX ORDER — ONDANSETRON 8 MG/1
8 TABLET, FILM COATED ORAL ONCE
Status: COMPLETED | OUTPATIENT
Start: 2025-06-23 | End: 2025-06-23

## 2025-06-23 RX ORDER — ALBUTEROL SULFATE 0.83 MG/ML
3 SOLUTION RESPIRATORY (INHALATION) AS NEEDED
Status: DISCONTINUED | OUTPATIENT
Start: 2025-06-23 | End: 2025-06-23 | Stop reason: HOSPADM

## 2025-06-23 RX ORDER — DIPHENHYDRAMINE HYDROCHLORIDE 50 MG/ML
50 INJECTION, SOLUTION INTRAMUSCULAR; INTRAVENOUS AS NEEDED
Status: DISCONTINUED | OUTPATIENT
Start: 2025-06-23 | End: 2025-06-23 | Stop reason: HOSPADM

## 2025-06-23 RX ORDER — FAMOTIDINE 10 MG/ML
20 INJECTION, SOLUTION INTRAVENOUS ONCE AS NEEDED
Status: DISCONTINUED | OUTPATIENT
Start: 2025-06-23 | End: 2025-06-23 | Stop reason: HOSPADM

## 2025-06-23 RX ORDER — EPINEPHRINE 0.3 MG/.3ML
0.3 INJECTION SUBCUTANEOUS EVERY 5 MIN PRN
Status: DISCONTINUED | OUTPATIENT
Start: 2025-06-23 | End: 2025-06-23 | Stop reason: HOSPADM

## 2025-06-23 RX ADMIN — Medication 129 MG: at 09:13

## 2025-06-23 RX ADMIN — ONDANSETRON HYDROCHLORIDE 8 MG: 8 TABLET, FILM COATED ORAL at 08:53

## 2025-06-23 ASSESSMENT — PAIN SCALES - GENERAL: PAINLEVEL_OUTOF10: 0-NO PAIN

## 2025-06-23 NOTE — PROGRESS NOTES
Patient presents to infusion appointment in stable condition. C9D22 [id number] azacitidine injections tolerated without incident. Hgb 11.5, Aranesp held per orders. ANC 1.72, filgrastim held per order. Lab results and schedule reviewed. Discharged in stable condition.

## 2025-06-25 ENCOUNTER — APPOINTMENT (OUTPATIENT)
Dept: PHYSICAL THERAPY | Facility: CLINIC | Age: 85
End: 2025-06-25
Payer: MEDICARE

## 2025-06-25 ENCOUNTER — TELEPHONE (OUTPATIENT)
Dept: ADMISSION | Facility: HOSPITAL | Age: 85
End: 2025-06-25
Payer: MEDICARE

## 2025-06-25 NOTE — TELEPHONE ENCOUNTER
Spouse called requesting to reschedule pt's infusion from tomorrow to 6/27.  Their car is in need of repair and they are unable to come tomorrow.

## 2025-06-26 ENCOUNTER — APPOINTMENT (OUTPATIENT)
Dept: HEMATOLOGY/ONCOLOGY | Facility: HOSPITAL | Age: 85
End: 2025-06-26
Payer: MEDICARE

## 2025-06-27 ENCOUNTER — INFUSION (OUTPATIENT)
Dept: HEMATOLOGY/ONCOLOGY | Facility: HOSPITAL | Age: 85
End: 2025-06-27
Payer: MEDICARE

## 2025-06-27 ENCOUNTER — LAB (OUTPATIENT)
Dept: LAB | Facility: HOSPITAL | Age: 85
End: 2025-06-27
Payer: MEDICARE

## 2025-06-27 VITALS
OXYGEN SATURATION: 100 % | TEMPERATURE: 97.2 F | RESPIRATION RATE: 18 BRPM | HEART RATE: 77 BPM | HEIGHT: 72 IN | BODY MASS INDEX: 36.7 KG/M2 | WEIGHT: 270.95 LBS | SYSTOLIC BLOOD PRESSURE: 136 MMHG | DIASTOLIC BLOOD PRESSURE: 68 MMHG

## 2025-06-27 DIAGNOSIS — D46.9 MYELODYSPLASTIC SYNDROME (MULTI): ICD-10-CM

## 2025-06-27 LAB
ALBUMIN SERPL BCP-MCNC: 4 G/DL (ref 3.4–5)
ALP SERPL-CCNC: 88 U/L (ref 33–136)
ALT SERPL W P-5'-P-CCNC: 26 U/L (ref 10–52)
ANION GAP SERPL CALC-SCNC: 11 MMOL/L (ref 10–20)
AST SERPL W P-5'-P-CCNC: 21 U/L (ref 9–39)
BASOPHILS # BLD AUTO: 0.02 X10*3/UL (ref 0–0.1)
BASOPHILS NFR BLD AUTO: 0.6 %
BILIRUB SERPL-MCNC: 1 MG/DL (ref 0–1.2)
BUN SERPL-MCNC: 30 MG/DL (ref 6–23)
CALCIUM SERPL-MCNC: 9.5 MG/DL (ref 8.6–10.3)
CHLORIDE SERPL-SCNC: 108 MMOL/L (ref 98–107)
CO2 SERPL-SCNC: 26 MMOL/L (ref 21–32)
CREAT SERPL-MCNC: 1.32 MG/DL (ref 0.5–1.3)
EGFRCR SERPLBLD CKD-EPI 2021: 53 ML/MIN/1.73M*2
EOSINOPHIL # BLD AUTO: 0.07 X10*3/UL (ref 0–0.4)
EOSINOPHIL NFR BLD AUTO: 2.2 %
ERYTHROCYTE [DISTWIDTH] IN BLOOD BY AUTOMATED COUNT: 16.9 % (ref 11.5–14.5)
GLUCOSE SERPL-MCNC: 88 MG/DL (ref 74–99)
HCT VFR BLD AUTO: 36.1 % (ref 41–52)
HGB BLD-MCNC: 11.7 G/DL (ref 13.5–17.5)
IMM GRANULOCYTES # BLD AUTO: 0.01 X10*3/UL (ref 0–0.5)
IMM GRANULOCYTES NFR BLD AUTO: 0.3 % (ref 0–0.9)
LYMPHOCYTES # BLD AUTO: 1.1 X10*3/UL (ref 0.8–3)
LYMPHOCYTES NFR BLD AUTO: 34.9 %
MCH RBC QN AUTO: 32.1 PG (ref 26–34)
MCHC RBC AUTO-ENTMCNC: 32.4 G/DL (ref 32–36)
MCV RBC AUTO: 99 FL (ref 80–100)
MONOCYTES # BLD AUTO: 0.18 X10*3/UL (ref 0.05–0.8)
MONOCYTES NFR BLD AUTO: 5.7 %
NEUTROPHILS # BLD AUTO: 1.77 X10*3/UL (ref 1.6–5.5)
NEUTROPHILS NFR BLD AUTO: 56.3 %
NRBC BLD-RTO: 0 /100 WBCS (ref 0–0)
PLATELET # BLD AUTO: 73 X10*3/UL (ref 150–450)
POTASSIUM SERPL-SCNC: 4.4 MMOL/L (ref 3.5–5.3)
PROT SERPL-MCNC: 6.1 G/DL (ref 6.4–8.2)
RBC # BLD AUTO: 3.65 X10*6/UL (ref 4.5–5.9)
SODIUM SERPL-SCNC: 141 MMOL/L (ref 136–145)
WBC # BLD AUTO: 3.2 X10*3/UL (ref 4.4–11.3)

## 2025-06-27 PROCEDURE — 80053 COMPREHEN METABOLIC PANEL: CPT

## 2025-06-27 PROCEDURE — 2560000001 HC RX 256 EXPERIMENTAL DRUGS: Performed by: INTERNAL MEDICINE

## 2025-06-27 PROCEDURE — 85025 COMPLETE CBC W/AUTO DIFF WBC: CPT

## 2025-06-27 PROCEDURE — 96401 CHEMO ANTI-NEOPL SQ/IM: CPT

## 2025-06-27 PROCEDURE — 2500000004 HC RX 250 GENERAL PHARMACY W/ HCPCS (ALT 636 FOR OP/ED): Performed by: INTERNAL MEDICINE

## 2025-06-27 PROCEDURE — 36415 COLL VENOUS BLD VENIPUNCTURE: CPT

## 2025-06-27 RX ORDER — ALBUTEROL SULFATE 0.83 MG/ML
3 SOLUTION RESPIRATORY (INHALATION) AS NEEDED
OUTPATIENT
Start: 2025-07-24

## 2025-06-27 RX ORDER — DIPHENHYDRAMINE HYDROCHLORIDE 50 MG/ML
50 INJECTION, SOLUTION INTRAMUSCULAR; INTRAVENOUS AS NEEDED
OUTPATIENT
Start: 2025-07-24

## 2025-06-27 RX ORDER — FAMOTIDINE 10 MG/ML
20 INJECTION, SOLUTION INTRAVENOUS ONCE AS NEEDED
OUTPATIENT
Start: 2025-07-14

## 2025-06-27 RX ORDER — DIPHENHYDRAMINE HYDROCHLORIDE 50 MG/ML
50 INJECTION, SOLUTION INTRAMUSCULAR; INTRAVENOUS AS NEEDED
Status: CANCELLED | OUTPATIENT
Start: 2025-06-30

## 2025-06-27 RX ORDER — EPINEPHRINE 0.3 MG/.3ML
0.3 INJECTION SUBCUTANEOUS EVERY 5 MIN PRN
Status: CANCELLED | OUTPATIENT
Start: 2025-06-30

## 2025-06-27 RX ORDER — EPINEPHRINE 0.3 MG/.3ML
0.3 INJECTION SUBCUTANEOUS EVERY 5 MIN PRN
OUTPATIENT
Start: 2025-07-21

## 2025-06-27 RX ORDER — ONDANSETRON 8 MG/1
8 TABLET, FILM COATED ORAL ONCE
OUTPATIENT
Start: 2025-07-10

## 2025-06-27 RX ORDER — FAMOTIDINE 10 MG/ML
20 INJECTION, SOLUTION INTRAVENOUS ONCE AS NEEDED
Status: CANCELLED | OUTPATIENT
Start: 2025-07-03

## 2025-06-27 RX ORDER — FAMOTIDINE 10 MG/ML
20 INJECTION, SOLUTION INTRAVENOUS ONCE AS NEEDED
OUTPATIENT
Start: 2025-07-24

## 2025-06-27 RX ORDER — DIPHENHYDRAMINE HYDROCHLORIDE 50 MG/ML
50 INJECTION, SOLUTION INTRAMUSCULAR; INTRAVENOUS AS NEEDED
OUTPATIENT
Start: 2025-07-14

## 2025-06-27 RX ORDER — FAMOTIDINE 10 MG/ML
20 INJECTION, SOLUTION INTRAVENOUS ONCE AS NEEDED
Status: CANCELLED | OUTPATIENT
Start: 2025-06-30

## 2025-06-27 RX ORDER — FAMOTIDINE 10 MG/ML
20 INJECTION, SOLUTION INTRAVENOUS ONCE AS NEEDED
OUTPATIENT
Start: 2025-07-21

## 2025-06-27 RX ORDER — ALBUTEROL SULFATE 0.83 MG/ML
3 SOLUTION RESPIRATORY (INHALATION) AS NEEDED
Status: CANCELLED | OUTPATIENT
Start: 2025-06-30

## 2025-06-27 RX ORDER — ONDANSETRON 8 MG/1
8 TABLET, FILM COATED ORAL ONCE
OUTPATIENT
Start: 2025-07-21

## 2025-06-27 RX ORDER — DIPHENHYDRAMINE HYDROCHLORIDE 50 MG/ML
50 INJECTION, SOLUTION INTRAMUSCULAR; INTRAVENOUS AS NEEDED
OUTPATIENT
Start: 2025-07-21

## 2025-06-27 RX ORDER — ONDANSETRON 8 MG/1
8 TABLET, FILM COATED ORAL ONCE
OUTPATIENT
Start: 2025-07-07

## 2025-06-27 RX ORDER — DIPHENHYDRAMINE HYDROCHLORIDE 50 MG/ML
50 INJECTION, SOLUTION INTRAMUSCULAR; INTRAVENOUS AS NEEDED
Status: DISCONTINUED | OUTPATIENT
Start: 2025-06-27 | End: 2025-06-27 | Stop reason: HOSPADM

## 2025-06-27 RX ORDER — EPINEPHRINE 0.3 MG/.3ML
0.3 INJECTION SUBCUTANEOUS EVERY 5 MIN PRN
OUTPATIENT
Start: 2025-07-10

## 2025-06-27 RX ORDER — ALBUTEROL SULFATE 0.83 MG/ML
3 SOLUTION RESPIRATORY (INHALATION) AS NEEDED
Status: CANCELLED | OUTPATIENT
Start: 2025-07-03

## 2025-06-27 RX ORDER — DIPHENHYDRAMINE HYDROCHLORIDE 50 MG/ML
50 INJECTION, SOLUTION INTRAMUSCULAR; INTRAVENOUS AS NEEDED
OUTPATIENT
Start: 2025-07-10

## 2025-06-27 RX ORDER — EPINEPHRINE 0.3 MG/.3ML
0.3 INJECTION SUBCUTANEOUS EVERY 5 MIN PRN
OUTPATIENT
Start: 2025-07-24

## 2025-06-27 RX ORDER — DIPHENHYDRAMINE HYDROCHLORIDE 50 MG/ML
50 INJECTION, SOLUTION INTRAMUSCULAR; INTRAVENOUS AS NEEDED
OUTPATIENT
Start: 2025-07-17

## 2025-06-27 RX ORDER — EPINEPHRINE 0.3 MG/.3ML
0.3 INJECTION SUBCUTANEOUS EVERY 5 MIN PRN
OUTPATIENT
Start: 2025-07-07

## 2025-06-27 RX ORDER — ONDANSETRON 8 MG/1
8 TABLET, FILM COATED ORAL ONCE
OUTPATIENT
Start: 2025-07-24

## 2025-06-27 RX ORDER — FAMOTIDINE 10 MG/ML
20 INJECTION, SOLUTION INTRAVENOUS ONCE AS NEEDED
OUTPATIENT
Start: 2025-07-07

## 2025-06-27 RX ORDER — ONDANSETRON 8 MG/1
8 TABLET, FILM COATED ORAL ONCE
OUTPATIENT
Start: 2025-07-14

## 2025-06-27 RX ORDER — ALBUTEROL SULFATE 0.83 MG/ML
3 SOLUTION RESPIRATORY (INHALATION) AS NEEDED
OUTPATIENT
Start: 2025-07-10

## 2025-06-27 RX ORDER — ONDANSETRON 8 MG/1
8 TABLET, FILM COATED ORAL ONCE
Status: COMPLETED | OUTPATIENT
Start: 2025-06-27 | End: 2025-06-27

## 2025-06-27 RX ORDER — EPINEPHRINE 0.3 MG/.3ML
0.3 INJECTION SUBCUTANEOUS EVERY 5 MIN PRN
OUTPATIENT
Start: 2025-07-14

## 2025-06-27 RX ORDER — EPINEPHRINE 0.3 MG/.3ML
0.3 INJECTION SUBCUTANEOUS EVERY 5 MIN PRN
Status: CANCELLED | OUTPATIENT
Start: 2025-07-03

## 2025-06-27 RX ORDER — ONDANSETRON 8 MG/1
8 TABLET, FILM COATED ORAL ONCE
Status: CANCELLED | OUTPATIENT
Start: 2025-06-30

## 2025-06-27 RX ORDER — ONDANSETRON 8 MG/1
8 TABLET, FILM COATED ORAL ONCE
Status: CANCELLED | OUTPATIENT
Start: 2025-07-03

## 2025-06-27 RX ORDER — ALBUTEROL SULFATE 0.83 MG/ML
3 SOLUTION RESPIRATORY (INHALATION) AS NEEDED
OUTPATIENT
Start: 2025-07-17

## 2025-06-27 RX ORDER — EPINEPHRINE 0.3 MG/.3ML
0.3 INJECTION SUBCUTANEOUS EVERY 5 MIN PRN
OUTPATIENT
Start: 2025-07-17

## 2025-06-27 RX ORDER — ALBUTEROL SULFATE 0.83 MG/ML
3 SOLUTION RESPIRATORY (INHALATION) AS NEEDED
OUTPATIENT
Start: 2025-07-14

## 2025-06-27 RX ORDER — DIPHENHYDRAMINE HYDROCHLORIDE 50 MG/ML
50 INJECTION, SOLUTION INTRAMUSCULAR; INTRAVENOUS AS NEEDED
Status: CANCELLED | OUTPATIENT
Start: 2025-07-03

## 2025-06-27 RX ORDER — FAMOTIDINE 10 MG/ML
20 INJECTION, SOLUTION INTRAVENOUS ONCE AS NEEDED
Status: DISCONTINUED | OUTPATIENT
Start: 2025-06-27 | End: 2025-06-27 | Stop reason: HOSPADM

## 2025-06-27 RX ORDER — ALBUTEROL SULFATE 0.83 MG/ML
3 SOLUTION RESPIRATORY (INHALATION) AS NEEDED
Status: DISCONTINUED | OUTPATIENT
Start: 2025-06-27 | End: 2025-06-27 | Stop reason: HOSPADM

## 2025-06-27 RX ORDER — ONDANSETRON 8 MG/1
8 TABLET, FILM COATED ORAL ONCE
OUTPATIENT
Start: 2025-07-17

## 2025-06-27 RX ORDER — ALBUTEROL SULFATE 0.83 MG/ML
3 SOLUTION RESPIRATORY (INHALATION) AS NEEDED
OUTPATIENT
Start: 2025-07-21

## 2025-06-27 RX ORDER — FAMOTIDINE 10 MG/ML
20 INJECTION, SOLUTION INTRAVENOUS ONCE AS NEEDED
OUTPATIENT
Start: 2025-07-17

## 2025-06-27 RX ORDER — FAMOTIDINE 10 MG/ML
20 INJECTION, SOLUTION INTRAVENOUS ONCE AS NEEDED
OUTPATIENT
Start: 2025-07-10

## 2025-06-27 RX ORDER — EPINEPHRINE 0.3 MG/.3ML
0.3 INJECTION SUBCUTANEOUS EVERY 5 MIN PRN
Status: DISCONTINUED | OUTPATIENT
Start: 2025-06-27 | End: 2025-06-27 | Stop reason: HOSPADM

## 2025-06-27 RX ORDER — ALBUTEROL SULFATE 0.83 MG/ML
3 SOLUTION RESPIRATORY (INHALATION) AS NEEDED
OUTPATIENT
Start: 2025-07-07

## 2025-06-27 RX ORDER — DIPHENHYDRAMINE HYDROCHLORIDE 50 MG/ML
50 INJECTION, SOLUTION INTRAMUSCULAR; INTRAVENOUS AS NEEDED
OUTPATIENT
Start: 2025-07-07

## 2025-06-27 RX ADMIN — ONDANSETRON HYDROCHLORIDE 8 MG: 8 TABLET, FILM COATED ORAL at 15:18

## 2025-06-27 RX ADMIN — Medication 12.9 MG: at 15:43

## 2025-06-27 NOTE — PROGRESS NOTES
GENARO Burgess is a 84 y.o. male who presents for Day 25 Cycle 9.    He is on the following chemotherapy regimen:     Treatment Plans       Name Type Plan Dates Plan Provider         Active    ([facility]) [id number] - AzaCITIDine / Decitabine, 42 Day Cycle Followed by [facility] Treatment 9/29/2024 - Present Sebastien Rashid MD                    Since his last visit, he has been doing well.  Overall, he states his energy level is stable.  His appetite has been unchanged.  He reports no complaints.  He has no other concerns.    Tolerated injection well, AVS along with today's labs printed and discharged in stable condition with wife at his side.

## 2025-06-30 ENCOUNTER — DOCUMENTATION (OUTPATIENT)
Dept: HEMATOLOGY/ONCOLOGY | Facility: HOSPITAL | Age: 85
End: 2025-06-30

## 2025-06-30 ENCOUNTER — INFUSION (OUTPATIENT)
Dept: HEMATOLOGY/ONCOLOGY | Facility: HOSPITAL | Age: 85
End: 2025-06-30
Payer: MEDICARE

## 2025-06-30 ENCOUNTER — LAB (OUTPATIENT)
Dept: LAB | Facility: HOSPITAL | Age: 85
End: 2025-06-30
Payer: MEDICARE

## 2025-06-30 ENCOUNTER — OFFICE VISIT (OUTPATIENT)
Dept: HEMATOLOGY/ONCOLOGY | Facility: HOSPITAL | Age: 85
End: 2025-06-30
Payer: MEDICARE

## 2025-06-30 ENCOUNTER — APPOINTMENT (OUTPATIENT)
Dept: HEMATOLOGY/ONCOLOGY | Facility: HOSPITAL | Age: 85
End: 2025-06-30
Payer: MEDICARE

## 2025-06-30 VITALS
DIASTOLIC BLOOD PRESSURE: 78 MMHG | RESPIRATION RATE: 16 BRPM | TEMPERATURE: 97 F | BODY MASS INDEX: 37.33 KG/M2 | HEART RATE: 79 BPM | SYSTOLIC BLOOD PRESSURE: 149 MMHG | OXYGEN SATURATION: 100 % | WEIGHT: 273.81 LBS

## 2025-06-30 DIAGNOSIS — D46.9 MYELODYSPLASTIC SYNDROME (MULTI): ICD-10-CM

## 2025-06-30 DIAGNOSIS — C61 MALIGNANT NEOPLASM OF PROSTATE (MULTI): Primary | ICD-10-CM

## 2025-06-30 DIAGNOSIS — D46.9 MYELODYSPLASTIC SYNDROME (MULTI): Primary | ICD-10-CM

## 2025-06-30 LAB
ALBUMIN SERPL BCP-MCNC: 4 G/DL (ref 3.4–5)
ALP SERPL-CCNC: 82 U/L (ref 33–136)
ALT SERPL W P-5'-P-CCNC: 32 U/L (ref 10–52)
ANION GAP SERPL CALC-SCNC: 10 MMOL/L (ref 10–20)
AST SERPL W P-5'-P-CCNC: 23 U/L (ref 9–39)
BASOPHILS # BLD AUTO: 0.02 X10*3/UL (ref 0–0.1)
BASOPHILS NFR BLD AUTO: 0.6 %
BILIRUB SERPL-MCNC: 1 MG/DL (ref 0–1.2)
BUN SERPL-MCNC: 26 MG/DL (ref 6–23)
CALCIUM SERPL-MCNC: 9.3 MG/DL (ref 8.6–10.3)
CHLORIDE SERPL-SCNC: 107 MMOL/L (ref 98–107)
CO2 SERPL-SCNC: 27 MMOL/L (ref 21–32)
CREAT SERPL-MCNC: 1.32 MG/DL (ref 0.5–1.3)
EGFRCR SERPLBLD CKD-EPI 2021: 53 ML/MIN/1.73M*2
EOSINOPHIL # BLD AUTO: 0.1 X10*3/UL (ref 0–0.4)
EOSINOPHIL NFR BLD AUTO: 2.9 %
ERYTHROCYTE [DISTWIDTH] IN BLOOD BY AUTOMATED COUNT: 16.6 % (ref 11.5–14.5)
GLUCOSE SERPL-MCNC: 95 MG/DL (ref 74–99)
HCT VFR BLD AUTO: 36.6 % (ref 41–52)
HGB BLD-MCNC: 11.8 G/DL (ref 13.5–17.5)
HGB RETIC QN: 35 PG (ref 28–38)
IMM GRANULOCYTES # BLD AUTO: 0.01 X10*3/UL (ref 0–0.5)
IMM GRANULOCYTES NFR BLD AUTO: 0.3 % (ref 0–0.9)
IMMATURE RETIC FRACTION: 14.9 %
LDH SERPL L TO P-CCNC: 165 U/L (ref 84–246)
LYMPHOCYTES # BLD AUTO: 1.11 X10*3/UL (ref 0.8–3)
LYMPHOCYTES NFR BLD AUTO: 32.6 %
MAGNESIUM SERPL-MCNC: 1.7 MG/DL (ref 1.6–2.4)
MCH RBC QN AUTO: 32.3 PG (ref 26–34)
MCHC RBC AUTO-ENTMCNC: 32.2 G/DL (ref 32–36)
MCV RBC AUTO: 100 FL (ref 80–100)
MONOCYTES # BLD AUTO: 0.16 X10*3/UL (ref 0.05–0.8)
MONOCYTES NFR BLD AUTO: 4.7 %
NEUTROPHILS # BLD AUTO: 2 X10*3/UL (ref 1.6–5.5)
NEUTROPHILS NFR BLD AUTO: 58.9 %
NRBC BLD-RTO: 0 /100 WBCS (ref 0–0)
PLATELET # BLD AUTO: 76 X10*3/UL (ref 150–450)
POTASSIUM SERPL-SCNC: 4.3 MMOL/L (ref 3.5–5.3)
PROT SERPL-MCNC: 6.2 G/DL (ref 6.4–8.2)
RBC # BLD AUTO: 3.65 X10*6/UL (ref 4.5–5.9)
RETICS #: 0.06 X10*6/UL (ref 0.02–0.11)
RETICS/RBC NFR AUTO: 1.7 % (ref 0.5–2)
SODIUM SERPL-SCNC: 140 MMOL/L (ref 136–145)
URATE SERPL-MCNC: 4.8 MG/DL (ref 4–7.5)
WBC # BLD AUTO: 3.4 X10*3/UL (ref 4.4–11.3)

## 2025-06-30 PROCEDURE — 36415 COLL VENOUS BLD VENIPUNCTURE: CPT

## 2025-06-30 PROCEDURE — 1159F MED LIST DOCD IN RCRD: CPT | Performed by: NURSE PRACTITIONER

## 2025-06-30 PROCEDURE — 99215 OFFICE O/P EST HI 40 MIN: CPT | Performed by: NURSE PRACTITIONER

## 2025-06-30 PROCEDURE — 85025 COMPLETE CBC W/AUTO DIFF WBC: CPT

## 2025-06-30 PROCEDURE — 2500000004 HC RX 250 GENERAL PHARMACY W/ HCPCS (ALT 636 FOR OP/ED): Performed by: NURSE PRACTITIONER

## 2025-06-30 PROCEDURE — 85045 AUTOMATED RETICULOCYTE COUNT: CPT

## 2025-06-30 PROCEDURE — G2211 COMPLEX E/M VISIT ADD ON: HCPCS | Performed by: NURSE PRACTITIONER

## 2025-06-30 PROCEDURE — 2560000001 HC RX 256 EXPERIMENTAL DRUGS: Performed by: NURSE PRACTITIONER

## 2025-06-30 PROCEDURE — 96401 CHEMO ANTI-NEOPL SQ/IM: CPT

## 2025-06-30 PROCEDURE — 80053 COMPREHEN METABOLIC PANEL: CPT

## 2025-06-30 PROCEDURE — 84075 ASSAY ALKALINE PHOSPHATASE: CPT

## 2025-06-30 PROCEDURE — 84550 ASSAY OF BLOOD/URIC ACID: CPT

## 2025-06-30 PROCEDURE — 83735 ASSAY OF MAGNESIUM: CPT

## 2025-06-30 PROCEDURE — 83615 LACTATE (LD) (LDH) ENZYME: CPT

## 2025-06-30 PROCEDURE — 1160F RVW MEDS BY RX/DR IN RCRD: CPT | Performed by: NURSE PRACTITIONER

## 2025-06-30 RX ORDER — ALBUTEROL SULFATE 0.83 MG/ML
3 SOLUTION RESPIRATORY (INHALATION) AS NEEDED
Status: DISCONTINUED | OUTPATIENT
Start: 2025-06-30 | End: 2025-06-30 | Stop reason: HOSPADM

## 2025-06-30 RX ORDER — DIPHENHYDRAMINE HYDROCHLORIDE 50 MG/ML
50 INJECTION, SOLUTION INTRAMUSCULAR; INTRAVENOUS AS NEEDED
Status: DISCONTINUED | OUTPATIENT
Start: 2025-06-30 | End: 2025-06-30 | Stop reason: HOSPADM

## 2025-06-30 RX ORDER — ONDANSETRON 8 MG/1
8 TABLET, FILM COATED ORAL ONCE
Status: COMPLETED | OUTPATIENT
Start: 2025-06-30 | End: 2025-06-30

## 2025-06-30 RX ORDER — FAMOTIDINE 10 MG/ML
20 INJECTION, SOLUTION INTRAVENOUS ONCE AS NEEDED
Status: DISCONTINUED | OUTPATIENT
Start: 2025-06-30 | End: 2025-06-30 | Stop reason: HOSPADM

## 2025-06-30 RX ORDER — EPINEPHRINE 0.3 MG/.3ML
0.3 INJECTION SUBCUTANEOUS EVERY 5 MIN PRN
Status: DISCONTINUED | OUTPATIENT
Start: 2025-06-30 | End: 2025-06-30 | Stop reason: HOSPADM

## 2025-06-30 RX ADMIN — ONDANSETRON HYDROCHLORIDE 8 MG: 8 TABLET, FILM COATED ORAL at 13:09

## 2025-06-30 RX ADMIN — Medication 129 MG: at 13:24

## 2025-06-30 ASSESSMENT — PAIN SCALES - GENERAL: PAINLEVEL_OUTOF10: 0-NO PAIN

## 2025-06-30 NOTE — RESEARCH NOTES
Research Note Treatment Day    Holden Burgess is here today for treatment on CASE [id number]. Today is W39D1. Procedures completed per protocol. AE's and con-meds reviewed with patient.   Patient is aware of treatment plan.     [x]   Received treatment as planned   OR  []    Treatment delayed; patient calendar updated as required   Treatment delayed because:    []   AE    []   Physician Discretion    []   Clinical Deterioration or Progression     []   Other    Education Documentation  Treatment Plan and Schedule, taught by Judi Patel RN at 6/30/2025  2:42 PM.  Learner: Patient  Readiness: Acceptance  Method: Explanation  Response: Verbalizes Understanding    Education Comments  No comments found.            OFFICE VISIT 4/17/2025    CARDIOLOGY FOLLOW UP    Chief Complaint   Patient presents with   • Office Visit     6 mo f/u-pt has no concerns. Weight has been stable.      HISTORY OF PRESENT ILLNESS:    Cynthia Ferreira is a 89 year old female with a past medical history of coronary artery disease s/p stent to RCA in [place] in 06/2018, HFpEF, hypertension, hyperlipidemia, diabetes mellitus. The patient presents today for follow up.    The patient presents today accompanied by a family member and reports doing well from a cardiac standpoint.  He weight has been stable. Resides in assisted living facility. No cardiac symptoms or limitations. Most recent cardiac testing and labs were reviewed with the patient during their appointment.  The patient is compliant with all cardiac medications and reports tolerating them well.  No reports of dyspnea, palpitations, sense of arrhythmia, heart racing, syncope, orthopnea, fluid retention in abdomen or LE, dizziness, lightheadedness or chest pain.  No further complaints at this time.    Holter Monitor 11/03/2021  6% SVE burden with 746 short runs, most likely representing multifocal atrial tachycardia; longest 17 beats.   No AF.   1.2% PVC burden without runs.   Longest pause 3.1 seconds.     NM Stress 06/17/2021  1. Low risk myocardial perfusion scan  2. There is inferior wall artifact.  No evidence of ischemia or infarction  3. LV systolic function is normal with EF of 68% and no wall motion abnormalities.  4. 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  (CMD) 09/12/2023     Priority: Low   • Failure to thrive in adult 09/12/2023     Priority: Low   • Diarrhea 09/06/2023     Priority: Low   • Altered mental status, unspecified altered mental status type 09/03/2023     Priority: Low   • Abdominal pain 06/17/2021     Priority: Low   • Unilateral weakness 08/07/2020     Priority: Low   • Primary osteoarthritis of both knees 08/07/2020     Priority: Low   • DDD (degenerative disc disease), cervical 08/07/2020     Priority: Low   • Trigger index finger of right hand 10/10/2018     Priority: Low   • Mild major depression (CMD) 04/27/2018     Priority: Low   • Psychophysiological insomnia 04/27/2018     Priority: Low   • Chronic diastolic CHF (congestive heart failure)  (CMD) 01/05/2018     Priority: Low   • CKD stage 3 secondary to diabetes  (CMD) 10/02/2017     Priority: Low   • Borderline glaucoma of right eye 09/08/2017     Priority: Low   • Primary open angle glaucoma of left eye, mild stage 06/06/2016     Priority: Low   • Benign essential HTN 02/23/2016     Priority: Low   • Osteopenia 11/04/2015     Priority: Low   • Rheumatoid arthritis involving multiple sites  (CMD) 11/04/2015     Priority: Low   • Hypercalcemia 11/04/2015     Priority: Low   • Dry ARMD 10/01/2015     Priority: Low   • Dry eye syndrome 10/01/2015     Priority: Low   • DM (diabetes mellitus) (CMS/[facility]) 10/01/2015     Priority: Low   • Conjunctival hemorrhage 05/28/2015     Priority: Low   • MGD (meibomian gland disease) 05/28/2015     Priority: Low   • Pseudophakia of both eyes 10/28/2013     Priority: Low   • CAD (coronary artery disease)      Priority: Low   • HLD (hyperlipidemia)      Priority: Low   • GERD (gastroesophageal reflux disease)      Priority: Low   • Hyperparathyroidism due to renal insufficiency (CMS/[facility])      Priority: Low   • RLS (restless legs syndrome)      Priority: Low   • History of tobacco use       Priority: Low       PAST SURGICAL HISTORY:    Past Surgical History:   Procedure Laterality Date   • Appendectomy     • Cardiac catherization     • Cataract extraction w/ intraocular lens implant Bilateral    • Cholecystectomy     • Colonoscopy diagnostic  02/13/2006   • Dexa bone density axial skeleton  01/12/2011   • Extracapsular cataract removal w insert io lens prosth wo ecp  05/31/2012; 05/10/2012    Both eyes   • Eye surgery     • Hb closed reduction other Left 10/06/2017    ankle   • Hernia repair     • Mammo screening bilateral  01/06/2012   • Removal gallbladder     • Skin biopsy     • Small intestine surgery  01/01/2002    Resection for GI stromal cell tumor       ALLERGIES:   Allergen Reactions   • Ace Inhibitors Other (See Comments)     \"Heart beats faster\"   • Calcium Channel Blockers Cough   • Statins GI UPSET       MEDICATIONS:  Outpatient Medications Marked as Taking for the 4/15/25 encounter (Office Visit) with Marcos Iverson MD   Medication Sig Dispense Refill   • senna (SENOKOT) 8.6 MG tablet Take 1 tablet by mouth as needed for Constipation.     • nitroGLYCERIN (NITROSTAT) 0.4 MG sublingual tablet Place 1 tablet under the tongue every 5 minutes as needed for Chest pain. 90 tablet 3   • insulin glargine (LANTUS) 100 UNIT/ML vial solution Inject 45 Units into the skin every morning. Indications: Type 2 Diabetes     • furosemide (LASIX) 20 MG tablet Take 1 tablet by mouth daily. 30 tablet 11   • losartan (COZAAR) 25 MG tablet Take 1 tablet by mouth daily. 30 tablet 11   • pramipexole (MIRAPEX) 1 MG tablet 0.5 mg in the morning and 1 mg at night (for restless legs) 45 tablet 11   • baclofen (LIORESAL) 10 MG tablet Take 10 mg by mouth daily.     • isosorbide mononitrate (IMDUR) 30 MG 24 hr tablet Take 30 mg by mouth daily.     • latanoprost (XALATAN) 0.005 % ophthalmic solution Place 1 drop into both eyes nightly.     • Nystop 569717 UNIT/GM powder Apply topically as needed.     • morphine (ROXANOL)  100 MG/5ML concentrated solution Take by mouth as needed for Pain. 0.25 ml-1.0 ml sublingually every 1 hr as needed for moderate pain     • ondansetron (ZOFRAN) 4 MG tablet Take 4 mg by mouth every 6 hours as needed for Nausea.     • traZODone (DESYREL) 50 MG tablet Take 0.5 tablets by mouth nightly. Indications: Trouble Sleeping 30 tablet 11   • traMADol (ULTRAM) 50 MG tablet Take 1 tablet by mouth every 6 hours as needed for Pain. 30 tablet 3   • DULoxetine (CYMBALTA) 60 MG capsule Take 1 capsule by mouth daily. 30 capsule 11   • aspirin (Aspirin 81) 81 MG chewable tablet Chew 81 mg by mouth daily.     • Omeprazole 20 MG Tablet Delayed Release Dispersible Take 40 mg by mouth daily. Indications: GERD     • allopurinol (ZYLOPRIM) 100 MG tablet Take 100 mg by mouth daily.     • calcium carbonate (Tums E-X 750) 750 MG chewable tablet Give one tablet by mouth daily.  May also use PRN for heartburn/indigestion.     • polyvinyl alcohol-povidone PF (REFRESH CLASSIC EYE DROPS) 1.4-0.6 % ophthalmic solution Place 1 drop into both eyes 4 times daily. For dryness 30 each 11   • metoPROLOL tartrate (LOPRESSOR) 25 MG tablet Take 25 mg by mouth in the morning and 25 mg in the evening. 90 tablet 0   • Baclofen 5 MG tablet Take 5 mg by mouth 2 times daily as needed. Indications: Muscle Spasm 90 tablet    • pravastatin (PRAVACHOL) 20 MG tablet Take 20 mg by mouth daily.      • acetaminophen (TYLENOL) 650 MG CR tablet Take 1,300 mg by mouth in the morning and 1,300 mg at noon and 1,300 mg in the evening.     • diclofenac (VOLTAREN) 1 % gel Apply 4 g topically 2 times daily. Apply in the morning and HS and can use one additional dose prn daily. 100 g 0   • bisacodyl (DULCOLAX) 10 MG suppository Place 10 mg rectally every 24 hours as needed for Constipation (Insert 10mg rectally every 24 hours as needed for constipation).     • magnesium hydroxide (Milk of Magnesia) 400 MG/5ML suspension Take 30 mLs by mouth every 24 hours as needed  for Constipation.     • acetaminophen (TYLENOL) 325 MG tablet Take 650 mg by mouth every 4 hours as needed for Pain or Fever.     • ferrous sulfate 325 (65 FE) MG EC tablet Take 1 tablet by mouth daily (with breakfast). 30 tablet 0   • polyethylene glycol (MIRALAX) 17 g packet Take 17 g by mouth daily as needed (constipation). Hold if loose stools 14 each 0   • fluticasone (FLONASE) 50 MCG/ACT nasal spray Spray 1 spray in each nostril daily.     • folic acid (FOLATE) 1 MG tablet Take 1 tablet by mouth daily. 90 tablet 3   • albuterol 108 (90 Base) MCG/ACT inhaler Inhale 2 puffs into the lungs every 4 hours as needed for Shortness of Breath or Wheezing. 18 g 3   • Cholecalciferol (vitamin D3) 125 mcg (5,000 units) capsule Take 1 capsule by mouth daily. 90 capsule 3       SOCIAL HISTORY:  Social History     Socioeconomic History   • Marital status:      Spouse name: Not on file   • Number of children: Not on file   • Years of education: Not on file   • Highest education level: Not on file   Occupational History   • Occupation: retired farmer   Tobacco Use   • Smoking status: Former     Current packs/day: 0.00     Types: Cigarettes     Quit date: 1957     Years since quittin.3   • Smokeless tobacco: Never   Vaping Use   • Vaping status: never used   Substance and Sexual Activity   • Alcohol use: Not Currently     Alcohol/week: 0.0 standard drinks of alcohol     Comment: 1/month    • Drug use: No   • Sexual activity: Not Currently   Other Topics Concern   • Not on file   Social History Narrative   • Not on file     Social Drivers of Health     Financial Resource Strain: Not on file   Food Insecurity: Not on file   Transportation Needs: Not on file   Physical Activity: Not on file   Stress: Not on file   Social Connections: Unknown (2024)    [facility]    Social Connections    • How often do you feel lonely or isolated from those around you?: Not on file     • Social Connections: Recent Result: Not on file   Feeling safe in your relationship: Not At Risk (12/16/2023)    Interpersonal Safety    • Social Determinants: Intimate Partner Violence Past Fear: No    • Social Determinants: Intimate Partner Violence Current Fear: No       FAMILY HISTORY:  family history includes Cancer in her brother and brother; Diabetes in her mother; Heart disease in her brother, brother, brother, brother, father, mother, sister, and sister; Hyperlipidemia in her daughter and son; Hypertension in her daughter and son; Macular degeneration in her sister; Thyroid in her daughter.    I have personally reviewed and updated the following EPIC sections Current medications, Allergies, Problem list, Past Medical History, Past Surgical History, Social History and Family History    REVIEW OF SYSTEMS:  Constitutional: There is no history of fevers or chills.  Eyes: Patient denies blurred vision.  ENMT: No history of ear pain.  Cardiovascular: As noted in HPI.  Respiratory: There is no history of shortness of breath.  Gastrointestinal: No history of abdominal pain, nausea or vomiting.  Genitourinary: There is no history of dysuria.  Musculoskeletal: Denies history of new joint pain.   Skin: No history of skin rashes.  Neurologic: No history of numbness, tingling or weakness.  All other systems that were reviewed are negative.    LABS:  Sodium (mmol/L)   Date Value   02/18/2025 136     Potassium (mmol/L)   Date Value   02/18/2025 4.6     Chloride (mmol/L)   Date Value   02/18/2025 102     Glucose (mg/dL)   Date Value   02/18/2025 199 (H)     Calcium (mg/dL)   Date Value   02/18/2025 9.1     Carbon Dioxide (mmol/L)   Date Value   02/18/2025 24     BUN (mg/dL)   Date Value   02/18/2025 31 (H)     Creatinine (mg/dL)   Date Value   02/18/2025 1.11 (H)     TSH (mcUnits/mL)   Date Value   05/02/2022 5.024 (H)     Last Lab A1C:  Hemoglobin A1C (%)   Date Value   03/15/2023 7.9 (H)       Last Point of Care  A1C:  No results found for: \"UJSXISHE9A\", \"5GLYH\"  CHOLESTEROL (mg/dL)   Date Value   03/15/2023 190     HDL (mg/dL)   Date Value   03/15/2023 31 (L)     CHOL/HDL (no units)   Date Value   03/15/2023 6.13 (H)     TRIGLYCERIDE (mg/dL)   Date Value   03/15/2023 450 (H)     LDL (mg/dL)   Date Value   05/08/2020 93       VITAL SIGNS:  Vitals:    04/15/25 1103   BP: 130/64   Pulse: (!) 52     PHYSICAL ASSESSMENT:  CONSTITUTIONAL:  Well-developed, well-nourished.  PSYCHIATRIC/NEUROLOGIC:  Comfortable and in no apparent distress.  Alert and oriented x3.  In a good mood.  SKIN:  Soft, warm, and dry, without rashes or bruises.    LUNGS:  Respiratory effort is unlabored.  Lungs are clear without wheezing or crackles.  CARDIAC EXAM: The PMI is non-displaced.  Auscultation reveals a normal S1, normal S2.  No S3 or S4.  No murmurs, clicks, or pericardial friction rub.   EXTREMITIES:  Without clubbing, cyanosis or edema.  Femoral and pedal pulses intact.     ASSESSMENT/PLAN:    Coronary Artery Disease s/p stent to RCA in [place] in 06/2018. NM Stress from 06/2021 noted inferior wall artifact, no evidence of myocardial ischemia or infarction. Denies angina or anginal equivalent, but patient has limited functional capacity. On pravastatin, metoprolol, imdur, nitroglycerin PRN.      HFpEF: LVEF of 66% per most recent Echo from 06/2021. NT proBNP from 08/2023 was 475 pg/mL. Compensated on exam. No reports of dyspnea, fluid retention or orthopnea. On metoprolol, losartan, hydralazine, lasix.      Hypertension: Controlled in clinic, 130/64. On metoprolol, losartan, imdur, hydralazine, lasix.      Hyperlipidemia: Most recent FLP reviewed from 04/2024. Chol 134, HDL 31, LDL 53, . On pravastatin.      Diabetes Mellitus: A1c of 8.3% as of 04/2024. On insulin    Return if symptoms worsen or fail to improve.    On 4/15/2025, Amelia RIVERA scribed the services personally performed by Marcos Iverson MD  The documentation recorded  by the scribe accurately and completely reflects the service(s) I personally performed and the decisions made by me.

## 2025-06-30 NOTE — PROGRESS NOTES
Patient presents to infusion appointment in stable condition. Seen by RAFAELA Lunsford CNP. C10D1 [id number] azacitidine injections tolerated without incident. Hgb 11.8, Aranesp held per orders, ANC 2.00, filgrastim held per orders. Lab results reviewed. Discharged in stable condition.

## 2025-07-02 ENCOUNTER — TREATMENT (OUTPATIENT)
Dept: PHYSICAL THERAPY | Facility: CLINIC | Age: 85
End: 2025-07-02
Payer: MEDICARE

## 2025-07-02 DIAGNOSIS — D46.9 MYELODYSPLASTIC SYNDROME (MULTI): ICD-10-CM

## 2025-07-02 PROCEDURE — 97110 THERAPEUTIC EXERCISES: CPT | Mod: GP

## 2025-07-02 NOTE — PROGRESS NOTES
"Patient Name: Holden Burgess \"Rachelle\"  Today's Date: 7/2/2025  Visit #: 13/20  Time In: 0705  Time Out: 0809  Current Problem: Myelodysplastic syndrome    Subjective:   Pain level / change in pain: 0/10  Patient comments: Had an infusion on Monday    Objective:   Therapeutic Exercise 94422:   1/2 mile indoor track  Recumbent bike x8min level 4  Tilt board L2  Airex DL balance eyes open/eyes closed   Knee extension machine 30# 3x10  Knee flexion machine 50# 3x10  Ascend stairs x20    Assessment:  Patient tolerated today's treatment session well and will continue to benefit from skilled physical therapy services to address functional limitations. Patient appears motivated and compliant this date, demonstrating a working understanding of principals instructed and home program.     Plan: Progress with functional strength, gait and balance      Student supervised 100% of the time by CI and all pertinent clinical decisions were made by CI (Hoang Bill)   "

## 2025-07-03 ENCOUNTER — INFUSION (OUTPATIENT)
Dept: HEMATOLOGY/ONCOLOGY | Facility: HOSPITAL | Age: 85
End: 2025-07-03
Payer: MEDICARE

## 2025-07-03 ENCOUNTER — LAB (OUTPATIENT)
Dept: LAB | Facility: HOSPITAL | Age: 85
End: 2025-07-03
Payer: MEDICARE

## 2025-07-03 VITALS
WEIGHT: 273.15 LBS | SYSTOLIC BLOOD PRESSURE: 134 MMHG | BODY MASS INDEX: 37.24 KG/M2 | DIASTOLIC BLOOD PRESSURE: 70 MMHG | HEART RATE: 84 BPM | TEMPERATURE: 97.3 F | RESPIRATION RATE: 19 BRPM | OXYGEN SATURATION: 99 %

## 2025-07-03 DIAGNOSIS — D46.9 MYELODYSPLASTIC SYNDROME (MULTI): ICD-10-CM

## 2025-07-03 LAB
ALBUMIN SERPL BCP-MCNC: 4 G/DL (ref 3.4–5)
ALP SERPL-CCNC: 87 U/L (ref 33–136)
ALT SERPL W P-5'-P-CCNC: 24 U/L (ref 10–52)
ANION GAP SERPL CALC-SCNC: 12 MMOL/L (ref 10–20)
AST SERPL W P-5'-P-CCNC: 18 U/L (ref 9–39)
BASOPHILS # BLD AUTO: 0.02 X10*3/UL (ref 0–0.1)
BASOPHILS NFR BLD AUTO: 0.6 %
BILIRUB SERPL-MCNC: 0.8 MG/DL (ref 0–1.2)
BUN SERPL-MCNC: 36 MG/DL (ref 6–23)
CALCIUM SERPL-MCNC: 9.5 MG/DL (ref 8.6–10.3)
CHLORIDE SERPL-SCNC: 107 MMOL/L (ref 98–107)
CO2 SERPL-SCNC: 24 MMOL/L (ref 21–32)
CREAT SERPL-MCNC: 1.68 MG/DL (ref 0.5–1.3)
EGFRCR SERPLBLD CKD-EPI 2021: 40 ML/MIN/1.73M*2
EOSINOPHIL # BLD AUTO: 0.08 X10*3/UL (ref 0–0.4)
EOSINOPHIL NFR BLD AUTO: 2.3 %
ERYTHROCYTE [DISTWIDTH] IN BLOOD BY AUTOMATED COUNT: 17 % (ref 11.5–14.5)
GLUCOSE SERPL-MCNC: 99 MG/DL (ref 74–99)
HCT VFR BLD AUTO: 35.8 % (ref 41–52)
HGB BLD-MCNC: 11.6 G/DL (ref 13.5–17.5)
IMM GRANULOCYTES # BLD AUTO: 0.01 X10*3/UL (ref 0–0.5)
IMM GRANULOCYTES NFR BLD AUTO: 0.3 % (ref 0–0.9)
LYMPHOCYTES # BLD AUTO: 1.27 X10*3/UL (ref 0.8–3)
LYMPHOCYTES NFR BLD AUTO: 36.2 %
MCH RBC QN AUTO: 32.2 PG (ref 26–34)
MCHC RBC AUTO-ENTMCNC: 32.4 G/DL (ref 32–36)
MCV RBC AUTO: 99 FL (ref 80–100)
MONOCYTES # BLD AUTO: 0.2 X10*3/UL (ref 0.05–0.8)
MONOCYTES NFR BLD AUTO: 5.7 %
NEUTROPHILS # BLD AUTO: 1.93 X10*3/UL (ref 1.6–5.5)
NEUTROPHILS NFR BLD AUTO: 54.9 %
NRBC BLD-RTO: 0 /100 WBCS (ref 0–0)
PLATELET # BLD AUTO: 76 X10*3/UL (ref 150–450)
POTASSIUM SERPL-SCNC: 4.5 MMOL/L (ref 3.5–5.3)
PROT SERPL-MCNC: 6.2 G/DL (ref 6.4–8.2)
RBC # BLD AUTO: 3.6 X10*6/UL (ref 4.5–5.9)
SODIUM SERPL-SCNC: 138 MMOL/L (ref 136–145)
WBC # BLD AUTO: 3.5 X10*3/UL (ref 4.4–11.3)

## 2025-07-03 PROCEDURE — 80053 COMPREHEN METABOLIC PANEL: CPT

## 2025-07-03 PROCEDURE — 36415 COLL VENOUS BLD VENIPUNCTURE: CPT

## 2025-07-03 PROCEDURE — 85025 COMPLETE CBC W/AUTO DIFF WBC: CPT

## 2025-07-03 PROCEDURE — 2560000001 HC RX 256 EXPERIMENTAL DRUGS: Performed by: NURSE PRACTITIONER

## 2025-07-03 PROCEDURE — 96401 CHEMO ANTI-NEOPL SQ/IM: CPT

## 2025-07-03 PROCEDURE — 2500000004 HC RX 250 GENERAL PHARMACY W/ HCPCS (ALT 636 FOR OP/ED): Performed by: NURSE PRACTITIONER

## 2025-07-03 RX ORDER — EPINEPHRINE 0.3 MG/.3ML
0.3 INJECTION SUBCUTANEOUS EVERY 5 MIN PRN
Status: DISCONTINUED | OUTPATIENT
Start: 2025-07-03 | End: 2025-07-03 | Stop reason: HOSPADM

## 2025-07-03 RX ORDER — DIPHENHYDRAMINE HYDROCHLORIDE 50 MG/ML
50 INJECTION, SOLUTION INTRAMUSCULAR; INTRAVENOUS AS NEEDED
Status: DISCONTINUED | OUTPATIENT
Start: 2025-07-03 | End: 2025-07-03 | Stop reason: HOSPADM

## 2025-07-03 RX ORDER — ALBUTEROL SULFATE 0.83 MG/ML
3 SOLUTION RESPIRATORY (INHALATION) AS NEEDED
Status: DISCONTINUED | OUTPATIENT
Start: 2025-07-03 | End: 2025-07-03 | Stop reason: HOSPADM

## 2025-07-03 RX ORDER — FAMOTIDINE 10 MG/ML
20 INJECTION, SOLUTION INTRAVENOUS ONCE AS NEEDED
Status: DISCONTINUED | OUTPATIENT
Start: 2025-07-03 | End: 2025-07-03 | Stop reason: HOSPADM

## 2025-07-03 RX ORDER — ONDANSETRON 8 MG/1
8 TABLET, FILM COATED ORAL ONCE
Status: COMPLETED | OUTPATIENT
Start: 2025-07-03 | End: 2025-07-03

## 2025-07-03 RX ADMIN — Medication 12.9 MG: at 13:36

## 2025-07-03 RX ADMIN — ONDANSETRON HYDROCHLORIDE 8 MG: 8 TABLET, FILM COATED ORAL at 12:59

## 2025-07-03 ASSESSMENT — PAIN SCALES - GENERAL: PAINLEVEL_OUTOF10: 0-NO PAIN

## 2025-07-03 NOTE — PROGRESS NOTES
GENARO Burgess is a 84 y.o. male who presents for Day 4 Cycle 10.    He is on the following chemotherapy regimen:     Treatment Plans       Name Type Plan Dates Plan Provider         Active    ([facility]) [id number] - AzaCITIDine / Decitabine, 42 Day Cycle Followed by [facility] On[facility] Treatment 9/29/2024 - Present Sebastien Rashid MD                    Since his last visit, he has been doing well.  Overall, he states his energy level is stable.  His appetite has been unchanged.  He reports no complaints.  He has no other concerns.    Tolerated injection well, labs along with AVS provided and discharged in stable condition with wife by his side.

## 2025-07-07 ENCOUNTER — INFUSION (OUTPATIENT)
Dept: HEMATOLOGY/ONCOLOGY | Facility: HOSPITAL | Age: 85
End: 2025-07-07
Payer: MEDICARE

## 2025-07-07 ENCOUNTER — LAB (OUTPATIENT)
Dept: LAB | Facility: HOSPITAL | Age: 85
End: 2025-07-07
Payer: MEDICARE

## 2025-07-07 VITALS
BODY MASS INDEX: 37.06 KG/M2 | TEMPERATURE: 97.2 F | RESPIRATION RATE: 18 BRPM | WEIGHT: 273.59 LBS | DIASTOLIC BLOOD PRESSURE: 77 MMHG | HEIGHT: 72 IN | HEART RATE: 80 BPM | SYSTOLIC BLOOD PRESSURE: 124 MMHG | OXYGEN SATURATION: 97 %

## 2025-07-07 DIAGNOSIS — D46.9 MYELODYSPLASTIC SYNDROME (MULTI): ICD-10-CM

## 2025-07-07 LAB
ALBUMIN SERPL BCP-MCNC: 4.2 G/DL (ref 3.4–5)
ALP SERPL-CCNC: 92 U/L (ref 33–136)
ALT SERPL W P-5'-P-CCNC: 34 U/L (ref 10–52)
ANION GAP SERPL CALC-SCNC: 13 MMOL/L (ref 10–20)
AST SERPL W P-5'-P-CCNC: 26 U/L (ref 9–39)
BASOPHILS # BLD AUTO: 0.01 X10*3/UL (ref 0–0.1)
BASOPHILS NFR BLD AUTO: 0.3 %
BILIRUB SERPL-MCNC: 0.9 MG/DL (ref 0–1.2)
BUN SERPL-MCNC: 26 MG/DL (ref 6–23)
CALCIUM SERPL-MCNC: 9.6 MG/DL (ref 8.6–10.3)
CHLORIDE SERPL-SCNC: 105 MMOL/L (ref 98–107)
CO2 SERPL-SCNC: 27 MMOL/L (ref 21–32)
CREAT SERPL-MCNC: 1.38 MG/DL (ref 0.5–1.3)
EGFRCR SERPLBLD CKD-EPI 2021: 50 ML/MIN/1.73M*2
EOSINOPHIL # BLD AUTO: 0.14 X10*3/UL (ref 0–0.4)
EOSINOPHIL NFR BLD AUTO: 4.2 %
ERYTHROCYTE [DISTWIDTH] IN BLOOD BY AUTOMATED COUNT: 16.8 % (ref 11.5–14.5)
GLUCOSE SERPL-MCNC: 96 MG/DL (ref 74–99)
HCT VFR BLD AUTO: 38.4 % (ref 41–52)
HGB BLD-MCNC: 12.3 G/DL (ref 13.5–17.5)
IMM GRANULOCYTES # BLD AUTO: 0.01 X10*3/UL (ref 0–0.5)
IMM GRANULOCYTES NFR BLD AUTO: 0.3 % (ref 0–0.9)
LYMPHOCYTES # BLD AUTO: 1.15 X10*3/UL (ref 0.8–3)
LYMPHOCYTES NFR BLD AUTO: 34.7 %
MCH RBC QN AUTO: 32 PG (ref 26–34)
MCHC RBC AUTO-ENTMCNC: 32 G/DL (ref 32–36)
MCV RBC AUTO: 100 FL (ref 80–100)
MONOCYTES # BLD AUTO: 0.2 X10*3/UL (ref 0.05–0.8)
MONOCYTES NFR BLD AUTO: 6 %
NEUTROPHILS # BLD AUTO: 1.8 X10*3/UL (ref 1.6–5.5)
NEUTROPHILS NFR BLD AUTO: 54.5 %
NRBC BLD-RTO: 0 /100 WBCS (ref 0–0)
PLATELET # BLD AUTO: 86 X10*3/UL (ref 150–450)
POTASSIUM SERPL-SCNC: 4.5 MMOL/L (ref 3.5–5.3)
PROT SERPL-MCNC: 6.5 G/DL (ref 6.4–8.2)
RBC # BLD AUTO: 3.84 X10*6/UL (ref 4.5–5.9)
SODIUM SERPL-SCNC: 140 MMOL/L (ref 136–145)
WBC # BLD AUTO: 3.3 X10*3/UL (ref 4.4–11.3)

## 2025-07-07 PROCEDURE — 2500000004 HC RX 250 GENERAL PHARMACY W/ HCPCS (ALT 636 FOR OP/ED): Performed by: NURSE PRACTITIONER

## 2025-07-07 PROCEDURE — 36415 COLL VENOUS BLD VENIPUNCTURE: CPT

## 2025-07-07 PROCEDURE — 96401 CHEMO ANTI-NEOPL SQ/IM: CPT

## 2025-07-07 PROCEDURE — 85025 COMPLETE CBC W/AUTO DIFF WBC: CPT

## 2025-07-07 PROCEDURE — 2560000001 HC RX 256 EXPERIMENTAL DRUGS: Performed by: NURSE PRACTITIONER

## 2025-07-07 PROCEDURE — 80053 COMPREHEN METABOLIC PANEL: CPT

## 2025-07-07 RX ORDER — DIPHENHYDRAMINE HYDROCHLORIDE 50 MG/ML
50 INJECTION, SOLUTION INTRAMUSCULAR; INTRAVENOUS AS NEEDED
Status: DISCONTINUED | OUTPATIENT
Start: 2025-07-07 | End: 2025-07-07 | Stop reason: HOSPADM

## 2025-07-07 RX ORDER — ONDANSETRON 8 MG/1
8 TABLET, FILM COATED ORAL ONCE
Status: COMPLETED | OUTPATIENT
Start: 2025-07-07 | End: 2025-07-07

## 2025-07-07 RX ORDER — EPINEPHRINE 0.3 MG/.3ML
0.3 INJECTION SUBCUTANEOUS EVERY 5 MIN PRN
Status: DISCONTINUED | OUTPATIENT
Start: 2025-07-07 | End: 2025-07-07 | Stop reason: HOSPADM

## 2025-07-07 RX ORDER — FAMOTIDINE 10 MG/ML
20 INJECTION, SOLUTION INTRAVENOUS ONCE AS NEEDED
Status: DISCONTINUED | OUTPATIENT
Start: 2025-07-07 | End: 2025-07-07 | Stop reason: HOSPADM

## 2025-07-07 RX ORDER — ALBUTEROL SULFATE 0.83 MG/ML
3 SOLUTION RESPIRATORY (INHALATION) AS NEEDED
Status: DISCONTINUED | OUTPATIENT
Start: 2025-07-07 | End: 2025-07-07 | Stop reason: HOSPADM

## 2025-07-07 RX ADMIN — Medication 129 MG: at 14:37

## 2025-07-07 RX ADMIN — ONDANSETRON HYDROCHLORIDE 8 MG: 8 TABLET, FILM COATED ORAL at 14:08

## 2025-07-07 NOTE — PROGRESS NOTES
Patient presents to infusion appointment in stable condition. C10D8 [id number] azacitidine injections tolerated without incident. Hgb 12.3, ANC 1.80, Aranesp and filgrastim held per order. Lab results and schedule reviewed. Discharged in stable condition.

## 2025-07-09 ENCOUNTER — TREATMENT (OUTPATIENT)
Dept: PHYSICAL THERAPY | Facility: CLINIC | Age: 85
End: 2025-07-09
Payer: MEDICARE

## 2025-07-09 DIAGNOSIS — D46.9 MYELODYSPLASTIC SYNDROME (MULTI): ICD-10-CM

## 2025-07-09 PROCEDURE — 97110 THERAPEUTIC EXERCISES: CPT | Mod: GP

## 2025-07-09 NOTE — PROGRESS NOTES
Patient Name: Holden Burgess  Today's Date: 7/9/2025  Visit #: 14/20  Time In: 1445  Time Out: 1530  Total Time:45    Subjective:   Pain level / change in pain: 0  Change in function:   Patient comments: Feels tired today from building two chairs.     Objective:     Therapeutic Exercise 64610:   4 laps indoor track with wheeled walker  Recumbent bike x10 min (2miles) Level 3  Tilt board L2 fwd/bkwd, side/side  Airex DL balance eyes open x2 min  Tandem stance balance x30s R / x30s L  Standing knee flexion x20 each  Standing step taps x20 each    Assessment: Pat was tired today but willing and able to participate in therapy. Worked on more balance / postural work today. Would not perform DL stance balance with eyes closed today. Pat demonstrated good endurance with both the bike and walking around the track. Shared that his creatinine levels were high and that he was instructed to drink more water. Patient displayed understanding and compliance with home exercise program. Patient tolerated today's treatment session well and will continue to benefit from skilled physical therapy services to address functional limitations.     Plan: Progress to DL stance with eyes closed or only one hand for support next visit. Continue implementing manual therapy techniques and therapeutic exercise to address functional limitations and progress/regress exercises as needed. Further treatments may include therapeutic exercise, therapeutic activities, manual therapy, neuromuscular re-education, dry needling. Continue to emphasize the importance of hydration.     Student supervised 100% of the time by CI and all pertinent clinical decisions were made by CI (Hoang Bill)

## 2025-07-10 ENCOUNTER — LAB (OUTPATIENT)
Dept: LAB | Facility: HOSPITAL | Age: 85
End: 2025-07-10
Payer: MEDICARE

## 2025-07-10 ENCOUNTER — INFUSION (OUTPATIENT)
Dept: HEMATOLOGY/ONCOLOGY | Facility: HOSPITAL | Age: 85
End: 2025-07-10
Payer: MEDICARE

## 2025-07-10 VITALS
SYSTOLIC BLOOD PRESSURE: 147 MMHG | WEIGHT: 271.83 LBS | RESPIRATION RATE: 16 BRPM | TEMPERATURE: 97.9 F | DIASTOLIC BLOOD PRESSURE: 66 MMHG | OXYGEN SATURATION: 99 % | HEART RATE: 88 BPM | BODY MASS INDEX: 37.06 KG/M2

## 2025-07-10 DIAGNOSIS — D46.9 MYELODYSPLASTIC SYNDROME (MULTI): ICD-10-CM

## 2025-07-10 LAB
ALBUMIN SERPL BCP-MCNC: 3.9 G/DL (ref 3.4–5)
ALP SERPL-CCNC: 83 U/L (ref 33–136)
ALT SERPL W P-5'-P-CCNC: 22 U/L (ref 10–52)
ANION GAP SERPL CALC-SCNC: 11 MMOL/L (ref 10–20)
AST SERPL W P-5'-P-CCNC: 17 U/L (ref 9–39)
BASOPHILS # BLD AUTO: 0.01 X10*3/UL (ref 0–0.1)
BASOPHILS NFR BLD AUTO: 0.3 %
BILIRUB SERPL-MCNC: 0.8 MG/DL (ref 0–1.2)
BUN SERPL-MCNC: 32 MG/DL (ref 6–23)
CALCIUM SERPL-MCNC: 9.5 MG/DL (ref 8.6–10.3)
CHLORIDE SERPL-SCNC: 107 MMOL/L (ref 98–107)
CO2 SERPL-SCNC: 26 MMOL/L (ref 21–32)
CREAT SERPL-MCNC: 1.34 MG/DL (ref 0.5–1.3)
EGFRCR SERPLBLD CKD-EPI 2021: 52 ML/MIN/1.73M*2
EOSINOPHIL # BLD AUTO: 0.1 X10*3/UL (ref 0–0.4)
EOSINOPHIL NFR BLD AUTO: 3.5 %
ERYTHROCYTE [DISTWIDTH] IN BLOOD BY AUTOMATED COUNT: 17 % (ref 11.5–14.5)
GLUCOSE SERPL-MCNC: 89 MG/DL (ref 74–99)
HCT VFR BLD AUTO: 35.5 % (ref 41–52)
HGB BLD-MCNC: 11.6 G/DL (ref 13.5–17.5)
IMM GRANULOCYTES # BLD AUTO: 0.01 X10*3/UL (ref 0–0.5)
IMM GRANULOCYTES NFR BLD AUTO: 0.3 % (ref 0–0.9)
LYMPHOCYTES # BLD AUTO: 1.04 X10*3/UL (ref 0.8–3)
LYMPHOCYTES NFR BLD AUTO: 36.2 %
MCH RBC QN AUTO: 32.7 PG (ref 26–34)
MCHC RBC AUTO-ENTMCNC: 32.7 G/DL (ref 32–36)
MCV RBC AUTO: 100 FL (ref 80–100)
MONOCYTES # BLD AUTO: 0.18 X10*3/UL (ref 0.05–0.8)
MONOCYTES NFR BLD AUTO: 6.3 %
NEUTROPHILS # BLD AUTO: 1.53 X10*3/UL (ref 1.6–5.5)
NEUTROPHILS NFR BLD AUTO: 53.4 %
NRBC BLD-RTO: 0 /100 WBCS (ref 0–0)
PLATELET # BLD AUTO: 69 X10*3/UL (ref 150–450)
POTASSIUM SERPL-SCNC: 4.3 MMOL/L (ref 3.5–5.3)
PROT SERPL-MCNC: 6 G/DL (ref 6.4–8.2)
RBC # BLD AUTO: 3.55 X10*6/UL (ref 4.5–5.9)
SODIUM SERPL-SCNC: 140 MMOL/L (ref 136–145)
WBC # BLD AUTO: 2.9 X10*3/UL (ref 4.4–11.3)

## 2025-07-10 PROCEDURE — 84075 ASSAY ALKALINE PHOSPHATASE: CPT

## 2025-07-10 PROCEDURE — 96401 CHEMO ANTI-NEOPL SQ/IM: CPT

## 2025-07-10 PROCEDURE — 36415 COLL VENOUS BLD VENIPUNCTURE: CPT

## 2025-07-10 PROCEDURE — 85025 COMPLETE CBC W/AUTO DIFF WBC: CPT

## 2025-07-10 PROCEDURE — 2500000004 HC RX 250 GENERAL PHARMACY W/ HCPCS (ALT 636 FOR OP/ED): Performed by: NURSE PRACTITIONER

## 2025-07-10 PROCEDURE — 2560000001 HC RX 256 EXPERIMENTAL DRUGS: Performed by: NURSE PRACTITIONER

## 2025-07-10 RX ORDER — DIPHENHYDRAMINE HYDROCHLORIDE 50 MG/ML
50 INJECTION, SOLUTION INTRAMUSCULAR; INTRAVENOUS AS NEEDED
Status: DISCONTINUED | OUTPATIENT
Start: 2025-07-10 | End: 2025-07-10 | Stop reason: HOSPADM

## 2025-07-10 RX ORDER — ALBUTEROL SULFATE 0.83 MG/ML
3 SOLUTION RESPIRATORY (INHALATION) AS NEEDED
Status: DISCONTINUED | OUTPATIENT
Start: 2025-07-10 | End: 2025-07-10 | Stop reason: HOSPADM

## 2025-07-10 RX ORDER — ONDANSETRON 8 MG/1
8 TABLET, FILM COATED ORAL ONCE
Status: COMPLETED | OUTPATIENT
Start: 2025-07-10 | End: 2025-07-10

## 2025-07-10 RX ORDER — EPINEPHRINE 0.3 MG/.3ML
0.3 INJECTION SUBCUTANEOUS EVERY 5 MIN PRN
Status: DISCONTINUED | OUTPATIENT
Start: 2025-07-10 | End: 2025-07-10 | Stop reason: HOSPADM

## 2025-07-10 RX ORDER — FAMOTIDINE 10 MG/ML
20 INJECTION, SOLUTION INTRAVENOUS ONCE AS NEEDED
Status: DISCONTINUED | OUTPATIENT
Start: 2025-07-10 | End: 2025-07-10 | Stop reason: HOSPADM

## 2025-07-10 RX ADMIN — Medication 12.9 MG: at 13:03

## 2025-07-10 RX ADMIN — ONDANSETRON HYDROCHLORIDE 8 MG: 8 TABLET, FILM COATED ORAL at 12:29

## 2025-07-10 ASSESSMENT — PAIN SCALES - GENERAL: PAINLEVEL_OUTOF10: 0-NO PAIN

## 2025-07-10 NOTE — PROGRESS NOTES
GENARO Burgess is a 84 y.o. male who presents for Day 11 Cycle 10.    He is on the following chemotherapy regimen:     Treatment Plans       Name Type Plan Dates Plan Provider         Active    ([facility]) [id number] - AzaCITIDine / Decitabine, 42 Day Cycle Followed by [facility] Day [facility] On[facility] Treatment 9/29/2024 - Present Sebastien Rashid MD                    Since his last visit, he has been doing well.  Overall, he states his energy level is stable.  His appetite has been unchanged.  He reports no complaints.  He has no other concerns.    Tolerated injection well, AVS along with lab printout provided and discharged in stable condition.

## 2025-07-14 ENCOUNTER — INFUSION (OUTPATIENT)
Dept: HEMATOLOGY/ONCOLOGY | Facility: HOSPITAL | Age: 85
End: 2025-07-14
Payer: MEDICARE

## 2025-07-14 ENCOUNTER — LAB (OUTPATIENT)
Dept: LAB | Facility: HOSPITAL | Age: 85
End: 2025-07-14
Payer: MEDICARE

## 2025-07-14 VITALS
SYSTOLIC BLOOD PRESSURE: 132 MMHG | DIASTOLIC BLOOD PRESSURE: 72 MMHG | WEIGHT: 271.83 LBS | OXYGEN SATURATION: 100 % | RESPIRATION RATE: 16 BRPM | HEART RATE: 80 BPM | TEMPERATURE: 97.2 F | BODY MASS INDEX: 37.06 KG/M2

## 2025-07-14 DIAGNOSIS — D46.9 MYELODYSPLASTIC SYNDROME (MULTI): ICD-10-CM

## 2025-07-14 LAB
ALBUMIN SERPL BCP-MCNC: 4.3 G/DL (ref 3.4–5)
ALP SERPL-CCNC: 90 U/L (ref 33–136)
ALT SERPL W P-5'-P-CCNC: 29 U/L (ref 10–52)
ANION GAP SERPL CALC-SCNC: 11 MMOL/L (ref 10–20)
AST SERPL W P-5'-P-CCNC: 22 U/L (ref 9–39)
BASOPHILS # BLD AUTO: 0.01 X10*3/UL (ref 0–0.1)
BASOPHILS NFR BLD AUTO: 0.3 %
BILIRUB SERPL-MCNC: 1.1 MG/DL (ref 0–1.2)
BUN SERPL-MCNC: 34 MG/DL (ref 6–23)
CALCIUM SERPL-MCNC: 9.7 MG/DL (ref 8.6–10.3)
CHLORIDE SERPL-SCNC: 104 MMOL/L (ref 98–107)
CO2 SERPL-SCNC: 28 MMOL/L (ref 21–32)
CREAT SERPL-MCNC: 1.45 MG/DL (ref 0.5–1.3)
EGFRCR SERPLBLD CKD-EPI 2021: 48 ML/MIN/1.73M*2
EOSINOPHIL # BLD AUTO: 0.08 X10*3/UL (ref 0–0.4)
EOSINOPHIL NFR BLD AUTO: 2.4 %
ERYTHROCYTE [DISTWIDTH] IN BLOOD BY AUTOMATED COUNT: 16.3 % (ref 11.5–14.5)
GLUCOSE SERPL-MCNC: 114 MG/DL (ref 74–99)
HCT VFR BLD AUTO: 37.5 % (ref 41–52)
HGB BLD-MCNC: 12.2 G/DL (ref 13.5–17.5)
IMM GRANULOCYTES # BLD AUTO: 0.03 X10*3/UL (ref 0–0.5)
IMM GRANULOCYTES NFR BLD AUTO: 0.9 % (ref 0–0.9)
LYMPHOCYTES # BLD AUTO: 0.92 X10*3/UL (ref 0.8–3)
LYMPHOCYTES NFR BLD AUTO: 27.7 %
MCH RBC QN AUTO: 32.4 PG (ref 26–34)
MCHC RBC AUTO-ENTMCNC: 32.5 G/DL (ref 32–36)
MCV RBC AUTO: 100 FL (ref 80–100)
MONOCYTES # BLD AUTO: 0.17 X10*3/UL (ref 0.05–0.8)
MONOCYTES NFR BLD AUTO: 5.1 %
NEUTROPHILS # BLD AUTO: 2.11 X10*3/UL (ref 1.6–5.5)
NEUTROPHILS NFR BLD AUTO: 63.6 %
NRBC BLD-RTO: 0 /100 WBCS (ref 0–0)
PLATELET # BLD AUTO: 72 X10*3/UL (ref 150–450)
POTASSIUM SERPL-SCNC: 4.5 MMOL/L (ref 3.5–5.3)
PROT SERPL-MCNC: 6.4 G/DL (ref 6.4–8.2)
RBC # BLD AUTO: 3.76 X10*6/UL (ref 4.5–5.9)
SODIUM SERPL-SCNC: 138 MMOL/L (ref 136–145)
WBC # BLD AUTO: 3.3 X10*3/UL (ref 4.4–11.3)

## 2025-07-14 PROCEDURE — 2500000004 HC RX 250 GENERAL PHARMACY W/ HCPCS (ALT 636 FOR OP/ED): Performed by: NURSE PRACTITIONER

## 2025-07-14 PROCEDURE — 2560000001 HC RX 256 EXPERIMENTAL DRUGS: Performed by: NURSE PRACTITIONER

## 2025-07-14 PROCEDURE — 85025 COMPLETE CBC W/AUTO DIFF WBC: CPT

## 2025-07-14 PROCEDURE — 96401 CHEMO ANTI-NEOPL SQ/IM: CPT

## 2025-07-14 PROCEDURE — 80053 COMPREHEN METABOLIC PANEL: CPT

## 2025-07-14 PROCEDURE — 36415 COLL VENOUS BLD VENIPUNCTURE: CPT

## 2025-07-14 RX ORDER — EPINEPHRINE 0.3 MG/.3ML
0.3 INJECTION SUBCUTANEOUS EVERY 5 MIN PRN
Status: DISCONTINUED | OUTPATIENT
Start: 2025-07-14 | End: 2025-07-14 | Stop reason: HOSPADM

## 2025-07-14 RX ORDER — ONDANSETRON 8 MG/1
8 TABLET, FILM COATED ORAL ONCE
Status: COMPLETED | OUTPATIENT
Start: 2025-07-14 | End: 2025-07-14

## 2025-07-14 RX ORDER — ALBUTEROL SULFATE 0.83 MG/ML
3 SOLUTION RESPIRATORY (INHALATION) AS NEEDED
Status: DISCONTINUED | OUTPATIENT
Start: 2025-07-14 | End: 2025-07-14 | Stop reason: HOSPADM

## 2025-07-14 RX ORDER — DIPHENHYDRAMINE HYDROCHLORIDE 50 MG/ML
50 INJECTION, SOLUTION INTRAMUSCULAR; INTRAVENOUS AS NEEDED
Status: DISCONTINUED | OUTPATIENT
Start: 2025-07-14 | End: 2025-07-14 | Stop reason: HOSPADM

## 2025-07-14 RX ORDER — FAMOTIDINE 10 MG/ML
20 INJECTION, SOLUTION INTRAVENOUS ONCE AS NEEDED
Status: DISCONTINUED | OUTPATIENT
Start: 2025-07-14 | End: 2025-07-14 | Stop reason: HOSPADM

## 2025-07-14 RX ADMIN — ONDANSETRON HYDROCHLORIDE 8 MG: 8 TABLET, FILM COATED ORAL at 10:35

## 2025-07-14 RX ADMIN — Medication 64.5 MG: at 11:10

## 2025-07-14 RX ADMIN — Medication 64.5 MG: at 11:12

## 2025-07-14 ASSESSMENT — PAIN SCALES - GENERAL: PAINLEVEL_OUTOF10: 0-NO PAIN

## 2025-07-14 NOTE — PROGRESS NOTES
Patient presents to infusion appointment in stable condition. C10D15 [id number] azacitidine injections tolerated without incident. Hgb 12.2, ANC 2.11, Aranesp and filgrastim held per orders. Lab results and schedule reviewed. Discharged in stable condition.

## 2025-07-15 ENCOUNTER — TREATMENT (OUTPATIENT)
Dept: PHYSICAL THERAPY | Facility: CLINIC | Age: 85
End: 2025-07-15
Payer: MEDICARE

## 2025-07-15 DIAGNOSIS — D46.9 MYELODYSPLASTIC SYNDROME (MULTI): ICD-10-CM

## 2025-07-15 PROCEDURE — 97110 THERAPEUTIC EXERCISES: CPT | Mod: GP,CQ

## 2025-07-15 NOTE — PROGRESS NOTES
"Patient Name: Holden Burgess \"PAT\"  MRN: [id number]  Today's Date: 7/15/2025  Time Calculation  Start Time: 0905  Stop Time: 0950  Time Calculation (min): 45 min    Subjective:  States that  he has some general fatigue post sessions \"but good fatigue\"    Objective:  Ambulation 1 lap with walking stick     Assessment:   Pt requires no standing rest periods with ambulation however decreased pace noted . Increased pace with use of WW. PT would benefit from further PT to improve overall LE strength /stability and endurance to help reduce risk of falls .    Plan:   Cont to increase functional endurance tolerance     Treatment :   THERAPEUTIC EXERCISE 96315 45 minutes 3 units    Recumbent 5 minutes   Track 1 lap with walking stick   Track 4 laps WW   Balance   Static stance /decreased MIGNON /EO/EC   Side stepping with RTB   Hip abduction RTB   Hip ext RTB         Current Problem:  1. Myelodysplastic syndrome (Multi)  Follow Up In Physical Therapy                Precautions: No recent falls        "

## 2025-07-17 ENCOUNTER — LAB (OUTPATIENT)
Dept: LAB | Facility: HOSPITAL | Age: 85
End: 2025-07-17
Payer: MEDICARE

## 2025-07-17 ENCOUNTER — INFUSION (OUTPATIENT)
Dept: HEMATOLOGY/ONCOLOGY | Facility: HOSPITAL | Age: 85
End: 2025-07-17
Payer: MEDICARE

## 2025-07-17 VITALS
OXYGEN SATURATION: 100 % | HEART RATE: 89 BPM | RESPIRATION RATE: 19 BRPM | DIASTOLIC BLOOD PRESSURE: 65 MMHG | SYSTOLIC BLOOD PRESSURE: 137 MMHG | WEIGHT: 272 LBS | BODY MASS INDEX: 37.08 KG/M2 | TEMPERATURE: 97.7 F

## 2025-07-17 DIAGNOSIS — D46.9 MYELODYSPLASTIC SYNDROME (MULTI): ICD-10-CM

## 2025-07-17 LAB
ALBUMIN SERPL BCP-MCNC: 4.1 G/DL (ref 3.4–5)
ALP SERPL-CCNC: 89 U/L (ref 33–136)
ALT SERPL W P-5'-P-CCNC: 34 U/L (ref 10–52)
ANION GAP SERPL CALC-SCNC: 11 MMOL/L (ref 10–20)
AST SERPL W P-5'-P-CCNC: 28 U/L (ref 9–39)
BASOPHILS # BLD AUTO: 0.01 X10*3/UL (ref 0–0.1)
BASOPHILS NFR BLD AUTO: 0.3 %
BILIRUB SERPL-MCNC: 0.8 MG/DL (ref 0–1.2)
BUN SERPL-MCNC: 39 MG/DL (ref 6–23)
CALCIUM SERPL-MCNC: 9.6 MG/DL (ref 8.6–10.3)
CHLORIDE SERPL-SCNC: 110 MMOL/L (ref 98–107)
CO2 SERPL-SCNC: 25 MMOL/L (ref 21–32)
CREAT SERPL-MCNC: 1.48 MG/DL (ref 0.5–1.3)
EGFRCR SERPLBLD CKD-EPI 2021: 46 ML/MIN/1.73M*2
EOSINOPHIL # BLD AUTO: 0.1 X10*3/UL (ref 0–0.4)
EOSINOPHIL NFR BLD AUTO: 2.8 %
ERYTHROCYTE [DISTWIDTH] IN BLOOD BY AUTOMATED COUNT: 16.5 % (ref 11.5–14.5)
GLUCOSE SERPL-MCNC: 111 MG/DL (ref 74–99)
HCT VFR BLD AUTO: 35.7 % (ref 41–52)
HGB BLD-MCNC: 11.7 G/DL (ref 13.5–17.5)
IMM GRANULOCYTES # BLD AUTO: 0.02 X10*3/UL (ref 0–0.5)
IMM GRANULOCYTES NFR BLD AUTO: 0.6 % (ref 0–0.9)
LYMPHOCYTES # BLD AUTO: 1.12 X10*3/UL (ref 0.8–3)
LYMPHOCYTES NFR BLD AUTO: 31.3 %
MCH RBC QN AUTO: 33.1 PG (ref 26–34)
MCHC RBC AUTO-ENTMCNC: 32.8 G/DL (ref 32–36)
MCV RBC AUTO: 101 FL (ref 80–100)
MONOCYTES # BLD AUTO: 0.21 X10*3/UL (ref 0.05–0.8)
MONOCYTES NFR BLD AUTO: 5.9 %
NEUTROPHILS # BLD AUTO: 2.12 X10*3/UL (ref 1.6–5.5)
NEUTROPHILS NFR BLD AUTO: 59.1 %
NRBC BLD-RTO: 0 /100 WBCS (ref 0–0)
PLATELET # BLD AUTO: 83 X10*3/UL (ref 150–450)
POTASSIUM SERPL-SCNC: 5.3 MMOL/L (ref 3.5–5.3)
PROT SERPL-MCNC: 6.4 G/DL (ref 6.4–8.2)
RBC # BLD AUTO: 3.54 X10*6/UL (ref 4.5–5.9)
SODIUM SERPL-SCNC: 141 MMOL/L (ref 136–145)
WBC # BLD AUTO: 3.6 X10*3/UL (ref 4.4–11.3)

## 2025-07-17 PROCEDURE — 2500000004 HC RX 250 GENERAL PHARMACY W/ HCPCS (ALT 636 FOR OP/ED): Performed by: NURSE PRACTITIONER

## 2025-07-17 PROCEDURE — 85025 COMPLETE CBC W/AUTO DIFF WBC: CPT

## 2025-07-17 PROCEDURE — 84075 ASSAY ALKALINE PHOSPHATASE: CPT

## 2025-07-17 PROCEDURE — 36415 COLL VENOUS BLD VENIPUNCTURE: CPT

## 2025-07-17 PROCEDURE — 96401 CHEMO ANTI-NEOPL SQ/IM: CPT

## 2025-07-17 PROCEDURE — 2560000001 HC RX 256 EXPERIMENTAL DRUGS: Performed by: NURSE PRACTITIONER

## 2025-07-17 RX ORDER — ALBUTEROL SULFATE 0.83 MG/ML
3 SOLUTION RESPIRATORY (INHALATION) AS NEEDED
Status: DISCONTINUED | OUTPATIENT
Start: 2025-07-17 | End: 2025-07-17 | Stop reason: HOSPADM

## 2025-07-17 RX ORDER — FAMOTIDINE 10 MG/ML
20 INJECTION, SOLUTION INTRAVENOUS ONCE AS NEEDED
Status: DISCONTINUED | OUTPATIENT
Start: 2025-07-17 | End: 2025-07-17 | Stop reason: HOSPADM

## 2025-07-17 RX ORDER — DIPHENHYDRAMINE HYDROCHLORIDE 50 MG/ML
50 INJECTION, SOLUTION INTRAMUSCULAR; INTRAVENOUS AS NEEDED
Status: DISCONTINUED | OUTPATIENT
Start: 2025-07-17 | End: 2025-07-17 | Stop reason: HOSPADM

## 2025-07-17 RX ORDER — ONDANSETRON 8 MG/1
8 TABLET, FILM COATED ORAL ONCE
Status: COMPLETED | OUTPATIENT
Start: 2025-07-17 | End: 2025-07-17

## 2025-07-17 RX ORDER — EPINEPHRINE 0.3 MG/.3ML
0.3 INJECTION SUBCUTANEOUS EVERY 5 MIN PRN
Status: DISCONTINUED | OUTPATIENT
Start: 2025-07-17 | End: 2025-07-17 | Stop reason: HOSPADM

## 2025-07-17 RX ADMIN — Medication 12.9 MG: at 11:23

## 2025-07-17 RX ADMIN — ONDANSETRON HYDROCHLORIDE 8 MG: 8 TABLET, FILM COATED ORAL at 10:50

## 2025-07-17 ASSESSMENT — PAIN SCALES - GENERAL: PAINLEVEL_OUTOF10: 0-NO PAIN

## 2025-07-17 NOTE — PROGRESS NOTES
GENARO Burgess is a 84 y.o. male who presents for Day 18 Cycle 10.    He is on the following chemotherapy regimen:     Treatment Plans       Name Type Plan Dates Plan Provider         Active    ([facility]) [id number] - AzaCITIDine / Decitabine, 42 Day Cycle Followed by 28 Day Cycles Oncology Treatment 9/29/2024 - Present Sebastien Rashid MD                    Since his last visit, he has been doing well.  Overall, he states his energy level is stable.  His appetite has been unchanged.  He reports no complaints.  He has no other concerns.    Tolerated injection well, AVS along with today's lab results provided and discharged in stable condition.

## 2025-07-21 ENCOUNTER — INFUSION (OUTPATIENT)
Dept: HEMATOLOGY/ONCOLOGY | Facility: HOSPITAL | Age: 85
End: 2025-07-21
Payer: MEDICARE

## 2025-07-21 ENCOUNTER — LAB (OUTPATIENT)
Dept: LAB | Facility: HOSPITAL | Age: 85
End: 2025-07-21
Payer: MEDICARE

## 2025-07-21 VITALS
OXYGEN SATURATION: 100 % | SYSTOLIC BLOOD PRESSURE: 130 MMHG | RESPIRATION RATE: 17 BRPM | HEART RATE: 77 BPM | WEIGHT: 274.3 LBS | BODY MASS INDEX: 37.4 KG/M2 | DIASTOLIC BLOOD PRESSURE: 67 MMHG | TEMPERATURE: 97.5 F

## 2025-07-21 DIAGNOSIS — D46.9 MYELODYSPLASTIC SYNDROME (MULTI): ICD-10-CM

## 2025-07-21 LAB
ALBUMIN SERPL BCP-MCNC: 4 G/DL (ref 3.4–5)
ALP SERPL-CCNC: 85 U/L (ref 33–136)
ALT SERPL W P-5'-P-CCNC: 37 U/L (ref 10–52)
ANION GAP SERPL CALC-SCNC: 12 MMOL/L (ref 10–20)
AST SERPL W P-5'-P-CCNC: 23 U/L (ref 9–39)
BASOPHILS # BLD AUTO: 0.01 X10*3/UL (ref 0–0.1)
BASOPHILS NFR BLD AUTO: 0.3 %
BILIRUB SERPL-MCNC: 0.8 MG/DL (ref 0–1.2)
BUN SERPL-MCNC: 26 MG/DL (ref 6–23)
CALCIUM SERPL-MCNC: 9.2 MG/DL (ref 8.6–10.3)
CHLORIDE SERPL-SCNC: 107 MMOL/L (ref 98–107)
CO2 SERPL-SCNC: 25 MMOL/L (ref 21–32)
CREAT SERPL-MCNC: 1.32 MG/DL (ref 0.5–1.3)
EGFRCR SERPLBLD CKD-EPI 2021: 53 ML/MIN/1.73M*2
EOSINOPHIL # BLD AUTO: 0.09 X10*3/UL (ref 0–0.4)
EOSINOPHIL NFR BLD AUTO: 2.7 %
ERYTHROCYTE [DISTWIDTH] IN BLOOD BY AUTOMATED COUNT: 16.3 % (ref 11.5–14.5)
GLUCOSE SERPL-MCNC: 93 MG/DL (ref 74–99)
HCT VFR BLD AUTO: 36.8 % (ref 41–52)
HGB BLD-MCNC: 11.9 G/DL (ref 13.5–17.5)
IMM GRANULOCYTES # BLD AUTO: 0.01 X10*3/UL (ref 0–0.5)
IMM GRANULOCYTES NFR BLD AUTO: 0.3 % (ref 0–0.9)
LYMPHOCYTES # BLD AUTO: 1.02 X10*3/UL (ref 0.8–3)
LYMPHOCYTES NFR BLD AUTO: 30.1 %
MCH RBC QN AUTO: 32.6 PG (ref 26–34)
MCHC RBC AUTO-ENTMCNC: 32.3 G/DL (ref 32–36)
MCV RBC AUTO: 101 FL (ref 80–100)
MONOCYTES # BLD AUTO: 0.2 X10*3/UL (ref 0.05–0.8)
MONOCYTES NFR BLD AUTO: 5.9 %
NEUTROPHILS # BLD AUTO: 2.06 X10*3/UL (ref 1.6–5.5)
NEUTROPHILS NFR BLD AUTO: 60.7 %
NRBC BLD-RTO: 0 /100 WBCS (ref 0–0)
PLATELET # BLD AUTO: 101 X10*3/UL (ref 150–450)
POTASSIUM SERPL-SCNC: 4.7 MMOL/L (ref 3.5–5.3)
PROT SERPL-MCNC: 6.2 G/DL (ref 6.4–8.2)
RBC # BLD AUTO: 3.65 X10*6/UL (ref 4.5–5.9)
SODIUM SERPL-SCNC: 139 MMOL/L (ref 136–145)
WBC # BLD AUTO: 3.4 X10*3/UL (ref 4.4–11.3)

## 2025-07-21 PROCEDURE — 96401 CHEMO ANTI-NEOPL SQ/IM: CPT

## 2025-07-21 PROCEDURE — 2500000004 HC RX 250 GENERAL PHARMACY W/ HCPCS (ALT 636 FOR OP/ED): Performed by: NURSE PRACTITIONER

## 2025-07-21 PROCEDURE — 85025 COMPLETE CBC W/AUTO DIFF WBC: CPT

## 2025-07-21 PROCEDURE — 2560000001 HC RX 256 EXPERIMENTAL DRUGS: Performed by: NURSE PRACTITIONER

## 2025-07-21 PROCEDURE — 80053 COMPREHEN METABOLIC PANEL: CPT

## 2025-07-21 PROCEDURE — 36415 COLL VENOUS BLD VENIPUNCTURE: CPT

## 2025-07-21 RX ORDER — ONDANSETRON 8 MG/1
8 TABLET, FILM COATED ORAL ONCE
Status: COMPLETED | OUTPATIENT
Start: 2025-07-21 | End: 2025-07-21

## 2025-07-21 RX ADMIN — Medication 129 MG: at 10:15

## 2025-07-21 RX ADMIN — ONDANSETRON HYDROCHLORIDE 8 MG: 8 TABLET, FILM COATED ORAL at 09:49

## 2025-07-21 ASSESSMENT — PAIN SCALES - GENERAL: PAINLEVEL_OUTOF10: 0-NO PAIN

## 2025-07-21 NOTE — PROGRESS NOTES
Pt received C10 d22 Study Azacitidine as ordered without incidence. Feels well, denies new complaints. Ambulated independently from clinic- uses [facility] and is aware of upcoming appts. No Darbo or Zarxio needed per order.

## 2025-07-23 PROBLEM — Z87.891 FORMER SMOKER: Status: RESOLVED | Noted: 2023-09-20 | Resolved: 2025-07-23

## 2025-07-23 NOTE — ASSESSMENT & PLAN NOTE
7/28/2025: Shaina treatment well.   Continue [id number]     Diagnostics:  -Bone marrow biopsy (week 24) 3/13/25 of therapy shows persistent disease with < 1% blast  Treatment:  - Continue Alternating azacitidine 50 mg/m2 with decitabine 5 mg/m² per [id number]  - C11D1 today  Disease/Toxicity Monitoring:  - Given his hemoglobin and age will check CBC with each visit  Supportive Care:  - Blood products as indicated  - Working with PT  Antimicrobial Prophylaxis:   -None indicated at this time, ANC is in the normal range  IV access:  - for now, continue with PIV; may benefit from port placement or other central line access

## 2025-07-23 NOTE — PROGRESS NOTES
"Patient ID: Holden Burgess \"GENARO\" is a 84 y.o. male.  Referring Physician: No referring provider defined for this encounter.  Primary Care Provider: Giacomo Joseph DO    Date of Service:  7/28/2025    SUBJECTIVE:  Patient presents today, accompanied by his wife and daughter, for follow up.   Reports feeling well.  He does get dizzy from time to time when he stands up too quickly.  Eating and drinking well.  No concerns or complaints today.      Oncology History   Myelodysplastic syndrome (Multi)   1/23/2024 Initial Diagnosis    Myelodysplastic syndrome:   Diagnosis:   Originally referred to Dr. Murphy in 2023 for longstanding thrombocytopenia.  At the time had mild leukopenia, no anemia.  Was refractory to a trial of prednisone, and so Bone marrow biopsy was completed completed on 1/23/2024 and demonstrated:  BONE MARROW CLOT, CORE BIOPSY, ASPIRATE, LEFT ILIAC CREST:   -- MILDLY HYPERCELLULAR BONE MARROW (80%) WITH MATURING TRILINEAGE HEMATOPOIESIS AND MODERATE INCREASE IN MEGAKARYOCYTES, SEE NOTE.  -- SMALL LYMPHOID AGGREGATES, FAVOR BENIGN.  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 Given the presence of persistent cytopenias, hypercellularity with increase megakaryocytes with abnormal clustering, and no increase in blasts, the overall findings are most consistent with:  -- MDS with low blasts (WHO-HAEM5 Classification) /   -- MDS-NOS with single lineage dysplasia (MDS-NOS-SLD) (ICC 2022 Classification).  NGS: SRSF2  Chromosome Analysis: 47,XY,+8[15]/46,XY[5]  IPSS-M: -0.99 - low risks disease     4/4/2024 - 8/29/2024 Supportive Treatment    Treatment:   I. Eltrombopag -on 4/4/2024 patient continued with persistent thrombocytopenia but also had some progressive anemia with hemoglobin down to 10.  Patient was offered [id number], preferred supportive management with oral medication opted for eltrombopag in the setting of thrombocytopenia starting at 50 " and ramping up to 150 mg  From 4/20/2024 through 8/20/2024 patient had progressive anemia and progressive thrombocytopenia and spite of treatment.    II. Darbepoietin -in the setting of worsening anemia darbepoetin was added 7/12/2024 at 100 mcg every 2 weeks       9/30/2024 -  Research Study Participant    ([facility]) [id number] - AzaCITIDine / Decitabine, 42 Day Cycle Followed by 28 Day Cycles  Plan Provider: Sebastien Rashid MD  Treatment goal: Palliative  Line of treatment: First Line  Associated studies: 5-Azacitidine and Decitabine Epigenetic Therapy for Myeloid Malignancies        OBJECTIVE:  KPS: Karnofsky Score: 90 - Able to carry on normal activity; minor signs or symptoms of disease      Physical Exam  Constitutional:       Appearance: Normal appearance.   HENT:      Mouth/Throat:      Mouth: Mucous membranes are moist.     Cardiovascular:      Rate and Rhythm: Normal rate and regular rhythm.      Heart sounds: Normal heart sounds.   Pulmonary:      Effort: Pulmonary effort is normal.      Breath sounds: Normal breath sounds.   Abdominal:      General: Bowel sounds are normal.      Palpations: Abdomen is soft.     Musculoskeletal:         General: Normal range of motion.      Cervical back: Neck supple.      Right lower leg: No edema.      Left lower leg: No edema.   Lymphadenopathy:      Comments: No lymphadenopathy     Skin:     General: Skin is warm and dry.      Findings: No lesion or rash.     Neurological:      General: No focal deficit present.      Mental Status: He is alert and oriented to person, place, and time. Mental status is at baseline.     Psychiatric:         Mood and Affect: Mood normal.       Assessment & Plan  Myelodysplastic syndrome (Multi)  7/28/2025: Shaina treatment well.   Continue [id number]     Diagnostics:  -Bone marrow biopsy (week 24) 3/13/25 of therapy shows persistent disease with < 1% blast  Treatment:  - Continue Alternating azacitidine 50 mg/m2 with decitabine 5 mg/m² per  [id number]  - C11D1 today  Disease/Toxicity Monitoring:  - Given his hemoglobin and age will check CBC with each visit  Supportive Care:  - Blood products as indicated  - Working with PT  Antimicrobial Prophylaxis:   -None indicated at this time, ANC is in the normal range  IV access:  - for now, continue with PIV; may benefit from port placement or other central line access    RTC:  Q month MD/KENDRICK follow up    MICHELE Starr-CNP

## 2025-07-24 ENCOUNTER — LAB (OUTPATIENT)
Dept: LAB | Facility: HOSPITAL | Age: 85
End: 2025-07-24
Payer: MEDICARE

## 2025-07-24 ENCOUNTER — INFUSION (OUTPATIENT)
Dept: HEMATOLOGY/ONCOLOGY | Facility: HOSPITAL | Age: 85
End: 2025-07-24
Payer: MEDICARE

## 2025-07-24 VITALS
HEART RATE: 85 BPM | OXYGEN SATURATION: 98 % | DIASTOLIC BLOOD PRESSURE: 69 MMHG | TEMPERATURE: 97.5 F | SYSTOLIC BLOOD PRESSURE: 123 MMHG | RESPIRATION RATE: 20 BRPM | HEIGHT: 72 IN | BODY MASS INDEX: 36.85 KG/M2 | WEIGHT: 272.05 LBS

## 2025-07-24 DIAGNOSIS — D46.9 MYELODYSPLASTIC SYNDROME (MULTI): ICD-10-CM

## 2025-07-24 LAB
ALBUMIN SERPL BCP-MCNC: 4.2 G/DL (ref 3.4–5)
ALP SERPL-CCNC: 91 U/L (ref 33–136)
ALT SERPL W P-5'-P-CCNC: 27 U/L (ref 10–52)
ANION GAP SERPL CALC-SCNC: 12 MMOL/L (ref 10–20)
AST SERPL W P-5'-P-CCNC: 20 U/L (ref 9–39)
BASOPHILS # BLD AUTO: 0.01 X10*3/UL (ref 0–0.1)
BASOPHILS NFR BLD AUTO: 0.3 %
BILIRUB SERPL-MCNC: 1 MG/DL (ref 0–1.2)
BUN SERPL-MCNC: 30 MG/DL (ref 6–23)
CALCIUM SERPL-MCNC: 9.6 MG/DL (ref 8.6–10.3)
CHLORIDE SERPL-SCNC: 106 MMOL/L (ref 98–107)
CO2 SERPL-SCNC: 26 MMOL/L (ref 21–32)
CREAT SERPL-MCNC: 1.4 MG/DL (ref 0.5–1.3)
EGFRCR SERPLBLD CKD-EPI 2021: 50 ML/MIN/1.73M*2
EOSINOPHIL # BLD AUTO: 0.07 X10*3/UL (ref 0–0.4)
EOSINOPHIL NFR BLD AUTO: 2.2 %
ERYTHROCYTE [DISTWIDTH] IN BLOOD BY AUTOMATED COUNT: 16.5 % (ref 11.5–14.5)
GLUCOSE SERPL-MCNC: 91 MG/DL (ref 74–99)
HCT VFR BLD AUTO: 38.3 % (ref 41–52)
HGB BLD-MCNC: 12.3 G/DL (ref 13.5–17.5)
IMM GRANULOCYTES # BLD AUTO: 0.02 X10*3/UL (ref 0–0.5)
IMM GRANULOCYTES NFR BLD AUTO: 0.6 % (ref 0–0.9)
LYMPHOCYTES # BLD AUTO: 1.1 X10*3/UL (ref 0.8–3)
LYMPHOCYTES NFR BLD AUTO: 34.1 %
MCH RBC QN AUTO: 32.4 PG (ref 26–34)
MCHC RBC AUTO-ENTMCNC: 32.1 G/DL (ref 32–36)
MCV RBC AUTO: 101 FL (ref 80–100)
MONOCYTES # BLD AUTO: 0.21 X10*3/UL (ref 0.05–0.8)
MONOCYTES NFR BLD AUTO: 6.5 %
NEUTROPHILS # BLD AUTO: 1.82 X10*3/UL (ref 1.6–5.5)
NEUTROPHILS NFR BLD AUTO: 56.3 %
NRBC BLD-RTO: 0 /100 WBCS (ref 0–0)
PLATELET # BLD AUTO: 83 X10*3/UL (ref 150–450)
POTASSIUM SERPL-SCNC: 4.7 MMOL/L (ref 3.5–5.3)
PROT SERPL-MCNC: 6.4 G/DL (ref 6.4–8.2)
RBC # BLD AUTO: 3.8 X10*6/UL (ref 4.5–5.9)
SODIUM SERPL-SCNC: 139 MMOL/L (ref 136–145)
WBC # BLD AUTO: 3.2 X10*3/UL (ref 4.4–11.3)

## 2025-07-24 PROCEDURE — 96401 CHEMO ANTI-NEOPL SQ/IM: CPT

## 2025-07-24 PROCEDURE — 85025 COMPLETE CBC W/AUTO DIFF WBC: CPT

## 2025-07-24 PROCEDURE — 80053 COMPREHEN METABOLIC PANEL: CPT

## 2025-07-24 PROCEDURE — 36415 COLL VENOUS BLD VENIPUNCTURE: CPT

## 2025-07-24 PROCEDURE — 2560000001 HC RX 256 EXPERIMENTAL DRUGS: Performed by: NURSE PRACTITIONER

## 2025-07-24 PROCEDURE — 2500000004 HC RX 250 GENERAL PHARMACY W/ HCPCS (ALT 636 FOR OP/ED): Performed by: NURSE PRACTITIONER

## 2025-07-24 RX ORDER — FAMOTIDINE 10 MG/ML
20 INJECTION, SOLUTION INTRAVENOUS ONCE AS NEEDED
Status: DISCONTINUED | OUTPATIENT
Start: 2025-07-24 | End: 2025-07-24 | Stop reason: HOSPADM

## 2025-07-24 RX ORDER — ALBUTEROL SULFATE 0.83 MG/ML
3 SOLUTION RESPIRATORY (INHALATION) AS NEEDED
Status: DISCONTINUED | OUTPATIENT
Start: 2025-07-24 | End: 2025-07-24 | Stop reason: HOSPADM

## 2025-07-24 RX ORDER — ONDANSETRON 8 MG/1
8 TABLET, FILM COATED ORAL ONCE
Status: COMPLETED | OUTPATIENT
Start: 2025-07-24 | End: 2025-07-24

## 2025-07-24 RX ORDER — EPINEPHRINE 0.3 MG/.3ML
0.3 INJECTION SUBCUTANEOUS EVERY 5 MIN PRN
Status: DISCONTINUED | OUTPATIENT
Start: 2025-07-24 | End: 2025-07-24 | Stop reason: HOSPADM

## 2025-07-24 RX ORDER — DIPHENHYDRAMINE HYDROCHLORIDE 50 MG/ML
50 INJECTION, SOLUTION INTRAMUSCULAR; INTRAVENOUS AS NEEDED
Status: DISCONTINUED | OUTPATIENT
Start: 2025-07-24 | End: 2025-07-24 | Stop reason: HOSPADM

## 2025-07-24 RX ADMIN — ONDANSETRON HYDROCHLORIDE 8 MG: 8 TABLET, FILM COATED ORAL at 14:09

## 2025-07-24 RX ADMIN — Medication 12.9 MG: at 14:20

## 2025-07-28 ENCOUNTER — INFUSION (OUTPATIENT)
Dept: HEMATOLOGY/ONCOLOGY | Facility: HOSPITAL | Age: 85
End: 2025-07-28
Payer: MEDICARE

## 2025-07-28 ENCOUNTER — LAB (OUTPATIENT)
Dept: LAB | Facility: HOSPITAL | Age: 85
End: 2025-07-28
Payer: MEDICARE

## 2025-07-28 ENCOUNTER — DOCUMENTATION (OUTPATIENT)
Dept: HEMATOLOGY/ONCOLOGY | Facility: HOSPITAL | Age: 85
End: 2025-07-28

## 2025-07-28 ENCOUNTER — OFFICE VISIT (OUTPATIENT)
Dept: HEMATOLOGY/ONCOLOGY | Facility: HOSPITAL | Age: 85
End: 2025-07-28
Payer: MEDICARE

## 2025-07-28 VITALS
RESPIRATION RATE: 19 BRPM | HEIGHT: 72 IN | HEART RATE: 88 BPM | BODY MASS INDEX: 36.76 KG/M2 | DIASTOLIC BLOOD PRESSURE: 61 MMHG | TEMPERATURE: 98.2 F | SYSTOLIC BLOOD PRESSURE: 129 MMHG | WEIGHT: 271.39 LBS | OXYGEN SATURATION: 100 %

## 2025-07-28 DIAGNOSIS — D46.9 MYELODYSPLASTIC SYNDROME (MULTI): Primary | ICD-10-CM

## 2025-07-28 DIAGNOSIS — D46.9 MYELODYSPLASTIC SYNDROME (MULTI): ICD-10-CM

## 2025-07-28 LAB
ALBUMIN SERPL BCP-MCNC: 4 G/DL (ref 3.4–5)
ALP SERPL-CCNC: 88 U/L (ref 33–136)
ALT SERPL W P-5'-P-CCNC: 25 U/L (ref 10–52)
ANION GAP SERPL CALC-SCNC: 11 MMOL/L (ref 10–20)
AST SERPL W P-5'-P-CCNC: 19 U/L (ref 9–39)
BASOPHILS # BLD AUTO: 0.02 X10*3/UL (ref 0–0.1)
BASOPHILS NFR BLD AUTO: 0.7 %
BILIRUB SERPL-MCNC: 0.9 MG/DL (ref 0–1.2)
BUN SERPL-MCNC: 30 MG/DL (ref 6–23)
CALCIUM SERPL-MCNC: 9.3 MG/DL (ref 8.6–10.3)
CHLORIDE SERPL-SCNC: 107 MMOL/L (ref 98–107)
CO2 SERPL-SCNC: 26 MMOL/L (ref 21–32)
CREAT SERPL-MCNC: 1.43 MG/DL (ref 0.5–1.3)
EGFRCR SERPLBLD CKD-EPI 2021: 48 ML/MIN/1.73M*2
EOSINOPHIL # BLD AUTO: 0.06 X10*3/UL (ref 0–0.4)
EOSINOPHIL NFR BLD AUTO: 2 %
ERYTHROCYTE [DISTWIDTH] IN BLOOD BY AUTOMATED COUNT: 16.2 % (ref 11.5–14.5)
GLUCOSE SERPL-MCNC: 75 MG/DL (ref 74–99)
HCT VFR BLD AUTO: 36 % (ref 41–52)
HGB BLD-MCNC: 11.9 G/DL (ref 13.5–17.5)
IMM GRANULOCYTES # BLD AUTO: 0.01 X10*3/UL (ref 0–0.5)
IMM GRANULOCYTES NFR BLD AUTO: 0.3 % (ref 0–0.9)
LYMPHOCYTES # BLD AUTO: 1.01 X10*3/UL (ref 0.8–3)
LYMPHOCYTES NFR BLD AUTO: 34 %
MCH RBC QN AUTO: 32.9 PG (ref 26–34)
MCHC RBC AUTO-ENTMCNC: 33.1 G/DL (ref 32–36)
MCV RBC AUTO: 99 FL (ref 80–100)
MONOCYTES # BLD AUTO: 0.17 X10*3/UL (ref 0.05–0.8)
MONOCYTES NFR BLD AUTO: 5.7 %
NEUTROPHILS # BLD AUTO: 1.7 X10*3/UL (ref 1.6–5.5)
NEUTROPHILS NFR BLD AUTO: 57.3 %
NRBC BLD-RTO: 0 /100 WBCS (ref 0–0)
PLATELET # BLD AUTO: 71 X10*3/UL (ref 150–450)
POTASSIUM SERPL-SCNC: 4.3 MMOL/L (ref 3.5–5.3)
PROT SERPL-MCNC: 6.2 G/DL (ref 6.4–8.2)
RBC # BLD AUTO: 3.62 X10*6/UL (ref 4.5–5.9)
SODIUM SERPL-SCNC: 140 MMOL/L (ref 136–145)
WBC # BLD AUTO: 3 X10*3/UL (ref 4.4–11.3)

## 2025-07-28 PROCEDURE — 85025 COMPLETE CBC W/AUTO DIFF WBC: CPT

## 2025-07-28 PROCEDURE — 83615 LACTATE (LD) (LDH) ENZYME: CPT

## 2025-07-28 PROCEDURE — 96401 CHEMO ANTI-NEOPL SQ/IM: CPT

## 2025-07-28 PROCEDURE — 84550 ASSAY OF BLOOD/URIC ACID: CPT

## 2025-07-28 PROCEDURE — 83735 ASSAY OF MAGNESIUM: CPT

## 2025-07-28 PROCEDURE — 2560000001 HC RX 256 EXPERIMENTAL DRUGS: Performed by: INTERNAL MEDICINE

## 2025-07-28 PROCEDURE — 80053 COMPREHEN METABOLIC PANEL: CPT

## 2025-07-28 PROCEDURE — 99215 OFFICE O/P EST HI 40 MIN: CPT | Performed by: NURSE PRACTITIONER

## 2025-07-28 PROCEDURE — 36415 COLL VENOUS BLD VENIPUNCTURE: CPT

## 2025-07-28 PROCEDURE — 2500000004 HC RX 250 GENERAL PHARMACY W/ HCPCS (ALT 636 FOR OP/ED): Performed by: INTERNAL MEDICINE

## 2025-07-28 RX ORDER — ONDANSETRON 8 MG/1
8 TABLET, FILM COATED ORAL ONCE
Status: CANCELLED | OUTPATIENT
Start: 2025-07-31

## 2025-07-28 RX ORDER — FAMOTIDINE 10 MG/ML
20 INJECTION, SOLUTION INTRAVENOUS ONCE AS NEEDED
Status: CANCELLED | OUTPATIENT
Start: 2025-08-07

## 2025-07-28 RX ORDER — ONDANSETRON 8 MG/1
8 TABLET, FILM COATED ORAL ONCE
OUTPATIENT
Start: 2025-08-18

## 2025-07-28 RX ORDER — ALBUTEROL SULFATE 0.83 MG/ML
3 SOLUTION RESPIRATORY (INHALATION) AS NEEDED
Status: DISCONTINUED | OUTPATIENT
Start: 2025-07-28 | End: 2025-07-28 | Stop reason: HOSPADM

## 2025-07-28 RX ORDER — EPINEPHRINE 0.3 MG/.3ML
0.3 INJECTION SUBCUTANEOUS EVERY 5 MIN PRN
Status: CANCELLED | OUTPATIENT
Start: 2025-07-31

## 2025-07-28 RX ORDER — ONDANSETRON 8 MG/1
8 TABLET, FILM COATED ORAL ONCE
Status: CANCELLED | OUTPATIENT
Start: 2025-07-28

## 2025-07-28 RX ORDER — FAMOTIDINE 10 MG/ML
20 INJECTION, SOLUTION INTRAVENOUS ONCE AS NEEDED
OUTPATIENT
Start: 2025-08-11

## 2025-07-28 RX ORDER — FAMOTIDINE 10 MG/ML
20 INJECTION, SOLUTION INTRAVENOUS ONCE AS NEEDED
Status: CANCELLED | OUTPATIENT
Start: 2025-07-31

## 2025-07-28 RX ORDER — DIPHENHYDRAMINE HYDROCHLORIDE 50 MG/ML
50 INJECTION, SOLUTION INTRAMUSCULAR; INTRAVENOUS AS NEEDED
OUTPATIENT
Start: 2025-08-18

## 2025-07-28 RX ORDER — ALBUTEROL SULFATE 0.83 MG/ML
3 SOLUTION RESPIRATORY (INHALATION) AS NEEDED
Status: CANCELLED | OUTPATIENT
Start: 2025-08-04

## 2025-07-28 RX ORDER — DIPHENHYDRAMINE HYDROCHLORIDE 50 MG/ML
50 INJECTION, SOLUTION INTRAMUSCULAR; INTRAVENOUS AS NEEDED
OUTPATIENT
Start: 2025-08-11

## 2025-07-28 RX ORDER — DIPHENHYDRAMINE HYDROCHLORIDE 50 MG/ML
50 INJECTION, SOLUTION INTRAMUSCULAR; INTRAVENOUS AS NEEDED
Status: CANCELLED | OUTPATIENT
Start: 2025-08-07

## 2025-07-28 RX ORDER — ALBUTEROL SULFATE 0.83 MG/ML
3 SOLUTION RESPIRATORY (INHALATION) AS NEEDED
OUTPATIENT
Start: 2025-08-18

## 2025-07-28 RX ORDER — EPINEPHRINE 0.3 MG/.3ML
0.3 INJECTION SUBCUTANEOUS EVERY 5 MIN PRN
Status: CANCELLED | OUTPATIENT
Start: 2025-08-07

## 2025-07-28 RX ORDER — EPINEPHRINE 0.3 MG/.3ML
0.3 INJECTION SUBCUTANEOUS EVERY 5 MIN PRN
OUTPATIENT
Start: 2025-08-14

## 2025-07-28 RX ORDER — DIPHENHYDRAMINE HYDROCHLORIDE 50 MG/ML
50 INJECTION, SOLUTION INTRAMUSCULAR; INTRAVENOUS AS NEEDED
OUTPATIENT
Start: 2025-08-21

## 2025-07-28 RX ORDER — DIPHENHYDRAMINE HYDROCHLORIDE 50 MG/ML
50 INJECTION, SOLUTION INTRAMUSCULAR; INTRAVENOUS AS NEEDED
Status: CANCELLED | OUTPATIENT
Start: 2025-07-31

## 2025-07-28 RX ORDER — DIPHENHYDRAMINE HYDROCHLORIDE 50 MG/ML
50 INJECTION, SOLUTION INTRAMUSCULAR; INTRAVENOUS AS NEEDED
Status: CANCELLED | OUTPATIENT
Start: 2025-07-28

## 2025-07-28 RX ORDER — ALBUTEROL SULFATE 0.83 MG/ML
3 SOLUTION RESPIRATORY (INHALATION) AS NEEDED
Status: CANCELLED | OUTPATIENT
Start: 2025-07-28

## 2025-07-28 RX ORDER — ONDANSETRON 8 MG/1
8 TABLET, FILM COATED ORAL ONCE
Status: COMPLETED | OUTPATIENT
Start: 2025-07-28 | End: 2025-07-28

## 2025-07-28 RX ORDER — ONDANSETRON 8 MG/1
8 TABLET, FILM COATED ORAL ONCE
OUTPATIENT
Start: 2025-08-11

## 2025-07-28 RX ORDER — DIPHENHYDRAMINE HYDROCHLORIDE 50 MG/ML
50 INJECTION, SOLUTION INTRAMUSCULAR; INTRAVENOUS AS NEEDED
Status: CANCELLED | OUTPATIENT
Start: 2025-08-04

## 2025-07-28 RX ORDER — EPINEPHRINE 0.3 MG/.3ML
0.3 INJECTION SUBCUTANEOUS EVERY 5 MIN PRN
Status: DISCONTINUED | OUTPATIENT
Start: 2025-07-28 | End: 2025-07-28 | Stop reason: HOSPADM

## 2025-07-28 RX ORDER — ONDANSETRON 8 MG/1
8 TABLET, FILM COATED ORAL ONCE
OUTPATIENT
Start: 2025-08-21

## 2025-07-28 RX ORDER — ALBUTEROL SULFATE 0.83 MG/ML
3 SOLUTION RESPIRATORY (INHALATION) AS NEEDED
Status: CANCELLED | OUTPATIENT
Start: 2025-08-07

## 2025-07-28 RX ORDER — EPINEPHRINE 0.3 MG/.3ML
0.3 INJECTION SUBCUTANEOUS EVERY 5 MIN PRN
OUTPATIENT
Start: 2025-08-18

## 2025-07-28 RX ORDER — EPINEPHRINE 0.3 MG/.3ML
0.3 INJECTION SUBCUTANEOUS EVERY 5 MIN PRN
Status: CANCELLED | OUTPATIENT
Start: 2025-07-28

## 2025-07-28 RX ORDER — FAMOTIDINE 10 MG/ML
20 INJECTION, SOLUTION INTRAVENOUS ONCE AS NEEDED
OUTPATIENT
Start: 2025-08-14

## 2025-07-28 RX ORDER — ALBUTEROL SULFATE 0.83 MG/ML
3 SOLUTION RESPIRATORY (INHALATION) AS NEEDED
Status: CANCELLED | OUTPATIENT
Start: 2025-07-31

## 2025-07-28 RX ORDER — ALBUTEROL SULFATE 0.83 MG/ML
3 SOLUTION RESPIRATORY (INHALATION) AS NEEDED
OUTPATIENT
Start: 2025-08-21

## 2025-07-28 RX ORDER — ONDANSETRON 8 MG/1
8 TABLET, FILM COATED ORAL ONCE
OUTPATIENT
Start: 2025-08-14

## 2025-07-28 RX ORDER — EPINEPHRINE 0.3 MG/.3ML
0.3 INJECTION SUBCUTANEOUS EVERY 5 MIN PRN
Status: CANCELLED | OUTPATIENT
Start: 2025-08-04

## 2025-07-28 RX ORDER — ONDANSETRON 8 MG/1
8 TABLET, FILM COATED ORAL ONCE
Status: CANCELLED | OUTPATIENT
Start: 2025-08-07

## 2025-07-28 RX ORDER — DIPHENHYDRAMINE HYDROCHLORIDE 50 MG/ML
50 INJECTION, SOLUTION INTRAMUSCULAR; INTRAVENOUS AS NEEDED
Status: DISCONTINUED | OUTPATIENT
Start: 2025-07-28 | End: 2025-07-28 | Stop reason: HOSPADM

## 2025-07-28 RX ORDER — EPINEPHRINE 0.3 MG/.3ML
0.3 INJECTION SUBCUTANEOUS EVERY 5 MIN PRN
OUTPATIENT
Start: 2025-08-11

## 2025-07-28 RX ORDER — FAMOTIDINE 10 MG/ML
20 INJECTION, SOLUTION INTRAVENOUS ONCE AS NEEDED
Status: CANCELLED | OUTPATIENT
Start: 2025-07-28

## 2025-07-28 RX ORDER — ONDANSETRON 8 MG/1
8 TABLET, FILM COATED ORAL ONCE
Status: CANCELLED | OUTPATIENT
Start: 2025-08-04

## 2025-07-28 RX ORDER — DIPHENHYDRAMINE HYDROCHLORIDE 50 MG/ML
50 INJECTION, SOLUTION INTRAMUSCULAR; INTRAVENOUS AS NEEDED
OUTPATIENT
Start: 2025-08-14

## 2025-07-28 RX ORDER — ALBUTEROL SULFATE 0.83 MG/ML
3 SOLUTION RESPIRATORY (INHALATION) AS NEEDED
OUTPATIENT
Start: 2025-08-14

## 2025-07-28 RX ORDER — FAMOTIDINE 10 MG/ML
20 INJECTION, SOLUTION INTRAVENOUS ONCE AS NEEDED
Status: CANCELLED | OUTPATIENT
Start: 2025-08-04

## 2025-07-28 RX ORDER — FAMOTIDINE 10 MG/ML
20 INJECTION, SOLUTION INTRAVENOUS ONCE AS NEEDED
OUTPATIENT
Start: 2025-08-21

## 2025-07-28 RX ORDER — FAMOTIDINE 10 MG/ML
20 INJECTION, SOLUTION INTRAVENOUS ONCE AS NEEDED
Status: DISCONTINUED | OUTPATIENT
Start: 2025-07-28 | End: 2025-07-28 | Stop reason: HOSPADM

## 2025-07-28 RX ORDER — EPINEPHRINE 0.3 MG/.3ML
0.3 INJECTION SUBCUTANEOUS EVERY 5 MIN PRN
OUTPATIENT
Start: 2025-08-21

## 2025-07-28 RX ORDER — FAMOTIDINE 10 MG/ML
20 INJECTION, SOLUTION INTRAVENOUS ONCE AS NEEDED
OUTPATIENT
Start: 2025-08-18

## 2025-07-28 RX ORDER — ALBUTEROL SULFATE 0.83 MG/ML
3 SOLUTION RESPIRATORY (INHALATION) AS NEEDED
OUTPATIENT
Start: 2025-08-11

## 2025-07-28 RX ADMIN — Medication 129 MG: at 12:05

## 2025-07-28 RX ADMIN — ONDANSETRON HYDROCHLORIDE 8 MG: 8 TABLET, FILM COATED ORAL at 11:44

## 2025-07-28 ASSESSMENT — PAIN SCALES - GENERAL: PAINLEVEL_OUTOF10: 0-NO PAIN

## 2025-07-28 NOTE — PROGRESS NOTES
Patient presents to infusion appointment in stable condition. C11D1 [id number] azacitidine injections tolerated without incident. Hgb 11.9, ANC 1.70, aranesp and filgrastim injections held per order. Discharged in stable condition.

## 2025-07-28 NOTE — RESEARCH NOTES
Research Note Treatment Day    Holden Burgess is here today for treatment on CASE 4919. Today is Week 43 Day 1. Procedures completed per protocol. AE's and con-meds reviewed with patient. Patient is aware of treatment plan.  No new AE to report    [x]   Received treatment as planned   OR  []    Treatment delayed; patient calendar updated as required   Treatment delayed because:    []   AE    []   Physician Discretion    []   Clinical Deterioration or Progression     []   Other    Education Documentation  Treatment Plan and Schedule, taught by Judi Patel RN at 7/28/2025  6:03 PM.  Learner: Patient  Readiness: Acceptance  Method: Explanation  Response: Verbalizes Understanding    Education Comments  No comments found.

## 2025-07-29 ENCOUNTER — TREATMENT (OUTPATIENT)
Dept: PHYSICAL THERAPY | Facility: CLINIC | Age: 85
End: 2025-07-29
Payer: MEDICARE

## 2025-07-29 DIAGNOSIS — D46.9 MYELODYSPLASTIC SYNDROME (MULTI): ICD-10-CM

## 2025-07-29 PROCEDURE — 97110 THERAPEUTIC EXERCISES: CPT | Mod: GP,CQ

## 2025-07-29 ASSESSMENT — PAIN SCALES - GENERAL: PAINLEVEL_OUTOF10: 0 - NO PAIN

## 2025-07-29 ASSESSMENT — PAIN - FUNCTIONAL ASSESSMENT: PAIN_FUNCTIONAL_ASSESSMENT: 0-10

## 2025-07-29 NOTE — PROGRESS NOTES
"Patient Name: Holden Burgess \"GENARO\"  MRN: [id number]  Today's Date: 7/29/2025  Time in 1035  Time out 1120  Treatment Time 45 min  Therapeutic Exercise 45 min    Current Problem:  1. Myelodysplastic syndrome (Multi)  Follow Up In Physical Therapy                Precautions: No recent falls     Subjective:  Reports he is feeling ok today. No significant complaints.    Pain  0/10    Objective:  Ambulation 1 lap with walking stick     Treatment :   THERAPEUTIC EXERCISE [id number] 45 minutes 3 units   Recumbent 8 minutes   Track 1 lap with walking stick   Track 4 laps WW   Balance   Static stance /decreased MIGNON /EO/EC   Tandem stance  Side stepping with RTB   Hip abduction RTB   Hip ext RTB     Assessment:   Patient tolerated treatment well. Able to complete track laps with no resting breaks . Difficulty maintaining balance during static and dynamic balance activities, requires assistance to maintain balance. Able to ambulate with increased pace using ww vs walking stick. Patient will continue to benefit from PT to improve balance and LE strength and decrease risk for falls.     Plan:   Continue to focus on endurance tolerance. Progress as able.                  "

## 2025-07-30 DIAGNOSIS — D46.9 MYELODYSPLASTIC SYNDROME (MULTI): Primary | ICD-10-CM

## 2025-07-30 DIAGNOSIS — D46.9 MYELODYSPLASTIC SYNDROME (MULTI): ICD-10-CM

## 2025-07-30 LAB
LDH SERPL L TO P-CCNC: 175 U/L (ref 84–246)
MAGNESIUM SERPL-MCNC: 1.89 MG/DL (ref 1.6–2.4)
URATE SERPL-MCNC: 5.3 MG/DL (ref 4–7.5)

## 2025-07-31 ENCOUNTER — INFUSION (OUTPATIENT)
Dept: HEMATOLOGY/ONCOLOGY | Facility: HOSPITAL | Age: 85
End: 2025-07-31
Payer: MEDICARE

## 2025-07-31 ENCOUNTER — LAB (OUTPATIENT)
Dept: LAB | Facility: HOSPITAL | Age: 85
End: 2025-07-31
Payer: MEDICARE

## 2025-07-31 VITALS
WEIGHT: 274.6 LBS | OXYGEN SATURATION: 100 % | HEART RATE: 84 BPM | DIASTOLIC BLOOD PRESSURE: 69 MMHG | SYSTOLIC BLOOD PRESSURE: 135 MMHG | BODY MASS INDEX: 37.44 KG/M2 | TEMPERATURE: 97.9 F | RESPIRATION RATE: 19 BRPM

## 2025-07-31 DIAGNOSIS — D46.9 MYELODYSPLASTIC SYNDROME (MULTI): ICD-10-CM

## 2025-07-31 LAB
ALBUMIN SERPL BCP-MCNC: 4.1 G/DL (ref 3.4–5)
ALP SERPL-CCNC: 92 U/L (ref 33–136)
ALT SERPL W P-5'-P-CCNC: 31 U/L (ref 10–52)
ANION GAP SERPL CALC-SCNC: 11 MMOL/L (ref 10–20)
AST SERPL W P-5'-P-CCNC: 22 U/L (ref 9–39)
BASOPHILS # BLD AUTO: 0.03 X10*3/UL (ref 0–0.1)
BASOPHILS NFR BLD AUTO: 0.9 %
BILIRUB SERPL-MCNC: 0.9 MG/DL (ref 0–1.2)
BUN SERPL-MCNC: 35 MG/DL (ref 6–23)
CALCIUM SERPL-MCNC: 9.4 MG/DL (ref 8.6–10.3)
CHLORIDE SERPL-SCNC: 106 MMOL/L (ref 98–107)
CO2 SERPL-SCNC: 27 MMOL/L (ref 21–32)
CREAT SERPL-MCNC: 1.5 MG/DL (ref 0.5–1.3)
EGFRCR SERPLBLD CKD-EPI 2021: 46 ML/MIN/1.73M*2
EOSINOPHIL # BLD AUTO: 0.08 X10*3/UL (ref 0–0.4)
EOSINOPHIL NFR BLD AUTO: 2.5 %
ERYTHROCYTE [DISTWIDTH] IN BLOOD BY AUTOMATED COUNT: 16.2 % (ref 11.5–14.5)
GLUCOSE SERPL-MCNC: 96 MG/DL (ref 74–99)
HCT VFR BLD AUTO: 37.4 % (ref 41–52)
HGB BLD-MCNC: 12.1 G/DL (ref 13.5–17.5)
HGB RETIC QN: 35 PG (ref 28–38)
IMM GRANULOCYTES # BLD AUTO: 0.02 X10*3/UL (ref 0–0.5)
IMM GRANULOCYTES NFR BLD AUTO: 0.6 % (ref 0–0.9)
IMMATURE RETIC FRACTION: 12.1 %
LYMPHOCYTES # BLD AUTO: 1.21 X10*3/UL (ref 0.8–3)
LYMPHOCYTES NFR BLD AUTO: 37.5 %
MCH RBC QN AUTO: 32.9 PG (ref 26–34)
MCHC RBC AUTO-ENTMCNC: 32.4 G/DL (ref 32–36)
MCV RBC AUTO: 102 FL (ref 80–100)
MONOCYTES # BLD AUTO: 0.18 X10*3/UL (ref 0.05–0.8)
MONOCYTES NFR BLD AUTO: 5.6 %
NEUTROPHILS # BLD AUTO: 1.71 X10*3/UL (ref 1.6–5.5)
NEUTROPHILS NFR BLD AUTO: 52.9 %
NRBC BLD-RTO: 0 /100 WBCS (ref 0–0)
PHOSPHATE SERPL-MCNC: 2.9 MG/DL (ref 2.5–4.9)
PLATELET # BLD AUTO: 97 X10*3/UL (ref 150–450)
POTASSIUM SERPL-SCNC: 5.2 MMOL/L (ref 3.5–5.3)
PROT SERPL-MCNC: 6.5 G/DL (ref 6.4–8.2)
RBC # BLD AUTO: 3.68 X10*6/UL (ref 4.5–5.9)
RETICS #: 0.06 X10*6/UL (ref 0.02–0.11)
RETICS/RBC NFR AUTO: 1.7 % (ref 0.5–2)
SODIUM SERPL-SCNC: 139 MMOL/L (ref 136–145)
WBC # BLD AUTO: 3.2 X10*3/UL (ref 4.4–11.3)

## 2025-07-31 PROCEDURE — 85045 AUTOMATED RETICULOCYTE COUNT: CPT

## 2025-07-31 PROCEDURE — 96401 CHEMO ANTI-NEOPL SQ/IM: CPT

## 2025-07-31 PROCEDURE — 84100 ASSAY OF PHOSPHORUS: CPT

## 2025-07-31 PROCEDURE — 2560000001 HC RX 256 EXPERIMENTAL DRUGS: Performed by: INTERNAL MEDICINE

## 2025-07-31 PROCEDURE — 85025 COMPLETE CBC W/AUTO DIFF WBC: CPT

## 2025-07-31 PROCEDURE — 2500000004 HC RX 250 GENERAL PHARMACY W/ HCPCS (ALT 636 FOR OP/ED): Performed by: INTERNAL MEDICINE

## 2025-07-31 PROCEDURE — 36415 COLL VENOUS BLD VENIPUNCTURE: CPT

## 2025-07-31 PROCEDURE — 80053 COMPREHEN METABOLIC PANEL: CPT

## 2025-07-31 RX ORDER — EPINEPHRINE 0.3 MG/.3ML
0.3 INJECTION SUBCUTANEOUS EVERY 5 MIN PRN
Status: DISCONTINUED | OUTPATIENT
Start: 2025-07-31 | End: 2025-07-31 | Stop reason: HOSPADM

## 2025-07-31 RX ORDER — FAMOTIDINE 10 MG/ML
20 INJECTION, SOLUTION INTRAVENOUS ONCE AS NEEDED
Status: DISCONTINUED | OUTPATIENT
Start: 2025-07-31 | End: 2025-07-31 | Stop reason: HOSPADM

## 2025-07-31 RX ORDER — ALBUTEROL SULFATE 0.83 MG/ML
3 SOLUTION RESPIRATORY (INHALATION) AS NEEDED
Status: DISCONTINUED | OUTPATIENT
Start: 2025-07-31 | End: 2025-07-31 | Stop reason: HOSPADM

## 2025-07-31 RX ORDER — ONDANSETRON 8 MG/1
8 TABLET, FILM COATED ORAL ONCE
Status: COMPLETED | OUTPATIENT
Start: 2025-07-31 | End: 2025-07-31

## 2025-07-31 RX ORDER — DIPHENHYDRAMINE HYDROCHLORIDE 50 MG/ML
50 INJECTION, SOLUTION INTRAMUSCULAR; INTRAVENOUS AS NEEDED
Status: DISCONTINUED | OUTPATIENT
Start: 2025-07-31 | End: 2025-07-31 | Stop reason: HOSPADM

## 2025-07-31 RX ADMIN — ONDANSETRON HYDROCHLORIDE 8 MG: 8 TABLET, FILM COATED ORAL at 10:25

## 2025-07-31 RX ADMIN — Medication 12.9 MG: at 11:06

## 2025-07-31 ASSESSMENT — PAIN SCALES - GENERAL: PAINLEVEL_OUTOF10: 0-NO PAIN

## 2025-07-31 NOTE — PROGRESS NOTES
GENARO Burgess is a 84 y.o. male who presents for Day 4 Cycle 11.    He is on the following chemotherapy regimen:     Treatment Plans       Name Type Plan Dates Plan Provider         Active    ([facility]) [id number] - AzaCITIDine / Decitabine, 42 Day Cycle Followed by 28 Day Cycles Oncology Treatment 9/29/2024 - Present Sebastien Rashid MD                    Since his last visit, he has been doing well.  Overall, he states his energy level is stable.  His appetite has been unchanged.  He reports no complaints.  He has no other concerns.    Tolerated injection well, AVS along with today's lab results provided and discharged in stable condition with wife by his side.

## 2025-08-04 ENCOUNTER — INFUSION (OUTPATIENT)
Dept: HEMATOLOGY/ONCOLOGY | Facility: HOSPITAL | Age: 85
End: 2025-08-04
Payer: MEDICARE

## 2025-08-04 ENCOUNTER — LAB (OUTPATIENT)
Dept: LAB | Facility: HOSPITAL | Age: 85
End: 2025-08-04
Payer: MEDICARE

## 2025-08-04 VITALS
HEIGHT: 72 IN | HEART RATE: 88 BPM | TEMPERATURE: 98.6 F | OXYGEN SATURATION: 98 % | WEIGHT: 272 LBS | DIASTOLIC BLOOD PRESSURE: 69 MMHG | RESPIRATION RATE: 17 BRPM | SYSTOLIC BLOOD PRESSURE: 131 MMHG | BODY MASS INDEX: 36.84 KG/M2

## 2025-08-04 DIAGNOSIS — D46.9 MYELODYSPLASTIC SYNDROME (MULTI): ICD-10-CM

## 2025-08-04 LAB
ALBUMIN SERPL BCP-MCNC: 4 G/DL (ref 3.4–5)
ALP SERPL-CCNC: 92 U/L (ref 33–136)
ALT SERPL W P-5'-P-CCNC: 27 U/L (ref 10–52)
ANION GAP SERPL CALC-SCNC: 13 MMOL/L (ref 10–20)
AST SERPL W P-5'-P-CCNC: 20 U/L (ref 9–39)
BASOPHILS # BLD AUTO: 0.02 X10*3/UL (ref 0–0.1)
BASOPHILS NFR BLD AUTO: 0.7 %
BILIRUB SERPL-MCNC: 0.7 MG/DL (ref 0–1.2)
BUN SERPL-MCNC: 40 MG/DL (ref 6–23)
CALCIUM SERPL-MCNC: 9.6 MG/DL (ref 8.6–10.3)
CHLORIDE SERPL-SCNC: 107 MMOL/L (ref 98–107)
CO2 SERPL-SCNC: 24 MMOL/L (ref 21–32)
CREAT SERPL-MCNC: 1.56 MG/DL (ref 0.5–1.3)
EGFRCR SERPLBLD CKD-EPI 2021: 44 ML/MIN/1.73M*2
EOSINOPHIL # BLD AUTO: 0.07 X10*3/UL (ref 0–0.4)
EOSINOPHIL NFR BLD AUTO: 2.4 %
ERYTHROCYTE [DISTWIDTH] IN BLOOD BY AUTOMATED COUNT: 16 % (ref 11.5–14.5)
GLUCOSE SERPL-MCNC: 93 MG/DL (ref 74–99)
HCT VFR BLD AUTO: 36.4 % (ref 41–52)
HGB BLD-MCNC: 11.8 G/DL (ref 13.5–17.5)
HGB RETIC QN: 36 PG (ref 28–38)
IMM GRANULOCYTES # BLD AUTO: 0.01 X10*3/UL (ref 0–0.5)
IMM GRANULOCYTES NFR BLD AUTO: 0.3 % (ref 0–0.9)
IMMATURE RETIC FRACTION: 16.6 %
LYMPHOCYTES # BLD AUTO: 1.05 X10*3/UL (ref 0.8–3)
LYMPHOCYTES NFR BLD AUTO: 35.6 %
MCH RBC QN AUTO: 32.8 PG (ref 26–34)
MCHC RBC AUTO-ENTMCNC: 32.4 G/DL (ref 32–36)
MCV RBC AUTO: 101 FL (ref 80–100)
MONOCYTES # BLD AUTO: 0.16 X10*3/UL (ref 0.05–0.8)
MONOCYTES NFR BLD AUTO: 5.4 %
NEUTROPHILS # BLD AUTO: 1.64 X10*3/UL (ref 1.6–5.5)
NEUTROPHILS NFR BLD AUTO: 55.6 %
NRBC BLD-RTO: 0 /100 WBCS (ref 0–0)
PLATELET # BLD AUTO: 86 X10*3/UL (ref 150–450)
POTASSIUM SERPL-SCNC: 4.9 MMOL/L (ref 3.5–5.3)
PROT SERPL-MCNC: 6.3 G/DL (ref 6.4–8.2)
RBC # BLD AUTO: 3.6 X10*6/UL (ref 4.5–5.9)
RETICS #: 0.06 X10*6/UL (ref 0.02–0.11)
RETICS/RBC NFR AUTO: 1.6 % (ref 0.5–2)
SODIUM SERPL-SCNC: 139 MMOL/L (ref 136–145)
WBC # BLD AUTO: 3 X10*3/UL (ref 4.4–11.3)

## 2025-08-04 PROCEDURE — 85025 COMPLETE CBC W/AUTO DIFF WBC: CPT

## 2025-08-04 PROCEDURE — 80053 COMPREHEN METABOLIC PANEL: CPT

## 2025-08-04 PROCEDURE — 2560000001 HC RX 256 EXPERIMENTAL DRUGS: Performed by: INTERNAL MEDICINE

## 2025-08-04 PROCEDURE — 36415 COLL VENOUS BLD VENIPUNCTURE: CPT

## 2025-08-04 PROCEDURE — 96401 CHEMO ANTI-NEOPL SQ/IM: CPT

## 2025-08-04 PROCEDURE — 85045 AUTOMATED RETICULOCYTE COUNT: CPT

## 2025-08-04 PROCEDURE — 2500000004 HC RX 250 GENERAL PHARMACY W/ HCPCS (ALT 636 FOR OP/ED): Performed by: INTERNAL MEDICINE

## 2025-08-04 RX ORDER — ALBUTEROL SULFATE 0.83 MG/ML
3 SOLUTION RESPIRATORY (INHALATION) AS NEEDED
Status: DISCONTINUED | OUTPATIENT
Start: 2025-08-04 | End: 2025-08-04 | Stop reason: HOSPADM

## 2025-08-04 RX ORDER — DIPHENHYDRAMINE HYDROCHLORIDE 50 MG/ML
50 INJECTION, SOLUTION INTRAMUSCULAR; INTRAVENOUS AS NEEDED
Status: DISCONTINUED | OUTPATIENT
Start: 2025-08-04 | End: 2025-08-04 | Stop reason: HOSPADM

## 2025-08-04 RX ORDER — FAMOTIDINE 10 MG/ML
20 INJECTION, SOLUTION INTRAVENOUS ONCE AS NEEDED
Status: DISCONTINUED | OUTPATIENT
Start: 2025-08-04 | End: 2025-08-04 | Stop reason: HOSPADM

## 2025-08-04 RX ORDER — ONDANSETRON 8 MG/1
8 TABLET, FILM COATED ORAL ONCE
Status: COMPLETED | OUTPATIENT
Start: 2025-08-04 | End: 2025-08-04

## 2025-08-04 RX ORDER — EPINEPHRINE 0.3 MG/.3ML
0.3 INJECTION SUBCUTANEOUS EVERY 5 MIN PRN
Status: DISCONTINUED | OUTPATIENT
Start: 2025-08-04 | End: 2025-08-04 | Stop reason: HOSPADM

## 2025-08-04 RX ADMIN — Medication 129 MG: at 11:36

## 2025-08-04 RX ADMIN — ONDANSETRON HYDROCHLORIDE 8 MG: 8 TABLET, FILM COATED ORAL at 10:51

## 2025-08-04 ASSESSMENT — PAIN SCALES - GENERAL: PAINLEVEL_OUTOF10: 0-NO PAIN

## 2025-08-04 NOTE — PROGRESS NOTES
Patient presents to infusion appointment in stable condition. C11D8 [id number] azacitidine injections tolerated without incident. ANC 1.64, Hgb 11.8, filgrastim and Aranesp held per order. Lab results reviewed. Discharged in stable condition.

## 2025-08-06 NOTE — RESEARCH NOTES
Research Note Treatment Day    Holden Burgess is being treated on CASE 4919.  His week 37 beginning June 9 was delayed 1 week for patient's vacation.  Treatment resumed according to protocol the following week.    []   Received treatment as planned   OR  [x]    Treatment delayed; patient calendar updated as required   Treatment delayed because:    []   AE    []   Physician Discretion    []   Clinical Deterioration or     [x]   Other - Vacation    Education Documentation  No documentation found.  Education Comments  No comments found.

## 2025-08-07 ENCOUNTER — LAB (OUTPATIENT)
Dept: LAB | Facility: HOSPITAL | Age: 85
End: 2025-08-07
Payer: MEDICARE

## 2025-08-07 ENCOUNTER — INFUSION (OUTPATIENT)
Dept: HEMATOLOGY/ONCOLOGY | Facility: HOSPITAL | Age: 85
End: 2025-08-07
Payer: MEDICARE

## 2025-08-07 VITALS
DIASTOLIC BLOOD PRESSURE: 59 MMHG | TEMPERATURE: 97 F | HEART RATE: 63 BPM | SYSTOLIC BLOOD PRESSURE: 120 MMHG | OXYGEN SATURATION: 100 %

## 2025-08-07 DIAGNOSIS — D46.9 MYELODYSPLASTIC SYNDROME (MULTI): ICD-10-CM

## 2025-08-07 LAB
ALBUMIN SERPL BCP-MCNC: 4 G/DL (ref 3.4–5)
ALP SERPL-CCNC: 87 U/L (ref 33–136)
ALT SERPL W P-5'-P-CCNC: 25 U/L (ref 10–52)
ANION GAP SERPL CALC-SCNC: 11 MMOL/L (ref 10–20)
AST SERPL W P-5'-P-CCNC: 20 U/L (ref 9–39)
BASOPHILS # BLD AUTO: 0.02 X10*3/UL (ref 0–0.1)
BASOPHILS NFR BLD AUTO: 0.7 %
BILIRUB SERPL-MCNC: 0.7 MG/DL (ref 0–1.2)
BUN SERPL-MCNC: 40 MG/DL (ref 6–23)
CALCIUM SERPL-MCNC: 9.5 MG/DL (ref 8.6–10.3)
CHLORIDE SERPL-SCNC: 108 MMOL/L (ref 98–107)
CO2 SERPL-SCNC: 25 MMOL/L (ref 21–32)
CREAT SERPL-MCNC: 1.59 MG/DL (ref 0.5–1.3)
EGFRCR SERPLBLD CKD-EPI 2021: 43 ML/MIN/1.73M*2
EOSINOPHIL # BLD AUTO: 0.08 X10*3/UL (ref 0–0.4)
EOSINOPHIL NFR BLD AUTO: 2.8 %
ERYTHROCYTE [DISTWIDTH] IN BLOOD BY AUTOMATED COUNT: 16 % (ref 11.5–14.5)
GLUCOSE SERPL-MCNC: 83 MG/DL (ref 74–99)
HCT VFR BLD AUTO: 37.6 % (ref 41–52)
HGB BLD-MCNC: 12.5 G/DL (ref 13.5–17.5)
IMM GRANULOCYTES # BLD AUTO: 0.02 X10*3/UL (ref 0–0.5)
IMM GRANULOCYTES NFR BLD AUTO: 0.7 % (ref 0–0.9)
LYMPHOCYTES # BLD AUTO: 1.13 X10*3/UL (ref 0.8–3)
LYMPHOCYTES NFR BLD AUTO: 39.5 %
MCH RBC QN AUTO: 33.4 PG (ref 26–34)
MCHC RBC AUTO-ENTMCNC: 33.2 G/DL (ref 32–36)
MCV RBC AUTO: 101 FL (ref 80–100)
MONOCYTES # BLD AUTO: 0.2 X10*3/UL (ref 0.05–0.8)
MONOCYTES NFR BLD AUTO: 7 %
NEUTROPHILS # BLD AUTO: 1.41 X10*3/UL (ref 1.6–5.5)
NEUTROPHILS NFR BLD AUTO: 49.3 %
NRBC BLD-RTO: 0 /100 WBCS (ref 0–0)
PLATELET # BLD AUTO: 78 X10*3/UL (ref 150–450)
POTASSIUM SERPL-SCNC: 4.5 MMOL/L (ref 3.5–5.3)
PROT SERPL-MCNC: 6.5 G/DL (ref 6.4–8.2)
RBC # BLD AUTO: 3.74 X10*6/UL (ref 4.5–5.9)
SODIUM SERPL-SCNC: 139 MMOL/L (ref 136–145)
WBC # BLD AUTO: 2.9 X10*3/UL (ref 4.4–11.3)

## 2025-08-07 PROCEDURE — 85025 COMPLETE CBC W/AUTO DIFF WBC: CPT

## 2025-08-07 PROCEDURE — 2500000004 HC RX 250 GENERAL PHARMACY W/ HCPCS (ALT 636 FOR OP/ED): Performed by: INTERNAL MEDICINE

## 2025-08-07 PROCEDURE — 84075 ASSAY ALKALINE PHOSPHATASE: CPT

## 2025-08-07 PROCEDURE — 2560000001 HC RX 256 EXPERIMENTAL DRUGS: Performed by: INTERNAL MEDICINE

## 2025-08-07 PROCEDURE — 36415 COLL VENOUS BLD VENIPUNCTURE: CPT

## 2025-08-07 PROCEDURE — 96401 CHEMO ANTI-NEOPL SQ/IM: CPT

## 2025-08-07 RX ORDER — EPINEPHRINE 0.3 MG/.3ML
0.3 INJECTION SUBCUTANEOUS EVERY 5 MIN PRN
Status: DISCONTINUED | OUTPATIENT
Start: 2025-08-07 | End: 2025-08-07 | Stop reason: HOSPADM

## 2025-08-07 RX ORDER — ALBUTEROL SULFATE 0.83 MG/ML
3 SOLUTION RESPIRATORY (INHALATION) AS NEEDED
Status: DISCONTINUED | OUTPATIENT
Start: 2025-08-07 | End: 2025-08-07 | Stop reason: HOSPADM

## 2025-08-07 RX ORDER — DIPHENHYDRAMINE HYDROCHLORIDE 50 MG/ML
50 INJECTION, SOLUTION INTRAMUSCULAR; INTRAVENOUS AS NEEDED
Status: DISCONTINUED | OUTPATIENT
Start: 2025-08-07 | End: 2025-08-07 | Stop reason: HOSPADM

## 2025-08-07 RX ORDER — FAMOTIDINE 10 MG/ML
20 INJECTION, SOLUTION INTRAVENOUS ONCE AS NEEDED
Status: DISCONTINUED | OUTPATIENT
Start: 2025-08-07 | End: 2025-08-07 | Stop reason: HOSPADM

## 2025-08-07 RX ORDER — ONDANSETRON 8 MG/1
8 TABLET, FILM COATED ORAL ONCE
Status: COMPLETED | OUTPATIENT
Start: 2025-08-07 | End: 2025-08-07

## 2025-08-07 RX ADMIN — ONDANSETRON HYDROCHLORIDE 8 MG: 8 TABLET, FILM COATED ORAL at 12:28

## 2025-08-07 RX ADMIN — Medication 12.9 MG: at 12:48

## 2025-08-07 NOTE — PROGRESS NOTES
GENARO Burgess is a 84 y.o. male who presents for Day 11 Cycle 11.    He is on the following chemotherapy regimen:     Treatment Plans       Name Type Plan Dates Plan Provider         Active    ([facility]) [id number] - AzaCITIDine / Decitabine, 42 Day Cycle Followed by 28 Day Cycles Oncology Treatment 9/29/2024 - Present Sebastien Rashid MD                    Since his last visit, he has been doing well.  Overall, he states his energy level is stable.  His appetite has been unchanged.  He reports no complaints.  He has no other concerns.    Tolerated injection well, AVS along with today's blood work provided and discharged in stable condition.

## 2025-08-11 ENCOUNTER — INFUSION (OUTPATIENT)
Dept: HEMATOLOGY/ONCOLOGY | Facility: HOSPITAL | Age: 85
End: 2025-08-11
Payer: MEDICARE

## 2025-08-11 ENCOUNTER — LAB (OUTPATIENT)
Dept: LAB | Facility: HOSPITAL | Age: 85
End: 2025-08-11
Payer: MEDICARE

## 2025-08-11 VITALS
OXYGEN SATURATION: 100 % | HEART RATE: 78 BPM | DIASTOLIC BLOOD PRESSURE: 61 MMHG | SYSTOLIC BLOOD PRESSURE: 143 MMHG | TEMPERATURE: 97.7 F | WEIGHT: 274 LBS | RESPIRATION RATE: 19 BRPM | BODY MASS INDEX: 37.36 KG/M2

## 2025-08-11 DIAGNOSIS — D46.9 MYELODYSPLASTIC SYNDROME (MULTI): ICD-10-CM

## 2025-08-11 LAB
ALBUMIN SERPL BCP-MCNC: 4.1 G/DL (ref 3.4–5)
ALP SERPL-CCNC: 83 U/L (ref 33–136)
ALT SERPL W P-5'-P-CCNC: 25 U/L (ref 10–52)
ANION GAP SERPL CALC-SCNC: 14 MMOL/L (ref 10–20)
AST SERPL W P-5'-P-CCNC: 21 U/L (ref 9–39)
BASOPHILS # BLD AUTO: 0.02 X10*3/UL (ref 0–0.1)
BASOPHILS NFR BLD AUTO: 0.6 %
BILIRUB SERPL-MCNC: 0.8 MG/DL (ref 0–1.2)
BUN SERPL-MCNC: 33 MG/DL (ref 6–23)
CALCIUM SERPL-MCNC: 9.1 MG/DL (ref 8.6–10.3)
CHLORIDE SERPL-SCNC: 108 MMOL/L (ref 98–107)
CO2 SERPL-SCNC: 22 MMOL/L (ref 21–32)
CREAT SERPL-MCNC: 1.55 MG/DL (ref 0.5–1.3)
EGFRCR SERPLBLD CKD-EPI 2021: 44 ML/MIN/1.73M*2
EOSINOPHIL # BLD AUTO: 0.08 X10*3/UL (ref 0–0.4)
EOSINOPHIL NFR BLD AUTO: 2.6 %
ERYTHROCYTE [DISTWIDTH] IN BLOOD BY AUTOMATED COUNT: 15.8 % (ref 11.5–14.5)
GLUCOSE SERPL-MCNC: 104 MG/DL (ref 74–99)
HCT VFR BLD AUTO: 35.3 % (ref 41–52)
HGB BLD-MCNC: 11.7 G/DL (ref 13.5–17.5)
IMM GRANULOCYTES # BLD AUTO: 0.01 X10*3/UL (ref 0–0.5)
IMM GRANULOCYTES NFR BLD AUTO: 0.3 % (ref 0–0.9)
LYMPHOCYTES # BLD AUTO: 1.06 X10*3/UL (ref 0.8–3)
LYMPHOCYTES NFR BLD AUTO: 33.9 %
MCH RBC QN AUTO: 33.2 PG (ref 26–34)
MCHC RBC AUTO-ENTMCNC: 33.1 G/DL (ref 32–36)
MCV RBC AUTO: 100 FL (ref 80–100)
MONOCYTES # BLD AUTO: 0.14 X10*3/UL (ref 0.05–0.8)
MONOCYTES NFR BLD AUTO: 4.5 %
NEUTROPHILS # BLD AUTO: 1.82 X10*3/UL (ref 1.6–5.5)
NEUTROPHILS NFR BLD AUTO: 58.1 %
NRBC BLD-RTO: 0.6 /100 WBCS (ref 0–0)
PLATELET # BLD AUTO: 96 X10*3/UL (ref 150–450)
POTASSIUM SERPL-SCNC: 4.7 MMOL/L (ref 3.5–5.3)
PROT SERPL-MCNC: 6.1 G/DL (ref 6.4–8.2)
RBC # BLD AUTO: 3.52 X10*6/UL (ref 4.5–5.9)
SODIUM SERPL-SCNC: 139 MMOL/L (ref 136–145)
WBC # BLD AUTO: 3.1 X10*3/UL (ref 4.4–11.3)

## 2025-08-11 PROCEDURE — 36415 COLL VENOUS BLD VENIPUNCTURE: CPT

## 2025-08-11 PROCEDURE — 96401 CHEMO ANTI-NEOPL SQ/IM: CPT

## 2025-08-11 PROCEDURE — 85025 COMPLETE CBC W/AUTO DIFF WBC: CPT

## 2025-08-11 PROCEDURE — 2560000001 HC RX 256 EXPERIMENTAL DRUGS: Performed by: INTERNAL MEDICINE

## 2025-08-11 PROCEDURE — 80053 COMPREHEN METABOLIC PANEL: CPT

## 2025-08-11 PROCEDURE — 2500000004 HC RX 250 GENERAL PHARMACY W/ HCPCS (ALT 636 FOR OP/ED): Performed by: INTERNAL MEDICINE

## 2025-08-11 RX ORDER — ALBUTEROL SULFATE 0.83 MG/ML
3 SOLUTION RESPIRATORY (INHALATION) AS NEEDED
Status: DISCONTINUED | OUTPATIENT
Start: 2025-08-11 | End: 2025-08-11 | Stop reason: HOSPADM

## 2025-08-11 RX ORDER — DIPHENHYDRAMINE HYDROCHLORIDE 50 MG/ML
50 INJECTION, SOLUTION INTRAMUSCULAR; INTRAVENOUS AS NEEDED
Status: DISCONTINUED | OUTPATIENT
Start: 2025-08-11 | End: 2025-08-11 | Stop reason: HOSPADM

## 2025-08-11 RX ORDER — ONDANSETRON 8 MG/1
8 TABLET, FILM COATED ORAL ONCE
Status: COMPLETED | OUTPATIENT
Start: 2025-08-11 | End: 2025-08-11

## 2025-08-11 RX ORDER — EPINEPHRINE 0.3 MG/.3ML
0.3 INJECTION SUBCUTANEOUS EVERY 5 MIN PRN
Status: DISCONTINUED | OUTPATIENT
Start: 2025-08-11 | End: 2025-08-11 | Stop reason: HOSPADM

## 2025-08-11 RX ORDER — FAMOTIDINE 10 MG/ML
20 INJECTION, SOLUTION INTRAVENOUS ONCE AS NEEDED
Status: DISCONTINUED | OUTPATIENT
Start: 2025-08-11 | End: 2025-08-11 | Stop reason: HOSPADM

## 2025-08-11 RX ADMIN — ONDANSETRON HYDROCHLORIDE 8 MG: 8 TABLET, FILM COATED ORAL at 13:03

## 2025-08-11 RX ADMIN — Medication 129 MG: at 13:36

## 2025-08-11 ASSESSMENT — PAIN SCALES - GENERAL: PAINLEVEL_OUTOF10: 0-NO PAIN

## 2025-08-13 ENCOUNTER — TREATMENT (OUTPATIENT)
Dept: PHYSICAL THERAPY | Facility: CLINIC | Age: 85
End: 2025-08-13
Payer: MEDICARE

## 2025-08-13 DIAGNOSIS — D46.9 MYELODYSPLASTIC SYNDROME (MULTI): ICD-10-CM

## 2025-08-13 PROCEDURE — 97110 THERAPEUTIC EXERCISES: CPT | Mod: GP

## 2025-08-14 ENCOUNTER — LAB (OUTPATIENT)
Dept: LAB | Facility: HOSPITAL | Age: 85
End: 2025-08-14
Payer: MEDICARE

## 2025-08-14 ENCOUNTER — INFUSION (OUTPATIENT)
Dept: HEMATOLOGY/ONCOLOGY | Facility: HOSPITAL | Age: 85
End: 2025-08-14
Payer: MEDICARE

## 2025-08-14 VITALS
TEMPERATURE: 97.7 F | RESPIRATION RATE: 18 BRPM | BODY MASS INDEX: 37.42 KG/M2 | HEART RATE: 88 BPM | SYSTOLIC BLOOD PRESSURE: 112 MMHG | DIASTOLIC BLOOD PRESSURE: 69 MMHG | OXYGEN SATURATION: 99 % | WEIGHT: 274.47 LBS

## 2025-08-14 DIAGNOSIS — D46.9 MYELODYSPLASTIC SYNDROME (MULTI): ICD-10-CM

## 2025-08-14 LAB
ALBUMIN SERPL BCP-MCNC: 4.1 G/DL (ref 3.4–5)
ALP SERPL-CCNC: 81 U/L (ref 33–136)
ALT SERPL W P-5'-P-CCNC: 27 U/L (ref 10–52)
ANION GAP SERPL CALC-SCNC: 13 MMOL/L (ref 10–20)
AST SERPL W P-5'-P-CCNC: 22 U/L (ref 9–39)
BASOPHILS # BLD AUTO: 0.02 X10*3/UL (ref 0–0.1)
BASOPHILS NFR BLD AUTO: 0.6 %
BILIRUB SERPL-MCNC: 1 MG/DL (ref 0–1.2)
BUN SERPL-MCNC: 33 MG/DL (ref 6–23)
CALCIUM SERPL-MCNC: 9.6 MG/DL (ref 8.6–10.3)
CHLORIDE SERPL-SCNC: 107 MMOL/L (ref 98–107)
CO2 SERPL-SCNC: 24 MMOL/L (ref 21–32)
CREAT SERPL-MCNC: 1.56 MG/DL (ref 0.5–1.3)
EGFRCR SERPLBLD CKD-EPI 2021: 44 ML/MIN/1.73M*2
EOSINOPHIL # BLD AUTO: 0.09 X10*3/UL (ref 0–0.4)
EOSINOPHIL NFR BLD AUTO: 2.8 %
ERYTHROCYTE [DISTWIDTH] IN BLOOD BY AUTOMATED COUNT: 15.8 % (ref 11.5–14.5)
GLUCOSE SERPL-MCNC: 113 MG/DL (ref 74–99)
HCT VFR BLD AUTO: 36 % (ref 41–52)
HGB BLD-MCNC: 11.9 G/DL (ref 13.5–17.5)
IMM GRANULOCYTES # BLD AUTO: 0.01 X10*3/UL (ref 0–0.5)
IMM GRANULOCYTES NFR BLD AUTO: 0.3 % (ref 0–0.9)
LYMPHOCYTES # BLD AUTO: 1.15 X10*3/UL (ref 0.8–3)
LYMPHOCYTES NFR BLD AUTO: 36.3 %
MCH RBC QN AUTO: 33.1 PG (ref 26–34)
MCHC RBC AUTO-ENTMCNC: 33.1 G/DL (ref 32–36)
MCV RBC AUTO: 100 FL (ref 80–100)
MONOCYTES # BLD AUTO: 0.15 X10*3/UL (ref 0.05–0.8)
MONOCYTES NFR BLD AUTO: 4.7 %
NEUTROPHILS # BLD AUTO: 1.75 X10*3/UL (ref 1.6–5.5)
NEUTROPHILS NFR BLD AUTO: 55.3 %
NRBC BLD-RTO: 0 /100 WBCS (ref 0–0)
PLATELET # BLD AUTO: 75 X10*3/UL (ref 150–450)
POTASSIUM SERPL-SCNC: 4.8 MMOL/L (ref 3.5–5.3)
PROT SERPL-MCNC: 6.3 G/DL (ref 6.4–8.2)
RBC # BLD AUTO: 3.6 X10*6/UL (ref 4.5–5.9)
SODIUM SERPL-SCNC: 139 MMOL/L (ref 136–145)
WBC # BLD AUTO: 3.2 X10*3/UL (ref 4.4–11.3)

## 2025-08-14 PROCEDURE — 85025 COMPLETE CBC W/AUTO DIFF WBC: CPT

## 2025-08-14 PROCEDURE — 36415 COLL VENOUS BLD VENIPUNCTURE: CPT

## 2025-08-14 PROCEDURE — 2560000001 HC RX 256 EXPERIMENTAL DRUGS: Performed by: INTERNAL MEDICINE

## 2025-08-14 PROCEDURE — 80053 COMPREHEN METABOLIC PANEL: CPT

## 2025-08-14 PROCEDURE — 2500000004 HC RX 250 GENERAL PHARMACY W/ HCPCS (ALT 636 FOR OP/ED): Performed by: INTERNAL MEDICINE

## 2025-08-14 PROCEDURE — 96401 CHEMO ANTI-NEOPL SQ/IM: CPT

## 2025-08-14 RX ORDER — FAMOTIDINE 10 MG/ML
20 INJECTION, SOLUTION INTRAVENOUS ONCE AS NEEDED
Status: DISCONTINUED | OUTPATIENT
Start: 2025-08-14 | End: 2025-08-14 | Stop reason: HOSPADM

## 2025-08-14 RX ORDER — ALBUTEROL SULFATE 0.83 MG/ML
3 SOLUTION RESPIRATORY (INHALATION) AS NEEDED
Status: DISCONTINUED | OUTPATIENT
Start: 2025-08-14 | End: 2025-08-14 | Stop reason: HOSPADM

## 2025-08-14 RX ORDER — ONDANSETRON 8 MG/1
8 TABLET, FILM COATED ORAL ONCE
Status: COMPLETED | OUTPATIENT
Start: 2025-08-14 | End: 2025-08-14

## 2025-08-14 RX ORDER — EPINEPHRINE 0.3 MG/.3ML
0.3 INJECTION SUBCUTANEOUS EVERY 5 MIN PRN
Status: DISCONTINUED | OUTPATIENT
Start: 2025-08-14 | End: 2025-08-14 | Stop reason: HOSPADM

## 2025-08-14 RX ORDER — DIPHENHYDRAMINE HYDROCHLORIDE 50 MG/ML
50 INJECTION, SOLUTION INTRAMUSCULAR; INTRAVENOUS AS NEEDED
Status: DISCONTINUED | OUTPATIENT
Start: 2025-08-14 | End: 2025-08-14 | Stop reason: HOSPADM

## 2025-08-14 RX ADMIN — ONDANSETRON HYDROCHLORIDE 8 MG: 8 TABLET, FILM COATED ORAL at 11:59

## 2025-08-14 RX ADMIN — Medication 12.9 MG: at 12:27

## 2025-08-14 ASSESSMENT — PAIN SCALES - GENERAL: PAINLEVEL_OUTOF10: 0-NO PAIN

## 2025-08-18 ENCOUNTER — APPOINTMENT (OUTPATIENT)
Dept: HEMATOLOGY/ONCOLOGY | Facility: HOSPITAL | Age: 85
End: 2025-08-18
Payer: MEDICARE

## 2025-08-18 ENCOUNTER — INFUSION (OUTPATIENT)
Dept: HEMATOLOGY/ONCOLOGY | Facility: HOSPITAL | Age: 85
End: 2025-08-18
Payer: MEDICARE

## 2025-08-18 ENCOUNTER — APPOINTMENT (OUTPATIENT)
Dept: UROLOGY | Facility: CLINIC | Age: 85
End: 2025-08-18
Payer: MEDICARE

## 2025-08-18 ENCOUNTER — LAB (OUTPATIENT)
Dept: LAB | Facility: HOSPITAL | Age: 85
End: 2025-08-18
Payer: MEDICARE

## 2025-08-18 VITALS
BODY MASS INDEX: 37.24 KG/M2 | HEIGHT: 72 IN | DIASTOLIC BLOOD PRESSURE: 74 MMHG | RESPIRATION RATE: 20 BRPM | OXYGEN SATURATION: 98 % | WEIGHT: 274.91 LBS | HEART RATE: 86 BPM | TEMPERATURE: 96.6 F | SYSTOLIC BLOOD PRESSURE: 146 MMHG

## 2025-08-18 DIAGNOSIS — D46.9 MYELODYSPLASTIC SYNDROME (MULTI): ICD-10-CM

## 2025-08-18 LAB
ALBUMIN SERPL BCP-MCNC: 4.2 G/DL (ref 3.4–5)
ALP SERPL-CCNC: 85 U/L (ref 33–136)
ALT SERPL W P-5'-P-CCNC: 33 U/L (ref 10–52)
ANION GAP SERPL CALC-SCNC: 12 MMOL/L (ref 10–20)
AST SERPL W P-5'-P-CCNC: 25 U/L (ref 9–39)
BASOPHILS # BLD AUTO: 0.02 X10*3/UL (ref 0–0.1)
BASOPHILS NFR BLD AUTO: 0.6 %
BILIRUB SERPL-MCNC: 0.9 MG/DL (ref 0–1.2)
BUN SERPL-MCNC: 32 MG/DL (ref 6–23)
CALCIUM SERPL-MCNC: 9.4 MG/DL (ref 8.6–10.3)
CHLORIDE SERPL-SCNC: 104 MMOL/L (ref 98–107)
CO2 SERPL-SCNC: 27 MMOL/L (ref 21–32)
CREAT SERPL-MCNC: 1.53 MG/DL (ref 0.5–1.3)
EGFRCR SERPLBLD CKD-EPI 2021: 45 ML/MIN/1.73M*2
EOSINOPHIL # BLD AUTO: 0.09 X10*3/UL (ref 0–0.4)
EOSINOPHIL NFR BLD AUTO: 2.8 %
ERYTHROCYTE [DISTWIDTH] IN BLOOD BY AUTOMATED COUNT: 15.9 % (ref 11.5–14.5)
GLUCOSE SERPL-MCNC: 94 MG/DL (ref 74–99)
HCT VFR BLD AUTO: 36.8 % (ref 41–52)
HGB BLD-MCNC: 12.2 G/DL (ref 13.5–17.5)
IMM GRANULOCYTES # BLD AUTO: 0.02 X10*3/UL (ref 0–0.5)
IMM GRANULOCYTES NFR BLD AUTO: 0.6 % (ref 0–0.9)
LYMPHOCYTES # BLD AUTO: 1.23 X10*3/UL (ref 0.8–3)
LYMPHOCYTES NFR BLD AUTO: 38.3 %
MCH RBC QN AUTO: 33.3 PG (ref 26–34)
MCHC RBC AUTO-ENTMCNC: 33.2 G/DL (ref 32–36)
MCV RBC AUTO: 101 FL (ref 80–100)
MONOCYTES # BLD AUTO: 0.17 X10*3/UL (ref 0.05–0.8)
MONOCYTES NFR BLD AUTO: 5.3 %
NEUTROPHILS # BLD AUTO: 1.68 X10*3/UL (ref 1.6–5.5)
NEUTROPHILS NFR BLD AUTO: 52.4 %
NRBC BLD-RTO: 0 /100 WBCS (ref 0–0)
PLATELET # BLD AUTO: 86 X10*3/UL (ref 150–450)
POTASSIUM SERPL-SCNC: 4.9 MMOL/L (ref 3.5–5.3)
PROT SERPL-MCNC: 6.5 G/DL (ref 6.4–8.2)
RBC # BLD AUTO: 3.66 X10*6/UL (ref 4.5–5.9)
SODIUM SERPL-SCNC: 138 MMOL/L (ref 136–145)
WBC # BLD AUTO: 3.2 X10*3/UL (ref 4.4–11.3)

## 2025-08-18 PROCEDURE — 96401 CHEMO ANTI-NEOPL SQ/IM: CPT

## 2025-08-18 PROCEDURE — 85025 COMPLETE CBC W/AUTO DIFF WBC: CPT

## 2025-08-18 PROCEDURE — 80053 COMPREHEN METABOLIC PANEL: CPT

## 2025-08-18 PROCEDURE — 2500000004 HC RX 250 GENERAL PHARMACY W/ HCPCS (ALT 636 FOR OP/ED): Performed by: INTERNAL MEDICINE

## 2025-08-18 PROCEDURE — 36415 COLL VENOUS BLD VENIPUNCTURE: CPT

## 2025-08-18 PROCEDURE — 2560000001 HC RX 256 EXPERIMENTAL DRUGS: Performed by: INTERNAL MEDICINE

## 2025-08-18 RX ORDER — ONDANSETRON 8 MG/1
8 TABLET, FILM COATED ORAL ONCE
Status: COMPLETED | OUTPATIENT
Start: 2025-08-18 | End: 2025-08-18

## 2025-08-18 RX ORDER — FAMOTIDINE 10 MG/ML
20 INJECTION, SOLUTION INTRAVENOUS ONCE AS NEEDED
Status: DISCONTINUED | OUTPATIENT
Start: 2025-08-18 | End: 2025-08-18 | Stop reason: HOSPADM

## 2025-08-18 RX ORDER — DIPHENHYDRAMINE HYDROCHLORIDE 50 MG/ML
50 INJECTION, SOLUTION INTRAMUSCULAR; INTRAVENOUS AS NEEDED
Status: DISCONTINUED | OUTPATIENT
Start: 2025-08-18 | End: 2025-08-18 | Stop reason: HOSPADM

## 2025-08-18 RX ORDER — ALBUTEROL SULFATE 0.83 MG/ML
3 SOLUTION RESPIRATORY (INHALATION) AS NEEDED
Status: DISCONTINUED | OUTPATIENT
Start: 2025-08-18 | End: 2025-08-18 | Stop reason: HOSPADM

## 2025-08-18 RX ORDER — EPINEPHRINE 0.3 MG/.3ML
0.3 INJECTION SUBCUTANEOUS EVERY 5 MIN PRN
Status: DISCONTINUED | OUTPATIENT
Start: 2025-08-18 | End: 2025-08-18 | Stop reason: HOSPADM

## 2025-08-18 RX ADMIN — ONDANSETRON HYDROCHLORIDE 8 MG: 8 TABLET, FILM COATED ORAL at 14:49

## 2025-08-18 RX ADMIN — Medication 129 MG: at 15:20

## 2025-08-20 ENCOUNTER — TREATMENT (OUTPATIENT)
Dept: PHYSICAL THERAPY | Facility: CLINIC | Age: 85
End: 2025-08-20
Payer: MEDICARE

## 2025-08-20 DIAGNOSIS — D46.9 MYELODYSPLASTIC SYNDROME (MULTI): ICD-10-CM

## 2025-08-20 PROCEDURE — 97110 THERAPEUTIC EXERCISES: CPT | Mod: GP

## 2025-08-21 ENCOUNTER — LAB (OUTPATIENT)
Dept: LAB | Facility: HOSPITAL | Age: 85
End: 2025-08-21
Payer: MEDICARE

## 2025-08-21 ENCOUNTER — INFUSION (OUTPATIENT)
Dept: HEMATOLOGY/ONCOLOGY | Facility: HOSPITAL | Age: 85
End: 2025-08-21
Payer: MEDICARE

## 2025-08-21 VITALS
SYSTOLIC BLOOD PRESSURE: 128 MMHG | HEART RATE: 71 BPM | WEIGHT: 273.6 LBS | OXYGEN SATURATION: 100 % | BODY MASS INDEX: 37.3 KG/M2 | RESPIRATION RATE: 18 BRPM | TEMPERATURE: 97 F | DIASTOLIC BLOOD PRESSURE: 65 MMHG

## 2025-08-21 DIAGNOSIS — D46.9 MYELODYSPLASTIC SYNDROME (MULTI): ICD-10-CM

## 2025-08-21 LAB
ALBUMIN SERPL BCP-MCNC: 4.3 G/DL (ref 3.4–5)
ALP SERPL-CCNC: 95 U/L (ref 33–136)
ALT SERPL W P-5'-P-CCNC: 31 U/L (ref 10–52)
ANION GAP SERPL CALC-SCNC: 11 MMOL/L (ref 10–20)
AST SERPL W P-5'-P-CCNC: 23 U/L (ref 9–39)
BASOPHILS # BLD AUTO: 0.01 X10*3/UL (ref 0–0.1)
BASOPHILS NFR BLD AUTO: 0.4 %
BILIRUB SERPL-MCNC: 1 MG/DL (ref 0–1.2)
BUN SERPL-MCNC: 32 MG/DL (ref 6–23)
CALCIUM SERPL-MCNC: 9.7 MG/DL (ref 8.6–10.3)
CHLORIDE SERPL-SCNC: 106 MMOL/L (ref 98–107)
CO2 SERPL-SCNC: 27 MMOL/L (ref 21–32)
CREAT SERPL-MCNC: 1.65 MG/DL (ref 0.5–1.3)
EGFRCR SERPLBLD CKD-EPI 2021: 41 ML/MIN/1.73M*2
EOSINOPHIL # BLD AUTO: 0.08 X10*3/UL (ref 0–0.4)
EOSINOPHIL NFR BLD AUTO: 2.9 %
ERYTHROCYTE [DISTWIDTH] IN BLOOD BY AUTOMATED COUNT: 15.8 % (ref 11.5–14.5)
GLUCOSE SERPL-MCNC: 89 MG/DL (ref 74–99)
HCT VFR BLD AUTO: 38 % (ref 41–52)
HGB BLD-MCNC: 12.5 G/DL (ref 13.5–17.5)
IMM GRANULOCYTES # BLD AUTO: 0.01 X10*3/UL (ref 0–0.5)
IMM GRANULOCYTES NFR BLD AUTO: 0.4 % (ref 0–0.9)
LYMPHOCYTES # BLD AUTO: 0.97 X10*3/UL (ref 0.8–3)
LYMPHOCYTES NFR BLD AUTO: 34.6 %
MCH RBC QN AUTO: 33.2 PG (ref 26–34)
MCHC RBC AUTO-ENTMCNC: 32.9 G/DL (ref 32–36)
MCV RBC AUTO: 101 FL (ref 80–100)
MONOCYTES # BLD AUTO: 0.17 X10*3/UL (ref 0.05–0.8)
MONOCYTES NFR BLD AUTO: 6.1 %
NEUTROPHILS # BLD AUTO: 1.56 X10*3/UL (ref 1.6–5.5)
NEUTROPHILS NFR BLD AUTO: 55.6 %
NRBC BLD-RTO: 0 /100 WBCS (ref 0–0)
PLATELET # BLD AUTO: 82 X10*3/UL (ref 150–450)
POTASSIUM SERPL-SCNC: 4.9 MMOL/L (ref 3.5–5.3)
PROT SERPL-MCNC: 6.4 G/DL (ref 6.4–8.2)
RBC # BLD AUTO: 3.77 X10*6/UL (ref 4.5–5.9)
SODIUM SERPL-SCNC: 139 MMOL/L (ref 136–145)
WBC # BLD AUTO: 2.8 X10*3/UL (ref 4.4–11.3)

## 2025-08-21 PROCEDURE — 85025 COMPLETE CBC W/AUTO DIFF WBC: CPT

## 2025-08-21 PROCEDURE — 2500000004 HC RX 250 GENERAL PHARMACY W/ HCPCS (ALT 636 FOR OP/ED): Performed by: INTERNAL MEDICINE

## 2025-08-21 PROCEDURE — 36415 COLL VENOUS BLD VENIPUNCTURE: CPT

## 2025-08-21 PROCEDURE — 2560000001 HC RX 256 EXPERIMENTAL DRUGS: Performed by: INTERNAL MEDICINE

## 2025-08-21 PROCEDURE — 96401 CHEMO ANTI-NEOPL SQ/IM: CPT

## 2025-08-21 PROCEDURE — 84075 ASSAY ALKALINE PHOSPHATASE: CPT

## 2025-08-21 RX ORDER — EPINEPHRINE 0.3 MG/.3ML
0.3 INJECTION SUBCUTANEOUS EVERY 5 MIN PRN
Status: DISCONTINUED | OUTPATIENT
Start: 2025-08-21 | End: 2025-08-21 | Stop reason: HOSPADM

## 2025-08-21 RX ORDER — FAMOTIDINE 10 MG/ML
20 INJECTION, SOLUTION INTRAVENOUS ONCE AS NEEDED
Status: DISCONTINUED | OUTPATIENT
Start: 2025-08-21 | End: 2025-08-21 | Stop reason: HOSPADM

## 2025-08-21 RX ORDER — ONDANSETRON 8 MG/1
8 TABLET, FILM COATED ORAL ONCE
Status: COMPLETED | OUTPATIENT
Start: 2025-08-21 | End: 2025-08-21

## 2025-08-21 RX ORDER — ALBUTEROL SULFATE 0.83 MG/ML
3 SOLUTION RESPIRATORY (INHALATION) AS NEEDED
Status: DISCONTINUED | OUTPATIENT
Start: 2025-08-21 | End: 2025-08-21 | Stop reason: HOSPADM

## 2025-08-21 RX ORDER — DIPHENHYDRAMINE HYDROCHLORIDE 50 MG/ML
50 INJECTION, SOLUTION INTRAMUSCULAR; INTRAVENOUS AS NEEDED
Status: DISCONTINUED | OUTPATIENT
Start: 2025-08-21 | End: 2025-08-21 | Stop reason: HOSPADM

## 2025-08-21 RX ADMIN — Medication 12.9 MG: at 11:00

## 2025-08-21 RX ADMIN — ONDANSETRON HYDROCHLORIDE 8 MG: 8 TABLET, FILM COATED ORAL at 10:36

## 2025-08-25 ENCOUNTER — OFFICE VISIT (OUTPATIENT)
Dept: HEMATOLOGY/ONCOLOGY | Facility: HOSPITAL | Age: 85
End: 2025-08-25
Payer: MEDICARE

## 2025-08-25 ENCOUNTER — LAB (OUTPATIENT)
Dept: LAB | Facility: HOSPITAL | Age: 85
End: 2025-08-25
Payer: MEDICARE

## 2025-08-25 ENCOUNTER — INFUSION (OUTPATIENT)
Dept: HEMATOLOGY/ONCOLOGY | Facility: HOSPITAL | Age: 85
End: 2025-08-25
Payer: MEDICARE

## 2025-08-25 ENCOUNTER — DOCUMENTATION (OUTPATIENT)
Dept: HEMATOLOGY/ONCOLOGY | Facility: HOSPITAL | Age: 85
End: 2025-08-25

## 2025-08-25 VITALS
SYSTOLIC BLOOD PRESSURE: 142 MMHG | WEIGHT: 274.7 LBS | RESPIRATION RATE: 18 BRPM | BODY MASS INDEX: 37.45 KG/M2 | OXYGEN SATURATION: 100 % | HEART RATE: 98 BPM | DIASTOLIC BLOOD PRESSURE: 72 MMHG | TEMPERATURE: 96.3 F

## 2025-08-25 DIAGNOSIS — D46.9 MYELODYSPLASTIC SYNDROME (MULTI): ICD-10-CM

## 2025-08-25 DIAGNOSIS — D46.9 MYELODYSPLASTIC SYNDROME (MULTI): Primary | ICD-10-CM

## 2025-08-25 LAB
ALBUMIN SERPL BCP-MCNC: 4 G/DL (ref 3.4–5)
ALP SERPL-CCNC: 90 U/L (ref 33–136)
ALT SERPL W P-5'-P-CCNC: 33 U/L (ref 10–52)
ANION GAP SERPL CALC-SCNC: 11 MMOL/L (ref 10–20)
AST SERPL W P-5'-P-CCNC: 21 U/L (ref 9–39)
BASOPHILS # BLD AUTO: 0.01 X10*3/UL (ref 0–0.1)
BASOPHILS NFR BLD AUTO: 0.3 %
BILIRUB SERPL-MCNC: 0.9 MG/DL (ref 0–1.2)
BUN SERPL-MCNC: 30 MG/DL (ref 6–23)
CALCIUM SERPL-MCNC: 9.5 MG/DL (ref 8.6–10.3)
CHLORIDE SERPL-SCNC: 106 MMOL/L (ref 98–107)
CO2 SERPL-SCNC: 27 MMOL/L (ref 21–32)
CREAT SERPL-MCNC: 1.43 MG/DL (ref 0.5–1.3)
EGFRCR SERPLBLD CKD-EPI 2021: 48 ML/MIN/1.73M*2
EOSINOPHIL # BLD AUTO: 0.08 X10*3/UL (ref 0–0.4)
EOSINOPHIL NFR BLD AUTO: 2.6 %
ERYTHROCYTE [DISTWIDTH] IN BLOOD BY AUTOMATED COUNT: 15.6 % (ref 11.5–14.5)
GLUCOSE SERPL-MCNC: 111 MG/DL (ref 74–99)
HCT VFR BLD AUTO: 35.1 % (ref 41–52)
HGB BLD-MCNC: 11.9 G/DL (ref 13.5–17.5)
HGB RETIC QN: 35 PG (ref 28–38)
IMM GRANULOCYTES # BLD AUTO: 0.02 X10*3/UL (ref 0–0.5)
IMM GRANULOCYTES NFR BLD AUTO: 0.7 % (ref 0–0.9)
IMMATURE RETIC FRACTION: 9.9 %
LDH SERPL L TO P-CCNC: 148 U/L (ref 84–246)
LYMPHOCYTES # BLD AUTO: 0.95 X10*3/UL (ref 0.8–3)
LYMPHOCYTES NFR BLD AUTO: 31.4 %
MAGNESIUM SERPL-MCNC: 1.84 MG/DL (ref 1.6–2.4)
MCH RBC QN AUTO: 34 PG (ref 26–34)
MCHC RBC AUTO-ENTMCNC: 33.9 G/DL (ref 32–36)
MCV RBC AUTO: 100 FL (ref 80–100)
MONOCYTES # BLD AUTO: 0.14 X10*3/UL (ref 0.05–0.8)
MONOCYTES NFR BLD AUTO: 4.6 %
NEUTROPHILS # BLD AUTO: 1.83 X10*3/UL (ref 1.6–5.5)
NEUTROPHILS NFR BLD AUTO: 60.4 %
NRBC BLD-RTO: 0 /100 WBCS (ref 0–0)
PLATELET # BLD AUTO: 82 X10*3/UL (ref 150–450)
POTASSIUM SERPL-SCNC: 4.6 MMOL/L (ref 3.5–5.3)
PROT SERPL-MCNC: 6.1 G/DL (ref 6.4–8.2)
RBC # BLD AUTO: 3.5 X10*6/UL (ref 4.5–5.9)
RETICS #: 0.05 X10*6/UL (ref 0.02–0.11)
RETICS/RBC NFR AUTO: 1.4 % (ref 0.5–2)
SODIUM SERPL-SCNC: 139 MMOL/L (ref 136–145)
URATE SERPL-MCNC: 4.9 MG/DL (ref 4–7.5)
WBC # BLD AUTO: 3 X10*3/UL (ref 4.4–11.3)

## 2025-08-25 PROCEDURE — 83735 ASSAY OF MAGNESIUM: CPT

## 2025-08-25 PROCEDURE — 80053 COMPREHEN METABOLIC PANEL: CPT

## 2025-08-25 PROCEDURE — 2560000001 HC RX 256 EXPERIMENTAL DRUGS: Performed by: INTERNAL MEDICINE

## 2025-08-25 PROCEDURE — 84550 ASSAY OF BLOOD/URIC ACID: CPT

## 2025-08-25 PROCEDURE — 85025 COMPLETE CBC W/AUTO DIFF WBC: CPT

## 2025-08-25 PROCEDURE — 2500000004 HC RX 250 GENERAL PHARMACY W/ HCPCS (ALT 636 FOR OP/ED): Performed by: INTERNAL MEDICINE

## 2025-08-25 PROCEDURE — 85045 AUTOMATED RETICULOCYTE COUNT: CPT

## 2025-08-25 PROCEDURE — 36415 COLL VENOUS BLD VENIPUNCTURE: CPT

## 2025-08-25 PROCEDURE — 99215 OFFICE O/P EST HI 40 MIN: CPT | Performed by: NURSE PRACTITIONER

## 2025-08-25 PROCEDURE — 96401 CHEMO ANTI-NEOPL SQ/IM: CPT

## 2025-08-25 PROCEDURE — 83615 LACTATE (LD) (LDH) ENZYME: CPT

## 2025-08-25 RX ORDER — ONDANSETRON 8 MG/1
8 TABLET, FILM COATED ORAL ONCE
Status: CANCELLED | OUTPATIENT
Start: 2025-08-28

## 2025-08-25 RX ORDER — ALBUTEROL SULFATE 0.83 MG/ML
3 SOLUTION RESPIRATORY (INHALATION) AS NEEDED
Status: CANCELLED | OUTPATIENT
Start: 2025-08-25

## 2025-08-25 RX ORDER — EPINEPHRINE 0.3 MG/.3ML
0.3 INJECTION SUBCUTANEOUS EVERY 5 MIN PRN
Status: CANCELLED | OUTPATIENT
Start: 2025-08-25

## 2025-08-25 RX ORDER — ALBUTEROL SULFATE 0.83 MG/ML
3 SOLUTION RESPIRATORY (INHALATION) AS NEEDED
OUTPATIENT
Start: 2025-09-01

## 2025-08-25 RX ORDER — ONDANSETRON 8 MG/1
8 TABLET, FILM COATED ORAL ONCE
OUTPATIENT
Start: 2025-09-15

## 2025-08-25 RX ORDER — EPINEPHRINE 0.3 MG/.3ML
0.3 INJECTION SUBCUTANEOUS EVERY 5 MIN PRN
OUTPATIENT
Start: 2025-09-01

## 2025-08-25 RX ORDER — DIPHENHYDRAMINE HYDROCHLORIDE 50 MG/ML
50 INJECTION, SOLUTION INTRAMUSCULAR; INTRAVENOUS AS NEEDED
OUTPATIENT
Start: 2025-09-18

## 2025-08-25 RX ORDER — ALBUTEROL SULFATE 0.83 MG/ML
3 SOLUTION RESPIRATORY (INHALATION) AS NEEDED
OUTPATIENT
Start: 2025-09-08

## 2025-08-25 RX ORDER — FAMOTIDINE 10 MG/ML
20 INJECTION, SOLUTION INTRAVENOUS ONCE AS NEEDED
OUTPATIENT
Start: 2025-09-11

## 2025-08-25 RX ORDER — ONDANSETRON 8 MG/1
8 TABLET, FILM COATED ORAL ONCE
OUTPATIENT
Start: 2025-09-01

## 2025-08-25 RX ORDER — DIPHENHYDRAMINE HYDROCHLORIDE 50 MG/ML
50 INJECTION, SOLUTION INTRAMUSCULAR; INTRAVENOUS AS NEEDED
Status: CANCELLED | OUTPATIENT
Start: 2025-08-25

## 2025-08-25 RX ORDER — ALBUTEROL SULFATE 0.83 MG/ML
3 SOLUTION RESPIRATORY (INHALATION) AS NEEDED
Status: DISCONTINUED | OUTPATIENT
Start: 2025-08-25 | End: 2025-08-25 | Stop reason: HOSPADM

## 2025-08-25 RX ORDER — EPINEPHRINE 0.3 MG/.3ML
0.3 INJECTION SUBCUTANEOUS EVERY 5 MIN PRN
Status: DISCONTINUED | OUTPATIENT
Start: 2025-08-25 | End: 2025-08-25 | Stop reason: HOSPADM

## 2025-08-25 RX ORDER — DIPHENHYDRAMINE HYDROCHLORIDE 50 MG/ML
50 INJECTION, SOLUTION INTRAMUSCULAR; INTRAVENOUS AS NEEDED
OUTPATIENT
Start: 2025-09-11

## 2025-08-25 RX ORDER — ONDANSETRON 8 MG/1
8 TABLET, FILM COATED ORAL ONCE
OUTPATIENT
Start: 2025-09-04

## 2025-08-25 RX ORDER — DIPHENHYDRAMINE HYDROCHLORIDE 50 MG/ML
50 INJECTION, SOLUTION INTRAMUSCULAR; INTRAVENOUS AS NEEDED
OUTPATIENT
Start: 2025-09-01

## 2025-08-25 RX ORDER — FAMOTIDINE 10 MG/ML
20 INJECTION, SOLUTION INTRAVENOUS ONCE AS NEEDED
OUTPATIENT
Start: 2025-09-15

## 2025-08-25 RX ORDER — FAMOTIDINE 10 MG/ML
20 INJECTION, SOLUTION INTRAVENOUS ONCE AS NEEDED
Status: CANCELLED | OUTPATIENT
Start: 2025-08-25

## 2025-08-25 RX ORDER — EPINEPHRINE 0.3 MG/.3ML
0.3 INJECTION SUBCUTANEOUS EVERY 5 MIN PRN
OUTPATIENT
Start: 2025-09-04

## 2025-08-25 RX ORDER — ALBUTEROL SULFATE 0.83 MG/ML
3 SOLUTION RESPIRATORY (INHALATION) AS NEEDED
OUTPATIENT
Start: 2025-09-15

## 2025-08-25 RX ORDER — DIPHENHYDRAMINE HYDROCHLORIDE 50 MG/ML
50 INJECTION, SOLUTION INTRAMUSCULAR; INTRAVENOUS AS NEEDED
OUTPATIENT
Start: 2025-09-08

## 2025-08-25 RX ORDER — ALBUTEROL SULFATE 0.83 MG/ML
3 SOLUTION RESPIRATORY (INHALATION) AS NEEDED
OUTPATIENT
Start: 2025-09-18

## 2025-08-25 RX ORDER — DIPHENHYDRAMINE HYDROCHLORIDE 50 MG/ML
50 INJECTION, SOLUTION INTRAMUSCULAR; INTRAVENOUS AS NEEDED
Status: CANCELLED | OUTPATIENT
Start: 2025-08-28

## 2025-08-25 RX ORDER — ALBUTEROL SULFATE 0.83 MG/ML
3 SOLUTION RESPIRATORY (INHALATION) AS NEEDED
Status: CANCELLED | OUTPATIENT
Start: 2025-08-28

## 2025-08-25 RX ORDER — EPINEPHRINE 0.3 MG/.3ML
0.3 INJECTION SUBCUTANEOUS EVERY 5 MIN PRN
Status: CANCELLED | OUTPATIENT
Start: 2025-08-28

## 2025-08-25 RX ORDER — DIPHENHYDRAMINE HYDROCHLORIDE 50 MG/ML
50 INJECTION, SOLUTION INTRAMUSCULAR; INTRAVENOUS AS NEEDED
OUTPATIENT
Start: 2025-09-15

## 2025-08-25 RX ORDER — FAMOTIDINE 10 MG/ML
20 INJECTION, SOLUTION INTRAVENOUS ONCE AS NEEDED
Status: CANCELLED | OUTPATIENT
Start: 2025-08-28

## 2025-08-25 RX ORDER — ONDANSETRON 8 MG/1
8 TABLET, FILM COATED ORAL ONCE
OUTPATIENT
Start: 2025-09-11

## 2025-08-25 RX ORDER — ONDANSETRON 8 MG/1
8 TABLET, FILM COATED ORAL ONCE
OUTPATIENT
Start: 2025-09-08

## 2025-08-25 RX ORDER — ONDANSETRON 8 MG/1
8 TABLET, FILM COATED ORAL ONCE
Status: COMPLETED | OUTPATIENT
Start: 2025-08-25 | End: 2025-08-25

## 2025-08-25 RX ORDER — FAMOTIDINE 10 MG/ML
20 INJECTION, SOLUTION INTRAVENOUS ONCE AS NEEDED
OUTPATIENT
Start: 2025-09-18

## 2025-08-25 RX ORDER — DIPHENHYDRAMINE HYDROCHLORIDE 50 MG/ML
50 INJECTION, SOLUTION INTRAMUSCULAR; INTRAVENOUS AS NEEDED
OUTPATIENT
Start: 2025-09-04

## 2025-08-25 RX ORDER — ONDANSETRON 8 MG/1
8 TABLET, FILM COATED ORAL ONCE
Status: CANCELLED | OUTPATIENT
Start: 2025-08-25

## 2025-08-25 RX ORDER — FAMOTIDINE 10 MG/ML
20 INJECTION, SOLUTION INTRAVENOUS ONCE AS NEEDED
OUTPATIENT
Start: 2025-09-01

## 2025-08-25 RX ORDER — EPINEPHRINE 0.3 MG/.3ML
0.3 INJECTION SUBCUTANEOUS EVERY 5 MIN PRN
OUTPATIENT
Start: 2025-09-08

## 2025-08-25 RX ORDER — EPINEPHRINE 0.3 MG/.3ML
0.3 INJECTION SUBCUTANEOUS EVERY 5 MIN PRN
OUTPATIENT
Start: 2025-09-18

## 2025-08-25 RX ORDER — FAMOTIDINE 10 MG/ML
20 INJECTION, SOLUTION INTRAVENOUS ONCE AS NEEDED
OUTPATIENT
Start: 2025-09-08

## 2025-08-25 RX ORDER — ALBUTEROL SULFATE 0.83 MG/ML
3 SOLUTION RESPIRATORY (INHALATION) AS NEEDED
OUTPATIENT
Start: 2025-09-04

## 2025-08-25 RX ORDER — EPINEPHRINE 0.3 MG/.3ML
0.3 INJECTION SUBCUTANEOUS EVERY 5 MIN PRN
OUTPATIENT
Start: 2025-09-11

## 2025-08-25 RX ORDER — FAMOTIDINE 10 MG/ML
20 INJECTION, SOLUTION INTRAVENOUS ONCE AS NEEDED
OUTPATIENT
Start: 2025-09-04

## 2025-08-25 RX ORDER — DIPHENHYDRAMINE HYDROCHLORIDE 50 MG/ML
50 INJECTION, SOLUTION INTRAMUSCULAR; INTRAVENOUS AS NEEDED
Status: DISCONTINUED | OUTPATIENT
Start: 2025-08-25 | End: 2025-08-25 | Stop reason: HOSPADM

## 2025-08-25 RX ORDER — EPINEPHRINE 0.3 MG/.3ML
0.3 INJECTION SUBCUTANEOUS EVERY 5 MIN PRN
OUTPATIENT
Start: 2025-09-15

## 2025-08-25 RX ORDER — ONDANSETRON 8 MG/1
8 TABLET, FILM COATED ORAL ONCE
OUTPATIENT
Start: 2025-09-18

## 2025-08-25 RX ORDER — ALBUTEROL SULFATE 0.83 MG/ML
3 SOLUTION RESPIRATORY (INHALATION) AS NEEDED
OUTPATIENT
Start: 2025-09-11

## 2025-08-25 RX ORDER — FAMOTIDINE 10 MG/ML
20 INJECTION, SOLUTION INTRAVENOUS ONCE AS NEEDED
Status: DISCONTINUED | OUTPATIENT
Start: 2025-08-25 | End: 2025-08-25 | Stop reason: HOSPADM

## 2025-08-25 RX ADMIN — ONDANSETRON HYDROCHLORIDE 8 MG: 8 TABLET, FILM COATED ORAL at 10:54

## 2025-08-25 RX ADMIN — Medication 64.5 MG: at 11:37

## 2025-08-25 RX ADMIN — Medication 64.5 MG: at 11:35

## 2025-08-25 ASSESSMENT — PAIN SCALES - GENERAL: PAINLEVEL_OUTOF10: 0-NO PAIN

## 2025-08-27 ENCOUNTER — TREATMENT (OUTPATIENT)
Dept: PHYSICAL THERAPY | Facility: CLINIC | Age: 85
End: 2025-08-27
Payer: MEDICARE

## 2025-08-27 DIAGNOSIS — D46.9 MYELODYSPLASTIC SYNDROME (MULTI): ICD-10-CM

## 2025-08-27 PROCEDURE — 97110 THERAPEUTIC EXERCISES: CPT | Mod: GP

## 2025-08-28 ENCOUNTER — LAB (OUTPATIENT)
Dept: LAB | Facility: HOSPITAL | Age: 85
End: 2025-08-28
Payer: MEDICARE

## 2025-08-28 ENCOUNTER — INFUSION (OUTPATIENT)
Dept: HEMATOLOGY/ONCOLOGY | Facility: HOSPITAL | Age: 85
End: 2025-08-28
Payer: MEDICARE

## 2025-08-28 VITALS
BODY MASS INDEX: 36.85 KG/M2 | DIASTOLIC BLOOD PRESSURE: 61 MMHG | RESPIRATION RATE: 20 BRPM | OXYGEN SATURATION: 99 % | WEIGHT: 272.05 LBS | HEIGHT: 72 IN | TEMPERATURE: 97.3 F | SYSTOLIC BLOOD PRESSURE: 148 MMHG | HEART RATE: 86 BPM

## 2025-08-28 DIAGNOSIS — F51.01 PRIMARY INSOMNIA: ICD-10-CM

## 2025-08-28 DIAGNOSIS — D46.9 MYELODYSPLASTIC SYNDROME (MULTI): ICD-10-CM

## 2025-08-28 PROCEDURE — 96401 CHEMO ANTI-NEOPL SQ/IM: CPT

## 2025-08-28 PROCEDURE — 2500000004 HC RX 250 GENERAL PHARMACY W/ HCPCS (ALT 636 FOR OP/ED): Performed by: INTERNAL MEDICINE

## 2025-08-28 PROCEDURE — 2560000001 HC RX 256 EXPERIMENTAL DRUGS: Performed by: INTERNAL MEDICINE

## 2025-08-28 RX ORDER — ONDANSETRON 8 MG/1
8 TABLET, FILM COATED ORAL ONCE
Status: COMPLETED | OUTPATIENT
Start: 2025-08-28 | End: 2025-08-28

## 2025-08-28 RX ORDER — FAMOTIDINE 10 MG/ML
20 INJECTION, SOLUTION INTRAVENOUS ONCE AS NEEDED
Status: DISCONTINUED | OUTPATIENT
Start: 2025-08-28 | End: 2025-08-28 | Stop reason: HOSPADM

## 2025-08-28 RX ORDER — EPINEPHRINE 0.3 MG/.3ML
0.3 INJECTION SUBCUTANEOUS EVERY 5 MIN PRN
Status: DISCONTINUED | OUTPATIENT
Start: 2025-08-28 | End: 2025-08-28 | Stop reason: HOSPADM

## 2025-08-28 RX ORDER — DIPHENHYDRAMINE HYDROCHLORIDE 50 MG/ML
50 INJECTION, SOLUTION INTRAMUSCULAR; INTRAVENOUS AS NEEDED
Status: DISCONTINUED | OUTPATIENT
Start: 2025-08-28 | End: 2025-08-28 | Stop reason: HOSPADM

## 2025-08-28 RX ORDER — ALBUTEROL SULFATE 0.83 MG/ML
3 SOLUTION RESPIRATORY (INHALATION) AS NEEDED
Status: DISCONTINUED | OUTPATIENT
Start: 2025-08-28 | End: 2025-08-28 | Stop reason: HOSPADM

## 2025-08-28 RX ADMIN — ONDANSETRON HYDROCHLORIDE 8 MG: 8 TABLET, FILM COATED ORAL at 14:15

## 2025-08-28 RX ADMIN — Medication 12.9 MG: at 14:54

## 2025-09-02 ENCOUNTER — LAB (OUTPATIENT)
Dept: LAB | Facility: HOSPITAL | Age: 85
End: 2025-09-02
Payer: MEDICARE

## 2025-09-02 ENCOUNTER — INFUSION (OUTPATIENT)
Dept: HEMATOLOGY/ONCOLOGY | Facility: HOSPITAL | Age: 85
End: 2025-09-02
Payer: MEDICARE

## 2025-09-02 VITALS
BODY MASS INDEX: 37.13 KG/M2 | WEIGHT: 272.3 LBS | RESPIRATION RATE: 17 BRPM | OXYGEN SATURATION: 100 % | SYSTOLIC BLOOD PRESSURE: 142 MMHG | DIASTOLIC BLOOD PRESSURE: 64 MMHG | HEART RATE: 75 BPM | TEMPERATURE: 97.3 F

## 2025-09-02 DIAGNOSIS — D46.9 MYELODYSPLASTIC SYNDROME (MULTI): ICD-10-CM

## 2025-09-02 LAB
ALBUMIN SERPL BCP-MCNC: 4.1 G/DL (ref 3.4–5)
ALP SERPL-CCNC: 87 U/L (ref 33–136)
ALT SERPL W P-5'-P-CCNC: 32 U/L (ref 10–52)
ANION GAP SERPL CALC-SCNC: 12 MMOL/L (ref 10–20)
AST SERPL W P-5'-P-CCNC: 23 U/L (ref 9–39)
BASOPHILS # BLD AUTO: 0.02 X10*3/UL (ref 0–0.1)
BASOPHILS NFR BLD AUTO: 0.6 %
BILIRUB SERPL-MCNC: 0.7 MG/DL (ref 0–1.2)
BUN SERPL-MCNC: 29 MG/DL (ref 6–23)
CALCIUM SERPL-MCNC: 9.5 MG/DL (ref 8.6–10.3)
CHLORIDE SERPL-SCNC: 107 MMOL/L (ref 98–107)
CO2 SERPL-SCNC: 27 MMOL/L (ref 21–32)
CREAT SERPL-MCNC: 1.4 MG/DL (ref 0.5–1.3)
EGFRCR SERPLBLD CKD-EPI 2021: 50 ML/MIN/1.73M*2
EOSINOPHIL # BLD AUTO: 0.13 X10*3/UL (ref 0–0.4)
EOSINOPHIL NFR BLD AUTO: 3.7 %
ERYTHROCYTE [DISTWIDTH] IN BLOOD BY AUTOMATED COUNT: 15.6 % (ref 11.5–14.5)
GLUCOSE SERPL-MCNC: 96 MG/DL (ref 74–99)
HCT VFR BLD AUTO: 36.5 % (ref 41–52)
HGB BLD-MCNC: 12.1 G/DL (ref 13.5–17.5)
IMM GRANULOCYTES # BLD AUTO: 0.03 X10*3/UL (ref 0–0.5)
IMM GRANULOCYTES NFR BLD AUTO: 0.9 % (ref 0–0.9)
LYMPHOCYTES # BLD AUTO: 1.4 X10*3/UL (ref 0.8–3)
LYMPHOCYTES NFR BLD AUTO: 40.3 %
MCH RBC QN AUTO: 33.3 PG (ref 26–34)
MCHC RBC AUTO-ENTMCNC: 33.2 G/DL (ref 32–36)
MCV RBC AUTO: 101 FL (ref 80–100)
MONOCYTES # BLD AUTO: 0.19 X10*3/UL (ref 0.05–0.8)
MONOCYTES NFR BLD AUTO: 5.5 %
NEUTROPHILS # BLD AUTO: 1.7 X10*3/UL (ref 1.6–5.5)
NEUTROPHILS NFR BLD AUTO: 49 %
NRBC BLD-RTO: 0 /100 WBCS (ref 0–0)
PLATELET # BLD AUTO: 112 X10*3/UL (ref 150–450)
POTASSIUM SERPL-SCNC: 4.6 MMOL/L (ref 3.5–5.3)
PROT SERPL-MCNC: 6.3 G/DL (ref 6.4–8.2)
RBC # BLD AUTO: 3.63 X10*6/UL (ref 4.5–5.9)
SODIUM SERPL-SCNC: 141 MMOL/L (ref 136–145)
WBC # BLD AUTO: 3.5 X10*3/UL (ref 4.4–11.3)

## 2025-09-02 PROCEDURE — 84075 ASSAY ALKALINE PHOSPHATASE: CPT

## 2025-09-02 PROCEDURE — 2560000001 HC RX 256 EXPERIMENTAL DRUGS: Performed by: INTERNAL MEDICINE

## 2025-09-02 PROCEDURE — 85025 COMPLETE CBC W/AUTO DIFF WBC: CPT

## 2025-09-02 PROCEDURE — 96401 CHEMO ANTI-NEOPL SQ/IM: CPT

## 2025-09-02 PROCEDURE — 2500000004 HC RX 250 GENERAL PHARMACY W/ HCPCS (ALT 636 FOR OP/ED): Performed by: INTERNAL MEDICINE

## 2025-09-02 PROCEDURE — 36415 COLL VENOUS BLD VENIPUNCTURE: CPT

## 2025-09-02 RX ORDER — DIPHENHYDRAMINE HYDROCHLORIDE 50 MG/ML
50 INJECTION, SOLUTION INTRAMUSCULAR; INTRAVENOUS AS NEEDED
Status: DISCONTINUED | OUTPATIENT
Start: 2025-09-02 | End: 2025-09-02 | Stop reason: HOSPADM

## 2025-09-02 RX ORDER — EPINEPHRINE 0.3 MG/.3ML
0.3 INJECTION SUBCUTANEOUS EVERY 5 MIN PRN
Status: DISCONTINUED | OUTPATIENT
Start: 2025-09-02 | End: 2025-09-02 | Stop reason: HOSPADM

## 2025-09-02 RX ORDER — ONDANSETRON 8 MG/1
8 TABLET, FILM COATED ORAL ONCE
Status: COMPLETED | OUTPATIENT
Start: 2025-09-02 | End: 2025-09-02

## 2025-09-02 RX ORDER — ALBUTEROL SULFATE 0.83 MG/ML
3 SOLUTION RESPIRATORY (INHALATION) AS NEEDED
Status: DISCONTINUED | OUTPATIENT
Start: 2025-09-02 | End: 2025-09-02 | Stop reason: HOSPADM

## 2025-09-02 RX ORDER — TEMAZEPAM 15 MG/1
15 CAPSULE ORAL NIGHTLY PRN
Qty: 90 CAPSULE | Refills: 0 | Status: SHIPPED | OUTPATIENT
Start: 2025-09-02

## 2025-09-02 RX ORDER — FAMOTIDINE 10 MG/ML
20 INJECTION, SOLUTION INTRAVENOUS ONCE AS NEEDED
Status: DISCONTINUED | OUTPATIENT
Start: 2025-09-02 | End: 2025-09-02 | Stop reason: HOSPADM

## 2025-09-02 RX ADMIN — Medication 129 MG: at 14:17

## 2025-09-02 RX ADMIN — ONDANSETRON HYDROCHLORIDE 8 MG: 8 TABLET, FILM COATED ORAL at 14:03

## 2025-09-02 ASSESSMENT — PAIN SCALES - GENERAL: PAINLEVEL_OUTOF10: 0-NO PAIN

## 2025-09-03 ENCOUNTER — TREATMENT (OUTPATIENT)
Dept: PHYSICAL THERAPY | Facility: CLINIC | Age: 85
End: 2025-09-03
Payer: MEDICARE

## 2025-09-03 DIAGNOSIS — D46.9 MYELODYSPLASTIC SYNDROME (MULTI): ICD-10-CM

## 2025-09-03 PROCEDURE — 97110 THERAPEUTIC EXERCISES: CPT | Mod: GP

## 2025-09-05 ENCOUNTER — INFUSION (OUTPATIENT)
Dept: HEMATOLOGY/ONCOLOGY | Facility: HOSPITAL | Age: 85
End: 2025-09-05
Payer: MEDICARE

## 2025-09-05 ENCOUNTER — LAB (OUTPATIENT)
Dept: LAB | Facility: HOSPITAL | Age: 85
End: 2025-09-05
Payer: MEDICARE

## 2025-09-05 VITALS
OXYGEN SATURATION: 100 % | BODY MASS INDEX: 37.22 KG/M2 | HEART RATE: 86 BPM | WEIGHT: 273 LBS | RESPIRATION RATE: 19 BRPM | SYSTOLIC BLOOD PRESSURE: 130 MMHG | DIASTOLIC BLOOD PRESSURE: 65 MMHG | TEMPERATURE: 97.5 F

## 2025-09-05 DIAGNOSIS — D46.9 MYELODYSPLASTIC SYNDROME (MULTI): ICD-10-CM

## 2025-09-05 LAB
ALBUMIN SERPL BCP-MCNC: 4 G/DL (ref 3.4–5)
ALP SERPL-CCNC: 90 U/L (ref 33–136)
ALT SERPL W P-5'-P-CCNC: 32 U/L (ref 10–52)
ANION GAP SERPL CALC-SCNC: 13 MMOL/L (ref 10–20)
AST SERPL W P-5'-P-CCNC: 25 U/L (ref 9–39)
BASOPHILS # BLD AUTO: 0.02 X10*3/UL (ref 0–0.1)
BASOPHILS NFR BLD AUTO: 0.8 %
BILIRUB SERPL-MCNC: 0.8 MG/DL (ref 0–1.2)
BUN SERPL-MCNC: 33 MG/DL (ref 6–23)
CALCIUM SERPL-MCNC: 9.4 MG/DL (ref 8.6–10.3)
CHLORIDE SERPL-SCNC: 108 MMOL/L (ref 98–107)
CO2 SERPL-SCNC: 24 MMOL/L (ref 21–32)
CREAT SERPL-MCNC: 1.56 MG/DL (ref 0.5–1.3)
EGFRCR SERPLBLD CKD-EPI 2021: 44 ML/MIN/1.73M*2
EOSINOPHIL # BLD AUTO: 0.07 X10*3/UL (ref 0–0.4)
EOSINOPHIL NFR BLD AUTO: 2.7 %
ERYTHROCYTE [DISTWIDTH] IN BLOOD BY AUTOMATED COUNT: 15.4 % (ref 11.5–14.5)
GLUCOSE SERPL-MCNC: 103 MG/DL (ref 74–99)
HCT VFR BLD AUTO: 36.1 % (ref 41–52)
HGB BLD-MCNC: 12 G/DL (ref 13.5–17.5)
IMM GRANULOCYTES # BLD AUTO: 0.02 X10*3/UL (ref 0–0.5)
IMM GRANULOCYTES NFR BLD AUTO: 0.8 % (ref 0–0.9)
LYMPHOCYTES # BLD AUTO: 0.93 X10*3/UL (ref 0.8–3)
LYMPHOCYTES NFR BLD AUTO: 35.2 %
MCH RBC QN AUTO: 33.3 PG (ref 26–34)
MCHC RBC AUTO-ENTMCNC: 33.2 G/DL (ref 32–36)
MCV RBC AUTO: 100 FL (ref 80–100)
MONOCYTES # BLD AUTO: 0.15 X10*3/UL (ref 0.05–0.8)
MONOCYTES NFR BLD AUTO: 5.7 %
NEUTROPHILS # BLD AUTO: 1.45 X10*3/UL (ref 1.6–5.5)
NEUTROPHILS NFR BLD AUTO: 54.8 %
NRBC BLD-RTO: 0 /100 WBCS (ref 0–0)
PLATELET # BLD AUTO: 104 X10*3/UL (ref 150–450)
POTASSIUM SERPL-SCNC: 4.7 MMOL/L (ref 3.5–5.3)
PROT SERPL-MCNC: 6.1 G/DL (ref 6.4–8.2)
RBC # BLD AUTO: 3.6 X10*6/UL (ref 4.5–5.9)
SODIUM SERPL-SCNC: 140 MMOL/L (ref 136–145)
WBC # BLD AUTO: 2.6 X10*3/UL (ref 4.4–11.3)

## 2025-09-05 PROCEDURE — 96401 CHEMO ANTI-NEOPL SQ/IM: CPT

## 2025-09-05 PROCEDURE — 2560000001 HC RX 256 EXPERIMENTAL DRUGS: Performed by: INTERNAL MEDICINE

## 2025-09-05 PROCEDURE — 2500000004 HC RX 250 GENERAL PHARMACY W/ HCPCS (ALT 636 FOR OP/ED): Performed by: INTERNAL MEDICINE

## 2025-09-05 PROCEDURE — 36415 COLL VENOUS BLD VENIPUNCTURE: CPT

## 2025-09-05 PROCEDURE — 85025 COMPLETE CBC W/AUTO DIFF WBC: CPT

## 2025-09-05 PROCEDURE — 84075 ASSAY ALKALINE PHOSPHATASE: CPT

## 2025-09-05 RX ORDER — ONDANSETRON 8 MG/1
8 TABLET, FILM COATED ORAL ONCE
Status: COMPLETED | OUTPATIENT
Start: 2025-09-05 | End: 2025-09-05

## 2025-09-05 RX ORDER — EPINEPHRINE 0.3 MG/.3ML
0.3 INJECTION SUBCUTANEOUS EVERY 5 MIN PRN
Status: DISCONTINUED | OUTPATIENT
Start: 2025-09-05 | End: 2025-09-05 | Stop reason: HOSPADM

## 2025-09-05 RX ORDER — FAMOTIDINE 10 MG/ML
20 INJECTION, SOLUTION INTRAVENOUS ONCE AS NEEDED
Status: DISCONTINUED | OUTPATIENT
Start: 2025-09-05 | End: 2025-09-05 | Stop reason: HOSPADM

## 2025-09-05 RX ORDER — DIPHENHYDRAMINE HYDROCHLORIDE 50 MG/ML
50 INJECTION, SOLUTION INTRAMUSCULAR; INTRAVENOUS AS NEEDED
Status: DISCONTINUED | OUTPATIENT
Start: 2025-09-05 | End: 2025-09-05 | Stop reason: HOSPADM

## 2025-09-05 RX ORDER — ALBUTEROL SULFATE 0.83 MG/ML
3 SOLUTION RESPIRATORY (INHALATION) AS NEEDED
Status: DISCONTINUED | OUTPATIENT
Start: 2025-09-05 | End: 2025-09-05 | Stop reason: HOSPADM

## 2025-09-05 RX ADMIN — ONDANSETRON HYDROCHLORIDE 8 MG: 8 TABLET, FILM COATED ORAL at 10:12

## 2025-09-05 RX ADMIN — Medication 12.9 MG: at 10:55

## 2025-09-05 ASSESSMENT — PAIN SCALES - GENERAL: PAINLEVEL_OUTOF10: 0-NO PAIN

## 2025-09-09 ENCOUNTER — APPOINTMENT (OUTPATIENT)
Dept: NEPHROLOGY | Facility: CLINIC | Age: 85
End: 2025-09-09
Payer: MEDICARE

## 2025-09-12 ENCOUNTER — APPOINTMENT (OUTPATIENT)
Dept: CARDIOLOGY | Facility: CLINIC | Age: 85
End: 2025-09-12
Payer: MEDICARE

## 2025-10-20 ENCOUNTER — APPOINTMENT (OUTPATIENT)
Age: 85
End: 2025-10-20
Payer: MEDICARE

## 2025-11-05 ENCOUNTER — APPOINTMENT (OUTPATIENT)
Dept: PRIMARY CARE | Facility: CLINIC | Age: 85
End: 2025-11-05
Payer: MEDICARE

## 2025-12-16 ENCOUNTER — APPOINTMENT (OUTPATIENT)
Dept: CARDIOLOGY | Facility: CLINIC | Age: 85
End: 2025-12-16
Payer: MEDICARE

## (undated) DEVICE — PITCHER, GRADUATE, 32 OZ (1200CC), STERILE

## (undated) DEVICE — CATHETER ANGIO AD PED 5FR L65CM GWIRE 0.035IN PERIPH STR

## (undated) DEVICE — DRAPE, UNDERBUTTOCKS

## (undated) DEVICE — DRAPE, INCISE, ANTIMICROBIAL, IOBAN 2, LARGE, 17 X 23 IN, DISPOSABLE, STERILE

## (undated) DEVICE — 3M™ STERI-DRAPE™ INSTRUMENT POUCH 1018: Brand: STERI-DRAPE™

## (undated) DEVICE — RETRACTOR, SURGICAL, RING, PLASTIC, DISPOSABLE

## (undated) DEVICE — INTENDED FOR TISSUE SEPARATION, AND OTHER PROCEDURES THAT REQUIRE A SHARP SURGICAL BLADE TO PUNCTURE OR CUT.: Brand: BARD-PARKER ® CARBON RIB-BACK BLADES

## (undated) DEVICE — SLEEVE, VASO PRESS, CALF GARMENT, MEDIUM, GREEN

## (undated) DEVICE — TOWEL PACK, STERILE, 4/PACK, BLUE

## (undated) DEVICE — COVER, TABLE, 54X90

## (undated) DEVICE — SKIN MARKER,REGULAR TIP WITH RULER: Brand: DEVON

## (undated) DEVICE — BOWL SET, SMALL, DELUXE, STERILE

## (undated) DEVICE — APPLICATOR, CHLORAPREP, W/ORANGE TINT, 26ML

## (undated) DEVICE — 3 ML SYRINGE LUER-LOCK TIP: Brand: MONOJECT

## (undated) DEVICE — CHLORAPREP 26ML ORANGE

## (undated) DEVICE — SUTURE PERMAHAND SZ 3-0 L18IN NONABSORBABLE BLK SILK BRAID A184H

## (undated) DEVICE — TISSEEL FIBRIN SEALANT, PRIMA, FROZEN, 10ML

## (undated) DEVICE — SYRINGE MED 30ML STD CLR PLAS LUERLOCK TIP N CTRL DISP

## (undated) DEVICE — FLEXOR, CHECK-FLO, INTRODUCER RAABE MODIFICATION: Brand: FLEXOR

## (undated) DEVICE — Z DISCONTINUED PER MEDLINE USE 2741943 DRESSING AQUACEL 10 IN ALG W9XL25CM SIL CVR WTRPRF VIR BACT BARR ANTIMIC

## (undated) DEVICE — SUCKER CARD L9IN 0.25IN CONN MINI RIG SFT TIP W/ SIDE PRT

## (undated) DEVICE — SPONGE,LAP,18"X18",DLX,XR,ST,5/PK,40/PK: Brand: MEDLINE

## (undated) DEVICE — Device

## (undated) DEVICE — DRESSING, GAUZE, SUPER, KERLIX, 7.75 X 8.75 IN, BULK, NS

## (undated) DEVICE — SET IV ADMINISTRATION WING 21GA 12IN LF

## (undated) DEVICE — APPLIER CLP L9.375IN APER 2.1MM CLS L3.8MM 20 SM TI CLP

## (undated) DEVICE — DRAPE C ARM W41XL74IN UNIV MOB W RUBBERBAND CLP

## (undated) DEVICE — SUTURE PERMAHAND SZ 4-0 L18IN NONABSORBABLE BLK SILK BRAID A183H

## (undated) DEVICE — PTA BALLOON DILATATION CATHETER: Brand: MUSTANG™

## (undated) DEVICE — DRAPE, LEGGINGS, 48 X 31 IN, STERILE, LF

## (undated) DEVICE — SYRINGE, 60 CC, IRRIGATION, BULB, CONTRO-BULB, PAPER POUCH

## (undated) DEVICE — STRIP, SKIN CLOSURE, STERI STRIP, REINFORCED, 0.5 X 4 IN

## (undated) DEVICE — DRESSING, QUICKCLOT, HEMOSTATIC, 5 X 5

## (undated) DEVICE — DRAPE PACK, LAVH, W/ATTACHED LEGGINGS, W/POUCH, 100 X 114 IN, LF, STERILE

## (undated) DEVICE — KIT CATH 16FR 5ML URIN M INDWL INDWL STR TIP INF CTRL

## (undated) DEVICE — SYRINGE MED 10ML LUERLOCK TIP W/O SFTY DISP

## (undated) DEVICE — SECTO® DISSECTOR, KITTNER, 5/16 IN DIAMETER, (5 EA/POUCH, 24 POUCHES/PK, 4 PK/BX): Brand: SYMMETRY SURGICAL

## (undated) DEVICE — MEDI-VAC YANKAUER SUCTION HANDLE W/BULBOUS TIP: Brand: CARDINAL HEALTH

## (undated) DEVICE — DRESSING, TRANSPARENT, TEGADERM, 2-3/8 X 2-3/4 IN

## (undated) DEVICE — DRESSING, TRANSPARENT, TEGADERM, 4 X 4-3/4 IN, NO LABEL

## (undated) DEVICE — X-RAY DETECTABLE SPONGES,16 PLY: Brand: VISTEC

## (undated) DEVICE — SUTURE VCRL SZ 2-0 L36IN ABSRB UD L36MM CT-1 1/2 CIR J945H

## (undated) DEVICE — SYRINGE, 30 CC, LUER LOCK

## (undated) DEVICE — 3M™ STERI-STRIP™ REINFORCED ADHESIVE SKIN CLOSURES, R1547, 1/2 IN X 4 IN (12 MM X 100 MM), 6 STRIPS/ENVELOPE: Brand: 3M™ STERI-STRIP™

## (undated) DEVICE — GEL, ULTRASOUND, AQUASONIC 100, 20 GM, STERILE

## (undated) DEVICE — TOWEL,OR,DSP,ST,BLUE,STD,4/PK,20PK/CS: Brand: MEDLINE

## (undated) DEVICE — SUTURE VCRL + SZ 4-0 L18IN ABSRB UD L19MM PS-2 3/8 CIR PRIM VCP496H

## (undated) DEVICE — INTRODUCER SYSTEM, PRELUDE SNAP, SPLITTABLE, HEMOSTATIC, 7FR

## (undated) DEVICE — 36" (91 CM) ARTERIAL PRESSURE TUBING: Brand: ICU MEDICAL

## (undated) DEVICE — LABEL MED MINI W/ MARKER

## (undated) DEVICE — DRESSING, AQUACEL AG, HYDROFIBER W/SILVER, 3.5 X 6 IN

## (undated) DEVICE — COVER,MAYO STAND,STERILE: Brand: MEDLINE

## (undated) DEVICE — RADIFOCUS GLIDEWIRE: Brand: GLIDEWIRE

## (undated) DEVICE — FOGARTY - HYDRAGRIP SURGICAL - CLAMP INSERTS: Brand: FOGARTY SAFEJAW

## (undated) DEVICE — BAG, DECANTER

## (undated) DEVICE — SUTURE PERMAHAND SZ 2-0 L12X18IN NONABSORBABLE BLK SILK A185H

## (undated) DEVICE — SUTURE PERMA-HAND SZ 2 L60IN NONABSORBABLE BLK SILK BRAID SA8H

## (undated) DEVICE — DRESSING, ISLAND, TELFA, 4 X 5 IN

## (undated) DEVICE — RETRACTOR, CORDLESS, RADIALUX, LIGHTED

## (undated) DEVICE — GLOVE SURG SZ 85 L12IN FNGR THK94MIL TRNSLUC YEL LTX

## (undated) DEVICE — INTENDED USED TO PROTECT, TAG AND HELP LOCATED SUTURES DURING SURGERY: Brand: STERION®SUTURE AID BOOTIES

## (undated) DEVICE — BRIEF, STRETCH, XXXLARGE, MESH PANTS

## (undated) DEVICE — SUTURE NABSORBABLE PRONOVA L24IN SZ 5-0 BLU C-1 L13MM 3/8 CIR REV PN3725H

## (undated) DEVICE — APPLIER CLP L9.38IN M LIG TI DISP STR RNG HNDL LIGACLP

## (undated) DEVICE — CATH ANGIO INFUSE BR SOS 5FRX.038INX80CM

## (undated) DEVICE — INTENDED TO BE USED TO OCCLUDE, RETRACT AND IDENTIFY ARTERIES, VEINS, TENDONS AND NERVES IN SURGICAL PROCEDURES: Brand: STERION®  VESSEL LOOP

## (undated) DEVICE — ELECTRODE PT RET AD L9FT HI MOIST COND ADH HYDRGEL CORDED

## (undated) DEVICE — 1842 FOAM BLOCK NEEDLE COUNTER: Brand: DEVON

## (undated) DEVICE — SYRINGE BLB 50CC IRRIG PLIABLE FNGR FLNG GRAD FLSK DISP

## (undated) DEVICE — WOUND SYSTEM, DEBRIDEMENT & CLEANING, O.R DUOPAK

## (undated) DEVICE — PENCIL ES L3M BTTN SWCH HOLSTER W/ BLDE ELECTRD EDGE

## (undated) DEVICE — CANISTER NEG PRSS 500ML WND THER W/ TBNG NO PRSS RANG W/

## (undated) DEVICE — ELECTRODE, ELECTROSURGICAL, GUARDED TIP

## (undated) DEVICE — INTRODUCER SYSTEM, PRELUDE SNAP, SPLITTABLE, 9 FR X 13 CM

## (undated) DEVICE — PACK,LAPAROTOMY,NO GOWNS: Brand: MEDLINE

## (undated) DEVICE — SYRINGE, 50 CC, LUER LOCK

## (undated) DEVICE — STAY SET, SURGICAL, 5MM SHARP HOOK, 8PK

## (undated) DEVICE — STAPLER, SKIN PROXIMATE, 35 WIDE

## (undated) DEVICE — VESSEL LOOP, RED MAXI, 2 CARD

## (undated) DEVICE — DRESSING, DRAIN SPONGE, EXCILON AMD, 4X4 IN, 6 PLY, ST

## (undated) DEVICE — DRESSING, TELFA, 3X4

## (undated) DEVICE — MEDI-VAC NON-CONDUCTIVE SUCTION TUBING: Brand: CARDINAL HEALTH

## (undated) DEVICE — TAPE, UMBILICAL, 1/8 X 30 IN, COTTON

## (undated) DEVICE — 3M™ STERI-DRAPE™ INCISE DRAPE 1050 (60CM X 45CM): Brand: STERI-DRAPE™

## (undated) DEVICE — SHIELD, SCOOP FENESTRATION, SCATPAD, ABSORBENT, DUAL, LEFT SUB/ C

## (undated) DEVICE — LOOP VES L12IN DIA0.066IN WHT SIL THN WALL AIR CUSH

## (undated) DEVICE — SYRINGE, 10 CC, LUER LOCK

## (undated) DEVICE — CANNULA PERF L2IN BLNT TIP 3MM VES CLR RADPQ BODY FEM LUER D